# Patient Record
Sex: FEMALE | Race: WHITE | NOT HISPANIC OR LATINO | Employment: OTHER | URBAN - METROPOLITAN AREA
[De-identification: names, ages, dates, MRNs, and addresses within clinical notes are randomized per-mention and may not be internally consistent; named-entity substitution may affect disease eponyms.]

---

## 2017-01-02 DIAGNOSIS — I63.9 CEREBROVASCULAR ACCIDENT (CVA), UNSPECIFIED MECHANISM (H): Primary | ICD-10-CM

## 2017-01-02 RX ORDER — BACLOFEN 10 MG/1
10 TABLET ORAL 3 TIMES DAILY
Qty: 90 TABLET | Refills: 0 | Status: CANCELLED | OUTPATIENT
Start: 2017-01-02

## 2017-01-02 RX ORDER — BACLOFEN 10 MG/1
10 TABLET ORAL 3 TIMES DAILY
Qty: 90 TABLET | Refills: 0 | Status: SHIPPED | OUTPATIENT
Start: 2017-01-02 | End: 2017-01-05

## 2017-01-02 NOTE — TELEPHONE ENCOUNTER
Pt calling for refill of baclofen,  Pharmacy is [facility] on  [address] in [place].  Please call pt when called in or if you have any questions.  OK to leave message on voicemail.

## 2017-01-02 NOTE — TELEPHONE ENCOUNTER
Requesting 90 day supply  baclofen      Last Written Prescription Date:  1/2/17  Last Fill Quantity: 90,   # refills: 0  Last Office Visit with [facility], [facility] or [facility] prescribing provider: 5/2/16  Future Office visit:    Next 5 appointments (look out 90 days)     Jan 06, 2017  8:00 AM   PHYSICAL with Joel Daniel Irwin Wegener, MD   [facility]    [address] [place] [id number] [phone]                   Routing refill request to provider for review/approval because:  Drug not on the [facility], [facility] or [facility] refill protocol or controlled substance

## 2017-01-02 NOTE — TELEPHONE ENCOUNTER
Routing refill request to provider for review/approval because:  Drug not on the [facility] refill protocol     Routing to PCP.    Dr. Wegener-Please sign if agree.    Thank you!  CARYN WatsonN, RN    baclofen (LIORESAL) 10 MG tablet      Last Written Prescription Date: 11/1/16  Last Fill Quantity: 90,  # refills: 0   Last Office Visit with [facility], [facility] or [facility] prescribing provider: 5/2/16 with Dr. Wegener                                         Next 5 appointments (look out 90 days)     Jan 06, 2017  8:00 AM   PHYSICAL with Joel Daniel Irwin Wegener, MD   [facility]    [address] [place] [id number] [phone]

## 2017-01-03 ENCOUNTER — TELEPHONE (OUTPATIENT)
Dept: FAMILY MEDICINE | Facility: CLINIC | Age: 67
End: 2017-01-03

## 2017-01-03 DIAGNOSIS — I63.9 CEREBROVASCULAR ACCIDENT (CVA), UNSPECIFIED MECHANISM (H): Primary | ICD-10-CM

## 2017-01-03 NOTE — TELEPHONE ENCOUNTER
Pharmacy is requesting a 90 day supply on Baclofen 10 mg or a dispense qty of #270.  Prescription was written for a 30 day supply or #90 with directions of Take 1 tablet by mouth 3 times daily  Please advise.  Can patient get a 90 day supply?  Medication not on RN protocol.  Ora Rios, RN

## 2017-01-03 NOTE — TELEPHONE ENCOUNTER
This is a duplicate request.  See other 1/2/17 refill encounter.    Closing this encounter.  CARYN WatsonN, RN

## 2017-01-05 RX ORDER — BACLOFEN 10 MG/1
10 TABLET ORAL 3 TIMES DAILY
Qty: 270 TABLET | Refills: 3 | Status: SHIPPED | OUTPATIENT
Start: 2017-01-05 | End: 2017-12-04

## 2017-01-06 ENCOUNTER — OFFICE VISIT (OUTPATIENT)
Dept: FAMILY MEDICINE | Facility: CLINIC | Age: 67
End: 2017-01-06
Payer: MEDICARE

## 2017-01-06 VITALS
HEIGHT: 66 IN | OXYGEN SATURATION: 96 % | SYSTOLIC BLOOD PRESSURE: 124 MMHG | DIASTOLIC BLOOD PRESSURE: 66 MMHG | TEMPERATURE: 97.7 F | BODY MASS INDEX: 24.91 KG/M2 | RESPIRATION RATE: 12 BRPM | HEART RATE: 60 BPM | WEIGHT: 155 LBS

## 2017-01-06 DIAGNOSIS — F33.0 MAJOR DEPRESSIVE DISORDER, RECURRENT EPISODE, MILD (H): ICD-10-CM

## 2017-01-06 DIAGNOSIS — Z12.4 CERVICAL CANCER SCREENING: ICD-10-CM

## 2017-01-06 DIAGNOSIS — Z13.820 SCREENING FOR OSTEOPOROSIS: ICD-10-CM

## 2017-01-06 DIAGNOSIS — Z00.01 ENCOUNTER FOR GENERAL ADULT MEDICAL EXAMINATION WITH ABNORMAL FINDINGS: Primary | ICD-10-CM

## 2017-01-06 DIAGNOSIS — N18.30 TYPE 2 DIABETES MELLITUS WITH STAGE 3 CHRONIC KIDNEY DISEASE, WITH LONG-TERM CURRENT USE OF INSULIN (H): ICD-10-CM

## 2017-01-06 DIAGNOSIS — M10.9 GOUT, UNSPECIFIED CAUSE, UNSPECIFIED CHRONICITY, UNSPECIFIED SITE: ICD-10-CM

## 2017-01-06 DIAGNOSIS — I10 HYPERTENSION GOAL BP (BLOOD PRESSURE) < 140/90: ICD-10-CM

## 2017-01-06 DIAGNOSIS — Z23 NEED FOR VACCINATION: ICD-10-CM

## 2017-01-06 DIAGNOSIS — E78.5 HYPERLIPIDEMIA LDL GOAL <70: ICD-10-CM

## 2017-01-06 DIAGNOSIS — I10 HYPERTENSION, UNCONTROLLED: ICD-10-CM

## 2017-01-06 DIAGNOSIS — Z79.4 TYPE 2 DIABETES MELLITUS WITH STAGE 3 CHRONIC KIDNEY DISEASE, WITH LONG-TERM CURRENT USE OF INSULIN (H): ICD-10-CM

## 2017-01-06 DIAGNOSIS — J30.2 SEASONAL ALLERGIC RHINITIS, UNSPECIFIED ALLERGIC RHINITIS TRIGGER: ICD-10-CM

## 2017-01-06 DIAGNOSIS — Z78.0 POSTMENOPAUSAL STATUS: ICD-10-CM

## 2017-01-06 DIAGNOSIS — Z11.51 SCREENING FOR HUMAN PAPILLOMAVIRUS: ICD-10-CM

## 2017-01-06 DIAGNOSIS — E11.22 TYPE 2 DIABETES MELLITUS WITH STAGE 3 CHRONIC KIDNEY DISEASE, WITHOUT LONG-TERM CURRENT USE OF INSULIN (H): ICD-10-CM

## 2017-01-06 DIAGNOSIS — D51.9 ANEMIA DUE TO VITAMIN B12 DEFICIENCY, UNSPECIFIED B12 DEFICIENCY TYPE: ICD-10-CM

## 2017-01-06 DIAGNOSIS — E11.22 TYPE 2 DIABETES MELLITUS WITH STAGE 3 CHRONIC KIDNEY DISEASE, WITH LONG-TERM CURRENT USE OF INSULIN (H): ICD-10-CM

## 2017-01-06 DIAGNOSIS — N18.30 TYPE 2 DIABETES MELLITUS WITH STAGE 3 CHRONIC KIDNEY DISEASE, WITHOUT LONG-TERM CURRENT USE OF INSULIN (H): ICD-10-CM

## 2017-01-06 DIAGNOSIS — Z23 NEED FOR PROPHYLACTIC VACCINATION AND INOCULATION AGAINST INFLUENZA: ICD-10-CM

## 2017-01-06 DIAGNOSIS — Z12.4 SCREENING FOR CERVICAL CANCER: ICD-10-CM

## 2017-01-06 DIAGNOSIS — Z11.59 NEED FOR HEPATITIS C SCREENING TEST: ICD-10-CM

## 2017-01-06 LAB
ALT SERPL W P-5'-P-CCNC: 21 U/L (ref 0–50)
ANION GAP SERPL CALCULATED.3IONS-SCNC: 9 MMOL/L (ref 3–14)
AST SERPL W P-5'-P-CCNC: 12 U/L (ref 0–45)
BASOPHILS # BLD AUTO: 0 10E9/L (ref 0–0.2)
BASOPHILS NFR BLD AUTO: 0.4 %
BUN SERPL-MCNC: 28 MG/DL (ref 7–30)
CALCIUM SERPL-MCNC: 9.5 MG/DL (ref 8.5–10.1)
CHLORIDE SERPL-SCNC: 109 MMOL/L (ref 94–109)
CHOLEST SERPL-MCNC: 148 MG/DL
CO2 SERPL-SCNC: 24 MMOL/L (ref 20–32)
CREAT SERPL-MCNC: 0.93 MG/DL (ref 0.52–1.04)
DIFFERENTIAL METHOD BLD: NORMAL
EOSINOPHIL # BLD AUTO: 0.4 10E9/L (ref 0–0.7)
EOSINOPHIL NFR BLD AUTO: 5.2 %
ERYTHROCYTE [DISTWIDTH] IN BLOOD BY AUTOMATED COUNT: 12.6 % (ref 10–15)
GFR SERPL CREATININE-BSD FRML MDRD: 61 ML/MIN/1.7M2
GLUCOSE SERPL-MCNC: 139 MG/DL (ref 70–99)
HBA1C MFR BLD: 6.9 % (ref 4.3–6)
HCT VFR BLD AUTO: 40.4 % (ref 35–47)
HCV AB SERPL QL IA: NORMAL
HDLC SERPL-MCNC: 63 MG/DL
HGB BLD-MCNC: 12.8 G/DL (ref 11.7–15.7)
LDLC SERPL CALC-MCNC: 63 MG/DL
LYMPHOCYTES # BLD AUTO: 1.9 10E9/L (ref 0.8–5.3)
LYMPHOCYTES NFR BLD AUTO: 23.5 %
MCH RBC QN AUTO: 30.9 PG (ref 26.5–33)
MCHC RBC AUTO-ENTMCNC: 31.7 G/DL (ref 31.5–36.5)
MCV RBC AUTO: 98 FL (ref 78–100)
MONOCYTES # BLD AUTO: 0.4 10E9/L (ref 0–1.3)
MONOCYTES NFR BLD AUTO: 5.3 %
NEUTROPHILS # BLD AUTO: 5.3 10E9/L (ref 1.6–8.3)
NEUTROPHILS NFR BLD AUTO: 65.6 %
NONHDLC SERPL-MCNC: 85 MG/DL
PLATELET # BLD AUTO: 275 10E9/L (ref 150–450)
POTASSIUM SERPL-SCNC: 4.7 MMOL/L (ref 3.4–5.3)
RBC # BLD AUTO: 4.14 10E12/L (ref 3.8–5.2)
SODIUM SERPL-SCNC: 142 MMOL/L (ref 133–144)
TRIGL SERPL-MCNC: 109 MG/DL
TSH SERPL DL<=0.005 MIU/L-ACNC: 1.73 MU/L (ref 0.4–4)
URATE SERPL-MCNC: 4.5 MG/DL (ref 2.6–6)
VIT B12 SERPL-MCNC: >6000 PG/ML (ref 193–986)
WBC # BLD AUTO: 8 10E9/L (ref 4–11)

## 2017-01-06 PROCEDURE — 80048 BASIC METABOLIC PNL TOTAL CA: CPT | Performed by: FAMILY MEDICINE

## 2017-01-06 PROCEDURE — 36415 COLL VENOUS BLD VENIPUNCTURE: CPT | Performed by: FAMILY MEDICINE

## 2017-01-06 PROCEDURE — G0145 SCR C/V CYTO,THINLAYER,RESCR: HCPCS | Performed by: FAMILY MEDICINE

## 2017-01-06 PROCEDURE — 83036 HEMOGLOBIN GLYCOSYLATED A1C: CPT | Performed by: FAMILY MEDICINE

## 2017-01-06 PROCEDURE — 86803 HEPATITIS C AB TEST: CPT | Performed by: FAMILY MEDICINE

## 2017-01-06 PROCEDURE — 82607 VITAMIN B-12: CPT | Performed by: FAMILY MEDICINE

## 2017-01-06 PROCEDURE — 80061 LIPID PANEL: CPT | Performed by: FAMILY MEDICINE

## 2017-01-06 PROCEDURE — G0009 ADMIN PNEUMOCOCCAL VACCINE: HCPCS | Performed by: FAMILY MEDICINE

## 2017-01-06 PROCEDURE — 85025 COMPLETE CBC W/AUTO DIFF WBC: CPT | Performed by: FAMILY MEDICINE

## 2017-01-06 PROCEDURE — 84443 ASSAY THYROID STIM HORMONE: CPT | Performed by: FAMILY MEDICINE

## 2017-01-06 PROCEDURE — 90732 PPSV23 VACC 2 YRS+ SUBQ/IM: CPT | Performed by: FAMILY MEDICINE

## 2017-01-06 PROCEDURE — G0438 PPPS, INITIAL VISIT: HCPCS | Performed by: FAMILY MEDICINE

## 2017-01-06 PROCEDURE — 99000 SPECIMEN HANDLING OFFICE-LAB: CPT | Performed by: FAMILY MEDICINE

## 2017-01-06 PROCEDURE — 84450 TRANSFERASE (AST) (SGOT): CPT | Performed by: FAMILY MEDICINE

## 2017-01-06 PROCEDURE — 84460 ALANINE AMINO (ALT) (SGPT): CPT | Performed by: FAMILY MEDICINE

## 2017-01-06 PROCEDURE — 84550 ASSAY OF BLOOD/URIC ACID: CPT | Performed by: FAMILY MEDICINE

## 2017-01-06 RX ORDER — LISINOPRIL 5 MG/1
5 TABLET ORAL DAILY
Qty: 90 TABLET | Refills: 3 | Status: SHIPPED | OUTPATIENT
Start: 2017-01-06 | End: 2017-03-17 | Stop reason: SINTOL

## 2017-01-06 RX ORDER — MONTELUKAST SODIUM 10 MG/1
10 TABLET ORAL DAILY
Qty: 90 TABLET | Refills: 3 | Status: SHIPPED | OUTPATIENT
Start: 2017-01-06 | End: 2017-12-04

## 2017-01-06 RX ORDER — CITALOPRAM HYDROBROMIDE 20 MG/1
20 TABLET ORAL DAILY
Qty: 90 TABLET | Refills: 3 | Status: SHIPPED | OUTPATIENT
Start: 2017-01-06 | End: 2017-03-17

## 2017-01-06 RX ORDER — METOPROLOL SUCCINATE 50 MG/1
50 TABLET, EXTENDED RELEASE ORAL DAILY
Qty: 90 TABLET | Refills: 3 | Status: SHIPPED | OUTPATIENT
Start: 2017-01-06 | End: 2017-12-04

## 2017-01-06 RX ORDER — ATORVASTATIN CALCIUM 40 MG/1
40 TABLET, FILM COATED ORAL DAILY
Qty: 90 TABLET | Refills: 3 | Status: SHIPPED | OUTPATIENT
Start: 2017-01-06 | End: 2017-03-17

## 2017-01-06 NOTE — NURSING NOTE
The following medication was given:     MEDICATION: Vitamin B12  1000mcg  ROUTE: IM  SITE: Forearm - Left  DOSE: 1mL  LOT #: [id number]  :  [employer] [place]  EXPIRATION DATE:  5/1/2018  NDC#: 1722-1217-21  Sun Luong CMA

## 2017-01-06 NOTE — NURSING NOTE
"Chief Complaint   Patient presents with     Physical       Initial /66 mmHg  Pulse 60  Temp(Src) 97.7  F (36.5  C) (Tympanic)  Resp 12  Ht 5' 6\" (1.676 m)  Wt 155 lb (70.308 kg)  BMI 25.03 kg/m2  SpO2 96% Estimated body mass index is 25.03 kg/(m^2) as calculated from the following:    Height as of this encounter: 5' 6\" (1.676 m).    Weight as of this encounter: 155 lb (70.308 kg).  BP completed using cuff size: albertina Luong CMA      "

## 2017-01-06 NOTE — MR AVS SNAPSHOT
After Visit Summary   1/6/2017    Addis Peña    MRN: [id number]           Patient Information     Date Of Birth          1950        Visit Information        Provider Department      1/6/2017 8:00 AM Wegener, Joel Daniel Irwin, MD [facility]        Today's Diagnoses     Encounter for general adult medical examination with abnormal findings    -  1     Hypertension goal BP (blood pressure) < 140/90         Type 2 diabetes mellitus with stage 3 chronic kidney disease, without long-term current use of insulin (H)         Gout, unspecified cause, unspecified chronicity, unspecified site         Hyperlipidemia LDL goal <70         Anemia due to vitamin B12 deficiency, unspecified B12 deficiency type         Major depressive disorder, recurrent episode, mild (H)         Type 2 diabetes mellitus with stage 3 chronic kidney disease, with long-term current use of insulin (H)         Seasonal allergic rhinitis, unspecified allergic rhinitis trigger         Hypertension, uncontrolled         Need for hepatitis C screening test         Screening for osteoporosis         Screening for cervical cancer         Postmenopausal status            Follow-ups after your visit        Your next 10 appointments already scheduled     Feb 03, 2017 10:00 AM   Nurse Only with [facility]   [facility]    [address] [place] [id number] [phone]              Future tests that were ordered for you today     Open Future Orders        Priority Expected Expires Ordered    DX Hip/Pelvis/Spine Routine  1/6/2018 1/6/2017            Who to contact     If you have questions or need follow up information about today's clinic visit or your schedule please contact [facility] directly at [phone].  Normal or non-critical lab and imaging results will be communicated to you by MyChart, letter or phone within 4 business days  "after the clinic has received the results. If you do not hear from us within 7 days, please contact the clinic through [employer] or phone. If you have a critical or abnormal lab result, we will notify you by phone as soon as possible.  Submit refill requests through [employer] or call your pharmacy and they will forward the refill request to us. Please allow 3 business days for your refill to be completed.          Additional Information About Your Visit        [employer] Information     [employer] lets you send messages to your doctor, view your test results, renew your prescriptions, schedule appointments and more. To sign up, go to [email or web address] . Click on \"Log in\" on the left side of the screen, which will take you to the Welcome page. Then click on \"Sign up Now\" on the right side of the page.     You will be asked to enter the access code listed below, as well as some personal information. Please follow the directions to create your username and password.     Your access code is: [email or web address]  Expires: 2017  5:30 AM     Your access code will  in 90 days. If you need help or a new code, please call your [place] clinic or [phone].        Care EveryWhere ID     This is your Care EveryWhere ID. This could be used by other organizations to access your [place] medical records  [id number]        Your Vitals Were     Pulse Temperature Respirations Height BMI (Body Mass Index) Pulse Oximetry    60 97.7  F (36.5  C) (Tympanic) 12 5' 6\" (1.676 m) 25.03 kg/m2 96%       Blood Pressure from Last 3 Encounters:   17 124/66   16 130/68   16 142/68    Weight from Last 3 Encounters:   17 155 lb (70.308 kg)   16 154 lb (69.854 kg)   16 154 lb 1.6 oz (69.9 kg)              We Performed the Following     ALT     AST     Basic metabolic panel     CBC with platelets differential     Hemoglobin A1c     Hepatitis C Screen Reflex to HCV RNA Quant and Genotype     Lipid panel reflex " to direct LDL     Pap imaged thin layer screen with HPV - recommended age 30 - 65 years (select HPV order below)     TSH with free T4 reflex     Uric acid     Vitamin B12          Where to get your medicines      These medications were sent to [id number] [place] - [address] AT [facility] [address]  [address], [place] [address]     Phone:  [phone]    - atorvastatin 40 MG tablet  - citalopram 20 MG tablet  - lisinopril 5 MG tablet  - metFORMIN 1000 MG tablet  - metoprolol 50 MG 24 hr tablet  - montelukast 10 MG tablet       Primary Care Provider Office Phone # Fax #    Joel Daniel Irwin Wegener, [phone]       [facility] [address]  [facility] [address]        Thank you!     Thank you for choosing [facility]  for your care. Our goal is always to provide you with excellent care. Hearing back from our patients is one way we can continue to improve our services. Please take a few minutes to complete the written survey that you may receive in the mail after your visit with us. Thank you!             Your Updated Medication List - Protect others around you: Learn how to safely use, store and throw away your medicines at www.disposemymeds.org.          This list is accurate as of: 1/6/17  8:52 AM.  Always use your most recent med list.                   Brand Name Dispense Instructions for use    amLODIPine 10 MG tablet    NORVASC    90 tablet    Take 1 tablet (10 mg) by mouth daily       aspirin 81 MG tablet      Take 1 tablet by mouth daily.       atorvastatin 40 MG tablet    LIPITOR    90 tablet    Take 1 tablet (40 mg) by mouth daily       baclofen 10 MG tablet    LIORESAL    270 tablet    Take 1 tablet (10 mg) by mouth 3 times daily       blood glucose monitoring test strip    IRENE CONTOUR    200 each    Use to test blood sugars two times daily or as directed.       cholecalciferol 1000 UNIT tablet    vitamin D     Take 1,000 Units by  mouth daily       citalopram 20 MG tablet    celeXA    90 tablet    Take 1 tablet (20 mg) by mouth daily       cyanocobalamin 1000 MCG/ML injection    VITAMIN B12    1 mL    Inject 1 mL (1,000 mcg) into the muscle every 30 days Order for clinic injection,  No need to fill.       dextromethorphan 15 MG/5ML syrup      Take 10 mLs by mouth 2 times daily as needed       insulin pen needle 31G X 5 MM     30 each    See Admin Instructions Use once time(s) a day for use with lantus solostar pen.       Lactase 250 MG Caps      Take 1 chew tab by mouth as needed (with dairy)       lisinopril 5 MG tablet    PRINIVIL/ZESTRIL    90 tablet    Take 1 tablet (5 mg) by mouth daily       metFORMIN 1000 MG tablet    GLUCOPHAGE    180 tablet    Take 1 tablet (1,000 mg) by mouth 2 times daily (with meals)       metoprolol 50 MG 24 hr tablet    TOPROL-XL    90 tablet    Take 1 tablet (50 mg) by mouth daily       montelukast 10 MG tablet    SINGULAIR    90 tablet    Take 1 tablet (10 mg) by mouth daily       senna-docusate 8.6-50 MG per tablet    SENOKOT-S;PERICOLACE    60 tablet    Take 1 tablet by mouth 2 times daily To be taken with opioid (narcotic) pain medication to prevent constipation.       TYLENOL 500 MG tablet   Generic drug:  acetaminophen      Take 500 mg by mouth 2 times daily

## 2017-01-06 NOTE — Clinical Note
[place] [address] [place] [id number] [phone]      January 12, 2017    Addis Peña  [address]    [place] [address]    Dear Addis,    I am happy to inform you that your cervical cancer screening test (PAP smear) was normal and the test for HPV (Human Papilloma Virus) test was also negative or normal.    Per guidelines, you no longer need to have pap smears completed. You will still need to return to the clinic every year for an annual exam and other preventive tests.    Please return on or after 01/06/18 for your next physical exam.    Please contact my office if you have further questions.        Sincerely,    Joel Daniel Wegener, MD./  Cate Gilmore  Pap Tracking RN

## 2017-01-06 NOTE — PROGRESS NOTES
SUBJECTIVE:                                                            Addis Peña is a 66 year old female who presents for Preventive Visit.      Are you in the first 12 months of your Medicare coverage?  No    Physical  Annual:     Getting at least 3 servings of Calcium per day::  Yes    Bi-annual eye exam::  Yes    Dental care twice a year::  Yes    Sleep apnea or symptoms of sleep apnea::  None    Diet::  Diabetic    Frequency of exercise::  6-7 days/week    Duration of exercise::  30-45 minutes    Taking medications regularly::  Yes    Medication side effects::  None    Additional concerns today::  YES      COGNITIVE SCREEN  1) Repeat 3 items (Banana, Sunrise, Chair)    2) Clock draw: NORMAL  3) 3 item recall: Recalls 3 objects  Results: 3 items recalled: COGNITIVE IMPAIRMENT LESS LIKELY    Mini-CogTM Copyright S Jennifer. Licensed by the author for use in [place] [employer]; reprinted with permission (carina@[facility]). All rights reserved.        All Histories reviewed and updated in EPIC as appropriate.  Social History   Substance Use Topics     Smoking status: Never Smoker      Smokeless tobacco: Never Used     Alcohol Use: No       The patient does not drink >3 drinks per day nor >7 drinks per week.        -------------------------------------    Today's PHQ-2 Score:   PHQ-2 ( 1999 Pfizer) 1/6/2017   Q1: Little interest or pleasure in doing things -   Q2: Feeling down, depressed or hopeless -   PHQ-2 Score -   Little interest or pleasure in doing things Not at all   Feeling down, depressed or hopeless Not at all   PHQ-2 Score 0       Do you feel safe in your environment - Yes    Do you have a Health Care Directive?: Yes: Advance Directive has been received and scanned.    Current providers sharing in care for this patient include:   Patient Care Team:  Wegener, Joel Daniel Irwin, MD as PCP - General (Family Practice)  Madelaine Roland MD as MD (Urology)  Wegener, Joel Daniel Irwin, MD as  "Referring Physician (Family Practice)  Annette Perez, RN as Registered Nurse (Urology)      Hearing impairment: No    Ability to successfully perform activities of daily living: No, needs assistance with: transportation, shopping, preparing meals, housework, bathing and laundry     Fall risk:       Home safety:  none identified      The following health maintenance items are reviewed in Epic and correct as of today:  Health Maintenance   Topic Date Due     HEPATITIS C SCREENING  10/22/1969     FOOT EXAM Q1 YEAR( NO INBASKET)  07/31/2016     PHQ-9 Q6 MONTHS (NO INBASKET)  08/10/2016     INFLUENZA VACCINE (SYSTEM ASSIGNED)  09/01/2016     EYE EXAM Q1 YEAR( NO INBASKET)  09/10/2016     FALL RISK ASSESSMENT  10/22/2016     DEXA SCAN SCREENING (SYSTEM ASSIGNED)  10/22/2016     ADVANCE DIRECTIVE PLANNING Q5 YRS (NO INBASKET)  12/09/2016     PNEUMOCOCCAL (2 of 2 - PPSV23) 01/15/2017     DEPRESSION ACTION PLAN Q1 YR (NO INBASKET)  01/15/2017     LIPID MONITORING Q1 YEAR( NO INBASKET)  02/05/2017     MICROALBUMIN Q1 YEAR( NO INBASKET)  02/05/2017     RICCO QUESTIONNAIRE 1 YEAR  02/10/2017     BMP Q6 MOS (NO INBASKET)  04/11/2017     A1C Q6 MO( NO INBASKET)  04/11/2017     MAMMO Q1 YR NO INBASKET MESSAGE  11/11/2017     PAP Q3 YR  12/19/2017     TSH W/ FREE T4 REFLEX Q2 YEAR (NO INBASKET)  12/23/2017     COLONOSCOPY Q3 YR INBASKET MESSAGE  12/04/2018     TETANUS IMMUNIZATION (SYSTEM ASSIGNED)  02/01/2020           ROS:  Constitutional, HEENT, cardiovascular, pulmonary, GI, , musculoskeletal, neuro, skin, endocrine and psych systems are negative, except as otherwise noted.    Problem list, Medication list, Allergies, and Medical/Social/Surgical histories reviewed in [facility] and updated as appropriate.  OBJECTIVE:                                                            /66 mmHg  Pulse 60  Temp(Src) 97.7  F (36.5  C) (Tympanic)  Resp 12  Ht 5' 6\" (1.676 m)  Wt 155 lb (70.308 kg)  BMI 25.03 kg/m2  SpO2 96% " "Estimated body mass index is 25.03 kg/(m^2) as calculated from the following:    Height as of this encounter: 5' 6\" (1.676 m).    Weight as of this encounter: 155 lb (70.308 kg).  EXAM:   GENERAL: healthy, alert and no distress  EYES: Eyes grossly normal to inspection, PERRL and conjunctivae and sclerae normal  RESP: lungs clear to auscultation - no rales, rhonchi or wheezes  BREAST: normal without masses, tenderness or nipple discharge and no palpable axillary masses or adenopathy  CV: regular rate and rhythm, normal S1 S2, no S3 or S4, no murmur, click or rub, no peripheral edema and peripheral pulses strong  ABDOMEN: soft, nontender, no hepatosplenomegaly, no masses and bowel sounds normal  SKIN: no suspicious lesions or rashes  PSYCH: mentation appears normal, affect normal/bright    ASSESSMENT / PLAN:                                                            ASSESSMENT AND PLAN  1. Encounter for general adult medical examination with abnormal findings  pcv 23 repeat today since age 66.  Had flu shot already.  Pap smear today since only two on file at age 66, due for mammogram next year and up to date on colon cancer screening.     dexa scan ordered.     In context of h/o stroke, diabetes, problems below with right sided weakness.  Uses wheelchair, does not walk, has orthotic supports for transfers.  Here with spouse today who helps to care for her.     2. Hypertension goal BP (blood pressure) < 140/90  At goal, labs, refills.   - Basic metabolic panel  - metoprolol (TOPROL-XL) 50 MG 24 hr tablet; Take 1 tablet (50 mg) by mouth daily  Dispense: 90 tablet; Refill: 3    3. Type 2 diabetes mellitus with stage 3 chronic kidney disease, without long-term current use of insulin (H)  Last a1c at goal.   Sugars today fasting /140-160s after meals.   On appropriate ace inhibitor, statin metformin.   - Hemoglobin A1c  - TSH with free T4 reflex    4. Gout, unspecified cause, unspecified chronicity, unspecified " site  Labs today.   - Uric acid    5. Hyperlipidemia LDL goal <70  Labs todayl  - ALT  - AST  - Lipid panel reflex to direct LDL  - atorvastatin (LIPITOR) 40 MG tablet; Take 1 tablet (40 mg) by mouth daily  Dispense: 90 tablet; Refill: 3    6. Anemia due to vitamin B12 deficiency, unspecified B12 deficiency type  Labs today.   - CBC with platelets differential  - Vitamin B12    7. Major depressive disorder, recurrent episode, mild (H)  Currently stating mood is good.   - citalopram (CELEXA) 20 MG tablet; Take 1 tablet (20 mg) by mouth daily  Dispense: 90 tablet; Refill: 3    8. Type 2 diabetes mellitus with stage 3 chronic kidney disease, with long-term current use of insulin (H)    - metFORMIN (GLUCOPHAGE) 1000 MG tablet; Take 1 tablet (1,000 mg) by mouth 2 times daily (with meals)  Dispense: 180 tablet; Refill: 3  - lisinopril (PRINIVIL/ZESTRIL) 5 MG tablet; Take 1 tablet (5 mg) by mouth daily  Dispense: 90 tablet; Refill: 3    9. Seasonal allergic rhinitis, unspecified allergic rhinitis trigger    - montelukast (SINGULAIR) 10 MG tablet; Take 1 tablet (10 mg) by mouth daily  Dispense: 90 tablet; Refill: 3    10. Hypertension, uncontrolled    - metoprolol (TOPROL-XL) 50 MG 24 hr tablet; Take 1 tablet (50 mg) by mouth daily  Dispense: 90 tablet; Refill: 3    11. Need for hepatitis C screening test    - Hepatitis C Screen Reflex to HCV RNA Quant and Genotype    12. Screening for osteoporosis    - DX Hip/Pelvis/Spine; Future    13. Screening for cervical cancer    - Pap imaged thin layer screen with HPV - recommended age 30 - 65 years (select HPV order below)    14. Postmenopausal status    - DX Hip/Pelvis/Spine; Future    15. Need for prophylactic vaccination and inoculation against influenza    - Vaccine Administration, Initial [26816]        ANCELMOHART SIGNUP FOR E-VISITS AND EASIER COMMUNICATION:  [email or web address]     Zipnosis:  [employer] Complete.  Sign up for e-visits for common illnesses!  "    RADIOLOGY:   [facility]:  [phone] to schedule any radiology tests at [facility]: [phone]    Mammograms/colonoscopies:  [phone]    CONSUMER PRICE LINE for estimates of test costs:  [phone]           End of Life Planning:  Patient currently has an advanced directive: on file per problem list.     COUNSELING:  Reviewed preventive health counseling, as reflected in patient instructions       Dental care       Colon cancer screening        Estimated body mass index is 25.03 kg/(m^2) as calculated from the following:    Height as of this encounter: 5' 6\" (1.676 m).    Weight as of this encounter: 155 lb (70.308 kg).     reports that she has never smoked. She has never used smokeless tobacco.      Appropriate preventive services were discussed with this patient, including applicable screening as appropriate for cardiovascular disease, diabetes, osteopenia/osteoporosis, and glaucoma.  As appropriate for age/gender, discussed screening for colorectal cancer, prostate cancer, breast cancer, and cervical cancer. Checklist reviewing preventive services available has been given to the patient.    Reviewed patients plan of care and provided an AVS. The Intermediate Care Plan ( asthma action plan, low back pain action plan, and migraine action plan) for Addis meets the Care Plan requirement. This Care Plan has been established and reviewed with the Patient and spouse.    Counseling Resources:  ATP IV Guidelines  Pooled Cohorts Equation Calculator  Breast Cancer Risk Calculator  FRAX Risk Assessment  ICSI Preventive Guidelines  Dietary Guidelines for Americans, 2010  USDA's MyPlate  ASA Prophylaxis  Lung CA Screening    Joel Daniel Wegener, MD  [facility]  Answers for HPI/ROS submitted by the patient on 1/6/2017   PHQ-2 Depressed: Not at all, Not at all  PHQ-2 Score: 0      Injectable Influenza Immunization Documentation    1.  Is the person to be vaccinated sick " today?  No    2. Does the person to be vaccinated have an allergy to eggs or to a component of the vaccine?  No    3. Has the person to be vaccinated today ever had a serious reaction to influenza vaccine in the past?  No    4. Has the person to be vaccinated ever had Guillain-Manor syndrome?  No     Form completed by Sun Luong CMA

## 2017-01-07 ASSESSMENT — PATIENT HEALTH QUESTIONNAIRE - PHQ9: SUM OF ALL RESPONSES TO PHQ QUESTIONS 1-9: 0

## 2017-01-10 LAB
COPATH REPORT: NORMAL
PAP: NORMAL

## 2017-01-10 PROCEDURE — 87624 HPV HI-RISK TYP POOLED RSLT: CPT | Performed by: FAMILY MEDICINE

## 2017-01-11 LAB
FINAL DIAGNOSIS: NORMAL
HPV HR 12 DNA CVX QL NAA+PROBE: NEGATIVE
HPV16 DNA SPEC QL NAA+PROBE: NEGATIVE
HPV18 DNA SPEC QL NAA+PROBE: NEGATIVE
SPECIMEN DESCRIPTION: NORMAL

## 2017-01-12 PROBLEM — Z12.4 CERVICAL CANCER SCREENING: Status: ACTIVE | Noted: 2017-01-12

## 2017-01-17 ENCOUNTER — TELEPHONE (OUTPATIENT)
Dept: FAMILY MEDICINE | Facility: CLINIC | Age: 67
End: 2017-01-17

## 2017-01-17 DIAGNOSIS — D51.9 ANEMIA DUE TO VITAMIN B12 DEFICIENCY, UNSPECIFIED B12 DEFICIENCY TYPE: Primary | ICD-10-CM

## 2017-01-17 NOTE — TELEPHONE ENCOUNTER
Reason for Call:  Other call back    Detailed comments: Pt received her lab results today 01/17/16. She states that her B12 was high. She did have a B12 shot right before her lab work on 01/06/17. She thinks that is the reason why her B12 is very high. If there are any questions, please contact pt. Thanks!    Phone Number Patient can be reached at: Home number on file [phone] (home)    Best Time: Anytime    Can we leave a detailed message on this number? YES    Call taken on 1/17/2017 at 3:17 PM by Doreen Matos

## 2017-01-19 ENCOUNTER — RADIANT APPOINTMENT (OUTPATIENT)
Dept: BONE DENSITY | Facility: CLINIC | Age: 67
End: 2017-01-19
Attending: FAMILY MEDICINE
Payer: MEDICARE

## 2017-01-19 DIAGNOSIS — Z78.0 POSTMENOPAUSAL STATUS: ICD-10-CM

## 2017-01-19 DIAGNOSIS — Z13.820 SCREENING FOR OSTEOPOROSIS: ICD-10-CM

## 2017-01-19 PROCEDURE — 77080 DXA BONE DENSITY AXIAL: CPT | Performed by: INTERNAL MEDICINE

## 2017-01-20 NOTE — TELEPHONE ENCOUNTER
I agree that could be.  I recommend she re-check her level on the day of her next injection before she takes the injection. Joel Wegener,MD

## 2017-01-23 NOTE — TELEPHONE ENCOUNTER
Called and relayed message below from Dr. Wegener. Pt agrees she will schedule a lab visit prior to her next injection.    Teresa Couch CMA

## 2017-01-25 ENCOUNTER — TELEPHONE (OUTPATIENT)
Dept: FAMILY MEDICINE | Facility: CLINIC | Age: 67
End: 2017-01-25

## 2017-01-25 DIAGNOSIS — E55.9 VITAMIN D DEFICIENCY: Primary | ICD-10-CM

## 2017-01-25 NOTE — TELEPHONE ENCOUNTER
Pt called about Dexa result letter.  Pt was instructed to f/u with pcp.    She is wondering if you will rx something or if she should be on vit D and calcium.  She already takes 1000 units of Vit D.    Ok to leave a detailed message.  TYLER Lozada

## 2017-01-26 NOTE — TELEPHONE ENCOUNTER
Dr. Wegener - Paulette has a lab appointment on the 3rd of February, I pended a Vitamin D order and marked it future, if you approve please sign and she will get her vitamin d checked the same time.   Thank you    Patel Watson CMA

## 2017-01-26 NOTE — TELEPHONE ENCOUNTER
Per Dr. Wegener,  Call patient and let her know that we will check the vitamin level at next visit.   Patel Watson, CMA

## 2017-02-03 ENCOUNTER — ALLIED HEALTH/NURSE VISIT (OUTPATIENT)
Dept: NURSING | Facility: CLINIC | Age: 67
End: 2017-02-03
Payer: MEDICARE

## 2017-02-03 DIAGNOSIS — E55.9 VITAMIN D DEFICIENCY: ICD-10-CM

## 2017-02-03 DIAGNOSIS — D51.9 ANEMIA DUE TO VITAMIN B12 DEFICIENCY, UNSPECIFIED B12 DEFICIENCY TYPE: ICD-10-CM

## 2017-02-03 DIAGNOSIS — D51.9 ANEMIA DUE TO VITAMIN B12 DEFICIENCY, UNSPECIFIED B12 DEFICIENCY TYPE: Primary | ICD-10-CM

## 2017-02-03 LAB
DEPRECATED CALCIDIOL+CALCIFEROL SERPL-MC: 40 UG/L (ref 20–75)
VIT B12 SERPL-MCNC: 517 PG/ML (ref 193–986)

## 2017-02-03 PROCEDURE — 82607 VITAMIN B-12: CPT | Performed by: FAMILY MEDICINE

## 2017-02-03 PROCEDURE — 96372 THER/PROPH/DIAG INJ SC/IM: CPT

## 2017-02-03 PROCEDURE — 99207 ZZC NO CHARGE NURSE ONLY: CPT

## 2017-02-03 PROCEDURE — 36415 COLL VENOUS BLD VENIPUNCTURE: CPT | Performed by: FAMILY MEDICINE

## 2017-02-03 PROCEDURE — 82306 VITAMIN D 25 HYDROXY: CPT | Performed by: FAMILY MEDICINE

## 2017-02-03 NOTE — NURSING NOTE
The following medication was given:     MEDICATION: Vitamin B12  1000 mcg  ROUTE: IM  SITE: Deltoid - Left  DOSE: 1000 mcg/ml  LOT #: [id number]  :  [employer] [place]  EXPIRATION DATE:  7/2018  NDC#: 7978-0417-47   Verified by YESENIA Neal MA

## 2017-02-20 ENCOUNTER — TELEPHONE (OUTPATIENT)
Dept: FAMILY MEDICINE | Facility: CLINIC | Age: 67
End: 2017-02-20

## 2017-02-20 NOTE — TELEPHONE ENCOUNTER
Routing to PCP.    Dr. Wegener-Please review and advise.  Do you recommend patient continue with B12 injections and taking Vitamin D 1000 IU daily?    Thank you!  CARYN WatsonN, RN

## 2017-02-20 NOTE — TELEPHONE ENCOUNTER
Reason for Call:  Other Letter, B12 shots, Vit. D for bones    Detailed comments: Pt had a vitamin D test done on 02/03/17. She called to see what her results were since she has not received any. Her results were normal, but would like someone to contact here via phone or mail with future results. Pt would also like to know if she should still be receiving her B12 shot and taking her vitamin D meds.  Please contact pt regarding this. THanks!    Phone Number Patient can be reached at: Home number on file [phone] (home)    Best Time: Anytime    Can we leave a detailed message on this number? YES    Call taken on 2/20/2017 at 9:06 AM by Doreen Matos

## 2017-02-20 NOTE — TELEPHONE ENCOUNTER
Called PT, she did not answer, left a message stating to continue taking her B12 and Vitamin D. Told her to call back if she has any questions.     Octavia Fang, SMA

## 2017-03-03 ENCOUNTER — ALLIED HEALTH/NURSE VISIT (OUTPATIENT)
Dept: NURSING | Facility: CLINIC | Age: 67
End: 2017-03-03
Payer: MEDICARE

## 2017-03-03 DIAGNOSIS — D51.9 ANEMIA DUE TO VITAMIN B12 DEFICIENCY, UNSPECIFIED B12 DEFICIENCY TYPE: Primary | ICD-10-CM

## 2017-03-03 PROCEDURE — 96372 THER/PROPH/DIAG INJ SC/IM: CPT

## 2017-03-03 PROCEDURE — 99207 ZZC NO CHARGE NURSE ONLY: CPT

## 2017-03-03 NOTE — MR AVS SNAPSHOT
After Visit Summary   3/3/2017    Addis Peña    MRN: [id number]           Patient Information     Date Of Birth          1950        Visit Information        Provider Department      3/3/2017 10:00 AM HP MEDICAL ASSISTANT [facility]        Today's Diagnoses     Anemia due to vitamin B12 deficiency, unspecified B12 deficiency type    -  1       Follow-ups after your visit        Your next 10 appointments already scheduled     Mar 24, 2017  2:20 PM CDT   Office Visit with Joel Daniel Irwin Wegener, MD   Formerly named [facility] (Formerly named [facility])    [address] [place] [address] [phone]           Bring a current list of meds and any records pertaining to this visit.  For Physicals, please bring immunization records and any forms needing to be filled out.  Please arrive 10 minutes early to complete paperwork.            Apr 07, 2017 10:00 AM CDT   Nurse Only with HP MEDICAL ASSISTANT   [facility] ([facility])    [address] [place] [address] [phone]              Who to contact     If you have questions or need follow up information about today's clinic visit or your schedule please contact [facility] directly at [phone].  Normal or non-critical lab and imaging results will be communicated to you by sougouhart, letter or phone within 4 business days after the clinic has received the results. If you do not hear from us within 7 days, please contact the clinic through sougouhart or phone. If you have a critical or abnormal lab result, we will notify you by phone as soon as possible.  Submit refill requests through Lighting by LED or call your pharmacy and they will forward the refill request to us. Please allow 3 business days for your refill to be completed.          Additional Information About Your Visit        Lighting by LED Information     Lighting by LED lets you send messages to  "your doctor, view your test results, renew your prescriptions, schedule appointments and more. To sign up, go to [email or web address] . Click on \"Log in\" on the left side of the screen, which will take you to the Welcome page. Then click on \"Sign up Now\" on the right side of the page.     You will be asked to enter the access code listed below, as well as some personal information. Please follow the directions to create your username and password.     Your access code is: [email or web address]  Expires: 2017 10:04 AM     Your access code will  in 90 days. If you need help or a new code, please call your [place] [facility] clinic or [phone].        Care EveryWhere ID     This is your Care EveryWhere ID. This could be used by other organizations to access your [facility] medical records  [id number]         Blood Pressure from Last 3 Encounters:   17 124/66   16 130/68   16 142/68    Weight from Last 3 Encounters:   17 155 lb (70.3 kg)   16 154 lb (69.9 kg)   16 154 lb 1.6 oz (69.9 kg)              We Performed the Following     INJECTION INTRAMUSCULAR OR SUB-Q     VITAMIN B12 INJ /1000MCG        Primary Care Provider Office Phone # Fax #    Joel Daniel Irwin Wegener, [phone]       [address] [address]        Thank you!     Thank you for choosing [facility]  for your care. Our goal is always to provide you with excellent care. Hearing back from our patients is one way we can continue to improve our services. Please take a few minutes to complete the written survey that you may receive in the mail after your visit with us. Thank you!             Your Updated Medication List - Protect others around you: Learn how to safely use, store and throw away your medicines at www.disposemymeds.org.          This list is accurate as of: 3/3/17 10:04 AM.  Always use your most recent med list.                   Brand Name " Dispense Instructions for use    amLODIPine 10 MG tablet    NORVASC    90 tablet    Take 1 tablet (10 mg) by mouth daily       aspirin 81 MG tablet      Take 1 tablet by mouth daily.       atorvastatin 40 MG tablet    LIPITOR    90 tablet    Take 1 tablet (40 mg) by mouth daily       baclofen 10 MG tablet    LIORESAL    270 tablet    Take 1 tablet (10 mg) by mouth 3 times daily       blood glucose monitoring test strip    IRENE CONTOUR    200 each    Use to test blood sugars two times daily or as directed.       cholecalciferol 1000 UNIT tablet    vitamin D     Take 1,000 Units by mouth daily       citalopram 20 MG tablet    celeXA    90 tablet    Take 1 tablet (20 mg) by mouth daily       cyanocobalamin 1000 MCG/ML injection    VITAMIN B12    1 mL    Inject 1 mL (1,000 mcg) into the muscle every 30 days Order for clinic injection,  No need to fill.       dextromethorphan 15 MG/5ML syrup      Take 10 mLs by mouth 2 times daily as needed       insulin pen needle 31G X 5 MM     30 each    See Admin Instructions Use once time(s) a day for use with lantus solostar pen.       Lactase 250 MG Caps      Take 1 chew tab by mouth as needed (with dairy)       lisinopril 5 MG tablet    PRINIVIL/ZESTRIL    90 tablet    Take 1 tablet (5 mg) by mouth daily       metFORMIN 1000 MG tablet    GLUCOPHAGE    180 tablet    Take 1 tablet (1,000 mg) by mouth 2 times daily (with meals)       metoprolol 50 MG 24 hr tablet    TOPROL-XL    90 tablet    Take 1 tablet (50 mg) by mouth daily       montelukast 10 MG tablet    SINGULAIR    90 tablet    Take 1 tablet (10 mg) by mouth daily       senna-docusate 8.6-50 MG per tablet    SENOKOT-S;PERICOLACE    60 tablet    Take 1 tablet by mouth 2 times daily To be taken with opioid (narcotic) pain medication to prevent constipation.       TYLENOL 500 MG tablet   Generic drug:  acetaminophen      Take 500 mg by mouth 2 times daily

## 2017-03-03 NOTE — PROGRESS NOTES
The following medication was given:     MEDICATION: Vitamin B12  1000mcg  ROUTE: IM  SITE: Deltoid - Right  DOSE: 1 mL  LOT #:[id number]  :  [employer]  EXPIRATION DATE:  7/2018  NDC: 5041-1714-80  Verified by Yonas Fagan CMA

## 2017-03-14 ENCOUNTER — TELEPHONE (OUTPATIENT)
Dept: FAMILY MEDICINE | Facility: CLINIC | Age: 67
End: 2017-03-14

## 2017-03-14 NOTE — TELEPHONE ENCOUNTER
Pt has had a cold for one month.  Cough is getting worse.  No fever.  Nasal congestion.  Sometimes a productive cough.  Pt wanted to see pcp on Friday afternoon.  Used BAKARI Lozada RN

## 2017-03-17 ENCOUNTER — OFFICE VISIT (OUTPATIENT)
Dept: FAMILY MEDICINE | Facility: CLINIC | Age: 67
End: 2017-03-17
Payer: MEDICARE

## 2017-03-17 VITALS — HEART RATE: 73 BPM | TEMPERATURE: 99.2 F | SYSTOLIC BLOOD PRESSURE: 138 MMHG | DIASTOLIC BLOOD PRESSURE: 80 MMHG

## 2017-03-17 DIAGNOSIS — R05.8 ACE-INHIBITOR COUGH: ICD-10-CM

## 2017-03-17 DIAGNOSIS — F33.0 MAJOR DEPRESSIVE DISORDER, RECURRENT EPISODE, MILD (H): ICD-10-CM

## 2017-03-17 DIAGNOSIS — T46.4X5A ACE-INHIBITOR COUGH: ICD-10-CM

## 2017-03-17 DIAGNOSIS — R05.9 COUGH: Primary | ICD-10-CM

## 2017-03-17 DIAGNOSIS — I10 HYPERTENSION GOAL BP (BLOOD PRESSURE) < 140/90: ICD-10-CM

## 2017-03-17 DIAGNOSIS — E78.5 HYPERLIPIDEMIA LDL GOAL <70: ICD-10-CM

## 2017-03-17 PROCEDURE — 99213 OFFICE O/P EST LOW 20 MIN: CPT | Performed by: FAMILY MEDICINE

## 2017-03-17 RX ORDER — LOSARTAN POTASSIUM 25 MG/1
25 TABLET ORAL DAILY
Qty: 90 TABLET | Refills: 3 | Status: SHIPPED | OUTPATIENT
Start: 2017-03-17 | End: 2017-12-04

## 2017-03-17 RX ORDER — AMLODIPINE BESYLATE 10 MG/1
10 TABLET ORAL DAILY
Qty: 90 TABLET | Refills: 1 | Status: SHIPPED | OUTPATIENT
Start: 2017-03-17 | End: 2017-09-14

## 2017-03-17 RX ORDER — CITALOPRAM HYDROBROMIDE 20 MG/1
20 TABLET ORAL DAILY
Qty: 90 TABLET | Refills: 3 | Status: SHIPPED | OUTPATIENT
Start: 2017-03-17 | End: 2017-12-04

## 2017-03-17 RX ORDER — ATORVASTATIN CALCIUM 40 MG/1
40 TABLET, FILM COATED ORAL DAILY
Qty: 90 TABLET | Refills: 3 | Status: SHIPPED | OUTPATIENT
Start: 2017-03-17 | End: 2017-12-04

## 2017-03-17 NOTE — PATIENT INSTRUCTIONS
ASSESSMENT AND PLAN  1. Cough  Chronic cough since fall mostly fits with starting lisinopril and tickle in throat/throat clearing.      More recent cough likely mild cold.  If significantly worsening/not better let me know.  Expect two weeks for lisinopril cough to wear off.       2. Hyperlipidemia LDL goal <70  Refill needed.   - atorvastatin (LIPITOR) 40 MG tablet; Take 1 tablet (40 mg) by mouth daily  Dispense: 90 tablet; Refill: 3    3. Major depressive disorder, recurrent episode, mild (H)  Refill needed.   - citalopram (CELEXA) 20 MG tablet; Take 1 tablet (20 mg) by mouth daily  Dispense: 90 tablet; Refill: 3    4. Hypertension goal BP (blood pressure) < 140/90  Change to losartan 25 mg daily.  Stop lisinopril 5 mg daily.     - losartan (COZAAR) 25 MG tablet; Take 1 tablet (25 mg) by mouth daily  Dispense: 90 tablet; Refill: 3    5. ACE-inhibitor cough  Noted.     Follow up with me as planned to review dexa scan.           [facility] SIGNUP FOR E-VISITS AND EASIER COMMUNICATION:  [email or web address]     Zipnosis:  [email or web address].  Sign up for e-visits for common illnesses!     RADIOLOGY:   [facility]:  [phone] to schedule any radiology tests at [facility]: [phone]    Mammograms/colonoscopies:  [phone]    CONSUMER PRICE LINE for estimates of test costs:  [phone]

## 2017-03-17 NOTE — NURSING NOTE
"Chief Complaint   Patient presents with     URI     coughing       Initial /80 (BP Location: Right arm, Patient Position: Chair, Cuff Size: Adult Regular)  Pulse 73  Temp 99.2  F (37.3  C) (Oral) Estimated body mass index is 25.02 kg/(m^2) as calculated from the following:    Height as of 1/6/17: 5' 6\" (1.676 m).    Weight as of 1/6/17: 155 lb (70.3 kg).  Medication Reconciliation: complete Rupal Duque MA      "

## 2017-03-17 NOTE — PROGRESS NOTES
SUBJECTIVE:                                                    Addis Peña is a 66 year old female who presents to clinic today for the following health issues:      RESPIRATORY SYMPTOMS      Duration: November 2016    Description  nasal congestion, rhinorrhea, sore throat, cough and headache    Severity: moderate 8/10    Accompanying signs and symptoms: None    History (predisposing factors):  none    Precipitating or alleviating factors: laying down makes it worst    Therapies tried and outcome:  Cough drop     Additional history/life update:       Cough: with tickle in throat, throat clearing.  In retrospect started soon after starting lisinopril.  Per  seems to recall was on losartan in past and may have had this problem before with lisinopril.   Hyperlipidemia LDL goal <70  Major depressive disorder, recurrent episode, mild (H)  Hypertension goal BP (blood pressure) < 140/90  ACE-inhibitor cough :    Medical, social, surgical, and family histories reviewed.      REVIEW OF SYSTEMS FOR GENERAL, RESPIRATORY, CV, GI AND PSYCHIATRIC NEGATIVE EXCEPT AS NOTED IN HPI.         OBJECTIVE:  /80 (BP Location: Right arm, Patient Position: Chair, Cuff Size: Adult Regular)  Pulse 73  Temp 99.2  F (37.3  C) (Oral)    EXAM:  GENERAL APPEARANCE: healthy, alert and no distress  RESP: lungs clear to auscultation - no rales, rhonchi or wheezes  CV: regular rates and rhythm, normal S1 S2, no S3 or S4 and no murmur, click or rub -        ASSESSMENT AND PLAN  Patient Instructions   ASSESSMENT AND PLAN  1. Cough  Chronic cough since fall mostly fits with starting lisinopril and tickle in throat/throat clearing.      More recent cough likely mild cold.  If significantly worsening/not better let me know.  Expect two weeks for lisinopril cough to wear off.       2. Hyperlipidemia LDL goal <70  Refill needed.   - atorvastatin (LIPITOR) 40 MG tablet; Take 1 tablet (40 mg) by mouth daily  Dispense: 90 tablet; Refill:  3    3. Major depressive disorder, recurrent episode, mild (H)  Refill needed.   - citalopram (CELEXA) 20 MG tablet; Take 1 tablet (20 mg) by mouth daily  Dispense: 90 tablet; Refill: 3    4. Hypertension goal BP (blood pressure) < 140/90  Change to losartan 25 mg daily.  Stop lisinopril 5 mg daily.     - losartan (COZAAR) 25 MG tablet; Take 1 tablet (25 mg) by mouth daily  Dispense: 90 tablet; Refill: 3    5. ACE-inhibitor cough  Noted.     Follow up with me as planned to review dexa scan.           [facility] SIGNUP FOR E-VISITS AND EASIER COMMUNICATION:  [email or web address]     Zipnosis:  [place].  Sign up for e-visits for common illnesses!     RADIOLOGY:   [facility]:  [phone] to schedule any radiology tests at [facility]: [phone]    Mammograms/colonoscopies:  [phone]    CONSUMER PRICE LINE for estimates of test costs:  [phone]               Joel Wegener,MD

## 2017-03-17 NOTE — MR AVS SNAPSHOT
After Visit Summary   3/17/2017    Addis Peña    MRN: [id number]           Patient Information     Date Of Birth          1950        Visit Information        Provider Department      3/17/2017 3:40 PM Wegener, Joel Daniel Irwin, MD [facility]        Today's Diagnoses     Cough    -  1    Hyperlipidemia LDL goal <70        Major depressive disorder, recurrent episode, mild (H)        Hypertension goal BP (blood pressure) < 140/90        ACE-inhibitor cough          Care Instructions    ASSESSMENT AND PLAN  1. Cough  Chronic cough since fall mostly fits with starting lisinopril and tickle in throat/throat clearing.      More recent cough likely mild cold.  If significantly worsening/not better let me know.  Expect two weeks for lisinopril cough to wear off.       2. Hyperlipidemia LDL goal <70  Refill needed.   - atorvastatin (LIPITOR) 40 MG tablet; Take 1 tablet (40 mg) by mouth daily  Dispense: 90 tablet; Refill: 3    3. Major depressive disorder, recurrent episode, mild (H)  Refill needed.   - citalopram (CELEXA) 20 MG tablet; Take 1 tablet (20 mg) by mouth daily  Dispense: 90 tablet; Refill: 3    4. Hypertension goal BP (blood pressure) < 140/90  Change to losartan 25 mg daily.  Stop lisinopril 5 mg daily.     - losartan (COZAAR) 25 MG tablet; Take 1 tablet (25 mg) by mouth daily  Dispense: 90 tablet; Refill: 3    5. ACE-inhibitor cough  Noted.     Follow up with me as planned to review dexa scan.           [facility] SIG[facility]P FOR E-VISITS AND EASIER COMMUNICATION:  [email or web address]     Zipnosis:  [email or web address].  Sign up for e-visits for common illnesses!     RADIOLOGY:   [facility]:  [phone] to schedule any radiology tests at [facility]: [phone]    Mammograms/colonoscopies:  [phone]    CONSUMER PRICE LINE for estimates of test costs:  [phone]             Follow-ups after your visit        Your next 10  "appointments already scheduled     Mar 24, 2017  2:20 PM CDT   Office Visit with Joel Daniel Irwin Wegener, MD   [facility] ([facility])    [address] [place] [address] [phone]           Bring a current list of meds and any records pertaining to this visit.  For Physicals, please bring immunization records and any forms needing to be filled out.  Please arrive 10 minutes early to complete paperwork.            Apr 07, 2017 10:00 AM CDT   Nurse Only with HP MEDICAL ASSISTANT   [facility]    [address] [facility] [address] [phone]              Who to contact     If you have questions or need follow up information about today's clinic visit or your schedule please contact [facility] directly at [phone].  Normal or non-critical lab and imaging results will be communicated to you by e27hart, letter or phone within 4 business days after the clinic has received the results. If you do not hear from us within 7 days, please contact the clinic through e27hart or phone. If you have a critical or abnormal lab result, we will notify you by phone as soon as possible.  Submit refill requests through ClipCard or call your pharmacy and they will forward the refill request to us. Please allow 3 business days for your refill to be completed.          Additional Information About Your Visit        ClipCard Information     ClipCard lets you send messages to your doctor, view your test results, renew your prescriptions, schedule appointments and more. To sign up, go to [email or web address] . Click on \"Log in\" on the left side of the screen, which will take you to the Welcome page. Then click on \"Sign up Now\" on the right side of the page.     You will be asked to enter the access code listed below, as well as some personal information. Please follow the directions to create your username and " password.     Your access code is: [email or web address]  Expires: 2017 11:04 AM     Your access code will  in 90 days. If you need help or a new code, please call your [facility] or [phone].        Care EveryWhere ID     This is your Care EveryWhere ID. This could be used by other organizations to access your [facility] medical records  [id number]        Your Vitals Were     Pulse Temperature                73 99.2  F (37.3  C) (Oral)           Blood Pressure from Last 3 Encounters:   17 138/80   17 124/66   16 130/68    Weight from Last 3 Encounters:   17 155 lb (70.3 kg)   16 154 lb (69.9 kg)   16 154 lb 1.6 oz (69.9 kg)              Today, you had the following     No orders found for display         Today's Medication Changes          These changes are accurate as of: 3/17/17  4:32 PM.  If you have any questions, ask your nurse or doctor.               Start taking these medicines.        Dose/Directions    losartan 25 MG tablet   Commonly known as:  COZAAR   Used for:  Hypertension goal BP (blood pressure) < 140/90   Started by:  Wegener, Joel Daniel Irwin, MD        Dose:  25 mg   Take 1 tablet (25 mg) by mouth daily   Quantity:  90 tablet   Refills:  3         Stop taking these medicines if you haven't already. Please contact your care team if you have questions.     lisinopril 5 MG tablet   Commonly known as:  PRINIVIL/ZESTRIL   Stopped by:  Wegener, Joel Daniel Irwin, MD                Where to get your medicines      These medications were sent to [id number] [place] [address] AT [facility] [address]   [place], [place] [address]     Phone:  [phone]     amLODIPine 10 MG tablet    atorvastatin 40 MG tablet    citalopram 20 MG tablet    losartan 25 MG tablet                Primary Care Provider Office Phone # Fax #    Joel Daniel Irwin Wegener, [phone]       [facility] [address]  [facility] [address]        Thank you!     Thank you for choosing [facility]  for your care. Our goal is always to provide you with excellent care. Hearing back from our patients is one way we can continue to improve our services. Please take a few minutes to complete the written survey that you may receive in the mail after your visit with us. Thank you!             Your Updated Medication List - Protect others around you: Learn how to safely use, store and throw away your medicines at www.disposemymeds.org.          This list is accurate as of: 3/17/17  4:32 PM.  Always use your most recent med list.                   Brand Name Dispense Instructions for use    amLODIPine 10 MG tablet    NORVASC    90 tablet    Take 1 tablet (10 mg) by mouth daily       aspirin 81 MG tablet      Take 1 tablet by mouth daily.       atorvastatin 40 MG tablet    LIPITOR    90 tablet    Take 1 tablet (40 mg) by mouth daily       baclofen 10 MG tablet    LIORESAL    270 tablet    Take 1 tablet (10 mg) by mouth 3 times daily       blood glucose monitoring test strip    IRENE CONTOUR    200 each    Use to test blood sugars two times daily or as directed.       cholecalciferol 1000 UNIT tablet    vitamin D     Take 1,000 Units by mouth daily       citalopram 20 MG tablet    celeXA    90 tablet    Take 1 tablet (20 mg) by mouth daily       cyanocobalamin 1000 MCG/ML injection    VITAMIN B12    1 mL    Inject 1 mL (1,000 mcg) into the muscle every 30 days Order for clinic injection,  No need to fill.       dextromethorphan 15 MG/5ML syrup      Take 10 mLs by mouth 2 times daily as needed       insulin pen needle 31G X 5 MM     30 each    See Admin Instructions Use once time(s) a day for use with lantus solostar pen.       Lactase 250 MG Caps      Take 1 chew tab by mouth as needed (with dairy)       losartan 25 MG tablet    COZAAR    90 tablet    Take 1 tablet (25 mg) by mouth daily       metFORMIN 1000 MG tablet     GLUCOPHAGE    180 tablet    Take 1 tablet (1,000 mg) by mouth 2 times daily (with meals)       metoprolol 50 MG 24 hr tablet    TOPROL-XL    90 tablet    Take 1 tablet (50 mg) by mouth daily       montelukast 10 MG tablet    SINGULAIR    90 tablet    Take 1 tablet (10 mg) by mouth daily       senna-docusate 8.6-50 MG per tablet    SENOKOT-S;PERICOLACE    60 tablet    Take 1 tablet by mouth 2 times daily To be taken with opioid (narcotic) pain medication to prevent constipation.       TYLENOL 500 MG tablet   Generic drug:  acetaminophen      Take 500 mg by mouth 2 times daily

## 2017-03-17 NOTE — TELEPHONE ENCOUNTER
Medication Detail      Disp Refills Start End MARIZA   amLODIPine (NORVASC) 10 MG tablet 90 tablet 3 1/7/2016  --   Sig: Take 1 tablet (10 mg) by mouth daily   Class: E-Prescribe   Route: Oral       Last Office Visit with [facility], P or [facility] prescribing provider:  1/6/2017   Future Office Visit:    Next 5 appointments (look out 90 days)     Mar 17, 2017  3:40 PM CDT   SHORT with Joel Daniel Irwin Wegener, MD   [facility] ([facility])    [address] [place] [address] [phone]            Mar 24, 2017  2:20 PM CDT   Office Visit with Joel Daniel Irwin Wegener, MD   [facility] ([facility])    [address] [place] [id number] [phone]            Apr 07, 2017 10:00 AM CDT   Nurse Only with HP MEDICAL ASSISTANT   [facility]    [address] [place] [id number] [phone]                    BP Readings from Last 3 Encounters:   01/06/17 124/66   05/02/16 130/68   03/28/16 142/68

## 2017-03-24 ENCOUNTER — OFFICE VISIT (OUTPATIENT)
Dept: FAMILY MEDICINE | Facility: CLINIC | Age: 67
End: 2017-03-24
Payer: MEDICARE

## 2017-03-24 VITALS
HEART RATE: 69 BPM | TEMPERATURE: 98.9 F | OXYGEN SATURATION: 92 % | SYSTOLIC BLOOD PRESSURE: 112 MMHG | DIASTOLIC BLOOD PRESSURE: 78 MMHG

## 2017-03-24 DIAGNOSIS — M81.0 AGE-RELATED OSTEOPOROSIS WITHOUT CURRENT PATHOLOGICAL FRACTURE: Primary | ICD-10-CM

## 2017-03-24 PROCEDURE — 99214 OFFICE O/P EST MOD 30 MIN: CPT | Performed by: FAMILY MEDICINE

## 2017-03-24 NOTE — PROGRESS NOTES
SUBJECTIVE:                                                    Addis Peña is a 66 year old female who presents to clinic today for the following health issues:    1. Pt is here to follow up with bone density results and discuss osteoporosis medications.      Additional history/life update:    Age-related osteoporosis without current pathological fracture : has not had fractures.  No fhx fractures/hip fracture.  No corticosteroid use, smoking or heavy alcohol.  No falls.      Medical, social, surgical, and family histories reviewed.      REVIEW OF SYSTEMS FOR GENERAL, RESPIRATORY, CV, GI AND PSYCHIATRIC NEGATIVE EXCEPT AS NOTED IN HPI.         OBJECTIVE:  /78 (BP Location: Right arm, Patient Position: Chair, Cuff Size: Adult Regular)  Pulse 69  Temp 98.9  F (37.2  C) (Oral)  SpO2 92%    EXAM:  GENERAL APPEARANCE: healthy, alert and no distress        ASSESSMENT AND PLAN  Patient Instructions   ASSESSMENT AND PLAN  1. Age-related osteoporosis without current pathological fracture  Reviewed FRAX score and no need for treatment. Is below treatment threshold for both.  10 year risk major fracture 13.5 % and hip 3.5%  .  This is on higher end of non-treatment zone but still in established no treatment zone by frax criteria/who.  Has high calcium diet, vitamin D normal and on vitamin D supplementation and so will continue this.     Follow up at 12:20 on June 27th for diabetes and kidney follow up.      Ace Cough better with med changes!!    More than 1/2 of 25 minutes spent counseling/coordination of are.               Joel Wegener,MD

## 2017-03-24 NOTE — MR AVS SNAPSHOT
After Visit Summary   3/24/2017    Addis Peña    MRN: [id number]           Patient Information     Date Of Birth          1950        Visit Information        Provider Department      3/24/2017 2:20 PM Wegener, Joel Daniel Irwin, MD [facility]        Today's Diagnoses     Age-related osteoporosis without current pathological fracture    -  1      Care Instructions    ASSESSMENT AND PLAN  1. Age-related osteoporosis without current pathological fracture  Reviewed FRAX score and no need for treatment.  Has high calcium diet, vitamin D normal and on vitamin D supplementation.     Follow up at 12:20 on June 27th for diabetes and kidney follow up.      Cough better with med changes!!          MYCHART SIGNUP FOR E-VISITS AND EASIER COMMUNICATION:  [email or web address]     Zipnosis:  [employer].  Sign up for e-visits for common illnesses!     RADIOLOGY:   [facility]:  [phone] to schedule any radiology tests at [facility]: [phone]    Mammograms/colonoscopies:  [phone]    CONSUMER PRICE LINE for estimates of test costs:  [phone]             Follow-ups after your visit        Your next 10 appointments already scheduled     Apr 07, 2017 10:00 AM CDT   Nurse Only with HP MEDICAL ASSISTANT   [facility]    [address] [place] [id number] [phone]            Jun 27, 2017 12:20 PM CDT   SHORT with Joel Daniel Irwin Wegener, MD   [facility] ([facility])    [address] [place] [id number] [phone]              Who to contact     If you have questions or need follow up information about today's clinic visit or your schedule please contact [facility] directly at [phone].  Normal or non-critical lab and imaging results will be communicated to you by MyChart, letter or phone within 4 business  "days after the clinic has received the results. If you do not hear from us within 7 days, please contact the clinic through [employer] or phone. If you have a critical or abnormal lab result, we will notify you by phone as soon as possible.  Submit refill requests through [employer] or call your pharmacy and they will forward the refill request to us. Please allow 3 business days for your refill to be completed.          Additional Information About Your Visit        [employer] Information     [employer] lets you send messages to your doctor, view your test results, renew your prescriptions, schedule appointments and more. To sign up, go to [email or web address] . Click on \"Log in\" on the left side of the screen, which will take you to the Welcome page. Then click on \"Sign up Now\" on the right side of the page.     You will be asked to enter the access code listed below, as well as some personal information. Please follow the directions to create your username and password.     Your access code is: [email or web address]  Expires: 2017 11:04 AM     Your access code will  in 90 days. If you need help or a new code, please call your [place] clinic or [phone].        Care EveryWhere ID     This is your Care EveryWhere ID. This could be used by other organizations to access your [place] medical records  [id number]        Your Vitals Were     Pulse Temperature Pulse Oximetry             69 98.9  F (37.2  C) (Oral) 92%          Blood Pressure from Last 3 Encounters:   17 112/78   17 138/80   17 124/66    Weight from Last 3 Encounters:   17 155 lb (70.3 kg)   16 154 lb (69.9 kg)   16 154 lb 1.6 oz (69.9 kg)              Today, you had the following     No orders found for display       Primary Care Provider Office Phone # Fax #    Joel Daniel Irwin Wegener, [phone]       [facility] [address] [facility] [address]        Thank you!     Thank you for " choosing [facility]  for your care. Our goal is always to provide you with excellent care. Hearing back from our patients is one way we can continue to improve our services. Please take a few minutes to complete the written survey that you may receive in the mail after your visit with us. Thank you!             Your Updated Medication List - Protect others around you: Learn how to safely use, store and throw away your medicines at www.disposemymeds.org.          This list is accurate as of: 3/24/17  3:09 PM.  Always use your most recent med list.                   Brand Name Dispense Instructions for use    amLODIPine 10 MG tablet    NORVASC    90 tablet    Take 1 tablet (10 mg) by mouth daily       aspirin 81 MG tablet      Take 1 tablet by mouth daily.       atorvastatin 40 MG tablet    LIPITOR    90 tablet    Take 1 tablet (40 mg) by mouth daily       baclofen 10 MG tablet    LIORESAL    270 tablet    Take 1 tablet (10 mg) by mouth 3 times daily       blood glucose monitoring test strip    IRENE CONTOUR    200 each    Use to test blood sugars two times daily or as directed.       cholecalciferol 1000 UNIT tablet    vitamin D     Take 1,000 Units by mouth daily       citalopram 20 MG tablet    celeXA    90 tablet    Take 1 tablet (20 mg) by mouth daily       cyanocobalamin 1000 MCG/ML injection    VITAMIN B12    1 mL    Inject 1 mL (1,000 mcg) into the muscle every 30 days Order for clinic injection,  No need to fill.       dextromethorphan 15 MG/5ML syrup      Take 10 mLs by mouth 2 times daily as needed       insulin pen needle 31G X 5 MM     30 each    See Admin Instructions Use once time(s) a day for use with lantus solostar pen.       Lactase 250 MG Caps      Take 1 chew tab by mouth as needed (with dairy)       losartan 25 MG tablet    COZAAR    90 tablet    Take 1 tablet (25 mg) by mouth daily       metFORMIN 1000 MG tablet    GLUCOPHAGE    180 tablet    Take 1 tablet (1,000 mg) by mouth 2  times daily (with meals)       metoprolol 50 MG 24 hr tablet    TOPROL-XL    90 tablet    Take 1 tablet (50 mg) by mouth daily       montelukast 10 MG tablet    SINGULAIR    90 tablet    Take 1 tablet (10 mg) by mouth daily       senna-docusate 8.6-50 MG per tablet    SENOKOT-S;PERICOLACE    60 tablet    Take 1 tablet by mouth 2 times daily To be taken with opioid (narcotic) pain medication to prevent constipation.       TYLENOL 500 MG tablet   Generic drug:  acetaminophen      Take 500 mg by mouth 2 times daily

## 2017-03-24 NOTE — PATIENT INSTRUCTIONS
ASSESSMENT AND PLAN  1. Age-related osteoporosis without current pathological fracture  Reviewed FRAX score and no need for treatment.  Has high calcium diet, vitamin D normal and on vitamin D supplementation.     Follow up at 12:20 on June 27th for diabetes and kidney follow up.      Cough better with med changes!!          MYCHART SIGNUP FOR E-VISITS AND EASIER COMMUNICATION:  [email or web address]     Zipnosis:  [employer].  Sign up for e-visits for common illnesses!     RADIOLOGY:   [facility]:  [phone] to schedule any radiology tests at [facility]: [phone]    Mammograms/colonoscopies:  [phone]    CONSUMER PRICE LINE for estimates of test costs:  [phone]

## 2017-04-07 ENCOUNTER — ALLIED HEALTH/NURSE VISIT (OUTPATIENT)
Dept: NURSING | Facility: CLINIC | Age: 67
End: 2017-04-07
Payer: MEDICARE

## 2017-04-07 DIAGNOSIS — D51.9 ANEMIA DUE TO VITAMIN B12 DEFICIENCY, UNSPECIFIED B12 DEFICIENCY TYPE: Primary | ICD-10-CM

## 2017-04-07 PROCEDURE — 96372 THER/PROPH/DIAG INJ SC/IM: CPT

## 2017-04-07 PROCEDURE — 99207 ZZC NO CHARGE NURSE ONLY: CPT

## 2017-04-07 NOTE — NURSING NOTE
>> DESHAUN CHAN   Fri Apr 7, 2017 10:01 AM  The following medication was given:     MEDICATION: Vitamin B12  1000mcg  ROUTE: IM  SITE: Deltoid - Right  DOSE: 1 mL  LOT #: [id number]  :  [employer]  EXPIRATION DATE:  7/30/2018  NDC: 7608-7287-93  Verified by Yonas Chan CMA

## 2017-04-07 NOTE — MR AVS SNAPSHOT
"              After Visit Summary   4/7/2017    Addis Peña    MRN: [id number]           Patient Information     Date Of Birth          1950        Visit Information        Provider Department      4/7/2017 10:00 AM HP MEDICAL ASSISTANT [facility]        Today's Diagnoses     Anemia due to vitamin B12 deficiency, unspecified B12 deficiency type    -  1       Follow-ups after your visit        Your next 10 appointments already scheduled     May 05, 2017 10:00 AM CDT   Nurse Only with HP MEDICAL ASSISTANT   [facility] ([facility]    [address] [place] [phone]            Jun 23, 2017  3:20 PM CDT   SHORT with Joel Daniel Irwin Wegener, MD   [facility] ([facility])    [address] [place] [id number] [phone]              Who to contact     If you have questions or need follow up information about today's clinic visit or your schedule please contact [facility] directly at [phone].  Normal or non-critical lab and imaging results will be communicated to you by [employer], letter or phone within 4 business days after the clinic has received the results. If you do not hear from us within 7 days, please contact the clinic through [employer] or phone. If you have a critical or abnormal lab result, we will notify you by phone as soon as possible.  Submit refill requests through [employer] or call your pharmacy and they will forward the refill request to us. Please allow 3 business days for your refill to be completed.          Additional Information About Your Visit        MyChart Information     [employer] lets you send messages to your doctor, view your test results, renew your prescriptions, schedule appointments and more. To sign up, go to [email or web address] . Click on \"Log in\" on the left side of the screen, which will take you to the Welcome page. " "Then click on \"Sign up Now\" on the right side of the page.     You will be asked to enter the access code listed below, as well as some personal information. Please follow the directions to create your username and password.     Your access code is: [email or web address]  Expires: 2017 11:04 AM     Your access code will  in 90 days. If you need help or a new code, please call your [place] clinic or [phone].        Care EveryWhere ID     This is your Care EveryWhere ID. This could be used by other organizations to access your [place] medical records  [id number]         Blood Pressure from Last 3 Encounters:   17 112/78   17 138/80   17 124/66    Weight from Last 3 Encounters:   17 155 lb (70.3 kg)   16 154 lb (69.9 kg)   16 154 lb 1.6 oz (69.9 kg)              We Performed the Following     INJECTION INTRAMUSCULAR OR SUB-Q     VITAMIN B12 INJ /1000MCG        Primary Care Provider Office Phone # Fax #    Joel Daniel Irwin Wegener, [phone]       [address] [address]        Thank you!     Thank you for choosing [facility]  for your care. Our goal is always to provide you with excellent care. Hearing back from our patients is one way we can continue to improve our services. Please take a few minutes to complete the written survey that you may receive in the mail after your visit with us. Thank you!             Your Updated Medication List - Protect others around you: Learn how to safely use, store and throw away your medicines at www.disposemymeds.org.          This list is accurate as of: 17 12:04 PM.  Always use your most recent med list.                   Brand Name Dispense Instructions for use    amLODIPine 10 MG tablet    NORVASC    90 tablet    Take 1 tablet (10 mg) by mouth daily       aspirin 81 MG tablet      Take 1 tablet by mouth daily.       atorvastatin 40 MG tablet    LIPITOR    90 " tablet    Take 1 tablet (40 mg) by mouth daily       baclofen 10 MG tablet    LIORESAL    270 tablet    Take 1 tablet (10 mg) by mouth 3 times daily       blood glucose monitoring test strip    IRENE CONTOUR    200 each    Use to test blood sugars two times daily or as directed.       cholecalciferol 1000 UNIT tablet    vitamin D     Take 1,000 Units by mouth daily       citalopram 20 MG tablet    celeXA    90 tablet    Take 1 tablet (20 mg) by mouth daily       cyanocobalamin 1000 MCG/ML injection    VITAMIN B12    1 mL    Inject 1 mL (1,000 mcg) into the muscle every 30 days Order for clinic injection,  No need to fill.       dextromethorphan 15 MG/5ML syrup      Take 10 mLs by mouth 2 times daily as needed       insulin pen needle 31G X 5 MM     30 each    See Admin Instructions Use once time(s) a day for use with lantus solostar pen.       Lactase 250 MG Caps      Take 1 chew tab by mouth as needed (with dairy)       losartan 25 MG tablet    COZAAR    90 tablet    Take 1 tablet (25 mg) by mouth daily       metFORMIN 1000 MG tablet    GLUCOPHAGE    180 tablet    Take 1 tablet (1,000 mg) by mouth 2 times daily (with meals)       metoprolol 50 MG 24 hr tablet    TOPROL-XL    90 tablet    Take 1 tablet (50 mg) by mouth daily       montelukast 10 MG tablet    SINGULAIR    90 tablet    Take 1 tablet (10 mg) by mouth daily       senna-docusate 8.6-50 MG per tablet    SENOKOT-S;PERICOLACE    60 tablet    Take 1 tablet by mouth 2 times daily To be taken with opioid (narcotic) pain medication to prevent constipation.       TYLENOL 500 MG tablet   Generic drug:  acetaminophen      Take 500 mg by mouth 2 times daily

## 2017-05-01 DIAGNOSIS — E11.9 TYPE 2 DIABETES, HBA1C GOAL < 8% (H): Primary | ICD-10-CM

## 2017-05-01 NOTE — TELEPHONE ENCOUNTER
Medication Detail      Disp Refills Start End MARIZA   blood glucose monitoring (IRENE CONTOUR) test strip 200 each 3 3/31/2016  No   Sig: Use to test blood sugars two times daily or as directed.       Last Office Visit with [facility], [facility] or [facility] prescribing provider: 3/24/17                                         Next 5 appointments (look out 90 days)     May 05, 2017 10:00 AM CDT   Nurse Only with HP MEDICAL ASSISTANT   [facility] ([facility])    [address] [place] [phone]            Jun 23, 2017  3:20 PM CDT   SHORT with Joel Daniel Irwin Wegener, MD   [facility] ([facility])    [address] [place] [address] [phone]

## 2017-05-01 NOTE — TELEPHONE ENCOUNTER
Prescription approved per [facility] Refill Protocol.    Signed Prescriptions:                        Disp   Refills    blood glucose monitoring (IRENE CONTOUR) t*200 ea*1        Sig: Use to test blood sugars two times daily or as           directed.  Authorizing Provider: WEGENER, JOEL DANIEL IRWIN  Ordering User: USAMA GALVIN      Closing encounter - no further actions needed at this time    Usama Galvin RN

## 2017-05-05 ENCOUNTER — ALLIED HEALTH/NURSE VISIT (OUTPATIENT)
Dept: NURSING | Facility: CLINIC | Age: 67
End: 2017-05-05
Payer: MEDICARE

## 2017-05-05 DIAGNOSIS — D51.9 B12 DEFICIENCY ANEMIA: Primary | ICD-10-CM

## 2017-05-05 PROCEDURE — 99207 ZZC NO CHARGE NURSE ONLY: CPT

## 2017-05-05 PROCEDURE — 96372 THER/PROPH/DIAG INJ SC/IM: CPT

## 2017-05-05 NOTE — MR AVS SNAPSHOT
"              After Visit Summary   5/5/2017    Addis Peña    MRN: [id number]           Patient Information     Date Of Birth          1950        Visit Information        Provider Department      5/5/2017 10:00 AM HP MEDICAL ASSISTANT [facility]        Today's Diagnoses     B12 deficiency anemia    -  1       Follow-ups after your visit        Your next 10 appointments already scheduled     Jun 02, 2017 10:00 AM CDT   Nurse Only with HP MEDICAL ASSISTANT   [facility] ([facility])    [address] [place] [address] [phone]            Jun 23, 2017  3:20 PM CDT   SHORT with Joel Daniel Irwin Wegener, MD   [facility] ([facility])    [address] [place] [address] [phone]              Who to contact     If you have questions or need follow up information about today's clinic visit or your schedule please contact [facility] directly at [phone].  Normal or non-critical lab and imaging results will be communicated to you by MyChart, letter or phone within 4 business days after the clinic has received the results. If you do not hear from us within 7 days, please contact the clinic through [employer] or phone. If you have a critical or abnormal lab result, we will notify you by phone as soon as possible.  Submit refill requests through [employer] or call your pharmacy and they will forward the refill request to us. Please allow 3 business days for your refill to be completed.          Additional Information About Your Visit        MyChart Information     [employer] lets you send messages to your doctor, view your test results, renew your prescriptions, schedule appointments and more. To sign up, go to [email or web address] . Click on \"Log in\" on the left side of the screen, which will take you to the Welcome page. Then click on \"Sign up Now\" on the right side of " the page.     You will be asked to enter the access code listed below, as well as some personal information. Please follow the directions to create your username and password.     Your access code is: [email or web address]  Expires: 2017 11:04 AM     Your access code will  in 90 days. If you need help or a new code, please call your [place] clinic or [phone].        Care EveryWhere ID     This is your Care EveryWhere ID. This could be used by other organizations to access your [place] medical records  [id number]         Blood Pressure from Last 3 Encounters:   17 112/78   17 138/80   17 124/66    Weight from Last 3 Encounters:   17 155 lb (70.3 kg)   16 154 lb (69.9 kg)   16 154 lb 1.6 oz (69.9 kg)              We Performed the Following     INJECTION INTRAMUSCULAR OR SUB-Q     VITAMIN B12 INJ /1000MCG        Primary Care Provider Office Phone # Fax #    Joel Daniel Irwin Wegener, [phone]       [address] [address]        Thank you!     Thank you for choosing [facility]  for your care. Our goal is always to provide you with excellent care. Hearing back from our patients is one way we can continue to improve our services. Please take a few minutes to complete the written survey that you may receive in the mail after your visit with us. Thank you!             Your Updated Medication List - Protect others around you: Learn how to safely use, store and throw away your medicines at www.disposemymeds.org.          This list is accurate as of: 17 10:22 AM.  Always use your most recent med list.                   Brand Name Dispense Instructions for use    amLODIPine 10 MG tablet    NORVASC    90 tablet    Take 1 tablet (10 mg) by mouth daily       aspirin 81 MG tablet      Take 1 tablet by mouth daily.       atorvastatin 40 MG tablet    LIPITOR    90 tablet    Take 1 tablet (40 mg) by mouth daily        baclofen 10 MG tablet    LIORESAL    270 tablet    Take 1 tablet (10 mg) by mouth 3 times daily       blood glucose monitoring test strip    IRENE CONTOUR    200 each    Use to test blood sugars two times daily or as directed.       cholecalciferol 1000 UNIT tablet    vitamin D     Take 1,000 Units by mouth daily       citalopram 20 MG tablet    celeXA    90 tablet    Take 1 tablet (20 mg) by mouth daily       cyanocobalamin 1000 MCG/ML injection    VITAMIN B12    1 mL    Inject 1 mL (1,000 mcg) into the muscle every 30 days Order for clinic injection,  No need to fill.       dextromethorphan 15 MG/5ML syrup      Take 10 mLs by mouth 2 times daily as needed       insulin pen needle 31G X 5 MM     30 each    See Admin Instructions Use once time(s) a day for use with lantus solostar pen.       Lactase 250 MG Caps      Take 1 chew tab by mouth as needed (with dairy)       losartan 25 MG tablet    COZAAR    90 tablet    Take 1 tablet (25 mg) by mouth daily       metFORMIN 1000 MG tablet    GLUCOPHAGE    180 tablet    Take 1 tablet (1,000 mg) by mouth 2 times daily (with meals)       metoprolol 50 MG 24 hr tablet    TOPROL-XL    90 tablet    Take 1 tablet (50 mg) by mouth daily       montelukast 10 MG tablet    SINGULAIR    90 tablet    Take 1 tablet (10 mg) by mouth daily       senna-docusate 8.6-50 MG per tablet    SENOKOT-S;PERICOLACE    60 tablet    Take 1 tablet by mouth 2 times daily To be taken with opioid (narcotic) pain medication to prevent constipation.       TYLENOL 500 MG tablet   Generic drug:  acetaminophen      Take 500 mg by mouth 2 times daily

## 2017-06-02 ENCOUNTER — ALLIED HEALTH/NURSE VISIT (OUTPATIENT)
Dept: NURSING | Facility: CLINIC | Age: 67
End: 2017-06-02
Payer: MEDICARE

## 2017-06-02 DIAGNOSIS — D51.9 B12 DEFICIENCY ANEMIA: Primary | ICD-10-CM

## 2017-06-02 PROCEDURE — 96372 THER/PROPH/DIAG INJ SC/IM: CPT

## 2017-06-02 PROCEDURE — 99207 ZZC NO CHARGE NURSE ONLY: CPT

## 2017-06-02 NOTE — NURSING NOTE
>> YONAS MIRANDA   Fri May 5, 2017 10:20 AM  The following medication was given:     MEDICATION: Vitamin B12  1000mcg  ROUTE: IM  SITE: Deltoid - Right  DOSE: 1ml  LOT #: [id number]  :  [employer]  EXPIRATION DATE:  07/2018  NDC#: 9820-7133-58      Verbal ok given by dr porter to give in day 28.       Yonas Miranda MA

## 2017-06-02 NOTE — MR AVS SNAPSHOT
"              After Visit Summary   6/2/2017    Addis Peña    MRN: [id number]           Patient Information     Date Of Birth          1950        Visit Information        Provider Department      6/2/2017 10:00 AM HP MEDICAL ASSISTANT [facility]        Today's Diagnoses     B12 deficiency anemia    -  1       Follow-ups after your visit        Your next 10 appointments already scheduled     Jun 23, 2017  3:20 PM CDT   SHORT with Joel Daniel Irwin Wegener, MD   [facility] ([facility])    [address] [place] [id number] [phone]            Jul 07, 2017 10:00 AM CDT   Nurse Only with HP MEDICAL ASSISTANT   [facility]    [address] [facility] [address] [phone]              Who to contact     If you have questions or need follow up information about today's clinic visit or your schedule please contact [facility] directly at [phone].  Normal or non-critical lab and imaging results will be communicated to you by MyChart, letter or phone within 4 business days after the clinic has received the results. If you do not hear from us within 7 days, please contact the clinic through [employer] or phone. If you have a critical or abnormal lab result, we will notify you by phone as soon as possible.  Submit refill requests through [employer] or call your pharmacy and they will forward the refill request to us. Please allow 3 business days for your refill to be completed.          Additional Information About Your Visit        MyChart Information     [employer] lets you send messages to your doctor, view your test results, renew your prescriptions, schedule appointments and more. To sign up, go to [email or web address] . Click on \"Log in\" on the left side of the screen, which will take you to the Welcome page. Then click on \"Sign up Now\" on the right side of " the page.     You will be asked to enter the access code listed below, as well as some personal information. Please follow the directions to create your username and password.     Your access code is: [email or web address]  Expires: 2017 10:13 AM     Your access code will  in 90 days. If you need help or a new code, please call your [facility] clinic or [phone].        Care EveryWhere ID     This is your Care EveryWhere ID. This could be used by other organizations to access your [facility] medical records  [id number]         Blood Pressure from Last 3 Encounters:   17 112/78   17 138/80   17 124/66    Weight from Last 3 Encounters:   17 155 lb (70.3 kg)   16 154 lb (69.9 kg)   16 154 lb 1.6 oz (69.9 kg)              We Performed the Following     INJECTION INTRAMUSCULAR OR SUB-Q     VITAMIN B12 INJ /1000MCG        Primary Care Provider Office Phone # Fax #    Joel Daniel Irwin Wegener, [phone]       [address] [address]        Thank you!     Thank you for choosing [facility]  for your care. Our goal is always to provide you with excellent care. Hearing back from our patients is one way we can continue to improve our services. Please take a few minutes to complete the written survey that you may receive in the mail after your visit with us. Thank you!             Your Updated Medication List - Protect others around you: Learn how to safely use, store and throw away your medicines at www.disposemymeds.org.          This list is accurate as of: 17 10:13 AM.  Always use your most recent med list.                   Brand Name Dispense Instructions for use    amLODIPine 10 MG tablet    NORVASC    90 tablet    Take 1 tablet (10 mg) by mouth daily       aspirin 81 MG tablet      Take 1 tablet by mouth daily.       atorvastatin 40 MG tablet    LIPITOR    90 tablet    Take 1 tablet (40 mg) by mouth daily        baclofen 10 MG tablet    LIORESAL    270 tablet    Take 1 tablet (10 mg) by mouth 3 times daily       blood glucose monitoring test strip    IRENE CONTOUR    200 each    Use to test blood sugars two times daily or as directed.       cholecalciferol 1000 UNIT tablet    vitamin D     Take 1,000 Units by mouth daily       citalopram 20 MG tablet    celeXA    90 tablet    Take 1 tablet (20 mg) by mouth daily       cyanocobalamin 1000 MCG/ML injection    VITAMIN B12    1 mL    Inject 1 mL (1,000 mcg) into the muscle every 30 days Order for clinic injection,  No need to fill.       dextromethorphan 15 MG/5ML syrup      Take 10 mLs by mouth 2 times daily as needed       insulin pen needle 31G X 5 MM     30 each    See Admin Instructions Use once time(s) a day for use with lantus solostar pen.       Lactase 250 MG Caps      Take 1 chew tab by mouth as needed (with dairy)       losartan 25 MG tablet    COZAAR    90 tablet    Take 1 tablet (25 mg) by mouth daily       metFORMIN 1000 MG tablet    GLUCOPHAGE    180 tablet    Take 1 tablet (1,000 mg) by mouth 2 times daily (with meals)       metoprolol 50 MG 24 hr tablet    TOPROL-XL    90 tablet    Take 1 tablet (50 mg) by mouth daily       montelukast 10 MG tablet    SINGULAIR    90 tablet    Take 1 tablet (10 mg) by mouth daily       senna-docusate 8.6-50 MG per tablet    SENOKOT-S;PERICOLACE    60 tablet    Take 1 tablet by mouth 2 times daily To be taken with opioid (narcotic) pain medication to prevent constipation.       TYLENOL 500 MG tablet   Generic drug:  acetaminophen      Take 500 mg by mouth 2 times daily

## 2017-06-20 ENCOUNTER — TELEPHONE (OUTPATIENT)
Dept: FAMILY MEDICINE | Facility: CLINIC | Age: 67
End: 2017-06-20

## 2017-06-20 DIAGNOSIS — I69.30 H/O: STROKE WITH RESIDUAL EFFECTS: Primary | ICD-10-CM

## 2017-06-20 DIAGNOSIS — I63.9 FLACCID HEMIPLEGIA OF RIGHT DOMINANT SIDE DUE TO INFARCTION OF BRAIN (H): ICD-10-CM

## 2017-06-20 DIAGNOSIS — G81.01 FLACCID HEMIPLEGIA OF RIGHT DOMINANT SIDE DUE TO INFARCTION OF BRAIN (H): ICD-10-CM

## 2017-06-20 NOTE — LETTER
[place] [id number] [place] [address] [phone]      June 26, 2017      Addis Peña  [address]    [place] [address]            To whom it may concern,    Please allow the above named patient to participate in water therapy during July and August. She has arm and leg weakness from previous stroke and would benefit from strengthening.     Diagnoses:      ICD-10-CM    1. H/O: stroke with residual effects I69.30    2. 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

## 2017-06-20 NOTE — LETTER
[facility]   [address] [place]   [address]  [phone]    June 20, 2017      RE: Addis Peña  [address]    [place] [address]              To whom it may concern,    Please allow the above named patient to participate in water therapy during July and August. She has arm and leg weakness from previous stroke and would benefit from strengthening.             Sincerely,          Joel Wegener, MD/tb

## 2017-06-23 ENCOUNTER — OFFICE VISIT (OUTPATIENT)
Dept: FAMILY MEDICINE | Facility: CLINIC | Age: 67
End: 2017-06-23
Payer: MEDICARE

## 2017-06-23 VITALS
TEMPERATURE: 97.5 F | SYSTOLIC BLOOD PRESSURE: 132 MMHG | HEART RATE: 70 BPM | DIASTOLIC BLOOD PRESSURE: 78 MMHG | OXYGEN SATURATION: 96 %

## 2017-06-23 DIAGNOSIS — G81.91 HEMIPLEGIA AFFECTING RIGHT DOMINANT SIDE (H): ICD-10-CM

## 2017-06-23 DIAGNOSIS — E11.22 TYPE 2 DIABETES MELLITUS WITH STAGE 3 CHRONIC KIDNEY DISEASE, WITHOUT LONG-TERM CURRENT USE OF INSULIN (H): Primary | ICD-10-CM

## 2017-06-23 DIAGNOSIS — N18.30 TYPE 2 DIABETES MELLITUS WITH STAGE 3 CHRONIC KIDNEY DISEASE, WITHOUT LONG-TERM CURRENT USE OF INSULIN (H): Primary | ICD-10-CM

## 2017-06-23 DIAGNOSIS — Z86.73 HISTORY OF STROKE: ICD-10-CM

## 2017-06-23 DIAGNOSIS — D51.9 ANEMIA DUE TO VITAMIN B12 DEFICIENCY, UNSPECIFIED B12 DEFICIENCY TYPE: ICD-10-CM

## 2017-06-23 LAB
ERYTHROCYTE [DISTWIDTH] IN BLOOD BY AUTOMATED COUNT: 13 % (ref 10–15)
HBA1C MFR BLD: 7 % (ref 4.3–6)
HCT VFR BLD AUTO: 37.9 % (ref 35–47)
HGB BLD-MCNC: 12 G/DL (ref 11.7–15.7)
MCH RBC QN AUTO: 30.6 PG (ref 26.5–33)
MCHC RBC AUTO-ENTMCNC: 31.7 G/DL (ref 31.5–36.5)
MCV RBC AUTO: 97 FL (ref 78–100)
PLATELET # BLD AUTO: 250 10E9/L (ref 150–450)
RBC # BLD AUTO: 3.92 10E12/L (ref 3.8–5.2)
VIT B12 SERPL-MCNC: 487 PG/ML (ref 193–986)
WBC # BLD AUTO: 8.3 10E9/L (ref 4–11)

## 2017-06-23 PROCEDURE — 85027 COMPLETE CBC AUTOMATED: CPT | Performed by: FAMILY MEDICINE

## 2017-06-23 PROCEDURE — 36415 COLL VENOUS BLD VENIPUNCTURE: CPT | Performed by: FAMILY MEDICINE

## 2017-06-23 PROCEDURE — 99213 OFFICE O/P EST LOW 20 MIN: CPT | Performed by: FAMILY MEDICINE

## 2017-06-23 PROCEDURE — 80048 BASIC METABOLIC PNL TOTAL CA: CPT | Performed by: FAMILY MEDICINE

## 2017-06-23 PROCEDURE — 82607 VITAMIN B-12: CPT | Performed by: FAMILY MEDICINE

## 2017-06-23 PROCEDURE — 83036 HEMOGLOBIN GLYCOSYLATED A1C: CPT | Performed by: FAMILY MEDICINE

## 2017-06-23 ASSESSMENT — ANXIETY QUESTIONNAIRES
GAD7 TOTAL SCORE: 0
7. FEELING AFRAID AS IF SOMETHING AWFUL MIGHT HAPPEN: NOT AT ALL
5. BEING SO RESTLESS THAT IT IS HARD TO SIT STILL: NOT AT ALL
3. WORRYING TOO MUCH ABOUT DIFFERENT THINGS: NOT AT ALL
2. NOT BEING ABLE TO STOP OR CONTROL WORRYING: NOT AT ALL
1. FEELING NERVOUS, ANXIOUS, OR ON EDGE: NOT AT ALL
6. BECOMING EASILY ANNOYED OR IRRITABLE: NOT AT ALL
IF YOU CHECKED OFF ANY PROBLEMS ON THIS QUESTIONNAIRE, HOW DIFFICULT HAVE THESE PROBLEMS MADE IT FOR YOU TO DO YOUR WORK, TAKE CARE OF THINGS AT HOME, OR GET ALONG WITH OTHER PEOPLE: NOT DIFFICULT AT ALL

## 2017-06-23 ASSESSMENT — PATIENT HEALTH QUESTIONNAIRE - PHQ9: 5. POOR APPETITE OR OVEREATING: NOT AT ALL

## 2017-06-23 NOTE — NURSING NOTE
"Chief Complaint   Patient presents with     Diabetes     Kidney Problem       Initial /78 (BP Location: Right arm, Patient Position: Chair, Cuff Size: Adult Regular)  Pulse 70  Temp 97.5  F (36.4  C) (Tympanic)  SpO2 96% Estimated body mass index is 25.02 kg/(m^2) as calculated from the following:    Height as of 1/6/17: 5' 6\" (1.676 m).    Weight as of 1/6/17: 155 lb (70.3 kg).  Medication Reconciliation: complete Rupal Duque MA      "

## 2017-06-23 NOTE — MR AVS SNAPSHOT
After Visit Summary   6/23/2017    Addis Peña    MRN: [id number]           Patient Information     Date Of Birth          1950        Visit Information        Provider Department      6/23/2017 3:20 PM Wegener, Joel Daniel Irwin, MD [facility]        Today's Diagnoses     Type 2 diabetes mellitus with stage 3 chronic kidney disease, without long-term current use of insulin (H)    -  1    Anemia due to vitamin B12 deficiency, unspecified B12 deficiency type          Care Instructions    Lab Results   Component Value Date    A1C 7.0 06/23/2017    A1C 6.9 01/06/2017    A1C 6.8 10/11/2016    A1C 7.9 05/02/2016    A1C 8.6 02/05/2016     Controlled.      No acute issues.  No med changes.      please put in addis dhaliwal for 8:00 dec 4th for diabetes fu visit and preventive physical (annual wellness)            Follow-ups after your visit        Your next 10 appointments already scheduled     Jul 07, 2017 10:00 AM CDT   Nurse Only with HP MEDICAL ASSISTANT   [facility] ([facility])    [address] [place] [address] [phone]              Who to contact     If you have questions or need follow up information about today's clinic visit or your schedule please contact [facility] directly at [phone].  Normal or non-critical lab and imaging results will be communicated to you by MyChart, letter or phone within 4 business days after the clinic has received the results. If you do not hear from us within 7 days, please contact the clinic through MyChart or phone. If you have a critical or abnormal lab result, we will notify you by phone as soon as possible.  Submit refill requests through [employer] or call your pharmacy and they will forward the refill request to us. Please allow 3 business days for your refill to be completed.          Additional Information About Your Visit        MyChart Information     [employer] lets  "you send messages to your doctor, view your test results, renew your prescriptions, schedule appointments and more. To sign up, go to [email or web address] . Click on \"Log in\" on the left side of the screen, which will take you to the Welcome page. Then click on \"Sign up Now\" on the right side of the page.     You will be asked to enter the access code listed below, as well as some personal information. Please follow the directions to create your username and password.     Your access code is: [email or web address]  Expires: 2017 10:13 AM     Your access code will  in 90 days. If you need help or a new code, please call your [facility] clinic or [phone].        Care EveryWhere ID     This is your Care EveryWhere ID. This could be used by other organizations to access your [facility] medical records  [id number]        Your Vitals Were     Pulse Temperature Pulse Oximetry             70 97.5  F (36.4  C) (Tympanic) 96%          Blood Pressure from Last 3 Encounters:   17 132/78   17 112/78   17 138/80    Weight from Last 3 Encounters:   17 155 lb (70.3 kg)   16 154 lb (69.9 kg)   16 154 lb 1.6 oz (69.9 kg)              We Performed the Following     Basic metabolic panel     CBC with platelets     Hemoglobin A1c     Vitamin B12        Primary Care Provider Office Phone # Fax #    Joel Daniel Irwin Wegener, [phone]       [facility] [address] [place] [address]        Equal Access to Services     WOJCIECH PAPPAS : Hadii naz Mccarthy, waaxda luqadaha, qaybta kaalerica haas. So [facility] [phone].    ATENCIÓN: Si habla español, tiene a parr disposición servicios gratuitos de asistencia lingüística. Javy al [phone].    We comply with applicable federal civil rights laws and [place] laws. We do not discriminate on the basis of race, color, national origin, age, disability sex, sexual " orientation or gender identity.            Thank you!     Thank you for choosing [facility]  for your care. Our goal is always to provide you with excellent care. Hearing back from our patients is one way we can continue to improve our services. Please take a few minutes to complete the written survey that you may receive in the mail after your visit with us. Thank you!             Your Updated Medication List - Protect others around you: Learn how to safely use, store and throw away your medicines at www.disposemymeds.org.          This list is accurate as of: 6/23/17  4:17 PM.  Always use your most recent med list.                   Brand Name Dispense Instructions for use Diagnosis    amLODIPine 10 MG tablet    NORVASC    90 tablet    Take 1 tablet (10 mg) by mouth daily    Hypertension goal BP (blood pressure) < 140/90       aspirin 81 MG tablet      Take 1 tablet by mouth daily.        atorvastatin 40 MG tablet    LIPITOR    90 tablet    Take 1 tablet (40 mg) by mouth daily    Hyperlipidemia LDL goal <70       baclofen 10 MG tablet    LIORESAL    270 tablet    Take 1 tablet (10 mg) by mouth 3 times daily    Cerebrovascular accident (CVA), unspecified mechanism (H)       blood glucose monitoring test strip    IRENE CONTOUR    200 each    Use to test blood sugars two times daily or as directed.    Type 2 diabetes, HbA1C goal < 8% (H)       cholecalciferol 1000 UNIT tablet    vitamin D     Take 1,000 Units by mouth daily        citalopram 20 MG tablet    celeXA    90 tablet    Take 1 tablet (20 mg) by mouth daily    Major depressive disorder, recurrent episode, mild (H)       cyanocobalamin 1000 MCG/ML injection    VITAMIN B12    1 mL    Inject 1 mL (1,000 mcg) into the muscle every 30 days Order for clinic injection,  No need to fill.    Anemia due to vitamin B12 deficiency, unspecified B12 deficiency type       dextromethorphan 15 MG/5ML syrup      Take 10 mLs by mouth 2 times daily as needed         Lactase 250 MG Caps      Take 1 chew tab by mouth as needed (with dairy)        losartan 25 MG tablet    COZAAR    90 tablet    Take 1 tablet (25 mg) by mouth daily    Hypertension goal BP (blood pressure) < 140/90       metFORMIN 1000 MG tablet    GLUCOPHAGE    180 tablet    Take 1 tablet (1,000 mg) by mouth 2 times daily (with meals)    Type 2 diabetes mellitus with stage 3 chronic kidney disease, with long-term current use of insulin (H)       metoprolol 50 MG 24 hr tablet    TOPROL-XL    90 tablet    Take 1 tablet (50 mg) by mouth daily    Hypertension, uncontrolled, Hypertension goal BP (blood pressure) < 140/90       montelukast 10 MG tablet    SINGULAIR    90 tablet    Take 1 tablet (10 mg) by mouth daily    Seasonal allergic rhinitis, unspecified allergic rhinitis trigger       senna-docusate 8.6-50 MG per tablet    SENOKOT-S;PERICOLACE    60 tablet    Take 1 tablet by mouth 2 times daily To be taken with opioid (narcotic) pain medication to prevent constipation.    MARI (acute kidney injury) (H)       TYLENOL 500 MG tablet   Generic drug:  acetaminophen      Take 500 mg by mouth 2 times daily

## 2017-06-23 NOTE — LETTER
[facility]   [address] [place]   [address]  [phone]    June 26, 2017      Addis Peña  [address]    [place] [address]              Dear Ms. Peña,    The b12 was good so let's not change your dose.  The other labs were good     Results for orders placed or performed in visit on 06/23/17   Hemoglobin A1c   Result Value Ref Range    Hemoglobin A1C 7.0 (H) 4.3 - 6.0 %   Basic metabolic panel   Result Value Ref Range    Sodium 143 133 - 144 mmol/L    Potassium 4.2 3.4 - 5.3 mmol/L    Chloride 110 (H) 94 - 109 mmol/L    Carbon Dioxide 23 20 - 32 mmol/L    Anion Gap 10 3 - 14 mmol/L    Glucose 107 (H) 70 - 99 mg/dL    Urea Nitrogen 30 7 - 30 mg/dL    Creatinine 0.89 0.52 - 1.04 mg/dL    GFR Estimate 64 >60 mL/min/1.7m2    GFR Estimate If Black 77 >60 mL/min/1.7m2    Calcium 9.3 8.5 - 10.1 mg/dL   Vitamin B12   Result Value Ref Range    Vitamin B12 487 193 - 986 pg/mL   CBC with platelets   Result Value Ref Range    WBC 8.3 4.0 - 11.0 10e9/L    RBC Count 3.92 3.8 - 5.2 10e12/L    Hemoglobin 12.0 11.7 - 15.7 g/dL    Hematocrit 37.9 35.0 - 47.0 %    MCV 97 78 - 100 fl    MCH 30.6 26.5 - 33.0 pg    MCHC 31.7 31.5 - 36.5 g/dL    RDW 13.0 10.0 - 15.0 %    Platelet Count 250 150 - 450 10e9/L           Sincerely,    Joel Wegener, MD/nr

## 2017-06-23 NOTE — PATIENT INSTRUCTIONS
Lab Results   Component Value Date    A1C 7.0 06/23/2017    A1C 6.9 01/06/2017    A1C 6.8 10/11/2016    A1C 7.9 05/02/2016    A1C 8.6 02/05/2016     Controlled.      No acute issues.  No med changes.      please put in parveen dhaliwal for 8:00 dec 4th for diabetes fu visit and preventive physical (annual wellness)

## 2017-06-23 NOTE — PROGRESS NOTES
SUBJECTIVE:                                                    Addis Peña is a 66 year old female who presents to clinic today for the following health issues:      Diabetes Follow-up    Patient is checking blood sugars: twice daily.    Blood sugar testing frequency justification: Patient modifying lifestyle changes (diet, exercise) with blood sugars  Results are as follows:         am - 140 pm-117    Diabetic concerns: None     Symptoms of hypoglycemia (low blood sugar): none     Paresthesias (numbness or burning in feet) or sores: No     Date of last diabetic eye exam: OCT 2016      Amount of exercise or physical activity: 6-7 days/week for an average of 30-45 minutes    Problems taking medications regularly: No    Medication side effects: none    Diet: low salt and NO CARBS/ SWEETS      PROBLEMS TO ADD ON...KIDNEY F/U pt state that everything is going good.     Additional history/life update:       Type 2 diabetes mellitus with stage 3 chronic kidney disease, without long-term current use of insulin (H)  Anemia due to vitamin B12 deficiency, unspecified B12 deficiency type  History of stroke  Hemiplegia affecting right dominant side (H) :needing pool therapy orders, does yerly.     Medical, social, surgical, and family histories reviewed.      REVIEW OF SYSTEMS FOR GENERAL, RESPIRATORY, CV, GI AND PSYCHIATRIC NEGATIVE EXCEPT AS NOTED IN HPI.         OBJECTIVE:  /78 (BP Location: Right arm, Patient Position: Chair, Cuff Size: Adult Regular)  Pulse 70  Temp 97.5  F (36.4  C) (Tympanic)  SpO2 96%    EXAM:  GENERAL APPEARANCE: healthy, alert and no distress    In wheelchair.  Cannot move right leg and limited movement right arm.   No m/g/r  lctab        ASSESSMENT AND PLAN  1. Type 2 diabetes mellitus with stage 3 chronic kidney disease, without long-term current use of insulin (H)      Lab Results   Component Value Date    A1C 7.0 06/23/2017    A1C 6.9 01/06/2017    A1C 6.8 10/11/2016    A1C 7.9  05/02/2016    A1C 8.6 02/05/2016     Controlled.      No acute issues.  No med changes.      please put in parveen dhaliwal for 8:00 dec 4th for diabetes fu visit and preventive physical (annual wellness)    - Hemoglobin A1c  - Basic metabolic panel    2. Anemia due to vitamin B12 deficiency, unspecified B12 deficiency type  Check labs today.  Last injection about three weeks ago.   - Vitamin B12  - CBC with platelets    3. History of stroke  I will send in orders for pool therapy.     4. Hemiplegia affecting right dominant side (H)  Same.         MYCHART SIGNUP FOR E-VISITS AND EASIER COMMUNICATION:  [email or web address]     Zipnosis:  [email or web address].  Sign up for e-visits for common illnesses!     RADIOLOGY:   [facility]:  [phone] to schedule any radiology tests at [facility]: [phone]    Mammograms/colonoscopies:  [phone]    CONSUMER PRICE LINE for estimates of test costs:  [phone]       Joel Wegener,MD

## 2017-06-24 LAB
ANION GAP SERPL CALCULATED.3IONS-SCNC: 10 MMOL/L (ref 3–14)
BUN SERPL-MCNC: 30 MG/DL (ref 7–30)
CALCIUM SERPL-MCNC: 9.3 MG/DL (ref 8.5–10.1)
CHLORIDE SERPL-SCNC: 110 MMOL/L (ref 94–109)
CO2 SERPL-SCNC: 23 MMOL/L (ref 20–32)
CREAT SERPL-MCNC: 0.89 MG/DL (ref 0.52–1.04)
GFR SERPL CREATININE-BSD FRML MDRD: 64 ML/MIN/1.7M2
GLUCOSE SERPL-MCNC: 107 MG/DL (ref 70–99)
POTASSIUM SERPL-SCNC: 4.2 MMOL/L (ref 3.4–5.3)
SODIUM SERPL-SCNC: 143 MMOL/L (ref 133–144)

## 2017-06-24 ASSESSMENT — PATIENT HEALTH QUESTIONNAIRE - PHQ9: SUM OF ALL RESPONSES TO PHQ QUESTIONS 1-9: 0

## 2017-06-24 ASSESSMENT — ANXIETY QUESTIONNAIRES: GAD7 TOTAL SCORE: 0

## 2017-06-26 PROBLEM — G81.01: Status: ACTIVE | Noted: 2017-06-26

## 2017-06-26 PROBLEM — I63.9: Status: ACTIVE | Noted: 2017-06-26

## 2017-07-07 ENCOUNTER — TELEPHONE (OUTPATIENT)
Dept: FAMILY MEDICINE | Facility: CLINIC | Age: 67
End: 2017-07-07

## 2017-07-07 ENCOUNTER — ALLIED HEALTH/NURSE VISIT (OUTPATIENT)
Dept: NURSING | Facility: CLINIC | Age: 67
End: 2017-07-07
Payer: MEDICARE

## 2017-07-07 DIAGNOSIS — I63.9 FLACCID HEMIPLEGIA OF RIGHT DOMINANT SIDE DUE TO INFARCTION OF BRAIN (H): Primary | ICD-10-CM

## 2017-07-07 DIAGNOSIS — G81.01 FLACCID HEMIPLEGIA OF RIGHT DOMINANT SIDE DUE TO INFARCTION OF BRAIN (H): Primary | ICD-10-CM

## 2017-07-07 DIAGNOSIS — D51.9 ANEMIA DUE TO VITAMIN B12 DEFICIENCY, UNSPECIFIED B12 DEFICIENCY TYPE: Primary | ICD-10-CM

## 2017-07-07 PROCEDURE — 99207 ZZC NO CHARGE NURSE ONLY: CPT

## 2017-07-07 PROCEDURE — 96372 THER/PROPH/DIAG INJ SC/IM: CPT

## 2017-07-07 NOTE — TELEPHONE ENCOUNTER
Reason for Call:  Other-Referral    Detailed comments: Pt needs a letter stating that she was referral to see an acupuncture. [facility] is requesting the pt of this for her spend down. Please contact pt regarding this. Thanks!    Phone Number Patient can be reached at: Home number on file [phone] (home)    Best Time: Anytime    Can we leave a detailed message on this number? YES    Call taken on 7/7/2017 at 2:38 PM by Doreen Matos

## 2017-07-07 NOTE — MR AVS SNAPSHOT
"              After Visit Summary   7/7/2017    Addis Peña    MRN: [id number]           Patient Information     Date Of Birth          1950        Visit Information        Provider Department      7/7/2017 10:00 AM HP MEDICAL ASSISTANT [facility]        Today's Diagnoses     Anemia due to vitamin B12 deficiency, unspecified B12 deficiency type    -  1       Follow-ups after your visit        Your next 10 appointments already scheduled     Aug 04, 2017 10:00 AM CDT   Nurse Only with HP MEDICAL ASSISTANT   [facility] ([facility]    [address] [place] [id number] [phone]            Dec 04, 2017  8:00 AM CST   PHYSICAL with Joel Daniel Irwin Wegener, MD   [facility] ([facility])    [address] [place] [address] [phone]              Who to contact     If you have questions or need follow up information about today's clinic visit or your schedule please contact [facility] directly at [phone].  Normal or non-critical lab and imaging results will be communicated to you by BackerKithart, letter or phone within 4 business days after the clinic has received the results. If you do not hear from us within 7 days, please contact the clinic through BackerKithart or phone. If you have a critical or abnormal lab result, we will notify you by phone as soon as possible.  Submit refill requests through CBIT A/S or call your pharmacy and they will forward the refill request to us. Please allow 3 business days for your refill to be completed.          Additional Information About Your Visit        MyChart Information     CBIT A/S lets you send messages to your doctor, view your test results, renew your prescriptions, schedule appointments and more. To sign up, go to [email or web address] . Click on \"Log in\" on the left side of the screen, which will take you to the Welcome page. " "Then click on \"Sign up Now\" on the right side of the page.     You will be asked to enter the access code listed below, as well as some personal information. Please follow the directions to create your username and password.     Your access code is: [email or web address]  Expires: 2017 10:13 AM     Your access code will  in 90 days. If you need help or a new code, please call your [place] clinic or [phone].        Care EveryWhere ID     This is your Care EveryWhere ID. This could be used by other organizations to access your [place] medical records  [id number]         Blood Pressure from Last 3 Encounters:   17 132/78   17 112/78   17 138/80    Weight from Last 3 Encounters:   17 155 lb (70.3 kg)   16 154 lb (69.9 kg)   16 154 lb 1.6 oz (69.9 kg)              Today, you had the following     No orders found for display       Primary Care Provider Office Phone # Fax #    Joel Daniel Irwin Wegener, [phone]       [place] [id number] [place] [address]        Equal Access to Services     SUYAPA PAPPAS : Hadii aad ku hadasho Soomaali, waaxda luqadaha, qaybta kaalmada adeegyada, waxay idiin hayaan miriam olvera lafernando . So [facility] [phone].    ATENCIÓN: Si habla español, tiene a parr disposición servicios gratuitos de asistencia lingüística. Llame al [phone].    We comply with applicable federal civil rights laws and [place] laws. We do not discriminate on the basis of race, color, national origin, age, disability sex, sexual orientation or gender identity.            Thank you!     Thank you for choosing [facility]  for your care. Our goal is always to provide you with excellent care. Hearing back from our patients is one way we can continue to improve our services. Please take a few minutes to complete the written survey that you may receive in the mail after your visit with us. Thank you!             Your " Updated Medication List - Protect others around you: Learn how to safely use, store and throw away your medicines at www.disposemymeds.org.          This list is accurate as of: 7/7/17 12:32 PM.  Always use your most recent med list.                   Brand Name Dispense Instructions for use Diagnosis    amLODIPine 10 MG tablet    NORVASC    90 tablet    Take 1 tablet (10 mg) by mouth daily    Hypertension goal BP (blood pressure) < 140/90       aspirin 81 MG tablet      Take 1 tablet by mouth daily.        atorvastatin 40 MG tablet    LIPITOR    90 tablet    Take 1 tablet (40 mg) by mouth daily    Hyperlipidemia LDL goal <70       baclofen 10 MG tablet    LIORESAL    270 tablet    Take 1 tablet (10 mg) by mouth 3 times daily    Cerebrovascular accident (CVA), unspecified mechanism (H)       blood glucose monitoring test strip    IRENE CONTOUR    200 each    Use to test blood sugars two times daily or as directed.    Type 2 diabetes, HbA1C goal < 8% (H)       cholecalciferol 1000 UNIT tablet    vitamin D     Take 1,000 Units by mouth daily        citalopram 20 MG tablet    celeXA    90 tablet    Take 1 tablet (20 mg) by mouth daily    Major depressive disorder, recurrent episode, mild (H)       cyanocobalamin 1000 MCG/ML injection    VITAMIN B12    1 mL    Inject 1 mL (1,000 mcg) into the muscle every 30 days Order for clinic injection,  No need to fill.    Anemia due to vitamin B12 deficiency, unspecified B12 deficiency type       dextromethorphan 15 MG/5ML syrup      Take 10 mLs by mouth 2 times daily as needed        Lactase 250 MG Caps      Take 1 chew tab by mouth as needed (with dairy)        losartan 25 MG tablet    COZAAR    90 tablet    Take 1 tablet (25 mg) by mouth daily    Hypertension goal BP (blood pressure) < 140/90       metFORMIN 1000 MG tablet    GLUCOPHAGE    180 tablet    Take 1 tablet (1,000 mg) by mouth 2 times daily (with meals)    Type 2 diabetes mellitus with stage 3 chronic kidney disease,  with long-term current use of insulin (H)       metoprolol 50 MG 24 hr tablet    TOPROL-XL    90 tablet    Take 1 tablet (50 mg) by mouth daily    Hypertension, uncontrolled, Hypertension goal BP (blood pressure) < 140/90       montelukast 10 MG tablet    SINGULAIR    90 tablet    Take 1 tablet (10 mg) by mouth daily    Seasonal allergic rhinitis, unspecified allergic rhinitis trigger       senna-docusate 8.6-50 MG per tablet    SENOKOT-S;PERICOLACE    60 tablet    Take 1 tablet by mouth 2 times daily To be taken with opioid (narcotic) pain medication to prevent constipation.    MARI (acute kidney injury) (H)       TYLENOL 500 MG tablet   Generic drug:  acetaminophen      Take 500 mg by mouth 2 times daily

## 2017-07-07 NOTE — TELEPHONE ENCOUNTER
Writer does not find an acupuncture referral in patient's chart.    Writer called patient who stated:  1. Needs Acupuncture referral from PCP so that her acupuncture visits count towards her spend down    Patient aware Dr. Wegener back in clinic on 7/11/17 and will wait until he returns.    Referral pended.    Dr. Wegener-Please sign if agree.    Thank you!  CARYN WatsonN, RN

## 2017-07-07 NOTE — NURSING NOTE
The following medication was given:     MEDICATION: Vitamin B12  1000mcg  ROUTE: IM  SITE: Deltoid - Left  DOSE: 1mL  LOT #: [id number]  :  [employer]  EXPIRATION DATE:  7/1/2018  NDC#: 6305-4898-58  Patel Watson CMA

## 2017-07-10 ENCOUNTER — TELEPHONE (OUTPATIENT)
Dept: FAMILY MEDICINE | Facility: CLINIC | Age: 67
End: 2017-07-10

## 2017-07-10 DIAGNOSIS — B35.1 ONYCHOMYCOSIS: Primary | ICD-10-CM

## 2017-07-10 NOTE — TELEPHONE ENCOUNTER
Reason for call:  Other   Patient called regarding (reason for call): referral  Additional comments: for twinkle toes for her finger nails    Phone number to reach patient:  Home number on file [phone] (home)    Best Time:  anytime    Can we leave a detailed message on this number?  YES

## 2017-07-10 NOTE — TELEPHONE ENCOUNTER
"Spoke with pt and stated medical insurances do not pay for trimming of finger or toe nails. Pt is responsible for payment. Pt is requesting a \"Letter of Medical Necessity\" so she can show the county she is unable to trim nails herself and the county would apply payment towards her spend down account. Please mail the letter to pt.  "

## 2017-07-10 NOTE — TELEPHONE ENCOUNTER
Team coordinators-please contact patient and ask if she would like referral mailed or left at  for ?     Thank you!  CARYN WatsonN, RN

## 2017-07-10 NOTE — TELEPHONE ENCOUNTER
Called patient to ask about mailing or if she wanted to  she asked for it to be mailed to that is what I will do

## 2017-07-10 NOTE — TELEPHONE ENCOUNTER
Routing to referral specialist.    Luna Joseph-Please review.  Can patient be given referral to [facility]?    Thank you!  CARYN WatsonN, RN

## 2017-07-18 NOTE — TELEPHONE ENCOUNTER
Patient states she is still waiting on the letter requested below for [facility]. Patient thinks her request may have been misunderstood. Please see message from 7/10/2017 and follow up. Thanks!

## 2017-07-18 NOTE — TELEPHONE ENCOUNTER
Called pt that letter is ready for  , pt state SULY ULRICH will  the letter on her behalf.     Rupal Duque MA

## 2017-07-28 ENCOUNTER — TRANSFERRED RECORDS (OUTPATIENT)
Dept: HEALTH INFORMATION MANAGEMENT | Facility: CLINIC | Age: 67
End: 2017-07-28

## 2017-08-04 ENCOUNTER — ALLIED HEALTH/NURSE VISIT (OUTPATIENT)
Dept: NURSING | Facility: CLINIC | Age: 67
End: 2017-08-04
Payer: MEDICARE

## 2017-08-04 DIAGNOSIS — D51.9 ANEMIA DUE TO VITAMIN B12 DEFICIENCY, UNSPECIFIED B12 DEFICIENCY TYPE: Primary | ICD-10-CM

## 2017-08-04 PROCEDURE — 96372 THER/PROPH/DIAG INJ SC/IM: CPT

## 2017-08-04 NOTE — MR AVS SNAPSHOT
"              After Visit Summary   8/4/2017    Addis Peña    MRN: [id number]           Patient Information     Date Of Birth          1950        Visit Information        Provider Department      8/4/2017 10:00 AM HP MEDICAL ASSISTANT [facility]        Today's Diagnoses     Anemia due to vitamin B12 deficiency, unspecified B12 deficiency type    -  1       Follow-ups after your visit        Your next 10 appointments already scheduled     Sep 01, 2017 10:00 AM CDT   Nurse Only with HP MEDICAL ASSISTANT   [facility] ([facility]    [address] [place] [id number] [phone]            Dec 04, 2017  8:00 AM CST   PHYSICAL with Joel Daniel Irwin Wegener, MD   [facility] ([facility])    [address] [place] [address] [phone]              Who to contact     If you have questions or need follow up information about today's clinic visit or your schedule please contact [facility] directly at [phone].  Normal or non-critical lab and imaging results will be communicated to you by Vyoptahart, letter or phone within 4 business days after the clinic has received the results. If you do not hear from us within 7 days, please contact the clinic through Vyoptahart or phone. If you have a critical or abnormal lab result, we will notify you by phone as soon as possible.  Submit refill requests through Swogo or call your pharmacy and they will forward the refill request to us. Please allow 3 business days for your refill to be completed.          Additional Information About Your Visit        MyChart Information     Swogo lets you send messages to your doctor, view your test results, renew your prescriptions, schedule appointments and more. To sign up, go to [email or web address] . Click on \"Log in\" on the left side of the screen, which will take you to the Welcome page. " "Then click on \"Sign up Now\" on the right side of the page.     You will be asked to enter the access code listed below, as well as some personal information. Please follow the directions to create your username and password.     Your access code is: [email or web address]  Expires: 2017 10:13 AM     Your access code will  in 90 days. If you need help or a new code, please call your [place] clinic or [phone].        Care EveryWhere ID     This is your Care EveryWhere ID. This could be used by other organizations to access your [facility] medical records  [id number]         Blood Pressure from Last 3 Encounters:   17 132/78   17 112/78   17 138/80    Weight from Last 3 Encounters:   17 155 lb (70.3 kg)   16 154 lb (69.9 kg)   16 154 lb 1.6 oz (69.9 kg)              We Performed the Following     INJECTION INTRAMUSCULAR OR SUB-Q     VITAMIN B12 INJ /1000MCG        Primary Care Provider Office Phone # Fax #    Joel Daniel Irwin Wegener, [phone]       [place] [address]        Equal Access to Services     SUYAPA PAPPAS AH: Hadii naz ku hadasho Soomaali, waaxda luqadaha, qaybta kaalmada adeegyada, erica clark hayradhames kelly. So [facility] [phone].    ATENCIÓN: Si habla español, tiene a parr disposición servicios gratuitos de asistencia lingüística. Llame al [phone].    We comply with applicable federal civil rights laws and [place] laws. We do not discriminate on the basis of race, color, national origin, age, disability sex, sexual orientation or gender identity.            Thank you!     Thank you for choosing [facility]  for your care. Our goal is always to provide you with excellent care. Hearing back from our patients is one way we can continue to improve our services. Please take a few minutes to complete the written survey that you may receive in the mail after your visit with us. " Thank you!             Your Updated Medication List - Protect others around you: Learn how to safely use, store and throw away your medicines at www.disposemymeds.org.          This list is accurate as of: 8/4/17 11:02 AM.  Always use your most recent med list.                   Brand Name Dispense Instructions for use Diagnosis    amLODIPine 10 MG tablet    NORVASC    90 tablet    Take 1 tablet (10 mg) by mouth daily    Hypertension goal BP (blood pressure) < 140/90       aspirin 81 MG tablet      Take 1 tablet by mouth daily.        atorvastatin 40 MG tablet    LIPITOR    90 tablet    Take 1 tablet (40 mg) by mouth daily    Hyperlipidemia LDL goal <70       baclofen 10 MG tablet    LIORESAL    270 tablet    Take 1 tablet (10 mg) by mouth 3 times daily    Cerebrovascular accident (CVA), unspecified mechanism (H)       blood glucose monitoring test strip    IRENE CONTOUR    200 each    Use to test blood sugars two times daily or as directed.    Type 2 diabetes, HbA1C goal < 8% (H)       cholecalciferol 1000 UNIT tablet    vitamin D     Take 1,000 Units by mouth daily        citalopram 20 MG tablet    celeXA    90 tablet    Take 1 tablet (20 mg) by mouth daily    Major depressive disorder, recurrent episode, mild (H)       cyanocobalamin 1000 MCG/ML injection    VITAMIN B12    1 mL    Inject 1 mL (1,000 mcg) into the muscle every 30 days Order for clinic injection,  No need to fill.    Anemia due to vitamin B12 deficiency, unspecified B12 deficiency type       dextromethorphan 15 MG/5ML syrup      Take 10 mLs by mouth 2 times daily as needed        Lactase 250 MG Caps      Take 1 chew tab by mouth as needed (with dairy)        losartan 25 MG tablet    COZAAR    90 tablet    Take 1 tablet (25 mg) by mouth daily    Hypertension goal BP (blood pressure) < 140/90       metFORMIN 1000 MG tablet    GLUCOPHAGE    180 tablet    Take 1 tablet (1,000 mg) by mouth 2 times daily (with meals)    Type 2 diabetes mellitus with  stage 3 chronic kidney disease, with long-term current use of insulin (H)       metoprolol 50 MG 24 hr tablet    TOPROL-XL    90 tablet    Take 1 tablet (50 mg) by mouth daily    Hypertension, uncontrolled, Hypertension goal BP (blood pressure) < 140/90       montelukast 10 MG tablet    SINGULAIR    90 tablet    Take 1 tablet (10 mg) by mouth daily    Seasonal allergic rhinitis, unspecified allergic rhinitis trigger       senna-docusate 8.6-50 MG per tablet    SENOKOT-S;PERICOLACE    60 tablet    Take 1 tablet by mouth 2 times daily To be taken with opioid (narcotic) pain medication to prevent constipation.    MARI (acute kidney injury) (H)       TYLENOL 500 MG tablet   Generic drug:  acetaminophen      Take 500 mg by mouth 2 times daily

## 2017-08-04 NOTE — NURSING NOTE
The following medication was given:     MEDICATION: Vitamin B12  1000mcg  ROUTE: IM  SITE: Deltoid - Left  DOSE: 1ml  LOT #: [id number]  :  [employer]  EXPIRATION DATE:  09/01/18  NDC: 4898-4544-77  Bertha Duque MA

## 2017-08-13 ENCOUNTER — OFFICE VISIT (OUTPATIENT)
Dept: URGENT CARE | Facility: URGENT CARE | Age: 67
End: 2017-08-13
Payer: MEDICARE

## 2017-08-13 VITALS
RESPIRATION RATE: 16 BRPM | SYSTOLIC BLOOD PRESSURE: 130 MMHG | HEIGHT: 64 IN | TEMPERATURE: 98.7 F | HEART RATE: 84 BPM | WEIGHT: 164 LBS | BODY MASS INDEX: 28 KG/M2 | DIASTOLIC BLOOD PRESSURE: 76 MMHG | OXYGEN SATURATION: 96 %

## 2017-08-13 DIAGNOSIS — R31.0 GROSS HEMATURIA: Primary | ICD-10-CM

## 2017-08-13 LAB
ALBUMIN SERPL-MCNC: 3.8 G/DL (ref 3.4–5)
ALBUMIN UR-MCNC: >=300 MG/DL
ALP SERPL-CCNC: 94 U/L (ref 40–150)
ALT SERPL W P-5'-P-CCNC: 22 U/L (ref 0–50)
ANION GAP SERPL CALCULATED.3IONS-SCNC: 7 MMOL/L (ref 3–14)
APPEARANCE UR: ABNORMAL
AST SERPL W P-5'-P-CCNC: 17 U/L (ref 0–45)
BACTERIA #/AREA URNS HPF: ABNORMAL /HPF
BILIRUB SERPL-MCNC: 0.2 MG/DL (ref 0.2–1.3)
BILIRUB UR QL STRIP: NEGATIVE
BUN SERPL-MCNC: 28 MG/DL (ref 7–30)
CALCIUM SERPL-MCNC: 9.4 MG/DL (ref 8.5–10.1)
CHLORIDE SERPL-SCNC: 109 MMOL/L (ref 94–109)
CO2 SERPL-SCNC: 26 MMOL/L (ref 20–32)
COLOR UR AUTO: ABNORMAL
CREAT SERPL-MCNC: 0.92 MG/DL (ref 0.52–1.04)
GFR SERPL CREATININE-BSD FRML MDRD: 61 ML/MIN/1.7M2
GLUCOSE SERPL-MCNC: 122 MG/DL (ref 70–99)
GLUCOSE UR STRIP-MCNC: NEGATIVE MG/DL
HGB UR QL STRIP: ABNORMAL
KETONES UR STRIP-MCNC: NEGATIVE MG/DL
LEUKOCYTE ESTERASE UR QL STRIP: NEGATIVE
NITRATE UR QL: NEGATIVE
PH UR STRIP: 5.5 PH (ref 5–7)
POTASSIUM SERPL-SCNC: 4.8 MMOL/L (ref 3.4–5.3)
PROT SERPL-MCNC: 7.2 G/DL (ref 6.8–8.8)
RBC #/AREA URNS AUTO: >100 /HPF (ref 0–2)
SODIUM SERPL-SCNC: 142 MMOL/L (ref 133–144)
SP GR UR STRIP: 1.02 (ref 1–1.03)
URN SPEC COLLECT METH UR: ABNORMAL
URNS CMNT MICRO: ABNORMAL
UROBILINOGEN UR STRIP-ACNC: 0.2 EU/DL (ref 0.2–1)
WBC #/AREA URNS AUTO: ABNORMAL /HPF (ref 0–2)

## 2017-08-13 PROCEDURE — 80053 COMPREHEN METABOLIC PANEL: CPT | Performed by: PHYSICIAN ASSISTANT

## 2017-08-13 PROCEDURE — 87086 URINE CULTURE/COLONY COUNT: CPT | Performed by: PHYSICIAN ASSISTANT

## 2017-08-13 PROCEDURE — 81001 URINALYSIS AUTO W/SCOPE: CPT | Performed by: PHYSICIAN ASSISTANT

## 2017-08-13 PROCEDURE — 36415 COLL VENOUS BLD VENIPUNCTURE: CPT | Performed by: PHYSICIAN ASSISTANT

## 2017-08-13 PROCEDURE — 99213 OFFICE O/P EST LOW 20 MIN: CPT | Performed by: PHYSICIAN ASSISTANT

## 2017-08-13 NOTE — PROGRESS NOTES
SUBJECTIVE:   Addis Peña is a 66 year old female who  presents today for a possible UTI. Symptoms of hematuria have been going on since this AM. Patient denies having symptoms of dysuria, frequency or urgency.  sudden onsetand mild.  There is no history of fever, chills, nausea or vomiting.  No history of vaginal or penile discharge. This patient does have a history of urinary tract infections. Patient denies long duration, rigors, flank pain, temperature > 101 degrees F., Vomiting, significant nausea or diarrhea and taking Coumadin or vaginal discharge, vaginal odor and vaginal itching. She denies having abdominal pain.     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     Nephrolithiasis      Stroke (H) 1/2010    due to uncontrolled hypertension     Type 2 diabetes, HbA1C goal < 8% (H)      Current Outpatient Prescriptions   Medication Sig Dispense Refill     amLODIPine (NORVASC) 10 MG tablet Take 1 tablet (10 mg) by mouth daily 90 tablet 1     atorvastatin (LIPITOR) 40 MG tablet Take 1 tablet (40 mg) by mouth daily 90 tablet 3     citalopram (CELEXA) 20 MG tablet Take 1 tablet (20 mg) by mouth daily 90 tablet 3     losartan (COZAAR) 25 MG tablet Take 1 tablet (25 mg) by mouth daily 90 tablet 3     metFORMIN (GLUCOPHAGE) 1000 MG tablet Take 1 tablet (1,000 mg) by mouth 2 times daily (with meals) 180 tablet 3     montelukast (SINGULAIR) 10 MG tablet Take 1 tablet (10 mg) by mouth daily 90 tablet 3     metoprolol (TOPROL-XL) 50 MG 24 hr tablet Take 1 tablet (50 mg) by mouth daily 90 tablet 3     baclofen (LIORESAL) 10 MG tablet Take 1 tablet (10 mg) by mouth 3 times daily 270 tablet 3     cyanocobalamin (VITAMIN  "B12) 1000 MCG/ML injection Inject 1 mL (1,000 mcg) into the muscle every 30 days Order for clinic injection,  No need to fill. 1 mL 11     senna-docusate (SENOKOT-S;PERICOLACE) 8.6-50 MG per tablet Take 1 tablet by mouth 2 times daily To be taken with opioid (narcotic) pain medication to prevent constipation. 60 tablet 1     dextromethorphan 15 MG/5ML syrup Take 10 mLs by mouth 2 times daily as needed        Cholecalciferol (VITAMIN D3) 1000 UNIT TABS Take 1,000 Units by mouth daily        aspirin 81 MG tablet Take 1 tablet by mouth daily.       acetaminophen (TYLENOL) 500 MG tablet Take 500 mg by mouth 2 times daily        blood glucose monitoring (atVenu CONTOUR) test strip Use to test blood sugars two times daily or as directed. 200 each 1     Lactase 250 MG CAPS Take 1 chew tab by mouth as needed (with dairy)        Social History   Substance Use Topics     Smoking status: Never Smoker     Smokeless tobacco: Never Used     Alcohol use No       ROS:   Review of systems negative except as stated above.    OBJECTIVE:  /76  Pulse 84  Temp 98.7  F (37.1  C) (Oral)  Resp 16  Ht 5' 4\" (1.626 m)  Wt 164 lb (74.4 kg)  SpO2 96%  Breastfeeding? No  BMI 28.15 kg/m2  GENERAL APPEARANCE: healthy, alert and no distress  RESP: lungs clear to auscultation - no rales, rhonchi or wheezes  CV: regular rates and rhythm, normal S1 S2, no murmur noted  ABDOMEN:  soft, nontender, no HSM or masses and bowel sounds normal  BACK: No CVA tenderness  SKIN: no suspicious lesions or rashes    ASSESSMENT/PLAN:  1. Gross hematuria  Follow up with PCP tomorrow or Tuesday in regards to hematuria. Went over RED FLAG symptoms, severe abdominal pain, fever or unable to hold down fluids, you should be seen in the ED.   - *UA reflex to Microscopic and Culture ([place] and [facility] (except [place] and [place])  - Urine Microscopic  - Comprehensive metabolic panel (BMP + Alb, Alk Phos, ALT, AST, Total. Bili, TP)  - Urine Culture " Aerobic Bacterial    I have discussed the patient's diagnosis and my plan of treatment with the patient. We went over any labs or imaging. Patient is aware to come back in with worsening symptoms or if no relief despite treatment plan.  Patient verbalizes understanding. All questions were addressed and answered.     Chelly Ball PA-C

## 2017-08-13 NOTE — MR AVS SNAPSHOT
"              After Visit Summary   8/13/2017    Addis Peña    MRN: [id number]           Patient Information     Date Of Birth          1950        Visit Information        Provider Department      8/13/2017 1:35 PM Chelly Ball PA-C [facility]        Today's Diagnoses     Gross hematuria    -  1       Follow-ups after your visit        Your next 10 appointments already scheduled     Sep 01, 2017 10:00 AM CDT   Nurse Only with HP MEDICAL ASSISTANT   [facility] ([facility])    [address] [place] [id number] [phone]            Dec 04, 2017  8:00 AM CST   PHYSICAL with Joel Daniel Irwin Wegener, MD   [facility] ([facility]    [address] [place] [id number] [phone]              Who to contact     If you have questions or need follow up information about today's clinic visit or your schedule please contact [facility] directly at [phone].  Normal or non-critical lab and imaging results will be communicated to you by scoo mobilityhart, letter or phone within 4 business days after the clinic has received the results. If you do not hear from us within 7 days, please contact the clinic through scoo mobilityhart or phone. If you have a critical or abnormal lab result, we will notify you by phone as soon as possible.  Submit refill requests through [employer] or call your pharmacy and they will forward the refill request to us. Please allow 3 business days for your refill to be completed.          Additional Information About Your Visit        scoo mobilityhart Information     [employer] lets you send messages to your doctor, view your test results, renew your prescriptions, schedule appointments and more. To sign up, go to [email or web address] . Click on \"Log in\" on the left side of the screen, which will take you to the Welcome page. Then click on \"Sign up Now\" on the " "right side of the page.     You will be asked to enter the access code listed below, as well as some personal information. Please follow the directions to create your username and password.     Your access code is: [email or web address]  Expires: 2017 10:13 AM     Your access code will  in 90 days. If you need help or a new code, please call your [facility] clinic or [phone].        Care EveryWhere ID     This is your Care EveryWhere ID. This could be used by other organizations to access your [facility] medical records  [id number]        Your Vitals Were     Pulse Temperature Respirations Height Pulse Oximetry Breastfeeding?    84 98.7  F (37.1  C) (Oral) 16 5' 4\" (1.626 m) 96% No    BMI (Body Mass Index)                   28.15 kg/m2            Blood Pressure from Last 3 Encounters:   17 130/76   17 132/78   17 112/78    Weight from Last 3 Encounters:   17 164 lb (74.4 kg)   17 155 lb (70.3 kg)   16 154 lb (69.9 kg)              We Performed the Following     *UA reflex to Microscopic and Culture ([place] and [facility] (except [place] and [place])     Comprehensive metabolic panel (BMP + Alb, Alk Phos, ALT, AST, Total. Bili, TP)     Urine Culture Aerobic Bacterial     Urine Microscopic        Primary Care Provider Office Phone # Fax #    Jesus Daniel Irwin Wegener, [phone]       [facility] [address] [place], [place] [id number]        Equal Access to Services     [facility] Hospital[facility]AN : Hadii aad ku hadasho Soomaali, waaxda luqadaha, qaybta kaalmada juan j, waxdimitri eduardo gibson . So [facility] [phone].    ATENCIÓN: Si habla español, tiene a parr disposición servicios gratuitos de asistencia lingüística. Llame al [phone].    We comply with applicable federal civil rights laws and [place] laws. We do not discriminate on the basis of race, color, national origin, age, disability sex, sexual orientation or gender identity.            Thank you!  "    Thank you for choosing [facility]  for your care. Our goal is always to provide you with excellent care. Hearing back from our patients is one way we can continue to improve our services. Please take a few minutes to complete the written survey that you may receive in the mail after your visit with us. Thank you!             Your Updated Medication List - Protect others around you: Learn how to safely use, store and throw away your medicines at www.disposemymeds.org.          This list is accurate as of: 8/13/17  2:57 PM.  Always use your most recent med list.                   Brand Name Dispense Instructions for use Diagnosis    amLODIPine 10 MG tablet    NORVASC    90 tablet    Take 1 tablet (10 mg) by mouth daily    Hypertension goal BP (blood pressure) < 140/90       aspirin 81 MG tablet      Take 1 tablet by mouth daily.        atorvastatin 40 MG tablet    LIPITOR    90 tablet    Take 1 tablet (40 mg) by mouth daily    Hyperlipidemia LDL goal <70       baclofen 10 MG tablet    LIORESAL    270 tablet    Take 1 tablet (10 mg) by mouth 3 times daily    Cerebrovascular accident (CVA), unspecified mechanism (H)       blood glucose monitoring test strip    IRENE CONTOUR    200 each    Use to test blood sugars two times daily or as directed.    Type 2 diabetes, HbA1C goal < 8% (H)       cholecalciferol 1000 UNIT tablet    vitamin D     Take 1,000 Units by mouth daily        citalopram 20 MG tablet    celeXA    90 tablet    Take 1 tablet (20 mg) by mouth daily    Major depressive disorder, recurrent episode, mild (H)       cyanocobalamin 1000 MCG/ML injection    VITAMIN B12    1 mL    Inject 1 mL (1,000 mcg) into the muscle every 30 days Order for clinic injection,  No need to fill.    Anemia due to vitamin B12 deficiency, unspecified B12 deficiency type       dextromethorphan 15 MG/5ML syrup      Take 10 mLs by mouth 2 times daily as needed        Lactase 250 MG Caps      Take 1 chew tab by  mouth as needed (with dairy)        losartan 25 MG tablet    COZAAR    90 tablet    Take 1 tablet (25 mg) by mouth daily    Hypertension goal BP (blood pressure) < 140/90       metFORMIN 1000 MG tablet    GLUCOPHAGE    180 tablet    Take 1 tablet (1,000 mg) by mouth 2 times daily (with meals)    Type 2 diabetes mellitus with stage 3 chronic kidney disease, with long-term current use of insulin (H)       metoprolol 50 MG 24 hr tablet    TOPROL-XL    90 tablet    Take 1 tablet (50 mg) by mouth daily    Hypertension, uncontrolled, Hypertension goal BP (blood pressure) < 140/90       montelukast 10 MG tablet    SINGULAIR    90 tablet    Take 1 tablet (10 mg) by mouth daily    Seasonal allergic rhinitis, unspecified allergic rhinitis trigger       senna-docusate 8.6-50 MG per tablet    SENOKOT-S;PERICOLACE    60 tablet    Take 1 tablet by mouth 2 times daily To be taken with opioid (narcotic) pain medication to prevent constipation.    MARI (acute kidney injury) (H)       TYLENOL 500 MG tablet   Generic drug:  acetaminophen      Take 500 mg by mouth 2 times daily

## 2017-08-13 NOTE — NURSING NOTE
"Chief Complaint   Patient presents with     Urgent Care     UTI     started today about 11am. blood in urine. some discomfort, uncomfortable to sit.        Initial /76  Pulse 84  Temp 98.7  F (37.1  C) (Oral)  Resp 16  Ht 5' 4\" (1.626 m)  Wt 164 lb (74.4 kg)  SpO2 96%  Breastfeeding? No  BMI 28.15 kg/m2 Estimated body mass index is 28.15 kg/(m^2) as calculated from the following:    Height as of this encounter: 5' 4\" (1.626 m).    Weight as of this encounter: 164 lb (74.4 kg).  Medication Reconciliation: complete  "

## 2017-08-14 ENCOUNTER — TELEPHONE (OUTPATIENT)
Dept: FAMILY MEDICINE | Facility: CLINIC | Age: 67
End: 2017-08-14

## 2017-08-14 LAB
BACTERIA SPEC CULT: NORMAL
MICRO REPORT STATUS: NORMAL
SPECIMEN SOURCE: NORMAL

## 2017-08-14 NOTE — TELEPHONE ENCOUNTER
PT left  for clinic at 0913.    Pt was seen in UC yest.  Had blood in urine.  She needed f/u appt made with pcp.    Called pt and made appt.  TYLER Lozada

## 2017-08-15 ENCOUNTER — OFFICE VISIT (OUTPATIENT)
Dept: FAMILY MEDICINE | Facility: CLINIC | Age: 67
End: 2017-08-15
Payer: MEDICARE

## 2017-08-15 VITALS
TEMPERATURE: 98 F | SYSTOLIC BLOOD PRESSURE: 144 MMHG | DIASTOLIC BLOOD PRESSURE: 72 MMHG | OXYGEN SATURATION: 95 % | HEART RATE: 72 BPM

## 2017-08-15 DIAGNOSIS — I10 HYPERTENSION GOAL BP (BLOOD PRESSURE) < 140/90: ICD-10-CM

## 2017-08-15 DIAGNOSIS — R31.9 HEMATURIA: Primary | ICD-10-CM

## 2017-08-15 PROCEDURE — 99213 OFFICE O/P EST LOW 20 MIN: CPT | Performed by: FAMILY MEDICINE

## 2017-08-15 NOTE — MR AVS SNAPSHOT
After Visit Summary   8/15/2017    Addis Peña    MRN: [id number]           Patient Information     Date Of Birth          1950        Visit Information        Provider Department      8/15/2017 3:40 PM Wegener, Joel Daniel Irwin, MD [facility]        Today's Diagnoses     Hematuria    -  1    Hypertension goal BP (blood pressure) < 140/90          Care Instructions    ASSESSMENT AND PLAN  1. Hematuria  Resolved now.  No infection on culture.     Renal blood test came back stable/good.      No further evaluation needed unless this is recurrent.         2. Hypertension goal BP (blood pressure) < 140/90  Slightly high today, similar re-check but has been controlled recently.     Follow up one month for nurse blood pressue check, if high we will increase losartan.     Follow up December for diabetes visit as planned.           Reset TherapeuticsHART SIGNUP FOR E-VISITS AND EASIER COMMUNICATION:  [email or web address]     Zipnosis:  [email or web address].  Sign up for e-visits for common illnesses!     RADIOLOGY:   [facility]:  [phone] [facility] [phone]    Mammograms/colonoscopies:  [phone]    CONSUMER PRICE LINE for estimates of test costs:  [phone]               Follow-ups after your visit        Your next 10 appointments already scheduled     Sep 01, 2017 10:00 AM CDT   Nurse Only with HP MEDICAL ASSISTANT   [facility] ([facility])    [address] [place] [id number] [phone]            Dec 04, 2017  8:00 AM CST   PHYSICAL with Joel Daniel Irwin Wegener, MD   [facility] ([facility])    [address] [place] [id number] [phone]              Who to contact     If you have questions or need follow up information about today's clinic visit or your schedule please contact [facility]  "directly at [phone].  Normal or non-critical lab and imaging results will be communicated to you by ACTV8mehart, letter or phone within 4 business days after the clinic has received the results. If you do not hear from us within 7 days, please contact the clinic through ACTV8mehart or phone. If you have a critical or abnormal lab result, we will notify you by phone as soon as possible.  Submit refill requests through [employer] or call your pharmacy and they will forward the refill request to us. Please allow 3 business days for your refill to be completed.          Additional Information About Your Visit        ACTV8meharSnackr Information     [employer] lets you send messages to your doctor, view your test results, renew your prescriptions, schedule appointments and more. To sign up, go to [email or web address] . Click on \"Log in\" on the left side of the screen, which will take you to the Welcome page. Then click on \"Sign up Now\" on the right side of the page.     You will be asked to enter the access code listed below, as well as some personal information. Please follow the directions to create your username and password.     Your access code is: [email or web address]  Expires: 2017 10:13 AM     Your access code will  in 90 days. If you need help or a new code, please call your [facility] clinic or [phone].        Care EveryWhere ID     This is your Care EveryWhere ID. This could be used by other organizations to access your [facility] medical records  [id number]        Your Vitals Were     Pulse Temperature Pulse Oximetry             72 98  F (36.7  C) (Tympanic) 95%          Blood Pressure from Last 3 Encounters:   08/15/17 144/72   17 130/76   17 132/78    Weight from Last 3 Encounters:   17 164 lb (74.4 kg)   17 155 lb (70.3 kg)   16 154 lb (69.9 kg)              We Performed the Following     *UA reflex to Microscopic     Urine Culture Aerobic Bacterial        Primary Care Provider Office " Phone # Fax #    Jesus Daniel Irwin Wegener, [phone] [address]  [facility] [address]        Equal Access to Services     DAVIDSONWOJCIECH ELYSE : Mariely naz garcía dionio Sokhadar, waaxda luqadaha, qaybta kaalmada karenda, erica jerniganradhames kelly. So [facility] [phone].    ATENCIÓN: Si habla español, tiene a parr disposición servicios gratuitos de asistencia lingüística. Llame al [phone].    We comply with applicable federal civil rights laws and [place] laws. We do not discriminate on the basis of race, color, national origin, age, disability sex, sexual orientation or gender identity.            Thank you!     Thank you for choosing [facility]  for your care. Our goal is always to provide you with excellent care. Hearing back from our patients is one way we can continue to improve our services. Please take a few minutes to complete the written survey that you may receive in the mail after your visit with us. Thank you!             Your Updated Medication List - Protect others around you: Learn how to safely use, store and throw away your medicines at www.disposemymeds.org.          This list is accurate as of: 8/15/17  4:31 PM.  Always use your most recent med list.                   Brand Name Dispense Instructions for use Diagnosis    amLODIPine 10 MG tablet    NORVASC    90 tablet    Take 1 tablet (10 mg) by mouth daily    Hypertension goal BP (blood pressure) < 140/90       aspirin 81 MG tablet      Take 1 tablet by mouth daily.        atorvastatin 40 MG tablet    LIPITOR    90 tablet    Take 1 tablet (40 mg) by mouth daily    Hyperlipidemia LDL goal <70       baclofen 10 MG tablet    LIORESAL    270 tablet    Take 1 tablet (10 mg) by mouth 3 times daily    Cerebrovascular accident (CVA), unspecified mechanism (H)       blood glucose monitoring test strip    IRENE CONTOUR    200 each    Use to test blood sugars two times daily or as directed.    Type 2  diabetes, HbA1C goal < 8% (H)       cholecalciferol 1000 UNIT tablet    vitamin D     Take 1,000 Units by mouth daily        citalopram 20 MG tablet    celeXA    90 tablet    Take 1 tablet (20 mg) by mouth daily    Major depressive disorder, recurrent episode, mild (H)       cyanocobalamin 1000 MCG/ML injection    VITAMIN B12    1 mL    Inject 1 mL (1,000 mcg) into the muscle every 30 days Order for clinic injection,  No need to fill.    Anemia due to vitamin B12 deficiency, unspecified B12 deficiency type       dextromethorphan 15 MG/5ML syrup      Take 10 mLs by mouth 2 times daily as needed        Lactase 250 MG Caps      Take 1 chew tab by mouth as needed (with dairy)        losartan 25 MG tablet    COZAAR    90 tablet    Take 1 tablet (25 mg) by mouth daily    Hypertension goal BP (blood pressure) < 140/90       metFORMIN 1000 MG tablet    GLUCOPHAGE    180 tablet    Take 1 tablet (1,000 mg) by mouth 2 times daily (with meals)    Type 2 diabetes mellitus with stage 3 chronic kidney disease, with long-term current use of insulin (H)       metoprolol 50 MG 24 hr tablet    TOPROL-XL    90 tablet    Take 1 tablet (50 mg) by mouth daily    Hypertension, uncontrolled, Hypertension goal BP (blood pressure) < 140/90       montelukast 10 MG tablet    SINGULAIR    90 tablet    Take 1 tablet (10 mg) by mouth daily    Seasonal allergic rhinitis, unspecified allergic rhinitis trigger       senna-docusate 8.6-50 MG per tablet    SENOKOT-S;PERICOLACE    60 tablet    Take 1 tablet by mouth 2 times daily To be taken with opioid (narcotic) pain medication to prevent constipation.    MARI (acute kidney injury) (H)       TYLENOL 500 MG tablet   Generic drug:  acetaminophen      Take 500 mg by mouth 2 times daily

## 2017-08-15 NOTE — NURSING NOTE
"Chief Complaint   Patient presents with     UTI       Initial /72 (BP Location: Right arm, Patient Position: Chair, Cuff Size: Adult Regular)  Pulse 72  Temp 98  F (36.7  C) (Tympanic)  SpO2 95% Estimated body mass index is 28.15 kg/(m^2) as calculated from the following:    Height as of 8/13/17: 5' 4\" (1.626 m).    Weight as of 8/13/17: 164 lb (74.4 kg).  Medication Reconciliation: complete   Rupal Duque MA      "

## 2017-08-15 NOTE — PATIENT INSTRUCTIONS
ASSESSMENT AND PLAN  1. Hematuria  Resolved now.  No infection on culture.     Renal blood test came back stable/good.      No further evaluation needed unless this is recurrent.         2. Hypertension goal BP (blood pressure) < 140/90  Slightly high today, similar re-check but has been controlled recently.     Follow up one month for nurse blood pressue check, if high we will increase losartan.     Follow up December for diabetes visit as planned.           [employer] USA[employer]ART SIGNUP FOR E-VISITS AND EASIER COMMUNICATION:  [email or web address]     Zipnosis:  "[email or web address].  Sign up for e-visits for common illnesses!     RADIOLOGY:   [facility]:  [phone] to schedule any radiology tests at [facility]: [phone]    Mammograms/colonoscopies:  [phone]    CONSUMER PRICE LINE for estimates of test costs:  [phone]

## 2017-08-15 NOTE — PROGRESS NOTES
SUBJECTIVE:                                                    Addis Peña is a 66 year old female who presents to clinic today for the following health issues:      URINARY TRACT SYMPTOMS      Duration: 3 day    Description  frequency and hematuria    Intensity:  No pain    Accompanying signs and symptoms:  Fever/chills: 7/13/2017   Flank pain no   Nausea and vomiting: no   Vaginal symptoms: none  Abdominal/Pelvic Pain: no     History  History of frequent UTI's: YES  History of kidney stones: YES  Sexually Active: no   Possibility of pregnancy: No    Precipitating or alleviating factors: None    Therapies tried and outcome: none   Outcome: none          Additional history/life update:       Hematuria: no day, no pain, fevers, dysuria.  Resolved.  None for several days.    Hypertension goal BP (blood pressure) < 140/90 :    Medical, social, surgical, and family histories reviewed.      ROS:  Constitutional, HEENT, cardiovascular, pulmonary, GI, , musculoskeletal, neuro, skin, endocrine and psych systems are negative, except as otherwise noted.        OBJECTIVE:  /72 (BP Location: Right arm, Patient Position: Chair, Cuff Size: Adult Regular)  Pulse 72  Temp 98  F (36.7  C) (Tympanic)  SpO2 95%    EXAM:  GENERAL APPEARANCE: healthy, alert and no distress      ASSESSMENT AND PLAN  Patient Instructions   ASSESSMENT AND PLAN  1. Hematuria  Resolved now.  No infection on culture.     Renal blood test came back stable/good.      No further evaluation needed unless this is recurrent.         2. Hypertension goal BP (blood pressure) < 140/90  Slightly high today, similar re-check but has been controlled recently.     Follow up one month for nurse blood pressue check, if high we will increase losartan.     Follow up December for diabetes visit as planned.           SANJEEV SIGNUP FOR E-VISITS AND EASIER COMMUNICATION:  [email or web address]     Zipnosis:  [email or web address].  Sign up for e-visits  for common illnesses!     RADIOLOGY:   [facility]:  [phone] to schedule any radiology tests at [facility]: [phone]    Mammograms/colonoscopies:  [phone]    CONSUMER PRICE LINE for estimates of test costs:  [phone]           I spent greater than 1/2 of 15 minutes counseling regarding the rational for the assessment and plan noted above.           Joel Wegener,MD

## 2017-09-01 ENCOUNTER — ALLIED HEALTH/NURSE VISIT (OUTPATIENT)
Dept: NURSING | Facility: CLINIC | Age: 67
End: 2017-09-01
Payer: MEDICARE

## 2017-09-01 VITALS — HEART RATE: 67 BPM | SYSTOLIC BLOOD PRESSURE: 125 MMHG | DIASTOLIC BLOOD PRESSURE: 68 MMHG | OXYGEN SATURATION: 96 %

## 2017-09-01 DIAGNOSIS — Z01.30 BP CHECK: Primary | ICD-10-CM

## 2017-09-01 DIAGNOSIS — E53.8 VITAMIN B 12 DEFICIENCY: ICD-10-CM

## 2017-09-01 PROCEDURE — 99207 ZZC NO CHARGE NURSE ONLY: CPT

## 2017-09-01 NOTE — MR AVS SNAPSHOT
"              After Visit Summary   9/1/2017    Addis Peña    MRN: [id number]           Patient Information     Date Of Birth          1950        Visit Information        Provider Department      9/1/2017 10:00 AM HP MEDICAL ASSISTANT [facility]        Today's Diagnoses     BP check    -  1    Vitamin B 12 deficiency           Follow-ups after your visit        Your next 10 appointments already scheduled     Oct 06, 2017 10:00 AM CDT   Nurse Only with HP MEDICAL ASSISTANT   [facility] ([facility])    [address] [place] [address] [phone]            Dec 04, 2017  8:00 AM CST   PHYSICAL with Joel Daniel Irwin Wegener, MD   [facility] ([facility])    [address] [place] [address] [phone]              Who to contact     If you have questions or need follow up information about today's clinic visit or your schedule please contact [facility] directly at [phone].  Normal or non-critical lab and imaging results will be communicated to you by [employer], letter or phone within 4 business days after the clinic has received the results. If you do not hear from us within 7 days, please contact the clinic through [employer] or phone. If you have a critical or abnormal lab result, we will notify you by phone as soon as possible.  Submit refill requests through [employer] or call your pharmacy and they will forward the refill request to us. Please allow 3 business days for your refill to be completed.          Additional Information About Your Visit        Metabacushart Information     [employer] lets you send messages to your doctor, view your test results, renew your prescriptions, schedule appointments and more. To sign up, go to [email or web address] . Click on \"Log in\" on the left side of the screen, which will take you to the Welcome page. Then click on \"Sign up Now\" " on the right side of the page.     You will be asked to enter the access code listed below, as well as some personal information. Please follow the directions to create your username and password.     Your access code is: [email or web address]  Expires: 2017 10:53 AM     Your access code will  in 90 days. If you need help or a new code, please call your [facility] or [phone].        Care EveryWhere ID     This is your Care EveryWhere ID. This could be used by other organizations to access your [place] medical records  [id number]        Your Vitals Were     Pulse Pulse Oximetry                67 96%           Blood Pressure from Last 3 Encounters:   17 125/68   08/15/17 144/72   17 130/76    Weight from Last 3 Encounters:   17 164 lb (74.4 kg)   17 155 lb (70.3 kg)   16 154 lb (69.9 kg)              Today, you had the following     No orders found for display       Primary Care Provider Office Phone # Fax #    Joel Daniel Irwin Wegener, [phone] [address] [place] [address]        Equal Access to Services     WOJCIECH PAPPAS AH: Hadii naz garcía hadasho Soomaali, waaxda luqadaha, qaybta kaalmada adeegyada, erica kelly. So [facility] [phone].    ATENCIÓN: Si habla español, tiene a parr disposición servicios gratuitos de asistencia lingüística. Llame al [phone].    We comply with applicable federal civil rights laws and [place] laws. We do not discriminate on the basis of race, color, national origin, age, disability sex, sexual orientation or gender identity.            Thank you!     Thank you for choosing [facility]  for your care. Our goal is always to provide you with excellent care. Hearing back from our patients is one way we can continue to improve our services. Please take a few minutes to complete the written survey that you may receive in the mail after your visit with us. Thank you!              Your Updated Medication List - Protect others around you: Learn how to safely use, store and throw away your medicines at www.disposemymeds.org.          This list is accurate as of: 9/1/17 10:53 AM.  Always use your most recent med list.                   Brand Name Dispense Instructions for use Diagnosis    amLODIPine 10 MG tablet    NORVASC    90 tablet    Take 1 tablet (10 mg) by mouth daily    Hypertension goal BP (blood pressure) < 140/90       aspirin 81 MG tablet      Take 1 tablet by mouth daily.        atorvastatin 40 MG tablet    LIPITOR    90 tablet    Take 1 tablet (40 mg) by mouth daily    Hyperlipidemia LDL goal <70       baclofen 10 MG tablet    LIORESAL    270 tablet    Take 1 tablet (10 mg) by mouth 3 times daily    Cerebrovascular accident (CVA), unspecified mechanism (H)       blood glucose monitoring test strip    IRENE CONTOUR    200 each    Use to test blood sugars two times daily or as directed.    Type 2 diabetes, HbA1C goal < 8% (H)       cholecalciferol 1000 UNIT tablet    vitamin D     Take 1,000 Units by mouth daily        citalopram 20 MG tablet    celeXA    90 tablet    Take 1 tablet (20 mg) by mouth daily    Major depressive disorder, recurrent episode, mild (H)       cyanocobalamin 1000 MCG/ML injection    VITAMIN B12    1 mL    Inject 1 mL (1,000 mcg) into the muscle every 30 days Order for clinic injection,  No need to fill.    Anemia due to vitamin B12 deficiency, unspecified B12 deficiency type       dextromethorphan 15 MG/5ML syrup      Take 10 mLs by mouth 2 times daily as needed        Lactase 250 MG Caps      Take 1 chew tab by mouth as needed (with dairy)        losartan 25 MG tablet    COZAAR    90 tablet    Take 1 tablet (25 mg) by mouth daily    Hypertension goal BP (blood pressure) < 140/90       metFORMIN 1000 MG tablet    GLUCOPHAGE    180 tablet    Take 1 tablet (1,000 mg) by mouth 2 times daily (with meals)    Type 2 diabetes mellitus with stage 3 chronic  kidney disease, with long-term current use of insulin (H)       metoprolol 50 MG 24 hr tablet    TOPROL-XL    90 tablet    Take 1 tablet (50 mg) by mouth daily    Hypertension, uncontrolled, Hypertension goal BP (blood pressure) < 140/90       montelukast 10 MG tablet    SINGULAIR    90 tablet    Take 1 tablet (10 mg) by mouth daily    Seasonal allergic rhinitis, unspecified allergic rhinitis trigger       senna-docusate 8.6-50 MG per tablet    SENOKOT-S;PERICOLACE    60 tablet    Take 1 tablet by mouth 2 times daily To be taken with opioid (narcotic) pain medication to prevent constipation.    MARI (acute kidney injury) (H)       TYLENOL 500 MG tablet   Generic drug:  acetaminophen      Take 500 mg by mouth 2 times daily

## 2017-09-14 ENCOUNTER — TELEPHONE (OUTPATIENT)
Dept: FAMILY MEDICINE | Facility: CLINIC | Age: 67
End: 2017-09-14

## 2017-09-14 DIAGNOSIS — I10 HYPERTENSION GOAL BP (BLOOD PRESSURE) < 140/90: ICD-10-CM

## 2017-09-14 RX ORDER — AMLODIPINE BESYLATE 10 MG/1
10 TABLET ORAL DAILY
Qty: 90 TABLET | Refills: 1 | Status: SHIPPED | OUTPATIENT
Start: 2017-09-14 | End: 2017-12-04

## 2017-09-14 NOTE — TELEPHONE ENCOUNTER
Next 5 appointments (look out 90 days)     Oct 06, 2017 10:00 AM CDT   Nurse Only with HP MEDICAL ASSISTANT   [facility] ([facility])    [address] [place] [address] [phone]            Dec 04, 2017  8:00 AM CST   PHYSICAL with Joel Daniel Irwin Wegener, MD   [facility] ([facility])    [address] [place] [address] [phone]                   Potassium   Date Value Ref Range Status   08/13/2017 4.8 3.4 - 5.3 mmol/L Final     Creatinine   Date Value Ref Range Status   08/13/2017 0.92 0.52 - 1.04 mg/dL Final     BP Readings from Last 3 Encounters:   09/01/17 125/68   08/15/17 144/72   08/13/17 130/76     Patient informed refill sent.  Ora Rios RN

## 2017-09-14 NOTE — TELEPHONE ENCOUNTER
Reason for Call:  Medication or medication refill:    Do you use a [facility]?  Name of the pharmacy and phone number for the current request:  [facility] [place] [facility]    Name of the medication requested: Amlodipine    Other request: Pt called in to request a refill. Pt states pharmacy has been contacting us to get this approved but have heard nothing. Please assist asap!    Can we leave a detailed message on this number? YES    Phone number patient can be reached at: Home number on file [phone] (home)    Best Time: anytime    Call taken on 9/14/2017 at 10:59 AM by Luna Hernández

## 2017-10-06 ENCOUNTER — ALLIED HEALTH/NURSE VISIT (OUTPATIENT)
Dept: NURSING | Facility: CLINIC | Age: 67
End: 2017-10-06
Payer: MEDICARE

## 2017-10-06 DIAGNOSIS — E53.8 VITAMIN B 12 DEFICIENCY: Primary | ICD-10-CM

## 2017-10-06 PROCEDURE — 96372 THER/PROPH/DIAG INJ SC/IM: CPT

## 2017-10-06 PROCEDURE — 99207 ZZC NO CHARGE NURSE ONLY: CPT

## 2017-10-06 NOTE — MR AVS SNAPSHOT
"              After Visit Summary   10/6/2017    Addis Peña    MRN: [id number]           Patient Information     Date Of Birth          1950        Visit Information        Provider Department      10/6/2017 10:00 AM HP MEDICAL ASSISTANT [facility]        Today's Diagnoses     Vitamin B 12 deficiency    -  1       Follow-ups after your visit        Your next 10 appointments already scheduled     Nov 03, 2017 10:00 AM CDT   Nurse Only with HP MEDICAL ASSISTANT   [facility] ([facility])    [address] [place] [address] [phone]            Dec 04, 2017  8:00 AM CST   PHYSICAL with Joel Daniel Irwin Wegener, MD   [facility]    [address] [place] [address] [phone]              Who to contact     If you have questions or need follow up information about today's clinic visit or your schedule please contact [facility] directly at [phone].  Normal or non-critical lab and imaging results will be communicated to you by MyChart, letter or phone within 4 business days after the clinic has received the results. If you do not hear from us within 7 days, please contact the clinic through [employer] or phone. If you have a critical or abnormal lab result, we will notify you by phone as soon as possible.  Submit refill requests through [employer] or call your pharmacy and they will forward the refill request to us. Please allow 3 business days for your refill to be completed.          Additional Information About Your Visit        MyChart Information     [employer] lets you send messages to your doctor, view your test results, renew your prescriptions, schedule appointments and more. To sign up, go to [email or web address] . Click on \"Log in\" on the left side of the screen, which will take you to the Welcome page. Then click on \"Sign up Now\" on the right " side of the page.     You will be asked to enter the access code listed below, as well as some personal information. Please follow the directions to create your username and password.     Your access code is: [email or web address]  Expires: 2017 10:53 AM     Your access code will  in 90 days. If you need help or a new code, please call your [place] clinic or [phone].        Care EveryWhere ID     This is your Care EveryWhere ID. This could be used by other organizations to access your [place] medical records  [id number]         Blood Pressure from Last 3 Encounters:   17 125/68   08/15/17 144/72   17 130/76    Weight from Last 3 Encounters:   17 164 lb (74.4 kg)   17 155 lb (70.3 kg)   16 154 lb (69.9 kg)              Today, you had the following     No orders found for display       Primary Care Provider Office Phone # Fax #    Joel Daniel Irwin Wegener, [phone]       [facility] [address] [place] [address]        Equal Access to Services     [facility]: Hadii aad ku hadasho Soomaali, waaxda luqadaha, qaybta kaalmada adeegyada, erica clark haymirzan adehyun gibson . So [facility] [phone].    ATENCIÓN: Si habla español, tiene a parr disposición servicios gratuitos de asistencia lingüística. Llame al [phone].    We comply with applicable federal civil rights laws and [place] laws. We do not discriminate on the basis of race, color, national origin, age, disability, sex, sexual orientation, or gender identity.            Thank you!     Thank you for choosing [facility]  for your care. Our goal is always to provide you with excellent care. Hearing back from our patients is one way we can continue to improve our services. Please take a few minutes to complete the written survey that you may receive in the mail after your visit with us. Thank you!             Your Updated Medication List - Protect others around you: Learn how to  safely use, store and throw away your medicines at www.disposemymeds.org.          This list is accurate as of: 10/6/17 10:42 AM.  Always use your most recent med list.                   Brand Name Dispense Instructions for use Diagnosis    amLODIPine 10 MG tablet    NORVASC    90 tablet    Take 1 tablet (10 mg) by mouth daily    Hypertension goal BP (blood pressure) < 140/90       aspirin 81 MG tablet      Take 1 tablet by mouth daily.        atorvastatin 40 MG tablet    LIPITOR    90 tablet    Take 1 tablet (40 mg) by mouth daily    Hyperlipidemia LDL goal <70       baclofen 10 MG tablet    LIORESAL    270 tablet    Take 1 tablet (10 mg) by mouth 3 times daily    Cerebrovascular accident (CVA), unspecified mechanism (H)       blood glucose monitoring test strip    IRENE CONTOUR    200 each    Use to test blood sugars two times daily or as directed.    Type 2 diabetes, HbA1C goal < 8% (H)       cholecalciferol 1000 UNIT tablet    vitamin D     Take 1,000 Units by mouth daily        citalopram 20 MG tablet    celeXA    90 tablet    Take 1 tablet (20 mg) by mouth daily    Major depressive disorder, recurrent episode, mild (H)       cyanocobalamin 1000 MCG/ML injection    VITAMIN B12    1 mL    Inject 1 mL (1,000 mcg) into the muscle every 30 days Order for clinic injection,  No need to fill.    Anemia due to vitamin B12 deficiency, unspecified B12 deficiency type       dextromethorphan 15 MG/5ML syrup      Take 10 mLs by mouth 2 times daily as needed        Lactase 250 MG Caps      Take 1 chew tab by mouth as needed (with dairy)        losartan 25 MG tablet    COZAAR    90 tablet    Take 1 tablet (25 mg) by mouth daily    Hypertension goal BP (blood pressure) < 140/90       metFORMIN 1000 MG tablet    GLUCOPHAGE    180 tablet    Take 1 tablet (1,000 mg) by mouth 2 times daily (with meals)    Type 2 diabetes mellitus with stage 3 chronic kidney disease, with long-term current use of insulin (H)       metoprolol 50 MG  24 hr tablet    TOPROL-XL    90 tablet    Take 1 tablet (50 mg) by mouth daily    Hypertension, uncontrolled, Hypertension goal BP (blood pressure) < 140/90       montelukast 10 MG tablet    SINGULAIR    90 tablet    Take 1 tablet (10 mg) by mouth daily    Seasonal allergic rhinitis, unspecified allergic rhinitis trigger       senna-docusate 8.6-50 MG per tablet    SENOKOT-S;PERICOLACE    60 tablet    Take 1 tablet by mouth 2 times daily To be taken with opioid (narcotic) pain medication to prevent constipation.    MARI (acute kidney injury) (H)       TYLENOL 500 MG tablet   Generic drug:  acetaminophen      Take 500 mg by mouth 2 times daily

## 2017-10-06 NOTE — NURSING NOTE
The following medication was given:     MEDICATION: Vitamin B12  1000mcg  ROUTE: IM  SITE: Arm - Right  DOSE: 1000 mcg  LOT #: [id number]  :  Spare Backup  EXPIRATION DATE:  9-18  NDC#: 7760-4297-42    Aneta Dueñas MA

## 2017-11-03 ENCOUNTER — ALLIED HEALTH/NURSE VISIT (OUTPATIENT)
Dept: NURSING | Facility: CLINIC | Age: 67
End: 2017-11-03
Payer: MEDICARE

## 2017-11-03 DIAGNOSIS — D51.9 VITAMIN B12 DEFICIENCY ANEMIA: Primary | ICD-10-CM

## 2017-11-03 PROCEDURE — 96372 THER/PROPH/DIAG INJ SC/IM: CPT

## 2017-11-03 PROCEDURE — 99207 ZZC NO CHARGE NURSE ONLY: CPT

## 2017-11-03 NOTE — NURSING NOTE
The following medication was given:     MEDICATION: Vitamin B12  1000mcg  ROUTE: IM  SITE: Deltoid - Left  DOSE: 1,000 mcg/mL  LOT #: [id number]  :  FlexEl  EXPIRATION DATE:  09/2018  Verified by: Bertha Duque MA

## 2017-11-03 NOTE — MR AVS SNAPSHOT
"              After Visit Summary   11/3/2017    Addis Peña    MRN: [id number]           Patient Information     Date Of Birth          1950        Visit Information        Provider Department      11/3/2017 10:00 AM HP MEDICAL ASSISTANT [facility]        Today's Diagnoses     Vitamin B12 deficiency anemia    -  1       Follow-ups after your visit        Your next 10 appointments already scheduled     Nov 17, 2017  2:15 PM CST   (Arrive by 2:00 PM)   MA SCREENING DIGITAL BILATERAL with [id number]   [facility] ([facility])    [address]  2[address] Floor  [facility] [address] [phone]           Do not use any powder, lotion or deodorant under your arms or on your breast. If you do, we will ask you to remove it before your exam.  Wear comfortable, two-piece clothing.  If you have any allergies, tell your care team.  Bring any previous mammograms from other facilities or have them mailed to the breast center. Three-dimensional (3D) mammograms are available at [place] locations in formerly [facility], [facility], [facility], and [place]. [facility] locations include [place] and [facility] in [place]. Benefits of 3D mammograms include: - Improved rate of cancer detection - Decreases your chance of having to go back for more tests, which means fewer: - \"False-positive\" results (This means that there is an abnormal area but it isn't cancer.) - Invasive testing procedures, such as a biopsy or surgery - Can provide clearer images of the breast if you have dense breast tissue. 3D mammography is an optional exam that anyone can have with a 2D mammogram. It doesn't replace or take the place of a 2D mammogram. 2D mammograms remain an effective screening test for all women.  Not all insurance companies cover the cost of a 3D mammogram. Check with your insurance.            Dec 04, 2017  8:00 AM " "CST   PHYSICAL with Joel Daniel Irwin Wegener, MD   [facility] ([facility])    [address] [place] [address] [phone]            Dec 08, 2017 10:00 AM CST   Nurse Only with HP MEDICAL ASSISTANT   [facility] ([facility])    [address] [address] [phone]              Who to contact     If you have questions or need follow up information about today's clinic visit or your schedule please contact [facility] directly at [phone].  Normal or non-critical lab and imaging results will be communicated to you by MyChart, letter or phone within 4 business days after the clinic has received the results. If you do not hear from us within 7 days, please contact the clinic through MyChart or phone. If you have a critical or abnormal lab result, we will notify you by phone as soon as possible.  Submit refill requests through [employer] or call your pharmacy and they will forward the refill request to us. Please allow 3 business days for your refill to be completed.          Additional Information About Your Visit        MyChart Information     [employer] lets you send messages to your doctor, view your test results, renew your prescriptions, schedule appointments and more. To sign up, go to [email or web address] . Click on \"Log in\" on the left side of the screen, which will take you to the Welcome page. Then click on \"Sign up Now\" on the right side of the page.     You will be asked to enter the access code listed below, as well as some personal information. Please follow the directions to create your username and password.     Your access code is: [email or web address]  Expires: 2017 10:53 AM     Your access code will  in 90 days. If you need help or a new code, please call your [place] clinic or [phone].        Care EveryWhere ID     This is your Care EveryWhere ID. This could be " used by other organizations to access your [place] medical records  [id number]         Blood Pressure from Last 3 Encounters:   09/01/17 125/68   08/15/17 144/72   08/13/17 130/76    Weight from Last 3 Encounters:   08/13/17 164 lb (74.4 kg)   01/06/17 155 lb (70.3 kg)   03/11/16 154 lb (69.9 kg)              We Performed the Following     INJECTION INTRAMUSCULAR OR SUB-Q     VITAMIN B12 INJ /1000MCG        Primary Care Provider Office Phone # Fax #    Jesus Ashok Irwin Wegener, [phone]       [address]  [facility] [address]        Equal Access to Services     SUYAPA PAPPAS : Hadii aad ku hadasho Sokhadar, waaxda luqadaha, qaybta kaalmada adeegyada, erica gibson . So [facility] [phone].    ATENCIÓN: Si habla español, tiene a parr disposición servicios gratuitos de asistencia lingüística. Llame al [phone].    We comply with applicable federal civil rights laws and [place] laws. We do not discriminate on the basis of race, color, national origin, age, disability, sex, sexual orientation, or gender identity.            Thank you!     Thank you for choosing [facility]  for your care. Our goal is always to provide you with excellent care. Hearing back from our patients is one way we can continue to improve our services. Please take a few minutes to complete the written survey that you may receive in the mail after your visit with us. Thank you!             Your Updated Medication List - Protect others around you: Learn how to safely use, store and throw away your medicines at www.disposemymeds.org.          This list is accurate as of: 11/3/17 10:28 AM.  Always use your most recent med list.                   Brand Name Dispense Instructions for use Diagnosis    amLODIPine 10 MG tablet    NORVASC    90 tablet    Take 1 tablet (10 mg) by mouth daily    Hypertension goal BP (blood pressure) < 140/90       aspirin 81 MG tablet      Take 1 tablet  by mouth daily.        atorvastatin 40 MG tablet    LIPITOR    90 tablet    Take 1 tablet (40 mg) by mouth daily    Hyperlipidemia LDL goal <70       baclofen 10 MG tablet    LIORESAL    270 tablet    Take 1 tablet (10 mg) by mouth 3 times daily    Cerebrovascular accident (CVA), unspecified mechanism (H)       blood glucose monitoring test strip    IRENE CONTOUR    200 each    Use to test blood sugars two times daily or as directed.    Type 2 diabetes, HbA1C goal < 8% (H)       cholecalciferol 1000 UNIT tablet    vitamin D3     Take 1,000 Units by mouth daily        citalopram 20 MG tablet    celeXA    90 tablet    Take 1 tablet (20 mg) by mouth daily    Major depressive disorder, recurrent episode, mild (H)       cyanocobalamin 1000 MCG/ML injection    VITAMIN B12    1 mL    Inject 1 mL (1,000 mcg) into the muscle every 30 days Order for clinic injection,  No need to fill.    Anemia due to vitamin B12 deficiency, unspecified B12 deficiency type       dextromethorphan 15 MG/5ML syrup      Take 10 mLs by mouth 2 times daily as needed        Lactase 250 MG Caps      Take 1 chew tab by mouth as needed (with dairy)        losartan 25 MG tablet    COZAAR    90 tablet    Take 1 tablet (25 mg) by mouth daily    Hypertension goal BP (blood pressure) < 140/90       metFORMIN 1000 MG tablet    GLUCOPHAGE    180 tablet    Take 1 tablet (1,000 mg) by mouth 2 times daily (with meals)    Type 2 diabetes mellitus with stage 3 chronic kidney disease, with long-term current use of insulin (H)       metoprolol 50 MG 24 hr tablet    TOPROL-XL    90 tablet    Take 1 tablet (50 mg) by mouth daily    Hypertension, uncontrolled, Hypertension goal BP (blood pressure) < 140/90       montelukast 10 MG tablet    SINGULAIR    90 tablet    Take 1 tablet (10 mg) by mouth daily    Seasonal allergic rhinitis, unspecified allergic rhinitis trigger       senna-docusate 8.6-50 MG per tablet    SENOKOT-S;PERICOLACE    60 tablet    Take 1 tablet by  mouth 2 times daily To be taken with opioid (narcotic) pain medication to prevent constipation.    MARI (acute kidney injury) (H)       TYLENOL 500 MG tablet   Generic drug:  acetaminophen      Take 500 mg by mouth 2 times daily

## 2017-11-17 ENCOUNTER — RADIANT APPOINTMENT (OUTPATIENT)
Dept: MAMMOGRAPHY | Facility: CLINIC | Age: 67
End: 2017-11-17
Attending: FAMILY MEDICINE

## 2017-11-17 DIAGNOSIS — Z12.31 VISIT FOR SCREENING MAMMOGRAM: ICD-10-CM

## 2017-11-28 DIAGNOSIS — E11.9 TYPE 2 DIABETES, HBA1C GOAL < 8% (H): Primary | ICD-10-CM

## 2017-11-28 NOTE — TELEPHONE ENCOUNTER
LOV 8/15/17    Medication Detail      Disp Refills Start End MARIZA   blood glucose monitoring (IRENE CONTOUR) test strip 200 each 1 5/1/2017  No   Sig: Use to test blood sugars two times daily or as directed.

## 2017-11-30 NOTE — TELEPHONE ENCOUNTER
Prescription approved per [facility] Refill Protocol.  GLADYS Bravo, BSN, RN

## 2017-12-04 ENCOUNTER — OFFICE VISIT (OUTPATIENT)
Dept: FAMILY MEDICINE | Facility: CLINIC | Age: 67
End: 2017-12-04
Payer: MEDICARE

## 2017-12-04 VITALS
DIASTOLIC BLOOD PRESSURE: 68 MMHG | BODY MASS INDEX: 28.32 KG/M2 | HEART RATE: 65 BPM | SYSTOLIC BLOOD PRESSURE: 128 MMHG | WEIGHT: 165 LBS | OXYGEN SATURATION: 93 % | TEMPERATURE: 97.5 F

## 2017-12-04 DIAGNOSIS — N18.30 TYPE 2 DIABETES MELLITUS WITH STAGE 3 CHRONIC KIDNEY DISEASE, WITH LONG-TERM CURRENT USE OF INSULIN (H): ICD-10-CM

## 2017-12-04 DIAGNOSIS — E11.22 TYPE 2 DIABETES MELLITUS WITH STAGE 3 CHRONIC KIDNEY DISEASE, WITH LONG-TERM CURRENT USE OF INSULIN (H): ICD-10-CM

## 2017-12-04 DIAGNOSIS — F33.0 MAJOR DEPRESSIVE DISORDER, RECURRENT EPISODE, MILD (H): ICD-10-CM

## 2017-12-04 DIAGNOSIS — I10 HYPERTENSION GOAL BP (BLOOD PRESSURE) < 140/90: ICD-10-CM

## 2017-12-04 DIAGNOSIS — E78.5 HYPERLIPIDEMIA LDL GOAL <70: ICD-10-CM

## 2017-12-04 DIAGNOSIS — J30.2 SEASONAL ALLERGIC RHINITIS, UNSPECIFIED CHRONICITY, UNSPECIFIED TRIGGER: ICD-10-CM

## 2017-12-04 DIAGNOSIS — N18.30 TYPE 2 DIABETES MELLITUS WITH STAGE 3 CHRONIC KIDNEY DISEASE, WITHOUT LONG-TERM CURRENT USE OF INSULIN (H): ICD-10-CM

## 2017-12-04 DIAGNOSIS — R13.12 OROPHARYNGEAL DYSPHAGIA: ICD-10-CM

## 2017-12-04 DIAGNOSIS — I63.9 CEREBROVASCULAR ACCIDENT (CVA), UNSPECIFIED MECHANISM (H): ICD-10-CM

## 2017-12-04 DIAGNOSIS — D51.9 ANEMIA DUE TO VITAMIN B12 DEFICIENCY, UNSPECIFIED B12 DEFICIENCY TYPE: ICD-10-CM

## 2017-12-04 DIAGNOSIS — I10 HYPERTENSION, UNCONTROLLED: ICD-10-CM

## 2017-12-04 DIAGNOSIS — Z13.89 SCREENING FOR DIABETIC PERIPHERAL NEUROPATHY: ICD-10-CM

## 2017-12-04 DIAGNOSIS — E11.22 TYPE 2 DIABETES MELLITUS WITH STAGE 3 CHRONIC KIDNEY DISEASE, WITHOUT LONG-TERM CURRENT USE OF INSULIN (H): ICD-10-CM

## 2017-12-04 DIAGNOSIS — Z00.00 MEDICARE ANNUAL WELLNESS VISIT, SUBSEQUENT: Primary | ICD-10-CM

## 2017-12-04 DIAGNOSIS — Z79.4 TYPE 2 DIABETES MELLITUS WITH STAGE 3 CHRONIC KIDNEY DISEASE, WITH LONG-TERM CURRENT USE OF INSULIN (H): ICD-10-CM

## 2017-12-04 DIAGNOSIS — Z11.59 ENCOUNTER FOR HCV SCREENING TEST FOR LOW RISK PATIENT: ICD-10-CM

## 2017-12-04 LAB
ALBUMIN SERPL-MCNC: 3.6 G/DL (ref 3.4–5)
ALP SERPL-CCNC: 94 U/L (ref 40–150)
ALT SERPL W P-5'-P-CCNC: 17 U/L (ref 0–50)
ANION GAP SERPL CALCULATED.3IONS-SCNC: 13 MMOL/L (ref 3–14)
AST SERPL W P-5'-P-CCNC: 9 U/L (ref 0–45)
BILIRUB SERPL-MCNC: 0.3 MG/DL (ref 0.2–1.3)
BUN SERPL-MCNC: 24 MG/DL (ref 7–30)
CALCIUM SERPL-MCNC: 9.3 MG/DL (ref 8.5–10.1)
CHLORIDE SERPL-SCNC: 109 MMOL/L (ref 94–109)
CHOLEST SERPL-MCNC: 135 MG/DL
CO2 SERPL-SCNC: 20 MMOL/L (ref 20–32)
CREAT SERPL-MCNC: 0.9 MG/DL (ref 0.52–1.04)
CREAT UR-MCNC: 108 MG/DL
ERYTHROCYTE [DISTWIDTH] IN BLOOD BY AUTOMATED COUNT: 13.6 % (ref 10–15)
GFR SERPL CREATININE-BSD FRML MDRD: 63 ML/MIN/1.7M2
GLUCOSE SERPL-MCNC: 134 MG/DL (ref 70–99)
HBA1C MFR BLD: 7.2 % (ref 4.3–6)
HCT VFR BLD AUTO: 37.6 % (ref 35–47)
HCV AB SERPL QL IA: NONREACTIVE
HDLC SERPL-MCNC: 56 MG/DL
HGB BLD-MCNC: 11.9 G/DL (ref 11.7–15.7)
LDLC SERPL CALC-MCNC: 51 MG/DL
MCH RBC QN AUTO: 29.9 PG (ref 26.5–33)
MCHC RBC AUTO-ENTMCNC: 31.6 G/DL (ref 31.5–36.5)
MCV RBC AUTO: 95 FL (ref 78–100)
MICROALBUMIN UR-MCNC: 191 MG/L
MICROALBUMIN/CREAT UR: 176.85 MG/G CR (ref 0–25)
NONHDLC SERPL-MCNC: 79 MG/DL
PLATELET # BLD AUTO: 271 10E9/L (ref 150–450)
POTASSIUM SERPL-SCNC: 4.5 MMOL/L (ref 3.4–5.3)
PROT SERPL-MCNC: 7 G/DL (ref 6.8–8.8)
RBC # BLD AUTO: 3.98 10E12/L (ref 3.8–5.2)
SODIUM SERPL-SCNC: 142 MMOL/L (ref 133–144)
TRIGL SERPL-MCNC: 139 MG/DL
VIT B12 SERPL-MCNC: 467 PG/ML (ref 193–986)
WBC # BLD AUTO: 6.5 10E9/L (ref 4–11)

## 2017-12-04 PROCEDURE — G0472 HEP C SCREEN HIGH RISK/OTHER: HCPCS | Performed by: FAMILY MEDICINE

## 2017-12-04 PROCEDURE — 82043 UR ALBUMIN QUANTITATIVE: CPT | Performed by: FAMILY MEDICINE

## 2017-12-04 PROCEDURE — 80061 LIPID PANEL: CPT | Performed by: FAMILY MEDICINE

## 2017-12-04 PROCEDURE — 99213 OFFICE O/P EST LOW 20 MIN: CPT | Mod: 25 | Performed by: FAMILY MEDICINE

## 2017-12-04 PROCEDURE — G0439 PPPS, SUBSEQ VISIT: HCPCS | Performed by: FAMILY MEDICINE

## 2017-12-04 PROCEDURE — 80053 COMPREHEN METABOLIC PANEL: CPT | Performed by: FAMILY MEDICINE

## 2017-12-04 PROCEDURE — 36415 COLL VENOUS BLD VENIPUNCTURE: CPT | Performed by: FAMILY MEDICINE

## 2017-12-04 PROCEDURE — 82607 VITAMIN B-12: CPT | Performed by: FAMILY MEDICINE

## 2017-12-04 PROCEDURE — 99207 C FOOT EXAM  NO CHARGE: CPT | Performed by: FAMILY MEDICINE

## 2017-12-04 PROCEDURE — 85027 COMPLETE CBC AUTOMATED: CPT | Performed by: FAMILY MEDICINE

## 2017-12-04 PROCEDURE — 96372 THER/PROPH/DIAG INJ SC/IM: CPT | Performed by: FAMILY MEDICINE

## 2017-12-04 PROCEDURE — 83036 HEMOGLOBIN GLYCOSYLATED A1C: CPT | Performed by: FAMILY MEDICINE

## 2017-12-04 RX ORDER — CYANOCOBALAMIN 1000 UG/ML
1 INJECTION, SOLUTION INTRAMUSCULAR; SUBCUTANEOUS
Qty: 1 ML | Refills: 11 | Status: SHIPPED | OUTPATIENT
Start: 2017-12-04 | End: 2018-12-20

## 2017-12-04 RX ORDER — CITALOPRAM HYDROBROMIDE 20 MG/1
20 TABLET ORAL DAILY
Qty: 90 TABLET | Refills: 3 | Status: SHIPPED | OUTPATIENT
Start: 2017-12-04 | End: 2018-12-08

## 2017-12-04 RX ORDER — METOPROLOL SUCCINATE 50 MG/1
50 TABLET, EXTENDED RELEASE ORAL DAILY
Qty: 90 TABLET | Refills: 3 | Status: SHIPPED | OUTPATIENT
Start: 2017-12-04 | End: 2019-02-15

## 2017-12-04 RX ORDER — LOSARTAN POTASSIUM 25 MG/1
25 TABLET ORAL DAILY
Qty: 90 TABLET | Refills: 3 | Status: SHIPPED | OUTPATIENT
Start: 2017-12-04 | End: 2019-02-15

## 2017-12-04 RX ORDER — MONTELUKAST SODIUM 10 MG/1
10 TABLET ORAL DAILY
Qty: 90 TABLET | Refills: 3 | Status: SHIPPED | OUTPATIENT
Start: 2017-12-04 | End: 2019-01-06

## 2017-12-04 RX ORDER — BACLOFEN 10 MG/1
10 TABLET ORAL 3 TIMES DAILY
Qty: 270 TABLET | Refills: 3 | Status: SHIPPED | OUTPATIENT
Start: 2017-12-04 | End: 2018-12-07

## 2017-12-04 RX ORDER — AMLODIPINE BESYLATE 10 MG/1
10 TABLET ORAL DAILY
Qty: 90 TABLET | Refills: 3 | Status: SHIPPED | OUTPATIENT
Start: 2017-12-04 | End: 2018-12-07

## 2017-12-04 RX ORDER — ATORVASTATIN CALCIUM 40 MG/1
40 TABLET, FILM COATED ORAL DAILY
Qty: 90 TABLET | Refills: 3 | Status: SHIPPED | OUTPATIENT
Start: 2017-12-04 | End: 2019-02-22

## 2017-12-04 ASSESSMENT — PATIENT HEALTH QUESTIONNAIRE - PHQ9: SUM OF ALL RESPONSES TO PHQ QUESTIONS 1-9: 0

## 2017-12-04 NOTE — NURSING NOTE
The following medication was given:     MEDICATION: Vitamin B12  1000mcg  ROUTE: IM  SITE: Deltoid - Right  DOSE: 1 ml  LOT #: [id number]  :  CAPPTURE  EXPIRATION DATE:  12/01/18  NDC: 47827443-26    Bertha Duque MA

## 2017-12-04 NOTE — PROGRESS NOTES
SUBJECTIVE:   Addis Peña is a 67 year old female who presents for Preventive Visit.      Are you in the first 12 months of your Medicare Part B coverage?  No    Healthy Habits:    Do you get at least three servings of calcium containing foods daily (dairy, green leafy vegetables, etc.)? yes    Amount of exercise or daily activities, outside of work: 6 day(s) per week    Problems taking medications regularly No    Medication side effects: No    Have you had an eye exam in the past two years? yes    Do you see a dentist twice per year? yes    Do you have sleep apnea, excessive snoring or daytime drowsiness?no    COGNITIVE SCREEN  1) Repeat 3 items (Banana, Sunrise, Chair)    2) Clock draw: NORMAL  3) 3 item recall: Recalls 3 objects  Results: 3 items recalled: COGNITIVE IMPAIRMENT LESS LIKELY    Mini-CogTM Copyright S Jennifer. Licensed by the author for use in [facility]; reprinted with permission (carina@[email or web address]). All rights reserved.                -------------------------------------    Reviewed and updated as needed this visit by clinical staffAllergies  Meds         Reviewed and updated as needed this visit by Provider  Meds        Social History   Substance Use Topics     Smoking status: Never Smoker     Smokeless tobacco: Never Used     Alcohol use No       The patient does not drink >3 drinks per day nor >7 drinks per week.     Today's PHQ-2 Score:   PHQ-2 ( 1999 Pfizer) 12/4/2017 8/15/2017   Q1: Little interest or pleasure in doing things 0 0   Q2: Feeling down, depressed or hopeless 0 0   PHQ-2 Score 0 0   Q1: Little interest or pleasure in doing things - -   Q2: Feeling down, depressed or hopeless - -   PHQ-2 Score - -         Do you feel safe in your environment - yes    Do you have a Health Care Directive?: No: Advance care planning reviewed with patient; information given to patient to review.      Current providers sharing in care for this patient include: Patient Care  "Team:  Wegener, Joel Daniel Irwin, MD as PCP - General (Family Practice)  Madelaine Roland MD as MD (Urology)  Wegener, Joel Daniel Irwin, MD as Referring Physician (Family Practice)  Annette Perez, RN as Registered Nurse (Urology)      Hearing impairment: No    Ability to successfully perform activities of daily living: No, needs assistance with: transportation, shopping, preparing meals, housework, bathing and laundry     Fall risk:  Fallen 2 or more times in the past year?: No  Any fall with injury in the past year?: No      Home safety:  none identified      The following health maintenance items are reviewed in Epic and correct as of today:  Health Maintenance   Topic Date Due     FOOT EXAM Q1 YEAR  07/31/2016     EYE EXAM Q1 YEAR  09/10/2016     ADVANCE DIRECTIVE PLANNING Q5 YRS  12/09/2016     DEPRESSION ACTION PLAN Q1 YR  01/15/2017     MICROALBUMIN Q1 YEAR  02/05/2017     A1C Q6 MO  12/23/2017     PHQ-9 Q6 MONTHS  12/23/2017     MEDICARE ANNUAL WELLNESS VISIT  01/06/2018     LIPID MONITORING Q1 YEAR  01/06/2018     BMP Q6 MOS  02/13/2018     RICCO QUESTIONNAIRE 1 YEAR  06/23/2018     FALL RISK ASSESSMENT  08/15/2018     COLONOSCOPY Q3 YR  12/04/2018     TSH W/ FREE T4 REFLEX Q2 YEAR  01/06/2019     MAMMO SCREEN Q2 YR (SYSTEM ASSIGNED)  11/17/2019     TETANUS IMMUNIZATION (SYSTEM ASSIGNED)  02/01/2020     INFLUENZA VACCINE (SYSTEM ASSIGNED)  Completed     DEXA SCAN SCREENING (SYSTEM ASSIGNED)  Completed     PNEUMOCOCCAL  Completed     HEPATITIS C SCREENING  Completed     Labs reviewed in EPIC        ROS:  Constitutional, HEENT, cardiovascular, pulmonary, GI, , musculoskeletal, neuro, skin, endocrine and psych systems are negative, except as otherwise noted.      OBJECTIVE:   /68  Pulse 65  Temp 97.5  F (36.4  C)  Wt 165 lb (74.8 kg)  SpO2 93%  BMI 28.32 kg/m2 Estimated body mass index is 28.32 kg/(m^2) as calculated from the following:    Height as of 8/13/17: 5' 4\" (1.626 m).    Weight " as of this encounter: 165 lb (74.8 kg).  EXAM:   GENERAL: healthy, alert and no distress  EYES: Eyes grossly normal to inspection, PERRL and conjunctivae and sclerae normal  HENT: ear canals and TM's normal, nose and mouth without ulcers or lesions  NECK: no adenopathy, no asymmetry, masses, or scars and thyroid normal to palpation  RESP: lungs clear to auscultation - no rales, rhonchi or wheezes  CV: regular rate and rhythm, normal S1 S2, no S3 or S4, no murmur, click or rub, no peripheral edema and peripheral pulses strong  ABDOMEN: soft, nontender, no hepatosplenomegaly, no masses and bowel sounds normal  MS: no ankle edema.  In wheelchair.  Left lower leg brace fitting well.   SKIN: no suspicious lesions or rashes - has pca check bottom weekly - no sores.   NEURO: Normal strength and tone, mentation intact and speech normal  PSYCH: mentation appears normal, affect normal/bright  Bilateral diabetic monofilament foot exam normal     ASSESSMENT / PLAN:   ASSESSMENT AND PLAN  1. Medicare annual wellness visit, subsequent  Up to date on preventive care screening.  Well cared for by /pca with h/o stroke in wheelchair.  Has had all immunizations including flu shot this year.     Up to date on colon and mammogram screening.     Reviewed chin tuck for mild dysphagia (since stroke, stable about every 1-2 weeks, worse with thin liquids.)  If not working well will let me know and can have her work with speech pathology.     2. Type 2 diabetes mellitus with stage 3 chronic kidney disease, without long-term current use of insulin (H)  Labs to eval control.  On appropriate metformin, statin, ace, daily aspirin.   - Hemoglobin A1c; Future  - blood glucose monitoring (IRENE CONTOUR) test strip; Use to test blood sugars two times daily or as directed.  Dispense: 200 each; Refill: 11  - Albumin Random Urine Quantitative with Creat Ratio  - Hemoglobin A1c  - Comprehensive metabolic panel    3. Hypertension goal BP (blood  pressure) < 140/90  Controlled.   - amLODIPine (NORVASC) 10 MG tablet; Take 1 tablet (10 mg) by mouth daily  Dispense: 90 tablet; Refill: 3  - metoprolol (TOPROL-XL) 50 MG 24 hr tablet; Take 1 tablet (50 mg) by mouth daily  Dispense: 90 tablet; Refill: 3  - losartan (COZAAR) 25 MG tablet; Take 1 tablet (25 mg) by mouth daily  Dispense: 90 tablet; Refill: 3    4. Hyperlipidemia LDL goal <70    - atorvastatin (LIPITOR) 40 MG tablet; Take 1 tablet (40 mg) by mouth daily  Dispense: 90 tablet; Refill: 3  - Lipid panel reflex to direct LDL Fasting    5. Cerebrovascular accident (CVA), unspecified mechanism (H)  With h/o spasm.   - baclofen (LIORESAL) 10 MG tablet; Take 1 tablet (10 mg) by mouth 3 times daily  Dispense: 270 tablet; Refill: 3    6. Major depressive disorder, recurrent episode, mild (H)  Continue.   - citalopram (CELEXA) 20 MG tablet; Take 1 tablet (20 mg) by mouth daily  Dispense: 90 tablet; Refill: 3    7. Anemia due to vitamin B12 deficiency, unspecified B12 deficiency type  Labs and b12 shot today.   - cyanocobalamin (VITAMIN B12) 1000 MCG/ML injection; Inject 1 mL (1,000 mcg) into the muscle every 30 days Order for clinic injection,  No need to fill.  Dispense: 1 mL; Refill: 11  - CBC with platelets  - Vitamin B12    8. Type 2 diabetes mellitus with stage 3 chronic kidney disease, with long-term current use of insulin (H)    - metFORMIN (GLUCOPHAGE) 1000 MG tablet; Take 1 tablet (1,000 mg) by mouth 2 times daily (with meals)  Dispense: 180 tablet; Refill: 3    9. Hypertension, uncontrolled    - metoprolol (TOPROL-XL) 50 MG 24 hr tablet; Take 1 tablet (50 mg) by mouth daily  Dispense: 90 tablet; Refill: 3    10. Screening for diabetic peripheral neuropathy    - FOOT EXAM  NO CHARGE [58377.114]    11. Seasonal allergic rhinitis, unspecified chronicity, unspecified trigger    - montelukast (SINGULAIR) 10 MG tablet; Take 1 tablet (10 mg) by mouth daily  Dispense: 90 tablet; Refill: 3    12. Encounter for  "HCV screening test for low risk patient    - Hepatitis C Screen Reflex to HCV RNA Quant and Genotype    13. Oropharyngeal dysphagia  As above.         MYCHART SIGNUP FOR E-VISITS AND EASIER COMMUNICATION:  [email or web address]     Zipnosis:  [email or web address].  Sign up for e-visits for common illnesses!     RADIOLOGY:   [facility]:  [phone] to schedule any radiology tests at [facility]: [phone]    Mammograms/colonoscopies:  [phone]    CONSUMER PRICE LINE for estimates of test costs:  [phone]       End of Life Planning:  Patient currently has an advanced directive: Yes.  Practitioner is supportive of decision.    COUNSELING:  Reviewed preventive health counseling, as reflected in patient instructions       Regular exercise       Healthy diet/nutrition       Vision screening       Hearing screening       Dental care        Estimated body mass index is 28.32 kg/(m^2) as calculated from the following:    Height as of 8/13/17: 5' 4\" (1.626 m).    Weight as of this encounter: 165 lb (74.8 kg).     reports that she has never smoked. She has never used smokeless tobacco.        Appropriate preventive services were discussed with this patient, including applicable screening as appropriate for cardiovascular disease, diabetes, osteopenia/osteoporosis, and glaucoma.  As appropriate for age/gender, discussed screening for colorectal cancer, prostate cancer, breast cancer, and cervical cancer. Checklist reviewing preventive services available has been given to the patient.    Reviewed patients plan of care and provided an AVS. The Basic Care Plan (routine screening as documented in Health Maintenance) for Addis meets the Care Plan requirement. This Care Plan has been established and reviewed with the Patient.    Counseling Resources:  ATP IV Guidelines  Pooled Cohorts Equation Calculator  Breast Cancer Risk Calculator  FRAX Risk Assessment  ICSI Preventive " Guidelines  Dietary Guidelines for Americans, 2010  USDA's MyPlate  ASA Prophylaxis  Lung CA Screening    Joel Daniel Wegener, MD  [facility]

## 2017-12-04 NOTE — MR AVS SNAPSHOT
After Visit Summary   12/4/2017    Addis Peña    MRN: [id number]           Patient Information     Date Of Birth          1950        Visit Information        Provider Department      12/4/2017 8:00 AM Wegener, Joel Daniel Irwin, MD [facility]        Today's Diagnoses     Medicare annual wellness visit, subsequent    -  1    Type 2 diabetes mellitus with stage 3 chronic kidney disease, without long-term current use of insulin (H)        Hypertension goal BP (blood pressure) < 140/90        Hyperlipidemia LDL goal <70        Cerebrovascular accident (CVA), unspecified mechanism (H)        Major depressive disorder, recurrent episode, mild (H)        Anemia due to vitamin B12 deficiency, unspecified B12 deficiency type        Type 2 diabetes mellitus with stage 3 chronic kidney disease, with long-term current use of insulin (H)        Hypertension, uncontrolled        Screening for diabetic peripheral neuropathy        Seasonal allergic rhinitis, unspecified chronicity, unspecified trigger        Encounter for HCV screening test for low risk patient        Oropharyngeal dysphagia          Care Instructions      Preventive Health Recommendations    Female Ages 65 +    Yearly exam:     See your health care provider every year in order to  o Review health changes.   o Discuss preventive care.    o Review your medicines if your doctor has prescribed any.      You no longer need a yearly Pap test unless you've had an abnormal Pap test in the past 10 years. If you have vaginal symptoms, such as bleeding or discharge, be sure to talk with your provider about a Pap test.      Every 1 to 2 years, have a mammogram.  If you are over 69, talk with your health care provider about whether or not you want to continue having screening mammograms.      Every 10 years, have a colonoscopy. Or, have a yearly FIT test (stool test). These exams will check for colon cancer.       Have a cholesterol  test every 5 years, or more often if your doctor advises it.       Have a diabetes test (fasting glucose) every three years. If you are at risk for diabetes, you should have this test more often.       At age 65, have a bone density scan (DEXA) to check for osteoporosis (brittle bone disease).    Shots:    Get a flu shot each year.    Get a tetanus shot every 10 years.    Talk to your doctor about your pneumonia vaccines. There are now two you should receive - Pneumovax (PPSV 23) and Prevnar (PCV 13).    Talk to your doctor about the shingles vaccine.    Talk to your doctor about the hepatitis B vaccine.    Nutrition:     Eat at least 5 servings of fruits and vegetables each day.      Eat whole-grain bread, whole-wheat pasta and brown rice instead of white grains and rice.      Talk to your provider about Calcium and Vitamin D.     Lifestyle    Exercise at least 150 minutes a week (30 minutes a day, 5 days a week). This will help you control your weight and prevent disease.      Limit alcohol to one drink per day.      No smoking.       Wear sunscreen to prevent skin cancer.       See your dentist twice a year for an exam and cleaning.      See your eye doctor every 1 to 2 years to screen for conditions such as glaucoma, macular degeneration and cataracts.          Follow-ups after your visit        Who to contact     If you have questions or need follow up information about today's clinic visit or your schedule please contact [facility] directly at [phone].  Normal or non-critical lab and imaging results will be communicated to you by MyChart, letter or phone within 4 business days after the clinic has received the results. If you do not hear from us within 7 days, please contact the clinic through MyChart or phone. If you have a critical or abnormal lab result, we will notify you by phone as soon as possible.  Submit refill requests through [employer] or call your pharmacy and they will forward  "the refill request to us. Please allow 3 business days for your refill to be completed.          Additional Information About Your Visit        [facility] Information     [employer] lets you send messages to your doctor, view your test results, renew your prescriptions, schedule appointments and more. To sign up, go to [email or web address] . Click on \"Log in\" on the left side of the screen, which will take you to the Welcome page. Then click on \"Sign up Now\" on the right side of the page.     You will be asked to enter the access code listed below, as well as some personal information. Please follow the directions to create your username and password.     Your access code is: [email or web address]  Expires: 3/4/2018  8:41 AM     Your access code will  in 90 days. If you need help or a new code, please call your [place] clinic or [phone].        Care EveryWhere ID     This is your Care EveryWhere ID. This could be used by other organizations to access your [place] medical records  [id number]        Your Vitals Were     Pulse Temperature Pulse Oximetry BMI (Body Mass Index)          65 97.5  F (36.4  C) 93% 28.32 kg/m2         Blood Pressure from Last 3 Encounters:   17 128/68   17 125/68   08/15/17 144/72    Weight from Last 3 Encounters:   17 165 lb (74.8 kg)   17 164 lb (74.4 kg)   17 155 lb (70.3 kg)              We Performed the Following     Albumin Random Urine Quantitative with Creat Ratio     CBC with platelets     Comprehensive metabolic panel     FOOT EXAM  NO CHARGE [15083.114]     Hemoglobin A1c     Hepatitis C Screen Reflex to HCV RNA Quant and Genotype     Lipid panel reflex to direct LDL Fasting     Vitamin B12          Where to get your medicines      These medications were sent to [facility] [id number] AT [facility] [address]   [address] [address]     Phone:  [phone]     amLODIPine 10 MG tablet    atorvastatin 40 " MG tablet    baclofen 10 MG tablet    blood glucose monitoring test strip    citalopram 20 MG tablet    cyanocobalamin 1000 MCG/ML injection    losartan 25 MG tablet    metFORMIN 1000 MG tablet    metoprolol 50 MG 24 hr tablet    montelukast 10 MG tablet          Primary Care Provider Office Phone # Fax #    Jesus Ashok Irwin Wegener, [phone]       [address]  [facility] [address]        Equal Access to Services     SUYAPA PAPPAS : Hadii aad ku hadasho Soomaali, waaxda luqadaha, qaybta kaalmada adeegyada, waxay idiin hayaan adeeg kharash la'aan . So [facility] [phone].    ATENCIÓN: Si habla español, tiene a parr disposición servicios gratuitos de asistencia lingüística. Javy al [phone].    We comply with applicable federal civil rights laws and [place] laws. We do not discriminate on the basis of race, color, national origin, age, disability, sex, sexual orientation, or gender identity.            Thank you!     Thank you for choosing [facility]  for your care. Our goal is always to provide you with excellent care. Hearing back from our patients is one way we can continue to improve our services. Please take a few minutes to complete the written survey that you may receive in the mail after your visit with us. Thank you!             Your Updated Medication List - Protect others around you: Learn how to safely use, store and throw away your medicines at www.disposemymeds.org.          This list is accurate as of: 12/4/17  8:41 AM.  Always use your most recent med list.                   Brand Name Dispense Instructions for use Diagnosis    amLODIPine 10 MG tablet    NORVASC    90 tablet    Take 1 tablet (10 mg) by mouth daily    Hypertension goal BP (blood pressure) < 140/90       aspirin 81 MG tablet      Take 1 tablet by mouth daily.        atorvastatin 40 MG tablet    LIPITOR    90 tablet    Take 1 tablet (40 mg) by mouth daily    Hyperlipidemia LDL goal <70        baclofen 10 MG tablet    LIORESAL    270 tablet    Take 1 tablet (10 mg) by mouth 3 times daily    Cerebrovascular accident (CVA), unspecified mechanism (H)       blood glucose monitoring test strip    IRENE CONTOUR    200 each    Use to test blood sugars two times daily or as directed.    Type 2 diabetes mellitus with stage 3 chronic kidney disease, without long-term current use of insulin (H)       cholecalciferol 1000 UNIT tablet    vitamin D3     Take 1,000 Units by mouth daily        citalopram 20 MG tablet    celeXA    90 tablet    Take 1 tablet (20 mg) by mouth daily    Major depressive disorder, recurrent episode, mild (H)       cyanocobalamin 1000 MCG/ML injection    VITAMIN B12    1 mL    Inject 1 mL (1,000 mcg) into the muscle every 30 days Order for clinic injection,  No need to fill.    Anemia due to vitamin B12 deficiency, unspecified B12 deficiency type       dextromethorphan 15 MG/5ML syrup      Take 10 mLs by mouth 2 times daily as needed        Lactase 250 MG Caps      Take 1 chew tab by mouth as needed (with dairy)        losartan 25 MG tablet    COZAAR    90 tablet    Take 1 tablet (25 mg) by mouth daily    Hypertension goal BP (blood pressure) < 140/90       metFORMIN 1000 MG tablet    GLUCOPHAGE    180 tablet    Take 1 tablet (1,000 mg) by mouth 2 times daily (with meals)    Type 2 diabetes mellitus with stage 3 chronic kidney disease, with long-term current use of insulin (H)       metoprolol 50 MG 24 hr tablet    TOPROL-XL    90 tablet    Take 1 tablet (50 mg) by mouth daily    Hypertension, uncontrolled, Hypertension goal BP (blood pressure) < 140/90       montelukast 10 MG tablet    SINGULAIR    90 tablet    Take 1 tablet (10 mg) by mouth daily    Seasonal allergic rhinitis, unspecified chronicity, unspecified trigger       senna-docusate 8.6-50 MG per tablet    SENOKOT-S;PERICOLACE    60 tablet    Take 1 tablet by mouth 2 times daily To be taken with opioid (narcotic) pain medication to  prevent constipation.    MARI (acute kidney injury) (H)       TYLENOL 500 MG tablet   Generic drug:  acetaminophen      Take 500 mg by mouth 2 times daily

## 2017-12-04 NOTE — LETTER
My Depression Action Plan  Name: Addis Peña   Date of Birth 1950  Date: 12/4/2017    My doctor: Wegener, Joel Daniel Irwin   My clinic: [address] [address] [phone]          GREEN    ZONE   Good Control    What it looks like:     Things are going generally well. You have normal up s and down s. You may even feel depressed from time to time, but bad moods usually last less than a day.   What you need to do:  1. Continue to care for yourself (see self care plan)  2. Check your depression survival kit and update it as needed  3. Follow your physician s recommendations including any medication.  4. Do not stop taking medication unless you consult with your physician first.           YELLOW         ZONE Getting Worse    What it looks like:     Depression is starting to interfere with your life.     It may be hard to get out of bed; you may be starting to isolate yourself from others.    Symptoms of depression are starting to last most all day and this has happened for several days.     You may have suicidal thoughts but they are not constant.   What you need to do:     1. Call your care team, your response to treatment will improve if you keep your care team informed of your progress. Yellow periods are signs an adjustment may need to be made.     2. Continue your self-care, even if you have to fake it!    3. Talk to someone in your support network    4. Open up your depression survival kit           RED    ZONE Medical Alert - Get Help    What it looks like:     Depression is seriously interfering with your life.     You may experience these or other symptoms: You can t get out of bed most days, can t work or engage in other necessary activities, you have trouble taking care of basic hygiene, or basic responsibilities, thoughts of suicide or death that will not go away, self-injurious behavior.     What you need to do:  1. Call your  care team and request a same-day appointment. If they are not available (weekends or after hours) call your local crisis line, emergency room or 911.      Electronically signed by: Bertha Duque, December 4, 2017    Depression Self Care Plan / Survival Kit    Self-Care for Depression  Here s the deal. Your body and mind are really not as separate as most people think.  What you do and think affects how you feel and how you feel influences what you do and think. This means if you do things that people who feel good do, it will help you feel better.  Sometimes this is all it takes.  There is also a place for medication and therapy depending on how severe your depression is, so be sure to consult with your medical provider and/ or Behavioral Health Consultant if your symptoms are worsening or not improving.     In order to better manage my stress, I will:    Exercise  Get some form of exercise, every day. This will help reduce pain and release endorphins, the  feel good  chemicals in your brain. This is almost as good as taking antidepressants!  This is not the same as joining a gym and then never going! (they count on that by the way ) It can be as simple as just going for a walk or doing some gardening, anything that will get you moving.      Hygiene   Maintain good hygiene (Get out of bed in the morning, Make your bed, Brush your teeth, Take a shower, and Get dressed like you were going to work, even if you are unemployed).  If your clothes don't fit try to get ones that do.    Diet  I will strive to eat foods that are good for me, drink plenty of water, and avoid excessive sugar, caffeine, alcohol, and other mood-altering substances.  Some foods that are helpful in depression are: complex carbohydrates, B vitamins, flaxseed, fish or fish oil, fresh fruits and vegetables.    Psychotherapy  I agree to participate in Individual Therapy (if recommended).    Medication  If prescribed medications, I agree to take them.   Missing doses can result in serious side effects.  I understand that drinking alcohol, or other illicit drug use, may cause potential side effects.  I will not stop my medication abruptly without first discussing it with my provider.    Staying Connected With Others  I will stay in touch with my friends, family members, and my primary care provider/team.    Use your imagination  Be creative.  We all have a creative side; it doesn t matter if it s oil painting, sand castles, or mud pies! This will also kick up the endorphins.    Witness Beauty  (AKA stop and smell the roses) Take a look outside, even in mid-winter. Notice colors, textures. Watch the squirrels and birds.     Service to others  Be of service to others.  There is always someone else in need.  By helping others we can  get out of ourselves  and remember the really important things.  This also provides opportunities for practicing all the other parts of the program.    Humor  Laugh and be silly!  Adjust your TV habits for less news and crime-drama and more comedy.    Control your stress  Try breathing deep, massage therapy, biofeedback, and meditation. Find time to relax each day.     My support system    Clinic Contact:  Phone number:    Contact 1:  Phone number:    Contact 2:  Phone number:    [employer]/:  Phone number:    Therapist:  Phone number:    Local crisis center:    Phone number:    Other community support:  Phone number:

## 2017-12-04 NOTE — NURSING NOTE
"Chief Complaint   Patient presents with     Physical       Initial There were no vitals taken for this visit. Estimated body mass index is 28.15 kg/(m^2) as calculated from the following:    Height as of 8/13/17: 5' 4\" (1.626 m).    Weight as of 8/13/17: 164 lb (74.4 kg).  Medication Reconciliation: complete     .Bertha Duque MA      "

## 2017-12-04 NOTE — LETTER
December 27, 2017      Addis Peña  [address]    [place] [address]        Dear ,    We are writing to inform you of your test results.    All labs stable/reasuring.  No med changes needed based on this.    Resulted Orders   CBC with platelets   Result Value Ref Range    WBC 6.5 4.0 - 11.0 10e9/L    RBC Count 3.98 3.8 - 5.2 10e12/L    Hemoglobin 11.9 11.7 - 15.7 g/dL    Hematocrit 37.6 35.0 - 47.0 %    MCV 95 78 - 100 fl    MCH 29.9 26.5 - 33.0 pg    MCHC 31.6 31.5 - 36.5 g/dL    RDW 13.6 10.0 - 15.0 %    Platelet Count 271 150 - 450 10e9/L   Vitamin B12   Result Value Ref Range    Vitamin B12 467 193 - 986 pg/mL   Albumin Random Urine Quantitative with Creat Ratio   Result Value Ref Range    Creatinine Urine 108 mg/dL    Albumin Urine mg/L 191 mg/L    Albumin Urine mg/g Cr 176.85 (H) 0 - 25 mg/g Cr   Hemoglobin A1c   Result Value Ref Range    Hemoglobin A1C 7.2 (H) 4.3 - 6.0 %   Lipid panel reflex to direct LDL Fasting   Result Value Ref Range    Cholesterol 135 <200 mg/dL    Triglycerides 139 <150 mg/dL      Comment:      Non Fasting    HDL Cholesterol 56 >49 mg/dL    LDL Cholesterol Calculated 51 <100 mg/dL      Comment:      Desirable:       <100 mg/dl    Non HDL Cholesterol 79 <130 mg/dL   Comprehensive metabolic panel   Result Value Ref Range    Sodium 142 133 - 144 mmol/L    Potassium 4.5 3.4 - 5.3 mmol/L    Chloride 109 94 - 109 mmol/L    Carbon Dioxide 20 20 - 32 mmol/L    Anion Gap 13 3 - 14 mmol/L    Glucose 134 (H) 70 - 99 mg/dL      Comment:      Non Fasting    Urea Nitrogen 24 7 - 30 mg/dL    Creatinine 0.90 0.52 - 1.04 mg/dL    GFR Estimate 63 >60 mL/min/1.7m2      Comment:      Non  GFR Calc    GFR Estimate If Black 76 >60 mL/min/1.7m2      Comment:       GFR Calc    Calcium 9.3 8.5 - 10.1 mg/dL    Bilirubin Total 0.3 0.2 - 1.3 mg/dL    Albumin 3.6 3.4 - 5.0 g/dL    Protein Total 7.0 6.8 - 8.8 g/dL    Alkaline Phosphatase 94 40 - 150  U/L    ALT 17 0 - 50 U/L    AST 9 0 - 45 U/L   Hepatitis C Screen Reflex to HCV RNA Quant and Genotype   Result Value Ref Range    Hepatitis C Antibody Nonreactive NR^Nonreactive      Comment:      Assay performance characteristics have not been established for newborns,   infants, and children         If you have any questions or concerns, please call the clinic at the number listed above.       Sincerely,      Joel Daniel Wegener, MD/nr

## 2018-01-05 ENCOUNTER — ALLIED HEALTH/NURSE VISIT (OUTPATIENT)
Dept: NURSING | Facility: CLINIC | Age: 68
End: 2018-01-05
Payer: MEDICARE

## 2018-01-05 DIAGNOSIS — D51.9 ANEMIA DUE TO VITAMIN B12 DEFICIENCY: Primary | ICD-10-CM

## 2018-01-05 PROCEDURE — 99207 ZZC NO CHARGE NURSE ONLY: CPT

## 2018-01-05 PROCEDURE — 96372 THER/PROPH/DIAG INJ SC/IM: CPT

## 2018-01-05 NOTE — MR AVS SNAPSHOT
After Visit Summary   1/5/2018    Addis Peña    MRN: [id number]           Patient Information     Date Of Birth          1950        Visit Information        Provider Department      1/5/2018 10:00 AM HP MEDICAL ASSISTANT [facility]        Today's Diagnoses     Anemia due to vitamin B12 deficiency    -  1       Follow-ups after your visit        Your next 10 appointments already scheduled     Feb 02, 2018 10:00 AM CST   Nurse Only with HP MEDICAL ASSISTANT   [facility] ([facility])    [address] [place] [address] [phone]            May 04, 2018  8:00 AM CDT   Office Visit with Joel Daniel Irwin Wegener, MD   [facility] ([facility])    [address] [place] [address] [phone]           Bring a current list of meds and any records pertaining to this visit. For Physicals, please bring immunization records and any forms needing to be filled out. Please arrive 10 minutes early to complete paperwork.              Who to contact     If you have questions or need follow up information about today's clinic visit or your schedule please contact [facility] directly at [phone].  Normal or non-critical lab and imaging results will be communicated to you by MyVRhart, letter or phone within 4 business days after the clinic has received the results. If you do not hear from us within 7 days, please contact the clinic through MyVRhart or phone. If you have a critical or abnormal lab result, we will notify you by phone as soon as possible.  Submit refill requests through Groove or call your pharmacy and they will forward the refill request to us. Please allow 3 business days for your refill to be completed.          Additional Information About Your Visit        MyVRharAzonia Information     Groove lets you send messages to your doctor, view your test  "results, renew your prescriptions, schedule appointments and more. To sign up, go to [email or web address] . Click on \"Log in\" on the left side of the screen, which will take you to the Welcome page. Then click on \"Sign up Now\" on the right side of the page.     You will be asked to enter the access code listed below, as well as some personal information. Please follow the directions to create your username and password.     Your access code is: [email or web address]  Expires: 3/4/2018  8:41 AM     Your access code will  in 90 days. If you need help or a new code, please call your [place] clinic or [phone].        Care EveryWhere ID     This is your Care EveryWhere ID. This could be used by other organizations to access your [place] medical records  [id number]         Blood Pressure from Last 3 Encounters:   17 128/68   17 125/68   08/15/17 144/72    Weight from Last 3 Encounters:   17 165 lb (74.8 kg)   17 164 lb (74.4 kg)   17 155 lb (70.3 kg)              We Performed the Following     INJECTION INTRAMUSCULAR OR SUB-Q     VITAMIN B12 INJ /1000MCG        Primary Care Provider Office Phone # Fax #    Joel Daniel Irwin Wegener, [phone] [address] [facility] [address]        Equal Access to Services     [facility] : Hadii naz ku dionio Sokhadar, waaxda luqadaha, qaybta kaalmada miriamyada, erica kelly. So [facility] [phone].    ATENCIÓN: Si habla español, tiene a parr disposición servicios gratuitos de asistencia lingüística. Llame al [phone].    We comply with applicable federal civil rights laws and [place] laws. We do not discriminate on the basis of race, color, national origin, age, disability, sex, sexual orientation, or gender identity.            Thank you!     Thank you for choosing [facility]  for your care. Our goal is always to provide you with excellent care. Hearing back from our " patients is one way we can continue to improve our services. Please take a few minutes to complete the written survey that you may receive in the mail after your visit with us. Thank you!             Your Updated Medication List - Protect others around you: Learn how to safely use, store and throw away your medicines at www.disposemymeds.org.          This list is accurate as of: 1/5/18 10:41 AM.  Always use your most recent med list.                   Brand Name Dispense Instructions for use Diagnosis    amLODIPine 10 MG tablet    NORVASC    90 tablet    Take 1 tablet (10 mg) by mouth daily    Hypertension goal BP (blood pressure) < 140/90       aspirin 81 MG tablet      Take 1 tablet by mouth daily.        atorvastatin 40 MG tablet    LIPITOR    90 tablet    Take 1 tablet (40 mg) by mouth daily    Hyperlipidemia LDL goal <70       baclofen 10 MG tablet    LIORESAL    270 tablet    Take 1 tablet (10 mg) by mouth 3 times daily    Cerebrovascular accident (CVA), unspecified mechanism (H)       blood glucose monitoring test strip    IRENE CONTOUR    200 each    Use to test blood sugars two times daily or as directed.    Type 2 diabetes mellitus with stage 3 chronic kidney disease, without long-term current use of insulin (H)       cholecalciferol 1000 UNIT tablet    vitamin D3     Take 1,000 Units by mouth daily        citalopram 20 MG tablet    celeXA    90 tablet    Take 1 tablet (20 mg) by mouth daily    Major depressive disorder, recurrent episode, mild (H)       cyanocobalamin 1000 MCG/ML injection    VITAMIN B12    1 mL    Inject 1 mL (1,000 mcg) into the muscle every 30 days Order for clinic injection,  No need to fill.    Anemia due to vitamin B12 deficiency, unspecified B12 deficiency type       dextromethorphan 15 MG/5ML syrup      Take 10 mLs by mouth 2 times daily as needed        Lactase 250 MG Caps      Take 1 chew tab by mouth as needed (with dairy)        losartan 25 MG tablet    COZAAR    90 tablet     Take 1 tablet (25 mg) by mouth daily    Hypertension goal BP (blood pressure) < 140/90       metFORMIN 1000 MG tablet    GLUCOPHAGE    180 tablet    Take 1 tablet (1,000 mg) by mouth 2 times daily (with meals)    Type 2 diabetes mellitus with stage 3 chronic kidney disease, with long-term current use of insulin (H)       metoprolol 50 MG 24 hr tablet    TOPROL-XL    90 tablet    Take 1 tablet (50 mg) by mouth daily    Hypertension, uncontrolled, Hypertension goal BP (blood pressure) < 140/90       montelukast 10 MG tablet    SINGULAIR    90 tablet    Take 1 tablet (10 mg) by mouth daily    Seasonal allergic rhinitis, unspecified chronicity, unspecified trigger       senna-docusate 8.6-50 MG per tablet    SENOKOT-S;PERICOLACE    60 tablet    Take 1 tablet by mouth 2 times daily To be taken with opioid (narcotic) pain medication to prevent constipation.    MARI (acute kidney injury) (H)       TYLENOL 500 MG tablet   Generic drug:  acetaminophen      Take 500 mg by mouth 2 times daily

## 2018-01-05 NOTE — NURSING NOTE
The following medication was given:     MEDICATION: Vitamin B12  1000 mcg  ROUTE: IM  SITE: Deltoid - Right  DOSE: 1000 mcg/mL  LOT #: [id number]  :  Virtual DBS  EXPIRATION DATE:  02/2019  NDC: 6032-8743-05  Verified by: PARAS Wheeler MA

## 2018-02-02 ENCOUNTER — ALLIED HEALTH/NURSE VISIT (OUTPATIENT)
Dept: NURSING | Facility: CLINIC | Age: 68
End: 2018-02-02
Payer: MEDICARE

## 2018-02-02 DIAGNOSIS — D51.9 ANEMIA DUE TO VITAMIN B12 DEFICIENCY, UNSPECIFIED B12 DEFICIENCY TYPE: Primary | ICD-10-CM

## 2018-02-02 PROCEDURE — 99207 ZZC NO CHARGE NURSE ONLY: CPT

## 2018-02-02 PROCEDURE — 96372 THER/PROPH/DIAG INJ SC/IM: CPT

## 2018-02-02 NOTE — MR AVS SNAPSHOT
After Visit Summary   2/2/2018    Addis Peña    MRN: [id number]           Patient Information     Date Of Birth          1950        Visit Information        Provider Department      2/2/2018 10:00 AM HP MEDICAL ASSISTANT [facility]        Today's Diagnoses     Anemia due to vitamin B12 deficiency, unspecified B12 deficiency type    -  1       Follow-ups after your visit        Your next 10 appointments already scheduled     Mar 02, 2018 10:00 AM CST   Nurse Only with HP MEDICAL ASSISTANT   [facility] ([facility])    [address] [place] [address] [phone]            May 04, 2018  8:00 AM CDT   Office Visit with Joel Daniel Irwin Wegener, MD   [facility] ([facility])    [address] [place] [address] [phone]           Bring a current list of meds and any records pertaining to this visit. For Physicals, please bring immunization records and any forms needing to be filled out. Please arrive 10 minutes early to complete paperwork.              Who to contact     If you have questions or need follow up information about today's clinic visit or your schedule please contact [facility] directly at [phone].  Normal or non-critical lab and imaging results will be communicated to you by Bobex.comhart, letter or phone within 4 business days after the clinic has received the results. If you do not hear from us within 7 days, please contact the clinic through Bobex.comhart or phone. If you have a critical or abnormal lab result, we will notify you by phone as soon as possible.  Submit refill requests through Prosensa or call your pharmacy and they will forward the refill request to us. Please allow 3 business days for your refill to be completed.          Additional Information About Your Visit        Prosensa Information     Prosensa lets you send messages to  "your doctor, view your test results, renew your prescriptions, schedule appointments and more. To sign up, go to [email or web address] . Click on \"Log in\" on the left side of the screen, which will take you to the Welcome page. Then click on \"Sign up Now\" on the right side of the page.     You will be asked to enter the access code listed below, as well as some personal information. Please follow the directions to create your username and password.     Your access code is: [email or web address]  Expires: 3/4/2018  8:41 AM     Your access code will  in 90 days. If you need help or a new code, please call your [place] clinic or [phone].        Care EveryWhere ID     This is your Care EveryWhere ID. This could be used by other organizations to access your [place] medical records  [id number]         Blood Pressure from Last 3 Encounters:   17 128/68   17 125/68   08/15/17 144/72    Weight from Last 3 Encounters:   17 165 lb (74.8 kg)   17 164 lb (74.4 kg)   17 155 lb (70.3 kg)              We Performed the Following     INJECTION INTRAMUSCULAR OR SUB-Q     VITAMIN B12 INJ /1000MCG        Primary Care Provider Office Phone # Fax #    Joel Daniel Irwin Wegener, [phone]       [address] [facility] [address]        Equal Access to Services     [facility] : Hadii aad ku hadasho Soomaali, waaxda luqadaha, qaybta kaalmada adeegyada, erica gibson . So [facility] [phone].    ATENCIÓN: Si vivianela sage, tiene a parr disposición servicios gratuitos de asistencia lingüística. Javy al [phone].    We comply with applicable federal civil rights laws and [place] laws. We do not discriminate on the basis of race, color, national origin, age, disability, sex, sexual orientation, or gender identity.            Thank you!     Thank you for choosing [facility]  for your care. Our goal is always to provide you with excellent " care. Hearing back from our patients is one way we can continue to improve our services. Please take a few minutes to complete the written survey that you may receive in the mail after your visit with us. Thank you!             Your Updated Medication List - Protect others around you: Learn how to safely use, store and throw away your medicines at www.disposemymeds.org.          This list is accurate as of 2/2/18 10:34 AM.  Always use your most recent med list.                   Brand Name Dispense Instructions for use Diagnosis    amLODIPine 10 MG tablet    NORVASC    90 tablet    Take 1 tablet (10 mg) by mouth daily    Hypertension goal BP (blood pressure) < 140/90       aspirin 81 MG tablet      Take 1 tablet by mouth daily.        atorvastatin 40 MG tablet    LIPITOR    90 tablet    Take 1 tablet (40 mg) by mouth daily    Hyperlipidemia LDL goal <70       baclofen 10 MG tablet    LIORESAL    270 tablet    Take 1 tablet (10 mg) by mouth 3 times daily    Cerebrovascular accident (CVA), unspecified mechanism (H)       blood glucose monitoring test strip    IRENE CONTOUR    200 each    Use to test blood sugars two times daily or as directed.    Type 2 diabetes mellitus with stage 3 chronic kidney disease, without long-term current use of insulin (H)       cholecalciferol 1000 UNIT tablet    vitamin D3     Take 1,000 Units by mouth daily        citalopram 20 MG tablet    celeXA    90 tablet    Take 1 tablet (20 mg) by mouth daily    Major depressive disorder, recurrent episode, mild (H)       cyanocobalamin 1000 MCG/ML injection    VITAMIN B12    1 mL    Inject 1 mL (1,000 mcg) into the muscle every 30 days Order for clinic injection,  No need to fill.    Anemia due to vitamin B12 deficiency, unspecified B12 deficiency type       dextromethorphan 15 MG/5ML syrup      Take 10 mLs by mouth 2 times daily as needed        Lactase 250 MG Caps      Take 1 chew tab by mouth as needed (with dairy)        losartan 25 MG  tablet    COZAAR    90 tablet    Take 1 tablet (25 mg) by mouth daily    Hypertension goal BP (blood pressure) < 140/90       metFORMIN 1000 MG tablet    GLUCOPHAGE    180 tablet    Take 1 tablet (1,000 mg) by mouth 2 times daily (with meals)    Type 2 diabetes mellitus with stage 3 chronic kidney disease, with long-term current use of insulin (H)       metoprolol succinate 50 MG 24 hr tablet    TOPROL-XL    90 tablet    Take 1 tablet (50 mg) by mouth daily    Hypertension, uncontrolled, Hypertension goal BP (blood pressure) < 140/90       montelukast 10 MG tablet    SINGULAIR    90 tablet    Take 1 tablet (10 mg) by mouth daily    Seasonal allergic rhinitis, unspecified chronicity, unspecified trigger       senna-docusate 8.6-50 MG per tablet    SENOKOT-S;PERICOLACE    60 tablet    Take 1 tablet by mouth 2 times daily To be taken with opioid (narcotic) pain medication to prevent constipation.    MARI (acute kidney injury) (H)       TYLENOL 500 MG tablet   Generic drug:  acetaminophen      Take 500 mg by mouth 2 times daily

## 2018-02-02 NOTE — NURSING NOTE
The following medication was given:     MEDICATION: Vitamin B12  1,000mcg  ROUTE: IM  SITE: Deltoid - Right  DOSE: 1ml  LOT #: [id number]  :  BrightRoll  EXPIRATION DATE:  02/01/19  NDC: 4849-3172-04    Bertha Duque MA

## 2018-02-04 NOTE — TELEPHONE ENCOUNTER
Addis Peña 's letter  have been faxed to [facility] @ [phone].     letter are in Dr. Wegener's basket.     Call and notify pt letter was sent out.    Rupal Duque MA     
Barbara, from [facility], calling about letter for pt to receive water therapy. They need diagnosis codes for this request.  Thanks,  Luna Joseph, DUARTE Referral Rep  
In nurse basket. Joel Wegener,MD   
Letter with diagnosis code for water therapy  fax to Barbara at [facility] at [phone].      Bertha Duque MA    
Reason for Call:  Other call back/letter    Detailed comments: Pt called in and requested a letter/referral to be faxed to [facility] for water therapy. Pt stated it would be in July and August for 8 weeks. Pt did not specify a date she needs this by, just at your convenience. Fax number she provided is [phone]. Pt stated if there are any questions, she is home all day and can be reached at the home phone listed.    Phone Number Patient can be reached at: Home number on file [phone] (home)    Best Time: anytime    Can we leave a detailed message on this number? YES    Call taken on 6/20/2017 at 1:48 PM by Luna Hernández      
Updated letter In nurse basket. Joel Wegener,MD   
172.72

## 2018-02-16 DIAGNOSIS — N18.30 TYPE 2 DIABETES MELLITUS WITH STAGE 3 CHRONIC KIDNEY DISEASE, WITH LONG-TERM CURRENT USE OF INSULIN (H): ICD-10-CM

## 2018-02-16 DIAGNOSIS — E11.22 TYPE 2 DIABETES MELLITUS WITH STAGE 3 CHRONIC KIDNEY DISEASE, WITH LONG-TERM CURRENT USE OF INSULIN (H): ICD-10-CM

## 2018-02-16 DIAGNOSIS — E11.9 TYPE 2 DIABETES, HBA1C GOAL < 8% (H): ICD-10-CM

## 2018-02-16 DIAGNOSIS — Z79.4 TYPE 2 DIABETES MELLITUS WITH STAGE 3 CHRONIC KIDNEY DISEASE, WITH LONG-TERM CURRENT USE OF INSULIN (H): ICD-10-CM

## 2018-02-19 NOTE — TELEPHONE ENCOUNTER
Requested Prescriptions   Pending Prescriptions Disp Refills     metFORMIN (GLUCOPHAGE) 1000 MG tablet [Pharmacy Med Name: METFORMIN 1000MG TABLETS]  Last Written Prescription Date:  12/4/2017  Last Fill Quantity: 180 tablet,  # refills: 3   Last Office Visit: 12/4/2017   Future Office Visit:    Next 5 appointments (look out 90 days)     Mar 02, 2018 10:00 AM CST   Nurse Only with HP MEDICAL ASSISTANT   [facility] ([facility]    [address] [place] [id number]   [phone]            May 04, 2018  8:00 AM CDT   Office Visit with Joel Daniel Irwin Wegener, MD   [facility] ([facility])    [address] [place] [id number] [phone]                180 tablet 0     Sig: TAKE 1 TABLET(1000 MG) BY MOUTH TWICE DAILY WITH MEALS    Biguanide Agents Passed    2/16/2018  5:15 PM       Passed - Blood pressure less than 140/90 in past 6 months    BP Readings from Last 3 Encounters:   12/04/17 128/68   09/01/17 125/68   08/15/17 144/72          Passed - Patient has documented LDL within the past 12 mos.    Recent Labs   Lab Test  12/04/17   0831   LDL  51          Passed - Patient has had a Microalbumin in the past 12 mos.    Recent Labs   Lab Test  12/04/17   0831   MICROL  191   UMALCR  176.85*          Passed - Patient is age 10 or older       Passed - Patient has documented A1c within the specified period of time.    Recent Labs   Lab Test  12/04/17   0831   A1C  7.2*          Passed - Patient's CR is NOT>1.4 OR Patient's EGFR is NOT<45 within past 12 mos.    Recent Labs   Lab Test  12/04/17   0831   GFRESTIMATED  63   GFRESTBLACK  76     Recent Labs   Lab Test  12/04/17   0831   CR  0.90          Passed - Patient does NOT have a diagnosis of CHF.       Passed - Patient is not pregnant       Passed - Patient has not had a positive pregnancy test within the past 12 mos.        Passed - Recent (6 mos) or future visit with  "authorizing provider's specialty    Patient had office visit in the last 6 months or has a visit in the next 30 days with authorizing provider.  See \"Patient Info\" tab in inbasket, or \"Choose Columns\" in Meds & Orders section of the refill encounter.         ________________________________________________________________________________       IRENE CONTOUR test strip [Pharmacy Med Name: CONTOUR TEST STRIPS 100'S]  Last Written Prescription Date:  12/4/2017  Last Fill Quantity: 200,  # refills: 11   Last Office Visit: 12/4/2017   Future Office Visit:    Next 5 appointments (look out 90 days)     Mar 02, 2018 10:00 AM CST   Nurse Only with HP MEDICAL ASSISTANT   [facility] ([facility])    [address] [place] [id number] [phone]            May 04, 2018  8:00 AM CDT   Office Visit with Joel Daniel Irwin Wegener, MD   [facility] ([facility])    [address] [place] [address] [phone]                200 strip 0     Sig: USE TO TEST BLOOD GLUCOSE TWICE DAILY OR AS DIRECTED    Diabetic Supplies Protocol Passed    2/16/2018  5:15 PM       Passed - Patient is 18 years of age or older       Passed - Patient has had appt within past 6 mos    Patient had office visit in the last 6 months or has a visit in the next 30 days with authorizing provider.  See \"Patient Info\" tab in inbasket, or \"Choose Columns\" in Meds & Orders section of the refill encounter.              "

## 2018-03-02 ENCOUNTER — ALLIED HEALTH/NURSE VISIT (OUTPATIENT)
Dept: NURSING | Facility: CLINIC | Age: 68
End: 2018-03-02
Payer: MEDICARE

## 2018-03-02 DIAGNOSIS — D51.9 ANEMIA DUE TO VITAMIN B12 DEFICIENCY: Primary | ICD-10-CM

## 2018-03-02 PROCEDURE — 99207 ZZC NO CHARGE NURSE ONLY: CPT

## 2018-03-02 PROCEDURE — 96372 THER/PROPH/DIAG INJ SC/IM: CPT

## 2018-03-02 NOTE — MR AVS SNAPSHOT
After Visit Summary   3/2/2018    Addis Peña    MRN: [id number]           Patient Information     Date Of Birth          1950        Visit Information        Provider Department      3/2/2018 10:00 AM HP MEDICAL ASSISTANT [facility]        Today's Diagnoses     Anemia due to vitamin B12 deficiency    -  1       Follow-ups after your visit        Your next 10 appointments already scheduled     Apr 06, 2018 10:00 AM CDT   Nurse Only with HP MEDICAL ASSISTANT   [facility] ([facility]    [address] [place] [address] [phone]            May 04, 2018  8:00 AM CDT   Office Visit with Joel Daniel Irwin Wegener, MD   [facility] ([facility])    [address] [place] [address] [phone]           Bring a current list of meds and any records pertaining to this visit. For Physicals, please bring immunization records and any forms needing to be filled out. Please arrive 10 minutes early to complete paperwork.              Who to contact     If you have questions or need follow up information about today's clinic visit or your schedule please contact [facility] directly at [phone].  Normal or non-critical lab and imaging results will be communicated to you by [employer], letter or phone within 4 business days after the clinic has received the results. If you do not hear from us within 7 days, please contact the clinic through [employer] or phone. If you have a critical or abnormal lab result, we will notify you by phone as soon as possible.  Submit refill requests through [employer] or call your pharmacy and they will forward the refill request to us. Please allow 3 business days for your refill to be completed.          Additional Information About Your Visit        [employer] Information     [employer] lets you send messages to your doctor, view your test  "results, renew your prescriptions, schedule appointments and more. To sign up, go to [email or web address] . Click on \"Log in\" on the left side of the screen, which will take you to the Welcome page. Then click on \"Sign up Now\" on the right side of the page.     You will be asked to enter the access code listed below, as well as some personal information. Please follow the directions to create your username and password.     Your access code is: [email or web address]  Expires: 3/4/2018  8:41 AM     Your access code will  in 90 days. If you need help or a new code, please call your [place] clinic or [phone].        Care EveryWhere ID     This is your Care EveryWhere ID. This could be used by other organizations to access your [place] medical records  [id number]         Blood Pressure from Last 3 Encounters:   17 128/68   17 125/68   08/15/17 144/72    Weight from Last 3 Encounters:   17 165 lb (74.8 kg)   17 164 lb (74.4 kg)   17 155 lb (70.3 kg)              We Performed the Following     INJECTION INTRAMUSCULAR OR SUB-Q     VITAMIN B12 INJ /1000MCG        Primary Care Provider Office Phone # Fax #    Joel Daniel Irwin Wegener, [phone] [address] [facility] [address]        Equal Access to Services     [facility] : Hadii naz ku dionio Sokhadar, waaxda luqadaha, qaybta kaalmada miriamyada, erica kelly. So [facility] [phone].    ATENCIÓN: Si habla español, tiene a parr disposición servicios gratuitos de asistencia lingüística. Llame al [phone].    We comply with applicable federal civil rights laws and [place] laws. We do not discriminate on the basis of race, color, national origin, age, disability, sex, sexual orientation, or gender identity.            Thank you!     Thank you for choosing [facility]  for your care. Our goal is always to provide you with excellent care. Hearing back from our " patients is one way we can continue to improve our services. Please take a few minutes to complete the written survey that you may receive in the mail after your visit with us. Thank you!             Your Updated Medication List - Protect others around you: Learn how to safely use, store and throw away your medicines at www.disposemymeds.org.          This list is accurate as of 3/2/18 10:16 AM.  Always use your most recent med list.                   Brand Name Dispense Instructions for use Diagnosis    amLODIPine 10 MG tablet    NORVASC    90 tablet    Take 1 tablet (10 mg) by mouth daily    Hypertension goal BP (blood pressure) < 140/90       aspirin 81 MG tablet      Take 1 tablet by mouth daily.        atorvastatin 40 MG tablet    LIPITOR    90 tablet    Take 1 tablet (40 mg) by mouth daily    Hyperlipidemia LDL goal <70       baclofen 10 MG tablet    LIORESAL    270 tablet    Take 1 tablet (10 mg) by mouth 3 times daily    Cerebrovascular accident (CVA), unspecified mechanism (H)       blood glucose monitoring test strip    IRENE CONTOUR    200 each    Use to test blood sugars two times daily or as directed.    Type 2 diabetes mellitus with stage 3 chronic kidney disease, without long-term current use of insulin (H)       cholecalciferol 1000 UNIT tablet    vitamin D3     Take 1,000 Units by mouth daily        citalopram 20 MG tablet    celeXA    90 tablet    Take 1 tablet (20 mg) by mouth daily    Major depressive disorder, recurrent episode, mild (H)       cyanocobalamin 1000 MCG/ML injection    VITAMIN B12    1 mL    Inject 1 mL (1,000 mcg) into the muscle every 30 days Order for clinic injection,  No need to fill.    Anemia due to vitamin B12 deficiency, unspecified B12 deficiency type       dextromethorphan 15 MG/5ML syrup      Take 10 mLs by mouth 2 times daily as needed        Lactase 250 MG Caps      Take 1 chew tab by mouth as needed (with dairy)        losartan 25 MG tablet    COZAAR    90 tablet     Take 1 tablet (25 mg) by mouth daily    Hypertension goal BP (blood pressure) < 140/90       metFORMIN 1000 MG tablet    GLUCOPHAGE    180 tablet    Take 1 tablet (1,000 mg) by mouth 2 times daily (with meals)    Type 2 diabetes mellitus with stage 3 chronic kidney disease, with long-term current use of insulin (H)       metoprolol succinate 50 MG 24 hr tablet    TOPROL-XL    90 tablet    Take 1 tablet (50 mg) by mouth daily    Hypertension, uncontrolled, Hypertension goal BP (blood pressure) < 140/90       montelukast 10 MG tablet    SINGULAIR    90 tablet    Take 1 tablet (10 mg) by mouth daily    Seasonal allergic rhinitis, unspecified chronicity, unspecified trigger       senna-docusate 8.6-50 MG per tablet    SENOKOT-S;PERICOLACE    60 tablet    Take 1 tablet by mouth 2 times daily To be taken with opioid (narcotic) pain medication to prevent constipation.    MARI (acute kidney injury) (H)       TYLENOL 500 MG tablet   Generic drug:  acetaminophen      Take 500 mg by mouth 2 times daily

## 2018-03-02 NOTE — NURSING NOTE
The following medication was given:     MEDICATION: Vitamin B12  1,000 mcg  ROUTE: IM  SITE: Deltoid - Right  DOSE: 1,000 mcg/mL  LOT #: [id number]  :  [employer]  EXPIRATION DATE:  05/2019  NDC: 8886-4395-71    Agus Duque MA

## 2018-04-06 ENCOUNTER — ALLIED HEALTH/NURSE VISIT (OUTPATIENT)
Dept: NURSING | Facility: CLINIC | Age: 68
End: 2018-04-06
Payer: MEDICARE

## 2018-04-06 DIAGNOSIS — D51.9 ANEMIA DUE TO VITAMIN B12 DEFICIENCY, UNSPECIFIED B12 DEFICIENCY TYPE: Primary | ICD-10-CM

## 2018-04-06 PROCEDURE — 96372 THER/PROPH/DIAG INJ SC/IM: CPT

## 2018-04-06 PROCEDURE — 99207 ZZC NO CHARGE NURSE ONLY: CPT

## 2018-04-06 NOTE — NURSING NOTE
The following medication was given:     MEDICATION: Vitamin B12  1000mcg  ROUTE: IM  SITE: Deltoid - Left  DOSE: 100 mcg/mL  LOT #: [id number]  :  [employer]  EXPIRATION DATE:  05/2019  NDC: 9935-2685-64  Verified by: Bertha Duque MA

## 2018-04-06 NOTE — MR AVS SNAPSHOT
"              After Visit Summary   4/6/2018    Addis Peña    MRN: [id number]           Patient Information     Date Of Birth          1950        Visit Information        Provider Department      4/6/2018 10:00 AM HP MEDICAL ASSISTANT [facility]        Today's Diagnoses     Anemia due to vitamin B12 deficiency, unspecified B12 deficiency type    -  1       Follow-ups after your visit        Your next 10 appointments already scheduled     May 04, 2018  8:00 AM CDT   Office Visit with Joel Daniel Irwin [facility]gener[facility]    [address] [place] [address] [phone]           Bring a current list of meds and any records pertaining to this visit. For Physicals, please bring immunization records and any forms needing to be filled out. Please arrive 10 minutes early to complete paperwork.              Who to contact     If you have questions or need follow up information about today's clinic visit or your schedule please contact [facility] directly at [phone].  Normal or non-critical lab and imaging results will be communicated to you by MyChart, letter or phone within 4 business days after the clinic has received the results. If you do not hear from us within 7 days, please contact the clinic through [employer] or phone. If you have a critical or abnormal lab result, we will notify you by phone as soon as possible.  Submit refill requests through [employer] or call your pharmacy and they will forward the refill request to us. Please allow 3 business days for your refill to be completed.          Additional Information About Your Visit        MyChart Information     [employer] lets you send messages to your doctor, view your test results, renew your prescriptions, schedule appointments and more. To sign up, go to [email or web address] . Click on \"Log in\" on the left side of the screen, which will " "take you to the Welcome page. Then click on \"Sign up Now\" on the right side of the page.     You will be asked to enter the access code listed below, as well as some personal information. Please follow the directions to create your username and password.     Your access code is: [email or web address]  Expires: 2018 10:44 AM     Your access code will  in 90 days. If you need help or a new code, please call your [place] clinic or [phone].        Care EveryWhere ID     This is your Care EveryWhere ID. This could be used by other organizations to access your [place] medical records  [id number]         Blood Pressure from Last 3 Encounters:   17 128/68   17 125/68   08/15/17 144/72    Weight from Last 3 Encounters:   17 165 lb (74.8 kg)   17 164 lb (74.4 kg)   17 155 lb (70.3 kg)              We Performed the Following     INJECTION INTRAMUSCULAR OR SUB-Q     VITAMIN B12 INJ /1000MCG        Primary Care Provider Office Phone # Fax #    Joel Daniel Irwin Wegener, [phone] [address] [place] [address]        Equal Access to Services     SUYAPA PAPPAS : Hadii naz ku hadasho Soomaali, waaxda luqadaha, qaybta kaalmada adeegyada, waxay idiin haymirzan miriam gibson . So [facility] [phone].    ATENCIÓN: Si habla español, tiene a parr disposición servicios gratuitos de asistencia lingüística. Llame al [phone].    We comply with applicable federal civil rights laws and [place] laws. We do not discriminate on the basis of race, color, national origin, age, disability, sex, sexual orientation, or gender identity.            Thank you!     Thank you for choosing [facility]  for your care. Our goal is always to provide you with excellent care. Hearing back from our patients is one way we can continue to improve our services. Please take a few minutes to complete the written survey that you may receive in the mail after your visit with " us. Thank you!             Your Updated Medication List - Protect others around you: Learn how to safely use, store and throw away your medicines at www.disposemymeds.org.          This list is accurate as of 4/6/18 10:44 AM.  Always use your most recent med list.                   Brand Name Dispense Instructions for use Diagnosis    amLODIPine 10 MG tablet    NORVASC    90 tablet    Take 1 tablet (10 mg) by mouth daily    Hypertension goal BP (blood pressure) < 140/90       aspirin 81 MG tablet      Take 1 tablet by mouth daily.        atorvastatin 40 MG tablet    LIPITOR    90 tablet    Take 1 tablet (40 mg) by mouth daily    Hyperlipidemia LDL goal <70       baclofen 10 MG tablet    LIORESAL    270 tablet    Take 1 tablet (10 mg) by mouth 3 times daily    Cerebrovascular accident (CVA), unspecified mechanism (H)       blood glucose monitoring test strip    IRENE CONTOUR    200 each    Use to test blood sugars two times daily or as directed.    Type 2 diabetes mellitus with stage 3 chronic kidney disease, without long-term current use of insulin (H)       cholecalciferol 1000 UNIT tablet    vitamin D3     Take 1,000 Units by mouth daily        citalopram 20 MG tablet    celeXA    90 tablet    Take 1 tablet (20 mg) by mouth daily    Major depressive disorder, recurrent episode, mild (H)       cyanocobalamin 1000 MCG/ML injection    VITAMIN B12    1 mL    Inject 1 mL (1,000 mcg) into the muscle every 30 days Order for clinic injection,  No need to fill.    Anemia due to vitamin B12 deficiency, unspecified B12 deficiency type       dextromethorphan 15 MG/5ML syrup      Take 10 mLs by mouth 2 times daily as needed        Lactase 250 MG Caps      Take 1 chew tab by mouth as needed (with dairy)        losartan 25 MG tablet    COZAAR    90 tablet    Take 1 tablet (25 mg) by mouth daily    Hypertension goal BP (blood pressure) < 140/90       metFORMIN 1000 MG tablet    GLUCOPHAGE    180 tablet    Take 1 tablet (1,000 mg)  by mouth 2 times daily (with meals)    Type 2 diabetes mellitus with stage 3 chronic kidney disease, with long-term current use of insulin (H)       metoprolol succinate 50 MG 24 hr tablet    TOPROL-XL    90 tablet    Take 1 tablet (50 mg) by mouth daily    Hypertension, uncontrolled, Hypertension goal BP (blood pressure) < 140/90       montelukast 10 MG tablet    SINGULAIR    90 tablet    Take 1 tablet (10 mg) by mouth daily    Seasonal allergic rhinitis, unspecified chronicity, unspecified trigger       senna-docusate 8.6-50 MG per tablet    SENOKOT-S;PERICOLACE    60 tablet    Take 1 tablet by mouth 2 times daily To be taken with opioid (narcotic) pain medication to prevent constipation.    MARI (acute kidney injury) (H)       TYLENOL 500 MG tablet   Generic drug:  acetaminophen      Take 500 mg by mouth 2 times daily

## 2018-05-04 ENCOUNTER — OFFICE VISIT (OUTPATIENT)
Dept: FAMILY MEDICINE | Facility: CLINIC | Age: 68
End: 2018-05-04
Payer: MEDICARE

## 2018-05-04 VITALS
SYSTOLIC BLOOD PRESSURE: 128 MMHG | TEMPERATURE: 99 F | HEIGHT: 64 IN | DIASTOLIC BLOOD PRESSURE: 80 MMHG | OXYGEN SATURATION: 95 % | HEART RATE: 73 BPM

## 2018-05-04 DIAGNOSIS — D51.9 ANEMIA DUE TO VITAMIN B12 DEFICIENCY, UNSPECIFIED B12 DEFICIENCY TYPE: ICD-10-CM

## 2018-05-04 DIAGNOSIS — Z71.89 ADVANCED DIRECTIVES, COUNSELING/DISCUSSION: ICD-10-CM

## 2018-05-04 DIAGNOSIS — N18.30 TYPE 2 DIABETES MELLITUS WITH STAGE 3 CHRONIC KIDNEY DISEASE, WITHOUT LONG-TERM CURRENT USE OF INSULIN (H): Primary | ICD-10-CM

## 2018-05-04 DIAGNOSIS — E11.22 TYPE 2 DIABETES MELLITUS WITH STAGE 3 CHRONIC KIDNEY DISEASE, WITHOUT LONG-TERM CURRENT USE OF INSULIN (H): Primary | ICD-10-CM

## 2018-05-04 LAB
ERYTHROCYTE [DISTWIDTH] IN BLOOD BY AUTOMATED COUNT: 14.4 % (ref 10–15)
HBA1C MFR BLD: 7.7 % (ref 0–5.6)
HCT VFR BLD AUTO: 39.7 % (ref 35–47)
HGB BLD-MCNC: 12.3 G/DL (ref 11.7–15.7)
MCH RBC QN AUTO: 29.4 PG (ref 26.5–33)
MCHC RBC AUTO-ENTMCNC: 31 G/DL (ref 31.5–36.5)
MCV RBC AUTO: 95 FL (ref 78–100)
PLATELET # BLD AUTO: 265 10E9/L (ref 150–450)
RBC # BLD AUTO: 4.19 10E12/L (ref 3.8–5.2)
VIT B12 SERPL-MCNC: 416 PG/ML (ref 193–986)
WBC # BLD AUTO: 6.9 10E9/L (ref 4–11)

## 2018-05-04 PROCEDURE — 99213 OFFICE O/P EST LOW 20 MIN: CPT | Performed by: FAMILY MEDICINE

## 2018-05-04 PROCEDURE — 83036 HEMOGLOBIN GLYCOSYLATED A1C: CPT | Performed by: FAMILY MEDICINE

## 2018-05-04 PROCEDURE — 85027 COMPLETE CBC AUTOMATED: CPT | Performed by: FAMILY MEDICINE

## 2018-05-04 PROCEDURE — 36415 COLL VENOUS BLD VENIPUNCTURE: CPT | Performed by: FAMILY MEDICINE

## 2018-05-04 PROCEDURE — 82607 VITAMIN B-12: CPT | Performed by: FAMILY MEDICINE

## 2018-05-04 NOTE — MR AVS SNAPSHOT
After Visit Summary   5/4/2018    Addis Peña    MRN: [id number]           Patient Information     Date Of Birth          1950        Visit Information        Provider Department      5/4/2018 8:00 AM Wegener, Joel Daniel Irwin, MD [facility]        Today's Diagnoses     Type 2 diabetes mellitus with stage 3 chronic kidney disease, without long-term current use of insulin (H)    -  1    Anemia due to vitamin B12 deficiency, unspecified B12 deficiency type        Advanced directives, counseling/discussion           Follow-ups after your visit        Your next 10 appointments already scheduled     Jun 01, 2018 10:00 AM CDT   Nurse Only with HP MEDICAL ASSISTANT   [facility] ([facility])    [address] [place] [address] [phone]            Oct 12, 2018  7:40 AM CDT   Office Visit with Joel Daniel Irwin Wegener, MD   [facility] ([facility])    [address] [place] [address] [phone]           Bring a current list of meds and any records pertaining to this visit. For Physicals, please bring immunization records and any forms needing to be filled out. Please arrive 10 minutes early to complete paperwork.              Who to contact     If you have questions or need follow up information about today's clinic visit or your schedule please contact [facility] directly at [phone].  Normal or non-critical lab and imaging results will be communicated to you by MyChart, letter or phone within 4 business days after the clinic has received the results. If you do not hear from us within 7 days, please contact the clinic through MyChart or phone. If you have a critical or abnormal lab result, we will notify you by phone as soon as possible.  Submit refill requests through LOOKCAST or call your pharmacy and they will forward the refill request to us. Please allow 3  "business days for your refill to be completed.          Additional Information About Your Visit        MyChart Information     Sellaround lets you send messages to your doctor, view your test results, renew your prescriptions, schedule appointments and more. To sign up, go to [email or web address] . Click on \"Log in\" on the left side of the screen, which will take you to the Welcome page. Then click on \"Sign up Now\" on the right side of the page.     You will be asked to enter the access code listed below, as well as some personal information. Please follow the directions to create your username and password.     Your access code is: [email or web address]  Expires: 2018 10:44 AM     Your access code will  in 90 days. If you need help or a new code, please call your [facility] clinic or [phone].        Care EveryWhere ID     This is your Care EveryWhere ID. This could be used by other organizations to access your [facility] medical records  [id number]        Your Vitals Were     Pulse Temperature Height Pulse Oximetry          73 99  F (37.2  C) (Oral) 5' 4\" (1.626 m) 95%         Blood Pressure from Last 3 Encounters:   18 128/80   17 128/68   17 125/68    Weight from Last 3 Encounters:   17 165 lb (74.8 kg)   17 164 lb (74.4 kg)   17 155 lb (70.3 kg)              We Performed the Following     CBC with platelets     DNR/DNI     Hemoglobin A1c     Vitamin B12        Primary Care Provider Office Phone # Fax #    Joel Daniel Irwin Wegener, [phone] [address]  [place] [address]        Equal Access to Services     WOJCIECH PAPPAS : Hadii naz Mccarthy, supriya skaggs, nadira arita, erica kelly. [facility] [phone].    ATENCIÓN: Si habla español, tiene a parr disposición servicios gratuitos de asistencia lingüística. Llame al [phone].    We comply with applicable federal civil rights laws and " [place] laws. We do not discriminate on the basis of race, color, national origin, age, disability, sex, sexual orientation, or gender identity.            Thank you!     Thank you for choosing [facility]  for your care. Our goal is always to provide you with excellent care. Hearing back from our patients is one way we can continue to improve our services. Please take a few minutes to complete the written survey that you may receive in the mail after your visit with us. Thank you!             Your Updated Medication List - Protect others around you: Learn how to safely use, store and throw away your medicines at www.disposemymeds.org.          This list is accurate as of 5/4/18  9:00 AM.  Always use your most recent med list.                   Brand Name Dispense Instructions for use Diagnosis    amLODIPine 10 MG tablet    NORVASC    90 tablet    Take 1 tablet (10 mg) by mouth daily    Hypertension goal BP (blood pressure) < 140/90       aspirin 81 MG tablet      Take 1 tablet by mouth daily.        atorvastatin 40 MG tablet    LIPITOR    90 tablet    Take 1 tablet (40 mg) by mouth daily    Hyperlipidemia LDL goal <70       baclofen 10 MG tablet    LIORESAL    270 tablet    Take 1 tablet (10 mg) by mouth 3 times daily    Cerebrovascular accident (CVA), unspecified mechanism (H)       blood glucose monitoring test strip    IRENE CONTOUR    200 each    Use to test blood sugars two times daily or as directed.    Type 2 diabetes mellitus with stage 3 chronic kidney disease, without long-term current use of insulin (H)       cholecalciferol 1000 UNIT tablet    vitamin D3     Take 1,000 Units by mouth daily        citalopram 20 MG tablet    celeXA    90 tablet    Take 1 tablet (20 mg) by mouth daily    Major depressive disorder, recurrent episode, mild (H)       cyanocobalamin 1000 MCG/ML injection    VITAMIN B12    1 mL    Inject 1 mL (1,000 mcg) into the muscle every 30 days Order for clinic injection,   No need to fill.    Anemia due to vitamin B12 deficiency, unspecified B12 deficiency type       dextromethorphan 15 MG/5ML syrup      Take 10 mLs by mouth 2 times daily as needed        Lactase 250 MG Caps      Take 1 chew tab by mouth as needed (with dairy)        losartan 25 MG tablet    COZAAR    90 tablet    Take 1 tablet (25 mg) by mouth daily    Hypertension goal BP (blood pressure) < 140/90       metFORMIN 1000 MG tablet    GLUCOPHAGE    180 tablet    Take 1 tablet (1,000 mg) by mouth 2 times daily (with meals)    Type 2 diabetes mellitus with stage 3 chronic kidney disease, with long-term current use of insulin (H)       metoprolol succinate 50 MG 24 hr tablet    TOPROL-XL    90 tablet    Take 1 tablet (50 mg) by mouth daily    Hypertension, uncontrolled, Hypertension goal BP (blood pressure) < 140/90       montelukast 10 MG tablet    SINGULAIR    90 tablet    Take 1 tablet (10 mg) by mouth daily    Seasonal allergic rhinitis, unspecified chronicity, unspecified trigger       senna-docusate 8.6-50 MG per tablet    SENOKOT-S;PERICOLACE    60 tablet    Take 1 tablet by mouth 2 times daily To be taken with opioid (narcotic) pain medication to prevent constipation.    MARI (acute kidney injury) (H)       TYLENOL 500 MG tablet   Generic drug:  acetaminophen      Take 500 mg by mouth 2 times daily

## 2018-05-04 NOTE — LETTER
May 11, 2018      Addis Peña  [address]    [place] [address]        Dear ,    We are writing to inform you of your test results.    The b12 and cbc were normal.    Resulted Orders   Hemoglobin A1c   Result Value Ref Range    Hemoglobin A1C 7.7 (H) 0 - 5.6 %      Comment:      Normal <5.7% Prediabetes 5.7-6.4%  Diabetes 6.5% or higher - adopted from ADA   consensus guidelines.     CBC with platelets   Result Value Ref Range    WBC 6.9 4.0 - 11.0 10e9/L    RBC Count 4.19 3.8 - 5.2 10e12/L    Hemoglobin 12.3 11.7 - 15.7 g/dL    Hematocrit 39.7 35.0 - 47.0 %    MCV 95 78 - 100 fl    MCH 29.4 26.5 - 33.0 pg    MCHC 31.0 (L) 31.5 - 36.5 g/dL    RDW 14.4 10.0 - 15.0 %    Platelet Count 265 150 - 450 10e9/L   Vitamin B12   Result Value Ref Range    Vitamin B12 416 193 - 986 pg/mL       If you have any questions or concerns, please call the clinic at the number listed above.     Sincerely,  Joel Daniel Wegener, MD/nr

## 2018-05-04 NOTE — PROGRESS NOTES
SUBJECTIVE:   Addis Peña is a 67 year old female who presents to clinic today for the following health issues:      Diabetes Follow-up    Patient is checking blood sugars: twice daily.    Blood sugar testing frequency justification: none  Results are as follows:          am - mornings and afternoon     Diabetic concerns: None     Symptoms of hypoglycemia (low blood sugar): none     Paresthesias (numbness or burning in feet) or sores: No     Date of last diabetic eye exam: 09/2017    BP Readings from Last 2 Encounters:   05/04/18 128/80   12/04/17 128/68     Hemoglobin A1C (%)   Date Value   05/04/2018 7.7 (H)   12/04/2017 7.2 (H)     LDL Cholesterol Calculated (mg/dL)   Date Value   12/04/2017 51   01/06/2017 63       Amount of exercise or physical activity: None    Problems taking medications regularly: No    Medication side effects: none    Diet: diabetic             Type 2 diabetes mellitus with stage 3 chronic kidney disease, without long-term current use of insulin (H): no changes from her perspective.   Anemia due to vitamin B12 deficiency, unspecified B12 deficiency type: due today.   Advanced directives, counseling/discussion :brings in POLST and advanced directive with her given by home care, would like to update.          Problem list, Medication list, Allergies, and Medical/Social/Surgical histories [facility] and updated as appropriate.  Labs reviewed in EP[facility]  BP Readings from Last 3 Encounters:   05/04/18 128/80   12/04/17 128/68   09/01/17 125/68    Wt Readings from Last 3 Encounters:   12/04/17 165 lb (74.8 kg)   08/13/17 164 lb (74.4 kg)   01/06/17 155 lb (70.3 kg)                  Patient Active Problem List   Diagnosis     Hypertension goal BP (blood pressure) < 140/90     Type 2 diabetes, HbA1C goal < 8% (H)     Rosacea     Cerebral artery occlusion with cerebral infarction (H)     Mild major depression (H)     GERD (gastroesophageal reflux disease)     Seasonal allergic rhinitis      Advanced directives, counseling/discussion     History of colonic polyps     Major depression in complete remission (H)     B12 deficiency anemia     Health Care Home     Hyperlipidemia LDL goal <70     Gout     Type 2 diabetes with stage 3 chronic kidney disease GFR 30-59 (H)     Altered mental status     MARI (acute kidney injury) (H)     Type 2 diabetes mellitus with stage 3 chronic kidney disease, without long-term current use of insulin (H)     Cervical cancer screening     ACE-inhibitor cough     History of stroke     Hemiplegia affecting right dominant side (H)     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 Father      DIABETES Sister      Hypertension Brother      CANCER Sister          Current Outpatient Prescriptions   Medication Sig Dispense Refill     acetaminophen (TYLENOL) 500 MG tablet Take 500 mg by mouth 2 times daily        amLODIPine (NORVASC) 10 MG tablet Take 1 tablet (10 mg) by mouth daily 90 tablet 3     aspirin 81 MG tablet Take 1 tablet by mouth daily.       atorvastatin (LIPITOR) 40 MG tablet Take 1 tablet (40 mg) by mouth daily 90 tablet 3     baclofen (LIORESAL) 10 MG tablet Take 1 tablet (10 mg) by mouth 3 times daily 270 tablet 3     blood glucose monitoring (IRENE CONTOUR) test strip Use to test blood sugars two times daily or as directed. 200 each 11     Cholecalciferol (VITAMIN D3) 1000 UNIT TABS Take 1,000 Units by mouth daily        citalopram  (CELEXA) 20 MG tablet Take 1 tablet (20 mg) by mouth daily 90 tablet 3     cyanocobalamin (VITAMIN B12) 1000 MCG/ML injection Inject 1 mL (1,000 mcg) into the muscle every 30 days Order for clinic injection,  No need to fill. 1 mL 11     dextromethorphan 15 MG/5ML syrup Take 10 mLs by mouth 2 times daily as needed        Lactase 250 MG CAPS Take 1 chew tab by mouth as needed (with dairy)        losartan (COZAAR) 25 MG tablet Take 1 tablet (25 mg) by mouth daily 90 tablet 3     metFORMIN (GLUCOPHAGE) 1000 MG tablet Take 1 tablet (1,000 mg) by mouth 2 times daily (with meals) 180 tablet 3     metoprolol (TOPROL-XL) 50 MG 24 hr tablet Take 1 tablet (50 mg) by mouth daily 90 tablet 3     montelukast (SINGULAIR) 10 MG tablet Take 1 tablet (10 mg) by mouth daily 90 tablet 3     senna-docusate (SENOKOT-S;PERICOLACE) 8.6-50 MG per tablet Take 1 tablet by mouth 2 times daily To be taken with opioid (narcotic) pain medication to prevent constipation. 60 tablet 1     Allergies   Allergen Reactions     Lisinopril Cough     Milk Products GI Disturbance     Latex Rash     Recent Labs   Lab Test  05/04/18   0751  12/04/17   0831  08/13/17   1433  06/23/17   1542  01/06/17   0848   02/05/16   0902   12/23/15   0939   A1C  7.7*  7.2*   --   7.0*  6.9*   < >  8.6*   < >   --    LDL   --   51   --    --   63   --   41   < >   --    HDL   --   56   --    --   63   --   55   < >   --    TRIG   --   139   --    --   109   --   186*   < >   --    ALT   --   17  22   --   21   --    --    --    --    CR   --   0.90  0.92  0.89  0.93   < >   --    < >  2.62*   GFRESTIMATED   --   63  61  64  61   < >   --    < >  18*   GFRESTBLACK   --   76  74  77  73   < >   --    < >  22*   POTASSIUM   --   4.5  4.8  4.2  4.7   < >   --    < >  3.6   TSH   --    --    --    --   1.73   --    --    --   1.24    < > = values in this interval not displayed.        ROS:  Constitutional, HEENT, cardiovascular, pulmonary, GI, , musculoskeletal, neuro, skin,  "endocrine and psych systems are negative, except as otherwise noted.        OBJECTIVE:  /80  Pulse 73  Temp 99  F (37.2  C) (Oral)  Ht 5' 4\" (1.626 m)  SpO2 95%    EXAM:  GENERAL APPEARANCE: healthy, alert and no distress  In wheelchair today    ASSESSMENT AND PLAN  1. Type 2 diabetes mellitus with stage 3 chronic kidney disease, without long-term current use of insulin (H) -   Reasonably controlled still with a1c below 8, medications reviewed as above no changes.  On statin and arb.   - Hemoglobin A1c; Future  - Hemoglobin A1c    2. Anemia due to vitamin B12 deficiency, unspecified B12 deficiency type  Shot today and lab to evaluate.   - CBC with platelets; Future  - Vitamin B12; Future  - CBC with platelets  - Vitamin B12    3. Advanced directives, counseling/discussion  Spent more than 1/2 of 25 minute visit discussing advanced directive wishes/plans.  Desires to be DNR/DNI and also do not intubate in hospital, would not want artificial nutrition.  Would want hospitalization for IV fluids, antibiotics, etc which I feel is very reasonable.  She will fill out more extensive advanced directive form and bring back (here with  today).  We did do the POLST form and gave her a copy for herself as well.       Joel Wegener,MD        "

## 2018-05-25 DIAGNOSIS — E78.5 HYPERLIPIDEMIA LDL GOAL <70: ICD-10-CM

## 2018-05-25 NOTE — TELEPHONE ENCOUNTER
"Requested Prescriptions   Pending Prescriptions Disp Refills     atorvastatin (LIPITOR) 40 MG tablet [Pharmacy Med Name: ATORVASTATIN 40MG TABLETS]  Last Written Prescription Date:  12/4/2017  Last Fill Quantity: 90 tabs,  # refills: 3   Last office visit: 5/4/2018 with prescribing provider:  Wegener   Future Office Visit:   Next 5 appointments (look out 90 days)     Jun 01, 2018 10:00 AM CDT   Nurse Only with HP MEDICAL ASSISTANT   [facility] ([facility])    [address] [address]   [phone]                  90 tablet 0     Sig: TAKE 1 TABLET(40 MG) BY MOUTH DAILY    Statins Protocol Passed    5/25/2018  2:25 PM       Passed - LDL on file in past 12 months    Recent Labs   Lab Test  12/04/17   0831   LDL  51            Passed - No abnormal creatine kinase in past 12 months    No lab results found.            Passed - Recent (12 mo) or future (30 days) visit within the authorizing provider's specialty    Patient had office visit in the last 12 months or has a visit in the next 30 days with authorizing provider or within the authorizing provider's specialty.  See \"Patient Info\" tab in inbasket, or \"Choose Columns\" in Meds & Orders section of the refill encounter.           Passed - Patient is age 18 or older       Passed - No active pregnancy on record       Passed - No positive pregnancy test in past 12 months          "

## 2018-05-30 RX ORDER — ATORVASTATIN CALCIUM 40 MG/1
TABLET, FILM COATED ORAL
Qty: 0.1 TABLET | Refills: 0 | OUTPATIENT
Start: 2018-05-30

## 2018-06-01 ENCOUNTER — ALLIED HEALTH/NURSE VISIT (OUTPATIENT)
Dept: NURSING | Facility: CLINIC | Age: 68
End: 2018-06-01
Payer: MEDICARE

## 2018-06-01 DIAGNOSIS — D51.9 ANEMIA DUE TO VITAMIN B12 DEFICIENCY, UNSPECIFIED B12 DEFICIENCY TYPE: Primary | ICD-10-CM

## 2018-06-01 PROCEDURE — 96372 THER/PROPH/DIAG INJ SC/IM: CPT

## 2018-06-01 NOTE — MR AVS SNAPSHOT
After Visit Summary   6/1/2018    Addis Peña    MRN: [id number]           Patient Information     Date Of Birth          1950        Visit Information        Provider Department      6/1/2018 10:00 AM HP MEDICAL ASSISTANT [facility]        Today's Diagnoses     Anemia due to vitamin B12 deficiency, unspecified B12 deficiency type    -  1       Follow-ups after your visit        Your next 10 appointments already scheduled     Jun 29, 2018 10:00 AM CDT   Nurse Only with HP MEDICAL ASSISTANT   [facility] ([facility])    [address] [place] [id number] [phone]            Oct 12, 2018  7:40 AM CDT   Office Visit with Joel Daniel Irwin Wegener, MD   [facility] ([facility]    [address] [place] [address] [phone]           Bring a current list of meds and any records pertaining to this visit. For Physicals, please bring immunization records and any forms needing to be filled out. Please arrive 10 minutes early to complete paperwork.              Who to contact     If you have questions or need follow up information about today's clinic visit or your schedule please contact [facility] directly at [phone].  Normal or non-critical lab and imaging results will be communicated to you by [employer], letter or phone within 4 business days after the clinic has received the results. If you do not hear from us within 7 days, please contact the clinic through Jootat or phone. If you have a critical or abnormal lab result, we will notify you by phone as soon as possible.  Submit refill requests through [email or web address] or call your pharmacy and they will forward the refill request to us. Please allow 3 business days for your refill to be completed.          Additional Information About Your Visit        [email or web address] Information     [email or web address] lets you send messages to  "your doctor, view your test results, renew your prescriptions, schedule appointments and more. To sign up, go to [email or web address] . Click on \"Log in\" on the left side of the screen, which will take you to the Welcome page. Then click on \"Sign up Now\" on the right side of the page.     You will be asked to enter the access code listed below, as well as some personal information. Please follow the directions to create your username and password.     Your access code is: [email or web address]  Expires: 2018 10:44 AM     Your access code will  in 90 days. If you need help or a new code, please call your [place] clinic or [phone].        Care EveryWhere ID     This is your Care EveryWhere ID. This could be used by other organizations to access your [place] medical records  [id number]         Blood Pressure from Last 3 Encounters:   18 128/80   17 128/68   17 125/68    Weight from Last 3 Encounters:   17 165 lb (74.8 kg)   17 164 lb (74.4 kg)   17 155 lb (70.3 kg)              We Performed the Following     INJECTION INTRAMUSCULAR OR SUB-Q     VITAMIN B12 INJ /1000MCG        Primary Care Provider Office Phone # Fax #    Jesus Daniel Irwin Wegener, [phone]       [address] [address]        Equal Access to Services     [facility] : Hadii aad ku hadasho Soomaali, waaxda luqadaha, qaybta kaalmada miriamyaitzel, erica gibson . So [facility] [phone].    ATENCIÓN: Si habla torinañol, tiene a parr disposición servicios gratuitos de asistencia lingüística. Llame al [phone].    We comply with applicable federal civil rights laws and [place] laws. We do not discriminate on the basis of race, color, national origin, age, disability, sex, sexual orientation, or gender identity.            Thank you!     Thank you for choosing [facility]  for your care. Our goal is always to provide you with excellent " care. Hearing back from our patients is one way we can continue to improve our services. Please take a few minutes to complete the written survey that you may receive in the mail after your visit with us. Thank you!             Your Updated Medication List - Protect others around you: Learn how to safely use, store and throw away your medicines at www.disposemymeds.org.          This list is accurate as of 6/1/18  1:04 PM.  Always use your most recent med list.                   Brand Name Dispense Instructions for use Diagnosis    amLODIPine 10 MG tablet    NORVASC    90 tablet    Take 1 tablet (10 mg) by mouth daily    Hypertension goal BP (blood pressure) < 140/90       aspirin 81 MG tablet      Take 1 tablet by mouth daily.        atorvastatin 40 MG tablet    LIPITOR    90 tablet    Take 1 tablet (40 mg) by mouth daily    Hyperlipidemia LDL goal <70       baclofen 10 MG tablet    LIORESAL    270 tablet    Take 1 tablet (10 mg) by mouth 3 times daily    Cerebrovascular accident (CVA), unspecified mechanism (H)       blood glucose monitoring test strip    IRENE CONTOUR    200 each    Use to test blood sugars two times daily or as directed.    Type 2 diabetes mellitus with stage 3 chronic kidney disease, without long-term current use of insulin (H)       cholecalciferol 1000 UNIT tablet    vitamin D3     Take 1,000 Units by mouth daily        citalopram 20 MG tablet    celeXA    90 tablet    Take 1 tablet (20 mg) by mouth daily    Major depressive disorder, recurrent episode, mild (H)       cyanocobalamin 1000 MCG/ML injection    VITAMIN B12    1 mL    Inject 1 mL (1,000 mcg) into the muscle every 30 days Order for clinic injection,  No need to fill.    Anemia due to vitamin B12 deficiency, unspecified B12 deficiency type       dextromethorphan 15 MG/5ML syrup      Take 10 mLs by mouth 2 times daily as needed        Lactase 250 MG Caps      Take 1 chew tab by mouth as needed (with dairy)        losartan 25 MG  tablet    COZAAR    90 tablet    Take 1 tablet (25 mg) by mouth daily    Hypertension goal BP (blood pressure) < 140/90       metFORMIN 1000 MG tablet    GLUCOPHAGE    180 tablet    Take 1 tablet (1,000 mg) by mouth 2 times daily (with meals)    Type 2 diabetes mellitus with stage 3 chronic kidney disease, with long-term current use of insulin (H)       metoprolol succinate 50 MG 24 hr tablet    TOPROL-XL    90 tablet    Take 1 tablet (50 mg) by mouth daily    Hypertension, uncontrolled, Hypertension goal BP (blood pressure) < 140/90       montelukast 10 MG tablet    SINGULAIR    90 tablet    Take 1 tablet (10 mg) by mouth daily    Seasonal allergic rhinitis, unspecified chronicity, unspecified trigger       senna-docusate 8.6-50 MG per tablet    SENOKOT-S;PERICOLACE    60 tablet    Take 1 tablet by mouth 2 times daily To be taken with opioid (narcotic) pain medication to prevent constipation.    MARI (acute kidney injury) (H)       TYLENOL 500 MG tablet   Generic drug:  acetaminophen      Take 500 mg by mouth 2 times daily

## 2018-06-01 NOTE — NURSING NOTE
The following medication was given:     MEDICATION: Vitamin B12  1,000 mcg  ROUTE: IM  SITE: Deltoid - Left  DOSE: 1 ml  LOT #: [id number]  :  [employer]  EXPIRATION DATE:  10/2019  NDC: 7764-2073-51  Bertha Duque MA

## 2018-06-29 ENCOUNTER — ALLIED HEALTH/NURSE VISIT (OUTPATIENT)
Dept: NURSING | Facility: CLINIC | Age: 68
End: 2018-06-29
Payer: MEDICARE

## 2018-06-29 DIAGNOSIS — E53.8 VITAMIN B12 DEFICIENCY (NON ANEMIC): Primary | ICD-10-CM

## 2018-06-29 PROCEDURE — 96372 THER/PROPH/DIAG INJ SC/IM: CPT

## 2018-06-29 NOTE — MR AVS SNAPSHOT
After Visit Summary   6/29/2018    Addis Peña    MRN: [id number]           Patient Information     Date Of Birth          1950        Visit Information        Provider Department      6/29/2018 10:00 AM [facility] MEDICAL ASSISTANT [facility]        Today's Diagnoses     Vitamin B12 deficiency (non anemic)    -  1       Follow-ups after your visit        Your next 10 appointments already scheduled     Aug 03, 2018 10:00 AM CDT   Nurse Only with [facility] MEDICAL ASSISTANT   [facility] ([facility])    [address] [place] [id number] [phone]            Oct 12, 2018  7:40 AM CDT   Office Visit with Joel Daniel Irwin Wegener, MD   [facility] ([facility])    [address] [place] [address] [phone]           Bring a current list of meds and any records pertaining to this visit. For Physicals, please bring immunization records and any forms needing to be filled out. Please arrive 10 minutes early to complete paperwork.              Who to contact     If you have questions or need follow up information about today's clinic visit or your schedule please contact [facility] directly at [phone].  Normal or non-critical lab and imaging results will be communicated to you by [employer], letter or phone within 4 business days after the clinic has received the results. If you do not hear from us within 7 days, please contact the clinic through [employer] or phone. If you have a critical or abnormal lab result, we will notify you by phone as soon as possible.  Submit refill requests through [employer] or call your pharmacy and they will forward the refill request to us. Please allow 3 business days for your refill to be completed.          Additional Information About Your Visit        Vokle Information     [employer]le lets you send messages to your doctor, view your test  "results, renew your prescriptions, schedule appointments and more. To sign up, go to [email or web address] . Click on \"Log in\" on the left side of the screen, which will take you to the Welcome page. Then click on \"Sign up Now\" on the right side of the page.     You will be asked to enter the access code listed below, as well as some personal information. Please follow the directions to create your username and password.     Your access code is: [email or web address]  Expires: 2018 10:44 AM     Your access code will  in 90 days. If you need help or a new code, please call your [place] clinic or [phone].        Care EveryWhere ID     This is your Care EveryWhere ID. This could be used by other organizations to access your [place] medical records  [id number]         Blood Pressure from Last 3 Encounters:   18 128/80   17 128/68   17 125/68    Weight from Last 3 Encounters:   17 165 lb (74.8 kg)   17 164 lb (74.4 kg)   17 155 lb (70.3 kg)              Today, you had the following     No orders found for display       Primary Care Provider Office Phone # Fax #    Joel Daniel Irwin Wegener, [phone] [address]  [facility] [address]        Equal Access to Services     [facility]: Hadii aad ku hadasho Soomaali, waaxda luqadaha, qaybta kaalmada adeegyada, waxay eduardo hayradhames gibson ah. So [facility] [phone].    ATENCIÓN: Si habla español, tiene a parr disposición servicios gratuitos de asistencia lingüística. Llame al [phone].    We comply with applicable federal civil rights laws and [place] laws. We do not discriminate on the basis of race, color, national origin, age, disability, sex, sexual orientation, or gender identity.            Thank you!     Thank you for choosing [facility]  for your care. Our goal is always to provide you with excellent care. Hearing back from our patients is one way we can " continue to improve our services. Please take a few minutes to complete the written survey that you may receive in the mail after your visit with us. Thank you!             Your Updated Medication List - Protect others around you: Learn how to safely use, store and throw away your medicines at www.disposemymeds.org.          This list is accurate as of 6/29/18 10:08 AM.  Always use your most recent med list.                   Brand Name Dispense Instructions for use Diagnosis    amLODIPine 10 MG tablet    NORVASC    90 tablet    Take 1 tablet (10 mg) by mouth daily    Hypertension goal BP (blood pressure) < 140/90       aspirin 81 MG tablet      Take 1 tablet by mouth daily.        atorvastatin 40 MG tablet    LIPITOR    90 tablet    Take 1 tablet (40 mg) by mouth daily    Hyperlipidemia LDL goal <70       baclofen 10 MG tablet    LIORESAL    270 tablet    Take 1 tablet (10 mg) by mouth 3 times daily    Cerebrovascular accident (CVA), unspecified mechanism (H)       blood glucose monitoring test strip    IRENE CONTOUR    200 each    Use to test blood sugars two times daily or as directed.    Type 2 diabetes mellitus with stage 3 chronic kidney disease, without long-term current use of insulin (H)       cholecalciferol 1000 UNIT tablet    vitamin D3     Take 1,000 Units by mouth daily        citalopram 20 MG tablet    celeXA    90 tablet    Take 1 tablet (20 mg) by mouth daily    Major depressive disorder, recurrent episode, mild (H)       cyanocobalamin 1000 MCG/ML injection    VITAMIN B12    1 mL    Inject 1 mL (1,000 mcg) into the muscle every 30 days Order for clinic injection,  No need to fill.    Anemia due to vitamin B12 deficiency, unspecified B12 deficiency type       dextromethorphan 15 MG/5ML syrup      Take 10 mLs by mouth 2 times daily as needed        Lactase 250 MG Caps      Take 1 chew tab by mouth as needed (with dairy)        losartan 25 MG tablet    COZAAR    90 tablet    Take 1 tablet (25 mg) by  mouth daily    Hypertension goal BP (blood pressure) < 140/90       metFORMIN 1000 MG tablet    GLUCOPHAGE    180 tablet    Take 1 tablet (1,000 mg) by mouth 2 times daily (with meals)    Type 2 diabetes mellitus with stage 3 chronic kidney disease, with long-term current use of insulin (H)       metoprolol succinate 50 MG 24 hr tablet    TOPROL-XL    90 tablet    Take 1 tablet (50 mg) by mouth daily    Hypertension, uncontrolled, Hypertension goal BP (blood pressure) < 140/90       montelukast 10 MG tablet    SINGULAIR    90 tablet    Take 1 tablet (10 mg) by mouth daily    Seasonal allergic rhinitis, unspecified chronicity, unspecified trigger       senna-docusate 8.6-50 MG per tablet    SENOKOT-S;PERICOLACE    60 tablet    Take 1 tablet by mouth 2 times daily To be taken with opioid (narcotic) pain medication to prevent constipation.    MARI (acute kidney injury) (H)       TYLENOL 500 MG tablet   Generic drug:  acetaminophen      Take 500 mg by mouth 2 times daily

## 2018-06-29 NOTE — NURSING NOTE
The following medication was given:     MEDICATION: Vitamin B12  1000mcg  ROUTE: IM  SITE: Deltoid - Right  DOSE: 1,000mcg  LOT #: [id number]  :  [employer]  EXPIRATION DATE:  10-1-19  NDC#:   1168-0474-24  Aneta Dueñas MA

## 2018-08-02 ENCOUNTER — TELEPHONE (OUTPATIENT)
Dept: FAMILY MEDICINE | Facility: CLINIC | Age: 68
End: 2018-08-02

## 2018-08-02 NOTE — TELEPHONE ENCOUNTER
The patient reports that she has gout in her left big toe and is wondering what foods to avoid as well as any home remedies to help with gout.

## 2018-08-02 NOTE — TELEPHONE ENCOUNTER
Per pt ed:    Gout is an inflammation of a joint due to a build-up of gout crystals in the joint fluid. This occurs when there is an excess of uric acid (a normal waste product) in the body. Uric acid builds up in the body when the kidneys are unable to filter enough of it from the blood. This may occur with age. It is also associated with kidney disease. Gout occurs more often in people with obesity, diabetes, high blood pressure, or high levels of fats in the blood. It may run in families. Gout tends to come and go. A flare up of gout is called an attack. Drinking alcohol or eating certain foods (such as shellfish or foods with additives such as high-fructose corn syrup) may increase uric acid levels in the blood and cause a gout attack.  During a gout attack, the affected joint may become a hot, red, swollen and painful. If you have had one attack of gout, you are likely to have another. An attack of gout can be treated with medicine. If these attacks become frequent, a daily medicine may be prescribed to help the kidneys remove uric acid from the body.  Home care  During a gout attack:    Rest painful joints. If gout affects the joints of your foot or leg, you may want to use crutches for the first few days to keep from bearing weight on the affected joint.    When sitting or lying down, raise the painful joint to a level higher than your heart.    Apply an ice pack (ice cubes in a plastic bag wrapped in a thin towel) over the injured area for 20 minutes every 1 to 2 hours the first day for pain relief. Continue this 3 to 4 times a day for swelling and pain.    Avoid alcohol and foods listed below (see Preventing attacks) during a gout attack. Drink extra fluid to help flush the uric acid through your kidneys.    If you were prescribed a medicine to treat gout, take it as your healthcare provider has instructed. Don't skip doses.    Take anti-inflammatory medicine as directed.     If pain medicines have been  prescribed, take them exactly as directed.    Preventing attacks    Minimize or avoid alcohol use. Excess alcohol intake can cause a gout attack.    Limit these foods and beverages:    Organ meats, such as kidneys and liver    Certain seafoods (anchovies, sardines, shrimp, scallops, herring, mackerel)    Wild game, meat extracts and meat gravies    Foods and beverages sweetened with high-fructose corn syrup, such as sodas    Eat a healthy diet including low-fat and nonfat dairy, whole grains, and vegetables.    If you are overweight, talk to your healthcare provider about a weight reduction plan. Avoid fasting or extreme low calorie diets (less than 900 calories per day). This will increase uric acid levels in the body.    If you have diabetes or high blood pressure, work with your doctor to manage these conditions.    Protect the joint from injury. Trauma can trigger a gout attack.  Follow-up care  Follow up with your healthcare provider, or as advised.   When to seek medical advice  Call your healthcare provider if you have any of the following:    Fever over 100.4 F (38. C) with worsening joint pain    Increasing redness around the joint    Pain developing in another joint    Repeated vomiting, abdominal pain, or blood in the vomit or stool (black or red color)  Date Last Reviewed: 3/1/2017     Pt has had sx for one week, no redness, pain goes up food and up leg. Not limping. Pt is in a limping, does hurt when she walks.     She wants to  this information above when she goes to  tomorrow for her B12 shot.    Pt was offered an appt but she declined    Carol Fisher, RN, BSN

## 2018-08-02 NOTE — TELEPHONE ENCOUNTER
HP RN,    Can you please print out this info below that is found in references as pt wants to pick it up at  of [facility] clinic tomorrow    Thanks,    Carol Fisher, RN, BSN

## 2018-08-03 ENCOUNTER — ALLIED HEALTH/NURSE VISIT (OUTPATIENT)
Dept: NURSING | Facility: CLINIC | Age: 68
End: 2018-08-03
Payer: MEDICARE

## 2018-08-03 DIAGNOSIS — D51.9 VITAMIN B12 DEFICIENCY ANEMIA: Primary | ICD-10-CM

## 2018-08-03 PROCEDURE — 96372 THER/PROPH/DIAG INJ SC/IM: CPT

## 2018-08-03 PROCEDURE — 99207 ZZC NO CHARGE NURSE ONLY: CPT

## 2018-08-03 NOTE — NURSING NOTE
The following medication was given:     MEDICATION: Vitamin B12  1000 mcg  ROUTE: IM  SITE: Deltoid - Left  DOSE: 1000 mcg/mL  LOT #: [id number]  :  [employer]  EXPIRATION DATE:  10/2019  NDC: 2600-8975-89  Verified by: PARAS Verduzco MA

## 2018-08-03 NOTE — MR AVS SNAPSHOT
After Visit Summary   8/3/2018    Addis Peña    MRN: [id number]           Patient Information     Date Of Birth          1950        Visit Information        Provider Department      8/3/2018 10:00 AM [facility] MEDICAL ASSISTANT [facility]        Today's Diagnoses     Vitamin B12 deficiency anemia    -  1       Follow-ups after your visit        Your next 10 appointments already scheduled     Aug 31, 2018 10:00 AM CDT   Nurse Only with HP MEDICAL ASSISTANT   [facility] ([facility])    [address] [place] [address] [phone]            Oct 12, 2018  7:40 AM CDT   Office Visit with Joel Daniel Irwin Wegener, MD   [facility] ([facility])    [address] [place] [address] [phone]           Bring a current list of meds and any records pertaining to this visit. For Physicals, please bring immunization records and any forms needing to be filled out. Please arrive 10 minutes early to complete paperwork.              Who to contact     If you have questions or need follow up information about today's clinic visit or your schedule please contact [facility] directly at [phone].  Normal or non-critical lab and imaging results will be communicated to you by MyChart, letter or phone within 4 business days after the clinic has received the results. If you do not hear from us within 7 days, please contact the clinic through MyChart or phone. If you have a critical or abnormal lab result, we will notify you by phone as soon as possible.  Submit refill requests through [employer] or call your pharmacy and they will forward the refill request to us. Please allow 3 business days for your refill to be completed.          Additional Information About Your Visit        Care EveryWhere ID     This is your Care EveryWhere ID. This could be used by other organizations to  access your [place] medical records  [id number]         Blood Pressure from Last 3 Encounters:   05/04/18 128/80   12/04/17 128/68   09/01/17 125/68    Weight from Last 3 Encounters:   12/04/17 165 lb (74.8 kg)   08/13/17 164 lb (74.4 kg)   01/06/17 155 lb (70.3 kg)              We Performed the Following     INJECTION INTRAMUSCULAR OR SUB-Q     VITAMIN B12 INJ /1000MCG        Primary Care Provider Office Phone # Fax #    Jesus Daniel Irwin Wegener, [phone] [address]  [facility] [address]        Equal Access to Services     SUYAPA PAPPAS : Hadii naz garcía hadstaro Sokhadar, waaxda luqadaha, qaybta kaalmada adehyunyada, erica gibson . So [facility] [phone].    ATENCIÓN: Si habla español, tiene a parr disposición servicios gratuitos de asistencia lingüística. Llame al [phone].    We comply with applicable federal civil rights laws and [place] laws. We do not discriminate on the basis of race, color, national origin, age, disability, sex, sexual orientation, or gender identity.            Thank you!     Thank you for choosing [facility]  for your care. Our goal is always to provide you with excellent care. Hearing back from our patients is one way we can continue to improve our services. Please take a few minutes to complete the written survey that you may receive in the mail after your visit with us. Thank you!             Your Updated Medication List - Protect others around you: Learn how to safely use, store and throw away your medicines at www.disposemymeds.org.          This list is accurate as of 8/3/18 10:32 AM.  Always use your most recent med list.                   Brand Name Dispense Instructions for use Diagnosis    amLODIPine 10 MG tablet    NORVASC    90 tablet    Take 1 tablet (10 mg) by mouth daily    Hypertension goal BP (blood pressure) < 140/90       aspirin 81 MG tablet      Take 1 tablet by mouth daily.         atorvastatin 40 MG tablet    LIPITOR    90 tablet    Take 1 tablet (40 mg) by mouth daily    Hyperlipidemia LDL goal <70       baclofen 10 MG tablet    LIORESAL    270 tablet    Take 1 tablet (10 mg) by mouth 3 times daily    Cerebrovascular accident (CVA), unspecified mechanism (H)       blood glucose monitoring test strip    IRENE CONTOUR    200 each    Use to test blood sugars two times daily or as directed.    Type 2 diabetes mellitus with stage 3 chronic kidney disease, without long-term current use of insulin (H)       cholecalciferol 1000 UNIT tablet    vitamin D3     Take 1,000 Units by mouth daily        citalopram 20 MG tablet    celeXA    90 tablet    Take 1 tablet (20 mg) by mouth daily    Major depressive disorder, recurrent episode, mild (H)       cyanocobalamin 1000 MCG/ML injection    VITAMIN B12    1 mL    Inject 1 mL (1,000 mcg) into the muscle every 30 days Order for clinic injection,  No need to fill.    Anemia due to vitamin B12 deficiency, unspecified B12 deficiency type       dextromethorphan 15 MG/5ML syrup      Take 10 mLs by mouth 2 times daily as needed        Lactase 250 MG Caps      Take 1 chew tab by mouth as needed (with dairy)        losartan 25 MG tablet    COZAAR    90 tablet    Take 1 tablet (25 mg) by mouth daily    Hypertension goal BP (blood pressure) < 140/90       metFORMIN 1000 MG tablet    GLUCOPHAGE    180 tablet    Take 1 tablet (1,000 mg) by mouth 2 times daily (with meals)    Type 2 diabetes mellitus with stage 3 chronic kidney disease, with long-term current use of insulin (H)       metoprolol succinate 50 MG 24 hr tablet    TOPROL-XL    90 tablet    Take 1 tablet (50 mg) by mouth daily    Hypertension, uncontrolled, Hypertension goal BP (blood pressure) < 140/90       montelukast 10 MG tablet    SINGULAIR    90 tablet    Take 1 tablet (10 mg) by mouth daily    Seasonal allergic rhinitis, unspecified chronicity, unspecified trigger       senna-docusate 8.6-50 MG per  tablet    SENOKOT-S;PERICOLACE    60 tablet    Take 1 tablet by mouth 2 times daily To be taken with opioid (narcotic) pain medication to prevent constipation.    MARI (acute kidney injury) (H)       TYLENOL 500 MG tablet   Generic drug:  acetaminophen      Take 500 mg by mouth 2 times daily

## 2018-08-26 ENCOUNTER — NURSE TRIAGE (OUTPATIENT)
Dept: NURSING | Facility: CLINIC | Age: 68
End: 2018-08-26

## 2018-08-26 ENCOUNTER — OFFICE VISIT (OUTPATIENT)
Dept: URGENT CARE | Facility: URGENT CARE | Age: 68
End: 2018-08-26
Payer: MEDICARE

## 2018-08-26 VITALS
WEIGHT: 168 LBS | TEMPERATURE: 98 F | DIASTOLIC BLOOD PRESSURE: 68 MMHG | OXYGEN SATURATION: 96 % | HEART RATE: 78 BPM | SYSTOLIC BLOOD PRESSURE: 135 MMHG | BODY MASS INDEX: 28.84 KG/M2

## 2018-08-26 DIAGNOSIS — R30.0 DYSURIA: Primary | ICD-10-CM

## 2018-08-26 PROCEDURE — 99214 OFFICE O/P EST MOD 30 MIN: CPT | Performed by: PHYSICIAN ASSISTANT

## 2018-08-26 RX ORDER — PHENAZOPYRIDINE HYDROCHLORIDE 200 MG/1
200 TABLET, FILM COATED ORAL 3 TIMES DAILY PRN
Qty: 6 TABLET | Refills: 0 | Status: SHIPPED | OUTPATIENT
Start: 2018-08-26 | End: 2018-08-28

## 2018-08-26 RX ORDER — CIPROFLOXACIN 250 MG/1
250 TABLET, FILM COATED ORAL 2 TIMES DAILY
Qty: 10 TABLET | Refills: 0 | Status: SHIPPED | OUTPATIENT
Start: 2018-08-26 | End: 2018-08-31

## 2018-08-26 NOTE — TELEPHONE ENCOUNTER
Buck is not with Addis so I called Addis to do a triage of symptoms then called Buck back to let him.know that she does need to be seen within the next 4 hours. They have an urgent care near them that he will bring her to for evaluation. I advised him to let them know she did take some Tylenol for her back pain, which would bring a fever down. She didn't have a thermometer to get her temperature.  Drea Dubon RN-[facility]      Reason for Disposition    Side (flank) or lower back pain present    Additional Information    Negative: Shock suspected (e.g., cold/pale/clammy skin, too weak to stand, low BP, rapid pulse)    Negative: Sounds like a life-threatening emergency to the triager    Negative: Followed a genital area injury    Negative: Taking antibiotic for urinary tract infection (UTI)    Negative: Pregnant    Negative: Postpartum < 1 month    Negative: [1] Unable to urinate (or only a few drops) > 4 hours AND     [2] bladder feels very full (e.g., palpable bladder or strong urge to urinate)    Negative: Patient sounds very sick or weak to the triager    Negative: [1] SEVERE pain with urination  (e.g., excruciating) AND [2] not improved after 2 hours of pain medicine and Sitz bath     Took some Tylenol for pain, 7 in back.    Negative: Fever > 100.5 F (38.1 C)    Protocols used: URINATION PAIN - FEMALE-ADULT-

## 2018-08-26 NOTE — MR AVS SNAPSHOT
After Visit Summary   8/26/2018    Addis Peña    MRN: [id number]           Patient Information     Date Of Birth          1950        Visit Information        Provider Department      8/26/2018 10:05 AM Syd Gutierrez PA-C [facility]        Today's Diagnoses     Dysuria    -  1       Follow-ups after your visit        Your next 10 appointments already scheduled     Aug 31, 2018 10:00 AM CDT   Nurse Only with HP MEDICAL ASSISTANT   [facility]    [address] [place] [address] [phone]            Oct 12, 2018  7:40 AM CDT   Office Visit with Joel Daniel Irwin Wegener, MD   [facility] ([facility])    [address] [place] [address] [phone]           Bring a current list of meds and any records pertaining to this visit. For Physicals, please bring immunization records and any forms needing to be filled out. Please arrive 10 minutes early to complete paperwork.              Who to contact     If you have questions or need follow up information about today's clinic visit or your schedule please contact [facility] directly at [phone].  Normal or non-critical lab and imaging results will be communicated to you by MyChart, letter or phone within 4 business days after the clinic has received the results. If you do not hear from us within 7 days, please contact the clinic through MyChart or phone. If you have a critical or abnormal lab result, we will notify you by phone as soon as possible.  Submit refill requests through statusboom or call your pharmacy and they will forward the refill request to us. Please allow 3 business days for your refill to be completed.          Additional Information About Your Visit        Care EveryWhere ID     This is your Care EveryWhere ID. This could be used by other organizations to access  your [facility] medical records  [id number]        Your Vitals Were     Pulse Temperature Pulse Oximetry BMI (Body Mass Index)          78 98  F (36.7  C) (Tympanic) 96% 28.84 kg/m2         Blood Pressure from Last 3 Encounters:   08/26/18 135/68   05/04/18 128/80   12/04/17 128/68    Weight from Last 3 Encounters:   08/26/18 168 lb (76.2 kg)   12/04/17 165 lb (74.8 kg)   08/13/17 164 lb (74.4 kg)              We Performed the Following     *UA reflex to Microscopic and Culture ([facility] (except [place] and [place])          Today's Medication Changes          These changes are accurate as of 8/26/18 10:45 AM.  If you have any questions, ask your nurse or doctor.               Start taking these medicines.        Dose/Directions    ciprofloxacin 250 MG tablet   Commonly known as:  CIPRO   Used for:  Dysuria   Started by:  Syd Gutierrez PA-C        Dose:  250 mg   Take 1 tablet (250 mg) by mouth 2 times daily for 5 days   Quantity:  10 tablet   Refills:  0       phenazopyridine 200 MG tablet   Commonly known as:  PYRIDIUM   Used for:  Dysuria   Started by:  Syd Gutierrez PA-C        Dose:  200 mg   Take 1 tablet (200 mg) by mouth 3 times daily as needed for irritation   Quantity:  6 tablet   Refills:  0            Where to get your medicines      These medications were sent to [facility] [id number]  [place] - [address] AT [facility]  [address] [address]     Phone:  [phone]     ciprofloxacin 250 MG tablet    phenazopyridine 200 MG tablet                Primary Care Provider Office Phone # Fax #    Joel Daniel Irwin Wegener, [phone] [address] [place] [address]        Equal Access to Services     SUYAPA PAPPAS AH: Mariely Mccarthy, waalanada lurovertoadaha, qaybta kaalmada juan j, erica kelly. [facility] [phone].    ATENCIÓN: Si habla español, tiene a parr disposición servicios  ronny de asistencia lingüística. Javy hayden [phone].    We comply with applicable federal civil rights laws and [place] laws. We do not discriminate on the basis of race, color, national origin, age, disability, sex, sexual orientation, or gender identity.            Thank you!     Thank you for choosing [facility]  for your care. Our goal is always to provide you with excellent care. Hearing back from our patients is one way we can continue to improve our services. Please take a few minutes to complete the written survey that you may receive in the mail after your visit with us. Thank you!             Your Updated Medication List - Protect others around you: Learn how to safely use, store and throw away your medicines at www.disposemymeds.org.          This list is accurate as of 8/26/18 10:45 AM.  Always use your most recent med list.                   Brand Name Dispense Instructions for use Diagnosis    amLODIPine 10 MG tablet    NORVASC    90 tablet    Take 1 tablet (10 mg) by mouth daily    Hypertension goal BP (blood pressure) < 140/90       aspirin 81 MG tablet      Take 1 tablet by mouth daily.        atorvastatin 40 MG tablet    LIPITOR    90 tablet    Take 1 tablet (40 mg) by mouth daily    Hyperlipidemia LDL goal <70       baclofen 10 MG tablet    LIORESAL    270 tablet    Take 1 tablet (10 mg) by mouth 3 times daily    Cerebrovascular accident (CVA), unspecified mechanism (H)       blood glucose monitoring test strip    IRENE CONTOUR    200 each    Use to test blood sugars two times daily or as directed.    Type 2 diabetes mellitus with stage 3 chronic kidney disease, without long-term current use of insulin (H)       cholecalciferol 1000 UNIT tablet    vitamin D3     Take 1,000 Units by mouth daily        ciprofloxacin 250 MG tablet    CIPRO    10 tablet    Take 1 tablet (250 mg) by mouth 2 times daily for 5 days    Dysuria       citalopram 20 MG tablet    celeXA    90  tablet    Take 1 tablet (20 mg) by mouth daily    Major depressive disorder, recurrent episode, mild (H)       cyanocobalamin 1000 MCG/ML injection    VITAMIN B12    1 mL    Inject 1 mL (1,000 mcg) into the muscle every 30 days Order for clinic injection,  No need to fill.    Anemia due to vitamin B12 deficiency, unspecified B12 deficiency type       dextromethorphan 15 MG/5ML syrup      Take 10 mLs by mouth 2 times daily as needed        Lactase 250 MG Caps      Take 1 chew tab by mouth as needed (with dairy)        losartan 25 MG tablet    COZAAR    90 tablet    Take 1 tablet (25 mg) by mouth daily    Hypertension goal BP (blood pressure) < 140/90       metFORMIN 1000 MG tablet    GLUCOPHAGE    180 tablet    Take 1 tablet (1,000 mg) by mouth 2 times daily (with meals)    Type 2 diabetes mellitus with stage 3 chronic kidney disease, with long-term current use of insulin (H)       metoprolol succinate 50 MG 24 hr tablet    TOPROL-XL    90 tablet    Take 1 tablet (50 mg) by mouth daily    Hypertension, uncontrolled, Hypertension goal BP (blood pressure) < 140/90       montelukast 10 MG tablet    SINGULAIR    90 tablet    Take 1 tablet (10 mg) by mouth daily    Seasonal allergic rhinitis, unspecified chronicity, unspecified trigger       phenazopyridine 200 MG tablet    PYRIDIUM    6 tablet    Take 1 tablet (200 mg) by mouth 3 times daily as needed for irritation    Dysuria       senna-docusate 8.6-50 MG per tablet    SENOKOT-S;PERICOLACE    60 tablet    Take 1 tablet by mouth 2 times daily To be taken with opioid (narcotic) pain medication to prevent constipation.    MARI (acute kidney injury) (H)       TYLENOL 500 MG tablet   Generic drug:  acetaminophen      Take 500 mg by mouth 2 times daily

## 2018-08-26 NOTE — PROGRESS NOTES
SUBJECTIVE:   Addis Peña is a 67 year old female who  presents today for a possible UTI. Symptoms of dysuria, urgency, frequency, suprapubic pain and pressure and back pain have been going on for 9hour(s).  Hematuria no.  sudden onsetand severe.  There is no history of fever, chills, nausea or vomiting.  No history of vaginal  discharge. This patient does  have a history of urinary tract infections. Patient denies long duration, rigors, flank pain and temperature > 101 degrees F. 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       atorvastatin (LIPITOR) 40 MG tablet Take 1 tablet (40 mg) by mouth daily 90 tablet 3     baclofen (LIORESAL) 10 MG tablet Take 1 tablet (10 mg) by mouth 3 times daily 270 tablet 3     blood glucose monitoring (IRENE CONTOUR) test strip Use to test blood sugars two times daily or as directed. 200 each 11     Cholecalciferol (VITAMIN D3) 1000 UNIT TABS Take 1,000 Units by mouth daily        citalopram (CELEXA) 20 MG tablet Take 1 tablet (20 mg) by mouth daily 90 tablet 3     cyanocobalamin (VITAMIN B12) 1000 MCG/ML injection Inject 1 mL (1,000 mcg) into the muscle every 30 days Order for clinic injection,  No need to fill. 1 mL  11     dextromethorphan 15 MG/5ML syrup Take 10 mLs by mouth 2 times daily as needed        Lactase 250 MG CAPS Take 1 chew tab by mouth as needed (with dairy)        losartan (COZAAR) 25 MG tablet Take 1 tablet (25 mg) by mouth daily 90 tablet 3     metFORMIN (GLUCOPHAGE) 1000 MG tablet Take 1 tablet (1,000 mg) by mouth 2 times daily (with meals) 180 tablet 3     metoprolol (TOPROL-XL) 50 MG 24 hr tablet Take 1 tablet (50 mg) by mouth daily 90 tablet 3     montelukast (SINGULAIR) 10 MG tablet Take 1 tablet (10 mg) by mouth daily 90 tablet 3     senna-docusate (SENOKOT-S;PERICOLACE) 8.6-50 MG per tablet Take 1 tablet by mouth 2 times daily To be taken with opioid (narcotic) pain medication to prevent constipation. 60 tablet 1     Social History   Substance Use Topics     Smoking status: Never Smoker     Smokeless tobacco: Never Used     Alcohol use No       ROS:   CONSTITUTIONAL:NEGATIVE for fever, chills, change in weight  : dysuria and frequency     OBJECTIVE:  /68 (BP Location: Right arm)  Pulse 78  Temp 98  F (36.7  C) (Tympanic)  Wt 168 lb (76.2 kg)  SpO2 96%  BMI 28.84 kg/m2  GENERAL APPEARANCE: healthy, alert and no distress  ABDOMEN:  soft, nontender, no HSM or masses and bowel sounds normal  BACK: No CVA tenderness  SKIN: no suspicious lesions or rashes    ASSESSMENT:     1. Dysuria    - ciprofloxacin (CIPRO) 250 MG tablet; Take 1 tablet (250 mg) by mouth 2 times daily for 5 days  Dispense: 10 tablet; Refill: 0  - phenazopyridine (PYRIDIUM) 200 MG tablet; Take 1 tablet (200 mg) by mouth 3 times daily as needed for irritation  Dispense: 6 tablet; Refill: 0    PLAN:  As per ordered above  Drink plenty of fluids.  Prevention and treatment of UTI's discussed.Signs and symptoms of pyelonephritis mentioned.  Follow up with primary care physician if not improving over the next 3 days.

## 2018-08-31 ENCOUNTER — ALLIED HEALTH/NURSE VISIT (OUTPATIENT)
Dept: NURSING | Facility: CLINIC | Age: 68
End: 2018-08-31
Payer: MEDICARE

## 2018-08-31 DIAGNOSIS — E53.8 VITAMIN B12 DEFICIENCY (NON ANEMIC): Primary | ICD-10-CM

## 2018-08-31 PROCEDURE — 96372 THER/PROPH/DIAG INJ SC/IM: CPT

## 2018-08-31 PROCEDURE — 99207 ZZC NO CHARGE NURSE ONLY: CPT

## 2018-08-31 NOTE — NURSING NOTE
>> MASHA KAUFFMAN   Fri Aug 31, 2018 10:28 AM  The following medication was given:     MEDICATION: Vitamin B12  1000 mcg  ROUTE: IM  SITE: Deltoid - Left  DOSE: 1000 mcg/ml  LOT #: [id number]  :  [employer]  EXPIRATION DATE:  10/2019  NDC#: 3802-1194-53      Pt was 2 days early, ok to give per Dr. Wegener

## 2018-08-31 NOTE — MR AVS SNAPSHOT
After Visit Summary   8/31/2018    Addis Peña    MRN: [id number]           Patient Information     Date Of Birth          1950        Visit Information        Provider Department      8/31/2018 10:00 AM HP MEDICAL ASSISTANT [facility]        Today's Diagnoses     Vitamin B12 deficiency (non anemic)    -  1       Follow-ups after your visit        Your next 10 appointments already scheduled     Sep 28, 2018 10:00 AM CDT   Nurse Only with HP MEDICAL ASSISTANT   [facility]    [address] [place] [address] [phone]            Oct 12, 2018  7:40 AM CDT   Office Visit with Joel Daniel Irwin Wegener, MD   [facility] ([facility])    [address] [place] [address] [phone]           Bring a current list of meds and any records pertaining to this visit. For Physicals, please bring immunization records and any forms needing to be filled out. Please arrive 10 minutes early to complete paperwork.              Who to contact     If you have questions or need follow up information about today's clinic visit or your schedule please contact [facility] directly at [phone].  Normal or non-critical lab and imaging results will be communicated to you by MyChart, letter or phone within 4 business days after the clinic has received the results. If you do not hear from us within 7 days, please contact the clinic through MyChart or phone. If you have a critical or abnormal lab result, we will notify you by phone as soon as possible.  Submit refill requests through Fuze or call your pharmacy and they will forward the refill request to us. Please allow 3 business days for your refill to be completed.          Additional Information About Your Visit        Care EveryWhere ID     This is your Care EveryWhere ID. This could be used by other  organizations to access your [facility] medical records  [id number]         Blood Pressure from Last 3 Encounters:   08/26/18 135/68   05/04/18 128/80   12/04/17 128/68    Weight from Last 3 Encounters:   08/26/18 168 lb (76.2 kg)   12/04/17 165 lb (74.8 kg)   08/13/17 164 lb (74.4 kg)              We Performed the Following     INJECTION INTRAMUSCULAR OR SUB-Q     VITAMIN B12 INJ /1000MCG        Primary Care Provider Office Phone # Fax #    Jesus Daniel Irwin Wegener, [phone] [address]  [facility] [address]        Equal Access to Services     SUYAPA PAPPAS : Hadii naz wheatleyo Sokhadar, waaxda luqadaha, qaybta kaalmada adehyunyada, erica kelly. So [facility] [phone].    ATENCIÓN: Si habla español, tiene a parr disposición servicios gratuitos de asistencia lingüística. Llame al [phone].    We comply with applicable federal civil rights laws and [place] laws. We do not discriminate on the basis of race, color, national origin, age, disability, sex, sexual orientation, or gender identity.            Thank you!     Thank you for choosing [facility]  for your care. Our goal is always to provide you with excellent care. Hearing back from our patients is one way we can continue to improve our services. Please take a few minutes to complete the written survey that you may receive in the mail after your visit with us. Thank you!             Your Updated Medication List - Protect others around you: Learn how to safely use, store and throw away your medicines at www.disposemymeds.org.          This list is accurate as of 8/31/18 10:31 AM.  Always use your most recent med list.                   Brand Name Dispense Instructions for use Diagnosis    amLODIPine 10 MG tablet    NORVASC    90 tablet    Take 1 tablet (10 mg) by mouth daily    Hypertension goal BP (blood pressure) < 140/90       aspirin 81 MG tablet      Take 1 tablet by mouth daily.         atorvastatin 40 MG tablet    LIPITOR    90 tablet    Take 1 tablet (40 mg) by mouth daily    Hyperlipidemia LDL goal <70       baclofen 10 MG tablet    LIORESAL    270 tablet    Take 1 tablet (10 mg) by mouth 3 times daily    Cerebrovascular accident (CVA), unspecified mechanism (H)       blood glucose monitoring test strip    IRENE CONTOUR    200 each    Use to test blood sugars two times daily or as directed.    Type 2 diabetes mellitus with stage 3 chronic kidney disease, without long-term current use of insulin (H)       cholecalciferol 1000 UNIT tablet    vitamin D3     Take 1,000 Units by mouth daily        ciprofloxacin 250 MG tablet    CIPRO    10 tablet    Take 1 tablet (250 mg) by mouth 2 times daily for 5 days    Dysuria       citalopram 20 MG tablet    celeXA    90 tablet    Take 1 tablet (20 mg) by mouth daily    Major depressive disorder, recurrent episode, mild (H)       cyanocobalamin 1000 MCG/ML injection    VITAMIN B12    1 mL    Inject 1 mL (1,000 mcg) into the muscle every 30 days Order for clinic injection,  No need to fill.    Anemia due to vitamin B12 deficiency, unspecified B12 deficiency type       dextromethorphan 15 MG/5ML syrup      Take 10 mLs by mouth 2 times daily as needed        Lactase 250 MG Caps      Take 1 chew tab by mouth as needed (with dairy)        losartan 25 MG tablet    COZAAR    90 tablet    Take 1 tablet (25 mg) by mouth daily    Hypertension goal BP (blood pressure) < 140/90       metFORMIN 1000 MG tablet    GLUCOPHAGE    180 tablet    Take 1 tablet (1,000 mg) by mouth 2 times daily (with meals)    Type 2 diabetes mellitus with stage 3 chronic kidney disease, with long-term current use of insulin (H)       metoprolol succinate 50 MG 24 hr tablet    TOPROL-XL    90 tablet    Take 1 tablet (50 mg) by mouth daily    Hypertension, uncontrolled, Hypertension goal BP (blood pressure) < 140/90       montelukast 10 MG tablet    SINGULAIR    90 tablet    Take 1 tablet  (10 mg) by mouth daily    Seasonal allergic rhinitis, unspecified chronicity, unspecified trigger       senna-docusate 8.6-50 MG per tablet    SENOKOT-S;PERICOLACE    60 tablet    Take 1 tablet by mouth 2 times daily To be taken with opioid (narcotic) pain medication to prevent constipation.    MARI (acute kidney injury) (H)       TYLENOL 500 MG tablet   Generic drug:  acetaminophen      Take 500 mg by mouth 2 times daily

## 2018-09-28 ENCOUNTER — ALLIED HEALTH/NURSE VISIT (OUTPATIENT)
Dept: NURSING | Facility: CLINIC | Age: 68
End: 2018-09-28
Payer: MEDICARE

## 2018-09-28 DIAGNOSIS — D51.9 ANEMIA DUE TO VITAMIN B12 DEFICIENCY, UNSPECIFIED B12 DEFICIENCY TYPE: Primary | ICD-10-CM

## 2018-09-28 PROCEDURE — 96372 THER/PROPH/DIAG INJ SC/IM: CPT

## 2018-09-28 PROCEDURE — 99207 ZZC NO CHARGE NURSE ONLY: CPT

## 2018-09-28 NOTE — MR AVS SNAPSHOT
After Visit Summary   9/28/2018    Addis Peña    MRN: [id number]           Patient Information     Date Of Birth          1950        Visit Information        Provider Department      9/28/2018 10:00 AM HP MEDICAL ASSISTANT [facility]        Today's Diagnoses     Anemia due to vitamin B12 deficiency, unspecified B12 deficiency type    -  1       Follow-ups after your visit        Your next 10 appointments already scheduled     Oct 12, 2018  7:40 AM CDT   [facility] Visit with Joel Daniel Irwin Wegener, [facility]   [facility]    [address] [place] [address] [phone]           Bring a current list of meds and any records pertaining to this visit. For Physicals, please bring immunization records and any forms needing to be filled out. Please arrive 10 minutes early to complete paperwork.            Oct 26, 2018 10:00 AM CDT   Nurse Only with HP MEDICAL ASSISTANT   [facility] ([facility])    [address] [place] [address] [phone]              Who to contact     If you have questions or need follow up information about today's clinic visit or your schedule please contact [facility] directly at [phone].  Normal or non-critical lab and imaging results will be communicated to you by MyChart, letter or phone within 4 business days after the clinic has received the results. If you do not hear from us within 7 days, please contact the clinic through MyChart or phone. If you have a critical or abnormal lab result, we will notify you by phone as soon as possible.  Submit refill requests through [employer] or call your pharmacy and they will forward the refill request to us. Please allow 3 business days for your refill to be completed.          Additional Information About Your Visit        Care EveryWhere ID     This is your Care EveryWhere ID.  This could be used by other organizations to access your [place] medical records  [id number]         Blood Pressure from Last 3 Encounters:   08/26/18 135/68   05/04/18 128/80   12/04/17 128/68    Weight from Last 3 Encounters:   08/26/18 168 lb (76.2 kg)   12/04/17 165 lb (74.8 kg)   08/13/17 164 lb (74.4 kg)              We Performed the Following     INJECTION INTRAMUSCULAR OR SUB-Q     VITAMIN B12 INJ /1000MCG        Primary Care Provider Office Phone # Fax #    Jesus Daniel Irwin Wegener, [phone]  [place] [address]        Equal Access to Services     SUYAPA PAPPAS : Hadii naz wheatleyo Sokhadar, waaxda luqadaha, qaybta kaalmada adeegyada, erica gibson . So [facility] [phone].    ATENCIÓN: Si habla español, tiene a parr disposición servicios gratuitos de asistencia lingüística. Llame al [phone].    We comply with applicable federal civil rights laws and [place] laws. We do not discriminate on the basis of race, color, national origin, age, disability, sex, sexual orientation, or gender identity.            Thank you!     Thank you for choosing [facility]  for your care. Our goal is always to provide you with excellent care. Hearing back from our patients is one way we can continue to improve our services. Please take a few minutes to complete the written survey that you may receive in the mail after your visit with us. Thank you!             Your Updated Medication List - Protect others around you: Learn how to safely use, store and throw away your medicines at www.disposemymeds.org.          This list is accurate as of 9/28/18 10:10 AM.  Always use your most recent med list.                   Brand Name Dispense Instructions for use Diagnosis    amLODIPine 10 MG tablet    NORVASC    90 tablet    Take 1 tablet (10 mg) by mouth daily    Hypertension goal BP (blood pressure) < 140/90       aspirin 81 MG tablet       Take 1 tablet by mouth daily.        atorvastatin 40 MG tablet    LIPITOR    90 tablet    Take 1 tablet (40 mg) by mouth daily    Hyperlipidemia LDL goal <70       baclofen 10 MG tablet    LIORESAL    270 tablet    Take 1 tablet (10 mg) by mouth 3 times daily    Cerebrovascular accident (CVA), unspecified mechanism (H)       blood glucose monitoring test strip    IRENE CONTOUR    200 each    Use to test blood sugars two times daily or as directed.    Type 2 diabetes mellitus with stage 3 chronic kidney disease, without long-term current use of insulin (H)       cholecalciferol 1000 UNIT tablet    vitamin D3     Take 1,000 Units by mouth daily        citalopram 20 MG tablet    celeXA    90 tablet    Take 1 tablet (20 mg) by mouth daily    Major depressive disorder, recurrent episode, mild (H)       cyanocobalamin 1000 MCG/ML injection    VITAMIN B12    1 mL    Inject 1 mL (1,000 mcg) into the muscle every 30 days Order for clinic injection,  No need to fill.    Anemia due to vitamin B12 deficiency, unspecified B12 deficiency type       dextromethorphan 15 MG/5ML syrup      Take 10 mLs by mouth 2 times daily as needed        Lactase 250 MG Caps      Take 1 chew tab by mouth as needed (with dairy)        losartan 25 MG tablet    COZAAR    90 tablet    Take 1 tablet (25 mg) by mouth daily    Hypertension goal BP (blood pressure) < 140/90       metFORMIN 1000 MG tablet    GLUCOPHAGE    180 tablet    Take 1 tablet (1,000 mg) by mouth 2 times daily (with meals)    Type 2 diabetes mellitus with stage 3 chronic kidney disease, with long-term current use of insulin (H)       metoprolol succinate 50 MG 24 hr tablet    TOPROL-XL    90 tablet    Take 1 tablet (50 mg) by mouth daily    Hypertension, uncontrolled, Hypertension goal BP (blood pressure) < 140/90       montelukast 10 MG tablet    SINGULAIR    90 tablet    Take 1 tablet (10 mg) by mouth daily    Seasonal allergic rhinitis, unspecified chronicity, unspecified trigger        senna-docusate 8.6-50 MG per tablet    SENOKOT-S;PERICOLACE    60 tablet    Take 1 tablet by mouth 2 times daily To be taken with opioid (narcotic) pain medication to prevent constipation.    MARI (acute kidney injury) (H)       TYLENOL 500 MG tablet   Generic drug:  acetaminophen      Take 500 mg by mouth 2 times daily

## 2018-09-28 NOTE — NURSING NOTE
The following medication was given:     MEDICATION: Vitamin B12  1000mcg  ROUTE: IM  SITE: Deltoid - Left  DOSE: 1000mcg  LOT #: [id number]  :  [employer]  EXPIRATION DATE:  6-1-20  NDC#: 77160-153-18  Aneta Dueñas MA

## 2018-10-12 ENCOUNTER — OFFICE VISIT (OUTPATIENT)
Dept: FAMILY MEDICINE | Facility: CLINIC | Age: 68
End: 2018-10-12
Payer: MEDICARE

## 2018-10-12 VITALS
DIASTOLIC BLOOD PRESSURE: 78 MMHG | RESPIRATION RATE: 18 BRPM | BODY MASS INDEX: 28.84 KG/M2 | HEIGHT: 64 IN | SYSTOLIC BLOOD PRESSURE: 132 MMHG | OXYGEN SATURATION: 98 % | TEMPERATURE: 99.3 F | HEART RATE: 62 BPM

## 2018-10-12 DIAGNOSIS — M19.072 OSTEOARTHRITIS OF JOINTS OF TOES OF FEET, BILATERAL: ICD-10-CM

## 2018-10-12 DIAGNOSIS — M19.071 OSTEOARTHRITIS OF JOINTS OF TOES OF FEET, BILATERAL: ICD-10-CM

## 2018-10-12 DIAGNOSIS — E11.22 TYPE 2 DIABETES MELLITUS WITH STAGE 3 CHRONIC KIDNEY DISEASE, WITHOUT LONG-TERM CURRENT USE OF INSULIN (H): Primary | ICD-10-CM

## 2018-10-12 DIAGNOSIS — N18.30 TYPE 2 DIABETES MELLITUS WITH STAGE 3 CHRONIC KIDNEY DISEASE, WITHOUT LONG-TERM CURRENT USE OF INSULIN (H): Primary | ICD-10-CM

## 2018-10-12 LAB
ANION GAP SERPL CALCULATED.3IONS-SCNC: 9 MMOL/L (ref 3–14)
BUN SERPL-MCNC: 20 MG/DL (ref 7–30)
CALCIUM SERPL-MCNC: 9.4 MG/DL (ref 8.5–10.1)
CHLORIDE SERPL-SCNC: 108 MMOL/L (ref 94–109)
CO2 SERPL-SCNC: 23 MMOL/L (ref 20–32)
CREAT SERPL-MCNC: 0.81 MG/DL (ref 0.52–1.04)
GFR SERPL CREATININE-BSD FRML MDRD: 71 ML/MIN/1.7M2
GLUCOSE SERPL-MCNC: 159 MG/DL (ref 70–99)
HBA1C MFR BLD: 7.8 % (ref 0–5.6)
POTASSIUM SERPL-SCNC: 4 MMOL/L (ref 3.4–5.3)
SODIUM SERPL-SCNC: 140 MMOL/L (ref 133–144)

## 2018-10-12 PROCEDURE — 80048 BASIC METABOLIC PNL TOTAL CA: CPT | Performed by: FAMILY MEDICINE

## 2018-10-12 PROCEDURE — 36415 COLL VENOUS BLD VENIPUNCTURE: CPT | Performed by: FAMILY MEDICINE

## 2018-10-12 PROCEDURE — 99213 OFFICE O/P EST LOW 20 MIN: CPT | Performed by: FAMILY MEDICINE

## 2018-10-12 PROCEDURE — 83036 HEMOGLOBIN GLYCOSYLATED A1C: CPT | Performed by: FAMILY MEDICINE

## 2018-10-12 NOTE — PROGRESS NOTES
SUBJECTIVE:   Addis Peña is a 67 year old female who presents to clinic today for the following health issues:      Diabetes Follow-up    Patient is checking blood sugars: twice daily.    Blood sugar testing frequency justification: On insulin, frequency appropriate   Results are as follows:         am - pm    Diabetic concerns: None     Symptoms of hypoglycemia (low blood sugar): none     Paresthesias (numbness or burning in feet) or sores: No     Date of last diabetic eye exam: 09/2017    BP Readings from Last 2 Encounters:   08/26/18 135/68   05/04/18 128/80     Hemoglobin A1C (%)   Date Value   05/04/2018 7.7 (H)   12/04/2017 7.2 (H)     LDL Cholesterol Calculated (mg/dL)   Date Value   12/04/2017 51   01/06/2017 63       Diabetes Management Resources    Amount of exercise or physical activity: None    Problems taking medications regularly: No    Medication side effects: none    Diet: diabetic           Type 2 diabetes mellitus with stage 3 chronic kidney disease, without long-term current use of insulin (H)  Osteoarthritis of joints of toes of feet, bilateral :no changes with diabetes that she can tell.  Concerned about gout in toes.  Several toes, painful under toe pads.  They do not get warm or red.  Just ache sometimes.  Does not interfere with anything.  No sores.         Problem list, Medication list, Allergies, and Medical/Social/Surgical histories reviewed in [facility] and updated as appropriate.  Labs reviewed in [facility]  BP Readings from Last 3 Encounters:   10/12/18 132/78   08/26/18 135/68   05/04/18 128/80    Wt Readings from Last 3 Encounters:   08/26/18 168 lb (76.2 kg)   12/04/17 165 lb (74.8 kg)   08/13/17 164 lb (74.4 kg)                  Patient Active Problem List   Diagnosis     Hypertension goal BP (blood pressure) < 140/90     Type 2 diabetes, HbA1C goal < 8% (H)     Rosacea     Cerebral artery occlusion with cerebral infarction (H)     Mild major depression (H)     GERD (gastroesophageal  reflux disease)     Seasonal allergic rhinitis     Advanced directives, counseling/discussion     History of colonic polyps     Major depression in complete remission (H)     B12 deficiency anemia     Health Care Home     Hyperlipidemia LDL goal <70     Gout     Type 2 diabetes with stage 3 chronic kidney disease GFR 30-59 (H)     Altered mental status     MARI (acute kidney injury) (H)     Type 2 diabetes mellitus with stage 3 chronic kidney disease, without long-term current use of insulin (H)     Cervical cancer screening     ACE-inhibitor cough     History of stroke     Hemiplegia affecting right dominant side (H)     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 Father      Diabetes Sister      Hypertension Brother      Cancer Sister          Current Outpatient Prescriptions   Medication Sig Dispense Refill     acetaminophen (TYLENOL) 500 MG tablet Take 500 mg by mouth 2 times daily        amLODIPine (NORVASC) 10 MG tablet Take 1 tablet (10 mg) by mouth daily 90 tablet 3     aspirin 81 MG tablet Take 1 tablet by mouth daily.       atorvastatin (LIPITOR) 40 MG tablet Take 1 tablet (40 mg) by mouth daily 90 tablet 3     baclofen (LIORESAL) 10 MG tablet Take 1 tablet (10 mg) by mouth 3 times daily 270 tablet 3     blood glucose monitoring (IRENE CONTOUR) test strip Use to test blood sugars two times daily or as directed. 200 each 11     Cholecalciferol (VITAMIN D3) 1000 UNIT TABS  Take 1,000 Units by mouth daily        citalopram (CELEXA) 20 MG tablet Take 1 tablet (20 mg) by mouth daily 90 tablet 3     cyanocobalamin (VITAMIN B12) 1000 MCG/ML injection Inject 1 mL (1,000 mcg) into the muscle every 30 days Order for clinic injection,  No need to fill. 1 mL 11     dextromethorphan 15 MG/5ML syrup Take 10 mLs by mouth 2 times daily as needed        diclofenac (VOLTAREN) 1 % GEL topical gel Apply 2 grams to toes four times daily as needed using enclosed dosing card. 100 g 1     Lactase 250 MG CAPS Take 1 chew tab by mouth as needed (with dairy)        losartan (COZAAR) 25 MG tablet Take 1 tablet (25 mg) by mouth daily 90 tablet 3     metFORMIN (GLUCOPHAGE) 1000 MG tablet Take 1 tablet (1,000 mg) by mouth 2 times daily (with meals) 180 tablet 3     metoprolol (TOPROL-XL) 50 MG 24 hr tablet Take 1 tablet (50 mg) by mouth daily 90 tablet 3     montelukast (SINGULAIR) 10 MG tablet Take 1 tablet (10 mg) by mouth daily 90 tablet 3     senna-docusate (SENOKOT-S;PERICOLACE) 8.6-50 MG per tablet Take 1 tablet by mouth 2 times daily To be taken with opioid (narcotic) pain medication to prevent constipation. 60 tablet 1     Allergies   Allergen Reactions     Lisinopril Cough     Milk Products GI Disturbance     Latex Rash     Recent Labs   Lab Test  10/12/18   0747  05/04/18   0751  12/04/17   0831  08/13/17   1433   01/06/17   0848   02/05/16   0902   12/23/15   0939   A1C  7.8*  7.7*  7.2*   --    < >  6.9*   < >  8.6*   < >   --    LDL   --    --   51   --    --   63   --   41   < >   --    HDL   --    --   56   --    --   63   --   55   < >   --    TRIG   --    --   139   --    --   109   --   186*   < >   --    ALT   --    --   17  22   --   21   --    --    --    --    CR  0.81   --   0.90  0.92   < >  0.93   < >   --    < >  2.62*   GFRESTIMATED  71   --   63  61   < >  61   < >   --    < >  18*   GFRESTBLACK  86   --   76  74   < >  73   < >   --    < >  22*   POTASSIUM  4.0   --   4.5  4.8   < >   "4.7   < >   --    < >  3.6   TSH   --    --    --    --    --   1.73   --    --    --   1.24    < > = values in this interval not displayed.        ROS:  Constitutional, HEENT, cardiovascular, pulmonary, GI, , musculoskeletal, neuro, skin, endocrine and psych systems are negative, except as otherwise noted.        OBJECTIVE:  /78  Pulse 62  Temp 99.3  F (37.4  C) (Oral)  Resp 18  Ht 5' 4\" (1.626 m)  SpO2 98%  BMI 28.84 kg/m2    EXAM:  GENERAL APPEARANCE: healthy, alert and no distress  Bilateral diabetic monofilament foot exam normal some callous lateral great toes.  No redness/swelling.     ASSESSMENT AND PLAN  Patient Instructions   ASSESSMENT AND PLAN  1. Type 2 diabetes mellitus with stage 3 chronic kidney disease, without long-term current use of insulin (H)  [facility] flu shot [facility] scheduled.     a1c still at goal but slowly rising.     Follow up early spring (about 5-6 months).       - Hemoglobin A1c  - Basic metabolic panel    2. Osteoarthritis of joints of toes of feet, bilateral  Does not sound like gout.  Can use diclofenac gel prior to activity if aching, up to four times daily.       - diclofenac (VOLTAREN) 1 % GEL topical gel; Apply 2 grams to toes four times daily as needed using enclosed dosing card.  Dispense: 100 g; Refill: 1        MYCHART FOR ON-LINE CARE(VISITS), LABS, REFILLS, MESSAGING, ETC [email or web address] , [phone]    E-VISIT: click \"on-line care, then request e-visit\".  E-visits work well for following up on issues we have discussed in clinic previously which may need new prescriptions, new prescriptions or substantial discussion. These are always done by me (Dr. Wegener).     ONCARE VISIT:  [email or web address]  - we treat nearly 50 common conditions through on-care.  These are done in an hour by on-call staff.     RADIOLOGY:  [facility]:  [phone]   [facility]: [phone]    Mammogram and Colonoscopy Scheduling:  " [phone]    Smoking Cessation: www.[email or web address], 4-998-463-PLAN (9275)      CONSUMER PRICE LINE for estimates of test costs:  [phone]           Joel Wegener,MD

## 2018-10-12 NOTE — MR AVS SNAPSHOT
"              After Visit Summary   10/12/2018    Addis Peña    MRN: [id number]           Patient Information     Date Of Birth          1950        Visit Information        Provider Department      10/12/2018 7:40 AM Wegener, Joel Daniel Irwin, MD [facility]        Today's Diagnoses     Type 2 diabetes mellitus with stage 3 chronic kidney disease, without long-term current use of insulin (H)    -  1    Osteoarthritis of joints of toes of feet, bilateral          Care Instructions    ASSESSMENT AND PLAN  1. Type 2 diabetes mellitus with stage 3 chronic kidney disease, without long-term current use of insulin (H)  Having flu shot at [facility] scheduled.     a1c still at goal but slowly rising.     Follow up early spring (about 5-6 months).       - Hemoglobin A1c  - Basic metabolic panel    2. Osteoarthritis of joints of toes of feet, bilateral  Does not sound like gout.  Can use diclofenac gel prior to activity if aching, up to four times daily.       - diclofenac (VOLTAREN) 1 % GEL topical gel; Apply 2 grams to toes four times daily as needed using enclosed dosing card.  Dispense: 100 g; Refill: 1        MYCHART FOR ON-LINE CARE(VISITS), LABS, REFILLS, MESSAGING, ETC [email or web address] , [phone]    E-VISIT: click \"on-line care, then request e-visit\".  E-visits work well for following up on issues we have discussed in clinic previously which may need new prescriptions, new prescriptions or substantial discussion. These are always done by me (Dr. Wegener).     ONCARE VISIT:  [email or web address]  - we treat nearly 50 common conditions through on-care.  These are done in an hour by on-call staff.     RADIOLOGY:  [facility]:  [phone]   [facility]: [phone]    Mammogram and Colonoscopy [phone]    Smoking Cessation: www.quitplan.org, 4-129-252-PLAN ([phone])      CONSUMER PRICE LINE for estimates of test costs:  [phone]               " "Follow-ups after your visit        Your next 10 appointments already scheduled     Oct 26, 2018 10:00 AM CDT   Nurse Only with HP MEDICAL ASSISTANT   [facility]    [address] [address] [phone]              Who to contact     If you have questions or need follow up information about today's clinic visit or your schedule please contact [facility] directly at [phone].  Normal or non-critical lab and imaging results will be communicated to you by MyChart, letter or phone within 4 business days after the clinic has received the results. If you do not hear from us within 7 days, please contact the clinic through MyChart or phone. If you have a critical or abnormal lab result, we will notify you by phone as soon as possible.  Submit refill requests through [employer]) or call your pharmacy and they will forward the refill request to us. Please allow 3 business days for your refill to be completed.          Additional Information About Your Visit        Care EveryWhere ID     This is your Care EveryWhere ID. This could be used by other organizations to access your [place] medical records  [id number]        Your Vitals Were     Pulse Temperature Respirations Height Pulse Oximetry BMI (Body Mass Index)    62 99.3  F (37.4  C) (Oral) 18 5' 4\" (1.626 m) 98% 28.84 kg/m2       Blood Pressure from Last 3 Encounters:   10/12/18 142/68   08/26/18 135/68   05/04/18 128/80    Weight from Last 3 Encounters:   08/26/18 168 lb (76.2 kg)   12/04/17 165 lb (74.8 kg)   08/13/17 164 lb (74.4 kg)              We Performed the Following     Basic metabolic panel     Hemoglobin A1c          Today's Medication Changes          These changes are accurate as of 10/12/18  8:14 AM.  If you have any questions, ask your nurse or doctor.               Start taking these medicines.        Dose/Directions    diclofenac 1 % Gel topical gel   Commonly known " as:  VOLTAREN   Used for:  Osteoarthritis of joints of toes of feet, bilateral   Started by:  Wegener, Joel Daniel Irwin, MD        Apply 2 grams to toes four times daily as needed using enclosed dosing card.   Quantity:  100 g   Refills:  1            Where to get your medicines      These medications were sent to [facility] [id number]  [place], [place] - [address] AT [facility] [address]  [address], [place] [address]     Phone:  [phone]     diclofenac 1 % Gel topical gel                Primary Care Provider Office Phone # Fax #    Joel Daniel Irwin Wegener, [phone]  [facility] [address]        Equal Access to Services     SUYAPA PAPPAS AH: Hadii naz ku hadasho Soomaali, waaxda luqadaha, qaybta kaalmada adeegyada, waxay idiin haymirzan miriam kelly. So [facility] [phone].    ATENCIÓN: Si habla español, tiene a parr disposición servicios gratuitos de asistencia lingüística. [facility] [phone].    We comply with applicable federal civil rights laws and [place] laws. We do not discriminate on the basis of race, color, national origin, age, disability, sex, sexual orientation, or gender identity.            Thank you!     Thank you for choosing [facility]  for your care. Our goal is always to provide you with excellent care. Hearing back from our patients is one way we can continue to improve our services. Please take a few minutes to complete the written survey that you may receive in the mail after your visit with us. Thank you!             Your Updated Medication List - Protect others around you: Learn how to safely use, store and throw away your medicines at www.disposemymeds.org.          This list is accurate as of 10/12/18  8:14 AM.  Always use your most recent med list.                   Brand Name Dispense Instructions for use Diagnosis    amLODIPine 10 MG tablet    NORVASC    90 tablet    Take 1 tablet (10 mg) by mouth daily     Hypertension goal BP (blood pressure) < 140/90       aspirin 81 MG tablet      Take 1 tablet by mouth daily.        atorvastatin 40 MG tablet    LIPITOR    90 tablet    Take 1 tablet (40 mg) by mouth daily    Hyperlipidemia LDL goal <70       baclofen 10 MG tablet    LIORESAL    270 tablet    Take 1 tablet (10 mg) by mouth 3 times daily    Cerebrovascular accident (CVA), unspecified mechanism (H)       blood glucose monitoring test strip    IRENE CONTOUR    200 each    Use to test blood sugars two times daily or as directed.    Type 2 diabetes mellitus with stage 3 chronic kidney disease, without long-term current use of insulin (H)       cholecalciferol 1000 UNIT tablet    vitamin D3     Take 1,000 Units by mouth daily        citalopram 20 MG tablet    celeXA    90 tablet    Take 1 tablet (20 mg) by mouth daily    Major depressive disorder, recurrent episode, mild (H)       cyanocobalamin 1000 MCG/ML injection    VITAMIN B12    1 mL    Inject 1 mL (1,000 mcg) into the muscle every 30 days Order for clinic injection,  No need to fill.    Anemia due to vitamin B12 deficiency, unspecified B12 deficiency type       dextromethorphan 15 MG/5ML syrup      Take 10 mLs by mouth 2 times daily as needed        diclofenac 1 % Gel topical gel    VOLTAREN    100 g    Apply 2 grams to toes four times daily as needed using enclosed dosing card.    Osteoarthritis of joints of toes of feet, bilateral       Lactase 250 MG Caps      Take 1 chew tab by mouth as needed (with dairy)        losartan 25 MG tablet    COZAAR    90 tablet    Take 1 tablet (25 mg) by mouth daily    Hypertension goal BP (blood pressure) < 140/90       metFORMIN 1000 MG tablet    GLUCOPHAGE    180 tablet    Take 1 tablet (1,000 mg) by mouth 2 times daily (with meals)    Type 2 diabetes mellitus with stage 3 chronic kidney disease, with long-term current use of insulin (H)       metoprolol succinate 50 MG 24 hr tablet    TOPROL-XL    90 tablet    Take 1 tablet  (50 mg) by mouth daily    Hypertension, uncontrolled, Hypertension goal BP (blood pressure) < 140/90       montelukast 10 MG tablet    SINGULAIR    90 tablet    Take 1 tablet (10 mg) by mouth daily    Seasonal allergic rhinitis, unspecified chronicity, unspecified trigger       senna-docusate 8.6-50 MG per tablet    SENOKOT-S;PERICOLACE    60 tablet    Take 1 tablet by mouth 2 times daily To be taken with opioid (narcotic) pain medication to prevent constipation.    MARI (acute kidney injury) (H)       TYLENOL 500 MG tablet   Generic drug:  acetaminophen      Take 500 mg by mouth 2 times daily

## 2018-10-12 NOTE — LETTER
October 22, 2018    Addis Peña  [address]    [place] [address]      Dear ,    We are writing to inform you of your test results.    Your kidney function was good and the a1c in a decent range.    Resulted Orders   Hemoglobin A1c   Result Value Ref Range    Hemoglobin A1C 7.8 (H) 0 - 5.6 %      Comment:      Normal <5.7% Prediabetes 5.7-6.4%  Diabetes 6.5% or higher - adopted from ADA   consensus guidelines.     Basic metabolic panel   Result Value Ref Range    Sodium 140 133 - 144 mmol/L    Potassium 4.0 3.4 - 5.3 mmol/L    Chloride 108 94 - 109 mmol/L    Carbon Dioxide 23 20 - 32 mmol/L    Anion Gap 9 3 - 14 mmol/L    Glucose 159 (H) 70 - 99 mg/dL      Comment:      Fasting specimen    Urea Nitrogen 20 7 - 30 mg/dL    Creatinine 0.81 0.52 - 1.04 mg/dL    GFR Estimate 71 >60 mL/min/1.7m2      Comment:      Non  GFR Calc    GFR Estimate If Black 86 >60 mL/min/1.7m2      Comment:       GFR Calc    Calcium 9.4 8.5 - 10.1 mg/dL     If you have any questions or concerns, please call the clinic at the number listed above.   Sincerely,   Joel Daniel Wegener, MD/nr

## 2018-10-12 NOTE — PATIENT INSTRUCTIONS
"ASSESSMENT AND PLAN  1. Type 2 diabetes mellitus with stage 3 chronic kidney disease, without long-term current use of insulin (H)  Having flu shot at [facility] scheduled.     a1c still at goal but slowly rising.     Follow up early spring (about 5-6 months).       - Hemoglobin A1c  - Basic metabolic panel    2. Osteoarthritis of joints of toes of feet, bilateral  Does not sound like gout.  Can use diclofenac gel prior to activity if aching, up to four times daily.       - diclofenac (VOLTAREN) 1 % GEL topical gel; Apply 2 grams to toes four times daily as needed using enclosed dosing card.  Dispense: 100 g; Refill: 1        MYCHART FOR ON-LINE CARE(VISITS), LABS, REFILLS, MESSAGING, ETC [email or web address] , [phone]    E-VISIT: click \"on-line care, then request e-visit\".  E-visits work well for following up on issues we have discussed in clinic previously which may need new prescriptions, new prescriptions or substantial discussion. These are always done by me (Dr. Wegener).     ONCARE VISIT:  [email or web address]  - we treat nearly 50 common conditions through on-care.  These are done in an hour by on-call staff.     RADIOLOGY:  [facility]:  [phone]   [facility]: [phone]    Mammogram and Colonoscopy [phone]    Smoking Cessation: [email or web address], [phone] ([phone])      CONSUMER PRICE LINE for estimates of test costs:  [phone]       "

## 2018-10-26 ENCOUNTER — ALLIED HEALTH/NURSE VISIT (OUTPATIENT)
Dept: NURSING | Facility: CLINIC | Age: 68
End: 2018-10-26
Payer: MEDICARE

## 2018-10-26 DIAGNOSIS — D51.9 ANEMIA DUE TO VITAMIN B12 DEFICIENCY, UNSPECIFIED B12 DEFICIENCY TYPE: Primary | ICD-10-CM

## 2018-10-26 PROCEDURE — 96372 THER/PROPH/DIAG INJ SC/IM: CPT

## 2018-10-26 PROCEDURE — 99207 ZZC NO CHARGE NURSE ONLY: CPT

## 2018-10-26 NOTE — NURSING NOTE
The following medication was given:     MEDICATION: Vitamin B12  1000 mcg  ROUTE: IM  SITE: Deltoid - Right  DOSE: 1000 mcg/mL  LOT #: [id number]  :  [employer]  EXPIRATION DATE:  06/2020  NDC: 664847-186-01  Verified by: PARAS Verduzco MA

## 2018-10-26 NOTE — MR AVS SNAPSHOT
After Visit Summary   10/26/2018    Addis Peña    MRN: [id number]           Patient Information     Date Of Birth          1950        Visit Information        Provider Department      10/26/2018 10:00 AM HP MEDICAL ASSISTANT [facility]        Today's Diagnoses     Anemia due to vitamin B12 deficiency, unspecified B12 deficiency type    -  1       Follow-ups after your visit        Your next 10 appointments already scheduled     Nov 23, 2018 10:00 AM CST   Nurse Only with HP MEDICAL ASSISTANT   [facility] ([facility])    [address] [place] [address] [phone]            Mar 29, 2019  7:40 AM CDT   PHYSICAL with Joel Daniel Irwin Wegener, MD   [facility] ([facility])    [address] [place] [address] [phone]              Who to contact     If you have questions or need follow up information about today's clinic visit or your schedule please contact [facility] directly at [phone].  Normal or non-critical lab and imaging results will be communicated to you by MyChart, letter or phone within 4 business days after the clinic has received the results. If you do not hear from us within 7 days, please contact the clinic through MyChart or phone. If you have a critical or abnormal lab result, we will notify you by phone as soon as possible.  Submit refill requests through JuicyCanvas or call your pharmacy and they will forward the refill request to us. Please allow 3 business days for your refill to be completed.          Additional Information About Your Visit        Care EveryWhere ID     This is your Care EveryWhere ID. This could be used by other organizations to access your [facility] medical records  [id number]         Blood Pressure from Last 3 Encounters:   10/12/18 132/78   08/26/18 135/68   05/04/18 128/80    Weight from Last 3  Encounters:   08/26/18 168 lb (76.2 kg)   12/04/17 165 lb (74.8 kg)   08/13/17 164 lb (74.4 kg)              We Performed the Following     INJECTION INTRAMUSCULAR OR SUB-Q     VITAMIN B12 INJ /1000MCG        Primary Care Provider Office Phone # Fax #    Jesus Ashok Irwin Wegener, [phone]       [address]  [facility] [address]        Equal Access to Services     SUYAPA PAPPAS : Hadii aad ku hadasho Soomaali, waaxda luqadaha, qaybta kaalmada adeegyada, waxay idiin hayaan adeeg kharash la'aan ah. So [facility] [phone].    ATENCIÓN: Si habla español, tiene a parr disposición servicios gratuitos de asistencia lingüística. [facility] [phone].    We comply with applicable federal civil rights laws and [place] laws. We do not discriminate on the basis of race, color, national origin, age, disability, sex, sexual orientation, or gender identity.            Thank you!     Thank you for choosing [facility]  for your care. Our goal is always to provide you with excellent care. Hearing back from our patients is one way we can continue to improve our services. Please take a few minutes to complete the written survey that you may receive in the mail after your visit with us. Thank you!             Your Updated Medication List - Protect others around you: Learn how to safely use, store and throw away your medicines at www.disposemymeds.org.          This list is accurate as of 10/26/18 10:26 AM.  Always use your most recent med list.                   Brand Name Dispense Instructions for use Diagnosis    amLODIPine 10 MG tablet    NORVASC    90 tablet    Take 1 tablet (10 mg) by mouth daily    Hypertension goal BP (blood pressure) < 140/90       aspirin 81 MG tablet      Take 1 tablet by mouth daily.        atorvastatin 40 MG tablet    LIPITOR    90 tablet    Take 1 tablet (40 mg) by mouth daily    Hyperlipidemia LDL goal <70       baclofen 10 MG tablet    LIORESAL    270 tablet    Take  1 tablet (10 mg) by mouth 3 times daily    Cerebrovascular accident (CVA), unspecified mechanism (H)       blood glucose monitoring test strip    IRENE CONTOUR    200 each    Use to test blood sugars two times daily or as directed.    Type 2 diabetes mellitus with stage 3 chronic kidney disease, without long-term current use of insulin (H)       cholecalciferol 1000 UNIT tablet    vitamin D3     Take 1,000 Units by mouth daily        citalopram 20 MG tablet    celeXA    90 tablet    Take 1 tablet (20 mg) by mouth daily    Major depressive disorder, recurrent episode, mild (H)       cyanocobalamin 1000 MCG/ML injection    VITAMIN B12    1 mL    Inject 1 mL (1,000 mcg) into the muscle every 30 days Order for clinic injection,  No need to fill.    Anemia due to vitamin B12 deficiency, unspecified B12 deficiency type       dextromethorphan 15 MG/5ML syrup      Take 10 mLs by mouth 2 times daily as needed        diclofenac 1 % Gel topical gel    VOLTAREN    100 g    Apply 2 grams to toes four times daily as needed using enclosed dosing card.    Osteoarthritis of joints of toes of feet, bilateral       Lactase 250 MG Caps      Take 1 chew tab by mouth as needed (with dairy)        losartan 25 MG tablet    COZAAR    90 tablet    Take 1 tablet (25 mg) by mouth daily    Hypertension goal BP (blood pressure) < 140/90       metFORMIN 1000 MG tablet    GLUCOPHAGE    180 tablet    Take 1 tablet (1,000 mg) by mouth 2 times daily (with meals)    Type 2 diabetes mellitus with stage 3 chronic kidney disease, with long-term current use of insulin (H)       metoprolol succinate 50 MG 24 hr tablet    TOPROL-XL    90 tablet    Take 1 tablet (50 mg) by mouth daily    Hypertension, uncontrolled, Hypertension goal BP (blood pressure) < 140/90       montelukast 10 MG tablet    SINGULAIR    90 tablet    Take 1 tablet (10 mg) by mouth daily    Seasonal allergic rhinitis, unspecified chronicity, unspecified trigger       senna-docusate 8.6-50  MG per tablet    SENOKOT-S;PERICOLACE    60 tablet    Take 1 tablet by mouth 2 times daily To be taken with opioid (narcotic) pain medication to prevent constipation.    MARI (acute kidney injury) (H)       TYLENOL 500 MG tablet   Generic drug:  acetaminophen      Take 500 mg by mouth 2 times daily

## 2018-10-31 ENCOUNTER — TRANSFERRED RECORDS (OUTPATIENT)
Dept: HEALTH INFORMATION MANAGEMENT | Facility: CLINIC | Age: 68
End: 2018-10-31

## 2018-10-31 LAB — RETINOPATHY: NEGATIVE

## 2018-11-23 ENCOUNTER — ALLIED HEALTH/NURSE VISIT (OUTPATIENT)
Dept: NURSING | Facility: CLINIC | Age: 68
End: 2018-11-23
Payer: MEDICARE

## 2018-11-23 DIAGNOSIS — D51.9 B12 DEFICIENCY ANEMIA: Primary | ICD-10-CM

## 2018-11-23 PROCEDURE — 96372 THER/PROPH/DIAG INJ SC/IM: CPT

## 2018-11-23 NOTE — NURSING NOTE
Prior to injection verified patient identity using patient's name and date of birth.  Due to injection administration, patient instructed to remain in clinic for 15 minutes  afterwards, and to report any adverse reaction to me immediately.    The following medication was given:     MEDICATION:  Vitamin B12  1000mcg/mL  ROUTE: IM  SITE: Deltoid - Right  DOSE: 1mL  LOT #: [id number]  : [employer]  EXPIRATION DATE: 06/2020  NDC#: 32390-699-96   Was there drug waste? No  Multi-dose vial: Janice Mohamud MA on 11/23/2018 at 10:11 AM

## 2018-11-23 NOTE — MR AVS SNAPSHOT
After Visit Summary   11/23/2018    Addis Peña    MRN: [id number]           Patient Information     Date Of Birth          1950        Visit Information        Provider Department      11/23/2018 10:00 AM HP MEDICAL ASSISTANT [facility]        Today's Diagnoses     B12 deficiency anemia    -  1       Follow-ups after your visit        Your next 10 appointments already scheduled     Nov 26, 2018  2:40 PM CST   SHORT with Emeka Monzon MD   [facility] ([facility])    [address] [place] [address] [phone]            Nov 30, 2018  1:00 PM CST   MA SCREENING DIGITAL BILATERAL with [id number]   [facility] ([facility])    [address] [facility] [facility] [address] [phone]           How do I prepare for my exam? (Food and drink instructions) No Food and Drink Restrictions.  How do I prepare for my exam? (Other instructions) Do not use any powder, lotion or deodorant under your arms or on your breast. If you do, we will ask you to remove it before your exam.  What should I wear: Wear comfortable, two-piece clothing.  How long does the exam take: Most scans will take 15 minutes.  What should I bring: Bring any previous mammograms from other facilities or have them mailed to the breast center.  Do I need a :  No  is needed.  What do I need to tell my doctor: If you have any allergies, tell your care team.  What should I do after the exam: No restrictions, You may resume normal activities.  What is this test: This test is an x-ray of the breast to look for breast disease. The breast is pressed between two plates to flatten and spread the tissue. An X-ray is taken of the breast from different angles.  Who should I call with questions: If you have any questions, please call the Imaging Department where you will have your exam. Directions, parking  "instructions, and other information is available on our website, [email or web address].  Other information about my exam Three-dimensional (3D) mammograms are available at [facility] locations in [facility], [facility], [place], [facility] and [place].  Health locations include [place] and [facility] in [place].  Benefits of 3D mammograms include * Improved rate of cancer detection * Decreases your chance of having to go back for more tests, which means fewer: * \"False-positive\" results (This means that there is an abnormal area but it isn't cancer.) * Invasive testing procedures, such as a biopsy or surgery * Can provide clearer images of the breast if you have dense breast tissue.  *3D mammography is an optional exam that anyone can have with a 2D mammogram. It doesn't replace or take the place of a 2D mammogram. 2D mammograms remain an effective screening test for all women.  Not all insurance companies cover the cost of a 3D mammogram. Check with your insurance. Three-dimensional (3D) mammograms are available at [facility] locations in [facility], [facility], [facility], and [place]. Health locations include [place] and [facility] in [place]. Benefits of 3D mammograms include: - Improved rate of cancer detection - Decreases your chance of having to go back for more tests, which means fewer: - \"False-positive\" results (This means that there is an abnormal area but it isn't cancer.) - Invasive testing procedures, such as a biopsy or surgery - Can provide clearer images of the breast if you have dense breast tissue. 3D mammography is an optional exam that anyone can have with a 2D mammogram. It doesn't replace or take the place of a 2D mammogram. 2D mammograms remain an effective screening test for all women.  Not all insurance companies cover the cost of a 3D mammogram. Check with your " "insurance.            Dec 21, 2018 10:00 AM CST   Nurse Only with HP MEDICAL ASSISTANT   [facility]    [address] [place] [address] [phone]            Mar 29, 2019  7:40 AM CDT   PHYSICAL with Joel Daniel Irwin Wegener, MD   [facility] ([facility])    [address] [place] [address] [phone]              Who to contact     If you have questions or need follow up information about today's clinic visit or your schedule please contact [facility] directly at [phone].  Normal or non-critical lab and imaging results will be communicated to you by Taamkruhart, letter or phone within 4 business days after the clinic has received the results. If you do not hear from us within 7 days, please contact the clinic through Taamkruhart or phone. If you have a critical or abnormal lab result, we will notify you by phone as soon as possible.  Submit refill requests through BloomThat or call your pharmacy and they will forward the refill request to us. Please allow 3 business days for your refill to be completed.          Additional Information About Your Visit        TaamkruharGood Deal Information     BloomThat lets you send messages to your doctor, view your test results, renew your prescriptions, schedule appointments and more. To sign up, go to [email or web address] . Click on \"Log in\" on the left side of the screen, which will take you to the Welcome page. Then click on \"Sign up Now\" on the right side of the page.     You will be asked to enter the access code listed below, as well as some personal information. Please follow the directions to create your username and password.     Your access code is: [email or web address]  Expires: 2019  6:30 AM     Your access code will  in 90 days. If you need help or a new code, please call your [facility] or [phone].        Care EveryWhere ID     This " is your Care EveryWhere ID. This could be used by other organizations to access your [place] medical records  [id number]         Blood Pressure from Last 3 Encounters:   10/12/18 132/78   08/26/18 135/68   05/04/18 128/80    Weight from Last 3 Encounters:   08/26/18 168 lb (76.2 kg)   12/04/17 165 lb (74.8 kg)   08/13/17 164 lb (74.4 kg)              We Performed the Following     INJECTION INTRAMUSCULAR OR SUB-Q     VITAMIN B12 INJ /1000MCG        Primary Care Provider Office Phone # Fax #    Jesus Ashok Irwin Wegener, [phone] [address]  [facility] [address]        Equal Access to Services     SUYAPA PAPPAS : Hadii aad ku hadasho Soomaali, waaxda luqadaha, qaybta kaalmada adeegyada, waxay idiin hayradhames gibson . So [facility] [phone].    ATENCIÓN: Si habla español, tiene a parr disposición servicios gratuitos de asistencia lingüística. Javy al [phone].    We comply with applicable federal civil rights laws and [place] laws. We do not discriminate on the basis of race, color, national origin, age, disability, sex, sexual orientation, or gender identity.            Thank you!     Thank you for choosing [facility]  for your care. Our goal is always to provide you with excellent care. Hearing back from our patients is one way we can continue to improve our services. Please take a few minutes to complete the written survey that you may receive in the mail after your visit with us. Thank you!             Your Updated Medication List - Protect others around you: Learn how to safely use, store and throw away your medicines at www.disposemymeds.org.          This list is accurate as of 11/23/18 10:16 AM.  Always use your most recent med list.                   Brand Name Dispense Instructions for use Diagnosis    amLODIPine 10 MG tablet    NORVASC    90 tablet    Take 1 tablet (10 mg) by mouth daily    Hypertension goal BP (blood pressure) < 140/90        aspirin 81 MG tablet    ASA     Take 1 tablet by mouth daily.        atorvastatin 40 MG tablet    LIPITOR    90 tablet    Take 1 tablet (40 mg) by mouth daily    Hyperlipidemia LDL goal <70       baclofen 10 MG tablet    LIORESAL    270 tablet    Take 1 tablet (10 mg) by mouth 3 times daily    Cerebrovascular accident (CVA), unspecified mechanism (H)       blood glucose monitoring test strip    IRENE CONTOUR    200 each    Use to test blood sugars two times daily or as directed.    Type 2 diabetes mellitus with stage 3 chronic kidney disease, without long-term current use of insulin (H)       cholecalciferol 1000 UNIT tablet    vitamin D3     Take 1,000 Units by mouth daily        citalopram 20 MG tablet    celeXA    90 tablet    Take 1 tablet (20 mg) by mouth daily    Major depressive disorder, recurrent episode, mild (H)       cyanocobalamin 1000 MCG/ML injection    VITAMIN B12    1 mL    Inject 1 mL (1,000 mcg) into the muscle every 30 days Order for clinic injection,  No need to fill.    Anemia due to vitamin B12 deficiency, unspecified B12 deficiency type       dextromethorphan 15 MG/5ML syrup      Take 10 mLs by mouth 2 times daily as needed        diclofenac 1 % topical gel    VOLTAREN    100 g    Apply 2 grams to toes four times daily as needed using enclosed dosing card.    Osteoarthritis of joints of toes of feet, bilateral       Lactase 250 MG Caps      Take 1 chew tab by mouth as needed (with dairy)        losartan 25 MG tablet    COZAAR    90 tablet    Take 1 tablet (25 mg) by mouth daily    Hypertension goal BP (blood pressure) < 140/90       metFORMIN 1000 MG tablet    GLUCOPHAGE    180 tablet    Take 1 tablet (1,000 mg) by mouth 2 times daily (with meals)    Type 2 diabetes mellitus with stage 3 chronic kidney disease, with long-term current use of insulin (H)       metoprolol succinate 50 MG 24 hr tablet    TOPROL-XL    90 tablet    Take 1 tablet (50 mg) by mouth daily    Hypertension, uncontrolled,  Hypertension goal BP (blood pressure) < 140/90       montelukast 10 MG tablet    SINGULAIR    90 tablet    Take 1 tablet (10 mg) by mouth daily    Seasonal allergic rhinitis, unspecified chronicity, unspecified trigger       senna-docusate 8.6-50 MG per tablet    SENOKOT-S;PERICOLACE    60 tablet    Take 1 tablet by mouth 2 times daily To be taken with opioid (narcotic) pain medication to prevent constipation.    MARI (acute kidney injury) (H)       TYLENOL 500 MG tablet   Generic drug:  acetaminophen      Take 500 mg by mouth 2 times daily

## 2018-11-26 ENCOUNTER — OFFICE VISIT (OUTPATIENT)
Dept: FAMILY MEDICINE | Facility: CLINIC | Age: 68
End: 2018-11-26
Payer: MEDICARE

## 2018-11-26 VITALS
DIASTOLIC BLOOD PRESSURE: 80 MMHG | OXYGEN SATURATION: 92 % | TEMPERATURE: 99.3 F | SYSTOLIC BLOOD PRESSURE: 142 MMHG | HEART RATE: 78 BPM

## 2018-11-26 DIAGNOSIS — L03.032 PARONYCHIA OF TOE, LEFT: ICD-10-CM

## 2018-11-26 DIAGNOSIS — I10 HYPERTENSION GOAL BP (BLOOD PRESSURE) < 140/90: ICD-10-CM

## 2018-11-26 DIAGNOSIS — N18.30 TYPE 2 DIABETES MELLITUS WITH STAGE 3 CHRONIC KIDNEY DISEASE, WITHOUT LONG-TERM CURRENT USE OF INSULIN (H): ICD-10-CM

## 2018-11-26 DIAGNOSIS — E11.22 TYPE 2 DIABETES MELLITUS WITH STAGE 3 CHRONIC KIDNEY DISEASE, WITHOUT LONG-TERM CURRENT USE OF INSULIN (H): ICD-10-CM

## 2018-11-26 DIAGNOSIS — L60.0 INGROWING TOENAIL OF LEFT FOOT: Primary | ICD-10-CM

## 2018-11-26 PROCEDURE — 99214 OFFICE O/P EST MOD 30 MIN: CPT | Performed by: FAMILY MEDICINE

## 2018-11-26 RX ORDER — CEPHALEXIN 500 MG/1
500 CAPSULE ORAL 2 TIMES DAILY
Qty: 14 CAPSULE | Refills: 0 | Status: SHIPPED | OUTPATIENT
Start: 2018-11-26 | End: 2019-03-29

## 2018-11-26 NOTE — PATIENT INSTRUCTIONS
Keflex 500 mg twice daily for 7 days  Soak feet in warm water two to three times/week. Then lift the inner side of the nail bed with dental floss.  If symptoms are not improving then you would need to follow up with a podiatrist.

## 2018-11-26 NOTE — PROGRESS NOTES
SUBJECTIVE:   Addis Peña is a 68 year old female who presents to clinic today for the following health issues:      Left big toenail problem   - For a few weeks she has swelling, redness, pain and bloody discharge. She he has been soaking foot with borox, neosporin at night and aloe during the day. Her fasting BG - have been kind of high due to the holidays. BG ~ 200.     Problem list and histories reviewed & adjusted, as indicated.  Additional history: as documented    Labs reviewed in EPIC    Reviewed and updated as needed this visit by clinical staff  Tobacco  Allergies  Med Hx  Surg Hx  Fam Hx  Soc Hx      Reviewed and updated as needed this visit by Provider         ROS:  Constitutional, HEENT, cardiovascular, pulmonary, gi and gu systems are negative, except as otherwise noted.    OBJECTIVE:     /71  Pulse 78  Temp 99.3  F (37.4  C) (Oral)  SpO2 92%  There is no height or weight on file to calculate BMI.   /80  Pulse 78  Temp 99.3  F (37.4  C) (Oral)  SpO2 92%  GENERAL: healthy, alert and no distress  EYES: Eyes grossly normal to inspection  HENT:  nose and mouth without ulcers or lesions  MS/SKIN: left great toe (inner side) with mild erythema, edema and dried blood     Diagnostic Test Results:  none     ASSESSMENT/PLAN:       ## Ingrowing toenail of left foot: a/s with paronchia   - H/o diabetes mellitus II with stage 3 CKD - well controlled. Recent fasting BG elevated due to holidays.   - Advised to soak foot and use dental floss to lift nail bed.   - cephALEXin (KEFLEX) 500 MG capsule; Take 1 capsule (500 mg) by mouth 2 times daily for 7 days  Dispense: 14 capsule; Refill: 0  - PODIATRY/FOOT & ANKLE SURGERY REFERRAL - if symptoms are not improving     ## HTN  - Nurse only B/P check - Dec 21, 2018       Emeka Monzon MD  [facility]

## 2018-11-26 NOTE — MR AVS SNAPSHOT
After Visit Summary   11/26/2018    Addis Peña    MRN: [id number]           Patient Information     Date Of Birth          1950        Visit Information        Provider Department      11/26/2018 2:40 PM Emeka Monzon MD [facility]        Today's Diagnoses     Ingrowing toenail of left foot    -  1    Paronychia of toe, left          Care Instructions    Keflex 500 mg twice daily for 7 days  Soak feet in warm water two to three times/week. Then lift the inner side of the nail bed with dental floss.  If symptoms are not improving then you would need to follow up with a podiatrist.           Follow-ups after your visit        Additional Services     PODIATRY/FOOT & ANKLE SURGERY REFERRAL       Your provider has referred you to: FMG: [facility] [phone]   [email or web address]/  FMG: [facility] [phone]   [email or web address]/    Please be aware that coverage of these services is subject to the terms and limitations of your health insurance plan.  Call member services at your health plan with any benefit or coverage questions.      Please bring the following to your appointment:  >>   Any x-rays, CTs or MRIs which have been performed.  Contact the facility where they were done to arrange for  prior to your scheduled appointment.    >>   List of current medications   >>   This referral request   >>   Any documents/labs given to you for this referral                  Your next 10 appointments already scheduled     Nov 30, 2018  1:00 PM CST   MA SCREENING DIGITAL BILATERAL with [id number]   [facility] ([facility])    [address]  [facility] [address] [phone]           How do I prepare for my exam? (Food and drink instructions) No Food and Drink Restrictions.  How do I prepare for my  "exam? (Other instructions) Do not use any powder, lotion or deodorant under your arms or on your breast. If you do, we will ask you to remove it before your exam.  What should I wear: Wear comfortable, two-piece clothing.  How long does the exam take: Most scans will take 15 minutes.  What should I bring: Bring any previous mammograms from other facilities or have them mailed to the breast center.  Do I need a :  No  is needed.  What do I need to tell my doctor: If you have any allergies, tell your care team.  What should I do after the exam: No restrictions, You may resume normal activities.  What is this test: This test is an x-ray of the breast to look for breast disease. The breast is pressed between two plates to flatten and spread the tissue. An X-ray is taken of the breast from different angles.  Who should I call with questions: If you have any questions, please call the Imaging Department where you will have your exam. Directions, parking instructions, and other information is available on our website, [email or web address].  Other information about my exam Three-dimensional (3D) mammograms are available at [place] locations in [facility], [place], [place], [facility], [place], [facility] and [place]. [facility] locations include [place] and the [facility] in [place].  Benefits of 3D mammograms include * Improved rate of cancer detection * Decreases your chance of having to go back for more tests, which means fewer: * \"False-positive\" results (This means that there is an abnormal area but it isn't cancer.) * Invasive testing procedures, such as a biopsy or surgery * Can provide clearer images of the breast if you have dense breast tissue.  *3D mammography is an optional exam that anyone can have with a 2D mammogram. It doesn't replace or take the place of a 2D mammogram. 2D mammograms remain an effective screening test for all women.  Not all insurance " "companies cover the cost of a 3D mammogram. Check with your insurance. Three-dimensional (3D) mammograms are available at [place] locations in [facility], [place], [place], [facility], [place], [place], and [place]. [facility] locations include [place] and [facility] in [place]. Benefits of 3D mammograms include: - Improved rate of cancer detection - Decreases your chance of having to go back for more tests, which means fewer: - \"False-positive\" results (This means that there is an abnormal area but it isn't cancer.) - Invasive testing procedures, such as a biopsy or surgery - Can provide clearer images of the breast if you have dense breast tissue. 3D mammography is an optional exam that anyone can have with a 2D mammogram. It doesn't replace or take the place of a 2D mammogram. 2D mammograms remain an effective screening test for all women.  Not all insurance companies cover the cost of a 3D mammogram. Check with your insurance.            Dec 21, 2018 10:00 AM CST   Nurse Only with [facility] MEDICAL ASSISTANT   [facility]    [address] [place] [id number] [phone]            Mar 29, 2019  7:40 AM CDT   PHYSICAL with Joel Daniel Irwin Wegener, MD   [facility]    [address] [place] [address] [phone]              Who to contact     If you have questions or need follow up information about today's clinic visit or your schedule please contact [facility] directly at [phone].  Normal or non-critical lab and imaging results will be communicated to you by MyChart, letter or phone within 4 business days after the clinic has received the results. If you do not hear from us within 7 days, please contact the clinic through MyChart or phone. If you have a critical or abnormal lab result, we will notify you by phone as soon as " "possible.  Submit refill requests through [employer] or call your pharmacy and they will forward the refill request to us. Please allow 3 business days for your refill to be completed.          Additional Information About Your Visit        [employer] Information     [employer] lets you send messages to your doctor, view your test results, renew your prescriptions, schedule appointments and more. To sign up, go to [email or web address] . Click on \"Log in\" on the left side of the screen, which will take you to the Welcome page. Then click on \"Sign up Now\" on the right side of the page.     You will be asked to enter the access code listed below, as well as some personal information. Please follow the directions to create your username and password.     Your access code is: [email or web address]  Expires: 2019  6:30 AM     Your access code will  in 90 days. If you need help or a new code, please call your [place] clinic or [phone].        Care EveryWhere ID     This is your Care EveryWhere ID. This could be used by other organizations to access your [place] medical records  [id number]        Your Vitals Were     Pulse Temperature Pulse Oximetry             78 99.3  F (37.4  C) (Oral) 92%          Blood Pressure from Last 3 Encounters:   18 140/71   10/12/18 132/78   18 135/68    Weight from Last 3 Encounters:   18 168 lb (76.2 kg)   17 165 lb (74.8 kg)   17 164 lb (74.4 kg)              We Performed the Following     PODIATRY/FOOT & ANKLE SURGERY REFERRAL          Today's Medication Changes          These changes are accurate as of 18  3:22 PM.  If you have any questions, ask your nurse or doctor.               Start taking these medicines.        Dose/Directions    cephALEXin 500 MG capsule   Commonly known as:  KEFLEX   Used for:  Ingrowing toenail of left foot   Started by:  Emeka Monzon MD        Dose:  500 mg   Take 1 capsule (500 mg) by mouth 2 times daily for 7 " days   Quantity:  14 capsule   Refills:  0            Where to get your medicines      These medications were sent to [facility] - [place], [place] - [address] AT [facility] [address]  [address] [address]     Phone:  [phone]     cephALEXin 500 MG capsule                Primary Care Provider Office Phone # Fax #    Jesus Ashok Irwin Wegener, [phone] [address]  [facility] [address]        Equal Access to Services     SUYAP[facility] : Hadii aad ku hadasho Soomaali, waaxda luqadaha, qaybta kaalmada adeegyada, waxay idiin hayaan adeeg kharash lafernando . So [facility] [phone].    ATENCIÓN: Si habla español, tiene a parr disposición servicios gratuitos de asistencia lingüística. Ll[facility] [phone].    We comply with applicable federal civil rights laws and [place] laws. We do not discriminate on the basis of race, color, national origin, age, disability, sex, sexual orientation, or gender identity.            Thank you!     Thank you for choosing [facility] [place]  for your care. Our goal is always to provide you with excellent care. Hearing back from our patients is one way we can continue to improve our services. Please take a few minutes to complete the written survey that you may receive in the mail after your visit with us. Thank you!             Your Updated Medication List - Protect others around you: Learn how to safely use, store and throw away your medicines at www.disposemymeds.org.          This list is accurate as of 11/26/18  3:22 PM.  Always use your most recent med list.                   Brand Name Dispense Instructions for use Diagnosis    amLODIPine 10 MG tablet    NORVASC    90 tablet    Take 1 tablet (10 mg) by mouth daily    Hypertension goal BP (blood pressure) < 140/90       aspirin 81 MG tablet    ASA     Take 1 tablet by mouth daily.        atorvastatin 40 MG tablet    LIPITOR    90 tablet    Take 1 tablet (40 mg) by mouth  daily    Hyperlipidemia LDL goal <70       baclofen 10 MG tablet    LIORESAL    270 tablet    Take 1 tablet (10 mg) by mouth 3 times daily    Cerebrovascular accident (CVA), unspecified mechanism (H)       blood glucose monitoring test strip    IRENE CONTOUR    200 each    Use to test blood sugars two times daily or as directed.    Type 2 diabetes mellitus with stage 3 chronic kidney disease, without long-term current use of insulin (H)       cephALEXin 500 MG capsule    KEFLEX    14 capsule    Take 1 capsule (500 mg) by mouth 2 times daily for 7 days    Ingrowing toenail of left foot       citalopram 20 MG tablet    celeXA    90 tablet    Take 1 tablet (20 mg) by mouth daily    Major depressive disorder, recurrent episode, mild (H)       cyanocobalamin 1000 MCG/ML injection    VITAMIN B12    1 mL    Inject 1 mL (1,000 mcg) into the muscle every 30 days Order for clinic injection,  No need to fill.    Anemia due to vitamin B12 deficiency, unspecified B12 deficiency type       dextromethorphan 15 MG/5ML syrup      Take 10 mLs by mouth 2 times daily as needed        diclofenac 1 % topical gel    VOLTAREN    100 g    Apply 2 grams to toes four times daily as needed using enclosed dosing card.    Osteoarthritis of joints of toes of feet, bilateral       Lactase 250 MG Caps      Take 1 chew tab by mouth as needed (with dairy)        losartan 25 MG tablet    COZAAR    90 tablet    Take 1 tablet (25 mg) by mouth daily    Hypertension goal BP (blood pressure) < 140/90       metFORMIN 1000 MG tablet    GLUCOPHAGE    180 tablet    Take 1 tablet (1,000 mg) by mouth 2 times daily (with meals)    Type 2 diabetes mellitus with stage 3 chronic kidney disease, with long-term current use of insulin (H)       metoprolol succinate 50 MG 24 hr tablet    TOPROL-XL    90 tablet    Take 1 tablet (50 mg) by mouth daily    Hypertension, uncontrolled, Hypertension goal BP (blood pressure) < 140/90       montelukast 10 MG tablet    SINGULAIR     90 tablet    Take 1 tablet (10 mg) by mouth daily    Seasonal allergic rhinitis, unspecified chronicity, unspecified trigger       senna-docusate 8.6-50 MG per tablet    SENOKOT-S;PERICOLACE    60 tablet    Take 1 tablet by mouth 2 times daily To be taken with opioid (narcotic) pain medication to prevent constipation.    MARI (acute kidney injury) (H)       TYLENOL 500 MG tablet   Generic drug:  acetaminophen      Take 500 mg by mouth 2 times daily        vitamin D3 1000 UNIT tablet    CHOLECALCIFEROL     Take 1,000 Units by mouth daily

## 2018-11-30 ENCOUNTER — RADIANT APPOINTMENT (OUTPATIENT)
Dept: MAMMOGRAPHY | Facility: CLINIC | Age: 68
End: 2018-11-30
Attending: FAMILY MEDICINE
Payer: MEDICARE

## 2018-11-30 DIAGNOSIS — Z12.39 ENCOUNTER FOR SCREENING FOR MALIGNANT NEOPLASM OF BREAST: ICD-10-CM

## 2018-12-07 DIAGNOSIS — I63.9 CEREBROVASCULAR ACCIDENT (CVA), UNSPECIFIED MECHANISM (H): ICD-10-CM

## 2018-12-07 DIAGNOSIS — I10 HYPERTENSION GOAL BP (BLOOD PRESSURE) < 140/90: ICD-10-CM

## 2018-12-07 NOTE — TELEPHONE ENCOUNTER
"Medication Detail      Disp Refills Start End MARIZA   baclofen (LIORESAL) 10 MG tablet 270 tablet 3 12/4/2017  No   Sig - Route: Take 1 tablet (10 mg) by mouth 3 times daily - Oral   Class: E-Prescribe   Order: [id number]   E-Prescribing Status: Receipt confirmed by pharmacy (12/4/2017  8:19 AM CST)       Last Office Visit: 11/26/2018  Future Office visit:    Next 5 appointments (look out 90 days)     Dec 21, 2018 10:00 AM CST   Nurse Only with HP MEDICAL ASSISTANT   [address] [address]   [phone]                   Routing refill request to provider for review/approval because:  Drug not on the [facility], [facility] or [facility] refill protocol or controlled substance    Requested Prescriptions   Pending Prescriptions Disp Refills     amLODIPine (NORVASC) 10 MG tablet [Pharmacy Med Name: AMLODIPINE BESYLATE 10MG TABLETS]  Last Written Prescription Date:  12/4/2017  Last Fill Quantity: 90 tabs,  # refills: 3   Last office visit: 11/26/2018 with prescribing provider:  Na   Future Office Visit:   Next 5 appointments (look out 90 days)     Dec 21, 2018 10:00 AM CST   Nurse Only with  MEDICAL ASSISTANT   [address] [address]   [phone]                  90 tablet 0     Sig: TAKE 1 TABLET(10 MG) BY MOUTH DAILY    Calcium Channel Blockers Protocol  Failed    12/7/2018 12:03 PM       Failed - Blood pressure under 140/90 in past 12 months    BP Readings from Last 3 Encounters:   11/26/18 142/80   10/12/18 132/78   08/26/18 135/68                Passed - Recent (12 mo) or future (30 days) visit within the authorizing provider's specialty    Patient had office visit in the last 12 months or has a visit in the next 30 days with authorizing provider or within the authorizing provider's specialty.  See \"Patient Info\" tab in inbasket, or \"Choose Columns\" in " Meds & Orders section of the refill encounter.             Passed - Patient is age 18 or older       Passed - No active pregnancy on record       Passed - Normal serum creatinine on file in past 12 months    Recent Labs   Lab Test  10/12/18   0747   CR  0.81            Passed - No positive pregnancy test in past 12 months

## 2018-12-08 DIAGNOSIS — F33.0 MAJOR DEPRESSIVE DISORDER, RECURRENT EPISODE, MILD (H): ICD-10-CM

## 2018-12-10 NOTE — TELEPHONE ENCOUNTER
"Requested Prescriptions   Pending Prescriptions Disp Refills     citalopram (CELEXA) 20 MG tablet [Pharmacy Med Name: CITALOPRAM 20MG TABLETS]  Last Written Prescription Date:  12/4/17  Last Fill Quantity: 90 TABLET,  # refills: 3   Last office visit: 11/26/2018 with prescribing provider:  GRETTA   Future Office Visit:   Next 5 appointments (look out 90 days)    Dec 21, 2018 10:00 AM CST  Nurse Only with HP MEDICAL ASSISTANT  [facility] [address] [facility] [id number] [phone]          90 tablet 0     Sig: TAKE 1 TABLET(20 MG) BY MOUTH DAILY    SSRIs Protocol Failed - 12/8/2018  3:11 PM       Failed - PHQ-9 score less than 5 in past 6 months    Please review last PHQ-9 score.   PHQ-9 SCORE 1/6/2017 6/23/2017 12/4/2017   PHQ-9 Total Score - - -   PHQ-9 Total Score 0 0 0     RICCO-7 SCORE 2/5/2016 2/10/2016 6/23/2017   Total Score - - -   Total Score 0 0 0                Passed - Patient is age 18 or older       Passed - No active pregnancy on record       Passed - No positive pregnancy test in last 12 months       Passed - Recent (6 mo) or future (30 days) visit within the authorizing provider's specialty    Patient had office visit in the last 6 months or has a visit in the next 30 days with authorizing provider or within the authorizing provider's specialty.  See \"Patient Info\" tab in inbasket, or \"Choose Columns\" in Meds & Orders section of the refill encounter.              "

## 2018-12-12 RX ORDER — AMLODIPINE BESYLATE 10 MG/1
TABLET ORAL
Qty: 90 TABLET | Refills: 0 | Status: SHIPPED | OUTPATIENT
Start: 2018-12-12 | End: 2019-03-09

## 2018-12-12 RX ORDER — BACLOFEN 10 MG/1
TABLET ORAL
Qty: 270 TABLET | Refills: 0 | Status: SHIPPED | OUTPATIENT
Start: 2018-12-12 | End: 2019-03-09

## 2018-12-13 ENCOUNTER — OFFICE VISIT (OUTPATIENT)
Dept: PODIATRY | Facility: CLINIC | Age: 68
End: 2018-12-13
Payer: MEDICARE

## 2018-12-13 ENCOUNTER — ANCILLARY PROCEDURE (OUTPATIENT)
Dept: GENERAL RADIOLOGY | Facility: CLINIC | Age: 68
End: 2018-12-13
Attending: PODIATRIST
Payer: MEDICARE

## 2018-12-13 VITALS
SYSTOLIC BLOOD PRESSURE: 134 MMHG | DIASTOLIC BLOOD PRESSURE: 72 MMHG | HEIGHT: 64 IN | BODY MASS INDEX: 28.68 KG/M2 | WEIGHT: 168 LBS

## 2018-12-13 DIAGNOSIS — L60.0 INGROWING NAIL: ICD-10-CM

## 2018-12-13 DIAGNOSIS — D51.9 B12 DEFICIENCY ANEMIA: ICD-10-CM

## 2018-12-13 DIAGNOSIS — M25.579 ANKLE PAIN: ICD-10-CM

## 2018-12-13 DIAGNOSIS — S99.912A ANKLE INJURY, LEFT, INITIAL ENCOUNTER: ICD-10-CM

## 2018-12-13 DIAGNOSIS — M79.675 TOE PAIN, LEFT: Primary | ICD-10-CM

## 2018-12-13 PROCEDURE — 99203 OFFICE O/P NEW LOW 30 MIN: CPT | Mod: 25 | Performed by: PODIATRIST

## 2018-12-13 PROCEDURE — 11730 AVULSION NAIL PLATE SIMPLE 1: CPT | Performed by: PODIATRIST

## 2018-12-13 PROCEDURE — 73610 X-RAY EXAM OF ANKLE: CPT | Mod: LT

## 2018-12-13 ASSESSMENT — MIFFLIN-ST. JEOR: SCORE: 1282.04

## 2018-12-13 NOTE — Clinical Note
Dr. Madera:  The order for B12 has .  Pt is coming in TOMORROW, Friday.  Do you want him to continue?  Order is pended.  Sign please.  Thanks!  Deyanira

## 2018-12-13 NOTE — LETTER
"    12/13/2018         RE: Addis Peña  [address]  [address]  [place] [address]        Dear Colleague,    Thank you for referring your patient, Addis Peña, to the [facility]. Please see a copy of my visit note below.      ASSESSMENT/PLAN:    Encounter Diagnoses   Name Primary?     Ankle pain Yes     Toe pain, left      Ingrowing nail      Most of the visit was focused on the ingrown toenail.  I suggested partial nail avulsion, as the problem persists despite soaking and an oral antibiotic.  Procedure outlined below.     Ankle pain likely a sprain. No fractures seen on XR and ankle mortise is congruent.  Pain is lessening.  No additional recommendations at this time.     Nail Avulsion Procedure  (non permanent removal)    The procedure was discussed with the patient, including risk of infection, abnormal nail regrowth, and possible need for a future nail procedure.  Post procedure home cares were explained. These cares are important for preventing infection and aiding in timely healing.   Verbal and written consent was obtained.   The site was marked and the \"Time Out\" called.     The base of the left hallux was injected with 2 cc of  2% Lidocaine plain.  The toe was then prepped with betadine solution.  A tourniquet was applied around the base of the toe for hemostasis.   Next the toe was checked for adequate anesthesia.  With the Pt comfortable, the medial nail was freed from the nail bed and marginal soft tissue attachments with a blunt instrument.  ( A nail splitter was then used to make a longitudinal cut 2mm from the medial nail fold.  It was completed, atraumatically, under the eponychium with a [place] blade. ) Next, it was firmly grasped with a hemostat and removed in total.      Silvadene ointment was applied to the nail bed, followed by a compressive dressing.  The tourniquet was removed.  The distal toe turned immediately pink.  The foot was kept elevated for several " minutes.  The patient tolerated the procedure well.      Patient is instructed to watch for, and call if,  increasing redness, drainage, and pain after 2-3 days.  Post procedure instructions provided - handout given.      Body mass index is 28.84 kg/m .    Weight management plan: Patient was referred to their PCP to discuss a diet and exercise plan.      Syd Matthew DPM, FACFAS, MS    [facility] of [facility]/Foot & Ankle Surgery      ____________________________________________________________________    HPI:       I was asked by Dr. Monzon to evaluate this patient, in consultation, regarding an ingrown toenail, left great toe.     Chief Complaint: toe pain; ingrown toenail, left great toe (medial edge)  Onset of problem: 1 month  Painful  Ratin/10   Frequency:  daily    The pain is made worse with walking, pressure  Previous treatment: soaking, neosporin cephalexin;  Problem persists    Secondary concerns:  Left ankle pain secondary to a fall. This has improved.  History of strike affecting her left lower extremity.     *  Patient Active Problem List   Diagnosis     Hypertension goal BP (blood pressure) < 140/90     Type 2 diabetes, HbA1C goal < 8% (H)     Rosacea     Cerebral artery occlusion with cerebral infarction (H)     Mild major depression (H)     GERD (gastroesophageal reflux disease)     Seasonal allergic rhinitis     Advanced directives, counseling/discussion     History of colonic polyps     Major depression in complete remission (H)     B12 deficiency anemia     Health Care Home     Hyperlipidemia LDL goal <70     Gout     Type 2 diabetes with stage 3 chronic kidney disease GFR 30-59 (H)     Altered mental status     MARI (acute kidney injury) (H)     Type 2 diabetes mellitus with stage 3 chronic kidney disease, without long-term current use of insulin (H)     Cervical cancer screening     ACE-inhibitor cough     History of stroke     Hemiplegia affecting right dominant side (H)      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       atorvastatin (LIPITOR) 40 MG tablet Take 1 tablet (40 mg) by mouth daily 90 tablet 3     baclofen (LIORESAL) 10 MG tablet TAKE 1 TABLET(10 MG) BY MOUTH THREE TIMES DAILY 270 tablet 0     blood glucose monitoring (IRENE CONTOUR) test strip Use to test blood sugars two times daily or as directed. 200 each 11     Cholecalciferol (VITAMIN D3) 1000 UNIT TABS Take 1,000 Units by mouth daily        citalopram (CELEXA) 20 MG tablet TAKE 1 TABLET(20 MG) BY MOUTH DAILY 90 tablet 3     cyanocobalamin (VITAMIN B12) 1000 MCG/ML injection Inject 1 mL (1,000 mcg) into the muscle every 30 days Order for clinic injection,  No need to fill. 1 mL 11     dextromethorphan 15 MG/5ML syrup Take 10 mLs by mouth 2 times daily as needed        diclofenac (VOLTAREN) 1 % GEL topical gel Apply 2 grams to toes four times daily as needed using enclosed dosing card. 100 g 1     Lactase 250 MG CAPS Take 1 chew tab by mouth as needed (with dairy)        losartan (COZAAR) 25 MG tablet Take 1 tablet (25 mg) by mouth daily 90 tablet 3     metFORMIN (GLUCOPHAGE) 1000 MG tablet Take 1 tablet (1,000 mg) by mouth 2 times daily (with meals) 180 tablet 3     metoprolol (TOPROL-XL) 50 MG 24 hr tablet Take 1 tablet (50 mg) by mouth daily 90 tablet 3     montelukast (SINGULAIR) 10 MG tablet Take 1 tablet (10 mg) by mouth  "daily 90 tablet 3     senna-docusate (SENOKOT-S;PERICOLACE) 8.6-50 MG per tablet Take 1 tablet by mouth 2 times daily To be taken with opioid (narcotic) pain medication to prevent constipation. 60 tablet 1       ROS:     A 10-point review of systems was performed and is positive for that noted above in the HPI and as seen below.  All other areas are negative.     Numbness in feet?  no   Calf pain with walking? no  Recent foot/ankle injury? History of ankle fracture in 2010 secondary to a fall  Weight change over past 12 months? no  Self perception as overweight? no  Recent flu-like symptoms? no  Joint pain other than feet ? no    Social History: Employment:  no;  Exercise/Physical activity:  Physical therapy;  Tobacco use:  no  Social History     Socioeconomic History     Marital status:      Spouse name: Not on file     Number of children: Not on file     Years of education: Not on file     Highest education level: Not on file   Social Needs     Financial resource strain: Not on file     Food insecurity - worry: Not on file     Food insecurity - inability: Not on file     Transportation needs - medical: Not on file     Transportation needs - non-medical: Not on file   Occupational History     Not on file   Tobacco Use     Smoking status: Never Smoker     Smokeless tobacco: Never Used   Substance and Sexual Activity     Alcohol use: No     Drug use: No     Sexual activity: Not Currently   Other Topics Concern     Parent/sibling w/ CABG, MI or angioplasty before 65F 55M? No   Social History Narrative     Not on file       Family history:  Family History   Problem Relation Age of Onset     Hypertension Mother      Heart Disease Mother      C.A.D. Father      Diabetes Sister      Hypertension Brother      Cancer Sister        Rheumatoid arthritis:  no  Foot Problems: no  Diabetes: yes      EXAM:    Vitals: /72   Ht 1.626 m (5' 4\")   Wt 76.2 kg (168 lb)   BMI 28.84 kg/m     BMI: Body mass index is 28.84 " "kg/m .  Height: 5' 4\"    Constitutional/ general:  Pt is in no apparent distress, appears well-nourished.  Cooperative with history and physical exam.     Psych:  The patient answered questions appropriately.  Normal affect.  Seems to have reasonable expectations, in terms of treatment.     Vascular:  Pedal pulses are faintly palpable on the left for both the DP and PT arteries.  CFT < 3 sec.  No edema.      Neuro:  Alert and oriented x 3. Coordinated gait.  Light touch sensation is intact to the L4, L5, S1 distributions. No obvious deficits.  Weakness of left lower extremity with some contracture at the knee that can be reduced.     Derm: localized edema, erythema, pain, crust, hyperkeratotic tissue along the medial nail unit, left hallux.     Musculoskeletal:    Lower extremity muscle strength is diminished on the left.  Reducible knee contracture.    No gross deformities.  Pain on palpation: lateral left ankle.      Radiographic Exam:  X-Ray Findings:  I personally reviewed the left ankle films.  No fracture. Osteopenia.     LEFT ANKLE THREE VIEWS  12/13/2018 3:02 PM      HISTORY: Ankle pain.     COMPARISON: None.     FINDINGS: Mild degenerative change. The ankle mortise appears intact.  There is no acute fracture or dislocation. There are no worrisome bony  lesions.                                                                      IMPRESSION: No acute osseous abnormality demonstrated.     MD Syd HUGHES DPM, MS [facility] of Podiatry/Foot & Ankle Surgery                Again, thank you for allowing me to participate in the care of your patient.        Sincerely,        Syd Matthew DPM    "

## 2018-12-13 NOTE — PATIENT INSTRUCTIONS
Thank you for choosing [facility]!    DR. ORTIZ'S CLINIC LOCATIONS     MONDAY - [place] WEDNESDAY - [place]   [address] [address] [place] [address]   [phone] / [phone] / [phone]       THURSDAY - [place] SCHEDULE SURGERY: [phone]   [address] APPOINTMENTS: [phone]   [place] [address] BILLING QUESTIONS: [phone] / [phone]       [facility]  1. Keep bandage on until that evening or the day after your procedure. If the bandage falls off, start the soaking process.    2. Some bleeding is normal. If bleeding seems excessive to you, place ice on top of your foot for 15-20 minutes and elevate your foot above heart level.    3. Over the counter pain medication, elevating your foot and ice application is all you will need for pain control.    4. If the bandage feels too tight and your toe is throbbing it is ok to remove the bandage and start soaking.     5. For two weeks, soak your foot twice a day in mild skin friendly soap (dish or hand soap) and warm water for 15 minutes. It is ok to soak your foot for a few minutes to loosen the dressing applied in the clinic. After soaking, blot dry and apply a regular band aid.    6. It is normal to experience some discomfort and redness around the nail for several days following the procedure. Drainage will likely appear a red- yellow. This is normal. If your toe is still draining a red-yellow fluid after 2 weeks continue to soak foot.    7. Initial discomfort might last for 2-3 days. You may resume with regular activity as soon as you are comfortable, as long as you keep the wound clean and dry and follow the soaking instruction. It is recommended that you do not enter public swimming pools/hot tubs while your toe is draining.    8. If you are experiencing worsening pain and redness or notice pus after 2-3  days please contact the clinic. If it is after hours call the clinic number and follow the voice prompter. This will direct you to an on call doctor.      Body Mass Index (BMI)  Many things can cause foot and ankle problems. Foot structure, activity level, foot mechanics and injuries are common causes of pain.  One very important issue that often goes unmentioned, is body weight.  Extra weight can cause increased stress on muscles, ligaments, bones and tendons.  Sometimes just a few extra pounds is all it takes to put one over her/his threshold. Without reducing that stress, it can be difficult to alleviate pain. Some people are uncomfortable addressing this issue, but we feel it is important for you to think about it. As Foot &  Ankle specialists, our job is addressing the lower extremity problem and possible causes. Regarding extra body weight, we encourage patients to discuss diet and weight management plans with their primary care doctors. It is this team approach that gives you the best opportunity for pain relief and getting you back on your feet.

## 2018-12-14 DIAGNOSIS — N18.30 TYPE 2 DIABETES MELLITUS WITH STAGE 3 CHRONIC KIDNEY DISEASE, WITHOUT LONG-TERM CURRENT USE OF INSULIN (H): ICD-10-CM

## 2018-12-14 DIAGNOSIS — E11.22 TYPE 2 DIABETES MELLITUS WITH STAGE 3 CHRONIC KIDNEY DISEASE, WITHOUT LONG-TERM CURRENT USE OF INSULIN (H): ICD-10-CM

## 2018-12-14 RX ORDER — CITALOPRAM HYDROBROMIDE 20 MG/1
TABLET ORAL
Qty: 90 TABLET | Refills: 3 | Status: SHIPPED | OUTPATIENT
Start: 2018-12-14 | End: 2019-03-29

## 2018-12-14 ASSESSMENT — ANXIETY QUESTIONNAIRES
3. WORRYING TOO MUCH ABOUT DIFFERENT THINGS: NOT AT ALL
1. FEELING NERVOUS, ANXIOUS, OR ON EDGE: NOT AT ALL
7. FEELING AFRAID AS IF SOMETHING AWFUL MIGHT HAPPEN: NOT AT ALL
2. NOT BEING ABLE TO STOP OR CONTROL WORRYING: NOT AT ALL
6. BECOMING EASILY ANNOYED OR IRRITABLE: NOT AT ALL
5. BEING SO RESTLESS THAT IT IS HARD TO SIT STILL: NOT AT ALL
IF YOU CHECKED OFF ANY PROBLEMS ON THIS QUESTIONNAIRE, HOW DIFFICULT HAVE THESE PROBLEMS MADE IT FOR YOU TO DO YOUR WORK, TAKE CARE OF THINGS AT HOME, OR GET ALONG WITH OTHER PEOPLE: NOT DIFFICULT AT ALL
GAD7 TOTAL SCORE: 0

## 2018-12-14 ASSESSMENT — PATIENT HEALTH QUESTIONNAIRE - PHQ9
SUM OF ALL RESPONSES TO PHQ QUESTIONS 1-9: 0
5. POOR APPETITE OR OVEREATING: NOT AT ALL

## 2018-12-14 NOTE — TELEPHONE ENCOUNTER
Routing refill request to provider for review/approval because:  Drug interaction warning Citalopram and Diclofenac  Ora Rios RN

## 2018-12-14 NOTE — TELEPHONE ENCOUNTER
Patient called to inquire about the status of this request received on 12/8/2018. Writer was able to connect patient with MA to complete PHQ-9. Please address RX request. Thanks!

## 2018-12-14 NOTE — TELEPHONE ENCOUNTER
"Requested Prescriptions   Pending Prescriptions Disp Refills     blood glucose monitoring (IRENE CONTOUR) test strip  Last Written Prescription Date:  12/4/2017  Last Fill Quantity: 200 each,  # refills: 11   Last office visit: 11/26/2018 with prescribing provider:  Bella   Future Office Visit:   Next 5 appointments (look out 90 days)    Dec 21, 2018 10:00 AM CST  Nurse Only with [facility]  [facility] ([address] [address]  [phone]   Jan 03, 2019  2:15 PM CST  Return Visit with Syd Matthew DPM  [facility] [address] [place] [id number] [phone]          200 each 11     Sig: Use to test blood sugars two times daily or as directed.    Diabetic Supplies Protocol Passed - 12/14/2018  1:27 PM       Passed - Patient is 18 years of age or older       Passed - Recent (6 mo) or future (30 days) visit within the authorizing provider's specialty    Patient had office visit in the last 6 months or has a visit in the next 30 days with authorizing provider.  See \"Patient Info\" tab in inbasket, or \"Choose Columns\" in Meds & Orders section of the refill encounter.              "

## 2018-12-14 NOTE — TELEPHONE ENCOUNTER
Low risk interaction with diclofenac since this being used in gel form. Refilled.     Joel Wegener,MD

## 2018-12-14 NOTE — TELEPHONE ENCOUNTER
Did phq-9 with pt over phone.     PHQ-2 Score:     PHQ-2 ( 1999 [employer]) 12/14/2018 12/4/2017   Q1: Little interest or pleasure in doing things 0 0   Q2: Feeling down, depressed or hopeless 0 0   PHQ-2 Score 0 0   Q1: Little interest or pleasure in doing things - -   Q2: Feeling down, depressed or hopeless - -   PHQ-2 Score - -     Kristin Saul, [facility]

## 2018-12-15 ASSESSMENT — ANXIETY QUESTIONNAIRES: GAD7 TOTAL SCORE: 0

## 2018-12-15 NOTE — PROGRESS NOTES
"  ASSESSMENT/PLAN:    Encounter Diagnoses   Name Primary?     Ankle pain Yes     Toe pain, left      Ingrowing nail      Most of the visit was focused on the ingrown toenail.  I suggested partial nail avulsion, as the problem persists despite soaking and an oral antibiotic.  Procedure outlined below.     Ankle pain likely a sprain. No fractures seen on XR and ankle mortise is congruent.  Pain is lessening.  No additional recommendations at this time.     Nail Avulsion Procedure  (non permanent removal)    The procedure was discussed with the patient, including risk of infection, abnormal nail regrowth, and possible need for a future nail procedure.  Post procedure home cares were explained. These cares are important for preventing infection and aiding in timely healing.   Verbal and written consent was obtained.   The site was marked and the \"Time Out\" called.     The base of the left hallux was injected with 2 cc of  2% Lidocaine plain.  The toe was then prepped with betadine solution.  A tourniquet was applied around the base of the toe for hemostasis.   Next the toe was checked for adequate anesthesia.  With the Pt comfortable, the medial nail was freed from the nail bed and marginal soft tissue attachments with a blunt instrument.  ( A nail splitter was then used to make a longitudinal cut 2mm from the medial nail fold.  It was completed, atraumatically, under the eponychium with a [place] blade. ) Next, it was firmly grasped with a hemostat and removed in total.      Silvadene ointment was applied to the nail bed, followed by a compressive dressing.  The tourniquet was removed.  The distal toe turned immediately pink.  The foot was kept elevated for several minutes.  The patient tolerated the procedure well.      Patient is instructed to watch for, and call if,  increasing redness, drainage, and pain after 2-3 days.  Post procedure instructions provided - handout given.      Body mass index is 28.84 " kg/m .    Weight management plan: Patient was referred to their PCP to discuss a diet and exercise plan.      Syd Matthew DPM, FACFAS, MS    [facility] of Podiatry/Foot & Ankle Surgery      ____________________________________________________________________    HPI:       I was asked by Dr. Monzon to evaluate this patient, in consultation, regarding an ingrown toenail, left great toe.     Chief Complaint: toe pain; ingrown toenail, left great toe (medial edge)  Onset of problem: 1 month  Painful  Ratin/10   Frequency:  daily    The pain is made worse with walking, pressure  Previous treatment: soaking, neosporin cephalexin;  Problem persists    Secondary concerns:  Left ankle pain secondary to a fall. This has improved.  History of strike affecting her left lower extremity.     *  Patient Active Problem List   Diagnosis     Hypertension goal BP (blood pressure) < 140/90     Type 2 diabetes, HbA1C goal < 8% (H)     Rosacea     Cerebral artery occlusion with cerebral infarction (H)     Mild major depression (H)     GERD (gastroesophageal reflux disease)     Seasonal allergic rhinitis     Advanced directives, counseling/discussion     History of colonic polyps     Major depression in complete remission (H)     B12 deficiency anemia     Health Care Home     Hyperlipidemia LDL goal <70     Gout     Type 2 diabetes with stage 3 chronic kidney disease GFR 30-59 (H)     Altered mental status     MARI (acute kidney injury) (H)     Type 2 diabetes mellitus with stage 3 chronic kidney disease, without long-term current use of insulin (H)     Cervical cancer screening     ACE-inhibitor cough     History of stroke     Hemiplegia affecting right dominant side (H)     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       atorvastatin (LIPITOR) 40 MG tablet Take 1 tablet (40 mg) by mouth daily 90 tablet 3     baclofen (LIORESAL) 10 MG tablet TAKE 1 TABLET(10 MG) BY MOUTH THREE TIMES DAILY 270 tablet 0     blood glucose monitoring ([place] CONTOUR) test strip Use to test blood sugars two times daily or as directed. 200 each 11     Cholecalciferol (VITAMIN D3) 1000 UNIT TABS Take 1,000 Units by mouth daily        citalopram (CELEXA) 20 MG tablet TAKE 1 TABLET(20 MG) BY MOUTH DAILY 90 tablet 3     cyanocobalamin (VITAMIN B12) 1000 MCG/ML injection Inject 1 mL (1,000 mcg) into the muscle every 30 days Order for clinic injection,  No need to fill. 1 mL 11     dextromethorphan 15 MG/5ML syrup Take 10 mLs by mouth 2 times daily as needed        diclofenac (VOLTAREN) 1 % GEL topical gel Apply 2 grams to toes four times daily as needed using enclosed dosing card. 100 g 1     Lactase 250 MG CAPS Take 1 chew tab by mouth as needed (with dairy)        losartan (COZAAR) 25 MG tablet Take 1 tablet (25 mg) by mouth daily 90 tablet 3     metFORMIN (GLUCOPHAGE) 1000 MG tablet Take 1 tablet (1,000 mg) by mouth 2 times daily (with meals) 180 tablet 3     metoprolol (TOPROL-XL) 50 MG 24 hr tablet Take 1 tablet (50 mg) by mouth daily 90 tablet 3     montelukast (SINGULAIR) 10 MG tablet Take 1 tablet (10 mg) by mouth daily 90 tablet 3     senna-docusate (SENOKOT-S;PERICOLACE) 8.6-50 MG per tablet Take 1 tablet by mouth 2 times daily To be taken with opioid (narcotic) pain medication to prevent constipation. 60 tablet 1       ROS:     A 10-point review of systems  "was performed and is positive for that noted above in the HPI and as seen below.  All other areas are negative.     Numbness in feet?  no   Calf pain with walking? no  Recent foot/ankle injury? History of ankle fracture in 2010 secondary to a fall  Weight change over past 12 months? no  Self perception as overweight? no  Recent flu-like symptoms? no  Joint pain other than feet ? no    Social History: Employment:  no;  Exercise/Physical activity:  Physical therapy;  Tobacco use:  no  Social History     Socioeconomic History     Marital status:      Spouse name: Not on file     Number of children: Not on file     Years of education: Not on file     Highest education level: Not on file   Social Needs     Financial resource strain: Not on file     Food insecurity - worry: Not on file     Food insecurity - inability: Not on file     Transportation needs - medical: Not on file     Transportation needs - non-medical: Not on file   Occupational History     Not on file   Tobacco Use     Smoking status: Never Smoker     Smokeless tobacco: Never Used   Substance and Sexual Activity     Alcohol use: No     Drug use: No     Sexual activity: Not Currently   Other Topics Concern     Parent/sibling w/ CABG, MI or angioplasty before 65F 55M? No   Social History Narrative     Not on file       Family history:  Family History   Problem Relation Age of Onset     Hypertension Mother      Heart Disease Mother      C.A.D. Father      Diabetes Sister      Hypertension Brother      Cancer Sister        Rheumatoid arthritis:  no  Foot Problems: no  Diabetes: yes      EXAM:    Vitals: /72   Ht 1.626 m (5' 4\")   Wt 76.2 kg (168 lb)   BMI 28.84 kg/m    BMI: Body mass index is 28.84 kg/m .  Height: 5' 4\"    Constitutional/ general:  Pt is in no apparent distress, appears well-nourished.  Cooperative with history and physical exam.     Psych:  The patient answered questions appropriately.  Normal affect.  Seems to have reasonable " expectations, in terms of treatment.     Vascular:  Pedal pulses are faintly palpable on the left for both the DP and PT arteries.  CFT < 3 sec.  No edema.      Neuro:  Alert and oriented x 3. Coordinated gait.  Light touch sensation is intact to the L4, L5, S1 distributions. No obvious deficits.  Weakness of left lower extremity with some contracture at the knee that can be reduced.     Derm: localized edema, erythema, pain, crust, hyperkeratotic tissue along the medial nail unit, left hallux.     Musculoskeletal:    Lower extremity muscle strength is diminished on the left.  Reducible knee contracture.    No gross deformities.  Pain on palpation: lateral left ankle.      Radiographic Exam:  X-Ray Findings:  I personally reviewed the left ankle films.  No fracture. Osteopenia.     LEFT ANKLE THREE VIEWS  12/13/2018 3:02 PM      HISTORY: Ankle pain.     COMPARISON: None.     FINDINGS: Mild degenerative change. The ankle mortise appears intact.  There is no acute fracture or dislocation. There are no worrisome bony  lesions.                                                                      IMPRESSION: No acute osseous abnormality demonstrated.     MD Syd HUGHES DPM, FACFAS, MS    [facility] of [facility]iatry/Foot & Ankle Surgery

## 2018-12-16 NOTE — TELEPHONE ENCOUNTER
Prescription approved per [facility] [place].  GLADYS Bravo, BSN, RN

## 2018-12-20 RX ORDER — CYANOCOBALAMIN 1000 UG/ML
1000 INJECTION, SOLUTION INTRAMUSCULAR; SUBCUTANEOUS
Status: CANCELLED | OUTPATIENT
Start: 2018-12-20 | End: 2019-12-15

## 2018-12-21 ENCOUNTER — ALLIED HEALTH/NURSE VISIT (OUTPATIENT)
Dept: NURSING | Facility: CLINIC | Age: 68
End: 2018-12-21
Payer: MEDICARE

## 2018-12-21 VITALS — SYSTOLIC BLOOD PRESSURE: 138 MMHG | DIASTOLIC BLOOD PRESSURE: 74 MMHG

## 2018-12-21 DIAGNOSIS — D51.9 ANEMIA DUE TO VITAMIN B12 DEFICIENCY, UNSPECIFIED B12 DEFICIENCY TYPE: Primary | ICD-10-CM

## 2018-12-21 DIAGNOSIS — D51.9 VITAMIN B12 DEFICIENCY ANEMIA: Primary | ICD-10-CM

## 2018-12-21 PROCEDURE — 99207 ZZC NO CHARGE NURSE ONLY: CPT

## 2018-12-21 RX ORDER — CYANOCOBALAMIN 1000 UG/ML
1000 INJECTION, SOLUTION INTRAMUSCULAR; SUBCUTANEOUS
Status: CANCELLED | OUTPATIENT
Start: 2018-12-21

## 2018-12-21 RX ORDER — CYANOCOBALAMIN 1000 UG/ML
1 INJECTION, SOLUTION INTRAMUSCULAR; SUBCUTANEOUS
Qty: 3 ML | Refills: 3 | OUTPATIENT
Start: 2018-12-21 | End: 2018-12-27

## 2018-12-21 NOTE — Clinical Note
Sir,Could you sign this order for B12. One time only. Dr. Wegener is not in office. Patient advised to contact PCP for future refilld and administration.Rikki Mohamud MA on 12/21/2018 at 10:51 AM

## 2018-12-21 NOTE — NURSING NOTE
The following medication was given:     MEDICATION: Vitamin B12  1000 mcg  ROUTE: IM  SITE: Deltoid - Right  DOSE: 1000 mcg  LOT #: [id number]  :  [employer]  EXPIRATION DATE:  07/2020  NDC: 9933-7641-54    Agus Duque MA

## 2018-12-27 RX ORDER — MEDROXYPROGESTERONE ACETATE 150 MG/ML
150 INJECTION, SUSPENSION INTRAMUSCULAR
Status: DISCONTINUED | OUTPATIENT
Start: 2019-01-25 | End: 2019-01-07 | Stop reason: CLARIF

## 2019-01-03 ENCOUNTER — OFFICE VISIT (OUTPATIENT)
Dept: PODIATRY | Facility: CLINIC | Age: 69
End: 2019-01-03
Payer: MEDICARE

## 2019-01-03 VITALS
HEIGHT: 64 IN | BODY MASS INDEX: 28.68 KG/M2 | DIASTOLIC BLOOD PRESSURE: 74 MMHG | SYSTOLIC BLOOD PRESSURE: 132 MMHG | WEIGHT: 168 LBS

## 2019-01-03 DIAGNOSIS — Z09 SURGERY FOLLOW-UP EXAMINATION: Primary | ICD-10-CM

## 2019-01-03 PROCEDURE — 99212 OFFICE O/P EST SF 10 MIN: CPT | Performed by: PODIATRIST

## 2019-01-03 ASSESSMENT — MIFFLIN-ST. JEOR: SCORE: 1282.04

## 2019-01-03 NOTE — LETTER
1/3/2019         RE: Addis Peña  [address]  [address]  [place] [address]        Dear Colleague,    Thank you for referring your patient, Addis Peña, to [facility]. Please see a copy of my visit note below.    Subjective:   Addis Peña is a 68 year old female who presents today in follow up after a partial nail avulsion, medial edge, left hallux. This was for treatment of an ingrown nail.  She denies any problems. She said that she soaked as instructed and applied Aloe Vera cream.    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  No history of GITA 2 or greater found in epic.   No further cervical cancer screening recommended.   Hm updated.              Type 2 diabetes mellitus with stage 3 chronic kidney disease, without long-term current use of insulin (H) 01/06/2017     Priority: Medium     Altered mental status 12/23/2015     Priority: Medium     MARI (acute kidney injury) (H) 12/23/2015     Priority: Medium     Type 2 diabetes with stage 3 chronic kidney disease GFR 30-59 (H) 10/23/2015     Priority: Medium     Gout 01/28/2014     Priority: Medium     Problem list name updated by automated process. Provider to review       Hyperlipidemia LDL goal <70 01/20/2014     Priority: Medium     Health Care Home 11/25/2013     Priority: Medium     State Tier Level:    Status:  Closed  Care Coordinator: Not active in care coordination at this time     See Letters for [facility] Care Plan         B12 deficiency anemia 06/25/2013     Priority: Medium      Major depression in complete remission (H) 07/06/2012     Priority: Medium     History of colonic polyps 04/13/2012     Priority: Medium     Problem list name updated by automated process. Provider to review       Advanced directives, counseling/discussion 12/09/2011     Priority: Medium     Advance Directive Problem List Overview:   Name Relationship Phone    Primary Health Care Agent            Alternative Health Care Agent          Patient states has Advance Directive and will bring in a copy to clinic. 12/9/2011          Mild major depression (H) 10/07/2011     Priority: Medium     GERD (gastroesophageal reflux disease) 10/07/2011     Priority: Medium     Seasonal allergic rhinitis 10/07/2011     Priority: Medium     Cerebral artery occlusion with cerebral infarction (H) 09/13/2011     Priority: Medium     Problem list name updated by automated process. Provider to review       Rosacea 02/09/2011     Priority: Medium     Hypertension goal BP (blood pressure) < 140/90      Priority: Medium     Type 2 diabetes, HbA1C goal < 8% (H)      Priority: Medium         Objective:    B/P: 132/74, T: Data Unavailable, P: Data Unavailable, R: Data Unavailable  Body mass index is 28.84 kg/m .    Medial edge of the left hallux nail unit appears dry. There is no wound or edema. No erythema. No pain on palpation.    Assessment/Plan:     Encounter Diagnosis   Name Primary?     Surgery follow-up examination Yes     Normal healing status post partial nail avulsion, medial edge, left hallux. No clinical signs of infection.     No additional follow up needed.    Body mass index is 28.84 kg/m .    Weight management plan: Patient was referred to their PCP to discuss a diet and exercise plan.      Syd Matthew DPM            Again, thank you for allowing me to participate in the care of your patient.        Sincerely,        Syd Matthew DPM

## 2019-01-03 NOTE — PROGRESS NOTES
Subjective:   Addis Peña is a 68 year old female who presents today in follow up after a partial nail avulsion, medial edge, left hallux. This was for treatment of an ingrown nail.  She denies any problems. She said that she soaked as instructed and applied Aloe Vera cream.    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  No history of GITA 2 or greater found in epic.   No further cervical cancer screening recommended.   Hm updated.              Type 2 diabetes mellitus with stage 3 chronic kidney disease, without long-term current use of insulin (H) 01/06/2017     Priority: Medium     Altered mental status 12/23/2015     Priority: Medium     MARI (acute kidney injury) (H) 12/23/2015     Priority: Medium     Type 2 diabetes with stage 3 chronic kidney disease GFR 30-59 (H) 10/23/2015     Priority: Medium     Gout 01/28/2014     Priority: Medium     Problem list name updated by automated process. Provider to review       Hyperlipidemia LDL goal <70 01/20/2014     Priority: Medium     Health Care Home 11/25/2013     Priority: Medium     State Tier Level:    Status:  Closed  Care Coordinator: Not active in care coordination at this time     See Letters for [facility] Plan         B12 deficiency anemia 06/25/2013     Priority: Medium     Major depression in complete remission (H) 07/06/2012     Priority: Medium     History of colonic polyps 04/13/2012     Priority: Medium     Problem list name updated by automated process. Provider to review       Advanced directives,  counseling/discussion 12/09/2011     Priority: Medium     Advance Directive Problem List Overview:   Name Relationship Phone    Primary Health Care Agent            Alternative Health Care Agent          Patient states has Advance Directive and will bring in a copy to clinic. 12/9/2011          Mild major depression (H) 10/07/2011     Priority: Medium     GERD (gastroesophageal reflux disease) 10/07/2011     Priority: Medium     Seasonal allergic rhinitis 10/07/2011     Priority: Medium     Cerebral artery occlusion with cerebral infarction (H) 09/13/2011     Priority: Medium     Problem list name updated by automated process. Provider to review       Rosacea 02/09/2011     Priority: Medium     Hypertension goal BP (blood pressure) < 140/90      Priority: Medium     Type 2 diabetes, HbA1C goal < 8% (H)      Priority: Medium         Objective:    B/P: 132/74, T: Data Unavailable, P: Data Unavailable, R: Data Unavailable  Body mass index is 28.84 kg/m .    Medial edge of the left hallux nail unit appears dry. There is no wound or edema. No erythema. No pain on palpation.    Assessment/Plan:     Encounter Diagnosis   Name Primary?     Surgery follow-up examination Yes     Normal healing status post partial nail avulsion, medial edge, left hallux. No clinical signs of infection.     No additional follow up needed.    Body mass index is 28.84 kg/m .    Weight management plan: Patient was referred to their PCP to discuss a diet and exercise plan.      Syd Matthew DPM

## 2019-01-06 DIAGNOSIS — J30.2 SEASONAL ALLERGIC RHINITIS: ICD-10-CM

## 2019-01-07 DIAGNOSIS — D51.9 B12 DEFICIENCY ANEMIA: Primary | ICD-10-CM

## 2019-01-07 RX ORDER — CYANOCOBALAMIN 1000 UG/ML
1000 INJECTION, SOLUTION INTRAMUSCULAR; SUBCUTANEOUS
Status: CANCELLED | OUTPATIENT
Start: 2019-01-08 | End: 2020-01-02

## 2019-01-07 RX ORDER — MONTELUKAST SODIUM 10 MG/1
TABLET ORAL
Qty: 90 TABLET | Refills: 2 | Status: SHIPPED | OUTPATIENT
Start: 2019-01-07 | End: 2019-03-29

## 2019-01-07 RX ORDER — CYANOCOBALAMIN 1000 UG/ML
1000 INJECTION, SOLUTION INTRAMUSCULAR; SUBCUTANEOUS
Status: CANCELLED | OUTPATIENT
Start: 2019-01-07 | End: 2020-01-02

## 2019-01-07 NOTE — PROGRESS NOTES
Patient has MA only on Jan 18th.  Has no active order for Vit B12.  Order pended.  Sign or advise please.  Thanks!      Deyanira Paiz, MSN, RN  Patient Care Supervisor  [facility]

## 2019-01-07 NOTE — TELEPHONE ENCOUNTER
"Requested Prescriptions   Pending Prescriptions Disp Refills     montelukast (SINGULAIR) 10 MG tablet [Pharmacy Med Name: MONTELUKAST 10MG TABLETS]  Last Written Prescription Date:  12/4/2017  Last Fill Quantity: 90 tablet,  # refills: 3   Last Office Visit: 11/26/2018   Future Office Visit:    Next 5 appointments (look out 90 days)    Jan 18, 2019 10:00 AM CST  Nurse Only with HP MEDICAL ASSISTANT  [facility] ([facility]) [address] [place] [phone]   Mar 29, 2019  7:40 AM CDT  PHYSICAL with Joel Daniel Irwin Wegener, MD  [facility] ([facility]) [address] [place] [phone] [phone]        90 tablet 0     Sig: TAKE 1 TABLET(10 MG) BY MOUTH DAILY    Leukotriene Inhibitors Protocol Failed - 1/6/2019  6:28 PM       Failed - Asthma control assessment score within normal limits in last 6 months    Please review ACT score.   No flowsheet data found.         Passed - Patient is age 12 or older    If patient is under 16, ok to refill using age based dosing.        Passed - Medication is active on med list       Passed - Recent (6 mo) or future (30 days) visit within the authorizing provider's specialty    Patient had office visit in the last 6 months or has a visit in the next 30 days with authorizing provider or within the authorizing provider's specialty.  See \"Patient Info\" tab in inbasket, or \"Choose Columns\" in Meds & Orders section of the refill encounter.              "

## 2019-01-07 NOTE — TELEPHONE ENCOUNTER
I cannot find where my note went.  Asthma not on problem list.    Prescription approved per [facility] Refill Protocol.  Nicole Rosa RN

## 2019-01-17 RX ORDER — CYANOCOBALAMIN 1000 UG/ML
1000 INJECTION, SOLUTION INTRAMUSCULAR; SUBCUTANEOUS
Status: CANCELLED | OUTPATIENT
Start: 2019-01-17

## 2019-01-18 ENCOUNTER — ALLIED HEALTH/NURSE VISIT (OUTPATIENT)
Dept: NURSING | Facility: CLINIC | Age: 69
End: 2019-01-18
Payer: MEDICARE

## 2019-01-18 DIAGNOSIS — E53.8 VITAMIN B12 DEFICIENCY (NON ANEMIC): Primary | ICD-10-CM

## 2019-01-18 PROCEDURE — 96372 THER/PROPH/DIAG INJ SC/IM: CPT

## 2019-01-18 RX ORDER — CYANOCOBALAMIN 1000 UG/ML
1000 INJECTION, SOLUTION INTRAMUSCULAR; SUBCUTANEOUS ONCE
Status: COMPLETED | OUTPATIENT
Start: 2019-01-18 | End: 2019-01-18

## 2019-01-18 RX ADMIN — CYANOCOBALAMIN 1000 MCG: 1000 INJECTION, SOLUTION INTRAMUSCULAR; SUBCUTANEOUS at 14:07

## 2019-01-18 NOTE — PROGRESS NOTES
Prior to injection, verified patient identity using patient's name and date of birth.  Due to injection administration, patient instructed to remain in clinic for 15 minutes  afterwards, and to report any adverse reaction to me immediately.    B12 injection    Drug Amount Wasted:  None.  Vial/Syringe: Single dose vial  Expiration Date:  07/2020

## 2019-02-15 ENCOUNTER — ALLIED HEALTH/NURSE VISIT (OUTPATIENT)
Dept: NURSING | Facility: CLINIC | Age: 69
End: 2019-02-15
Payer: MEDICARE

## 2019-02-15 DIAGNOSIS — E11.22 TYPE 2 DIABETES MELLITUS WITH STAGE 3 CHRONIC KIDNEY DISEASE, WITH LONG-TERM CURRENT USE OF INSULIN (H): ICD-10-CM

## 2019-02-15 DIAGNOSIS — I10 HYPERTENSION, UNCONTROLLED: ICD-10-CM

## 2019-02-15 DIAGNOSIS — Z79.4 TYPE 2 DIABETES MELLITUS WITH STAGE 3 CHRONIC KIDNEY DISEASE, WITH LONG-TERM CURRENT USE OF INSULIN (H): ICD-10-CM

## 2019-02-15 DIAGNOSIS — D51.9 ANEMIA DUE TO VITAMIN B12 DEFICIENCY, UNSPECIFIED B12 DEFICIENCY TYPE: Primary | ICD-10-CM

## 2019-02-15 DIAGNOSIS — I10 HYPERTENSION GOAL BP (BLOOD PRESSURE) < 140/90: ICD-10-CM

## 2019-02-15 DIAGNOSIS — N18.30 TYPE 2 DIABETES MELLITUS WITH STAGE 3 CHRONIC KIDNEY DISEASE, WITH LONG-TERM CURRENT USE OF INSULIN (H): ICD-10-CM

## 2019-02-15 PROCEDURE — 96372 THER/PROPH/DIAG INJ SC/IM: CPT

## 2019-02-15 PROCEDURE — 99207 ZZC NO CHARGE NURSE ONLY: CPT

## 2019-02-15 RX ORDER — CYANOCOBALAMIN 1000 UG/ML
1 INJECTION, SOLUTION INTRAMUSCULAR; SUBCUTANEOUS
Qty: 3 ML | Refills: 3 | OUTPATIENT
Start: 2019-02-15 | End: 2020-02-15

## 2019-02-15 RX ORDER — CYANOCOBALAMIN 1000 UG/ML
1000 INJECTION, SOLUTION INTRAMUSCULAR; SUBCUTANEOUS
Status: CANCELLED | OUTPATIENT
Start: 2019-02-15

## 2019-02-15 RX ORDER — CYANOCOBALAMIN 1000 UG/ML
1000 INJECTION, SOLUTION INTRAMUSCULAR; SUBCUTANEOUS
Status: DISCONTINUED | OUTPATIENT
Start: 2019-02-15 | End: 2022-02-11

## 2019-02-15 RX ADMIN — CYANOCOBALAMIN 1000 MCG: 1000 INJECTION, SOLUTION INTRAMUSCULAR; SUBCUTANEOUS at 10:31

## 2019-02-15 NOTE — TELEPHONE ENCOUNTER
"Requested Prescriptions   Pending Prescriptions Disp Refills     losartan (COZAAR) 25 MG tablet [Pharmacy Med Name: LOSARTAN 25MG TABLETS]  Last Written Prescription Date:  12/4/17  Last Fill Quantity: 90 TABLET,  # refills: 3   Last office visit: 11/26/2018 with prescribing provider:  GRETTA   Future Office Visit:   Next 5 appointments (look out 90 days)    Mar 22, 2019 10:00 AM CDT  Nurse Only with HP MEDICAL ASSISTANT  [facility] ([facility]) [address] [place] [phone]   Mar 29, 2019  7:40 AM CDT  PHYSICAL with Joel Daniel Irwin Wegener, MD  [facility] ([facility]) [address] [place] [phone] [phone]          90 tablet 0     Sig: TAKE 1 TABLET(25 MG) BY MOUTH DAILY    Angiotensin-II Receptors Passed - 2/15/2019  3:37 PM       Passed - Blood pressure under 140/90 in past 12 months    BP Readings from Last 3 Encounters:   01/03/19 132/74   12/21/18 138/74   12/13/18 134/72                Passed - Recent (12 mo) or future (30 days) visit within the authorizing provider's specialty    Patient had office visit in the last 12 months or has a visit in the next 30 days with authorizing provider or within the authorizing provider's specialty.  See \"Patient Info\" tab in inbasket, or \"Choose Columns\" in Meds & Orders section of the refill encounter.             Passed - Medication is active on med list       Passed - Patient is age 18 or older       Passed - No active pregnancy on record       Passed - Normal serum creatinine on file in past 12 months    Recent Labs   Lab Test 10/12/18  0747   CR 0.81            Passed - Normal serum potassium on file in past 12 months    Recent Labs   Lab Test 10/12/18  0747   POTASSIUM 4.0                   Passed - No positive pregnancy test in past 12 months        metFORMIN (GLUCOPHAGE) 1000 MG tablet [Pharmacy Med Name: METFORMIN 1000MG TABLETS]  Last Written " Prescription Date:  12/4/17  Last Fill Quantity: 180 TABLET,  # refills: 3   Last office visit: 11/26/2018 with prescribing provider:  GRETTA   Future Office Visit:   Next 5 appointments (look out 90 days)    Mar 22, 2019 10:00 AM CDT  Nurse Only with HP MEDICAL ASSISTANT  [facility] ([facility]) [address] [facility] [address]  [phone]   Mar 29, 2019  7:40 AM CDT  PHYSICAL with Joel Daniel Irwin Wegener, MD  [facility] ([facility]) [address] [place] [phone]          180 tablet 0     Sig: TAKE 1 TABLET(1000 MG) BY MOUTH TWICE DAILY WITH MEALS    Biguanide Agents Failed - 2/15/2019  3:37 PM       Failed - Patient has documented LDL within the past 12 mos.    Recent Labs   Lab Test 12/04/17  0831   LDL 51            Passed - Blood pressure less than 140/90 in past 6 months    BP Readings from Last 3 Encounters:   01/03/19 132/74   12/21/18 138/74   12/13/18 134/72                Passed - Patient has had a Microalbumin in the past 15 mos.    Recent Labs   Lab Test 12/04/17  0831   MICROL 191   UMALCR 176.85*            Passed - Patient is age 10 or older       Passed - Patient has documented A1c within the specified period of time.    If HgbA1C is 8 or greater, it needs to be on file within the past 3 months.  If less than 8, must be on file within the past 6 months.     Recent Labs   Lab Test 10/12/18  0747   A1C 7.8*            Passed - Patient's CR is NOT>1.4 OR Patient's EGFR is NOT<45 within past 12 mos.    Recent Labs   Lab Test 10/12/18  0747   GFRESTIMATED 71   GFRESTBLACK 86       Recent Labs   Lab Test 10/12/18  0747   CR 0.81            Passed - Patient does NOT have a diagnosis of CHF.       Passed - Medication is active on med list       Passed - Patient is not pregnant       Passed - Patient has not had a positive pregnancy test within the past 12 mos.        Passed - Recent (6 mo)  "or future (30 days) visit within the authorizing provider's specialty    Patient had office visit in the last 6 months or has a visit in the next 30 days with authorizing provider or within the authorizing provider's specialty.  See \"Patient Info\" tab in inbasket, or \"Choose Columns\" in Meds & Orders section of the refill encounter.            metoprolol succinate ER (TOPROL-XL) 50 MG 24 hr tablet [Pharmacy Med Name: METOPROLOL ER SUCCINATE 50MG TABS]  Last Written Prescription Date:  12/4/17  Last Fill Quantity: 90 TABLET,  # refills: 3   Last office visit: 11/26/2018 with prescribing provider:  GRETTA   Future Office Visit:   Next 5 appointments (look out 90 days)    Mar 22, 2019 10:00 AM CDT  Nurse Only with HP MEDICAL ASSISTANT  [facility] ([facility]) [address] [place] [phone]  [phone]   Mar 29, 2019  7:40 AM CDT  PHYSICAL with Joel Daniel Irwin Wegener, MD  [facility] ([facility]) [address] [place] [phone]  [phone]          90 tablet 0     Sig: TAKE 1 TABLET(50 MG) BY MOUTH DAILY    Beta-Blockers Protocol Passed - 2/15/2019  3:37 PM       Passed - Blood pressure under 140/90 in past 12 months    BP Readings from Last 3 Encounters:   01/03/19 132/74   12/21/18 138/74   12/13/18 134/72                Passed - Patient is age 6 or older       Passed - Recent (12 mo) or future (30 days) visit within the authorizing provider's specialty    Patient had office visit in the last 12 months or has a visit in the next 30 days with authorizing provider or within the authorizing provider's specialty.  See \"Patient Info\" tab in inbasket, or \"Choose Columns\" in Meds & Orders section of the refill encounter.             Passed - Medication is active on med list          "

## 2019-02-19 RX ORDER — METOPROLOL SUCCINATE 50 MG/1
TABLET, EXTENDED RELEASE ORAL
Qty: 90 TABLET | Refills: 0 | Status: SHIPPED | OUTPATIENT
Start: 2019-02-19 | End: 2019-03-29

## 2019-02-19 RX ORDER — LOSARTAN POTASSIUM 25 MG/1
TABLET ORAL
Qty: 90 TABLET | Refills: 0 | Status: SHIPPED | OUTPATIENT
Start: 2019-02-19 | End: 2019-03-29

## 2019-02-22 DIAGNOSIS — E78.5 HYPERLIPIDEMIA LDL GOAL <70: Primary | ICD-10-CM

## 2019-02-22 NOTE — TELEPHONE ENCOUNTER
"Requested Prescriptions   Pending Prescriptions Disp Refills     atorvastatin (LIPITOR) 40 MG tablet [Pharmacy Med Name: ATORVASTATIN 40MG TABLETS]  Last Written Prescription Date:  12/4/2017  Last Fill Quantity: 90 tabs,  # refills: 3   Last office visit: 11/26/2018 with prescribing provider:  Na   Future Office Visit:   Next 5 appointments (look out 90 days)    Mar 22, 2019 10:00 AM CDT  Nurse Only with HP MEDICAL ASSISTANT  [facility] ([facility]) [address] [place] [phone]   Mar 29, 2019  7:40 AM CDT  PHYSICAL with Joel Daniel Irwin Wegener, MD  [facility] ([facility]) [address] [place] [phone]          90 tablet 0     Sig: TAKE 1 TABLET(40 MG) BY MOUTH DAILY    Statins Protocol Failed - 2/22/2019  3:04 PM       Failed - LDL on file in past 12 months    Recent Labs   Lab Test 12/04/17  0831   LDL 51            Passed - No abnormal creatine kinase in past 12 months    No lab results found.            Passed - Recent (12 mo) or future (30 days) visit within the authorizing provider's specialty    Patient had office visit in the last 12 months or has a visit in the next 30 days with authorizing provider or within the authorizing provider's specialty.  See \"Patient Info\" tab in inbasket, or \"Choose Columns\" in Meds & Orders section of the refill encounter.             Passed - Medication is active on med list       Passed - Patient is age 18 or older       Passed - No active pregnancy on record       Passed - No positive pregnancy test in past 12 months          "

## 2019-02-25 DIAGNOSIS — E78.5 HYPERLIPIDEMIA LDL GOAL <70: ICD-10-CM

## 2019-02-25 RX ORDER — ATORVASTATIN CALCIUM 40 MG/1
TABLET, FILM COATED ORAL
Qty: 30 TABLET | Refills: 0 | Status: SHIPPED | OUTPATIENT
Start: 2019-02-25 | End: 2019-02-25

## 2019-02-25 NOTE — TELEPHONE ENCOUNTER
"Requested Prescriptions   Pending Prescriptions Disp Refills     atorvastatin (LIPITOR) 40 MG tablet [Pharmacy Med Name: ATORVASTATIN 40MG TABLETS]  Last Written Prescription Date:  2/25/2019  Last Fill Quantity: 30 tablet,  # refills: 0   Last Office Visit: 11/26/2018   Future Office Visit:    Next 5 appointments (look out 90 days)    Mar 22, 2019 10:00 AM CDT  Nurse Only with HP MEDICAL ASSISTANT  [facility] ([facility]) [address] [place] [phone]   Mar 29, 2019  7:40 AM CDT  PHYSICAL with Joel Daniel Irwin Wegener, MD  [facility] ([facility]) [address] [place] [address] [phone]        90 tablet 0     Sig: TAKE 1 TABLET BY MOUTH EVERY DAY    Statins Protocol Failed - 2/25/2019 11:11 AM       Failed - LDL on file in past 12 months    Recent Labs   Lab Test 12/04/17  0831   LDL 51          Passed - No abnormal creatine kinase in past 12 months    No lab results found.          Passed - Recent (12 mo) or future (30 days) visit within the authorizing provider's specialty    Patient had office visit in the last 12 months or has a visit in the next 30 days with authorizing provider or within the authorizing provider's specialty.  See \"Patient Info\" tab in inbasket, or \"Choose Columns\" in Meds & Orders section of the refill encounter.           Passed - Medication is active on med list       Passed - Patient is age 18 or older       Passed - No active pregnancy on record       Passed - No positive pregnancy test in past 12 months          "

## 2019-02-25 NOTE — TELEPHONE ENCOUNTER
,  --Please contact patient  and ask her to schedule a lab-only for annual cholesterol check..    -- A refill order for below medication  was sent to the pharmacy.         Thank you,  Nicole Rosa RN

## 2019-02-26 RX ORDER — ATORVASTATIN CALCIUM 40 MG/1
TABLET, FILM COATED ORAL
Qty: 30 TABLET | Refills: 0 | Status: SHIPPED | OUTPATIENT
Start: 2019-02-26 | End: 2019-03-29

## 2019-02-27 ENCOUNTER — TELEPHONE (OUTPATIENT)
Dept: FAMILY MEDICINE | Facility: CLINIC | Age: 69
End: 2019-02-27

## 2019-02-27 NOTE — TELEPHONE ENCOUNTER
Reason for Call:  Other call back    Detailed comments: pt states she got a call from [employer] and said they that her medications were delayed until they get authorization from the provider but she did not know what medications they were. Please advise.   Phone Number Patient can be reached at: Home number on file [phone] (home)    Best Time: asap    Can we leave a detailed message on this number? YES    Call taken on 2/27/2019 at 10:11 AM by Janene Hartley

## 2019-02-27 NOTE — TELEPHONE ENCOUNTER
Team coordinators-Please inform patient:    1. Atorvastatin refilled on 2/26/19.  2. Metoprolol, Atorvastatin, Losartan and Metformin refilled on 2/19/19  3. Patient to please follow up with her pharmacy for these refills    Thank you!  GLADYS Bravo, BSN, RN

## 2019-03-09 DIAGNOSIS — I63.9 CEREBROVASCULAR ACCIDENT (CVA), UNSPECIFIED MECHANISM (H): ICD-10-CM

## 2019-03-09 DIAGNOSIS — I10 HYPERTENSION GOAL BP (BLOOD PRESSURE) < 140/90: ICD-10-CM

## 2019-03-11 NOTE — TELEPHONE ENCOUNTER
"Requested Prescriptions   Pending Prescriptions Disp Refills     amLODIPine (NORVASC) 10 MG tablet [Pharmacy Med Name: AMLODIPINE BESYLATE 10MG TABLETS]  Last Written Prescription Date:  12/12/2018  Last Fill Quantity: 90 tablet,  # refills: 0   Last Office Visit: 11/26/2018   Future Office Visit:    Next 5 appointments (look out 90 days)    Mar 22, 2019 10:00 AM CDT  Nurse Only with HP MEDICAL ASSISTANT  [facility] ([facility]) [address] [place] [phone]   Mar 29, 2019  7:40 AM CDT  PHYSICAL with Joel Daniel Irwin Wegener, MD  [facility] ([facility]) [address] [place] [phone]        90 tablet 0     Sig: TAKE 1 TABLET(10 MG) BY MOUTH DAILY    Calcium Channel Blockers Protocol  Passed - 3/9/2019 12:28 PM       Passed - Blood pressure under 140/90 in past 12 months    BP Readings from Last 3 Encounters:   01/03/19 132/74   12/21/18 138/74   12/13/18 134/72          Passed - Recent (12 mo) or future (30 days) visit within the authorizing provider's specialty    Patient had office visit in the last 12 months or has a visit in the next 30 days with authorizing provider or within the authorizing provider's specialty.  See \"Patient Info\" tab in inbasket, or \"Choose Columns\" in Meds & Orders section of the refill encounter.           Passed - Medication is active on med list       Passed - Patient is age 18 or older       Passed - No active pregnancy on record       Passed - Normal serum creatinine on file in past 12 months    Recent Labs   Lab Test 10/12/18  0747   CR 0.81          Passed - No positive pregnancy test in past 12 months     _________________________________________________________________________________________________________________________       BACLOFEN 10MG TABLETS      Last Written Prescription Date:  12/12/2018  Last Fill Quantity: 270 tablet,   # refills: 0  Last " Office Visit: 11/26/2018  Future Office visit:    Next 5 appointments (look out 90 days)    Mar 22, 2019 10:00 AM CDT  Nurse Only with HP MEDICAL ASSISTANT  [facility] ([facility]) [address] [place] [phone]  [phone]   Mar 29, 2019  7:40 AM CDT  PHYSICAL with Joel Daniel Irwin Wegener, MD  [facility] ([facility]) [address] [place] [phone]         Routing refill request to provider for review/approval because:  Drug not on the [facility], [facility] or  Health refill protocol or controlled substance

## 2019-03-12 RX ORDER — AMLODIPINE BESYLATE 10 MG/1
TABLET ORAL
Qty: 90 TABLET | Refills: 2 | Status: SHIPPED | OUTPATIENT
Start: 2019-03-12 | End: 2019-03-29

## 2019-03-13 RX ORDER — BACLOFEN 10 MG/1
TABLET ORAL
Qty: 270 TABLET | Refills: 3 | Status: SHIPPED | OUTPATIENT
Start: 2019-03-13 | End: 2019-03-29

## 2019-03-22 ENCOUNTER — ALLIED HEALTH/NURSE VISIT (OUTPATIENT)
Dept: NURSING | Facility: CLINIC | Age: 69
End: 2019-03-22
Payer: MEDICARE

## 2019-03-22 DIAGNOSIS — D51.9 B12 DEFICIENCY ANEMIA: Primary | ICD-10-CM

## 2019-03-22 PROCEDURE — 99207 ZZC NO CHARGE NURSE ONLY: CPT

## 2019-03-22 PROCEDURE — 96372 THER/PROPH/DIAG INJ SC/IM: CPT

## 2019-03-22 RX ADMIN — CYANOCOBALAMIN 1000 MCG: 1000 INJECTION, SOLUTION INTRAMUSCULAR; SUBCUTANEOUS at 10:14

## 2019-03-22 NOTE — PROGRESS NOTES
Prior to injection, verified patient identity using patient's name and date of birth.  Due to injection administration, patient instructed to remain in clinic for 15 minutes  afterwards, and to report any adverse reaction to me immediately.    Cyanocobalamin    Drug Amount Wasted:  None.  Vial/Syringe: Single dose vial  Expiration Date:  07/2020

## 2019-03-29 ENCOUNTER — OFFICE VISIT (OUTPATIENT)
Dept: FAMILY MEDICINE | Facility: CLINIC | Age: 69
End: 2019-03-29
Payer: MEDICARE

## 2019-03-29 VITALS
WEIGHT: 166 LBS | SYSTOLIC BLOOD PRESSURE: 128 MMHG | BODY MASS INDEX: 28.34 KG/M2 | RESPIRATION RATE: 18 BRPM | HEART RATE: 67 BPM | TEMPERATURE: 97.9 F | HEIGHT: 64 IN | OXYGEN SATURATION: 95 % | DIASTOLIC BLOOD PRESSURE: 62 MMHG

## 2019-03-29 DIAGNOSIS — N18.30 TYPE 2 DIABETES MELLITUS WITH STAGE 3 CHRONIC KIDNEY DISEASE, WITHOUT LONG-TERM CURRENT USE OF INSULIN (H): ICD-10-CM

## 2019-03-29 DIAGNOSIS — Z12.11 SPECIAL SCREENING FOR MALIGNANT NEOPLASMS, COLON: ICD-10-CM

## 2019-03-29 DIAGNOSIS — Z86.0101 H/O ADENOMATOUS POLYP OF COLON: ICD-10-CM

## 2019-03-29 DIAGNOSIS — N18.30 TYPE 2 DIABETES MELLITUS WITH STAGE 3 CHRONIC KIDNEY DISEASE, WITH LONG-TERM CURRENT USE OF INSULIN (H): ICD-10-CM

## 2019-03-29 DIAGNOSIS — E78.5 HYPERLIPIDEMIA LDL GOAL <70: ICD-10-CM

## 2019-03-29 DIAGNOSIS — E11.22 TYPE 2 DIABETES MELLITUS WITH STAGE 3 CHRONIC KIDNEY DISEASE, WITHOUT LONG-TERM CURRENT USE OF INSULIN (H): ICD-10-CM

## 2019-03-29 DIAGNOSIS — I63.9 CEREBROVASCULAR ACCIDENT (CVA), UNSPECIFIED MECHANISM (H): ICD-10-CM

## 2019-03-29 DIAGNOSIS — Z00.00 WELL WOMAN EXAM WITHOUT GYNECOLOGICAL EXAM: Primary | ICD-10-CM

## 2019-03-29 DIAGNOSIS — D51.9 ANEMIA DUE TO VITAMIN B12 DEFICIENCY, UNSPECIFIED B12 DEFICIENCY TYPE: ICD-10-CM

## 2019-03-29 DIAGNOSIS — Z79.4 TYPE 2 DIABETES MELLITUS WITH STAGE 3 CHRONIC KIDNEY DISEASE, WITH LONG-TERM CURRENT USE OF INSULIN (H): ICD-10-CM

## 2019-03-29 DIAGNOSIS — I10 HYPERTENSION, UNCONTROLLED: ICD-10-CM

## 2019-03-29 DIAGNOSIS — I69.351 HEMIPLEGIA OF RIGHT DOMINANT SIDE AS LATE EFFECT OF CEREBRAL INFARCTION, UNSPECIFIED HEMIPLEGIA TYPE (H): ICD-10-CM

## 2019-03-29 DIAGNOSIS — E11.22 TYPE 2 DIABETES MELLITUS WITH STAGE 3 CHRONIC KIDNEY DISEASE, WITH LONG-TERM CURRENT USE OF INSULIN (H): ICD-10-CM

## 2019-03-29 DIAGNOSIS — F33.0 MAJOR DEPRESSIVE DISORDER, RECURRENT EPISODE, MILD (H): ICD-10-CM

## 2019-03-29 DIAGNOSIS — J30.2 SEASONAL ALLERGIC RHINITIS, UNSPECIFIED TRIGGER: ICD-10-CM

## 2019-03-29 DIAGNOSIS — I10 HYPERTENSION GOAL BP (BLOOD PRESSURE) < 140/90: ICD-10-CM

## 2019-03-29 LAB
ALBUMIN SERPL-MCNC: 4 G/DL (ref 3.4–5)
ALP SERPL-CCNC: 96 U/L (ref 40–150)
ALT SERPL W P-5'-P-CCNC: 15 U/L (ref 0–50)
ANION GAP SERPL CALCULATED.3IONS-SCNC: 9 MMOL/L (ref 3–14)
AST SERPL W P-5'-P-CCNC: 15 U/L (ref 0–45)
BILIRUB SERPL-MCNC: 0.3 MG/DL (ref 0.2–1.3)
BUN SERPL-MCNC: 25 MG/DL (ref 7–30)
CALCIUM SERPL-MCNC: 9.5 MG/DL (ref 8.5–10.1)
CHLORIDE SERPL-SCNC: 107 MMOL/L (ref 94–109)
CHOLEST SERPL-MCNC: 142 MG/DL
CO2 SERPL-SCNC: 26 MMOL/L (ref 20–32)
CREAT SERPL-MCNC: 0.8 MG/DL (ref 0.52–1.04)
CREAT UR-MCNC: 92 MG/DL
ERYTHROCYTE [DISTWIDTH] IN BLOOD BY AUTOMATED COUNT: 14.4 % (ref 10–15)
FOLATE SERPL-MCNC: 10.5 NG/ML
GFR SERPL CREATININE-BSD FRML MDRD: 77 ML/MIN/{1.73_M2}
GLUCOSE SERPL-MCNC: 183 MG/DL (ref 70–99)
HBA1C MFR BLD: 7.6 % (ref 0–5.6)
HCT VFR BLD AUTO: 39 % (ref 35–47)
HDLC SERPL-MCNC: 54 MG/DL
HGB BLD-MCNC: 12.1 G/DL (ref 11.7–15.7)
LDLC SERPL CALC-MCNC: 66 MG/DL
MCH RBC QN AUTO: 28.9 PG (ref 26.5–33)
MCHC RBC AUTO-ENTMCNC: 31 G/DL (ref 31.5–36.5)
MCV RBC AUTO: 93 FL (ref 78–100)
MICROALBUMIN UR-MCNC: 719 MG/L
MICROALBUMIN/CREAT UR: 785.79 MG/G CR (ref 0–25)
NONHDLC SERPL-MCNC: 88 MG/DL
PLATELET # BLD AUTO: 270 10E9/L (ref 150–450)
POTASSIUM SERPL-SCNC: 4.9 MMOL/L (ref 3.4–5.3)
PROT SERPL-MCNC: 7.2 G/DL (ref 6.8–8.8)
RBC # BLD AUTO: 4.19 10E12/L (ref 3.8–5.2)
SODIUM SERPL-SCNC: 142 MMOL/L (ref 133–144)
TRIGL SERPL-MCNC: 110 MG/DL
TSH SERPL DL<=0.005 MIU/L-ACNC: 1.08 MU/L (ref 0.4–4)
VIT B12 SERPL-MCNC: 560 PG/ML (ref 193–986)
WBC # BLD AUTO: 7.6 10E9/L (ref 4–11)

## 2019-03-29 PROCEDURE — 85027 COMPLETE CBC AUTOMATED: CPT | Performed by: FAMILY MEDICINE

## 2019-03-29 PROCEDURE — 83036 HEMOGLOBIN GLYCOSYLATED A1C: CPT | Performed by: FAMILY MEDICINE

## 2019-03-29 PROCEDURE — 80061 LIPID PANEL: CPT | Performed by: FAMILY MEDICINE

## 2019-03-29 PROCEDURE — 82607 VITAMIN B-12: CPT | Performed by: FAMILY MEDICINE

## 2019-03-29 PROCEDURE — 84443 ASSAY THYROID STIM HORMONE: CPT | Performed by: FAMILY MEDICINE

## 2019-03-29 PROCEDURE — 80053 COMPREHEN METABOLIC PANEL: CPT | Performed by: FAMILY MEDICINE

## 2019-03-29 PROCEDURE — 82746 ASSAY OF FOLIC ACID SERUM: CPT | Performed by: FAMILY MEDICINE

## 2019-03-29 PROCEDURE — G0439 PPPS, SUBSEQ VISIT: HCPCS | Performed by: FAMILY MEDICINE

## 2019-03-29 PROCEDURE — 36415 COLL VENOUS BLD VENIPUNCTURE: CPT | Performed by: FAMILY MEDICINE

## 2019-03-29 PROCEDURE — 82043 UR ALBUMIN QUANTITATIVE: CPT | Performed by: FAMILY MEDICINE

## 2019-03-29 RX ORDER — BACLOFEN 10 MG/1
10 TABLET ORAL 3 TIMES DAILY PRN
Qty: 270 TABLET | Refills: 3 | Status: SHIPPED | OUTPATIENT
Start: 2019-03-29 | End: 2020-10-20

## 2019-03-29 RX ORDER — AMLODIPINE BESYLATE 10 MG/1
10 TABLET ORAL DAILY
Qty: 90 TABLET | Refills: 3 | Status: SHIPPED | OUTPATIENT
Start: 2019-03-29 | End: 2020-10-20

## 2019-03-29 RX ORDER — ATORVASTATIN CALCIUM 40 MG/1
40 TABLET, FILM COATED ORAL DAILY
Qty: 90 TABLET | Refills: 3 | Status: SHIPPED | OUTPATIENT
Start: 2019-03-29 | End: 2020-10-20

## 2019-03-29 RX ORDER — LOSARTAN POTASSIUM 25 MG/1
25 TABLET ORAL DAILY
Qty: 90 TABLET | Refills: 3 | Status: SHIPPED | OUTPATIENT
Start: 2019-03-29 | End: 2020-10-20

## 2019-03-29 RX ORDER — MONTELUKAST SODIUM 10 MG/1
10 TABLET ORAL DAILY
Qty: 90 TABLET | Refills: 3 | Status: SHIPPED | OUTPATIENT
Start: 2019-03-29 | End: 2020-10-20

## 2019-03-29 RX ORDER — CITALOPRAM HYDROBROMIDE 20 MG/1
20 TABLET ORAL DAILY
Qty: 90 TABLET | Refills: 3 | Status: SHIPPED | OUTPATIENT
Start: 2019-03-29 | End: 2019-05-09

## 2019-03-29 RX ORDER — METOPROLOL SUCCINATE 50 MG/1
50 TABLET, EXTENDED RELEASE ORAL DAILY
Qty: 90 TABLET | Refills: 3 | Status: SHIPPED | OUTPATIENT
Start: 2019-03-29 | End: 2020-10-20

## 2019-03-29 ASSESSMENT — ENCOUNTER SYMPTOMS
NERVOUS/ANXIOUS: 0
ABDOMINAL PAIN: 0
CHILLS: 0
PALPITATIONS: 0
ARTHRALGIAS: 0
HEMATURIA: 0
PARESTHESIAS: 0
MYALGIAS: 0
HEADACHES: 0
HEMATOCHEZIA: 0
SHORTNESS OF BREATH: 0
COUGH: 0
DIARRHEA: 0
SORE THROAT: 0
NAUSEA: 0
CONSTIPATION: 0
WEAKNESS: 0
DYSURIA: 0
JOINT SWELLING: 0
FREQUENCY: 0
EYE PAIN: 0
FEVER: 0
DIZZINESS: 0

## 2019-03-29 ASSESSMENT — ACTIVITIES OF DAILY LIVING (ADL)
CURRENT_FUNCTION: PREPARING MEALS REQUIRES ASSISTANCE
CURRENT_FUNCTION: LAUNDRY REQUIRES ASSISTANCE
CURRENT_FUNCTION: TRANSPORTATION REQUIRES ASSISTANCE
CURRENT_FUNCTION: BATHING REQUIRES ASSISTANCE

## 2019-03-29 ASSESSMENT — MIFFLIN-ST. JEOR: SCORE: 1272.97

## 2019-03-29 NOTE — PROGRESS NOTES
"SUBJECTIVE:   Addis Peña is a 68 year old female who presents for Preventive Visit.  Are you in the first 12 months of your Medicare coverage?  No    Annual Wellness Visit     In general, how would you rate your overall health?  Good    Frequency of exercise:  6-7 days/week    Do you usually eat at least 4 servings of fruit and vegetables a day, include whole grains    & fiber and avoid regularly eating high fat or \"junk\" foods?  No    Taking medications regularly:  Yes    Medication side effects:  Not applicable    Ability to successfully perform activities of daily living:  Transportation requires assistance, preparing meals requires assistance, bathing requires assistance and laundry requires assistance    Home Safety:  No safety concerns identified    Hearing Impairment:  No hearing concerns    In the past 6 months, have you been bothered by leaking of urine?  No    In general, how would you rate your overall mental or emotional health?  Good    PHQ-2 Total Score: 0    Additional concerns today:  No    Do you feel safe in your environment? Yes    Do you have a Health Care Directive? Yes: Advance Directive has been received and scanned.      Fall risk  Fallen 2 or more times in the past year?: No  Any fall with injury in the past year?: No  click delete button to remove this line now  Cognitive Screening   1) Repeat 3 items (Leader, Season, Table)   - 2  2) Clock draw:  - unable, stroke  3) 3 item recall: 2  Results: decreased    Mini-CogTM Copyright TOBY Rodriguez. Licensed by the author for use in [facility]; reprinted with permission (carina@.[facility]). All rights reserved.      Do you have sleep apnea, excessive snoring or daytime drowsiness?: no    Reviewed and updated as needed this visit by clinical staff  Tobacco  Allergies  Meds  Med Hx  Surg Hx  Fam Hx  Soc Hx        Reviewed and updated as needed this visit by Provider        Social History     Tobacco Use     Smoking status: Never " Smoker     Smokeless tobacco: Never Used   Substance Use Topics     Alcohol use: No       Alcohol Use 3/29/2019   If you drink alcohol do you typically have greater than 3 drinks per day OR greater than 7 drinks per week? Not Applicable           PROBLEMS TO ADD ON...  -------------------------------------  Doing quite well.  Cared for well by     Current providers sharing in care for this patient include:   Patient Care Team:  Wegener, Joel Daniel Irwin, MD as PCP - General (Family Practice)  Wegener, Joel Daniel Irwin, MD as Assigned PCP  Madelaine Roland MD as MD (Urology)  Wegener, Joel Daniel Irwin, MD as Referring Physician (Family Practice)  Annette Perez, TYLER as Registered Nurse (Urology)    The following health maintenance items are reviewed in Epic and correct as of today:  Health Maintenance   Topic Date Due     ZOSTER IMMUNIZATION (2 of 3) 08/31/2012     FOOT EXAM Q1 YEAR  12/04/2018     MEDICARE ANNUAL WELLNESS VISIT  12/04/2018     LIPID MONITORING Q1 YEAR  12/04/2018     MICROALBUMIN Q1 YEAR  12/04/2018     COLONOSCOPY Q3 YR  12/04/2018     TSH W/ FREE T4 REFLEX Q2 YEAR  01/06/2019     BMP Q6 MOS  04/12/2019     A1C Q6 MO  04/12/2019     CBC Q1 YR  05/04/2019     PHQ-9 Q6 MONTHS  06/08/2019     EYE EXAM Q1 YEAR  10/31/2019     RICCO QUESTIONNAIRE 1 YEAR  12/08/2019     DTAP/TDAP/TD IMMUNIZATION (2 - Td) 02/01/2020     FALL RISK ASSESSMENT  03/29/2020     MAMMO SCREEN Q2 YR (SYSTEM ASSIGNED)  11/30/2020     ADVANCE DIRECTIVE PLANNING Q5 YRS  05/04/2023     DEXA SCAN SCREENING (SYSTEM ASSIGNED)  Completed     DEPRESSION ACTION PLAN  Completed     INFLUENZA VACCINE  Completed     PNEUMOCOCCAL IMMUNIZATION 65+ LOW/MEDIUM RISK  Completed     HEPATITIS C SCREENING  Completed     IPV IMMUNIZATION  Aged Out     MENINGITIS IMMUNIZATION  Aged Out     Labs reviewed in EPIC      Review of Systems   Constitutional: Negative for chills and fever.   HENT: Negative for congestion, ear pain, hearing  "loss and sore throat.    Eyes: Negative for pain.   Respiratory: Negative for cough and shortness of breath.    Cardiovascular: Negative for chest pain, palpitations and peripheral edema.   Gastrointestinal: Negative for abdominal pain, constipation, diarrhea, hematochezia and nausea.   Breasts:  Negative for tenderness and discharge.   Genitourinary: Negative for dysuria, frequency, genital sores, hematuria, urgency and vaginal bleeding.   Musculoskeletal: Negative for arthralgias, joint swelling and myalgias.   Neurological: Negative for dizziness, weakness, headaches and paresthesias.   Psychiatric/Behavioral: Negative for mood changes. The patient is not nervous/anxious.          OBJECTIVE:   /62   Pulse 67   Temp 97.9  F (36.6  C) (Oral)   Resp 18   Ht 1.626 m (5' 4\")   Wt 75.3 kg (166 lb)   SpO2 95%   BMI 28.49 kg/m   Estimated body mass index is 28.49 kg/m  as calculated from the following:    Height as of this encounter: 1.626 m (5' 4\").    Weight as of this encounter: 75.3 kg (166 lb).  Physical Exam  GENERAL: healthy, alert and no distress  EYES: Eyes grossly normal to inspection, PERRL and conjunctivae and sclerae normal  HENT: ear canals and TM's normal, nose and mouth without ulcers or lesions  NECK: no adenopathy, no asymmetry, masses, or scars and thyroid normal to palpation  RESP: lungs clear to auscultation - no rales, rhonchi or wheezes  BREAST: normal without masses, tenderness or nipple discharge and no palpable axillary masses or adenopathy  CV: regular rate and rhythm, normal S1 S2, no S3 or S4, no murmur, click or rub, no peripheral edema and peripheral pulses strong  ABDOMEN: soft, nontender, no hepatosplenomegaly, no masses and bowel sounds normal  MS: no gross musculoskeletal defects noted, no edema, does not move left arm or leg.  Left lower leg in rigid brace.     In wheelchair.   SKIN: no suspicious lesions or rashes  NEURO: Normal strength and tone, mentation intact and " speech normal  PSYCH: mentation appears normal, affect normal/bright        ASSESSMENT / PLAN:   ASSESSMENT AND PLAN  1. Well woman exam without gynecological exam  Overall well.     Due for mammogram next fall.     Advanced directive on file.     Due for colonoscopy now.     Call insurance to see if shingrix covered (new shingles vaccine).     I will refill all medications for one year,     Follow up 5-6 months with me for diabetes check.     2. Type 2 diabetes mellitus with stage 3 chronic kidney disease, with long-term current use of insulin (H)  Lab Results   Component Value Date    A1C 7.6 03/29/2019    A1C 7.8 10/12/2018    A1C 7.7 05/04/2018    A1C 7.2 12/04/2017    A1C 7.0 06/23/2017     At goal.  On aspirin, metformin, statin appropriately.       - Comprehensive metabolic panel; Future  - Albumin Random Urine Quantitative with Creat Ratio; Future  - Hemoglobin A1c; Future  - TSH with free T4 reflex; Future  - TSH with free T4 reflex  - Hemoglobin A1c  - Albumin Random Urine Quantitative with Creat Ratio  - Comprehensive metabolic panel    3. Hypertension goal BP (blood pressure) < 140/90    - Comprehensive metabolic panel; Future  - Albumin Random Urine Quantitative with Creat Ratio; Future  - Albumin Random Urine Quantitative with Creat Ratio  - Comprehensive metabolic panel    4. Hyperlipidemia LDL goal <70    - Comprehensive metabolic panel; Future  - Lipid panel reflex to direct LDL Fasting; Future  - Lipid panel reflex to direct LDL Fasting  - Comprehensive metabolic panel    5. Anemia due to vitamin B12 deficiency, unspecified B12 deficiency type    - CBC with platelets; Future  - Vitamin B12; Future  - Folate; Future  - Folate  - Vitamin B12  - CBC with platelets    6. Major depressive disorder, recurrent episode, mild (H)      7. Hemiplegia of right dominant side as late effect of cerebral infarction, unspecified hemiplegia type (H)      End of Life Planning:  Patient currently has an advanced  "directive: Yes.  Practitioner is supportive of decision.    COUNSELING:  Reviewed preventive health counseling, as reflected in patient instructions       Colon cancer screening    BP Readings from Last 1 Encounters:   03/29/19 128/62     Estimated body mass index is 28.49 kg/m  as calculated from the following:    Height as of this encounter: 1.626 m (5' 4\").    Weight as of this encounter: 75.3 kg (166 lb).           reports that  has never smoked. she has never used smokeless tobacco.      Appropriate preventive services were discussed with this patient, including applicable screening as appropriate for cardiovascular disease, diabetes, osteopenia/osteoporosis, and glaucoma.  As appropriate for age/gender, discussed screening for colorectal cancer, prostate cancer, breast cancer, and cervical cancer. Checklist reviewing preventive services available has been given to the patient.    Reviewed patients plan of care and provided an AVS. The Basic Care Plan (routine screening as documented in Health Maintenance) for Addis meets the Care Plan requirement. This Care Plan has been established and reviewed with the Patient.    Counseling Resources:  ATP IV Guidelines  Pooled Cohorts Equation Calculator  Breast Cancer Risk Calculator  FRAX Risk Assessment  ICSI Preventive Guidelines  Dietary Guidelines for Americans, 2010  [employer] MyPlate  ASA Prophylaxis  Lung CA Screening    Joel Daniel Wegener, MD  [facility]  "

## 2019-03-29 NOTE — PATIENT INSTRUCTIONS
"ASSESSMENT AND PLAN  1. Well woman exam without gynecological exam  Overall well.     Due for mammogram next fall.     Due for colonoscopy now.     Call insurance to see if shingrix covered (new shingles vaccine).     I will refill all medications for one year,     Follow up 5-6 months with me for diabetes check.     2. Type 2 diabetes mellitus with stage 3 chronic kidney disease, with long-term current use of insulin (H)    - Comprehensive metabolic panel; Future  - Albumin Random Urine Quantitative with Creat Ratio; Future  - Hemoglobin A1c; Future  - TSH with free T4 reflex; Future  - TSH with free T4 reflex  - Hemoglobin A1c  - Albumin Random Urine Quantitative with Creat Ratio  - Comprehensive metabolic panel    3. Hypertension goal BP (blood pressure) < 140/90    - Comprehensive metabolic panel; Future  - Albumin Random Urine Quantitative with Creat Ratio; Future  - Albumin Random Urine Quantitative with Creat Ratio  - Comprehensive metabolic panel    4. Hyperlipidemia LDL goal <70    - Comprehensive metabolic panel; Future  - Lipid panel reflex to direct LDL Fasting; Future  - Lipid panel reflex to direct LDL Fasting  - Comprehensive metabolic panel    5. Anemia due to vitamin B12 deficiency, unspecified B12 deficiency type    - CBC with platelets; Future  - Vitamin B12; Future  - Folate; Future  - Folate  - Vitamin B12  - CBC with platelets    6. Major depressive disorder, recurrent episode, mild (H)      7. Hemiplegia of right dominant side as late effect of cerebral infarction, unspecified hemiplegia type (H)        MYCHART FOR ON-LINE CARE(VISITS), LABS, REFILLS, MESSAGING, ETC [email or web address] , [phone]    E-VISIT: click \"on-line care, then request e-visit\".  E-visits work well for following up on issues we have discussed in clinic previously which may need new prescriptions, new prescriptions or substantial discussion. These are always done by me (Dr. Wegener).     ONCARE VISIT:  " [email or web address]  - we treat nearly 50 common conditions through on-care.  These are done in an hour by on-call staff.     RADIOLOGY:  [facility]:  [phone]   [facility]: [phone]    Mammogram and Colonoscopy [phone]    Smoking Cessation: www.quitplan.org, 5-879-087-PLAN ([phone])      CONSUMER PRICE LINE for estimates of test costs:  [phone]       Preventive Health Recommendations    See your health care provider every year to    Review health changes.     Discuss preventive care.      Review your medicines if your doctor has prescribed any.      You no longer need a yearly Pap test unless you've had an abnormal Pap test in the past 10 years. If you have vaginal symptoms, such as bleeding or discharge, be sure to talk with your provider about a Pap test.      Every 1 to 2 years, have a mammogram.  If you are over 69, talk with your health care provider about whether or not you want to continue having screening mammograms.      Every 10 years, have a colonoscopy. Or, have a yearly FIT test (stool test). These exams will check for colon cancer.       Have a cholesterol test every 5 years, or more often if your doctor advises it.       Have a diabetes test (fasting glucose) every three years. If you are at risk for diabetes, you should have this test more often.       At age 65, have a bone density scan (DEXA) to check for osteoporosis (brittle bone disease).    Shots:    Get a flu shot each year.    Get a tetanus shot every 10 years.    Talk to your doctor about your pneumonia vaccines. There are now two you should receive - Pneumovax (PPSV 23) and Prevnar (PCV 13).    Talk to your pharmacist about the shingles vaccine.    Talk to your doctor about the hepatitis B vaccine.    Nutrition:     Eat at least 5 servings of fruits and vegetables each day.      Eat whole-grain bread, whole-wheat pasta and brown rice instead of white grains and rice.      Get adequate Calcium and  Vitamin D.     Lifestyle    Exercise at least 150 minutes a week (30 minutes a day, 5 days a week). This will help you control your weight and prevent disease.      Limit alcohol to one drink per day.      No smoking.       Wear sunscreen to prevent skin cancer.       See your dentist twice a year for an exam and cleaning.      See your eye doctor every 1 to 2 years to screen for conditions such as glaucoma, macular degeneration and cataracts.    Personalized Prevention Plan  You are due for the preventive services outlined below.  Your care team is available to assist you in scheduling these services.  If you have already completed any of these items, please share that information with your care team to update in your medical record.  Health Maintenance Due   Topic Date Due     Zoster (Shingles) Vaccine (2 of 3) 08/31/2012     Diabetic Foot Exam - yearly  12/04/2018     Annual Wellness Visit  12/04/2018     FALL RISK ASSESSMENT  12/04/2018     Cholesterol Lab - yearly  12/04/2018     Microalbumin Lab - yearly  12/04/2018     Colonoscopy - every 3 years  12/04/2018     Thyroid Function Lab (TSH) - every 2 years  01/06/2019     Basic Metabolic Lab - every 6 months  04/12/2019     A1C (Diabetes) Lab - every 6 months  04/12/2019

## 2019-04-19 ENCOUNTER — ALLIED HEALTH/NURSE VISIT (OUTPATIENT)
Dept: NURSING | Facility: CLINIC | Age: 69
End: 2019-04-19
Payer: MEDICARE

## 2019-04-19 DIAGNOSIS — E53.8 VITAMIN B 12 DEFICIENCY: Primary | ICD-10-CM

## 2019-04-19 PROCEDURE — 99207 ZZC NO CHARGE NURSE ONLY: CPT

## 2019-04-19 PROCEDURE — 96372 THER/PROPH/DIAG INJ SC/IM: CPT

## 2019-04-19 RX ADMIN — CYANOCOBALAMIN 1000 MCG: 1000 INJECTION, SOLUTION INTRAMUSCULAR; SUBCUTANEOUS at 09:54

## 2019-05-03 ENCOUNTER — TELEPHONE (OUTPATIENT)
Dept: GASTROENTEROLOGY | Facility: CLINIC | Age: 69
End: 2019-05-03

## 2019-05-03 ENCOUNTER — TELEPHONE (OUTPATIENT)
Dept: FAMILY MEDICINE | Facility: CLINIC | Age: 69
End: 2019-05-03

## 2019-05-03 DIAGNOSIS — Z86.0101 H/O ADENOMATOUS POLYP OF COLON: Primary | ICD-10-CM

## 2019-05-03 RX ORDER — BISACODYL 5 MG/1
10 TABLET, DELAYED RELEASE ORAL DAILY PRN
Qty: 4 TABLET | Refills: 0 | Status: SHIPPED | OUTPATIENT
Start: 2019-05-03 | End: 2020-10-20

## 2019-05-03 NOTE — TELEPHONE ENCOUNTER
Skip all medications on the morning of the procedure except the metoprolol.      Otherwise take medications as prescribed.     Joel Wegener,MD

## 2019-05-03 NOTE — TELEPHONE ENCOUNTER
Called and discussed with patient. All questions answered.    Angle Charles RN, BSN   [facility]

## 2019-05-03 NOTE — TELEPHONE ENCOUNTER
: [x] N/A   [] Yes:  Language /  ID:       Patient scheduled for:  [] EGD  [x] Colonoscopy  [] EUS  [] Flex Sig   [] Other:     Indication for procedure. [] Screening   [x] H/O adenomatous polyp of colon    Sedation Type: [x] Conscious Sedation   [] MAC    Procedure Provider:  Fazal      Referring Provider. Wegener    Arrival time verified: Thurs; 5/9/19; 0715    [facility]   [facility] [address], 1st Floor, [address]  [x][address], 5th floor       Prep Type:    [x]Golytely eRx: [facility] [address] [facility], [place] - [address] AT [facility] ;  [] MoviPrep: , [] MiraLax: , [] Other:   []NPO /p [place], No solid food /p 2200 the night before    Anticoagulants or blood thinners: []None [x] ASA 81mg  - may continue [] Warfarin  [] Warfarin + Lovenox bridge [] Plavix [] Effient [] Eliquis [] Xarelto  [] Brilinta [] NSAIDS  [] Other    LAST anticoagulant dose: Date/Time:   INR:     Electronic implanted devices: [x] No  [] IPG  []  ICD  []  LVAD  []     H&P / Pre op physical completed: [x] N/A, [] Complete, Date , [] Scheduled, Date , [] No,     Additional Information: Pt uses wheelchair - she is able to stand and pivot transfer    _______________________________________________      Instructions given: [x] Rec d & Read   [] Reviewed         [] Resent via imaginet     [] Resent via eMail (see below)     Pre procedure teaching completed: [x] Yes - Reviewed, [] No,     [x] No questions regarding Sedation as ordered, []     Transportation from procedure & responsible adult to be with patient following procedure for a minimum of 6 hrs (Conscious Sedation) 24 hrs (MAC): [x] Yes Friend - confirmed will have post-procedure companionship as required, [] Pending , [] No     Lou Eric RN  [facility]

## 2019-05-03 NOTE — TELEPHONE ENCOUNTER
Reason for Call:  Other call back    Detailed comments: Patient called and stated that she is having a colonoscopy on 5/16 and was told that she needed to contact pcp about what to do with her current medications.      Phone Number Patient can be reached at: Home number on file [phone] (home)    Best Time: Any     Can we leave a detailed message on this number? YES    Call taken on 5/3/2019 at 3:07 PM by Angel Campoverde

## 2019-05-03 NOTE — TELEPHONE ENCOUNTER
Dr. Wegener -- please review patient medications and advise if any she should hold on day of procedure or during prep. Colonoscopy scheduled for 5/16.    Thank you  Angle Charles, RN, BSN   [facility]

## 2019-05-09 ENCOUNTER — HOSPITAL ENCOUNTER (OUTPATIENT)
Facility: AMBULATORY SURGERY CENTER | Age: 69
End: 2019-05-09
Attending: INTERNAL MEDICINE
Payer: MEDICARE

## 2019-05-09 VITALS
DIASTOLIC BLOOD PRESSURE: 76 MMHG | RESPIRATION RATE: 15 BRPM | HEIGHT: 64 IN | OXYGEN SATURATION: 95 % | SYSTOLIC BLOOD PRESSURE: 126 MMHG | TEMPERATURE: 98.6 F | HEART RATE: 68 BPM | BODY MASS INDEX: 28.85 KG/M2 | WEIGHT: 169 LBS

## 2019-05-09 LAB
COLONOSCOPY: NORMAL
GLUCOSE BLDC GLUCOMTR-MCNC: 167 MG/DL (ref 70–99)

## 2019-05-09 PROCEDURE — 88305 TISSUE EXAM BY PATHOLOGIST: CPT | Performed by: INTERNAL MEDICINE

## 2019-05-09 RX ORDER — ONDANSETRON 2 MG/ML
4 INJECTION INTRAMUSCULAR; INTRAVENOUS
Status: DISCONTINUED | OUTPATIENT
Start: 2019-05-09 | End: 2019-05-10 | Stop reason: HOSPADM

## 2019-05-09 RX ORDER — FENTANYL CITRATE 50 UG/ML
INJECTION, SOLUTION INTRAMUSCULAR; INTRAVENOUS PRN
Status: DISCONTINUED | OUTPATIENT
Start: 2019-05-09 | End: 2019-05-09 | Stop reason: HOSPADM

## 2019-05-09 RX ORDER — SIMETHICONE
LIQUID (ML) MISCELLANEOUS PRN
Status: DISCONTINUED | OUTPATIENT
Start: 2019-05-09 | End: 2019-05-09 | Stop reason: HOSPADM

## 2019-05-09 RX ORDER — LIDOCAINE 40 MG/G
CREAM TOPICAL
Status: DISCONTINUED | OUTPATIENT
Start: 2019-05-09 | End: 2019-05-10 | Stop reason: HOSPADM

## 2019-05-09 ASSESSMENT — MIFFLIN-ST. JEOR: SCORE: 1286.58

## 2019-05-09 NOTE — LETTER
May 14, 2019       TO: dAdis Stevenson  [address]  [address]  [place] [address]       Dear Ms. Stevenson,    We are writing to inform you of your test results.  The polyp tissue removed from your colon showed no evidence of cancer, but was identified as adenomatous.  These types of polyps can progress to cancer if left in the colon.     Although your polyp tissue was completely removed, we know that you may develop more polyps in the future.  It also showed melanosis coli. The results and recommendations regarding a follow-up colonoscopy were sent to your primary physician.  He or she will review our recommendation in the context of your other medical problems.  Please remember that our recommendation is only for individuals who have no symptoms.  If you experience a persistent change in bowel habits, or develop new abdominal pain or bleeding in your stools, please consult your primary care physician.     If you have questions, please feel free to make an appointment with your primary care physician or gastroenterology clinic.     Resulted Orders   Surgical pathology exam   Result Value Ref Range    Copath Report       Patient Name: ADDIS STEVENSON  MR#: [id number]  Specimen #: [id number]  Collected: 5/9/2019  Received: 5/9/2019  Reported: 5/13/2019 09:28  Ordering Phy(s): GRETTA RÍOS    For improved result formatting, select 'View Enhanced Report Format' under   Linked Documents section.    SPECIMEN(S):  A: Colon biopsy, ascending  B: Colon polyp, descending    FINAL DIAGNOSIS:  A. Colon biopsy, ascending:  - Colonic mucosa with melanosis coli    B. Colon polyp, descending:  - Tubular adenoma  - Granulation tissue    I have personally reviewed all specimens and/or slides, including the   listed special stains, and used them  with my medical judgement to determine or confirm the final diagnosis.    Electronically signed out by:    Rosa Sabillon M.D., [facility]    CLINICAL  "HISTORY:  History of adenomatous polyps.    GROSS:  A:  The specimen is received in formalin with proper patient   identification, labeled \"ascending colon biopsy\".   The specimen consists of a 0.6 cm in greate st dimension, irregular tan   soft tissue which is entirely  submitted in cassette A1.    B:  The specimen is received in formalin with proper patient   identification, labeled \"descending polyp\".  The  specimen consists of a 0.4 cm in greatest dimension, polypoid tan soft   tissue which is entirely submitted in  cassette B1. (Dictated by: Arabella Purvis 5/9/2019 10:52 AM)    MICROSCOPIC:  Microscopic examination was performed.    The technical component of this testing was completed at the [facility], with the professional component performed   at the [facility], [address] [place] [address],   [place] [address] ([phone])    CPT Codes:  A: 96844-IW9  B: 42604-FY8    COLLECTION SITE:  Client: [facility]  Location: [facility] (B)               It was a pleasure to see you at your recent visit. Please let me know if you have any questions or concerns.     Clinic Staff - [phone] option 3     Sincerely,     Na Diehl MD  [address], [address]  [place]  [address].        "

## 2019-05-09 NOTE — LETTER
"    May 13, 2019           {[facility] [[email or web address]] results letter list:[id number]}    Resulted Orders   Surgical pathology exam   Result Value Ref Range    Copath Report       Patient Name: ELAINE STEVENSON  MR#: [id number]  Specimen #: [id number]  Collected: 5/9/2019  Received: 5/9/2019  Reported: 5/13/2019 09:28  Ordering Phy(s): GRETTA RÍOS    For improved result formatting, select 'View Enhanced Report Format' under   Linked Documents section.    SPECIMEN(S):  A: Colon biopsy, ascending  B: Colon polyp, descending    FINAL DIAGNOSIS:  A. Colon biopsy, ascending:  - Colonic mucosa with melanosis coli    B. Colon polyp, descending:  - Tubular adenoma  - Granulation tissue    I have personally reviewed all specimens and/or slides, including the   listed special stains, and used them  with my medical judgement to determine or confirm the final diagnosis.    Electronically signed out by:    Rosa Sabillon M.D., [facility]    CLINICAL HISTORY:  History of adenomatous polyps.    GROSS:  A:  The specimen is received in formalin with proper patient   identification, labeled \"ascending colon biopsy\".   The specimen consists of a 0.6 cm in greate st dimension, irregular tan   soft tissue which is entirely  submitted in cassette A1.    B:  The specimen is received in formalin with proper patient   identification, labeled \"descending polyp\".  The  specimen consists of a 0.4 cm in greatest dimension, polypoid tan soft   tissue which is entirely submitted in  cassette B1. (Dictated by: Arabella Purvis 5/9/2019 10:52 AM)    MICROSCOPIC:  Microscopic examination was performed.    The technical component of this testing was completed at the [facility], with the professional component performed   at the [facility], [address] [place] [phone] ([phone])    CPT Codes:  A: 72593-ON0  B: " 64810-IT1    COLLECTION SITE:  Client: [facility]  Location: [facility] (B)           ***

## 2019-05-09 NOTE — DISCHARGE INSTRUCTIONS
Discharge Instructions after Colonoscopy  or Sigmoidoscopy    Today you had a ____ Colonoscopy ____ Sigmoidoscopy    Activity and Diet  You were given medicine for pain. You may be dizzy or sleepy.  For 24 hours:    Do not drive or use heavy equipment.    Do not make important decisions.    Do not drink any alcohol.  You may return to your normal diet and medicines.    Discomfort    Air was placed in your colon during the exam in order to see it. Walking helps to pass the air.    You may take Tylenol (acetaminophen) for pain unless your doctor has told you not to.  Do not take aspirin or ibuprofen (Advil, Motrin, or other anti-inflammatory  drugs) for _____ days.    Follow-up  ____ We took small tissue samples or polyps to study. Your doctor will call you with the results  within two weeks.    When to call:    Call right away if you have:    Unusual pain in belly or chest pain not relieved with passing air.    More than 1 to 2 Tablespoons of bleeding from your rectum.    Fever above 100.6  F (37.5  C).    If you have severe pain, bleeding, or shortness of breath, go to an emergency room.    If you have questions, call:  Monday to Friday, 7 a.m. to 4:30 p.m.  Endoscopy: [phone] (We may have to call you back)    After hours  Hospital: [phone] (Ask for the GI fellow on call)

## 2019-05-13 LAB — COPATH REPORT: NORMAL

## 2019-05-20 ENCOUNTER — TELEPHONE (OUTPATIENT)
Dept: FAMILY MEDICINE | Facility: CLINIC | Age: 69
End: 2019-05-20

## 2019-05-20 NOTE — TELEPHONE ENCOUNTER
Dr. Wegener-Please review and advise.  Patient had two biopsies during colonoscopy and pathology report is back.  Any further follow up recommended?    Thank you!  CARYN LopezN, RN

## 2019-05-20 NOTE — TELEPHONE ENCOUNTER
Reason for Call:  Other call back    Detailed comments: pt states she had a colonoscopy last week and it said to follow up by calling her provider for further instruction. Please advise.    Phone Number Patient can be reached at: Home number on file [phone] (home)    Best Time: asap    Can we leave a detailed message on this number? YES    Call taken on 5/20/2019 at 10:16 AM by Janene Hartley

## 2019-05-20 NOTE — TELEPHONE ENCOUNTER
Call pt.  One polyp found, not cancerous (pre-cancerous) and it was removed.     Next colonoscopy recommended in 5 years, we can decide at that time.     Joel Wegener,MD

## 2019-05-24 ENCOUNTER — ALLIED HEALTH/NURSE VISIT (OUTPATIENT)
Dept: NURSING | Facility: CLINIC | Age: 69
End: 2019-05-24
Payer: MEDICARE

## 2019-05-24 DIAGNOSIS — E53.8 VITAMIN B 12 DEFICIENCY: Primary | ICD-10-CM

## 2019-05-24 PROCEDURE — 96372 THER/PROPH/DIAG INJ SC/IM: CPT

## 2019-05-24 RX ADMIN — CYANOCOBALAMIN 1000 MCG: 1000 INJECTION, SOLUTION INTRAMUSCULAR; SUBCUTANEOUS at 10:01

## 2019-05-24 NOTE — PROGRESS NOTES
Prior to injection, verified patient identity using patient's name and date of birth.  Due to injection administration, patient instructed to remain in clinic for 15 minutes  afterwards, and to report any adverse reaction to me immediately.    B12    Drug Amount Wasted:  None.  Vial/Syringe: Single dose vial  Expiration Date:  10/20    Fide Veras MA on 5/24/2019 at 10:04 AM    .

## 2019-06-11 DIAGNOSIS — E11.22 TYPE 2 DIABETES MELLITUS WITH STAGE 3 CHRONIC KIDNEY DISEASE, WITHOUT LONG-TERM CURRENT USE OF INSULIN (H): ICD-10-CM

## 2019-06-11 DIAGNOSIS — N18.30 TYPE 2 DIABETES MELLITUS WITH STAGE 3 CHRONIC KIDNEY DISEASE, WITHOUT LONG-TERM CURRENT USE OF INSULIN (H): ICD-10-CM

## 2019-06-12 NOTE — TELEPHONE ENCOUNTER
"Requested Prescriptions   Pending Prescriptions Disp Refills     blood glucose (IRENE CONTOUR) test strip 200 each 3     Sig: Use to test blood sugars two times daily or as directed.  strip  Last Written Prescription Date:  03/29/2019  Last Fill Quantity: 200 each,  # refills: 3   Last Office Visit: 3/29/2019 Wegener  Future Office Visit:    Next 5 appointments (look out 90 days)    Jun 21, 2019 10:00 AM CDT  Nurse Only with [facility]  [facility] ([address] [address]  [phone]                  Diabetic Supplies Protocol Passed - 6/11/2019  8:25 AM        Passed - Medication is active on med list        Passed - Patient is 18 years of age or older        Passed - Recent (6 mo) or future (30 days) visit within the authorizing provider's specialty     Patient had office visit in the last 6 months or has a visit in the next 30 days with authorizing provider.  See \"Patient Info\" tab in inbasket, or \"Choose Columns\" in Meds & Orders section of the refill encounter.            "

## 2019-06-21 ENCOUNTER — ALLIED HEALTH/NURSE VISIT (OUTPATIENT)
Dept: NURSING | Facility: CLINIC | Age: 69
End: 2019-06-21
Payer: MEDICARE

## 2019-06-21 DIAGNOSIS — E53.8 VITAMIN B 12 DEFICIENCY: Primary | ICD-10-CM

## 2019-06-21 PROCEDURE — 96372 THER/PROPH/DIAG INJ SC/IM: CPT

## 2019-06-21 PROCEDURE — 99207 ZZC NO CHARGE NURSE ONLY: CPT

## 2019-06-21 RX ADMIN — CYANOCOBALAMIN 1000 MCG: 1000 INJECTION, SOLUTION INTRAMUSCULAR; SUBCUTANEOUS at 10:09

## 2019-06-21 NOTE — NURSING NOTE
Prior to injection, verified patient identity using patient's name and date of birth.  Due to injection administration, patient instructed to remain in clinic for 15 minutes  afterwards, and to report any adverse reaction to me immediately.    Cyanocobalamin 1,000    Drug Amount Wasted:  None.  Vial/Syringe: Single dose vial  Expiration Date: 10/2020    Bertha Duque MA

## 2019-07-02 ENCOUNTER — TELEPHONE (OUTPATIENT)
Dept: FAMILY MEDICINE | Facility: CLINIC | Age: 69
End: 2019-07-02

## 2019-07-02 NOTE — TELEPHONE ENCOUNTER
2nd request Faxed diabetic detailed written order to   Fax # [phone]  Also faxed 3rd request  Fax # [phone]

## 2019-07-19 ENCOUNTER — ALLIED HEALTH/NURSE VISIT (OUTPATIENT)
Dept: NURSING | Facility: CLINIC | Age: 69
End: 2019-07-19
Payer: MEDICARE

## 2019-07-19 DIAGNOSIS — E53.8 VITAMIN B12 DEFICIENCY (NON ANEMIC): Primary | ICD-10-CM

## 2019-07-19 PROCEDURE — 99207 ZZC NO CHARGE NURSE ONLY: CPT

## 2019-07-19 PROCEDURE — 96372 THER/PROPH/DIAG INJ SC/IM: CPT

## 2019-07-19 RX ADMIN — CYANOCOBALAMIN 1000 MCG: 1000 INJECTION, SOLUTION INTRAMUSCULAR; SUBCUTANEOUS at 10:04

## 2019-07-19 NOTE — NURSING NOTE
The following medication was given:     MEDICATION: Vitamin B12  1000 mcg  ROUTE: IM  SITE: Deltoid - Right  DOSE: 1000 mcg/ml  LOT #: [id number]  :  [employer]  EXPIRATION DATE:  11/2020  NDC#: 3304-0625-42

## 2019-08-16 ENCOUNTER — ALLIED HEALTH/NURSE VISIT (OUTPATIENT)
Dept: NURSING | Facility: CLINIC | Age: 69
End: 2019-08-16
Payer: MEDICARE

## 2019-08-16 DIAGNOSIS — E53.8 VITAMIN B 12 DEFICIENCY: Primary | ICD-10-CM

## 2019-08-16 PROCEDURE — 96372 THER/PROPH/DIAG INJ SC/IM: CPT

## 2019-08-16 RX ADMIN — CYANOCOBALAMIN 1000 MCG: 1000 INJECTION, SOLUTION INTRAMUSCULAR; SUBCUTANEOUS at 10:16

## 2019-08-16 NOTE — NURSING NOTE
Clinic Administered Medication Documentation      Injectable Medication Documentation    Patient was given Cyanocobalamin (B-12). Prior to medication administration, verified patients identity using patient s name and date of birth. Please see MAR and medication order for additional information. Patient instructed to remain in clinic for 15 minutes and report any adverse reaction to staff immediately .      Was entire vial of medication used? Yes  Vial/Syringe: Single dose vial  Expiration Date:  09/2020  Was this medication supplied by the patient? No     Agus Duque MA

## 2019-09-13 ENCOUNTER — ALLIED HEALTH/NURSE VISIT (OUTPATIENT)
Dept: NURSING | Facility: CLINIC | Age: 69
End: 2019-09-13
Payer: MEDICARE

## 2019-09-13 DIAGNOSIS — Z29.9 PREVENTIVE MEDICATION THERAPY NEEDED: Primary | ICD-10-CM

## 2019-09-13 PROCEDURE — 96372 THER/PROPH/DIAG INJ SC/IM: CPT

## 2019-09-13 RX ADMIN — CYANOCOBALAMIN 1000 MCG: 1000 INJECTION, SOLUTION INTRAMUSCULAR; SUBCUTANEOUS at 10:03

## 2019-09-13 NOTE — NURSING NOTE
Clinic Administered Medication Documentation    MEDICATION LIST:   Injectable Medication Documentation    Patient was given Cyanocobalamin (B-12). Prior to medication administration, verified patients identity using patient s name and date of birth. Please see MAR and medication order for additional information. Patient instructed to remain in clinic for 15 minutes.      Was entire vial of medication used? Yes  Vial/Syringe: Single dose vial  Expiration Date:  01/2021  Was this medication supplied by the patient? No     Rene Matt MA on 9/13/2019 at 10:06 AM

## 2019-10-04 DIAGNOSIS — E11.22 TYPE 2 DIABETES MELLITUS WITH STAGE 3 CHRONIC KIDNEY DISEASE, WITHOUT LONG-TERM CURRENT USE OF INSULIN (H): Primary | ICD-10-CM

## 2019-10-04 DIAGNOSIS — N18.30 TYPE 2 DIABETES MELLITUS WITH STAGE 3 CHRONIC KIDNEY DISEASE, WITHOUT LONG-TERM CURRENT USE OF INSULIN (H): Primary | ICD-10-CM

## 2019-10-04 LAB
ANION GAP SERPL CALCULATED.3IONS-SCNC: 10 MMOL/L (ref 3–14)
BUN SERPL-MCNC: 23 MG/DL (ref 7–30)
CALCIUM SERPL-MCNC: 8.9 MG/DL (ref 8.5–10.1)
CHLORIDE SERPL-SCNC: 106 MMOL/L (ref 94–109)
CO2 SERPL-SCNC: 23 MMOL/L (ref 20–32)
CREAT SERPL-MCNC: 0.74 MG/DL (ref 0.52–1.04)
GFR SERPL CREATININE-BSD FRML MDRD: 84 ML/MIN/{1.73_M2}
GLUCOSE SERPL-MCNC: 173 MG/DL (ref 70–99)
HBA1C MFR BLD: 7.6 % (ref 0–5.6)
POTASSIUM SERPL-SCNC: 4 MMOL/L (ref 3.4–5.3)
SODIUM SERPL-SCNC: 139 MMOL/L (ref 133–144)

## 2019-10-04 PROCEDURE — 80048 BASIC METABOLIC PNL TOTAL CA: CPT | Performed by: FAMILY MEDICINE

## 2019-10-04 PROCEDURE — 83036 HEMOGLOBIN GLYCOSYLATED A1C: CPT | Performed by: FAMILY MEDICINE

## 2019-10-04 PROCEDURE — 36415 COLL VENOUS BLD VENIPUNCTURE: CPT | Performed by: FAMILY MEDICINE

## 2019-10-11 ENCOUNTER — ALLIED HEALTH/NURSE VISIT (OUTPATIENT)
Dept: NURSING | Facility: CLINIC | Age: 69
End: 2019-10-11
Payer: MEDICARE

## 2019-10-11 DIAGNOSIS — E53.8 VITAMIN B 12 DEFICIENCY: Primary | ICD-10-CM

## 2019-10-11 PROCEDURE — 96372 THER/PROPH/DIAG INJ SC/IM: CPT

## 2019-10-11 RX ADMIN — CYANOCOBALAMIN 1000 MCG: 1000 INJECTION, SOLUTION INTRAMUSCULAR; SUBCUTANEOUS at 10:14

## 2019-10-11 NOTE — NURSING NOTE
Clinic Administered Medication Documentation    MEDICATION LIST:   Injectable Medication Documentation    Patient was given Cyanocobalamin (B-12). Prior to medication administration, verified patients identity using patient s name and date of birth. Please see MAR and medication order for additional information. Patient instructed to remain in clinic for 15 minutes and report any adverse reaction to staff immediately .      Was entire vial of medication used? Yes  Vial/Syringe: Single dose vial  Expiration Date:  11/2020  Was this medication supplied by the patient? No         Agus Duque MA

## 2019-11-08 ENCOUNTER — ALLIED HEALTH/NURSE VISIT (OUTPATIENT)
Dept: NURSING | Facility: CLINIC | Age: 69
End: 2019-11-08
Payer: MEDICARE

## 2019-11-08 DIAGNOSIS — E53.8 VITAMIN B 12 DEFICIENCY: Primary | ICD-10-CM

## 2019-11-08 PROCEDURE — 99207 ZZC NO CHARGE NURSE ONLY: CPT

## 2019-11-08 PROCEDURE — 96372 THER/PROPH/DIAG INJ SC/IM: CPT

## 2019-11-08 RX ADMIN — CYANOCOBALAMIN 1000 MCG: 1000 INJECTION, SOLUTION INTRAMUSCULAR; SUBCUTANEOUS at 10:21

## 2019-11-12 ENCOUNTER — TRANSFERRED RECORDS (OUTPATIENT)
Dept: HEALTH INFORMATION MANAGEMENT | Facility: CLINIC | Age: 69
End: 2019-11-12

## 2019-11-12 LAB — RETINOPATHY: NEGATIVE

## 2019-12-05 ENCOUNTER — ANCILLARY PROCEDURE (OUTPATIENT)
Dept: MAMMOGRAPHY | Facility: CLINIC | Age: 69
End: 2019-12-05
Attending: FAMILY MEDICINE
Payer: MEDICARE

## 2019-12-05 DIAGNOSIS — Z12.31 VISIT FOR SCREENING MAMMOGRAM: ICD-10-CM

## 2019-12-13 ENCOUNTER — ALLIED HEALTH/NURSE VISIT (OUTPATIENT)
Dept: NURSING | Facility: CLINIC | Age: 69
End: 2019-12-13
Payer: MEDICARE

## 2019-12-13 DIAGNOSIS — E53.8 VITAMIN B 12 DEFICIENCY: Primary | ICD-10-CM

## 2019-12-13 PROCEDURE — 96372 THER/PROPH/DIAG INJ SC/IM: CPT

## 2019-12-13 RX ADMIN — CYANOCOBALAMIN 1000 MCG: 1000 INJECTION, SOLUTION INTRAMUSCULAR; SUBCUTANEOUS at 10:32

## 2019-12-13 ASSESSMENT — ANXIETY QUESTIONNAIRES
7. FEELING AFRAID AS IF SOMETHING AWFUL MIGHT HAPPEN: NOT AT ALL
GAD7 TOTAL SCORE: 1
3. WORRYING TOO MUCH ABOUT DIFFERENT THINGS: NOT AT ALL
6. BECOMING EASILY ANNOYED OR IRRITABLE: NOT AT ALL
1. FEELING NERVOUS, ANXIOUS, OR ON EDGE: NOT AT ALL
2. NOT BEING ABLE TO STOP OR CONTROL WORRYING: NOT AT ALL
5. BEING SO RESTLESS THAT IT IS HARD TO SIT STILL: NOT AT ALL

## 2019-12-13 ASSESSMENT — PATIENT HEALTH QUESTIONNAIRE - PHQ9
SUM OF ALL RESPONSES TO PHQ QUESTIONS 1-9: 0
5. POOR APPETITE OR OVEREATING: SEVERAL DAYS

## 2019-12-13 NOTE — NURSING NOTE
Clinic Administered Medication Documentation    MEDICATION LIST:   Injectable Medication Documentation    Patient was given Cyanocobalamin (B-12). Prior to medication administration, verified patients identity using patient s name and date of birth. Please see MAR and medication order for additional information. Patient instructed to remain in clinic for 15 minutes and report any adverse reaction to staff immediately .      Was entire vial of medication used? Yes  Vial/Syringe: Single dose vial  Expiration Date:  01/2021  Was this medication supplied by the patient? No         Agus Duque MA

## 2019-12-14 ASSESSMENT — ANXIETY QUESTIONNAIRES: GAD7 TOTAL SCORE: 1

## 2019-12-16 ENCOUNTER — TELEPHONE (OUTPATIENT)
Dept: FAMILY MEDICINE | Facility: CLINIC | Age: 69
End: 2019-12-16

## 2020-01-07 DIAGNOSIS — N18.30 TYPE 2 DIABETES MELLITUS WITH STAGE 3 CHRONIC KIDNEY DISEASE, WITHOUT LONG-TERM CURRENT USE OF INSULIN (H): ICD-10-CM

## 2020-01-07 DIAGNOSIS — E11.22 TYPE 2 DIABETES MELLITUS WITH STAGE 3 CHRONIC KIDNEY DISEASE, WITHOUT LONG-TERM CURRENT USE OF INSULIN (H): ICD-10-CM

## 2020-01-07 NOTE — TELEPHONE ENCOUNTER
"Requested Prescriptions   Pending Prescriptions Disp Refills     blood glucose (IRENE CONTOUR) test strip 200 each 2     Sig: Use to test blood sugars two times daily or as directed.  Last Written Prescription Date:  6/13/2019  Last Fill Quantity: 200,  # refills: 2   Last Office Visit: 3/29/2019   Future Office Visit:    Next 5 appointments (look out 90 days)    Kyle 10, 2020 10:00 AM CST  Nurse Only with [facility]  [facility] ([facility]) [address] [place] [address]  [phone]              Diabetic Supplies Protocol Passed - 1/7/2020 11:40 AM        Passed - Medication is active on med list        Passed - Patient is 18 years of age or older        Passed - Recent (6 mo) or future (30 days) visit within the authorizing provider's specialty     Patient had office visit in the last 6 months or has a visit in the next 30 days with authorizing provider.  See \"Patient Info\" tab in inbasket, or \"Choose Columns\" in Meds & Orders section of the refill encounter.              "

## 2020-01-08 NOTE — TELEPHONE ENCOUNTER
HW Reception Medication is being filled for 1 time refill only due to:  Patient needs to be seen because due for diabetes f/u office visit please.     Signed Prescriptions:                        Disp   Refills    blood glucose (IRENE CONTOUR) test strip   70 each0        Sig: Use to test blood sugars two times daily or as           directed.  Authorizing Provider: WEGENER, JOEL DANIEL IRWIN  Ordering User: USAMA GALVIN

## 2020-01-09 NOTE — TELEPHONE ENCOUNTER
Spoke with patient regarding making her diabetes visit for further refills of her test strips but she has her physical appt schedule for 4- which she saids you made he change it. But any how she will not have enough test strip to get there through to her appointment. Tried to get her to schedule a diabetes visit and she insisted that you do all this at her physical

## 2020-01-10 ENCOUNTER — ALLIED HEALTH/NURSE VISIT (OUTPATIENT)
Dept: NURSING | Facility: CLINIC | Age: 70
End: 2020-01-10
Payer: MEDICARE

## 2020-01-10 DIAGNOSIS — E53.8 VITAMIN B 12 DEFICIENCY: Primary | ICD-10-CM

## 2020-01-10 PROCEDURE — 96372 THER/PROPH/DIAG INJ SC/IM: CPT

## 2020-01-10 RX ADMIN — CYANOCOBALAMIN 1000 MCG: 1000 INJECTION, SOLUTION INTRAMUSCULAR; SUBCUTANEOUS at 10:10

## 2020-01-10 NOTE — TELEPHONE ENCOUNTER
ERA informing pt two medications were sent to pharmacy.     Mary LINDO     [facility]

## 2020-01-25 ENCOUNTER — OFFICE VISIT (OUTPATIENT)
Dept: URGENT CARE | Facility: URGENT CARE | Age: 70
End: 2020-01-25
Payer: MEDICARE

## 2020-01-25 VITALS
OXYGEN SATURATION: 95 % | HEART RATE: 96 BPM | BODY MASS INDEX: 25.58 KG/M2 | SYSTOLIC BLOOD PRESSURE: 148 MMHG | WEIGHT: 163 LBS | HEIGHT: 67 IN | TEMPERATURE: 99 F | DIASTOLIC BLOOD PRESSURE: 84 MMHG

## 2020-01-25 DIAGNOSIS — M25.562 ACUTE PAIN OF LEFT KNEE: Primary | ICD-10-CM

## 2020-01-25 PROCEDURE — 99213 OFFICE O/P EST LOW 20 MIN: CPT | Performed by: FAMILY MEDICINE

## 2020-01-25 RX ORDER — IBUPROFEN 400 MG/1
400 TABLET, FILM COATED ORAL EVERY 6 HOURS PRN
COMMUNITY
End: 2021-11-16

## 2020-01-25 ASSESSMENT — MIFFLIN-ST. JEOR: SCORE: 1294.05

## 2020-01-26 NOTE — PROGRESS NOTES
SUBJECTIVE:  Chief Complaint   Patient presents with     Urgent Care     Knee Pain     12:30PM today while standing doing dishes suddenly felt herself falling backward and twisted while sitting back down in her wheelchair; thinks she twisted left knee while falling.  Denies hitting head but says may have strained neck when she fell.  Notes left leg is weaker and left knee doesn't fully straighten since stroke in 2010.     Addis Peña is a 69 year old female presents with a chief complaint of left knee pain and decreased range of motion.  The injury occurred 7 hour(s) ago.   The injury happened while at home. How: was standing up doing dishes, started to feel weak in left leg and was trying to sit down, grabbed counter and ended up twisting her left knee before she was able to sit down.  Unable to fully move knee since injury.  has had left sided weakness since CVA, has a brace to wear for ankle but does not use this when at home.  The patient complained of mild and moderate pain  and has had decreased ROM, feels like thigh hurts more than knee.  Applied biofreeze to area and this did seem to help. This is the first time this type of injury has occurred to this patient.     Patient states that when she fell backwards that may have strained her left sided neck, endorsed discomfort left side of neck but no LOC.  Hurts to move but not as bad.    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       atorvastatin (LIPITOR) 40 MG tablet Take 1 tablet (40 mg) by mouth daily 90 tablet 3     baclofen (LIORESAL) 10 MG tablet Take 1 tablet (10 mg) by mouth 3 times daily as needed for muscle spasms 270 tablet 3     bisacodyl (DULCOLAX) 5 MG EC tablet Take 2 tablets (10 mg) by mouth daily as needed for constipation 4 tablet 0     blood glucose (IRENE CONTOUR) test strip Use to test blood sugars two times daily or as directed. 100 each 11     Cholecalciferol (VITAMIN D3) 1000 UNIT TABS Take 1,000 Units by mouth daily        ibuprofen (ADVIL/MOTRIN) 400 MG tablet Take 400 mg by mouth every 6 hours as needed for moderate pain       Lactase 250 MG CAPS Take 1 chew tab by mouth as needed (with dairy)        losartan (COZAAR) 25 MG tablet Take 1 tablet (25 mg) by mouth daily 90 tablet 3     metFORMIN (GLUCOPHAGE) 1000 MG tablet Take 1 tablet (1,000 mg) by mouth 2 times daily (with meals) 180 tablet 3     metoprolol succinate ER (TOPROL-XL) 50 MG 24 hr tablet Take 1 tablet (50 mg) by mouth daily 90 tablet 3     vitamin B-12 (CYANOCOBALAMIN) 1000 MCG/ML injection Inject 1 mL (1,000 mcg) into the muscle every 30 days 3 mL 3     montelukast (SINGULAIR) 10 MG tablet Take 1 tablet (10 mg) by mouth daily (Patient not taking: Reported on 1/25/2020) 90 tablet 3     senna-docusate (SENOKOT-S;PERICOLACE) 8.6-50 MG per tablet Take 1 tablet by mouth 2 times daily To be taken with opioid (narcotic) pain medication to prevent constipation. (Patient not taking: Reported on 1/25/2020) 60 tablet 1     Social History     Tobacco Use     Smoking status: Never Smoker     Smokeless tobacco: Never Used   Substance Use Topics     Alcohol use: No       ROS:  Review of systems negative except as stated above.    EXAM:   BP (!) 148/84 (BP Location: Left arm, Patient  "Position: Sitting, Cuff Size: Adult Small)   Pulse 96   Temp 99  F (37.2  C) (Oral)   Ht 1.689 m (5' 6.5\")   Wt 73.9 kg (163 lb)   SpO2 95%   BMI 25.91 kg/m    Gen: healthy,alert,no distress, sitting in wheelchair  Extremity: left knee has decrease ROM, non-tender to palpation on lateral and medial liganment, mild swelling..   There is not compromise to the distal circulation.  Pulses are +2 and CRT is brisk  EXTREMITIES: peripheral pulses normal  PSYCH: alert, affect flat    X-RAY was done - left knee - unable to obtain due to patient's mobility    ASSESSMENT/PLAN:   (M25.562) Acute pain of left knee  (primary encounter diagnosis)  Comment: twisting injury  Plan: XR Knee Left 3 Views, [facility]          Referral            Patient with left sided weakness due to CVA, sustained a left knee twisting injury today, here due to decrease ROM.  Unable to obtain XRAY due to patient's mobility and unable to lay down.  Ace wrap for comfort and support, recommend rest, ice, elevation, tylenol.  Refer to [facility] for further evaluation.    Follow up with [facility] within 2-5 days    Greogry Mercedes MD, MD  January 25, 2020 6:50 PM              "

## 2020-01-26 NOTE — PATIENT INSTRUCTIONS
Ace wrap for comfort and support  Ice, elevation, tylenol for discomfort  Follow up with [facility]    Patient Education     Knee Sprain    A sprain is an injury to the ligaments or capsule that holds a joint together. There are no broken bones. Most sprains take 3 to 6 weeks to heal. If it a severe sprain where the ligament is completely torn, it can take months to recover.  Most knee sprains are treated with a splint, knee immobilizer brace, or elastic wrap for support. Severe sprains may rarely require surgery.  Home care    Stay off the injured leg as much as possible until you can walk on it without pain. If you have a lot of pain with walking, crutches or a walker may be prescribed. (These can be rented or purchased at many pharmacies and surgical or orthopedic supply stores). Follow your healthcare provider's advice about when to begin putting weight on that leg.    Keep your leg elevated to reduce pain and swelling. When sleeping, place a pillow under the injured leg. When sitting, support the injured leg so it is above heart level. This is very important during the first 48 hours.    Apply an ice pack over the injured area for 15 to 20 minutes every 3 to 6 hours. You should do this for the first 24 to 48 hours. You can make an ice pack by filling a plastic bag that seals at the top with ice cubes and then wrapping it with a thin towel. Continue to use ice packs for relief of pain and swelling as needed. As the ice melts, be careful to avoid getting your wrap, splint, or cast wet. After 48 hours, apply heat (warm shower or warm bath) for 15 to 20 minutes several times a day, or alternate ice and heat. You can place the ice pack directly over the splint. If you have to wear a hook-and-loop knee brace, you can open it to apply the ice pack, or heat, directly to the knee. Never put ice directly on the skin. Always wrap the ice in a towel or other type of cloth.    You may use over-the-counter  pain medicine to control pain, unless another pain medicine was prescribed. If you have chronic liver or kidney disease or ever had a stomach ulcer or gastrointestinal bleeding, talk with your healthcare provider before using these medicines.    If you were given a splint, keep it completely dry at all times. Bathe with your splint out of the water, protected with 2 large plastic bags, sealed with rubber bands or tape at the top end. If a fiberglass splint gets wet, you can dry it with a hair dryer set to cool. If you have a hook-and-loop knee brace, you can remove this to bathe, unless told otherwise.  Follow-up care  Follow up with your doctor as advised. Any X-rays you had today don t show any broken bones, breaks, or fractures. Sometimes fractures don t show up on the first X-ray. Bruises and sprains can sometimes hurt as much as a fracture. These injuries can take time to heal completely. If your symptoms don t improve or they get worse, talk with your doctor. You may need a repeat X-ray. If X-rays were taken, you will be told of any new findings that may affect your care.  Call 911  Call 911 if you have:     Shortness of breath     Chest pain  When to seek medical advice  Call your healthcare provider right away if any of these occur:    The splint or knee immobilizer brace becomes wet or soft    The fiberglass cast or splint remains wet for more than 24 hours    Pain or swelling increases    The injured leg or toes become cold, blue, numb, or tingly  Date Last Reviewed: 5/1/2018 2000-2019 [employer]. [address] [place] [address], [place] [address]. All rights reserved. This information is not intended as a substitute for professional medical care. Always follow your healthcare professional's instructions.

## 2020-01-27 ENCOUNTER — OFFICE VISIT (OUTPATIENT)
Dept: ORTHOPEDICS | Facility: CLINIC | Age: 70
End: 2020-01-27
Payer: MEDICARE

## 2020-01-27 ENCOUNTER — ANCILLARY PROCEDURE (OUTPATIENT)
Dept: GENERAL RADIOLOGY | Facility: CLINIC | Age: 70
End: 2020-01-27
Attending: FAMILY MEDICINE
Payer: MEDICARE

## 2020-01-27 VITALS — HEIGHT: 67 IN | BODY MASS INDEX: 25.58 KG/M2 | WEIGHT: 163 LBS

## 2020-01-27 DIAGNOSIS — Z86.73 H/O: STROKE: ICD-10-CM

## 2020-01-27 DIAGNOSIS — M25.562 LEFT KNEE PAIN, UNSPECIFIED CHRONICITY: Primary | ICD-10-CM

## 2020-01-27 DIAGNOSIS — M79.672 LEFT FOOT PAIN: ICD-10-CM

## 2020-01-27 DIAGNOSIS — M25.552 LEFT HIP PAIN: ICD-10-CM

## 2020-01-27 DIAGNOSIS — M81.0 OSTEOPOROSIS, UNSPECIFIED OSTEOPOROSIS TYPE, UNSPECIFIED PATHOLOGICAL FRACTURE PRESENCE: Primary | ICD-10-CM

## 2020-01-27 DIAGNOSIS — S32.402A CLOSED NONDISPLACED FRACTURE OF LEFT ACETABULUM, UNSPECIFIED PORTION OF ACETABULUM, INITIAL ENCOUNTER (H): ICD-10-CM

## 2020-01-27 DIAGNOSIS — S92.902A CLOSED FRACTURE OF LEFT FOOT, INITIAL ENCOUNTER: ICD-10-CM

## 2020-01-27 ASSESSMENT — PAIN SCALES - GENERAL: PAINLEVEL: MODERATE PAIN (5)

## 2020-01-27 ASSESSMENT — MIFFLIN-ST. JEOR: SCORE: 1294.05

## 2020-01-27 NOTE — LETTER
"  1/27/2020      RE: Addis Peña  [address]  [address]  [place] [address]       Sports Medicine Clinic Visit    PCP: Wegener, Joel Daniel Irwin    Addis Peña is a 69 year old female who is seen  as self referral presenting with left thigh pain a left great toe pain    Injury: 1/25/2020- standing up to do dishes without her AFO on, and she twisted her left leg as she fell back seated into her chair     Location of Pain: left anterior thigh  Duration of Pain: 2 day(s)  Rating of Pain: 5/10  Pain is better with: Ice and ibuprofen  Pain is worse with: WB  Additional Features: Pain radiates into thigh and lower leg,  Ecchymosis in left great toe  Treatment so far consists of: Ice and Ibuprofen  Prior History of related problems: None    Ht 1.689 m (5' 6.5\")   Wt 73.9 kg (163 lb)   BMI 25.91 kg/m            PMH:  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     Nephrolithiasis      Stroke (H) 1/2010    due to uncontrolled hypertension     Type 2 diabetes, HbA1C goal < 8% (H)        Active problem list:  Patient Active Problem List   Diagnosis     Hypertension goal BP (blood pressure) < 140/90     Type 2 diabetes, HbA1C goal < 8% (H)     Rosacea     Cerebral artery occlusion with cerebral infarction (H)     Mild major depression (H)     GERD (gastroesophageal reflux disease)     Seasonal allergic rhinitis     Advanced directives, counseling/discussion     History of colonic polyps     Major depression in complete remission (H)     B12 deficiency anemia     Health Care Home     Hyperlipidemia LDL goal <70     Gout     Type 2 diabetes with stage 3 chronic kidney disease GFR 30-59 (H)     Altered mental " status     MARI (acute kidney injury) (H)     Type 2 diabetes mellitus with stage 3 chronic kidney disease, without long-term current use of insulin (H)     Cervical cancer screening     ACE-inhibitor cough     History of stroke     Hemiplegia affecting right dominant side (H)     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 Father      Diabetes Sister      Hypertension Brother      Cancer Sister        SH:  Social History     Socioeconomic History     Marital status:      Spouse name: Not on file     Number of children: Not on file     Years of education: Not on file     Highest education level: Not on file   Occupational History     Not on file   Social Needs     Financial resource strain: Not on file     Food insecurity:     Worry: Not on file     Inability: Not on file     Transportation needs:     Medical: Not on file     Non-medical: Not on file   Tobacco Use     Smoking status: Never Smoker     Smokeless tobacco: Never Used   Substance and Sexual Activity     Alcohol use: No     Drug use: No     Sexual activity: Not Currently   Lifestyle     Physical activity:     Days per week: Not on file     Minutes per session: Not on file     Stress: Not on file   Relationships     Social connections:     Talks on phone: Not on file     Gets together: Not on file     Attends Yarsani service: Not on file     Active member of club or organization: Not on file     Attends meetings of clubs or organizations: Not on file     Relationship status: Not on file     Intimate partner violence:     Fear of current or ex partner: Not on file     Emotionally abused: Not on file     Physically abused: Not on file     Forced sexual activity: Not on file   Other Topics Concern     Parent/sibling w/ CABG, MI or angioplasty before 65F 55M? No   Social History Narrative     Not on file        MEDS:  See EMR, reviewed  ALL:  See EMR, reviewed    REVIEW OF SYSTEMS:  CONSTITUTIONAL:NEGATIVE for fever, chills, change in weight  INTEGUMENTARY/SKIN: NEGATIVE for worrisome rashes, moles or lesions  EYES: NEGATIVE for vision changes or irritation  ENT/MOUTH: NEGATIVE for ear, mouth and throat problems  RESP:NEGATIVE for significant cough or SOB  BREAST: NEGATIVE for masses, tenderness or discharge  CV: NEGATIVE for chest pain, palpitations or peripheral edema  GI: NEGATIVE for nausea, abdominal pain, heartburn, or change in bowel habits  :NEGATIVE for frequency, dysuria, or hematuria  :NEGATIVE for frequency, dysuria, or hematuria  NEURO: NEGATIVE for weakness, dizziness or paresthesias  ENDOCRINE: NEGATIVE for temperature intolerance, skin/hair changes  HEME/ALLERGY/IMMUNE: NEGATIVE for bleeding problems  PSYCHIATRIC: NEGATIVE for changes in mood or affect      Subjective: This 69-year-old female presents with acute left-sided hip and thigh discomfort and acute left-sided great toe discomfort after an injury 5 days ago.  At her baseline she has decreased use of the left lower extremity chronically because of a previous stroke.  She has a aide that assists her 3 hours each morning and another assistant that comes in in the evening.  However she will usually do transfers herself to the commode and will usually walk with assistive devices.  She has a history of osteoporosis.  She is to see physical therapy and physical medicine for spasm involving the left lower extremity and responded to baclofen.  She uses an AFO device for the left lower extremity to assist with ambulation.  5 days ago she did not have the AFO device on and she fell backwards into her wheelchair twisting her left lower extremity as she did so.  Since then she has had discomfort with weightbearing.  She has managed transfers by transferring with the use of the right lower extremity and avoiding weight on the left.    Objective she has  some bruising that is isolated to the great toe on the left.  I do not see other bruising involving the thigh, knee, calf or foot.  There is no significant swelling noted.  Internal and external rotation of the left hip is tolerated poorly.  Rocking motions of the pelvis are poorly tolerated with left-sided discomfort.  I can flex and extend the left knee fully.  In the seated position in the chair she will extend her left knee fully and she is able to do so without an extension lag.  But it causes discomfort in the thigh.  There is no effusion at the left knee.  Anterior posterior drawer is negative.  She is nontender over the medial lateral joint line.  Skin is intact.  At her baseline she has limited strength involving this extremity.  She will allow knee extension with 5- out of 5 strength.  She will allow some flexion and extension of the lower extremity at about 4- out of 5.  She has some mild tenderness in the area of the great toe distally.  She is nontender in the area of Lisfranc at the left midfoot.  She is nontender about the medial or lateral malleolus of the left ankle.    An x-ray of the left hemipelvis suggests a left-sided acetabular fracture.  An x-ray of the femur shows no fracture.  An x-ray of the knee shows no fracture.  An x-ray of the foot shows a small avulsion fracture at the corner of the proximal phalanx of the great toe.  This was not present on an x-ray in 2013.  She has what appears to be subacute fractures of the metatarsal heads of the second through fourth digits that were not present on an x-ray in 2013.  She seems nontender in those areas on exam today.      Left-sided acetabular fracture, acute x5 days.  Left-sided great toe fracture, nondisplaced, acute.  Left-sided metatarsal head fractures of the left foot, suspect subacute.  History of left-sided stroke affecting left lower extremity.  AFO and wheelchair use.    Plan: Patient denies pain as a concern.  She has an aide that  comes helps her with transfers in the morning and again in the evening.  She does not feel that she needs more support at home.  She has been able to tolerate transfers to the commode by using her wheelchair, her grab bars, and putting weight on the right extremity.  She knows that she will likely need to be nonweightbearing over the next 4 weeks.  I went over her x-rays with Dr. Campbell in consultation.  CT scan of her left hip and acetabulum is pending and he will follow-up with her to go over the results and options.  She did not declines the need for pain medicines.  She has an AFO device with an extension that goes under the foot.  This should be adequate enough to support her foot and her nonweightbearing status.  She will need follow-up x-rays of the foot within the next 2 weeks.        Steve Foster MD

## 2020-01-27 NOTE — TELEPHONE ENCOUNTER
RECORDS RECEIVED FROM: Acetabulum fracture, left. Ok per Amalia Foster referring   DATE RECEIVED: Feb 3, 2020   NOTES STATUS DETAILS   OFFICE NOTE from referring provider Internal  Steve Foster MD       OFFICE NOTE from other specialist N/A    DISCHARGE SUMMARY from hospital N/A    DISCHARGE REPORT from the ER N/A    OPERATIVE REPORT Internal    MEDICATION LIST Internal    IMPLANT RECORD/STICKER N/A    LABS     CBC/DIFF N/A    CULTURES N/A    INJECTIONS DONE IN RADIOLOGY N/A    MRI N/A    CT SCAN Internal    XRAYS (IMAGES & REPORTS) Internal    TUMOR     PATHOLOGY  Slides & report N/A      01/27/20   4:30 PM   Pre-visit complete  Deborah Glamm, CMA

## 2020-01-27 NOTE — PROGRESS NOTES
"Sports Medicine Clinic Visit    PCP: Wegener, Joel Daniel Irwin    Addis Peña is a 69 year old female who is seen  as self referral presenting with left thigh pain a left great toe pain    Injury: 1/25/2020- standing up to do dishes without her AFO on, and she twisted her left leg as she fell back seated into her chair     Location of Pain: left anterior thigh  Duration of Pain: 2 day(s)  Rating of Pain: 5/10  Pain is better with: Ice and ibuprofen  Pain is worse with: WB  Additional Features: Pain radiates into thigh and lower leg,  Ecchymosis in left great toe  Treatment so far consists of: Ice and Ibuprofen  Prior History of related problems: None    Ht 1.689 m (5' 6.5\")   Wt 73.9 kg (163 lb)   BMI 25.91 kg/m           PMH:  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     Nephrolithiasis      Stroke (H) 1/2010    due to uncontrolled hypertension     Type 2 diabetes, HbA1C goal < 8% (H)        Active problem list:  Patient Active Problem List   Diagnosis     Hypertension goal BP (blood pressure) < 140/90     Type 2 diabetes, HbA1C goal < 8% (H)     Rosacea     Cerebral artery occlusion with cerebral infarction (H)     Mild major depression (H)     GERD (gastroesophageal reflux disease)     Seasonal allergic rhinitis     Advanced directives, counseling/discussion     History of colonic polyps     Major depression in complete remission (H)     B12 deficiency anemia     Health Care Home     Hyperlipidemia LDL goal <70     Gout     Type 2 diabetes with stage 3 chronic kidney disease GFR 30-59 (H)     Altered mental status     MARI (acute kidney injury) (H)     Type 2 diabetes mellitus with stage 3 chronic kidney " disease, without long-term current use of insulin (H)     Cervical cancer screening     ACE-inhibitor cough     History of stroke     Hemiplegia affecting right dominant side (H)     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 Father      Diabetes Sister      Hypertension Brother      Cancer Sister        SH:  Social History     Socioeconomic History     Marital status:      Spouse name: Not on file     Number of children: Not on file     Years of education: Not on file     Highest education level: Not on file   Occupational History     Not on file   Social Needs     Financial resource strain: Not on file     Food insecurity:     Worry: Not on file     Inability: Not on file     Transportation needs:     Medical: Not on file     Non-medical: Not on file   Tobacco Use     Smoking status: Never Smoker     Smokeless tobacco: Never Used   Substance and Sexual Activity     Alcohol use: No     Drug use: No     Sexual activity: Not Currently   Lifestyle     Physical activity:     Days per week: Not on file     Minutes per session: Not on file     Stress: Not on file   Relationships     Social connections:     Talks on phone: Not on file     Gets together: Not on file     Attends [employer]ism service: Not on file     Active member of club or organization: Not on file     Attends meetings of clubs or organizations: Not on file     Relationship status: Not on file     Intimate partner violence:     Fear of current or ex partner: Not on file     Emotionally abused: Not on file     Physically abused: Not on file     Forced sexual activity: Not on file   Other Topics Concern     Parent/sibling w/ CABG, MI or angioplasty before 65F 55M? No   Social History Narrative     Not on file       MEDS:  See EMR, reviewed  ALL:  See EMR, reviewed    REVIEW OF  SYSTEMS:  CONSTITUTIONAL:NEGATIVE for fever, chills, change in weight  INTEGUMENTARY/SKIN: NEGATIVE for worrisome rashes, moles or lesions  EYES: NEGATIVE for vision changes or irritation  ENT/MOUTH: NEGATIVE for ear, mouth and throat problems  RESP:NEGATIVE for significant cough or SOB  BREAST: NEGATIVE for masses, tenderness or discharge  CV: NEGATIVE for chest pain, palpitations or peripheral edema  GI: NEGATIVE for nausea, abdominal pain, heartburn, or change in bowel habits  :NEGATIVE for frequency, dysuria, or hematuria  :NEGATIVE for frequency, dysuria, or hematuria  NEURO: NEGATIVE for weakness, dizziness or paresthesias  ENDOCRINE: NEGATIVE for temperature intolerance, skin/hair changes  HEME/ALLERGY/IMMUNE: NEGATIVE for bleeding problems  PSYCHIATRIC: NEGATIVE for changes in mood or affect      Subjective: This 69-year-old female presents with acute left-sided hip and thigh discomfort and acute left-sided great toe discomfort after an injury 5 days ago.  At her baseline she has decreased use of the left lower extremity chronically because of a previous stroke.  She has a aide that assists her 3 hours each morning and another assistant that comes in in the evening.  However she will usually do transfers herself to the commode and will usually walk with assistive devices.  She has a history of osteoporosis.  She is to see physical therapy and physical medicine for spasm involving the left lower extremity and responded to baclofen.  She uses an AFO device for the left lower extremity to assist with ambulation.  5 days ago she did not have the AFO device on and she fell backwards into her wheelchair twisting her left lower extremity as she did so.  Since then she has had discomfort with weightbearing.  She has managed transfers by transferring with the use of the right lower extremity and avoiding weight on the left.    Objective she has some bruising that is isolated to the great toe on the left.  I do  not see other bruising involving the thigh, knee, calf or foot.  There is no significant swelling noted.  Internal and external rotation of the left hip is tolerated poorly.  Rocking motions of the pelvis are poorly tolerated with left-sided discomfort.  I can flex and extend the left knee fully.  In the seated position in the chair she will extend her left knee fully and she is able to do so without an extension lag.  But it causes discomfort in the thigh.  There is no effusion at the left knee.  Anterior posterior drawer is negative.  She is nontender over the medial lateral joint line.  Skin is intact.  At her baseline she has limited strength involving this extremity.  She will allow knee extension with 5- out of 5 strength.  She will allow some flexion and extension of the lower extremity at about 4- out of 5.  She has some mild tenderness in the area of the great toe distally.  She is nontender in the area of Lisfranc at the left midfoot.  She is nontender about the medial or lateral malleolus of the left ankle.    An x-ray of the left hemipelvis suggests a left-sided acetabular fracture.  An x-ray of the femur shows no fracture.  An x-ray of the knee shows no fracture.  An x-ray of the foot shows a small avulsion fracture at the corner of the proximal phalanx of the great toe.  This was not present on an x-ray in 2013.  She has what appears to be subacute fractures of the metatarsal heads of the second through fourth digits that were not present on an x-ray in 2013.  She seems nontender in those areas on exam today.      Left-sided acetabular fracture, acute x5 days.  Left-sided great toe fracture, nondisplaced, acute.  Left-sided metatarsal head fractures of the left foot, suspect subacute.  History of left-sided stroke affecting left lower extremity.  AFO and wheelchair use.    Plan: Patient denies pain as a concern.  She has an aide that comes helps her with transfers in the morning and again in the  evening.  She does not feel that she needs more support at home.  She has been able to tolerate transfers to the commode by using her wheelchair, her grab bars, and putting weight on the right extremity.  She knows that she will likely need to be nonweightbearing over the next 4 weeks.  I went over her x-rays with Dr. Campbell in consultation.  CT scan of her left hip and acetabulum is pending and he will follow-up with her to go over the results and options.  She did not declines the need for pain medicines.  She has an AFO device with an extension that goes under the foot.  This should be adequate enough to support her foot and her nonweightbearing status.  She will need follow-up x-rays of the foot within the next 2 weeks.

## 2020-01-28 ENCOUNTER — ANCILLARY PROCEDURE (OUTPATIENT)
Dept: CT IMAGING | Facility: CLINIC | Age: 70
End: 2020-01-28
Attending: FAMILY MEDICINE
Payer: MEDICARE

## 2020-01-28 DIAGNOSIS — S32.402A CLOSED NONDISPLACED FRACTURE OF LEFT ACETABULUM, UNSPECIFIED PORTION OF ACETABULUM, INITIAL ENCOUNTER (H): ICD-10-CM

## 2020-01-29 ENCOUNTER — TELEPHONE (OUTPATIENT)
Dept: FAMILY MEDICINE | Facility: CLINIC | Age: 70
End: 2020-01-29

## 2020-01-29 DIAGNOSIS — N18.30 TYPE 2 DIABETES MELLITUS WITH STAGE 3 CHRONIC KIDNEY DISEASE, WITHOUT LONG-TERM CURRENT USE OF INSULIN (H): Primary | ICD-10-CM

## 2020-01-29 DIAGNOSIS — E11.22 TYPE 2 DIABETES MELLITUS WITH STAGE 3 CHRONIC KIDNEY DISEASE, WITHOUT LONG-TERM CURRENT USE OF INSULIN (H): Primary | ICD-10-CM

## 2020-01-29 DIAGNOSIS — R32 URINARY INCONTINENCE, UNSPECIFIED TYPE: ICD-10-CM

## 2020-01-29 NOTE — TELEPHONE ENCOUNTER
Reason for Call:  Other call back    Detailed comments: Patient is requesting a call back RE her blood sugar. States her hip is fractured so it has been in the 200's. Please return call to advise. Thanks!    Phone Number Patient can be reached at: Home number on file [phone] (home)    Best Time: Any    Can we leave a detailed message on this number? YES    Call taken on 1/29/2020 at 8:41 AM by Yue Griffin

## 2020-01-29 NOTE — TELEPHONE ENCOUNTER
Writer called patient twice and phone line busy.    Recall at another time.    Fasting glucose from 3/29/19 was 183.    Hemoglobin A1C   Date Value Ref Range Status   10/04/2019 7.6 (H) 0 - 5.6 % Final     Comment:     Normal <5.7% Prediabetes 5.7-6.4%  Diabetes 6.5% or higher - adopted from ADA   consensus guidelines.     03/29/2019 7.6 (H) 0 - 5.6 % Final     Comment:     Normal <5.7% Prediabetes 5.7-6.4%  Diabetes 6.5% or higher - adopted from ADA   consensus guidelines.     10/12/2018 7.8 (H) 0 - 5.6 % Final     Comment:     Normal <5.7% Prediabetes 5.7-6.4%  Diabetes 6.5% or higher - adopted from ADA   consensus guidelines.       GLADYS Bravo, BSN, RN

## 2020-01-29 NOTE — TELEPHONE ENCOUNTER
Dr. Wegener-Please review and advise.  Patient is concerned about elevated blood sugar readings. Any need to adjust Metformin dose?    Patient called back and spoke with writer.    Patient stated:  1.  Had accident on 1/25/20 and fractured hip  2. Blood sugars have been consistently elevated in 200's since 1/25/20  3. Not prescribed/given steroids or started on any new medications  4. Taking Metformin 1000 mg BID    Writer discussed with patient blood sugars can be elevated following a trauma.    Patient would like provider input.    Thank you!  GLADYS Bravo, CARYNN, RN

## 2020-01-30 ENCOUNTER — TELEPHONE (OUTPATIENT)
Dept: FAMILY MEDICINE | Facility: CLINIC | Age: 70
End: 2020-01-30

## 2020-01-30 NOTE — TELEPHONE ENCOUNTER
"Medication Problem (insulin glargine (LANTUS SOLOSTAR) 100 UNIT/ML pen plan does not cover medication. Alternative: Arash MCMULLEN provided a note in regards to the above.     Writer called pharmacy to relay Dr. Wegener's message in regards to substitution. Pharmacist stated \"ok I see it.\" Pharmacist will substitute. No further action at this time.    Thanks! Octavia Estrada RN            "

## 2020-01-30 NOTE — TELEPHONE ENCOUNTER
"Dr. Wegener-Please review.    Update received via [employer] message from person who identified as patient's caregiver:      \"To:  TRIAGE BRANDY ESCAMILLA      From: Buck Petty      Created: 1/30/2020 6:53 AM          THIS IS ABOUT ELAINE STEVENSON I AM HER CARE GIVER.  SORRY BUT I AM WORRIED.  HER BLOOD SUGAR HAS BEEN IN THE PLUS TWO HUNDREDS SINCE LAST SUNDAY.  SHE HAS BEEN IN CONTACT WITH THE NURSE. NURSE SHE WOULD SPEAK TO DOCTOR WEGENER, THAT WAS YESTERDAY.  AFTER HER FALL RESULTED IN FRACTURED HIP AND A BROKEN BIG TOE ALL ON LEFT SIDE.  HER STROKE SIDE. WELL SHE HAS STARTED TO BECOME INCONTINENT THIS MORNING. SHE SAID SHE DOES NOT WANT TO GO ON! SHE CAN'THANDLE THIS ONLY BEING ABLE TO STAND USING ONE LEG AND REALLY ONE ARM, AS HER OTHER HAS LIMITED USE, PLUS THE INCONTINENCE.  I HAVE NEVER HEARD HER SAY I JUST WANT TO GIVE UP.  HER PHONE [phone].  SHE DOES NOT HAVE MY CHART CANNOT KEYBOARD THAT WELL.  ANY HELP APPRECIATED THANK YOU\"          Writer called patient who stated:  1. Unable to get to bathroom in time due to decreased mobility   A. Wearing briefs   B. Denied signs/symptoms of UTI   C. Incontinence of urine only-dealt with this after stroke but it resolved   D. Able to clean self after episode of incontinence   E. Does not use a commode   F. Showered this AM  2. Has ortho follow up on 2/3/20 with Dr. Beard  3. Denied feeling depressed, but is overwhelmed with hip fracture diagnosis and decreased mobility  4. Declined office visit with PCP clinic provider today  5. Has adequate help at home      Writer recommended:  1. Continue working on good skin hygiene.  Discussed maintaining skin integrity of perineal region  2. Go to bathroom every hour, if possible, to empty bladder and help reduce episodes of incontinence    Writer discussed with patient to please call clinic with any questions/concerns and as soon as response received from Dr. Wegener, a triage RN will be calling patient with his " response.    Patient verbalized understanding and in agreement with plan.    Thank you!  CARYN LopezN, RN

## 2020-01-30 NOTE — TELEPHONE ENCOUNTER
I called and spoke with Addis  Feeling fairly impproved emotionally with increased ssupport.      Has used insulin before.  I recommend: For blood sugars I recommend starting basal insuline (glargine ) at 10 units daily.  This is actually somewhat below a standard starting dose so should be very safe and and not cause hypoglycemia.     I sent prescription for lantus and needles to [facility] in [place].     She has ortho follow up next Monday she  States.     Urine incontinence  Started fairly suddenly.  No burning or fevers.     If infection could be related to hyperglycemia.     I recommend urine culture.      can  container , she can do at home and he can bring back to any [place] lab.     I placed future order for this.     I will call back around dinner again to discuss plan with her  (she  Thought  This  Was a good  Idea).     Joel Wegener,MD Joel Wegener,MD

## 2020-01-31 DIAGNOSIS — R32 URINARY INCONTINENCE, UNSPECIFIED TYPE: ICD-10-CM

## 2020-01-31 LAB
ALBUMIN UR-MCNC: 100 MG/DL
APPEARANCE UR: ABNORMAL
BACTERIA #/AREA URNS HPF: ABNORMAL /HPF
BILIRUB UR QL STRIP: NEGATIVE
CASTS #/AREA URNS LPF: ABNORMAL /LPF (ref 0–2)
COLOR UR AUTO: YELLOW
GLUCOSE UR STRIP-MCNC: 100 MG/DL
HGB UR QL STRIP: ABNORMAL
KETONES UR STRIP-MCNC: NEGATIVE MG/DL
LEUKOCYTE ESTERASE UR QL STRIP: ABNORMAL
NITRATE UR QL: NEGATIVE
NON-SQ EPI CELLS #/AREA URNS LPF: ABNORMAL /LPF
PH UR STRIP: 5.5 PH (ref 5–7)
RBC #/AREA URNS AUTO: ABNORMAL /HPF
SOURCE: ABNORMAL
SP GR UR STRIP: 1.01 (ref 1–1.03)
UROBILINOGEN UR STRIP-ACNC: 0.2 EU/DL (ref 0.2–1)
WBC #/AREA URNS AUTO: ABNORMAL /HPF

## 2020-01-31 PROCEDURE — 81001 URINALYSIS AUTO W/SCOPE: CPT | Performed by: FAMILY MEDICINE

## 2020-01-31 PROCEDURE — 87086 URINE CULTURE/COLONY COUNT: CPT | Performed by: FAMILY MEDICINE

## 2020-02-01 LAB
BACTERIA SPEC CULT: NORMAL
SPECIMEN SOURCE: NORMAL

## 2020-02-02 ENCOUNTER — APPOINTMENT (OUTPATIENT)
Dept: GENERAL RADIOLOGY | Facility: CLINIC | Age: 70
End: 2020-02-02
Payer: MEDICARE

## 2020-02-02 ENCOUNTER — HOSPITAL ENCOUNTER (OUTPATIENT)
Facility: CLINIC | Age: 70
Setting detail: OBSERVATION
Discharge: OTHER INSTITUTION | End: 2020-02-02
Attending: EMERGENCY MEDICINE | Admitting: SURGERY
Payer: MEDICARE

## 2020-02-02 ENCOUNTER — APPOINTMENT (OUTPATIENT)
Dept: GENERAL RADIOLOGY | Facility: CLINIC | Age: 70
End: 2020-02-02
Attending: EMERGENCY MEDICINE
Payer: MEDICARE

## 2020-02-02 ENCOUNTER — APPOINTMENT (OUTPATIENT)
Dept: CT IMAGING | Facility: CLINIC | Age: 70
End: 2020-02-02
Attending: EMERGENCY MEDICINE
Payer: MEDICARE

## 2020-02-02 VITALS
TEMPERATURE: 98.5 F | OXYGEN SATURATION: 97 % | RESPIRATION RATE: 16 BRPM | SYSTOLIC BLOOD PRESSURE: 108 MMHG | HEART RATE: 76 BPM | DIASTOLIC BLOOD PRESSURE: 45 MMHG

## 2020-02-02 DIAGNOSIS — Z79.4 ENCOUNTER FOR LONG-TERM (CURRENT) USE OF INSULIN (H): ICD-10-CM

## 2020-02-02 DIAGNOSIS — S32.402A CLOSED DISPLACED FRACTURE OF LEFT ACETABULUM, UNSPECIFIED PORTION OF ACETABULUM, INITIAL ENCOUNTER (H): ICD-10-CM
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
## 2020-02-02 DIAGNOSIS — E11.9 TYPE 2 DIABETES MELLITUS WITHOUT COMPLICATION, UNSPECIFIED WHETHER LONG TERM INSULIN USE (H): ICD-10-CM

## 2020-02-02 PROBLEM — S72.002A CLOSED LEFT HIP FRACTURE (H): Status: ACTIVE | Noted: 2020-02-02

## 2020-02-02 LAB
ALBUMIN SERPL-MCNC: 2.8 G/DL (ref 3.4–5)
ALBUMIN UR-MCNC: 100 MG/DL
ALP SERPL-CCNC: 105 U/L (ref 40–150)
ALT SERPL W P-5'-P-CCNC: 38 U/L (ref 0–50)
ANION GAP SERPL CALCULATED.3IONS-SCNC: 7 MMOL/L (ref 3–14)
APPEARANCE UR: ABNORMAL
AST SERPL W P-5'-P-CCNC: 56 U/L (ref 0–45)
BASOPHILS # BLD AUTO: 0 10E9/L (ref 0–0.2)
BASOPHILS NFR BLD AUTO: 0.3 %
BILIRUB SERPL-MCNC: 0.4 MG/DL (ref 0.2–1.3)
BILIRUB UR QL STRIP: NEGATIVE
BUN SERPL-MCNC: 45 MG/DL (ref 7–30)
CALCIUM SERPL-MCNC: 9.6 MG/DL (ref 8.5–10.1)
CHLORIDE SERPL-SCNC: 112 MMOL/L (ref 94–109)
CO2 SERPL-SCNC: 22 MMOL/L (ref 20–32)
COLOR UR AUTO: YELLOW
CREAT SERPL-MCNC: 1.06 MG/DL (ref 0.52–1.04)
DIFFERENTIAL METHOD BLD: ABNORMAL
EOSINOPHIL # BLD AUTO: 0.1 10E9/L (ref 0–0.7)
EOSINOPHIL NFR BLD AUTO: 0.7 %
ERYTHROCYTE [DISTWIDTH] IN BLOOD BY AUTOMATED COUNT: 14 % (ref 10–15)
GFR SERPL CREATININE-BSD FRML MDRD: 54 ML/MIN/{1.73_M2}
GLUCOSE BLDC GLUCOMTR-MCNC: 158 MG/DL (ref 70–99)
GLUCOSE BLDC GLUCOMTR-MCNC: 222 MG/DL (ref 70–99)
GLUCOSE SERPL-MCNC: 246 MG/DL (ref 70–99)
GLUCOSE UR STRIP-MCNC: 30 MG/DL
HBA1C MFR BLD: 8.4 % (ref 0–5.6)
HCT VFR BLD AUTO: 31.9 % (ref 35–47)
HGB BLD-MCNC: 9.9 G/DL (ref 11.7–15.7)
HGB UR QL STRIP: ABNORMAL
IMM GRANULOCYTES # BLD: 0.1 10E9/L (ref 0–0.4)
IMM GRANULOCYTES NFR BLD: 0.9 %
KETONES UR STRIP-MCNC: NEGATIVE MG/DL
LEUKOCYTE ESTERASE UR QL STRIP: ABNORMAL
LYMPHOCYTES # BLD AUTO: 1.3 10E9/L (ref 0.8–5.3)
LYMPHOCYTES NFR BLD AUTO: 13.8 %
MCH RBC QN AUTO: 29.7 PG (ref 26.5–33)
MCHC RBC AUTO-ENTMCNC: 31 G/DL (ref 31.5–36.5)
MCV RBC AUTO: 96 FL (ref 78–100)
MONOCYTES # BLD AUTO: 0.8 10E9/L (ref 0–1.3)
MONOCYTES NFR BLD AUTO: 8.5 %
MUCOUS THREADS #/AREA URNS LPF: PRESENT /LPF
NEUTROPHILS # BLD AUTO: 7.2 10E9/L (ref 1.6–8.3)
NEUTROPHILS NFR BLD AUTO: 75.8 %
NITRATE UR QL: NEGATIVE
NRBC # BLD AUTO: 0 10*3/UL
NRBC BLD AUTO-RTO: 0 /100
PH UR STRIP: 5.5 PH (ref 5–7)
PLATELET # BLD AUTO: 347 10E9/L (ref 150–450)
POTASSIUM SERPL-SCNC: 3.4 MMOL/L (ref 3.4–5.3)
PROT SERPL-MCNC: 6.8 G/DL (ref 6.8–8.8)
RBC # BLD AUTO: 3.33 10E12/L (ref 3.8–5.2)
RBC #/AREA URNS AUTO: 15 /HPF (ref 0–2)
SODIUM SERPL-SCNC: 142 MMOL/L (ref 133–144)
SOURCE: ABNORMAL
SP GR UR STRIP: 1.02 (ref 1–1.03)
SQUAMOUS #/AREA URNS AUTO: 20 /HPF (ref 0–1)
UROBILINOGEN UR STRIP-MCNC: NORMAL MG/DL (ref 0–2)
WBC # BLD AUTO: 9.5 10E9/L (ref 4–11)
WBC #/AREA URNS AUTO: >182 /HPF (ref 0–5)
WBC CLUMPS #/AREA URNS HPF: PRESENT /HPF

## 2020-02-02 PROCEDURE — 71045 X-RAY EXAM CHEST 1 VIEW: CPT

## 2020-02-02 PROCEDURE — 99285 EMERGENCY DEPT VISIT HI MDM: CPT | Mod: 25 | Performed by: EMERGENCY MEDICINE

## 2020-02-02 PROCEDURE — 72192 CT PELVIS W/O DYE: CPT

## 2020-02-02 PROCEDURE — 25000132 ZZH RX MED GY IP 250 OP 250 PS 637: Mod: GY | Performed by: NURSE PRACTITIONER

## 2020-02-02 PROCEDURE — 00000146 ZZHCL STATISTIC GLUCOSE BY METER IP

## 2020-02-02 PROCEDURE — 99285 EMERGENCY DEPT VISIT HI MDM: CPT | Mod: Z6 | Performed by: EMERGENCY MEDICINE

## 2020-02-02 PROCEDURE — 73700 CT LOWER EXTREMITY W/O DYE: CPT | Mod: LT

## 2020-02-02 PROCEDURE — 96376 TX/PRO/DX INJ SAME DRUG ADON: CPT | Performed by: EMERGENCY MEDICINE

## 2020-02-02 PROCEDURE — 83036 HEMOGLOBIN GLYCOSYLATED A1C: CPT | Performed by: EMERGENCY MEDICINE

## 2020-02-02 PROCEDURE — 99232 SBSQ HOSP IP/OBS MODERATE 35: CPT | Performed by: NURSE PRACTITIONER

## 2020-02-02 PROCEDURE — G0378 HOSPITAL OBSERVATION PER HR: HCPCS

## 2020-02-02 PROCEDURE — 73502 X-RAY EXAM HIP UNI 2-3 VIEWS: CPT

## 2020-02-02 PROCEDURE — 72170 X-RAY EXAM OF PELVIS: CPT

## 2020-02-02 PROCEDURE — 80053 COMPREHEN METABOLIC PANEL: CPT | Performed by: EMERGENCY MEDICINE

## 2020-02-02 PROCEDURE — 25000128 H RX IP 250 OP 636: Performed by: NURSE PRACTITIONER

## 2020-02-02 PROCEDURE — 96375 TX/PRO/DX INJ NEW DRUG ADDON: CPT | Performed by: EMERGENCY MEDICINE

## 2020-02-02 PROCEDURE — 85025 COMPLETE CBC W/AUTO DIFF WBC: CPT | Performed by: EMERGENCY MEDICINE

## 2020-02-02 PROCEDURE — 25000131 ZZH RX MED GY IP 250 OP 636 PS 637: Mod: GY | Performed by: NURSE PRACTITIONER

## 2020-02-02 PROCEDURE — 25000132 ZZH RX MED GY IP 250 OP 250 PS 637: Mod: GY | Performed by: SURGERY

## 2020-02-02 PROCEDURE — 96365 THER/PROPH/DIAG IV INF INIT: CPT | Performed by: EMERGENCY MEDICINE

## 2020-02-02 PROCEDURE — 96372 THER/PROPH/DIAG INJ SC/IM: CPT | Performed by: EMERGENCY MEDICINE

## 2020-02-02 PROCEDURE — 81001 URINALYSIS AUTO W/SCOPE: CPT | Performed by: EMERGENCY MEDICINE

## 2020-02-02 RX ORDER — BACLOFEN 10 MG/1
10 TABLET ORAL 3 TIMES DAILY PRN
Status: DISCONTINUED | OUTPATIENT
Start: 2020-02-02 | End: 2020-02-02 | Stop reason: HOSPADM

## 2020-02-02 RX ORDER — METHOCARBAMOL 750 MG/1
750 TABLET, FILM COATED ORAL EVERY 6 HOURS PRN
Status: DISCONTINUED | OUTPATIENT
Start: 2020-02-02 | End: 2020-02-02 | Stop reason: HOSPADM

## 2020-02-02 RX ORDER — SENNOSIDES 8.6 MG
8.6 TABLET ORAL 2 TIMES DAILY PRN
Status: DISCONTINUED | OUTPATIENT
Start: 2020-02-02 | End: 2020-02-02 | Stop reason: HOSPADM

## 2020-02-02 RX ORDER — DEXTROSE MONOHYDRATE 25 G/50ML
25-50 INJECTION, SOLUTION INTRAVENOUS
Status: DISCONTINUED | OUTPATIENT
Start: 2020-02-02 | End: 2020-02-02 | Stop reason: HOSPADM

## 2020-02-02 RX ORDER — ATORVASTATIN CALCIUM 40 MG/1
40 TABLET, FILM COATED ORAL DAILY
Status: CANCELLED | OUTPATIENT
Start: 2020-02-02

## 2020-02-02 RX ORDER — VITAMIN B COMPLEX
1000 TABLET ORAL DAILY
Status: DISCONTINUED | OUTPATIENT
Start: 2020-02-02 | End: 2020-02-02 | Stop reason: HOSPADM

## 2020-02-02 RX ORDER — ACETAMINOPHEN 650 MG/1
650 SUPPOSITORY RECTAL EVERY 4 HOURS PRN
Status: DISCONTINUED | OUTPATIENT
Start: 2020-02-02 | End: 2020-02-02 | Stop reason: HOSPADM

## 2020-02-02 RX ORDER — LIDOCAINE 4 G/G
2 PATCH TOPICAL
Status: DISCONTINUED | OUTPATIENT
Start: 2020-02-02 | End: 2020-02-02 | Stop reason: HOSPADM

## 2020-02-02 RX ORDER — HEPARIN SODIUM 5000 [USP'U]/.5ML
5000 INJECTION, SOLUTION INTRAVENOUS; SUBCUTANEOUS EVERY 12 HOURS
Status: DISCONTINUED | OUTPATIENT
Start: 2020-02-02 | End: 2020-02-02 | Stop reason: HOSPADM

## 2020-02-02 RX ORDER — ONDANSETRON 2 MG/ML
4 INJECTION INTRAMUSCULAR; INTRAVENOUS EVERY 6 HOURS PRN
Status: DISCONTINUED | OUTPATIENT
Start: 2020-02-02 | End: 2020-02-02 | Stop reason: HOSPADM

## 2020-02-02 RX ORDER — AMLODIPINE BESYLATE 5 MG/1
10 TABLET ORAL DAILY
Status: DISCONTINUED | OUTPATIENT
Start: 2020-02-02 | End: 2020-02-02 | Stop reason: HOSPADM

## 2020-02-02 RX ORDER — NICOTINE POLACRILEX 4 MG
15-30 LOZENGE BUCCAL
Status: DISCONTINUED | OUTPATIENT
Start: 2020-02-02 | End: 2020-02-02 | Stop reason: HOSPADM

## 2020-02-02 RX ORDER — ATORVASTATIN CALCIUM 40 MG/1
40 TABLET, FILM COATED ORAL DAILY
Status: DISCONTINUED | OUTPATIENT
Start: 2020-02-02 | End: 2020-02-02 | Stop reason: HOSPADM

## 2020-02-02 RX ORDER — IBUPROFEN 600 MG/1
600 TABLET, FILM COATED ORAL EVERY 6 HOURS PRN
Status: DISCONTINUED | OUTPATIENT
Start: 2020-02-02 | End: 2020-02-02 | Stop reason: HOSPADM

## 2020-02-02 RX ORDER — HYDROMORPHONE HCL/0.9% NACL/PF 0.2MG/0.2
0.2 SYRINGE (ML) INTRAVENOUS
Status: DISCONTINUED | OUTPATIENT
Start: 2020-02-02 | End: 2020-02-02 | Stop reason: HOSPADM

## 2020-02-02 RX ORDER — MONTELUKAST SODIUM 10 MG/1
10 TABLET ORAL AT BEDTIME
Status: DISCONTINUED | OUTPATIENT
Start: 2020-02-02 | End: 2020-02-02 | Stop reason: HOSPADM

## 2020-02-02 RX ORDER — INSULIN GLARGINE 100 [IU]/ML
10 INJECTION, SOLUTION SUBCUTANEOUS EVERY MORNING
Status: DISCONTINUED | OUTPATIENT
Start: 2020-02-02 | End: 2020-02-02 | Stop reason: HOSPADM

## 2020-02-02 RX ORDER — ACETAMINOPHEN 500 MG
500 TABLET ORAL ONCE
Status: COMPLETED | OUTPATIENT
Start: 2020-02-02 | End: 2020-02-02

## 2020-02-02 RX ORDER — ONDANSETRON 4 MG/1
4 TABLET, ORALLY DISINTEGRATING ORAL EVERY 6 HOURS PRN
Status: DISCONTINUED | OUTPATIENT
Start: 2020-02-02 | End: 2020-02-02 | Stop reason: HOSPADM

## 2020-02-02 RX ORDER — LIDOCAINE 40 MG/G
CREAM TOPICAL
Status: DISCONTINUED | OUTPATIENT
Start: 2020-02-02 | End: 2020-02-02 | Stop reason: HOSPADM

## 2020-02-02 RX ORDER — BISACODYL 5 MG
5 TABLET, DELAYED RELEASE (ENTERIC COATED) ORAL DAILY PRN
Status: DISCONTINUED | OUTPATIENT
Start: 2020-02-02 | End: 2020-02-02 | Stop reason: HOSPADM

## 2020-02-02 RX ORDER — NALOXONE HYDROCHLORIDE 0.4 MG/ML
.1-.4 INJECTION, SOLUTION INTRAMUSCULAR; INTRAVENOUS; SUBCUTANEOUS
Status: DISCONTINUED | OUTPATIENT
Start: 2020-02-02 | End: 2020-02-02 | Stop reason: HOSPADM

## 2020-02-02 RX ORDER — OXYCODONE HYDROCHLORIDE 5 MG/1
5 TABLET ORAL ONCE
Status: DISCONTINUED | OUTPATIENT
Start: 2020-02-02 | End: 2020-02-02 | Stop reason: HOSPADM

## 2020-02-02 RX ORDER — METOPROLOL TARTRATE 25 MG/1
25 TABLET, FILM COATED ORAL 2 TIMES DAILY
Status: DISCONTINUED | OUTPATIENT
Start: 2020-02-02 | End: 2020-02-02 | Stop reason: HOSPADM

## 2020-02-02 RX ORDER — METOPROLOL TARTRATE 50 MG
50 TABLET ORAL DAILY
Status: DISCONTINUED | OUTPATIENT
Start: 2020-02-02 | End: 2020-02-02

## 2020-02-02 RX ORDER — ACETAMINOPHEN 325 MG/1
650 TABLET ORAL EVERY 4 HOURS PRN
Status: DISCONTINUED | OUTPATIENT
Start: 2020-02-02 | End: 2020-02-02 | Stop reason: HOSPADM

## 2020-02-02 RX ORDER — CEFTRIAXONE 1 G/1
1 INJECTION, POWDER, FOR SOLUTION INTRAMUSCULAR; INTRAVENOUS EVERY 24 HOURS
Status: DISCONTINUED | OUTPATIENT
Start: 2020-02-02 | End: 2020-02-02 | Stop reason: HOSPADM

## 2020-02-02 RX ADMIN — ACETAMINOPHEN 650 MG: 325 TABLET, FILM COATED ORAL at 05:12

## 2020-02-02 RX ADMIN — MELATONIN 1000 UNITS: at 12:47

## 2020-02-02 RX ADMIN — IBUPROFEN 600 MG: 600 TABLET ORAL at 05:12

## 2020-02-02 RX ADMIN — INSULIN ASPART 1 UNITS: 100 INJECTION, SOLUTION INTRAVENOUS; SUBCUTANEOUS at 13:57

## 2020-02-02 RX ADMIN — HEPARIN SODIUM 5000 UNITS: 5000 INJECTION, SOLUTION INTRAVENOUS; SUBCUTANEOUS at 12:49

## 2020-02-02 RX ADMIN — LIDOCAINE 2 PATCH: 560 PATCH PERCUTANEOUS; TOPICAL; TRANSDERMAL at 08:19

## 2020-02-02 RX ADMIN — BACLOFEN 10 MG: 10 TABLET ORAL at 12:48

## 2020-02-02 RX ADMIN — INSULIN ASPART 2 UNITS: 100 INJECTION, SOLUTION INTRAVENOUS; SUBCUTANEOUS at 10:34

## 2020-02-02 RX ADMIN — Medication 0.2 MG: at 11:07

## 2020-02-02 RX ADMIN — ACETAMINOPHEN 500 MG: 500 TABLET ORAL at 08:19

## 2020-02-02 RX ADMIN — ATORVASTATIN CALCIUM 40 MG: 40 TABLET, FILM COATED ORAL at 12:47

## 2020-02-02 RX ADMIN — CEFTRIAXONE 1 G: 1 INJECTION, POWDER, FOR SOLUTION INTRAMUSCULAR; INTRAVENOUS at 10:34

## 2020-02-02 RX ADMIN — AMLODIPINE BESYLATE 5 MG: 5 TABLET ORAL at 12:48

## 2020-02-02 ASSESSMENT — ENCOUNTER SYMPTOMS
ABDOMINAL PAIN: 0
NECK STIFFNESS: 0
COLOR CHANGE: 0
ARTHRALGIAS: 0
DIFFICULTY URINATING: 0
EYE REDNESS: 0
CONFUSION: 0
SHORTNESS OF BREATH: 0
HEADACHES: 0
FEVER: 0

## 2020-02-02 NOTE — PROGRESS NOTES
Ortho Brief Note    Patient with a know left anterior column yesenia transverse acetabular fracture.  Had a new fall.  Will re image for any additional injuries and plan for admission for pain control and placement.  Scheduled appointment with  tomorrow.  Will evaluate in the AM after completion of all necessary imaging    Russell Crowell MD  Orthopedic Surgery, PGY-4  Pager: [phone]  2:12 AM  February 2, 2020

## 2020-02-02 NOTE — ED NOTES
[facility], [place]   ED Nurse to Floor Handoff     Addis Peña is a 69 year old female who speaks English and lives with friend,  in a home  They arrived in the ED by car from home    ED Chief Complaint: Hip Pain    ED Dx;   Final diagnoses:   None         Needed?: No    Allergies:   Allergies   Allergen Reactions     Lisinopril Cough     Milk Products GI Disturbance     Latex Rash   .  Past Medical Hx:   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     Nephrolithiasis      Stroke (H) 1/2010    due to uncontrolled hypertension     Type 2 diabetes, HbA1C goal < 8% (H)       Baseline Mental status: WDL  Current Mental Status changes: at basesline    Infection present or suspected this encounter: no  Sepsis suspected: No  Isolation type: No active isolations     Activity level - Baseline/Home:  Stand with assist x2 and Wheelchair  Activity Level - Current:   Stand with assist x2 and Wheelchair.  Left hemiparesis.    Bariatric equipment needed?: No    In the ED these meds were given:   Medications   oxyCODONE (ROXICODONE) tablet 5 mg (5 mg Oral Not Given 2/2/20 2036)   lidocaine 1 % 0.1-1 mL (has no administration in time range)   lidocaine (LMX4) cream (has no administration in time range)   sodium chloride (PF) 0.9% PF flush 3 mL (has no administration in time range)   sodium chloride (PF) 0.9% PF flush 3 mL (has no administration in time range)   ondansetron (ZOFRAN-ODT) ODT tab 4 mg (has no administration in time range)     Or   ondansetron (ZOFRAN) injection 4 mg (has no administration in time range)   acetaminophen (TYLENOL) tablet 650 mg (650 mg Oral Given 2/2/20 0312)    acetaminophen (TYLENOL) Suppository 650 mg (has no administration in time range)   naloxone (NARCAN) injection 0.1-0.4 mg (has no administration in time range)   ibuprofen (ADVIL/MOTRIN) tablet 600 mg (600 mg Oral Given 2/2/20 0512)       Drips running?  No    Home pump  No    Current LDAs  Peripheral IV 05/09/19 Right Hand (Active)   Number of days: 269       Peripheral IV 02/02/20 Right Hand (Active)   Number of days: 0       Labs results:   Labs Ordered and Resulted from Time of ED Arrival Up to the Time of Departure from the ED   CBC WITH PLATELETS DIFFERENTIAL - Abnormal; Notable for the following components:       Result Value    RBC Count 3.33 (*)     Hemoglobin 9.9 (*)     Hematocrit 31.9 (*)     MCHC 31.0 (*)     All other components within normal limits   COMPREHENSIVE METABOLIC PANEL - Abnormal; Notable for the following components:    Chloride 112 (*)     All other components within normal limits   ROUTINE UA WITH MICROSCOPIC - Abnormal; Notable for the following components:    Glucose Urine 30 (*)     Blood Urine Small (*)     Protein Albumin Urine 100 (*)     Leukocyte Esterase Urine Large (*)     WBC Urine >182 (*)     RBC Urine 15 (*)     WBC Clumps Present (*)     Squamous Epithelial /HPF Urine 20 (*)     Mucous Urine Present (*)     All other components within normal limits   IP ASSIGN PROVIDER TEAM TO TREATMENT TEAM   MEASURE HEIGHT AND WEIGHT   VITAL SIGNS   ASSESS   NOTIFY PHYSICIAN   PULSE OXIMETRY NURSING   NOTIFY   NOTIFY   PERIPHERAL IV CATHETER   OBSERVATION GOALS   DAILY WEIGHTS   INTAKE AND OUTPUT   ACTIVITY       Imaging Studies:   Recent Results (from the past 24 hour(s))   CT Pelvis Bone wo Contrast    Narrative    EXAM: CT HIP LEFT WITHOUT CONTRAST, CT PELVIS BONE WITHOUT CONTRAST  LOCATION: [facility]  DATE/TIME: 02/02/2020, 2:31 AM    INDICATION: Known left acetabular fracture. Secondary fall. Worsening pain.  COMPARISON: 01/28/2020.  TECHNIQUE: Noncontrast. Axial,  sagittal and coronal thin-section reconstruction. Dose reduction techniques were used.   CONTRAST: None.    FINDINGS:   There is again a comminuted fracture of the left acetabulum. There is increased superior displacement of the acetabular roof with protrusion of the femoral head superiorly. No new fracture. There is increasing hematoma medial to the ilium and acetabulum.   Degenerative disease in the lower lumbar spine.    Colonic diverticula without diverticulitis. Nabothian cysts in the cervix. Atherosclerotic calcification of the aorta and its branches.      Impression    IMPRESSION:  1.  There is again a comminuted left acetabular fracture with increased superior displacement of the acetabular roof and superior displacement of the femoral head within the acetabular fossa. No dislocation.       CT Hip Left w/o Contrast    Narrative    EXAM: CT HIP LEFT WITHOUT CONTRAST, CT PELVIS BONE WITHOUT CONTRAST  LOCATION: [facility]  DATE/TIME: 02/02/2020, 2:31 AM    INDICATION: Known left acetabular fracture. Secondary fall. Worsening pain.  COMPARISON: 01/28/2020.  TECHNIQUE: Noncontrast. Axial, sagittal and coronal thin-section reconstruction. Dose reduction techniques were used.   CONTRAST: None.    FINDINGS:   There is again a comminuted fracture of the left acetabulum. There is increased superior displacement of the acetabular roof with protrusion of the femoral head superiorly. No new fracture. There is increasing hematoma medial to the ilium and acetabulum.   Degenerative disease in the lower lumbar spine.    Colonic diverticula without diverticulitis. Nabothian cysts in the cervix. Atherosclerotic calcification of the aorta and its branches.      Impression    IMPRESSION:  1.  There is again a comminuted left acetabular fracture with increased superior displacement of the acetabular roof and superior displacement of the femoral head within the acetabular fossa. No dislocation.           Recent vital  signs:   BP (!) 142/67   Pulse 92   Temp 98.4  F (36.9  C) (Oral)   Resp 14   SpO2 98%     [place] Coma Scale Score: 15 (02/02/20 0044)       Cardiac Rhythm: Other  Pt needs tele? No  Skin/wound Issues: Left hip ecchymosis.    Code Status: Full Code    Pain control: good    Nausea control: pt had none    Abnormal labs/tests/findings requiring intervention: none    Family present during ED course? Yes   Family Comments/Social Situation comments: n/a    Tasks needing completion: None    Niurka Rossi RN  [facility] -- [phone] [place] [facility]  [phone] [facility] ED

## 2020-02-02 NOTE — H&P
[facility], [place]    History and Physical / Consult note: Trauma Service      Date of Admission:  2/2/2020    Time of Admission/Consult Request (page/call): 2/2/2020    Time of my evaluation: 2/2/2020    Consulting services:  Orthopedics - Emergent consult (within 30 mins): Called by ED    Assessment & Plan   Trauma mechanism: Fall    Time/date of injury: 2/1/2020 around 10:00 PM and previously on 01/25/2020    Known Injuries:  1. Left acetabular fracture  2. Left toe and metatarsal fractures    Other diagnoses:   1. Left sided hemiparesis as sequela of stroke since 2010  2. Type 2 diabetes mellitus    Plan:  1. Orthopedic Surgery Consult  2. CBC, CMP, UA/UC, chest x-ray   3. New CT left hip/pelvis as patient fell again   4. Admission to Trauma Service   5. Pain control    Plan discussed with Michael Torres MD Trauma Attending    Saray Marie MD  Trauma Moonlighter  [phone]    Code status: Full code  General Cares:  GI Prophylaxis: n/a  DVT Prophylaxis: SCDs    ETOH: This patient was asked if in the last 3-6 months there has been a time when she had 4 or more drinks in a single day/outing.. Patient answer to the screening question was in the negative. No intervention needed.  Primary Care Physician   Joel Daniel Wegener    Chief Complaint   Left hip pain    History is obtained from the patient, electronic health record, emergency department physician and patient's family    History of Present Illness   Addis Peña is a 69 year old female who presented to the ER brought by her caregiver due to increased left hip pain.   Patient had a fall at home on 01/25/2020 while doing the dishes without her AFO on. She twisted her left leg as she fell back seated into her chair. She went to urgent care and initially a left knee x-ray was requested but it was not done due to her difficult mobility. She returned due to pain and a CT left hip was obtained on 01/28/2020. She was diagnosed with a  left acetabular fracture and left metatarsal and toe fractures. She chose to go back home and continue management as outpatient. She has been managing her pain with ibuprofen. She had a scheduled consultation with Orthopedic Surgery on 2/3/2020. She had been trying to stay non-weight bearing on her left side while at home but it had been difficult. She lives with a friend who is her caregiver.   She refers she fell again last night around 10:00 PM while trying to transfer. Her pain increased to 9/10. She came to the ER this time.  Addis denies any other acute symptoms at this time but pain in her left hip. She denies loss of consciousness, any chest pain, shortness of breath, fever, dysuria, etc.    Past Medical History    I have reviewed this patient's medical history and updated it with pertinent information if needed.   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  Past Surgical History:   Procedure Laterality Date     COLONOSCOPY       COLONOSCOPY N/A 5/9/2019    Procedure: COLONOSCOPY, WITH POLYPECTOMY AND BIOPSY;  Surgeon: Na Diehl MD;  Location: [facility] OR     colonscopy  3/2012    repeat 1 to 3 y     CYSTOSCOPY, URETEROSCOPY, COMBINED Right 2/22/2016    Procedure: COMBINED CYSTOSCOPY, URETEROSCOPY;  Surgeon: Madelaine Roland MD;  Location: [facility] OR     Prior to Admission Medications   Prior to Admission Medications   Prescriptions Last Dose  Informant Patient Reported? Taking?   Cholecalciferol (VITAMIN D3) 1000 UNIT TABS  Care Giver Yes No   Sig: Take 1,000 Units by mouth daily    Lactase 250 MG CAPS  Care Giver Yes No   Sig: Take 1 chew tab by mouth as needed (with dairy)    acetaminophen (TYLENOL) 500 MG tablet  Care Giver Yes No   Sig: Take 500 mg by mouth 2 times daily    amLODIPine (NORVASC) 10 MG tablet   No No   Sig: Take 1 tablet (10 mg) by mouth daily   aspirin 81 MG tablet  Care Giver Yes No   Sig: Take 1 tablet by mouth daily.   atorvastatin (LIPITOR) 40 MG tablet   No No   Sig: Take 1 tablet (40 mg) by mouth daily   baclofen (LIORESAL) 10 MG tablet   No No   Sig: Take 1 tablet (10 mg) by mouth 3 times daily as needed for muscle spasms   bisacodyl (DULCOLAX) 5 MG EC tablet   No No   Sig: Take 2 tablets (10 mg) by mouth daily as needed for constipation   blood glucose (IRENE CONTOUR) test strip   No No   Sig: Use to test blood sugars two times daily or as directed.   ibuprofen (ADVIL/MOTRIN) 400 MG tablet   Yes No   Sig: Take 400 mg by mouth every 6 hours as needed for moderate pain   insulin glargine (LANTUS SOLOSTAR) 100 UNIT/ML pen   No No   Sig: Inject 10 Units Subcutaneous every morning   insulin pen needle (ULTICARE MINI) 31G X 6 MM miscellaneous   No No   Sig: Use one pen needles daily or as directed.   losartan (COZAAR) 25 MG tablet   No No   Sig: Take 1 tablet (25 mg) by mouth daily   metFORMIN (GLUCOPHAGE) 1000 MG tablet   No No   Sig: Take 1 tablet (1,000 mg) by mouth 2 times daily (with meals)   metoprolol succinate ER (TOPROL-XL) 50 MG 24 hr tablet   No No   Sig: Take 1 tablet (50 mg) by mouth daily   montelukast (SINGULAIR) 10 MG tablet   No No   Sig: Take 1 tablet (10 mg) by mouth daily   Patient not taking: Reported on 1/25/2020   senna-docusate (SENOKOT-S;PERICOLACE) 8.6-50 MG per tablet   No No   Sig: Take 1 tablet by mouth 2 times daily To be taken with opioid (narcotic) pain medication to prevent constipation.   Patient  not taking: Reported on 1/25/2020   vitamin B-12 (CYANOCOBALAMIN) 1000 MCG/ML injection   No No   Sig: Inject 1 mL (1,000 mcg) into the muscle every 30 days      Facility-Administered Medications Last Administration Doses Remaining   vitamin B-12 (CYANOCOBALAMIN) injection 1,000 mcg 1/10/2020 10:10 AM         Allergies   Allergies   Allergen Reactions     Lisinopril Cough     Milk Products GI Disturbance     Latex Rash       Social History   Social History     Socioeconomic History     Marital status:      Spouse name: Not on file     Number of children: Not on file     Years of education: Not on file     Highest education level: Not on file   Occupational History     Not on file   Social Needs     Financial resource strain: Not on file     Food insecurity:     Worry: Not on file     Inability: Not on file     Transportation needs:     Medical: Not on file     Non-medical: Not on file   Tobacco Use     Smoking status: Never Smoker     Smokeless tobacco: Never Used   Substance and Sexual Activity     Alcohol use: No     Drug use: No     Sexual activity: Not Currently   Lifestyle     Physical activity:     Days per week: Not on file     Minutes per session: Not on file     Stress: Not on file   Relationships     Social connections:     Talks on phone: Not on file     Gets together: Not on file     Attends Denominational service: Not on file     Active member of club or organization: Not on file     Attends meetings of clubs or organizations: Not on file     Relationship status: Not on file     Intimate partner violence:     Fear of current or ex partner: Not on file     Emotionally abused: Not on file     Physically abused: Not on file     Forced sexual activity: Not on file   Other Topics Concern     Parent/sibling w/ CABG, MI or angioplasty before 65F 55M? No   Social History Narrative     Not on file       Family History   I have reviewed this patient's family history and updated it with pertinent information if  needed.   Family History   Problem Relation Age of Onset     Hypertension Mother      Heart Disease Mother      C.A.D. Father      Diabetes Sister      Hypertension Brother      Cancer Sister        Review of Systems   CONSTITUTIONAL: No fever, chills, sweats, fatigue   EYES: no visual blurring, no double vision or visual loss  ENT: no decrease in hearing, no tinnitus, no vertigo, no hoarseness  RESPIRATORY: no shortness of breath, no cough, no sputum   CARDIOVASCULAR: no palpitations, no chest  pain, no exertional chest pain or pressure  GASTROINTESTINAL: no nausea or vomiting, or abd pain  GENITOURINARY: no dysuria, no frequency or hesitancy, no hematuria  MUSCULOSKELETAL: left internal rotation of left lower extremity otherwise no weakness, no redness, no swelling, no joint pain in the other joints  SKIN: left hip ecchymosis otherwise no rashes, ecchymoses, abrasions or lacerations  NEUROLOGIC: no numbness or tingling of hands, no numbness or tingling  of feet, no syncope, no tremors or weakness  PSYCHIATRIC: no sleep disturbances, no anxiety or depression    Physical Exam   Temp: 98.4  F (36.9  C) Temp src: Oral BP: 137/42 Pulse: 76   Resp: 16 SpO2: 96 % O2 Device: None (Room air)    Vital Signs with Ranges  Temp:  [98.4  F (36.9  C)] 98.4  F (36.9  C)  Pulse:  [76] 76  Resp:  [16] 16  BP: (137)/(42) 137/42  SpO2:  [96 %] 96 % 0 lbs 0 oz    Primary Survey:  Airway: patient talking  Breathing: symmetric respiratory effort bilaterally  Circulation: central pulses present and peripheral pulses present  Disability: Pupils - left 3 mm and brisk, right 3 mm and brisk     [place] Coma Scale - Total 15/15  Eye Response (E): 15  4= spontaneous,  3= to verbal/voice, 2=  to pain, 1= No response   Verbal Response (V): 5   5= Orientated, converses,  4= Confused, converses, 3= Inappropriate words,  2= Incomprehensible sounds,  1=No response   Motor Response (M): 6   6= Obeys commands, 5= Localizes to pain, 4= Withdrawal to  pain, 3=Fexion to pain, 2= Extension to pain, 1= No response    Secondary Survey:  General: alert, oriented to person, place, time  Neuro: PERRLA. EOMI. CN II-XII grossly intact. No focal deficits. Strength 5/5 x right sided extremities. Sensation 3/5 left sided extremities (stroke sequela). Sensation intact.  Head: atraumatic, normocephalic, trachea midline  Eyes:  Pupils 3 mm, EOMI, corneas and conjunctivae clear  Ears: pearly grey bilateral TMs and non-inflamed external ear canals  Nose: nares patent, no drainage, nasal septum non-tender  Mouth/Throat: no exudates or erythema,  no dental tenderness or malocclusions, no tongue lacerations  Neck:  No cervical collar present. No midline posterior tenderness, full AROM without pain.   Chest/Pulmonary: normal respiratory rate and rhythm,  bilateral clear breath sounds, no wheezes, rales or rhonchi, no chest wall tenderness or deformities,   Cardiovascular: S1, S2,  normal and regular rate and rhythm, no murmurs  Abdomen: soft, non-tender, no guarding, no rebound tenderness and no tenderness to palpation  : normal external genitalia, pelvis stable to lateral compression,  no hartley, no urine assess/urine yellow and clear  Musculoskel/Extremities: left internal rotation of left lower extremity otherwise normal extremities, full AROM of major joints without tenderness, edema, erythema, ecchymosis, or abrasions. PP. No edema.   Back/Spine: no deformity, no midline tenderness, no sacral tenderness, no step-offs and no abrasions or contusions  Hands: no gross deformities of hands or fingers. Full AROM of hand and fingers in flexion and extension.  strength equal and symmetric.   Psychiatric: affect/mood normal, cooperative, normal judgement/insight and memory intact  Skin: left hip ecchymosis otherwise, no rashes, laceration, ecchymosis, skin warm and dry.     Results for orders placed or performed during the hospital encounter of 02/02/20 (from the past 24 hour(s))    CT Pelvis Bone wo Contrast    Narrative    EXAM: CT HIP LEFT WITHOUT CONTRAST, CT PELVIS BONE WITHOUT CONTRAST  LOCATION: [facility]  DATE/TIME: 02/02/2020, 2:31 AM    INDICATION: Known left acetabular fracture. Secondary fall. Worsening pain.  COMPARISON: 01/28/2020.  TECHNIQUE: Noncontrast. Axial, sagittal and coronal thin-section reconstruction. Dose reduction techniques were used.   CONTRAST: None.    FINDINGS:   There is again a comminuted fracture of the left acetabulum. There is increased superior displacement of the acetabular roof with protrusion of the femoral head superiorly. No new fracture. There is increasing hematoma medial to the ilium and acetabulum.   Degenerative disease in the lower lumbar spine.    Colonic diverticula without diverticulitis. Nabothian cysts in the cervix. Atherosclerotic calcification of the aorta and its branches.      Impression    IMPRESSION:  1.  There is again a comminuted left acetabular fracture with increased superior displacement of the acetabular roof and superior displacement of the femoral head within the acetabular fossa. No dislocation.       CT Hip Left w/o Contrast    Narrative    EXAM: CT HIP LEFT WITHOUT CONTRAST, CT PELVIS BONE WITHOUT CONTRAST  LOCATION: [facility]  DATE/TIME: 02/02/2020, 2:31 AM    INDICATION: Known left acetabular fracture. Secondary fall. Worsening pain.  COMPARISON: 01/28/2020.  TECHNIQUE: Noncontrast. Axial, sagittal and coronal thin-section reconstruction. Dose reduction techniques were used.   CONTRAST: None.    FINDINGS:   There is again a comminuted fracture of the left acetabulum. There is increased superior displacement of the acetabular roof with protrusion of the femoral head superiorly. No new fracture. There is increasing hematoma medial to the ilium and acetabulum.   Degenerative disease in the lower lumbar spine.    Colonic diverticula without diverticulitis. Nabothian cysts in the cervix.  Atherosclerotic calcification of the aorta and its branches.      Impression    IMPRESSION:  1.  There is again a comminuted left acetabular fracture with increased superior displacement of the acetabular roof and superior displacement of the femoral head within the acetabular fossa. No dislocation.       CBC with platelets differential   Result Value Ref Range    WBC 9.5 4.0 - 11.0 10e9/L    RBC Count 3.33 (L) 3.8 - 5.2 10e12/L    Hemoglobin 9.9 (L) 11.7 - 15.7 g/dL    Hematocrit 31.9 (L) 35.0 - 47.0 %    MCV 96 78 - 100 fl    MCH 29.7 26.5 - 33.0 pg    MCHC 31.0 (L) 31.5 - 36.5 g/dL    RDW 14.0 10.0 - 15.0 %    Platelet Count 347 150 - 450 10e9/L    Diff Method Automated Method     % Neutrophils 75.8 %    % Lymphocytes 13.8 %    % Monocytes 8.5 %    % Eosinophils 0.7 %    % Basophils 0.3 %    % Immature Granulocytes 0.9 %    Nucleated RBCs 0 0 /100    Absolute Neutrophil 7.2 1.6 - 8.3 10e9/L    Absolute Lymphocytes 1.3 0.8 - 5.3 10e9/L    Absolute Monocytes 0.8 0.0 - 1.3 10e9/L    Absolute Eosinophils 0.1 0.0 - 0.7 10e9/L    Absolute Basophils 0.0 0.0 - 0.2 10e9/L    Abs Immature Granulocytes 0.1 0 - 0.4 10e9/L    Absolute Nucleated RBC 0.0    Comprehensive metabolic panel   Result Value Ref Range    Sodium 142 133 - 144 mmol/L    Potassium 3.4 3.4 - 5.3 mmol/L    Chloride 112 (H) 94 - 109 mmol/L    Carbon Dioxide 22 20 - 32 mmol/L    Anion Gap 7 3 - 14 mmol/L    Glucose 246 (H) 70 - 99 mg/dL    Urea Nitrogen 45 (H) 7 - 30 mg/dL    Creatinine 1.06 (H) 0.52 - 1.04 mg/dL    GFR Estimate 54 (L) >60 mL/min/[1.73_m2]    GFR Estimate If Black 62 >60 mL/min/[1.73_m2]    Calcium 9.6 8.5 - 10.1 mg/dL    Bilirubin Total 0.4 0.2 - 1.3 mg/dL    Albumin 2.8 (L) 3.4 - 5.0 g/dL    Protein Total 6.8 6.8 - 8.8 g/dL    Alkaline Phosphatase 105 40 - 150 U/L    ALT 38 0 - 50 U/L    AST 56 (H) 0 - 45 U/L   UA with Microscopic   Result Value Ref Range    Color Urine Yellow     Appearance Urine Cloudy     Glucose Urine 30 (A)  NEG^Negative mg/dL    Bilirubin Urine Negative NEG^Negative    Ketones Urine Negative NEG^Negative mg/dL    Specific Gravity Urine 1.016 1.003 - 1.035    Blood Urine Small (A) NEG^Negative    pH Urine 5.5 5.0 - 7.0 pH    Protein Albumin Urine 100 (A) NEG^Negative mg/dL    Urobilinogen mg/dL Normal 0.0 - 2.0 mg/dL    Nitrite Urine Negative NEG^Negative    Leukocyte Esterase Urine Large (A) NEG^Negative    Source Midstream Urine     WBC Urine >182 (H) 0 - 5 /HPF    RBC Urine 15 (H) 0 - 2 /HPF    WBC Clumps Present (A) NEG^Negative /HPF    Squamous Epithelial /HPF Urine 20 (H) 0 - 1 /HPF    Mucous Urine Present (A) NEG^Negative /LPF       Studies:  CT Hip Left w/o Contrast    (Results Pending)   CT Pelvis Bone wo Contrast    (Results Pending)   XR Pelvis w Hip Left G/E 2 Views    (Results Pending)   XR Pelvis G/E 3 Views    (Results Pending)       Saray Marie\

## 2020-02-02 NOTE — ED TRIAGE NOTES
Pt arrives via  from triage w/ complaints of left hip pain. Pt fell while reaching for a cabinet and fell back into side of chair and sustained a hip fracture confirmed at [place] 8 days ago. Also has big toe fracture on left side. Hip pain 9/10 while laying, and 7/10 while sitting. Pt uses  at home to transfer, independently transfers to and from .     Hx stroke 10yrs ago w/ left side residual weakness.

## 2020-02-02 NOTE — ED PROVIDER NOTES
[place] [facility] EMERGENCY DEPARTMENT ([facility])  February 2, 2020    History     Chief Complaint   Patient presents with     Hip Pain     HPI  Addis Peña is a 69 year old female who presents to the ED for evaluation of left hip pain. Patient reports she fell while reaching for a cabinet and fell back into the side of a chair. She states she had X-rays at the [facility] clinic on 1/27/20, which showed a left-sided acetabular fracture, a small avulsion fracture at the corner of the proximal phalanx of the left great toe, and left-sided metatarsal head fractures of the left foot.  Patient was the option for admission or discharge home with nonweightbearing.  Patient opted to be discharged home.  She has been nonweightbearing since that time.  She does have an appointment with orthopedic surgery on Monday.  She states that today that she slid out of bed and has been having worsening left hip pain.  She otherwise denies numbness and tingling in her left lower extremity.  No other injuries from this event.  No other symptoms noted.        PAST MEDICAL HISTORY  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     Nephrolithiasis      Stroke (H) 1/2010    due to uncontrolled hypertension     Type 2 diabetes, HbA1C goal < 8% (H)      PAST SURGICAL HISTORY  Past Surgical History:   Procedure Laterality Date     COLONOSCOPY       COLONOSCOPY N/A 5/9/2019    Procedure: COLONOSCOPY, WITH POLYPECTOMY AND BIOPSY;  Surgeon: Na Diehl MD;  Location: [facility] OR     colonscopy  3/2012    repeat 1 to 3 y     CYSTOSCOPY, URETEROSCOPY, COMBINED Right 2/22/2016    Procedure: COMBINED CYSTOSCOPY,  URETEROSCOPY;  Surgeon: Madelaine Roland MD;  Location:  OR     FAMILY HISTORY  Family History   Problem Relation Age of Onset     Hypertension Mother      Heart Disease Mother      C.A.D. Father      Diabetes Sister      Hypertension Brother      Cancer Sister      SOCIAL HISTORY  Social History     Tobacco Use     Smoking status: Never Smoker     Smokeless tobacco: Never Used   Substance Use Topics     Alcohol use: No     MEDICATIONS  Current Facility-Administered Medications   Medication     vitamin B-12 (CYANOCOBALAMIN) injection 1,000 mcg     Current Outpatient Medications   Medication     acetaminophen (TYLENOL) 500 MG tablet     amLODIPine (NORVASC) 10 MG tablet     aspirin 81 MG tablet     atorvastatin (LIPITOR) 40 MG tablet     baclofen (LIORESAL) 10 MG tablet     bisacodyl (DULCOLAX) 5 MG EC tablet     blood glucose (IRENE CONTOUR) test strip     Cholecalciferol (VITAMIN D3) 1000 UNIT TABS     ibuprofen (ADVIL/MOTRIN) 400 MG tablet     insulin glargine (LANTUS SOLOSTAR) 100 UNIT/ML pen     insulin pen needle (ULTICARE MINI) 31G X 6 MM miscellaneous     Lactase 250 MG CAPS     losartan (COZAAR) 25 MG tablet     metFORMIN (GLUCOPHAGE) 1000 MG tablet     metoprolol succinate ER (TOPROL-XL) 50 MG 24 hr tablet     montelukast (SINGULAIR) 10 MG tablet     senna-docusate (SENOKOT-S;PERICOLACE) 8.6-50 MG per tablet     vitamin B-12 (CYANOCOBALAMIN) 1000 MCG/ML injection     ALLERGIES  Allergies   Allergen Reactions     Lisinopril Cough     Milk Products GI Disturbance     Latex Rash       I have reviewed the Medications, Allergies, Past Medical and Surgical History, and Social History in the Epic system.    Review of Systems   Constitutional: Negative for fever.   HENT: Negative for congestion.    Eyes: Negative for redness.   Respiratory: Negative for shortness of breath.    Cardiovascular: Negative for chest pain.   Gastrointestinal: Negative for abdominal pain.   Genitourinary: Negative for  difficulty urinating.   Musculoskeletal: Negative for arthralgias and neck stiffness.   Skin: Negative for color change.   Neurological: Negative for headaches.   Psychiatric/Behavioral: Negative for confusion.       Physical Exam   BP: 137/42  Pulse: 76  Temp: 98.4  F (36.9  C)  Resp: 16  SpO2: 96 %      Physical Exam  Constitutional:       General: She is not in acute distress.     Appearance: She is well-developed. She is not diaphoretic.   HENT:      Head: Normocephalic and atraumatic.      Mouth/Throat:      Pharynx: No oropharyngeal exudate.   Eyes:      General: No scleral icterus.        Right eye: No discharge.         Left eye: No discharge.      Pupils: Pupils are equal, round, and reactive to light.   Neck:      Musculoskeletal: Normal range of motion and neck supple.   Cardiovascular:      Rate and Rhythm: Normal rate and regular rhythm.      Heart sounds: Normal heart sounds. No murmur. No friction rub. No gallop.    Pulmonary:      Effort: Pulmonary effort is normal. No respiratory distress.      Breath sounds: Normal breath sounds. No wheezing.   Chest:      Chest wall: No tenderness.   Abdominal:      General: Bowel sounds are normal. There is no distension.      Palpations: Abdomen is soft.      Tenderness: There is no abdominal tenderness.   Musculoskeletal: Normal range of motion.         General: Tenderness (L hip) present. No deformity.      Comments: Sensation and pulse intact in left lower extremity.  Left-sided weakness at baseline.   Skin:     General: Skin is warm and dry.      Coloration: Skin is not pale.      Findings: No erythema or rash.   Neurological:      Mental Status: She is alert and oriented to person, place, and time. Mental status is at baseline.      Cranial Nerves: No cranial nerve deficit.      Comments: Left-sided weakness from previous CVA, at baseline.          Physical Exam   Constitutional: oriented to person, place, and time. appears well-developed and  well-nourished.   HENT:   Head: Normocephalic and atraumatic.   Neck: Normal range of motion.   Pulmonary/Chest: Effort normal. No respiratory distress.   Cardiac: No murmurs, rubs, gallops. RRR.  Abdominal: Abdomen soft, nontender, nondistended. No rebound tenderness.  MSK: Long bones without deformity or evidence of trauma  Neurological: alert and oriented to person, place, and time.   Skin: Skin is warm and dry.   Psychiatric:  normal mood and affect.  behavior is normal. Thought content normal.       ED Course        Procedures             Critical Care time:  none             Labs Ordered and Resulted from Time of ED Arrival Up to the Time of Departure from the ED - No data to display         Assessments & Plan (with Medical Decision Making)   This 69-year-old female with a recent diagnosis of an acetabular fracture after a fall who presents with worsening left hip pain.  Patient has been at home and not weightbearing.  She did have a minor fall where she slid out of bed earlier today and has been having worsening pain since that time.  Her pain is not currently controlled at home.  On exam she is neurovascularly intact in her left lower extremity.  She does have left-sided weakness from previous CVA.  CT scan redemonstrates this is acetabular fracture.  I discussed case with orthopedic surgery and Trauma.  Patient was seen by Trauma who will admit to their service.    I have reviewed the nursing notes.    I have reviewed the findings, diagnosis, plan and need for follow up with the patient.    New Prescriptions    No medications on file       Final diagnoses:   None       2/2/2020   [facility], [place], EMERGENCY DEPARTMENT     Rd Graff DO  02/02/20 1943

## 2020-02-02 NOTE — PROGRESS NOTES
[facility]   Tertiary Survey Progress Note     Date of Service (when I saw the patient): 02/02/2020     Assessment & Plan    Ms. Peña is a 69 year old female patient with a history significant for left sided partial hemiparesis and DM2 who presented to the ED with a subacute left acetabular fracture.     Trauma Mechanism:fall from standing into chair  Known Injuries:  1. Left acetabular fracture  2. Left toe and metatarsal fractures                   Other diagnoses:  1. Left sided hemiparesis from CVA in 2010  2. Diabetes Mellitus type 2  3. HTN  4. Rosacea  5. CVA  6. GERD  7. Seasonal allergies  8. B12 deficiency anemia  9. Hemiplegia    Procedure(s): none currently    Plan:  Trauma:  #Left acetabular fracture  #left toe and metatarsal fractures  -anticipating transfer to outside hospital for surgical intervention    Neuro/Pain:  #Acute pain 2/2 fracture  #hx of CVA with hemiplegia, CVA 2010  - Monitor neurological status. Delirium prevention and precautions.   -pain control via acetaminophen, lidocaine patches, oxycodone    Pulmonary:  #no current acute issues  -supplemental oxygen to keep sat >92%    Cardiovascular:    #HTN  #HLD  -home beta blocker and statin restarted.     GI/Nutrition:    #No current acute issue  -Ok to have diet prior to intervention: CHO consistent  No indication for parenteral nutrition.    Fluids/Electrolytes:  #no acute issues    Renal:   #no current acute issues  -baseline Cr 1.06      Endocrine  # Diabetes Mellitus type 2  -home metformin held  -home glargine started  -moderate sliding scale insulin    Infectious disease:   - Antibiotics:  -No indications for antibiotics. Anticipate perioperative antibiotics      Hematology:    #B12 deficiency anemia  -Hgb 9.9, hematocrit 31.9  -MCHC 31.0  -heparin subcutaneous bid, hold prior to surgical interventions per orthopedic team         Musculoskeletal:  #left sided hemiplegia 2/2  CVA  -Bedrest for now per ortho  - Physical and occupational therapy consults for strengthening, ROM, gait training, ADLs    Skin:  #potential for skinbreakdown  -bedrest, orthopedic issue, limited mobility  - dilgent cares to prevent skin breakdown and wound formation.      Lines/ tubes/ drains:  -PIV      General Cares:    PPI/H2 blocker:  Not indicated   DVT prophylaxis: heparin started   Bowel Regimen/Date of last stool: 2/1/2020   Pulmonary toilet: IS   ETOH screen completed: yes       Discharge goals:     Adequate pain management: yes    VSS x24 hours: ongoing    Hemoglobin stable x 48 hours: ongoing    Ambulating safely and/or therapy evals complete: ongoing    Drains/lines removed or plan in place to manage: ongoing    Teaching done: ongoing    Other:  Expected D/C date: await orthopedics treatement recommendations    Interval History    Addis Peña is a 69 year old female who presented to the ER brought by her caregiver due to increased left hip pain.   Patient had a fall at home on 01/25/2020 while doing the dishes without her AFO on. She twisted her left leg as she fell back seated into her chair. She went to urgent care and initially a left knee x-ray was requested but it was not done due to her difficult mobility. She returned due to pain and a CT left hip was obtained on 01/28/2020. She was diagnosed with a left acetabular fracture and left metatarsal and toe fractures. She chose to go back home and continue management as outpatient. She has been managing her pain with ibuprofen. She had a scheduled consultation with Orthopedic Surgery on 2/3/2020. She had been trying to stay non-weight bearing on her left side while at home but it had been difficult. She lives with a friend who is her caregiver.   She states she fell again 2/1/2020 around 10:00 PM while trying to transfer. Her pain increased to 9/10. She came to the ER this time.  Patient states she has had an uneventful time in the ED. She  states pain is well managed and she does not have concerns, just would like to know if she is having surgery. Denies nausea and vomiting, changes to skin, changes to vision or hearing.       Review Of Systems x8 negative except as noted on interval history       [place] Coma Scale - Total 15/15      Frailty Questionnaire: To be done for all patients age 60+  F (Fatigue): Is the patient easily fatigued? NO = 0  Does the patient have difficulty performing housework such as washing windows or scrubbing floors? AND Activity in a typical 24-hour day- No moderate or vigorous activity  R (Resistance): Is the patient unable to walk one flight of stairs? YES = 1  A (Ambulation): Is the patient unable to walk one block? YES = 1  I  (Illness): Does the patient have more than five illnesses? YES = 1  L (Loss of weight): Has the patient lost more than 5% of weight in the past 6 months. NO = 0  Lost five pounds or more in the last 3 months without trying? AND/OR Unintended weight loss?    Score: 3    Score:3-5: PLAN: Palliative Care Consult, PT/OT Consult, consider PM&R, Delirium Prevention Strategies                           Physical Exam  Vitals signs and nursing note reviewed.   Constitutional:       General: She is not in acute distress.     Appearance: Normal appearance. She is not ill-appearing, toxic-appearing or diaphoretic.   HENT:      Head: Normocephalic and atraumatic.      Right Ear: External ear normal.      Left Ear: External ear normal.      Nose: Nose normal.      Mouth/Throat:      Mouth: Mucous membranes are moist.      Pharynx: No oropharyngeal exudate or posterior oropharyngeal erythema.   Eyes:      General: No scleral icterus.        Right eye: No discharge.         Left eye: No discharge.      Extraocular Movements: Extraocular movements intact.      Pupils: Pupils are equal, round, and reactive to light.   Neck:      Musculoskeletal: Normal range of motion and neck supple. No neck rigidity or muscular  tenderness.   Cardiovascular:      Rate and Rhythm: Normal rate and regular rhythm.      Pulses: Normal pulses.      Heart sounds: Normal heart sounds. No murmur. No friction rub. No gallop.    Pulmonary:      Effort: Pulmonary effort is normal. No respiratory distress.      Breath sounds: Normal breath sounds. No stridor. No wheezing, rhonchi or rales.   Chest:      Chest wall: No tenderness.   Abdominal:      General: Bowel sounds are normal. There is no distension.      Palpations: Abdomen is soft. There is no mass.      Tenderness: There is no abdominal tenderness. There is no guarding.   Musculoskeletal: Normal range of motion.         General: No swelling, tenderness, deformity or signs of injury.      Right lower leg: No edema.      Left lower leg: No edema.   Skin:     General: Skin is warm and dry.      Capillary Refill: Capillary refill takes less than 2 seconds.      Coloration: Skin is not jaundiced.      Findings: Bruising present. No erythema.      Comments: Bruising present to left posterior hip   Neurological:      General: No focal deficit present.      Mental Status: She is alert and oriented to person, place, and time. Mental status is at baseline.   Psychiatric:         Mood and Affect: Mood normal.         Behavior: Behavior normal.         Thought Content: Thought content normal.         Judgment: Judgment normal.         Temp: 98.4  F (36.9  C) Temp src: Oral BP: 124/57 Pulse: 74   Resp: 14 SpO2: 95 % O2 Device: None (Room air)    There were no vitals filed for this visit.  Vital Signs with Ranges  Temp:  [98.4  F (36.9  C)] 98.4  F (36.9  C)  Pulse:  [74-92] 74  Resp:  [14-16] 14  BP: (124-142)/(42-67) 124/57  SpO2:  [95 %-98 %] 95 %  No intake/output data recorded.      Jaycob Pena NP  To contact the trauma service use job code pager [phone],   Numeric texts or alpha text through [facility]

## 2020-02-02 NOTE — CONSULTS
Orthopaedic Surgery Consultation    Addis Peña MRN# [id number]   Age: 69 year old YOB: 1950   Date of Admission:  2/2/2020    Reason for consult: Left acetabular fracture   Requesting physician: Michael Torres*            Impression and Recommendation (Resident / Clinician):   Impression:  Addis Peña is a 69 year old female with a significant PMH of left sided partial yesenia-paresis and DM II who presents with a subacute left acetabular fracture.     Recomendations:  Operative Plan: Pending, possibly transfer to [facility] for appropriate management      Activity: NWB LLE  Wound Cares/Dressing/Splint: None  Diet: Regular  DVT  PPx: DVT ppx per primary team  Pain: PO and IV pain control  Labs:   Recommend osteoporosis labs to be done during this hospitalization including Ca, Phos, PTH, Vitamin D.  Imaging: No further imaging needed at this time  PT/OT: Work on ROM, strengthening, gait training, mobility, and ADLs  Consults: Trauma    Dispo: Per Primary team  Follow Up: TBTHAI Crowell MD  Orthopaedic Surgery, PGY-4  Pager: [phone]      Please page me directly with any questions/concerns during regular weekday hours before 5pm. If there is no response, if it is a weekend, or if it is during evening hours then please page the orthopaedic surgery resident on call.    Attestation:  This patient was discussed with Dr Baerd who agrees with the above.         Chief Complaint:   Left acetabular fracture          History of Present Illness (Resident / Clinician):   Addis Peña is a 69 year old female with a significant PMH of left sided partial yesenia-paresis and DM II who presents with a subacute left acetabular fracture.  She had a ground level fall at her home 1 week ago.  Was seen at urgent care and subsequently sport medicine and diagnosed with a left acetabular fracture.  She has been at home managing in a wheelchair and has been nonweightbearing.  She  reports that her pain was very well controlled on ibuprofen and Tylenol.  Last night she had a fall out of bed, in fact she simply slid off the edge and was unable to get up.  She presented to the emergency department for worsening pain.  She denies any other new symptoms, no tingling or numbness.  Has been on Tylenol and ibuprofen with good pain control in the emergency department.  Pain is presently in the left hip and in the groin.  Worse with some movement.    At baseline the patient lives in a senior assisted living.  She has had aides help in the morning getting dressed and transferring to her wheelchair.  She has a roommate which helps her in the evening.  She typically ambulates with a cane.  She denies antecedent hip pain.  She has baseline weakness in the left leg and left arm/hand secondary to a stroke in 2010.      History obtained from patient interview and chart review.        Past Medical History:     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     Nephrolithiasis      Stroke (H) 1/2010    due to uncontrolled hypertension     Type 2 diabetes, HbA1C goal < 8% (H)              Past Surgical History:     Past Surgical History:   Procedure Laterality Date     COLONOSCOPY       COLONOSCOPY N/A 5/9/2019    Procedure: COLONOSCOPY, WITH POLYPECTOMY AND BIOPSY;  Surgeon: Na Diehl MD;  Location: [facility] OR     colonscopy  3/2012    repeat 1 to 3 y     CYSTOSCOPY, URETEROSCOPY, COMBINED Right 2/22/2016    Procedure: COMBINED CYSTOSCOPY, URETEROSCOPY;  Surgeon: Madelaine Roland MD;  [facility] OR      Reviewed with patient       Social History:   Tobacco use: Denies  Alcohol use:  Denies  Elicit drug use: Patient denies  Ambulation: Cane at baseline, has been wheelchair-bound since her fall  Living situation: Senior living facility          Family History:   No family history of anesthesia, bleeding or clotting complications.           Allergies:     Allergies   Allergen Reactions     Lisinopril Cough     Milk Products GI Disturbance     Latex Rash             Medications:   Medication reviewed with patient and in chart.  Anticoagulation: None  Antibiotics: None          Review of Systems:   A 12 point ROS was conducted and was otherwise negative except for HPI above.          Physical Exam:   /57   Pulse 74   Temp 98.4  F (36.9  C) (Oral)   Resp 14   SpO2 95%   General: Awake, alert, cooperative, no apparent distress, appears stated age  HEENT: Normocephalic, atraumatic, EOMI, no scleral icterus  Respiratory: Breathing non-labored, no wheezing  Cardiovascular: Nontachycardic  Skin: No rashes or lesions  Neurological: A&Ox3, CN II-XII grossly intact    Musculoskeletal:  LLE: No gross deformity. Skin intact, extensive ecchymosis to the lateral hip.  Patient has generalized atrophy of the left lower extremity from the knee distal.  She tolerates hip range of motion with only mild increase in pain.  She is able to do a partial independent straight leg raise.  She has no knee or ankle pain.  She has 0 out of 5 strength in her TA/EHL, 1 out of 5 in her Gastroc, 0 out of 5 in her peroneals, 3 out of 5 in knee extension, 4 out of 5 in hip flexion, 5 out of 5 in knee flexion.  SILT in femoral, sural, saphenous, deep peroneal, superficial peroneal, and tibial nerve distributions. Dorsalis pedis and posterior tibial arteries 2+ and foot wwp with BCR.          Imaging:   Review of left hip and pelvis CT from 2/2/2020 demonstrate: Redemonstration of a left acetabular fracture.  Complex pattern most consistent with anterior column posterior hemitransverse.  Slightly increased protrusio from  prior CT on 1/28/2019.  No femoral neck or intertrochanteric hip fracture noted.  No new fractures noted.         Laboratory date:   CBC:  Lab Results   Component Value Date    WBC 9.5 02/02/2020    HGB 9.9 (L) 02/02/2020     02/02/2020       BMP:  Lab Results   Component Value Date     02/02/2020    POTASSIUM 3.4 02/02/2020    CHLORIDE 112 (H) 02/02/2020    CO2 22 02/02/2020    BUN 45 (H) 02/02/2020    CR 1.06 (H) 02/02/2020    ANIONGAP 7 02/02/2020    ALBINO 9.6 02/02/2020     (H) 02/02/2020       Inflammatory Markers:  Lab Results   Component Value Date    WBC 9.5 02/02/2020    WBC 7.6 03/29/2019    WBC 6.9 05/04/2018    SED 15 02/02/2010    SED 12 02/02/2010    SED 8 01/26/2010

## 2020-02-02 NOTE — DISCHARGE SUMMARY
[facility], [place]    Discharge Summary  Trauma Surgery Service    Date of Admission:  2/2/2020  Date of Discharge/ transfer:  2/2/2020  Attending Physician: Dr. Melissa Rodriguez  Discharging Provider: Janelle Sandra CNP  Date of Service (when I saw the patient): 02/02/20    Primary Provider: Wegener, Joel Daniel Irwin  Primary Care clinic: [address] [place] [id number]  Phone: [phone]  Fax number: [phone]     Discharge Diagnoses   Falls- seem to be mechanical in nature  Left acetabular fracture  Acute vs subacute 2nd to 4th metatarsal head fractures  Diabetes Mellitus  Hypertension  Hx CVA with residual left sided hemiparesis    Hospital Course     The patient sustained the above injury as a result of multiple falls over the course of a few days. Date of initial fall per patient report 01/25/2020, second fall 02/02/2020. Per chart review and per the patient report, Addis Peña is a pleasant 69 year old female who presented to the ER brought by her caregiver due to increased left hip pain.     She had a fall at home on 01/25/2020 while doing the dishes without her AFO. She twisted her left leg as she fell back seated into her chair. She went to urgent care and initially a left knee x-ray was requested but it was not done due to her difficult mobility and pain. She subsequently returned  to urgent care due to pain and a CT left hip was obtained on 01/28/2020. She was diagnosed with a left acetabular fracture and left metatarsal and toe fractures. She chose to go back home and continue management as outpatient. She has been managing her pain with ibuprofen. She had a scheduled consultation with Orthopedic Surgery on 2/3/2020. She had been trying to stay non-weight bearing on her left side while at home but it had been difficult. She lives with a friend who is her caregiver.   Unfortunately she fell again 02/01/2020 about 10pm while trying to transfer. Her pain became  intense which prompted her to come to the ED.    In the ED she complains mainly of pain to her left hip. She denies loss of consciousness, or head strike with the fall. She denies chest pain, shortness of breath, fever or dysuria.    Repeat CT imaging was obtained of her pelvis and left hip with a left acetabular fracture with increased superior displacement and hematoma. She was seen by the Orthopedic surgery team and the decision was made to transfer her to another facility where there will be more equipped to manage her pelvic fractures. Management here included pain control, activity restrictions, and hemodynamic monitoring.      Acute pain  Pain was controlled with a multi-modality approach.  The current regimen for Addis Peña includes the following, scheduled acetaminophen, topical analgesic and PRN short acting opioids.      Urinary Tract Infection  Addis Peña was found to have UTI on routine urine analysis during this admission. She was started on Ceftriaxone. Her regimen can be tailored pending culture results.    Diabetes Mellitus  Hyperglycemia  Her home regimen includes Metformin 1000 mg twice daily and Glargine 10 units daily. Her blood glucose levels were elevated int he 200's . Her home oral regimen was held. She was placed on a Novolog medium correction sliding scale regimen and Glargin.     Hx Hypertension  Hyperlipidemia  Her blood pressure and heart rate were stable. Continue home betablocker, statin regimen    Code Status   Full Code    SUBJECTIVE: Transfer plan discussed with patient. No acute complains prior to transfer.    Discharge Disposition   Transferred to [facility] Orthopedic surgery team  Condition at discharge: Stable  Discharge VS: Blood pressure 124/57, pulse 74, temperature 98.4  F (36.9  C), temperature source Oral, resp. rate 14, SpO2 95 %, not currently breastfeeding.    Consultations This Hospital Stay   ORTHOPAEDIC SURGERY ADULT/PEDS IP CONSULT  SOCIAL WORK  "IP CONSULT  CARE COORDINATOR IP CONSULT  PHYSICAL THERAPY ADULT IP CONSULT  OCCUPATIONAL THERAPY ADULT IP CONSULT  NUTRITION SERVICES ADULT IP CONSULT    Discharge Orders   No discharge procedures on file.  Discharge Medications   Current Discharge Medication List      CONTINUE these medications which have NOT CHANGED    Details   acetaminophen (TYLENOL) 500 MG tablet Take 500 mg by mouth 2 times daily       amLODIPine (NORVASC) 10 MG tablet Take 1 tablet (10 mg) by mouth daily  Qty: 90 tablet, Refills: 3    Comments: Profile Rx: patient will contact pharmacy when needed  Associated Diagnoses: Hypertension goal BP (blood pressure) < 140/90      aspirin 81 MG tablet Take 1 tablet by mouth daily.      atorvastatin (LIPITOR) 40 MG tablet Take 1 tablet (40 mg) by mouth daily  Qty: 90 tablet, Refills: 3    Comments: Profile Rx: patient will contact pharmacy when needed  Associated Diagnoses: Hyperlipidemia LDL goal <70      baclofen (LIORESAL) 10 MG tablet Take 1 tablet (10 mg) by mouth 3 times daily as needed for muscle spasms  Qty: 270 tablet, Refills: 3    Comments: Profile Rx: patient will contact pharmacy when needed  Associated Diagnoses: Cerebrovascular accident (CVA), unspecified mechanism (H)      bisacodyl (DULCOLAX) 5 MG EC tablet Take 2 tablets (10 mg) by mouth daily as needed for constipation  Qty: 4 tablet, Refills: 0    Comments: Follow split-dose [facility] Colonoscopy Prep Instructions, Purchase Dulcolax tablets over-the-counter - finish all prep - even if you experience \"clear\" stools at any time.  Associated Diagnoses: H/O adenomatous polyp of colon      blood glucose (IRENE CONTOUR) test strip Use to test blood sugars two times daily or as directed.  Qty: 100 each, Refills: 11    Associated Diagnoses: Type 2 diabetes mellitus with stage 3 chronic kidney disease, without long-term current use of insulin (H)      Cholecalciferol (VITAMIN D3) 1000 UNIT TABS Take 1,000 Units by mouth daily       ibuprofen " (ADVIL/MOTRIN) 400 MG tablet Take 400 mg by mouth every 6 hours as needed for moderate pain      insulin glargine (LANTUS SOLOSTAR) 100 UNIT/ML pen Inject 10 Units Subcutaneous every morning  Qty: 9 mL, Refills: 3    Comments: If Lantus is not covered by insurance, may substitute Basaglar at same dose and frequency.    Associated Diagnoses: Type 2 diabetes mellitus with stage 3 chronic kidney disease, without long-term current use of insulin (H)      insulin pen needle (ULTICARE MINI) 31G X 6 MM miscellaneous Use one pen needles daily or as directed.  Qty: 100 each, Refills: 3    Associated Diagnoses: Type 2 diabetes mellitus with stage 3 chronic kidney disease, without long-term current use of insulin (H)      Lactase 250 MG CAPS Take 1 chew tab by mouth as needed (with dairy)       losartan (COZAAR) 25 MG tablet Take 1 tablet (25 mg) by mouth daily  Qty: 90 tablet, Refills: 3    Comments: Profile Rx: patient will contact pharmacy when needed  Associated Diagnoses: Hypertension goal BP (blood pressure) < 140/90      metFORMIN (GLUCOPHAGE) 1000 MG tablet Take 1 tablet (1,000 mg) by mouth 2 times daily (with meals)  Qty: 180 tablet, Refills: 3    Comments: Profile Rx: patient will contact pharmacy when needed  Associated Diagnoses: Type 2 diabetes mellitus with stage 3 chronic kidney disease, with long-term current use of insulin (H)      metoprolol succinate ER (TOPROL-XL) 50 MG 24 hr tablet Take 1 tablet (50 mg) by mouth daily  Qty: 90 tablet, Refills: 3    Comments: Profile Rx: patient will contact pharmacy when needed  Associated Diagnoses: Hypertension, uncontrolled; Hypertension goal BP (blood pressure) < 140/90      montelukast (SINGULAIR) 10 MG tablet Take 1 tablet (10 mg) by mouth daily  Qty: 90 tablet, Refills: 3    Comments: Profile Rx: patient will contact pharmacy when needed  Associated Diagnoses: Seasonal allergic rhinitis, unspecified trigger      senna-docusate (SENOKOT-S;PERICOLACE) 8.6-50 MG per  tablet Take 1 tablet by mouth 2 times daily To be taken with opioid (narcotic) pain medication to prevent constipation.  Qty: 60 tablet, Refills: 1    Comments: To be taken with opioid (narcotic) pain medication to prevent constipation.  Associated Diagnoses: MARI (acute kidney injury) (H)      vitamin B-12 (CYANOCOBALAMIN) 1000 MCG/ML injection Inject 1 mL (1,000 mcg) into the muscle every 30 days  Qty: 3 mL, Refills: 3    Associated Diagnoses: Anemia due to vitamin B12 deficiency, unspecified B12 deficiency type           Allergies   Allergies   Allergen Reactions     Lisinopril Cough     Milk Products GI Disturbance     Latex Rash     Data   Most Recent 3 CBC's:  Recent Labs   Lab Test 02/02/20  0419 03/29/19  0746 05/04/18  0751   WBC 9.5 7.6 6.9   HGB 9.9* 12.1 12.3   MCV 96 93 95    270 265      Most Recent 3 BMP's:  Recent Labs   Lab Test 02/02/20  0419 10/04/19  0727 03/29/19  0746    139 142   POTASSIUM 3.4 4.0 4.9   CHLORIDE 112* 106 107   CO2 22 23 26   BUN 45* 23 25   CR 1.06* 0.74 0.80   ANIONGAP 7 10 9   ALBINO 9.6 8.9 9.5   * 173* 183*     Most Recent 2 LFT's:  Recent Labs   Lab Test 02/02/20 0419 03/29/19  0746   AST 56* 15   ALT 38 15   ALKPHOS 105 96   BILITOTAL 0.4 0.3     Most Recent INR's and Anticoagulation Dosing History:  Anticoagulation Dose History     Recent Dosing and Labs Latest Ref Rng & Units 1/25/2010 1/26/2010 2/2/2010 12/23/2015    INR 0.86 - 1.14 0.94 0.99 1.01 1.02        Most Recent 3 Troponin's:  Recent Labs   Lab Test 12/23/15  0939   TROPI <0.015  The 99th percentile for upper reference range is 0.045 ug/L.  Troponin values in   the range of 0.045 - 0.120 ug/L may be associated with risks of adverse   clinical events.       Most Recent 6 Bacteria Isolates From Any Culture (See EPIC Reports for Culture Details):  Recent Labs   Lab Test 01/31/20  1405 08/13/17  1403 02/05/16  0909 12/24/15  2245 12/24/15  2243 12/28/12  1410   CULT 10,000 to 50,000  colonies/mL  mixed urogenital jewel  Susceptibility testing not routinely done   10,000 to 50,000 colonies/mL mixed urogenital jewel Susceptibility testing not   routinely done   50,000 to 100,000 colonies/mL mixed urogenital jewel No growth No growth Heavy growth Escherichia coli Heavy growth Strain 2 Escherichia coli Moderate growth 2 kinds Corynebacterium species Susceptibility testing not  routinely done     Most Recent TSH, T4 and A1c Labs:  Recent Labs   Lab Test 02/02/20  0419  03/29/19  0746   TSH  --   --  1.08   A1C 8.4*   < > 7.6*    < > = values in this interval not displayed.     Results for orders placed or performed during the hospital encounter of 02/02/20   CT Hip Left w/o Contrast    Narrative    EXAM: CT HIP LEFT WITHOUT CONTRAST, CT PELVIS BONE WITHOUT CONTRAST  LOCATION: [facility]  DATE/TIME: 02/02/2020, 2:31 AM    INDICATION: Known left acetabular fracture. Secondary fall. Worsening pain.  COMPARISON: 01/28/2020.  TECHNIQUE: Noncontrast. Axial, sagittal and coronal thin-section reconstruction. Dose reduction techniques were used.   CONTRAST: None.    FINDINGS:   There is again a comminuted fracture of the left acetabulum. There is increased superior displacement of the acetabular roof with protrusion of the femoral head superiorly. No new fracture. There is increasing hematoma medial to the ilium and acetabulum.   Degenerative disease in the lower lumbar spine.    Colonic diverticula without diverticulitis. Nabothian cysts in the cervix. Atherosclerotic calcification of the aorta and its branches.      Impression    IMPRESSION:  1.  There is again a comminuted left acetabular fracture with increased superior displacement of the acetabular roof and superior displacement of the femoral head within the acetabular fossa. No dislocation.       CT Pelvis Bone wo Contrast    Narrative    EXAM: CT HIP LEFT WITHOUT CONTRAST, CT PELVIS BONE WITHOUT CONTRAST  LOCATION: [facility]  SERVICES  DATE/TIME: 02/02/2020, 2:31 AM    INDICATION: Known left acetabular fracture. Secondary fall. Worsening pain.  COMPARISON: 01/28/2020.  TECHNIQUE: Noncontrast. Axial, sagittal and coronal thin-section reconstruction. Dose reduction techniques were used.   CONTRAST: None.    FINDINGS:   There is again a comminuted fracture of the left acetabulum. There is increased superior displacement of the acetabular roof with protrusion of the femoral head superiorly. No new fracture. There is increasing hematoma medial to the ilium and acetabulum.   Degenerative disease in the lower lumbar spine.    Colonic diverticula without diverticulitis. Nabothian cysts in the cervix. Atherosclerotic calcification of the aorta and its branches.      Impression    IMPRESSION:  1.  There is again a comminuted left acetabular fracture with increased superior displacement of the acetabular roof and superior displacement of the femoral head within the acetabular fossa. No dislocation.       XR Pelvis w Hip Left G/E 2 Views    Narrative    3 views left hip radiographs 2/2/2020 11:51 AM    History: Judet vies     Comparison: CT left hip 2/2/2020    Findings:    AP, crosstable lateral, and judet views of the left hip were obtained.      Mildly displaced, comminuted left acetabular fracture, which is better  characterized on pelvic CT same day. Mild protrusion of the left  femoral head compared to the right. Moderate degenerative changes of  the right hip and lumbar spine. Vascular calcifications.      Impression    Impression:  1. Redemonstration of mildly displaced, comminuted left acetabular  fracture, better characterized on CT left hip same day.  2. Degenerative changes of the lumbar spine and right hip.    XR Chest Port 1 View    Narrative    EXAM: XR CHEST PORT 1 VW  2/2/2020 11:33 AM      HISTORY: Recent falls. Possible pre-op.    COMPARISON: 12/23/2015    FINDINGS: Single AP chest radiograph. Trachea is midline, heart size  is  normal. No focal pulmonary opacity, pneumothorax, or pleural  effusion. No acute osseous or abdominal abnormality.      Impression    IMPRESSION: No acute cardiopulmonary disease.         I have personally reviewed the examination and initial interpretation  and I agree with the findings.    NOE GOLDSMITH MD   XR Pelvis 1/2 Views    Narrative    3 views left hip radiographs 2/2/2020 11:51 AM    History: Judet vies     Comparison: CT left hip 2/2/2020    Findings:    AP, crosstable lateral, and judet views of the left hip were obtained.      Mildly displaced, comminuted left acetabular fracture, which is better  characterized on pelvic CT same day. Mild protrusion of the left  femoral head compared to the right. Moderate degenerative changes of  the right hip and lumbar spine. Vascular calcifications.      Impression    Impression:  1. Redemonstration of mildly displaced, comminuted left acetabular  fracture, better characterized on CT left hip same day.  2. Degenerative changes of the lumbar spine and right hip.        Time Spent on this Encounter   I, KEILA Zaragoza CNP, personally saw the patient today and spent less than or equal to 30 minutes discharging this patient.    We appreciate the opportunity to care for your patient while in the hospital.  Should you have any questions about their injuries or this discharge summary our contact information is below.    Trauma Services  [facility]   Department of Critical Care and Acute Care Surgery  [address] [address]  Office: [phone]

## 2020-02-03 ENCOUNTER — PRE VISIT (OUTPATIENT)
Dept: ORTHOPEDICS | Facility: CLINIC | Age: 70
End: 2020-02-03

## 2020-02-07 ENCOUNTER — AMBULATORY - HEALTHEAST (OUTPATIENT)
Dept: GERIATRICS | Facility: CLINIC | Age: 70
End: 2020-02-07

## 2020-02-10 ENCOUNTER — AMBULATORY - HEALTHEAST (OUTPATIENT)
Dept: ADMINISTRATIVE | Facility: CLINIC | Age: 70
End: 2020-02-10

## 2020-02-10 ENCOUNTER — OFFICE VISIT - HEALTHEAST (OUTPATIENT)
Dept: GERIATRICS | Facility: CLINIC | Age: 70
End: 2020-02-10

## 2020-02-10 DIAGNOSIS — G89.18 POSTOPERATIVE PAIN: ICD-10-CM

## 2020-02-10 DIAGNOSIS — R53.81 PHYSICAL DECONDITIONING: ICD-10-CM

## 2020-02-10 DIAGNOSIS — Z98.890 S/P ORIF (OPEN REDUCTION INTERNAL FIXATION) FRACTURE: ICD-10-CM

## 2020-02-10 DIAGNOSIS — M25.552 LEFT HIP PAIN: ICD-10-CM

## 2020-02-10 DIAGNOSIS — Z87.81 S/P ORIF (OPEN REDUCTION INTERNAL FIXATION) FRACTURE: ICD-10-CM

## 2020-02-10 DIAGNOSIS — S32.432D CLOSED DISPLACED FRACTURE OF ANTERIOR COLUMN OF LEFT ACETABULUM WITH ROUTINE HEALING, SUBSEQUENT ENCOUNTER: ICD-10-CM

## 2020-02-11 ENCOUNTER — RECORDS - HEALTHEAST (OUTPATIENT)
Dept: LAB | Facility: CLINIC | Age: 70
End: 2020-02-11

## 2020-02-11 ENCOUNTER — COMMUNICATION - HEALTHEAST (OUTPATIENT)
Dept: GERIATRICS | Facility: CLINIC | Age: 70
End: 2020-02-11

## 2020-02-11 DIAGNOSIS — M25.552 LEFT HIP PAIN: ICD-10-CM

## 2020-02-11 DIAGNOSIS — G89.18 POSTOPERATIVE PAIN: ICD-10-CM

## 2020-02-11 LAB
ANION GAP SERPL CALCULATED.3IONS-SCNC: 10 MMOL/L (ref 5–18)
BUN SERPL-MCNC: 33 MG/DL (ref 8–22)
CALCIUM SERPL-MCNC: 9.1 MG/DL (ref 8.5–10.5)
CHLORIDE BLD-SCNC: 109 MMOL/L (ref 98–107)
CO2 SERPL-SCNC: 23 MMOL/L (ref 22–31)
CREAT SERPL-MCNC: 0.94 MG/DL (ref 0.6–1.1)
CREAT SERPL-MCNC: 0.94 MG/DL (ref 0.6–1.1)
ERYTHROCYTE [DISTWIDTH] IN BLOOD BY AUTOMATED COUNT: 14.6 % (ref 11–14.5)
GFR SERPL CREATININE-BSD FRML MDRD: 59 ML/MIN/1.73M2
GFR SERPL CREATININE-BSD FRML MDRD: 59 ML/MIN/1.73M2
GLUCOSE BLD-MCNC: 90 MG/DL (ref 70–125)
GLUCOSE SERPL-MCNC: 90 MG/DL (ref 70–125)
HCT VFR BLD AUTO: 36.7 % (ref 35–47)
HGB BLD-MCNC: 10.6 G/DL (ref 12–16)
MAGNESIUM SERPL-MCNC: 1.9 MG/DL (ref 1.8–2.6)
MCH RBC QN AUTO: 30.3 PG (ref 27–34)
MCHC RBC AUTO-ENTMCNC: 28.9 G/DL (ref 32–36)
MCV RBC AUTO: 105 FL (ref 80–100)
PLATELET # BLD AUTO: 377 THOU/UL (ref 140–440)
PMV BLD AUTO: 10.4 FL (ref 8.5–12.5)
POTASSIUM BLD-SCNC: 5.3 MMOL/L (ref 3.5–5)
POTASSIUM SERPL-SCNC: 5.3 MMOL/L (ref 3.5–5)
RBC # BLD AUTO: 3.5 MILL/UL (ref 3.8–5.4)
SODIUM SERPL-SCNC: 142 MMOL/L (ref 136–145)
WBC: 8.3 THOU/UL (ref 4–11)

## 2020-02-13 ENCOUNTER — OFFICE VISIT - HEALTHEAST (OUTPATIENT)
Dept: GERIATRICS | Facility: CLINIC | Age: 70
End: 2020-02-13
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
## 2020-02-13 DIAGNOSIS — Z87.81 S/P ORIF (OPEN REDUCTION INTERNAL FIXATION) FRACTURE: ICD-10-CM

## 2020-02-13 DIAGNOSIS — Z98.890 S/P ORIF (OPEN REDUCTION INTERNAL FIXATION) FRACTURE: ICD-10-CM

## 2020-02-14 ENCOUNTER — TRANSFERRED RECORDS (OUTPATIENT)
Dept: HEALTH INFORMATION MANAGEMENT | Facility: CLINIC | Age: 70
End: 2020-02-14

## 2020-02-14 ENCOUNTER — RECORDS - HEALTHEAST (OUTPATIENT)
Dept: LAB | Facility: CLINIC | Age: 70
End: 2020-02-14

## 2020-02-14 ENCOUNTER — OFFICE VISIT - HEALTHEAST (OUTPATIENT)
Dept: GERIATRICS | Facility: CLINIC | Age: 70
End: 2020-02-14

## 2020-02-14 DIAGNOSIS — Z98.890 S/P ORIF (OPEN REDUCTION INTERNAL FIXATION) FRACTURE: ICD-10-CM

## 2020-02-14 DIAGNOSIS — R53.81 PHYSICAL DECONDITIONING: ICD-10-CM

## 2020-02-14 DIAGNOSIS — S32.432D CLOSED DISPLACED FRACTURE OF ANTERIOR COLUMN OF LEFT ACETABULUM WITH ROUTINE HEALING, SUBSEQUENT ENCOUNTER: ICD-10-CM

## 2020-02-14 DIAGNOSIS — Z87.81 S/P ORIF (OPEN REDUCTION INTERNAL FIXATION) FRACTURE: ICD-10-CM

## 2020-02-14 LAB
CREAT SERPL-MCNC: 0.76 MG/DL (ref 0.6–1.1)
GFR SERPL CREATININE-BSD FRML MDRD: >60 ML/MIN/1.73M2
POTASSIUM BLD-SCNC: 4.6 MMOL/L (ref 3.5–5)
SODIUM SERPL-SCNC: 144 MMOL/L (ref 136–145)

## 2020-02-18 ENCOUNTER — OFFICE VISIT - HEALTHEAST (OUTPATIENT)
Dept: GERIATRICS | Facility: CLINIC | Age: 70
End: 2020-02-18

## 2020-02-18 DIAGNOSIS — M25.552 LEFT HIP PAIN: ICD-10-CM

## 2020-02-18 DIAGNOSIS — Z79.4 TYPE 2 DIABETES MELLITUS WITH OTHER NEUROLOGIC COMPLICATION, WITH LONG-TERM CURRENT USE OF INSULIN (H): ICD-10-CM

## 2020-02-18 DIAGNOSIS — Z87.81 S/P ORIF (OPEN REDUCTION INTERNAL FIXATION) FRACTURE: ICD-10-CM

## 2020-02-18 DIAGNOSIS — R53.81 PHYSICAL DECONDITIONING: ICD-10-CM

## 2020-02-18 DIAGNOSIS — I10 ESSENTIAL HYPERTENSION: ICD-10-CM

## 2020-02-18 DIAGNOSIS — S32.432D CLOSED DISPLACED FRACTURE OF ANTERIOR COLUMN OF LEFT ACETABULUM WITH ROUTINE HEALING, SUBSEQUENT ENCOUNTER: ICD-10-CM

## 2020-02-18 DIAGNOSIS — Z98.890 S/P ORIF (OPEN REDUCTION INTERNAL FIXATION) FRACTURE: ICD-10-CM

## 2020-02-18 DIAGNOSIS — E11.49 TYPE 2 DIABETES MELLITUS WITH OTHER NEUROLOGIC COMPLICATION, WITH LONG-TERM CURRENT USE OF INSULIN (H): ICD-10-CM

## 2020-02-18 DIAGNOSIS — G89.18 POSTOPERATIVE PAIN: ICD-10-CM

## 2020-02-20 ENCOUNTER — OFFICE VISIT - HEALTHEAST (OUTPATIENT)
Dept: GERIATRICS | Facility: CLINIC | Age: 70
End: 2020-02-20

## 2020-02-20 ENCOUNTER — TRANSFERRED RECORDS (OUTPATIENT)
Dept: HEALTH INFORMATION MANAGEMENT | Facility: CLINIC | Age: 70
End: 2020-02-20

## 2020-02-20 DIAGNOSIS — I10 ESSENTIAL HYPERTENSION: ICD-10-CM

## 2020-02-20 DIAGNOSIS — E11.49 TYPE 2 DIABETES MELLITUS WITH OTHER NEUROLOGIC COMPLICATION, WITH LONG-TERM CURRENT USE OF INSULIN (H): ICD-10-CM

## 2020-02-20 DIAGNOSIS — Z79.4 TYPE 2 DIABETES MELLITUS WITH OTHER NEUROLOGIC COMPLICATION, WITH LONG-TERM CURRENT USE OF INSULIN (H): ICD-10-CM

## 2020-02-20 DIAGNOSIS — R53.81 PHYSICAL DECONDITIONING: ICD-10-CM

## 2020-02-25 ENCOUNTER — TRANSFERRED RECORDS (OUTPATIENT)
Dept: HEALTH INFORMATION MANAGEMENT | Facility: CLINIC | Age: 70
End: 2020-02-25

## 2020-02-25 ENCOUNTER — OFFICE VISIT - HEALTHEAST (OUTPATIENT)
Dept: GERIATRICS | Facility: CLINIC | Age: 70
End: 2020-02-25

## 2020-02-25 DIAGNOSIS — M25.552 LEFT HIP PAIN: ICD-10-CM

## 2020-02-25 DIAGNOSIS — Z87.81 S/P ORIF (OPEN REDUCTION INTERNAL FIXATION) FRACTURE: ICD-10-CM

## 2020-02-25 DIAGNOSIS — E11.49 TYPE 2 DIABETES MELLITUS WITH OTHER NEUROLOGIC COMPLICATION, WITH LONG-TERM CURRENT USE OF INSULIN (H): ICD-10-CM

## 2020-02-25 DIAGNOSIS — R53.81 PHYSICAL DECONDITIONING: ICD-10-CM

## 2020-02-25 DIAGNOSIS — Z79.4 TYPE 2 DIABETES MELLITUS WITH OTHER NEUROLOGIC COMPLICATION, WITH LONG-TERM CURRENT USE OF INSULIN (H): ICD-10-CM

## 2020-02-25 DIAGNOSIS — S32.432D CLOSED DISPLACED FRACTURE OF ANTERIOR COLUMN OF LEFT ACETABULUM WITH ROUTINE HEALING, SUBSEQUENT ENCOUNTER: ICD-10-CM

## 2020-02-25 DIAGNOSIS — Z98.890 S/P ORIF (OPEN REDUCTION INTERNAL FIXATION) FRACTURE: ICD-10-CM

## 2020-02-25 DIAGNOSIS — G89.18 POSTOPERATIVE PAIN: ICD-10-CM

## 2020-02-28 ENCOUNTER — OFFICE VISIT - HEALTHEAST (OUTPATIENT)
Dept: GERIATRICS | Facility: CLINIC | Age: 70
End: 2020-02-28

## 2020-02-28 ENCOUNTER — TRANSFERRED RECORDS (OUTPATIENT)
Dept: HEALTH INFORMATION MANAGEMENT | Facility: CLINIC | Age: 70
End: 2020-02-28

## 2020-02-28 DIAGNOSIS — S32.432D CLOSED DISPLACED FRACTURE OF ANTERIOR COLUMN OF LEFT ACETABULUM WITH ROUTINE HEALING, SUBSEQUENT ENCOUNTER: ICD-10-CM
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
## 2020-02-28 DIAGNOSIS — Z87.81 S/P ORIF (OPEN REDUCTION INTERNAL FIXATION) FRACTURE: ICD-10-CM

## 2020-02-28 DIAGNOSIS — M25.552 LEFT HIP PAIN: ICD-10-CM

## 2020-02-28 DIAGNOSIS — Z98.890 S/P ORIF (OPEN REDUCTION INTERNAL FIXATION) FRACTURE: ICD-10-CM

## 2020-03-03 ENCOUNTER — TRANSFERRED RECORDS (OUTPATIENT)
Dept: HEALTH INFORMATION MANAGEMENT | Facility: CLINIC | Age: 70
End: 2020-03-03

## 2020-03-03 ENCOUNTER — OFFICE VISIT - HEALTHEAST (OUTPATIENT)
Dept: GERIATRICS | Facility: CLINIC | Age: 70
End: 2020-03-03

## 2020-03-03 DIAGNOSIS — Z79.4 TYPE 2 DIABETES MELLITUS WITH OTHER NEUROLOGIC COMPLICATION, WITH LONG-TERM CURRENT USE OF INSULIN (H): ICD-10-CM

## 2020-03-03 DIAGNOSIS — R53.81 PHYSICAL DECONDITIONING: ICD-10-CM

## 2020-03-03 DIAGNOSIS — E11.49 TYPE 2 DIABETES MELLITUS WITH OTHER NEUROLOGIC COMPLICATION, WITH LONG-TERM CURRENT USE OF INSULIN (H): ICD-10-CM

## 2020-03-05 ENCOUNTER — OFFICE VISIT - HEALTHEAST (OUTPATIENT)
Dept: GERIATRICS | Facility: CLINIC | Age: 70
End: 2020-03-05

## 2020-03-05 ENCOUNTER — TRANSFERRED RECORDS (OUTPATIENT)
Dept: HEALTH INFORMATION MANAGEMENT | Facility: CLINIC | Age: 70
End: 2020-03-05

## 2020-03-05 DIAGNOSIS — E11.49 TYPE 2 DIABETES MELLITUS WITH OTHER NEUROLOGIC COMPLICATION, WITH LONG-TERM CURRENT USE OF INSULIN (H): ICD-10-CM

## 2020-03-05 DIAGNOSIS — Z79.4 TYPE 2 DIABETES MELLITUS WITH OTHER NEUROLOGIC COMPLICATION, WITH LONG-TERM CURRENT USE OF INSULIN (H): ICD-10-CM

## 2020-03-05 DIAGNOSIS — R53.81 PHYSICAL DECONDITIONING: ICD-10-CM

## 2020-03-09 ENCOUNTER — OFFICE VISIT - HEALTHEAST (OUTPATIENT)
Dept: GERIATRICS | Facility: CLINIC | Age: 70
End: 2020-03-09

## 2020-03-09 DIAGNOSIS — Z79.4 TYPE 2 DIABETES MELLITUS WITH OTHER NEUROLOGIC COMPLICATION, WITH LONG-TERM CURRENT USE OF INSULIN (H): ICD-10-CM

## 2020-03-09 DIAGNOSIS — E11.49 TYPE 2 DIABETES MELLITUS WITH OTHER NEUROLOGIC COMPLICATION, WITH LONG-TERM CURRENT USE OF INSULIN (H): ICD-10-CM

## 2020-03-09 DIAGNOSIS — R53.81 PHYSICAL DECONDITIONING: ICD-10-CM

## 2020-03-12 ENCOUNTER — OFFICE VISIT - HEALTHEAST (OUTPATIENT)
Dept: GERIATRICS | Facility: CLINIC | Age: 70
End: 2020-03-12

## 2020-03-12 DIAGNOSIS — E11.49 TYPE 2 DIABETES MELLITUS WITH OTHER NEUROLOGIC COMPLICATION, WITH LONG-TERM CURRENT USE OF INSULIN (H): ICD-10-CM

## 2020-03-12 DIAGNOSIS — R53.81 PHYSICAL DECONDITIONING: ICD-10-CM

## 2020-03-12 DIAGNOSIS — Z79.4 TYPE 2 DIABETES MELLITUS WITH OTHER NEUROLOGIC COMPLICATION, WITH LONG-TERM CURRENT USE OF INSULIN (H): ICD-10-CM

## 2020-04-02 ENCOUNTER — OFFICE VISIT - HEALTHEAST (OUTPATIENT)
Dept: GERIATRICS | Facility: CLINIC | Age: 70
End: 2020-04-02

## 2020-04-02 DIAGNOSIS — E11.49 TYPE 2 DIABETES MELLITUS WITH OTHER NEUROLOGIC COMPLICATION, WITH LONG-TERM CURRENT USE OF INSULIN (H): ICD-10-CM

## 2020-04-02 DIAGNOSIS — R53.81 PHYSICAL DECONDITIONING: ICD-10-CM

## 2020-04-02 DIAGNOSIS — Z79.4 TYPE 2 DIABETES MELLITUS WITH OTHER NEUROLOGIC COMPLICATION, WITH LONG-TERM CURRENT USE OF INSULIN (H): ICD-10-CM

## 2020-04-06 ENCOUNTER — OFFICE VISIT - HEALTHEAST (OUTPATIENT)
Dept: GERIATRICS | Facility: CLINIC | Age: 70
End: 2020-04-06

## 2020-04-06 DIAGNOSIS — E11.49 TYPE 2 DIABETES MELLITUS WITH OTHER NEUROLOGIC COMPLICATION, WITH LONG-TERM CURRENT USE OF INSULIN (H): ICD-10-CM

## 2020-04-06 DIAGNOSIS — I10 ESSENTIAL HYPERTENSION: ICD-10-CM

## 2020-04-06 DIAGNOSIS — R53.81 PHYSICAL DECONDITIONING: ICD-10-CM

## 2020-04-06 DIAGNOSIS — Z79.4 TYPE 2 DIABETES MELLITUS WITH OTHER NEUROLOGIC COMPLICATION, WITH LONG-TERM CURRENT USE OF INSULIN (H): ICD-10-CM

## 2020-04-17 ENCOUNTER — OFFICE VISIT - HEALTHEAST (OUTPATIENT)
Dept: GERIATRICS | Facility: CLINIC | Age: 70
End: 2020-04-17

## 2020-04-17 DIAGNOSIS — M25.552 LEFT HIP PAIN: ICD-10-CM

## 2020-04-17 DIAGNOSIS — S32.432D CLOSED DISPLACED FRACTURE OF ANTERIOR COLUMN OF LEFT ACETABULUM WITH ROUTINE HEALING, SUBSEQUENT ENCOUNTER: ICD-10-CM

## 2020-04-17 DIAGNOSIS — I10 ESSENTIAL HYPERTENSION: ICD-10-CM

## 2020-04-17 DIAGNOSIS — E11.49 TYPE 2 DIABETES MELLITUS WITH OTHER NEUROLOGIC COMPLICATION, WITH LONG-TERM CURRENT USE OF INSULIN (H): ICD-10-CM

## 2020-04-17 DIAGNOSIS — Z79.4 TYPE 2 DIABETES MELLITUS WITH OTHER NEUROLOGIC COMPLICATION, WITH LONG-TERM CURRENT USE OF INSULIN (H): ICD-10-CM

## 2020-04-17 DIAGNOSIS — Z87.81 S/P ORIF (OPEN REDUCTION INTERNAL FIXATION) FRACTURE: ICD-10-CM

## 2020-04-17 DIAGNOSIS — R53.81 PHYSICAL DECONDITIONING: ICD-10-CM

## 2020-04-17 DIAGNOSIS — Z98.890 S/P ORIF (OPEN REDUCTION INTERNAL FIXATION) FRACTURE: ICD-10-CM

## 2020-04-21 ENCOUNTER — AMBULATORY - HEALTHEAST (OUTPATIENT)
Dept: GERIATRICS | Facility: CLINIC | Age: 70
End: 2020-04-21

## 2020-04-23 ENCOUNTER — TRANSFERRED RECORDS (OUTPATIENT)
Dept: HEALTH INFORMATION MANAGEMENT | Facility: CLINIC | Age: 70
End: 2020-04-23

## 2020-04-23 ENCOUNTER — OFFICE VISIT - HEALTHEAST (OUTPATIENT)
Dept: GERIATRICS | Facility: CLINIC | Age: 70
End: 2020-04-23

## 2020-04-23 DIAGNOSIS — S32.432D CLOSED DISPLACED FRACTURE OF ANTERIOR COLUMN OF LEFT ACETABULUM WITH ROUTINE HEALING, SUBSEQUENT ENCOUNTER: ICD-10-CM

## 2020-04-23 DIAGNOSIS — M25.552 LEFT HIP PAIN: ICD-10-CM

## 2020-04-23 DIAGNOSIS — Z79.4 TYPE 2 DIABETES MELLITUS WITH OTHER NEUROLOGIC COMPLICATION, WITH LONG-TERM CURRENT USE OF INSULIN (H): ICD-10-CM

## 2020-04-23 DIAGNOSIS — I69.993 CVA, OLD, ATAXIA: ICD-10-CM

## 2020-04-23 DIAGNOSIS — Z87.81 S/P ORIF (OPEN REDUCTION INTERNAL FIXATION) FRACTURE: ICD-10-CM

## 2020-04-23 DIAGNOSIS — E11.49 TYPE 2 DIABETES MELLITUS WITH OTHER NEUROLOGIC COMPLICATION, WITH LONG-TERM CURRENT USE OF INSULIN (H): ICD-10-CM

## 2020-04-23 DIAGNOSIS — R53.81 PHYSICAL DECONDITIONING: ICD-10-CM

## 2020-04-23 DIAGNOSIS — I10 ESSENTIAL HYPERTENSION: ICD-10-CM

## 2020-04-23 DIAGNOSIS — Z98.890 S/P ORIF (OPEN REDUCTION INTERNAL FIXATION) FRACTURE: ICD-10-CM

## 2020-04-27 ENCOUNTER — TRANSFERRED RECORDS (OUTPATIENT)
Dept: HEALTH INFORMATION MANAGEMENT | Facility: CLINIC | Age: 70
End: 2020-04-27

## 2020-04-27 ENCOUNTER — OFFICE VISIT - HEALTHEAST (OUTPATIENT)
Dept: GERIATRICS | Facility: CLINIC | Age: 70
End: 2020-04-27

## 2020-04-27 DIAGNOSIS — I69.993 CVA, OLD, ATAXIA: ICD-10-CM

## 2020-04-27 DIAGNOSIS — R53.81 PHYSICAL DECONDITIONING: ICD-10-CM

## 2020-04-29 ENCOUNTER — RECORDS - HEALTHEAST (OUTPATIENT)
Dept: LAB | Facility: CLINIC | Age: 70
End: 2020-04-29

## 2020-04-29 ENCOUNTER — TRANSFERRED RECORDS (OUTPATIENT)
Dept: HEALTH INFORMATION MANAGEMENT | Facility: CLINIC | Age: 70
End: 2020-04-29

## 2020-04-29 ENCOUNTER — OFFICE VISIT - HEALTHEAST (OUTPATIENT)
Dept: GERIATRICS | Facility: CLINIC | Age: 70
End: 2020-04-29

## 2020-04-29 DIAGNOSIS — R25.1 SPELLS OF TREMBLING: ICD-10-CM

## 2020-04-29 LAB
ANION GAP SERPL CALCULATED.3IONS-SCNC: 10 MMOL/L (ref 5–18)
BUN SERPL-MCNC: 29 MG/DL (ref 8–22)
CALCIUM SERPL-MCNC: 9.4 MG/DL (ref 8.5–10.5)
CHLORIDE BLD-SCNC: 109 MMOL/L (ref 98–107)
CO2 SERPL-SCNC: 23 MMOL/L (ref 22–31)
CREAT SERPL-MCNC: 0.79 MG/DL (ref 0.6–1.1)
CREAT SERPL-MCNC: 0.79 MG/DL (ref 0.6–1.1)
GFR SERPL CREATININE-BSD FRML MDRD: >60 ML/MIN/1.73M2
GFR SERPL CREATININE-BSD FRML MDRD: >60 ML/MIN/1.73M2
GLUCOSE BLD-MCNC: 129 MG/DL (ref 70–125)
GLUCOSE SERPL-MCNC: 129 MG/DL (ref 70–125)
MAGNESIUM SERPL-MCNC: 1.5 MG/DL (ref 1.8–2.6)
POTASSIUM BLD-SCNC: 4.6 MMOL/L (ref 3.5–5)
POTASSIUM SERPL-SCNC: 4.6 MMOL/L (ref 3.5–5)
SODIUM SERPL-SCNC: 142 MMOL/L (ref 136–145)
VIT B12 SERPL-MCNC: 591 PG/ML (ref 213–816)

## 2020-04-30 ENCOUNTER — OFFICE VISIT - HEALTHEAST (OUTPATIENT)
Dept: GERIATRICS | Facility: CLINIC | Age: 70
End: 2020-04-30

## 2020-04-30 ENCOUNTER — TRANSFERRED RECORDS (OUTPATIENT)
Dept: HEALTH INFORMATION MANAGEMENT | Facility: CLINIC | Age: 70
End: 2020-04-30

## 2020-04-30 DIAGNOSIS — S32.432D CLOSED DISPLACED FRACTURE OF ANTERIOR COLUMN OF LEFT ACETABULUM WITH ROUTINE HEALING, SUBSEQUENT ENCOUNTER: ICD-10-CM

## 2020-04-30 DIAGNOSIS — Z79.4 TYPE 2 DIABETES MELLITUS WITH OTHER NEUROLOGIC COMPLICATION, WITH LONG-TERM CURRENT USE OF INSULIN (H): ICD-10-CM

## 2020-04-30 DIAGNOSIS — I69.993 CVA, OLD, ATAXIA: ICD-10-CM

## 2020-04-30 DIAGNOSIS — Z87.81 S/P ORIF (OPEN REDUCTION INTERNAL FIXATION) FRACTURE: ICD-10-CM

## 2020-04-30 DIAGNOSIS — E11.49 TYPE 2 DIABETES MELLITUS WITH OTHER NEUROLOGIC COMPLICATION, WITH LONG-TERM CURRENT USE OF INSULIN (H): ICD-10-CM

## 2020-04-30 DIAGNOSIS — R25.1 SPELLS OF TREMBLING: ICD-10-CM

## 2020-04-30 DIAGNOSIS — I10 ESSENTIAL HYPERTENSION: ICD-10-CM

## 2020-04-30 DIAGNOSIS — R53.81 PHYSICAL DECONDITIONING: ICD-10-CM

## 2020-04-30 DIAGNOSIS — Z98.890 S/P ORIF (OPEN REDUCTION INTERNAL FIXATION) FRACTURE: ICD-10-CM

## 2020-05-02 ENCOUNTER — TRANSFERRED RECORDS (OUTPATIENT)
Dept: HEALTH INFORMATION MANAGEMENT | Facility: CLINIC | Age: 70
End: 2020-05-02

## 2020-05-04 LAB
ALT SERPL-CCNC: 14 U/L (ref 0–55)
AST SERPL-CCNC: 12 U/L (ref 10–40)
CHOLEST SERPL-MCNC: 121 MG/DL (ref 100–199)
HBA1C MFR BLD: 6.6 % (ref 0–5.7)
HDLC SERPL-MCNC: 39 MG/DL
INR PPP: 0.9 (ref 0.9–1.1)
LDLC SERPL CALC-MCNC: 46 MG/DL
NONHDLC SERPL-MCNC: 82 MG/DL
TRIGL SERPL-MCNC: 180 MG/DL
TSH SERPL-ACNC: 2.02 UIU/ML (ref 0.3–4.5)

## 2020-05-06 ENCOUNTER — RECORDS - HEALTHEAST (OUTPATIENT)
Dept: LAB | Facility: CLINIC | Age: 70
End: 2020-05-06

## 2020-05-06 ENCOUNTER — TRANSFERRED RECORDS (OUTPATIENT)
Dept: MULTI SPECIALTY CLINIC | Facility: CLINIC | Age: 70
End: 2020-05-06

## 2020-05-06 LAB
CREAT SERPL-MCNC: 0.74 MG/DL (ref 0.55–1.02)
GFR SERPL CREATININE-BSD FRML MDRD: >60 ML/MIN/1.73M2
GLUCOSE SERPL-MCNC: 131 MG/DL (ref 70–100)
POTASSIUM SERPL-SCNC: 4.3 MMOL/L (ref 3.5–5.1)

## 2020-05-07 ENCOUNTER — AMBULATORY - HEALTHEAST (OUTPATIENT)
Dept: ADMINISTRATIVE | Facility: CLINIC | Age: 70
End: 2020-05-07

## 2020-05-07 ENCOUNTER — PATIENT OUTREACH (OUTPATIENT)
Dept: CARE COORDINATION | Facility: CLINIC | Age: 70
End: 2020-05-07

## 2020-05-07 ENCOUNTER — RECORDS - HEALTHEAST (OUTPATIENT)
Dept: LAB | Facility: CLINIC | Age: 70
End: 2020-05-07

## 2020-05-07 ENCOUNTER — TRANSFERRED RECORDS (OUTPATIENT)
Dept: HEALTH INFORMATION MANAGEMENT | Facility: CLINIC | Age: 70
End: 2020-05-07

## 2020-05-07 ENCOUNTER — OFFICE VISIT - HEALTHEAST (OUTPATIENT)
Dept: GERIATRICS | Facility: CLINIC | Age: 70
End: 2020-05-07

## 2020-05-07 DIAGNOSIS — Z71.89 COUNSELING AND COORDINATION OF CARE: Primary | ICD-10-CM

## 2020-05-07 DIAGNOSIS — R50.9 FEVER, UNSPECIFIED FEVER CAUSE: ICD-10-CM

## 2020-05-07 DIAGNOSIS — G40.909 SEIZURE DISORDER (H): ICD-10-CM

## 2020-05-07 LAB
ALBUMIN UR-MCNC: ABNORMAL MG/DL
APPEARANCE UR: ABNORMAL
BACTERIA #/AREA URNS HPF: ABNORMAL HPF
BILIRUB UR QL STRIP: NEGATIVE
COLOR UR AUTO: YELLOW
GLUCOSE UR STRIP-MCNC: ABNORMAL MG/DL
GRAN CASTS #/AREA URNS LPF: ABNORMAL LPF
HGB UR QL STRIP: NEGATIVE
KETONES UR STRIP-MCNC: NEGATIVE MG/DL
LEUKOCYTE ESTERASE UR QL STRIP: ABNORMAL
LEVETIRACETAM (KEPPRA): 28.8 UG/ML (ref 6–46)
MAGNESIUM SERPL-MCNC: 1.7 MG/DL (ref 1.8–2.6)
MUCOUS THREADS #/AREA URNS LPF: ABNORMAL LPF
NITRATE UR QL: NEGATIVE
PH UR STRIP: 5.5 [PH] (ref 4.5–8)
RBC #/AREA URNS AUTO: ABNORMAL HPF
SP GR UR STRIP: 1.02 (ref 1–1.03)
SQUAMOUS #/AREA URNS AUTO: >100 LPF
TRANS CELLS #/AREA URNS HPF: ABNORMAL LPF
UROBILINOGEN UR STRIP-ACNC: ABNORMAL
WBC #/AREA URNS AUTO: ABNORMAL HPF
WBC CLUMPS #/AREA URNS HPF: PRESENT /[HPF]

## 2020-05-07 NOTE — LETTER
May 8, 2020    Dear Addis,                                                                         You were recently referred to the [facility]'s Clinic Care Coordination service.  This is a service offered through your Primary Care Clinic which can help you access resources and services in regard to your health and well-being goals. The clinic Community Health Worker has placed two calls to you to discuss the nature of this service and to offer enrollment.  If you are interested in learning more about Clinic Care Coordination, please call your Primary Care Clinic's Community Health Worker, Ade, at [phone].                                                    Sincerely,                                                                              MANOJ Booker                                                                                          Clinic Care Coordination                                                  [facility]

## 2020-05-07 NOTE — PROGRESS NOTES
Clinic Care Coordination Contact  Care Team Conversations    Care coordination referral placed due to ED and hospitalization utilization per discharge from external organization report    Elda Stanford RN  [facility] Primary Care-Care Coordination  [facility] Primary [facility]  [facility]  [phone]

## 2020-05-07 NOTE — PROGRESS NOTES
Clinic Care Coordination Contact  [facility]/Voicemail       Clinical Data: Care Coordinator Outreach  Outreach attempted x 1.  Left message on patient's voicemail with call back information and requested return call.  Plan: Care Coordinator will try to reach patient again in 1-2 business days.

## 2020-05-08 ENCOUNTER — AMBULATORY - HEALTHEAST (OUTPATIENT)
Dept: ADMINISTRATIVE | Facility: CLINIC | Age: 70
End: 2020-05-08

## 2020-05-08 ENCOUNTER — TRANSFERRED RECORDS (OUTPATIENT)
Dept: HEALTH INFORMATION MANAGEMENT | Facility: CLINIC | Age: 70
End: 2020-05-08

## 2020-05-08 ENCOUNTER — RECORDS - HEALTHEAST (OUTPATIENT)
Dept: LAB | Facility: CLINIC | Age: 70
End: 2020-05-08

## 2020-05-08 ENCOUNTER — OFFICE VISIT - HEALTHEAST (OUTPATIENT)
Dept: GERIATRICS | Facility: CLINIC | Age: 70
End: 2020-05-08

## 2020-05-08 DIAGNOSIS — N30.00 ACUTE CYSTITIS WITHOUT HEMATURIA: ICD-10-CM

## 2020-05-08 LAB
BACTERIA SPEC CULT: NORMAL
MAGNESIUM SERPL-MCNC: 1.7 MG/DL (ref 1.8–2.6)

## 2020-05-08 NOTE — PROGRESS NOTES
Community Health Worker called and left a message for the patient.  If the patient is returning my call, please transfer the patient to [facility] at ext. [phone].   Patient has been mailed a unreachable letter and was provided with [facility] contact information if they are interested in accessing Clinic Care Coordination.  Order for Care Management has been closed, no further outreach will be done at this time and patient can be re-referred.

## 2020-05-09 ENCOUNTER — COMMUNICATION - HEALTHEAST (OUTPATIENT)
Dept: GERIATRICS | Facility: CLINIC | Age: 70
End: 2020-05-09

## 2020-05-13 ENCOUNTER — TRANSFERRED RECORDS (OUTPATIENT)
Dept: HEALTH INFORMATION MANAGEMENT | Facility: CLINIC | Age: 70
End: 2020-05-13

## 2020-05-13 ENCOUNTER — OFFICE VISIT - HEALTHEAST (OUTPATIENT)
Dept: GERIATRICS | Facility: CLINIC | Age: 70
End: 2020-05-13

## 2020-05-13 DIAGNOSIS — R53.81 PHYSICAL DECONDITIONING: ICD-10-CM

## 2020-05-13 DIAGNOSIS — I69.993 CVA, OLD, ATAXIA: ICD-10-CM

## 2020-05-13 DIAGNOSIS — S32.432D CLOSED DISPLACED FRACTURE OF ANTERIOR COLUMN OF LEFT ACETABULUM WITH ROUTINE HEALING, SUBSEQUENT ENCOUNTER: ICD-10-CM

## 2020-05-13 DIAGNOSIS — G40.909 SEIZURE DISORDER (H): ICD-10-CM

## 2020-05-18 ENCOUNTER — OFFICE VISIT - HEALTHEAST (OUTPATIENT)
Dept: GERIATRICS | Facility: CLINIC | Age: 70
End: 2020-05-18

## 2020-05-18 ENCOUNTER — TRANSFERRED RECORDS (OUTPATIENT)
Dept: HEALTH INFORMATION MANAGEMENT | Facility: CLINIC | Age: 70
End: 2020-05-18

## 2020-05-18 DIAGNOSIS — R53.81 PHYSICAL DECONDITIONING: ICD-10-CM

## 2020-05-18 DIAGNOSIS — N30.00 ACUTE CYSTITIS WITHOUT HEMATURIA: ICD-10-CM

## 2020-05-18 DIAGNOSIS — G40.909 SEIZURE DISORDER (H): ICD-10-CM

## 2020-05-20 ENCOUNTER — OFFICE VISIT - HEALTHEAST (OUTPATIENT)
Dept: GERIATRICS | Facility: CLINIC | Age: 70
End: 2020-05-20

## 2020-05-20 DIAGNOSIS — G40.909 SEIZURE DISORDER (H): ICD-10-CM

## 2020-05-20 DIAGNOSIS — R53.81 PHYSICAL DECONDITIONING: ICD-10-CM

## 2020-05-26 ENCOUNTER — TRANSFERRED RECORDS (OUTPATIENT)
Dept: HEALTH INFORMATION MANAGEMENT | Facility: CLINIC | Age: 70
End: 2020-05-26

## 2020-05-26 ENCOUNTER — OFFICE VISIT - HEALTHEAST (OUTPATIENT)
Dept: GERIATRICS | Facility: CLINIC | Age: 70
End: 2020-05-26

## 2020-05-26 DIAGNOSIS — E11.49 TYPE 2 DIABETES MELLITUS WITH OTHER NEUROLOGIC COMPLICATION, WITH LONG-TERM CURRENT USE OF INSULIN (H): ICD-10-CM

## 2020-05-26 DIAGNOSIS — R53.81 PHYSICAL DECONDITIONING: ICD-10-CM

## 2020-05-26 DIAGNOSIS — G40.909 SEIZURE DISORDER (H): ICD-10-CM

## 2020-05-26 DIAGNOSIS — Z79.4 TYPE 2 DIABETES MELLITUS WITH OTHER NEUROLOGIC COMPLICATION, WITH LONG-TERM CURRENT USE OF INSULIN (H): ICD-10-CM

## 2020-05-28 ENCOUNTER — OFFICE VISIT - HEALTHEAST (OUTPATIENT)
Dept: GERIATRICS | Facility: CLINIC | Age: 70
End: 2020-05-28

## 2020-05-28 ENCOUNTER — TRANSFERRED RECORDS (OUTPATIENT)
Dept: HEALTH INFORMATION MANAGEMENT | Facility: CLINIC | Age: 70
End: 2020-05-28

## 2020-05-28 DIAGNOSIS — I10 ESSENTIAL HYPERTENSION: ICD-10-CM

## 2020-05-28 DIAGNOSIS — E11.49 TYPE 2 DIABETES MELLITUS WITH OTHER NEUROLOGIC COMPLICATION, WITH LONG-TERM CURRENT USE OF INSULIN (H): ICD-10-CM

## 2020-05-28 DIAGNOSIS — Z98.890 S/P ORIF (OPEN REDUCTION INTERNAL FIXATION) FRACTURE: ICD-10-CM

## 2020-05-28 DIAGNOSIS — Z87.81 S/P ORIF (OPEN REDUCTION INTERNAL FIXATION) FRACTURE: ICD-10-CM

## 2020-05-28 DIAGNOSIS — G40.909 SEIZURE DISORDER (H): ICD-10-CM

## 2020-05-28 DIAGNOSIS — Z79.4 TYPE 2 DIABETES MELLITUS WITH OTHER NEUROLOGIC COMPLICATION, WITH LONG-TERM CURRENT USE OF INSULIN (H): ICD-10-CM

## 2020-06-01 ENCOUNTER — RECORDS - HEALTHEAST (OUTPATIENT)
Dept: LAB | Facility: CLINIC | Age: 70
End: 2020-06-01

## 2020-06-01 ENCOUNTER — OFFICE VISIT - HEALTHEAST (OUTPATIENT)
Dept: GERIATRICS | Facility: CLINIC | Age: 70
End: 2020-06-01

## 2020-06-01 DIAGNOSIS — R35.0 URINARY FREQUENCY: ICD-10-CM

## 2020-06-01 LAB
ALBUMIN UR-MCNC: ABNORMAL MG/DL
ANION GAP SERPL CALCULATED.3IONS-SCNC: 11 MMOL/L (ref 5–18)
APPEARANCE UR: CLEAR
BACTERIA #/AREA URNS HPF: ABNORMAL HPF
BILIRUB UR QL STRIP: NEGATIVE
BUN SERPL-MCNC: 21 MG/DL (ref 8–22)
CALCIUM SERPL-MCNC: 9.5 MG/DL (ref 8.5–10.5)
CAOX CRY #/AREA URNS HPF: PRESENT /[HPF]
CHLORIDE BLD-SCNC: 103 MMOL/L (ref 98–107)
CO2 SERPL-SCNC: 28 MMOL/L (ref 22–31)
COLOR UR AUTO: YELLOW
CREAT SERPL-MCNC: 0.64 MG/DL (ref 0.6–1.1)
CREAT SERPL-MCNC: 0.64 MG/DL (ref 0.6–1.1)
GFR SERPL CREATININE-BSD FRML MDRD: >60 ML/MIN/1.73M2
GFR SERPL CREATININE-BSD FRML MDRD: >60 ML/MIN/1.73M2
GLUCOSE BLD-MCNC: 105 MG/DL (ref 70–125)
GLUCOSE SERPL-MCNC: 105 MG/DL (ref 70–125)
GLUCOSE UR STRIP-MCNC: NEGATIVE MG/DL
HGB BLD-MCNC: 12.7 G/DL (ref 12–16)
HGB UR QL STRIP: NEGATIVE
HYALINE CASTS #/AREA URNS LPF: ABNORMAL LPF
KETONES UR STRIP-MCNC: ABNORMAL MG/DL
LEUKOCYTE ESTERASE UR QL STRIP: ABNORMAL
MAGNESIUM SERPL-MCNC: 1.7 MG/DL (ref 1.8–2.6)
NITRATE UR QL: NEGATIVE
PH UR STRIP: 5.5 [PH] (ref 4.5–8)
POTASSIUM BLD-SCNC: 3.8 MMOL/L (ref 3.5–5)
POTASSIUM SERPL-SCNC: 3.8 MMOL/L (ref 3.5–5)
RBC #/AREA URNS AUTO: ABNORMAL HPF
SODIUM SERPL-SCNC: 142 MMOL/L (ref 136–145)
SP GR UR STRIP: 1.02 (ref 1–1.03)
SQUAMOUS #/AREA URNS AUTO: ABNORMAL LPF
TRANS CELLS #/AREA URNS HPF: ABNORMAL LPF
UROBILINOGEN UR STRIP-ACNC: ABNORMAL
WBC #/AREA URNS AUTO: ABNORMAL HPF

## 2020-06-03 ENCOUNTER — TRANSFERRED RECORDS (OUTPATIENT)
Dept: HEALTH INFORMATION MANAGEMENT | Facility: CLINIC | Age: 70
End: 2020-06-03

## 2020-06-03 ENCOUNTER — OFFICE VISIT - HEALTHEAST (OUTPATIENT)
Dept: GERIATRICS | Facility: CLINIC | Age: 70
End: 2020-06-03

## 2020-06-03 ENCOUNTER — RECORDS - HEALTHEAST (OUTPATIENT)
Dept: LAB | Facility: CLINIC | Age: 70
End: 2020-06-03

## 2020-06-03 DIAGNOSIS — R35.0 URINARY FREQUENCY: ICD-10-CM

## 2020-06-03 LAB
BACTERIA SPEC CULT: ABNORMAL
PHENYTOIN SERPL-MCNC: 6.2 UG/ML (ref 10–20)

## 2020-06-08 ENCOUNTER — RECORDS - HEALTHEAST (OUTPATIENT)
Dept: LAB | Facility: CLINIC | Age: 70
End: 2020-06-08

## 2020-06-08 ENCOUNTER — OFFICE VISIT - HEALTHEAST (OUTPATIENT)
Dept: GERIATRICS | Facility: CLINIC | Age: 70
End: 2020-06-08

## 2020-06-08 DIAGNOSIS — G40.909 SEIZURE DISORDER (H): ICD-10-CM

## 2020-06-08 LAB
ALBUMIN SERPL-MCNC: 3.1 G/DL (ref 3.5–5)
ALP SERPL-CCNC: 158 U/L (ref 45–120)
ALT SERPL W P-5'-P-CCNC: 19 U/L (ref 0–45)
AST SERPL W P-5'-P-CCNC: 19 U/L (ref 0–40)
BASOPHILS # BLD AUTO: 0 THOU/UL (ref 0–0.2)
BASOPHILS NFR BLD AUTO: 1 % (ref 0–2)
BILIRUB DIRECT SERPL-MCNC: <0.1 MG/DL
BILIRUB SERPL-MCNC: 0.2 MG/DL (ref 0–1)
EOSINOPHIL # BLD AUTO: 0.4 THOU/UL (ref 0–0.4)
EOSINOPHIL NFR BLD AUTO: 7 % (ref 0–6)
ERYTHROCYTE [DISTWIDTH] IN BLOOD BY AUTOMATED COUNT: 13.6 % (ref 11–14.5)
HCT VFR BLD AUTO: 38.8 % (ref 35–47)
HGB BLD-MCNC: 12 G/DL (ref 12–16)
LEVETIRACETAM (KEPPRA): 29.9 UG/ML (ref 6–46)
LYMPHOCYTES # BLD AUTO: 1 THOU/UL (ref 0.8–4.4)
LYMPHOCYTES NFR BLD AUTO: 18 % (ref 20–40)
MCH RBC QN AUTO: 31 PG (ref 27–34)
MCHC RBC AUTO-ENTMCNC: 30.9 G/DL (ref 32–36)
MCV RBC AUTO: 100 FL (ref 80–100)
MONOCYTES # BLD AUTO: 0.6 THOU/UL (ref 0–0.9)
MONOCYTES NFR BLD AUTO: 10 % (ref 2–10)
NEUTROPHILS # BLD AUTO: 3.7 THOU/UL (ref 2–7.7)
NEUTROPHILS NFR BLD AUTO: 65 % (ref 50–70)
PHENYTOIN (DILATIN) FREE: 0.5 UG/ML (ref 1–2)
PHENYTOIN SERPL-MCNC: 6.3 UG/ML (ref 10–20)
PLATELET # BLD AUTO: 251 THOU/UL (ref 140–440)
PMV BLD AUTO: 10.3 FL (ref 8.5–12.5)
PROT SERPL-MCNC: 5.7 G/DL (ref 6–8)
RBC # BLD AUTO: 3.87 MILL/UL (ref 3.8–5.4)
WBC: 5.7 THOU/UL (ref 4–11)

## 2020-06-10 ENCOUNTER — OFFICE VISIT - HEALTHEAST (OUTPATIENT)
Dept: GERIATRICS | Facility: CLINIC | Age: 70
End: 2020-06-10

## 2020-06-10 DIAGNOSIS — F33.1 MODERATE EPISODE OF RECURRENT MAJOR DEPRESSIVE DISORDER (H): ICD-10-CM

## 2020-06-15 ENCOUNTER — MEDICAL CORRESPONDENCE (OUTPATIENT)
Dept: HEALTH INFORMATION MANAGEMENT | Facility: CLINIC | Age: 70
End: 2020-06-15

## 2020-06-18 ENCOUNTER — OFFICE VISIT - HEALTHEAST (OUTPATIENT)
Dept: GERIATRICS | Facility: CLINIC | Age: 70
End: 2020-06-18

## 2020-06-18 DIAGNOSIS — G40.909 SEIZURE DISORDER (H): ICD-10-CM

## 2020-06-18 DIAGNOSIS — I69.993 CVA, OLD, ATAXIA: ICD-10-CM

## 2020-06-18 DIAGNOSIS — F33.1 MODERATE EPISODE OF RECURRENT MAJOR DEPRESSIVE DISORDER (H): ICD-10-CM

## 2020-06-26 ENCOUNTER — TRANSFERRED RECORDS (OUTPATIENT)
Dept: HEALTH INFORMATION MANAGEMENT | Facility: CLINIC | Age: 70
End: 2020-06-26

## 2020-06-26 ENCOUNTER — OFFICE VISIT - HEALTHEAST (OUTPATIENT)
Dept: GERIATRICS | Facility: CLINIC | Age: 70
End: 2020-06-26

## 2020-06-26 DIAGNOSIS — Z79.4 TYPE 2 DIABETES MELLITUS WITH OTHER NEUROLOGIC COMPLICATION, WITH LONG-TERM CURRENT USE OF INSULIN (H): ICD-10-CM

## 2020-06-26 DIAGNOSIS — G40.909 SEIZURE DISORDER (H): ICD-10-CM

## 2020-06-26 DIAGNOSIS — I69.993 CVA, OLD, ATAXIA: ICD-10-CM

## 2020-06-26 DIAGNOSIS — I10 ESSENTIAL HYPERTENSION: ICD-10-CM

## 2020-06-26 DIAGNOSIS — E11.49 TYPE 2 DIABETES MELLITUS WITH OTHER NEUROLOGIC COMPLICATION, WITH LONG-TERM CURRENT USE OF INSULIN (H): ICD-10-CM

## 2020-07-17 ENCOUNTER — OFFICE VISIT - HEALTHEAST (OUTPATIENT)
Dept: GERIATRICS | Facility: CLINIC | Age: 70
End: 2020-07-17

## 2020-07-17 ENCOUNTER — TRANSFERRED RECORDS (OUTPATIENT)
Dept: HEALTH INFORMATION MANAGEMENT | Facility: CLINIC | Age: 70
End: 2020-07-17

## 2020-07-17 ENCOUNTER — RECORDS - HEALTHEAST (OUTPATIENT)
Dept: LAB | Facility: CLINIC | Age: 70
End: 2020-07-17

## 2020-07-17 DIAGNOSIS — I69.993 CVA, OLD, ATAXIA: ICD-10-CM

## 2020-07-17 DIAGNOSIS — R35.0 URINARY FREQUENCY: ICD-10-CM

## 2020-07-17 LAB
ALBUMIN UR-MCNC: ABNORMAL MG/DL
APPEARANCE UR: ABNORMAL
BACTERIA #/AREA URNS HPF: ABNORMAL HPF
BILIRUB UR QL STRIP: NEGATIVE
CAOX CRY #/AREA URNS HPF: PRESENT /[HPF]
COLOR UR AUTO: YELLOW
GLUCOSE UR STRIP-MCNC: ABNORMAL MG/DL
HGB UR QL STRIP: NEGATIVE
KETONES UR STRIP-MCNC: NEGATIVE MG/DL
LEUKOCYTE ESTERASE UR QL STRIP: ABNORMAL
NITRATE UR QL: NEGATIVE
PH UR STRIP: 5.5 [PH] (ref 4.5–8)
RBC #/AREA URNS AUTO: ABNORMAL HPF
SP GR UR STRIP: 1.02 (ref 1–1.03)
SQUAMOUS #/AREA URNS AUTO: ABNORMAL LPF
UROBILINOGEN UR STRIP-ACNC: ABNORMAL
WBC #/AREA URNS AUTO: ABNORMAL HPF

## 2020-07-18 LAB — BACTERIA SPEC CULT: NORMAL

## 2020-07-28 ENCOUNTER — TRANSFERRED RECORDS (OUTPATIENT)
Dept: HEALTH INFORMATION MANAGEMENT | Facility: CLINIC | Age: 70
End: 2020-07-28

## 2020-07-28 ENCOUNTER — OFFICE VISIT - HEALTHEAST (OUTPATIENT)
Dept: GERIATRICS | Facility: CLINIC | Age: 70
End: 2020-07-28

## 2020-07-28 DIAGNOSIS — F41.9 ANXIETY: ICD-10-CM

## 2020-07-28 DIAGNOSIS — R35.0 URINARY FREQUENCY: ICD-10-CM

## 2020-08-14 ENCOUNTER — MEDICAL CORRESPONDENCE (OUTPATIENT)
Dept: HEALTH INFORMATION MANAGEMENT | Facility: CLINIC | Age: 70
End: 2020-08-14

## 2020-08-14 ENCOUNTER — OFFICE VISIT - HEALTHEAST (OUTPATIENT)
Dept: GERIATRICS | Facility: CLINIC | Age: 70
End: 2020-08-14

## 2020-08-14 DIAGNOSIS — R53.81 PHYSICAL DECONDITIONING: ICD-10-CM

## 2020-08-14 DIAGNOSIS — F41.9 ANXIETY: ICD-10-CM

## 2020-08-24 ENCOUNTER — OFFICE VISIT - HEALTHEAST (OUTPATIENT)
Dept: GERIATRICS | Facility: CLINIC | Age: 70
End: 2020-08-24

## 2020-08-24 ENCOUNTER — MEDICAL CORRESPONDENCE (OUTPATIENT)
Dept: HEALTH INFORMATION MANAGEMENT | Facility: CLINIC | Age: 70
End: 2020-08-24

## 2020-08-24 DIAGNOSIS — F33.1 MODERATE EPISODE OF RECURRENT MAJOR DEPRESSIVE DISORDER (H): ICD-10-CM

## 2020-08-24 DIAGNOSIS — G40.909 SEIZURE DISORDER (H): ICD-10-CM

## 2020-08-24 DIAGNOSIS — I69.993 CVA, OLD, ATAXIA: ICD-10-CM

## 2020-08-24 DIAGNOSIS — G81.94 LEFT HEMIPLEGIA (H): ICD-10-CM

## 2020-09-01 ENCOUNTER — TELEPHONE (OUTPATIENT)
Dept: FAMILY MEDICINE | Facility: CLINIC | Age: 70
End: 2020-09-01

## 2020-09-04 ENCOUNTER — TRANSFERRED RECORDS (OUTPATIENT)
Dept: HEALTH INFORMATION MANAGEMENT | Facility: CLINIC | Age: 70
End: 2020-09-04

## 2020-09-04 ENCOUNTER — OFFICE VISIT - HEALTHEAST (OUTPATIENT)
Dept: GERIATRICS | Facility: CLINIC | Age: 70
End: 2020-09-04

## 2020-09-04 DIAGNOSIS — R53.81 PHYSICAL DECONDITIONING: ICD-10-CM

## 2020-09-04 DIAGNOSIS — I10 ESSENTIAL HYPERTENSION: ICD-10-CM

## 2020-09-04 DIAGNOSIS — I69.993 CVA, OLD, ATAXIA: ICD-10-CM

## 2020-09-04 DIAGNOSIS — Z79.4 TYPE 2 DIABETES MELLITUS WITH OTHER NEUROLOGIC COMPLICATION, WITH LONG-TERM CURRENT USE OF INSULIN (H): ICD-10-CM

## 2020-09-04 DIAGNOSIS — E11.49 TYPE 2 DIABETES MELLITUS WITH OTHER NEUROLOGIC COMPLICATION, WITH LONG-TERM CURRENT USE OF INSULIN (H): ICD-10-CM

## 2020-09-04 DIAGNOSIS — G40.909 SEIZURE DISORDER (H): ICD-10-CM

## 2020-09-11 ENCOUNTER — TRANSFERRED RECORDS (OUTPATIENT)
Dept: HEALTH INFORMATION MANAGEMENT | Facility: CLINIC | Age: 70
End: 2020-09-11

## 2020-09-11 ENCOUNTER — OFFICE VISIT - HEALTHEAST (OUTPATIENT)
Dept: GERIATRICS | Facility: CLINIC | Age: 70
End: 2020-09-11

## 2020-09-11 DIAGNOSIS — G81.94 LEFT HEMIPLEGIA (H): ICD-10-CM

## 2020-09-11 DIAGNOSIS — E11.49 TYPE 2 DIABETES MELLITUS WITH OTHER NEUROLOGIC COMPLICATION, WITH LONG-TERM CURRENT USE OF INSULIN (H): ICD-10-CM

## 2020-09-11 DIAGNOSIS — I69.993 CVA, OLD, ATAXIA: ICD-10-CM

## 2020-09-11 DIAGNOSIS — I10 ESSENTIAL HYPERTENSION: ICD-10-CM

## 2020-09-11 DIAGNOSIS — Z79.4 TYPE 2 DIABETES MELLITUS WITH OTHER NEUROLOGIC COMPLICATION, WITH LONG-TERM CURRENT USE OF INSULIN (H): ICD-10-CM

## 2020-09-11 DIAGNOSIS — G40.909 SEIZURE DISORDER (H): ICD-10-CM

## 2020-09-11 DIAGNOSIS — R53.81 PHYSICAL DECONDITIONING: ICD-10-CM

## 2020-09-17 ENCOUNTER — TELEPHONE (OUTPATIENT)
Dept: FAMILY MEDICINE | Facility: CLINIC | Age: 70
End: 2020-09-17

## 2020-09-17 NOTE — TELEPHONE ENCOUNTER
Guerda with [employer] [facility] is calling in wanting to get verbal conformation that Dr. Wegener will be following up with this patient during home care. Please call Guerda back at [phone] to discuss. Thank You    Marlen Shepherd on 9/17/2020 at 2:35 PM

## 2020-09-21 ENCOUNTER — AMBULATORY - HEALTHEAST (OUTPATIENT)
Dept: GERIATRICS | Facility: CLINIC | Age: 70
End: 2020-09-21

## 2020-09-30 ENCOUNTER — MYC MEDICAL ADVICE (OUTPATIENT)
Dept: FAMILY MEDICINE | Facility: CLINIC | Age: 70
End: 2020-09-30

## 2020-10-01 NOTE — TELEPHONE ENCOUNTER
Dr Wegener - please advise regarding gabapentin and Magnesium ordered by neurologist.  Ora Rios RN

## 2020-10-01 NOTE — TELEPHONE ENCOUNTER
Can you double book her as a virtual visit tomorrow and let her know I can do the visit sometime on Saturday or later Friday night?  Not sure my schedule yet.  Very full schedule until then.     In the meantime I recommend to take the medications as prescribed - she was likely already receiving them in rehab.     Joel Wegener,MD

## 2020-10-08 ENCOUNTER — MYC MEDICAL ADVICE (OUTPATIENT)
Dept: FAMILY MEDICINE | Facility: CLINIC | Age: 70
End: 2020-10-08

## 2020-10-09 ENCOUNTER — VIRTUAL VISIT (OUTPATIENT)
Dept: FAMILY MEDICINE | Facility: CLINIC | Age: 70
End: 2020-10-09
Payer: MEDICARE

## 2020-10-09 DIAGNOSIS — E55.9 HYPOVITAMINOSIS D: ICD-10-CM

## 2020-10-09 DIAGNOSIS — S72.002D CLOSED FRACTURE OF LEFT HIP WITH ROUTINE HEALING, SUBSEQUENT ENCOUNTER: ICD-10-CM

## 2020-10-09 DIAGNOSIS — E11.22 TYPE 2 DIABETES MELLITUS WITH STAGE 3B CHRONIC KIDNEY DISEASE, WITH LONG-TERM CURRENT USE OF INSULIN (H): ICD-10-CM

## 2020-10-09 DIAGNOSIS — R53.81 PHYSICAL DECONDITIONING: Primary | ICD-10-CM

## 2020-10-09 DIAGNOSIS — N18.32 TYPE 2 DIABETES MELLITUS WITH STAGE 3B CHRONIC KIDNEY DISEASE, WITH LONG-TERM CURRENT USE OF INSULIN (H): ICD-10-CM

## 2020-10-09 DIAGNOSIS — G40.909 SEIZURE DISORDER (H): ICD-10-CM

## 2020-10-09 DIAGNOSIS — Z79.4 TYPE 2 DIABETES MELLITUS WITH STAGE 3B CHRONIC KIDNEY DISEASE, WITH LONG-TERM CURRENT USE OF INSULIN (H): ICD-10-CM

## 2020-10-09 DIAGNOSIS — I10 HYPERTENSION, UNCONTROLLED: ICD-10-CM

## 2020-10-09 DIAGNOSIS — E78.5 HYPERLIPIDEMIA LDL GOAL <70: ICD-10-CM

## 2020-10-09 DIAGNOSIS — Z86.0101 H/O ADENOMATOUS POLYP OF COLON: ICD-10-CM

## 2020-10-09 DIAGNOSIS — J30.2 SEASONAL ALLERGIC RHINITIS, UNSPECIFIED TRIGGER: ICD-10-CM

## 2020-10-09 DIAGNOSIS — I69.30 HISTORY OF STROKE WITH RESIDUAL DEFICIT: ICD-10-CM

## 2020-10-09 DIAGNOSIS — I10 HYPERTENSION GOAL BP (BLOOD PRESSURE) < 140/90: ICD-10-CM

## 2020-10-09 DIAGNOSIS — M62.838 MUSCLE SPASM: ICD-10-CM

## 2020-10-09 DIAGNOSIS — Z98.890 HISTORY OF OPEN REDUCTION AND INTERNAL FIXATION (ORIF) PROCEDURE: ICD-10-CM

## 2020-10-09 DIAGNOSIS — K59.03 DRUG-INDUCED CONSTIPATION: ICD-10-CM

## 2020-10-09 DIAGNOSIS — R53.1 LEFT-SIDED WEAKNESS: ICD-10-CM

## 2020-10-09 DIAGNOSIS — F34.1 DYSTHYMIA: ICD-10-CM

## 2020-10-09 DIAGNOSIS — I63.9 CEREBROVASCULAR ACCIDENT (CVA), UNSPECIFIED MECHANISM (H): ICD-10-CM

## 2020-10-09 DIAGNOSIS — F33.0 MAJOR DEPRESSIVE DISORDER, RECURRENT EPISODE, MILD (H): ICD-10-CM

## 2020-10-09 PROCEDURE — 99442 PR PHYSICIAN TELEPHONE EVALUATION 11-20 MIN: CPT | Mod: TEL | Performed by: FAMILY MEDICINE

## 2020-10-09 NOTE — PATIENT INSTRUCTIONS
Had hip fracture/orif, subsequent repeat stroke (previous stroke) and then seizures at [facility].     Transferred to [facility] for rehab.     discharge home sept 18th.  Has had pt/ot/ temporary pca for showering.  They are working on resuming former pca services.     Currently homebound.  Still not able to walk without significant assistance, canot stand on own or transgfer on own.     Left arm getting stronger but still weak, unable to lift more than one lb approximately, definitely not a milk jug.     Left leg weaker than before.  Cannot walk with walker or cane alone.     Current needs:  1.  New wheelchair.  Current chair without appropriate lumbar support, foot rests, arm support.   Plan: refer to seated mobility clinic.     2.  Deconditioning/left sided weakness:  Home care nursing orders/pt/ot/temporary pca orders are expiring and would like them continued.     I will try to do so and send orders to:    [facility], [facility] [phone], [address] [place] [address].     3.  Mood/anxiety:  Feel doing well now.  Had started gabapentin for this .  I recommend stopping at this time.     4.  Seizure disorder:  Follow up with neurology recommended to take over management.     5.  Magnesium oxide:  Unclear why started, not o meds that would reduce magnesium. Likely for muscle spasm, tics.  Ok to discontinue now.     6.  Care coordination;  I will review hosppital/d/c summary and records spouse sent more closely and make sure our med list is up to date.  Will refill all medications.     Follow up one month for preventive physical.     Has had flu shot already.

## 2020-10-09 NOTE — PROGRESS NOTES
"Addis Peña is a 69 year old female who is being evaluated via a billable telephone visit.      The patient has been notified of following:     \"This telephone visit will be conducted via a call between you and your physician/provider. We have found that certain health care needs can be provided without the need for a physical exam.  This service lets us provide the care you need with a short phone conversation.  If a prescription is necessary we can send it directly to your pharmacy.  If lab work is needed we can place an order for that and you can then stop by our lab to have the test done at a later time.    Telephone visits are billed at different rates depending on your insurance coverage. During this emergency period, for some insurers they may be billed the same as an in-person visit.  Please reach out to your insurance provider with any questions.    If during the course of the call the physician/provider feels a telephone visit is not appropriate, you will not be charged for this service.\"    Patient has given verbal consent for Telephone visit?  Yes    What phone number would you like to be contacted at? [phone]    How would you like to obtain your AVS? Lisa Acosta     Addis Peña is a 69 year old female who presents via phone visit today for the following health issues:    HPI      Needs a new wheel chair            Physical deconditioning  Closed fracture of left hip with routine healing, subsequent encounter  History of stroke with residual deficit  Left-sided weakness  History of open reduction and internal fixation (ORIF) procedure  Seizure disorder (H) :history from patient and spouse and [facility] discahrge summary and Had hip fracture/orif, subsequent repeat stroke (previous stroke) and then seizures at [facility].     Transferred to [facility] for rehab.     discharge home sept 18th.  Has had pt/ot/ temporary pca for showering.  They are working on resuming former pca " services.     Currently homebound.  Still not able to walk without significant assistance, canot stand on own or transgfer on own.     Left arm getting stronger but still weak, unable to lift more than one lb approximately, definitely not a milk jug.     Left leg weaker than before.  Cannot walk with walker or cane alone.  Otherwise feels is doing well, little pain, breathing well just trying to get stronger.     She desires a wheelchair, not sure which kind exactly.     Would like home care/pt/ot/pca/nursing continued longer.         Problem list, Medication list, Allergies, and Medical/Social/Surgical histories reviewed in [facility] and updated as appropriate.  Labs reviewed in EPIC  BP Readings from Last 3 Encounters:   02/02/20 108/45   01/25/20 (!) 148/84   05/09/19 126/76    Wt Readings from Last 3 Encounters:   01/27/20 73.9 kg (163 lb)   01/25/20 73.9 kg (163 lb)   05/09/19 76.7 kg (169 lb)                  Patient Active Problem List   Diagnosis     Hypertension goal BP (blood pressure) < 140/90     Type 2 diabetes, HbA1C goal < 8% (H)     Rosacea     Cerebral artery occlusion with cerebral infarction (H)     Mild major depression (H)     GERD (gastroesophageal reflux disease)     Seasonal allergic rhinitis     Advanced directives, counseling/discussion     History of colonic polyps     Major depression in complete remission (H)     B12 deficiency anemia     Health Care Home     Hyperlipidemia LDL goal <70     Gout     Type 2 diabetes with stage 3 chronic kidney disease GFR 30-59 (H)     Altered mental status     MARI (acute kidney injury) (H)     Type 2 diabetes mellitus with stage 3 chronic kidney disease, without long-term current use of insulin (H)     Cervical cancer screening     ACE-inhibitor cough     History of stroke     Hemiplegia affecting right dominant side (H)     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)     Closed left hip fracture (H)     Past Surgical History:    Procedure Laterality Date     COLONOSCOPY       COLONOSCOPY N/A 5/9/2019    Procedure: COLONOSCOPY, WITH POLYPECTOMY AND BIOPSY;  Surgeon: Na Diehl MD;  Location: [facility] OR     colonscopy  3/2012    repeat 1 to 3 y     CYSTOSCOPY, URETEROSCOPY, COMBINED Right 2/22/2016    Procedure: COMBINED CYSTOSCOPY, URETEROSCOPY;  Surgeon: Madelaine Roland MD;  Location: [facility] OR       Social History     Tobacco Use     Smoking status: Never Smoker     Smokeless tobacco: Never Used   Substance Use Topics     Alcohol use: No     Family History   Problem Relation Age of Onset     Hypertension Mother      Heart Disease Mother      C.A.D. Father      Diabetes Sister      Hypertension Brother      Cancer Sister          Current Outpatient Medications   Medication Sig Dispense Refill     acetaminophen (TYLENOL) 500 MG tablet Take 500 mg by mouth 2 times daily        amLODIPine (NORVASC) 10 MG tablet Take 1 tablet (10 mg) by mouth daily 90 tablet 3     aspirin 81 MG tablet Take 1 tablet by mouth daily.       atorvastatin (LIPITOR) 40 MG tablet Take 1 tablet (40 mg) by mouth daily 90 tablet 3     baclofen (LIORESAL) 10 MG tablet Take 1 tablet (10 mg) by mouth 3 times daily as needed for muscle spasms 270 tablet 3     bisacodyl (DULCOLAX) 5 MG EC tablet Take 2 tablets (10 mg) by mouth daily as needed for constipation 4 tablet 0     blood glucose (IRENE CONTOUR) test strip Use to test blood sugars two times daily or as directed. 100 each 11     Cholecalciferol (VITAMIN D3) 1000 UNIT TABS Take 1,000 Units by mouth daily        ibuprofen (ADVIL/MOTRIN) 400 MG tablet Take 400 mg by mouth every 6 hours as needed for moderate pain       insulin pen needle (ULTICARE MINI) 31G X 6 MM miscellaneous Use one pen needles daily or as directed. 100 each 3     Lactase 250 MG CAPS Take 1 chew tab by mouth as needed (with dairy)        losartan (COZAAR) 25 MG tablet Take 1 tablet (25 mg) by mouth daily 90 tablet 3     metFORMIN  (GLUCOPHAGE) 1000 MG tablet Take 1 tablet (1,000 mg) by mouth 2 times daily (with meals) 180 tablet 3     metoprolol succinate ER (TOPROL-XL) 50 MG 24 hr tablet Take 1 tablet (50 mg) by mouth daily 90 tablet 3     insulin glargine (LANTUS SOLOSTAR) 100 UNIT/ML pen Inject 10 Units Subcutaneous every morning 9 mL 3     montelukast (SINGULAIR) 10 MG tablet Take 1 tablet (10 mg) by mouth daily (Patient not taking: Reported on 1/25/2020) 90 tablet 3     senna-docusate (SENOKOT-S;PERICOLACE) 8.6-50 MG per tablet Take 1 tablet by mouth 2 times daily To be taken with opioid (narcotic) pain medication to prevent constipation. (Patient not taking: Reported on 1/25/2020) 60 tablet 1     Allergies   Allergen Reactions     Lisinopril Cough     Milk Products GI Disturbance     Latex Rash     Recent Labs   Lab Test 06/01/20 05/06/20 05/04/20 02/02/20  0419 02/02/20  0419 10/04/19  0727 03/29/19  0746 12/04/17  0831 12/04/17  0831   A1C  --   --  6.6*  --  8.4* 7.6* 7.6*   < > 7.2*   LDL  --   --  46  --   --   --  66  --  51   HDL  --   --  39*  --   --   --  54  --  56   TRIG  --   --  180*  --   --   --  110  --  139   ALT  --   --  14  --  38  --  15  --  17   CR 0.64 0.74  --    < > 1.06* 0.74 0.80   < > 0.90   GFRESTIMATED >60 >60  --    < > 54* 84 77   < > 63   GFRESTBLACK >60 >60  --    < > 62 >90 89   < > 76   POTASSIUM 3.8 4.3  --    < > 3.4 4.0 4.9   < > 4.5   TSH  --   --  2.02  --   --   --  1.08  --   --     < > = values in this interval not displayed.        ROS:  Constitutional, HEENT, cardiovascular, pulmonary, GI, , musculoskeletal, neuro, skin, endocrine and psych systems are negative, except as otherwise noted.        OBJECTIVE:  There were no vitals taken for this visit.    EXAM:  GENERAL APPEARANCE: healthy, alert and no distress  Slightly slow but clear speech.     ASSESSMENT AND PLAN  Patient Instructions       Current needs:  1.  New wheelchair.  Current chair without appropriate lumbar support, foot  rests, arm support.   Plan: refer to seated mobility clinic.     Addendum 11/04/20:  Received seated mobility clinic recommendation  Wheelchair with back cushion.  This is to certify that she needs this high strength lightweight wheelchair since she needs a height which promotes her  to easily help with transfers since she is unable to transfer on her own which cannot be performed with a standard or lightweight wheelchair. She also needs a wider seat than those standard wheelchairs to allow for a back cushion and bottom cushioning devices to prevent pressure sores since she will spend more than two hours daily in the chair since it is her only means of movement around the house and it is difficult for her  to help transfer.      Mostly her  will push the chair but she can self propel for short distances.     2.  Deconditioning/left sided weakness:  Home care nursing orders/pt/ot/temporary pca orders are expiring and would like them continued.     I will try to do so and send orders to:    [facility] [phone], [address] [place] [address].     3.  Mood/anxiety:  Feel doing well now.  Had started gabapentin for this .  I recommend stopping at this time.     4.  Seizure disorder:  Follow up with neurology recommended to take over management.     5.  Magnesium oxide:  Unclear why started, not o meds that would reduce magnesium. Likely for muscle spasm, tics.  Ok to discontinue now.     6.  Care coordination;     Follow up one month for preventive physical.     Has had flu shot already.     Home bound certification:  I certify that due to general muscle weakness and left sided weakness from previous stroke that Ms. Peña is homebound due to inability to safely ambulate or transfer on her own or wheel a wheelchair on her own.     Related diagnoses/refills as below:    ASSESSMENT AND PLAN  1. Physical deconditioning    - OCCUPATIONAL THERAPY REFERRAL; Future    2. Closed fracture  of left hip with routine healing, subsequent encounter    - OCCUPATIONAL THERAPY REFERRAL; Future    3. History of stroke with residual deficit    - OCCUPATIONAL THERAPY REFERRAL; Future    4. Left-sided weakness    - OCCUPATIONAL THERAPY REFERRAL; Future    5. History of open reduction and internal fixation (ORIF) procedure    - OCCUPATIONAL THERAPY REFERRAL; Future    6. Seizure disorder (H)    - levETIRAcetam (KEPPRA) 500 MG tablet; Take 1250 mg twice daily  - phenytoin sodium extended (DILANTIN) 200 MG capsule; Take 1 capsule (200 mg) by mouth 2 times daily  - OCCUPATIONAL THERAPY REFERRAL; Future    7. Seasonal allergic rhinitis, unspecified trigger    - montelukast (SINGULAIR) 10 MG tablet; Take 1 tablet (10 mg) by mouth daily  Dispense: 90 tablet; Refill: 3    8. Hypertension goal BP (blood pressure) < 140/90    - amLODIPine (NORVASC) 10 MG tablet; Take 1 tablet (10 mg) by mouth daily  Dispense: 90 tablet; Refill: 3  - losartan (COZAAR) 25 MG tablet; Take 1 tablet (25 mg) by mouth daily  Dispense: 90 tablet; Refill: 3  - metoprolol succinate ER (TOPROL-XL) 50 MG 24 hr tablet; Take 1 tablet (50 mg) by mouth daily  Dispense: 90 tablet; Refill: 3    9. Hyperlipidemia LDL goal <70    - atorvastatin (LIPITOR) 40 MG tablet; Take 1 tablet (40 mg) by mouth daily  Dispense: 90 tablet; Refill: 3    10. Cerebrovascular accident (CVA), unspecified mechanism (H)    - baclofen (LIORESAL) 10 MG tablet; Take 1 tablet (10 mg) by mouth 3 times daily as needed for muscle spasms  Dispense: 270 tablet; Refill: 3  - OCCUPATIONAL THERAPY REFERRAL; Future    11. H/O adenomatous polyp of colon      12. Type 2 diabetes mellitus with stage 3b chronic kidney disease, with long-term current use of insulin (H)    - insulin glargine (LANTUS SOLOSTAR) 100 UNIT/ML pen; Inject 35 Units Subcutaneous every morning  Dispense: 31.5 mL; Refill: 3  - insulin lispro (HUMALOG KWIKPEN) 100 UNIT/ML (1 unit dial) KWIKPEN; Inject 8 Units Subcutaneous 3  times daily (before meals) (max per correction scale)  Dispense: 21 mL; Refill: 3  - metFORMIN (GLUCOPHAGE) 1000 MG tablet; Take 1 tablet (1,000 mg) by mouth 2 times daily (with meals)  Dispense: 180 tablet; Refill: 3    13. Hypertension, uncontrolled      14. Dysthymia    - citalopram (CELEXA) 10 MG tablet; Take 1 tablet (10 mg) by mouth daily  Dispense: 90 tablet; Refill: 3    15. Muscle spasm    - baclofen (LIORESAL) 10 MG tablet; Take 1 tablet (10 mg) by mouth 3 times daily as needed for muscle spasms  Dispense: 270 tablet; Refill: 3  - OCCUPATIONAL THERAPY REFERRAL; Future    16. Drug-induced constipation    - bisacodyl (DULCOLAX) 5 MG EC tablet; Take 2 tablets (10 mg) by mouth daily as needed for constipation  Dispense: 180 tablet; Refill: 3    17. Hypovitaminosis D    - Vitamin D3 (VITAMIN D3) 25 mcg (1000 units) tablet; Take 1 tablet (25 mcg) by mouth daily  Dispense: 90 tablet; Refill: 3    18. Major depressive disorder, recurrent episode, mild (H)      Telephone visit start: 2:09, 15 minutes total      Addendum:    After much communication with MsBrianna Peña, care coordination team and physical/occupational therapy team we have decide to move forward with ordering of a manual wheelchair. I will have this note re-sent to [employer] so we can move forward with ordering manual wheelchair of her choosing.     02/24/2021.         Joel Wegener,MD

## 2020-10-12 ENCOUNTER — ALLIED HEALTH/NURSE VISIT (OUTPATIENT)
Dept: NURSING | Facility: CLINIC | Age: 70
End: 2020-10-12
Payer: MEDICARE

## 2020-10-12 DIAGNOSIS — E53.8 VITAMIN B12 DEFICIENCY (NON ANEMIC): Primary | ICD-10-CM

## 2020-10-12 PROCEDURE — 96372 THER/PROPH/DIAG INJ SC/IM: CPT

## 2020-10-12 RX ADMIN — CYANOCOBALAMIN 1000 MCG: 1000 INJECTION, SOLUTION INTRAMUSCULAR; SUBCUTANEOUS at 15:28

## 2020-10-12 NOTE — PROGRESS NOTES
.Clinic Administered Medication Documentation      Injectable Medication Documentation    Patient was given Cyanocobalamin (B-12). Prior to medication administration, verified patients identity using patient s name and date of birth. Please see MAR and medication order for additional information. Patient instructed to remain in clinic for 15 minutes.      Was entire vial of medication used? Yes  Vial/Syringe: Single dose vial  Expiration Date:  02/2021  Was this medication supplied by the patient? No     Ioana Pierce CMA on 10/12/2020 at 3:30 PM

## 2020-10-13 ENCOUNTER — MYC MEDICAL ADVICE (OUTPATIENT)
Dept: FAMILY MEDICINE | Facility: CLINIC | Age: 70
End: 2020-10-13

## 2020-10-13 DIAGNOSIS — I69.351 FLACCID HEMIPLEGIA OF RIGHT DOMINANT SIDE AS LATE EFFECT OF CEREBRAL INFARCTION (H): ICD-10-CM

## 2020-10-13 DIAGNOSIS — I63.50 CEREBRAL ARTERY OCCLUSION WITH CEREBRAL INFARCTION (H): ICD-10-CM

## 2020-10-13 DIAGNOSIS — E11.22 TYPE 2 DIABETES MELLITUS WITH STAGE 3 CHRONIC KIDNEY DISEASE, WITHOUT LONG-TERM CURRENT USE OF INSULIN (H): Primary | ICD-10-CM

## 2020-10-13 DIAGNOSIS — N18.30 TYPE 2 DIABETES MELLITUS WITH STAGE 3 CHRONIC KIDNEY DISEASE, WITHOUT LONG-TERM CURRENT USE OF INSULIN (H): Primary | ICD-10-CM

## 2020-10-14 ENCOUNTER — MYC MEDICAL ADVICE (OUTPATIENT)
Dept: FAMILY MEDICINE | Facility: CLINIC | Age: 70
End: 2020-10-14

## 2020-10-14 NOTE — TELEPHONE ENCOUNTER
Dr Wegener,    Did you contact her home care or send them orders? If it is [facility] in [place] then I could not find their number. [phone] listed on line but that doesn't work.     Do you want to sign attached seating mobility clinic for her wheelchair?    Carol Fisher, RN, BSN

## 2020-10-14 NOTE — TELEPHONE ENCOUNTER
From 10/9/20 visit with Dr Wegener:    Current needs:  1.  New wheelchair.  Current chair without appropriate lumbar support, foot rests, arm support.   Plan: refer to seated mobility clinic.      2.  Deconditioning/left sided weakness:  Home care nursing orders/pt/ot/temporary pca orders are expiring and would like them continued.      I will try to do so and send orders to:     [facility], [facility] [phone], [address] [place] [address].  6.  Care coordination;  I will review hosppital/d/c summary and records spouse sent more closely and make sure our med list is up to date.  Will refill all medications.      Follow up one month for preventive physical.

## 2020-10-14 NOTE — TELEPHONE ENCOUNTER
Dr Wegener-See Mychart-I see no documentation/communication from Home Health. Wheelchair DME pended please physically sign. When would you like pt to follow up with you?

## 2020-10-17 ENCOUNTER — MYC MEDICAL ADVICE (OUTPATIENT)
Dept: FAMILY MEDICINE | Facility: CLINIC | Age: 70
End: 2020-10-17

## 2020-10-19 NOTE — TELEPHONE ENCOUNTER
See 10/13/20 MyChart encounter.    Writer responded to patient via [employer].    CARYN LopezN, RN

## 2020-10-20 RX ORDER — MONTELUKAST SODIUM 10 MG/1
10 TABLET ORAL DAILY
Qty: 90 TABLET | Refills: 3 | Status: SHIPPED | OUTPATIENT
Start: 2020-10-20 | End: 2021-11-02

## 2020-10-20 RX ORDER — VITAMIN B COMPLEX
25 TABLET ORAL DAILY
Qty: 90 TABLET | Refills: 3 | Status: SHIPPED | OUTPATIENT
Start: 2020-10-20 | End: 2021-11-16

## 2020-10-20 RX ORDER — CITALOPRAM HYDROBROMIDE 10 MG/1
10 TABLET ORAL DAILY
Qty: 90 TABLET | Refills: 3 | Status: SHIPPED | OUTPATIENT
Start: 2020-10-20 | End: 2021-11-02

## 2020-10-20 RX ORDER — LOSARTAN POTASSIUM 25 MG/1
25 TABLET ORAL DAILY
Qty: 90 TABLET | Refills: 3 | Status: SHIPPED | OUTPATIENT
Start: 2020-10-20 | End: 2021-10-25

## 2020-10-20 RX ORDER — BACLOFEN 10 MG/1
10 TABLET ORAL 3 TIMES DAILY PRN
Qty: 270 TABLET | Refills: 3 | Status: SHIPPED | OUTPATIENT
Start: 2020-10-20 | End: 2021-10-25

## 2020-10-20 RX ORDER — LEVETIRACETAM 500 MG/1
1000 TABLET ORAL 2 TIMES DAILY
COMMUNITY
Start: 2020-10-20

## 2020-10-20 RX ORDER — BISACODYL 5 MG/1
10 TABLET, DELAYED RELEASE ORAL DAILY PRN
Qty: 180 TABLET | Refills: 3 | Status: SHIPPED | OUTPATIENT
Start: 2020-10-20 | End: 2021-11-16

## 2020-10-20 RX ORDER — PHENYTOIN SODIUM 200 MG/1
100 CAPSULE, EXTENDED RELEASE ORAL 2 TIMES DAILY
COMMUNITY
Start: 2020-10-20 | End: 2021-12-20

## 2020-10-20 RX ORDER — ATORVASTATIN CALCIUM 40 MG/1
40 TABLET, FILM COATED ORAL DAILY
Qty: 90 TABLET | Refills: 3 | Status: SHIPPED | OUTPATIENT
Start: 2020-10-20 | End: 2021-11-02

## 2020-10-20 RX ORDER — INSULIN LISPRO 100 [IU]/ML
8 INJECTION, SOLUTION INTRAVENOUS; SUBCUTANEOUS
Qty: 21 ML | Refills: 3 | Status: SHIPPED | OUTPATIENT
Start: 2020-10-20 | End: 2020-10-20

## 2020-10-20 RX ORDER — AMLODIPINE BESYLATE 10 MG/1
10 TABLET ORAL DAILY
Qty: 90 TABLET | Refills: 3 | Status: SHIPPED | OUTPATIENT
Start: 2020-10-20 | End: 2021-11-02

## 2020-10-20 RX ORDER — METOPROLOL SUCCINATE 50 MG/1
50 TABLET, EXTENDED RELEASE ORAL DAILY
Qty: 90 TABLET | Refills: 3 | Status: SHIPPED | OUTPATIENT
Start: 2020-10-20 | End: 2021-10-25

## 2020-10-20 NOTE — TELEPHONE ENCOUNTER
"Joel Wegener MD     Writer spoke with the Homecare team, [facility], who states they discharged recently because the patient met her goals for \"2.  Deconditioning/left sided weakness\" and she does not think they would be able to continue for much longer with a new home care orders--    Please advise.    Thanks! Octavia Estrada RN    "

## 2020-10-20 NOTE — TELEPHONE ENCOUNTER
Triage - please call their home care and give orders to continue:   Home care nursing orders/pt/ot/pca       Their # is [phone] Their fax is [phone] for [facility].       I sent mychart back to pt/ regarding the rest.     Joel Wegener,MD

## 2020-10-24 ENCOUNTER — MYC MEDICAL ADVICE (OUTPATIENT)
Dept: FAMILY MEDICINE | Facility: CLINIC | Age: 70
End: 2020-10-24

## 2020-10-24 DIAGNOSIS — N18.30 TYPE 2 DIABETES MELLITUS WITH STAGE 3 CHRONIC KIDNEY DISEASE, WITHOUT LONG-TERM CURRENT USE OF INSULIN (H): ICD-10-CM

## 2020-10-24 DIAGNOSIS — E11.22 TYPE 2 DIABETES MELLITUS WITH STAGE 3 CHRONIC KIDNEY DISEASE, WITHOUT LONG-TERM CURRENT USE OF INSULIN (H): ICD-10-CM

## 2020-10-27 ENCOUNTER — TELEPHONE (OUTPATIENT)
Dept: FAMILY MEDICINE | Facility: CLINIC | Age: 70
End: 2020-10-27

## 2020-10-29 ENCOUNTER — MYC MEDICAL ADVICE (OUTPATIENT)
Dept: FAMILY MEDICINE | Facility: CLINIC | Age: 70
End: 2020-10-29

## 2020-10-29 DIAGNOSIS — N39.3 STRESS INCONTINENCE OF URINE: Primary | ICD-10-CM

## 2020-11-04 ENCOUNTER — MYC MEDICAL ADVICE (OUTPATIENT)
Dept: FAMILY MEDICINE | Facility: CLINIC | Age: 70
End: 2020-11-04

## 2020-11-05 ENCOUNTER — MYC MEDICAL ADVICE (OUTPATIENT)
Dept: FAMILY MEDICINE | Facility: CLINIC | Age: 70
End: 2020-11-05

## 2020-11-05 NOTE — TELEPHONE ENCOUNTER
"Dr. Wegener-Please review.    It appears this [employer] message follows 11/4/20 MyChart encounter.    Additional [employer] message sent from patient today:  \"Addis Peña  to Wegener, Joel Daniel Irwin, MD      11/5/20 9:42 AM  Unless the form was from [employer] for the wheelchair?  I am seeing them on Tuesday, that is the place you referred me to.   I don't know if they send out forms before I see them or use the referral that you sent? My wheelchair evaluation it this Tuesday the 10th. Is it another medical supply company?  As always thanks for your help.\"      Thank you!  CARYN LopezN, RN      "

## 2020-11-05 NOTE — TELEPHONE ENCOUNTER
Paperwork put in [facility] nurse basket to fax.     Joel Wegener,MD

## 2020-11-06 ENCOUNTER — MYC MEDICAL ADVICE (OUTPATIENT)
Dept: FAMILY MEDICINE | Facility: CLINIC | Age: 70
End: 2020-11-06

## 2020-11-06 NOTE — TELEPHONE ENCOUNTER
Triage--can we check on this DME order for patient pull-ups--    Joel Wegener MD states it is in the fax basket --dated 10/29--can we get it refaxed?      Thanks! Octavia Estrada RN

## 2020-11-06 NOTE — TELEPHONE ENCOUNTER
Team - please call [facility] and tell them to wait on processing the wheelchair order.  If you can find the order please bring back to me.  I put it in the scan basket late Wednesday evening.     Joel Wegener,MD

## 2020-11-08 NOTE — TELEPHONE ENCOUNTER
[facility] closed. LM regarding wait on processing the wheelchair order for pt.    Doreen MOELLER  [facility]

## 2020-11-10 ENCOUNTER — HOSPITAL ENCOUNTER (OUTPATIENT)
Dept: OCCUPATIONAL THERAPY | Facility: CLINIC | Age: 70
Setting detail: THERAPIES SERIES
End: 2020-11-10
Attending: FAMILY MEDICINE
Payer: COMMERCIAL

## 2020-11-10 DIAGNOSIS — R53.1 LEFT-SIDED WEAKNESS: ICD-10-CM

## 2020-11-10 DIAGNOSIS — Z98.890 HISTORY OF OPEN REDUCTION AND INTERNAL FIXATION (ORIF) PROCEDURE: ICD-10-CM

## 2020-11-10 DIAGNOSIS — G40.909 SEIZURE DISORDER (H): ICD-10-CM

## 2020-11-10 DIAGNOSIS — I69.30 HISTORY OF STROKE WITH RESIDUAL DEFICIT: ICD-10-CM

## 2020-11-10 DIAGNOSIS — I63.9 CEREBROVASCULAR ACCIDENT (CVA), UNSPECIFIED MECHANISM (H): ICD-10-CM

## 2020-11-10 DIAGNOSIS — S72.002D CLOSED FRACTURE OF LEFT HIP WITH ROUTINE HEALING, SUBSEQUENT ENCOUNTER: ICD-10-CM

## 2020-11-10 DIAGNOSIS — M62.838 MUSCLE SPASM: ICD-10-CM

## 2020-11-10 DIAGNOSIS — R53.81 PHYSICAL DECONDITIONING: ICD-10-CM

## 2020-11-10 PROCEDURE — 97542 WHEELCHAIR MNGMENT TRAINING: CPT | Mod: GO | Performed by: OCCUPATIONAL THERAPIST

## 2020-11-10 NOTE — PROGRESS NOTES
"   SEATING AND WHEELED MOBILITY ASSESSMENT  11/10/20 1300   Quick Adds   Quick Adds Certification;Current Manual Wheelchair   General Information   Rehab Discipline OT   Funding Medicare/MA   Service Outpatient;Occupational Therapy;Seating/Wheeled Mobility Evaluation   Height 5'3\"   Weight 145   Start Of Care Date 11/10/20   Referring Physician Joel Wegener   Orders Evaluate And Treat As Indicated;Per Therapist Evaluation   Orders Date 11/10/20   Patient/Caregiver Goals Wheelchair Replacement   Rehabilitation Technology Supplier Angie BAKER from [employer]   Current Community Support Family/Friend Caregiver;Personal Care Attendant  (4 hrs PCA approved)   Patient role/Employment history Disabled   General Information Comments Spouse worked full time   Fall Risk Screen   Fall screen completed by OT   Have you fallen 2 or more times in the past year? Yes   Have you fallen and had an injury in the past year? Yes  (L Hip ORIF)   Is patient a fall risk? Yes   Medical History   Onset Of Illness/injury Or Date Of Surgery 11/10/20   Medical Diagnosis CVA with L yesenia   Medical History L hip fracture with ORIF   Current Manual Wheelchair   Age 2014    Breezy Ultra 4   Cushion Gel  (M2)   Wheelchair Back Solid Curved   Footrest Style L swing away   Headrest Yes   Settings Used Home;Outdoor Community Mobility;Primary Means of Transportation   Condition Fair   Current Equipment Requires Replacement   Rationale for Equipment Changes Heavy duty user   Home Accessibility   Living Environment Apartment/condo   Primary Entrance Level;Elevator   Community ADL   Transportation Van  (SUV)   Community Mobility Requirements Medical Appointments;Shopping;[employer]   Cognitive/Visual/Hearing Status   Observations Difficulty Reporting Medical History   Vision Intact   Hearing Intact   ADL Status   Feeding Independent   Grooming/Hygiene Independent   Dressing Independent   Toileting Independent;Uses Equipment  (grab bars)   Bathing " Requires Assist;Uses Equipment  (sliding seat bench)   Meal Preparation Requires Assist   Home Management Requires Assist   Fatigue   Fatigue Measure Score Frequent rests and nap   Balance   Unsupported Sitting Balance Uses Upper Extremities for Balance   Sitting Balance in Chair Uses Upper Extremities for Balance   Standing Balance Uses Upper Extremities for Balance   Ambulation   Ambulation Non Ambulatory   Ambulation Assist Requires Assist   Ambulation Equipment Cane  (railing)   Ambulation Comments For exercises only with assist   Transfers   Transfer Assist Independent   Transfer Method Stand Pivot   Transfer Comments Needs grab bar to pull up   Sleep/Rest   Sleep Surface/Equipment hospital bed   Wheelchair Ability   Wheelchair Ability Quick Adds Manual Chair;Wheelchair Use   Manual Wheelchair Propulsion   Manual Wheelchair Propulsion Assist   Wheelchair Use   Ability to Perform Weight Shifts Assist   Bed Confined Without Wheelchair? Yes   Hours in Wheelchair Daily 3   Neuromuscular   History of Pressure Sores No   Current Pressure Sores No   Pain Yes   Pain Location back pain with prolonged sitting   Coordination UE Impaired;LE Impaired  (L yesenia)   Tone Spasticity   Sensory Deficits Reported L side impaired   Sensation on Seating Surface Impaired per Report  (L side)   Head and Neck   Head and Neck Position Functional   Head Control Good   Upper Extremities   UE ROM L shoulder 0-90 L elbow  d   UE Strength L 1/5 R 4-/5   Dominance Right   Pelvis   Anterior/Posterior Pelvis Position Posterior Tilt   Trunk   Anterior/Posterior Trunk Position Increased Thoracic Kyphosis   Lower Extremities   LE ROM L PROM WFL R WFL   LE Strength L 0/5 R 4-/5   Foot Positioning Plantar flexed   LE Comments L AFO   Patient Measurements   Other Per atp notes   Education Assessment   Barriers to Learning Physical   Preferred Learning Style Listening;Demonstration   Assessment/Plan   Criteria for Skilled Interventions Met Yes,  Treatment Indicated   Treatment Diagnosis impaired participaiton in MRADLS   Therapy Frequency once   Planned Therapy Interventions Wheelchair Management/Propulsion Training   Planned Therapy Interventions Comments Educated patient of power wc vs manual chair options.  Patient demo'd use of chair safely in clinic with some assist for safety on L side, but was teachable.  Trialed tilt, recline, and power legs.    Risks and benefits of treatment have been explained Yes   Patient/family & other staff in agreement with plan of care Yes   Comments Home trials of stretto to make sure transfers and independence is still the same. Possible need of home care if chair is approved in order to train in use in tight apartment and with transfers.     Session Time   OT Wheelchair Management Minutes (94844) 75   Certification   Certification date from 11/10/20   Certification date to 11/10/20   Adult OT Eval Goals   OT Eval Goals (Adult) 1    OT Goal 1   Goal Identifier wheelchair   Goal Description Patient to demonstrate a successful home trial with the recommended equipment   Target Date 11/10/20   Date Met 11/10/20   Electronically signed by:  Yohana CONNELLY/TINO, ATP      Occupational Therapist, Assistive   [phone]      fax: [phone]      [email or web address]  Seating Clinic- [facility], [address]  [address]  [place]   [address]

## 2020-11-12 DIAGNOSIS — I63.9 FLACCID HEMIPLEGIA OF RIGHT DOMINANT SIDE DUE TO INFARCTION OF BRAIN (H): Primary | ICD-10-CM

## 2020-11-12 DIAGNOSIS — G81.01 FLACCID HEMIPLEGIA OF RIGHT DOMINANT SIDE DUE TO INFARCTION OF BRAIN (H): Primary | ICD-10-CM

## 2020-11-13 ENCOUNTER — PATIENT OUTREACH (OUTPATIENT)
Dept: CARE COORDINATION | Facility: CLINIC | Age: 70
End: 2020-11-13

## 2020-11-13 NOTE — PROGRESS NOTES
"Clinic Care Coordination Contact  [facility]/Voicemail       Clinical Data: Care Coordinator Outreach  Outreach attempted x 1.  Left message on patient's voicemail with call back information and requested return call.  Plan: Care Coordinator will try to reach patient again in 1-2 business days.      Reason for Referral: DME, please schedule with CC RN  Additional pertinent details: From RN Triage: \"Please let me know if you [facility]? Do you need to send them another referral? I saw Yohana there, again not happy with the pressure to go to a power chair. Do you stillhave the other wheelchair form you were going to fax? I really need to get going on this, winter weather travel is not something I like to do\"  Clinical Staff have discussed the Care Coordination Referral with the patient and/or caregiver: no  "

## 2020-11-16 NOTE — PROGRESS NOTES
Clinic Care Coordination Contact  Community Health Worker Initial Outreach    CHW Initial Information Gathering:  Referral Source: PCP  Current living arrangement:: I live alone  Community Resources: [facility]  Informal Support system:: Friends  No PCP office visit in Past Year: No  Transportation means:: Friend  CHW Additional Questions  If ED/Hospital discharge, follow-up appointment scheduled as recommended?: N/A  Medication changes made following ED/Hospital discharge?: N/A  MyChart active?: Yes    Patient accepts CC: Yes. Patient scheduled for assessment with CC JOSE GUADALUPE Moreira on Wednesday, November 18th  at 1 pm by phone. Patient noted desire to discuss getting a wheelchair and PCA services.

## 2020-11-16 NOTE — TELEPHONE ENCOUNTER
fyi - message sent to pt in another encounter.     Yohana Mancini wrote back to me.  She will start working on getting a non-motorized wheelchair.  She is looking at better options for you from a different vendor, she doesn't feel [facility] has the type of non-motorized wheelchairs that would be best for you.  She is trying to get one that is lighter and easier to set up than the ones you can get through [facility].  I don't believe she needs to see you back again but I'll ask her to reach out to you if she does.     Best,

## 2020-11-17 ENCOUNTER — MYC MEDICAL ADVICE (OUTPATIENT)
Dept: FAMILY MEDICINE | Facility: CLINIC | Age: 70
End: 2020-11-17

## 2020-11-17 ENCOUNTER — TELEPHONE (OUTPATIENT)
Dept: FAMILY MEDICINE | Facility: CLINIC | Age: 70
End: 2020-11-17

## 2020-11-17 NOTE — TELEPHONE ENCOUNTER
Dr. Wegener-Please review.  Per chart review of 11/17/20 telephone encounter, patient's plan of care with [facility] Was addressed/faxed today.    Thank you!  CARYN LopezN, RN

## 2020-11-19 ENCOUNTER — PATIENT OUTREACH (OUTPATIENT)
Dept: NURSING | Facility: CLINIC | Age: 70
End: 2020-11-19
Payer: MEDICARE

## 2020-11-19 ENCOUNTER — PATIENT OUTREACH (OUTPATIENT)
Dept: CARE COORDINATION | Facility: CLINIC | Age: 70
End: 2020-11-19

## 2020-11-19 ASSESSMENT — ACTIVITIES OF DAILY LIVING (ADL): DEPENDENT_IADLS:: LAUNDRY;SHOPPING;MEDICATION MANAGEMENT

## 2020-11-19 NOTE — PROGRESS NOTES
Clinic Care Coordination Contact  Community Health Worker Initial Outreach    Patient accepts CC: Yes. Patient scheduled for assessment with JEFF Moreira on Thursday, November 19th at 3 pm by phone. Patient noted desire to discuss wheelchair and PCA services.     Rescheduled pt. She apologized for missing CC JOSE GUADALUPE's calls yesterday--pt was charging her cell phone. Asked to have JEFF SHI call her home phone today first.

## 2020-11-20 ENCOUNTER — OFFICE VISIT (OUTPATIENT)
Dept: FAMILY MEDICINE | Facility: CLINIC | Age: 70
End: 2020-11-20
Payer: MEDICARE

## 2020-11-20 ENCOUNTER — MYC MEDICAL ADVICE (OUTPATIENT)
Dept: FAMILY MEDICINE | Facility: CLINIC | Age: 70
End: 2020-11-20

## 2020-11-20 VITALS
TEMPERATURE: 98.4 F | DIASTOLIC BLOOD PRESSURE: 64 MMHG | SYSTOLIC BLOOD PRESSURE: 127 MMHG | HEART RATE: 69 BPM | OXYGEN SATURATION: 98 %

## 2020-11-20 DIAGNOSIS — Z79.4 TYPE 2 DIABETES MELLITUS WITH STAGE 3B CHRONIC KIDNEY DISEASE, WITH LONG-TERM CURRENT USE OF INSULIN (H): ICD-10-CM

## 2020-11-20 DIAGNOSIS — E11.22 TYPE 2 DIABETES MELLITUS WITH STAGE 3B CHRONIC KIDNEY DISEASE, WITHOUT LONG-TERM CURRENT USE OF INSULIN (H): ICD-10-CM

## 2020-11-20 DIAGNOSIS — N18.32 TYPE 2 DIABETES MELLITUS WITH STAGE 3B CHRONIC KIDNEY DISEASE, WITHOUT LONG-TERM CURRENT USE OF INSULIN (H): ICD-10-CM

## 2020-11-20 DIAGNOSIS — Z23 NEED FOR DIPHTHERIA-TETANUS-PERTUSSIS (TDAP) VACCINE: ICD-10-CM

## 2020-11-20 DIAGNOSIS — I10 HYPERTENSION GOAL BP (BLOOD PRESSURE) < 140/90: ICD-10-CM

## 2020-11-20 DIAGNOSIS — N18.32 TYPE 2 DIABETES MELLITUS WITH STAGE 3B CHRONIC KIDNEY DISEASE, WITH LONG-TERM CURRENT USE OF INSULIN (H): ICD-10-CM

## 2020-11-20 DIAGNOSIS — M10.9 GOUT, UNSPECIFIED CAUSE, UNSPECIFIED CHRONICITY, UNSPECIFIED SITE: ICD-10-CM

## 2020-11-20 DIAGNOSIS — E78.5 HYPERLIPIDEMIA LDL GOAL <70: ICD-10-CM

## 2020-11-20 DIAGNOSIS — E11.22 TYPE 2 DIABETES MELLITUS WITH STAGE 3B CHRONIC KIDNEY DISEASE, WITH LONG-TERM CURRENT USE OF INSULIN (H): ICD-10-CM

## 2020-11-20 DIAGNOSIS — D51.9 ANEMIA DUE TO VITAMIN B12 DEFICIENCY, UNSPECIFIED B12 DEFICIENCY TYPE: ICD-10-CM

## 2020-11-20 DIAGNOSIS — Z00.00 ENCOUNTER FOR MEDICARE ANNUAL WELLNESS EXAM: Primary | ICD-10-CM

## 2020-11-20 LAB
ALBUMIN SERPL-MCNC: 3.4 G/DL (ref 3.4–5)
ALP SERPL-CCNC: 196 U/L (ref 40–150)
ALT SERPL W P-5'-P-CCNC: 24 U/L (ref 0–50)
ANION GAP SERPL CALCULATED.3IONS-SCNC: 5 MMOL/L (ref 3–14)
AST SERPL W P-5'-P-CCNC: 20 U/L (ref 0–45)
BILIRUB SERPL-MCNC: 0.1 MG/DL (ref 0.2–1.3)
BUN SERPL-MCNC: 20 MG/DL (ref 7–30)
CALCIUM SERPL-MCNC: 9 MG/DL (ref 8.5–10.1)
CHLORIDE SERPL-SCNC: 108 MMOL/L (ref 94–109)
CHOLEST SERPL-MCNC: 180 MG/DL
CO2 SERPL-SCNC: 26 MMOL/L (ref 20–32)
CREAT SERPL-MCNC: 0.65 MG/DL (ref 0.52–1.04)
CREAT UR-MCNC: 138 MG/DL
ERYTHROCYTE [DISTWIDTH] IN BLOOD BY AUTOMATED COUNT: 11.8 % (ref 10–15)
FOLATE SERPL-MCNC: 4.5 NG/ML
GFR SERPL CREATININE-BSD FRML MDRD: >90 ML/MIN/{1.73_M2}
GLUCOSE SERPL-MCNC: 104 MG/DL (ref 70–99)
HBA1C MFR BLD: 6.3 % (ref 0–5.6)
HCT VFR BLD AUTO: 40.9 % (ref 35–47)
HDLC SERPL-MCNC: 60 MG/DL
HGB BLD-MCNC: 13.2 G/DL (ref 11.7–15.7)
LDLC SERPL CALC-MCNC: 87 MG/DL
MCH RBC QN AUTO: 32.6 PG (ref 26.5–33)
MCHC RBC AUTO-ENTMCNC: 32.3 G/DL (ref 31.5–36.5)
MCV RBC AUTO: 101 FL (ref 78–100)
MICROALBUMIN UR-MCNC: 478 MG/L
MICROALBUMIN/CREAT UR: 346.38 MG/G CR (ref 0–25)
NONHDLC SERPL-MCNC: 120 MG/DL
PLATELET # BLD AUTO: 286 10E9/L (ref 150–450)
POTASSIUM SERPL-SCNC: 4.3 MMOL/L (ref 3.4–5.3)
PROT SERPL-MCNC: 7 G/DL (ref 6.8–8.8)
RBC # BLD AUTO: 4.05 10E12/L (ref 3.8–5.2)
SODIUM SERPL-SCNC: 139 MMOL/L (ref 133–144)
TRIGL SERPL-MCNC: 165 MG/DL
TSH SERPL DL<=0.005 MIU/L-ACNC: 2.89 MU/L (ref 0.4–4)
URATE SERPL-MCNC: 4.3 MG/DL (ref 2.6–6)
VIT B12 SERPL-MCNC: >6000 PG/ML (ref 193–986)
WBC # BLD AUTO: 7.7 10E9/L (ref 4–11)

## 2020-11-20 PROCEDURE — G0439 PPPS, SUBSEQ VISIT: HCPCS | Performed by: FAMILY MEDICINE

## 2020-11-20 PROCEDURE — 82746 ASSAY OF FOLIC ACID SERUM: CPT | Performed by: FAMILY MEDICINE

## 2020-11-20 PROCEDURE — 96372 THER/PROPH/DIAG INJ SC/IM: CPT | Performed by: FAMILY MEDICINE

## 2020-11-20 PROCEDURE — 83036 HEMOGLOBIN GLYCOSYLATED A1C: CPT | Performed by: FAMILY MEDICINE

## 2020-11-20 PROCEDURE — 85027 COMPLETE CBC AUTOMATED: CPT | Performed by: FAMILY MEDICINE

## 2020-11-20 PROCEDURE — 90715 TDAP VACCINE 7 YRS/> IM: CPT | Performed by: FAMILY MEDICINE

## 2020-11-20 PROCEDURE — 80053 COMPREHEN METABOLIC PANEL: CPT | Performed by: FAMILY MEDICINE

## 2020-11-20 PROCEDURE — 84443 ASSAY THYROID STIM HORMONE: CPT | Performed by: FAMILY MEDICINE

## 2020-11-20 PROCEDURE — 80061 LIPID PANEL: CPT | Performed by: FAMILY MEDICINE

## 2020-11-20 PROCEDURE — 90471 IMMUNIZATION ADMIN: CPT | Performed by: FAMILY MEDICINE

## 2020-11-20 PROCEDURE — 84550 ASSAY OF BLOOD/URIC ACID: CPT | Performed by: FAMILY MEDICINE

## 2020-11-20 PROCEDURE — 36415 COLL VENOUS BLD VENIPUNCTURE: CPT | Performed by: FAMILY MEDICINE

## 2020-11-20 PROCEDURE — 82607 VITAMIN B-12: CPT | Performed by: FAMILY MEDICINE

## 2020-11-20 PROCEDURE — 82043 UR ALBUMIN QUANTITATIVE: CPT | Performed by: FAMILY MEDICINE

## 2020-11-20 RX ADMIN — CYANOCOBALAMIN 1000 MCG: 1000 INJECTION, SOLUTION INTRAMUSCULAR; SUBCUTANEOUS at 10:57

## 2020-11-20 NOTE — NURSING NOTE
Prior to immunization administration, verified patients identity using patient s name and date of birth. Please see Immunization Activity for additional information.     Screening Questionnaire for Adult Immunization    Are you sick today?   No   Do you have allergies to medications, food, a vaccine component or latex?   Yes   Have you ever had a serious reaction after receiving a vaccination?   No   Do you have a long-term health problem with heart, lung, kidney, or metabolic disease (e.g., diabetes), asthma, a blood disorder, no spleen, complement component deficiency, a cochlear implant, or a spinal fluid leak?  Are you on long-term aspirin therapy?   Yes   Do you have cancer, leukemia, HIV/AIDS, or any other immune system problem?   No   Do you have a parent, brother, or sister with an immune system problem?   No   In the past 3 months, have you taken medications that affect  your immune system, such as prednisone, other steroids, or anticancer drugs; drugs for the treatment of rheumatoid arthritis, Crohn s disease, or psoriasis; or have you had radiation treatments?   No   Have you had a seizure, or a brain or other nervous system problem?   Yes   During the past year, have you received a transfusion of blood or blood    products, or been given immune (gamma) globulin or antiviral drug?   No   For women: Are you pregnant or is there a chance you could become       pregnant during the next month?   No   Have you received any vaccinations in the past 4 weeks?   No     Immunization questionnaire was positive for at least one answer.  Notified. Wegener        Per orders of Dr. Wegener injection of Adacel given by Allyn Diehl CMA. Patient instructed to remain in clinic for 15 minutes afterwards, and to report any adverse reaction to me immediately.       Screening performed by Allyn Diehl CMA on 11/20/2020 at 10:56 AM.

## 2020-11-20 NOTE — PATIENT INSTRUCTIONS
Patient Education   Personalized Prevention Plan  You are due for the preventive services outlined below.  Your care team is available to assist you in scheduling these services.  If you have already completed any of these items, please share that information with your care team to update in your medical record.  Health Maintenance Due   Topic Date Due     Diabetic Foot Exam  12/04/2018     Diptheria Tetanus Pertussis (DTAP/TDAP/TD) Vaccine (2 - Td) 02/01/2020     Kidney Microalbumin Urine Test  03/29/2020     Depression Assessment  06/13/2020     Basic Metabolic Panel  08/02/2020     A1C Lab  11/04/2020     Eye Exam  11/12/2020

## 2020-11-20 NOTE — PROGRESS NOTES
"  SUBJECTIVE:   Addis Peña is a 70 year old female who presents for Preventive Visit.      Patient has been advised of split billing requirements and indicates understanding: Yes  Are you in the first 12 months of your Medicare Part B coverage?  No    Physical Health:    In general, how would you rate your overall physical health? good    Outside of work, how many days during the week do you exercise? 2-3 days/week    Outside of work, approximately how many minutes a day do you exercise?45-60 minutes    If you drink alcohol do you typically have >3 drinks per day or >7 drinks per week? No    Do you usually eat at least 4 servings of fruit and vegetables a day, include whole grains & fiber and avoid regularly eating high fat or \"junk\" foods? NO    Do you have any problems taking medications regularly?  No    Do you have any side effects from medications? none    Needs assistance for the following daily activities: transportation, shopping, preparing meals, housework, bathing and laundry    Which of the following safety concerns are present in your home?  none identified     Hearing impairment: No    In the past 6 months, have you been bothered by leaking of urine? no    Mental Health:    In general, how would you rate your overall mental or emotional health? good  PHQ-2 Score:      Do you feel safe in your environment? Yes    Have you ever done Advance Care Planning? (For example, a Health Directive, POLST, or a discussion with a medical provider or your loved ones about your wishes): Yes, advance care planning is on file.    Additional concerns to address?  No    Fall risk: yes       Cognitive Screenin) Repeat 3 items (Leader, Season, Table)    2) Clock draw: NORMAL  3) 3 item recall: Recalls 2 objects   Results: NORMAL clock, 1-2 items recalled: COGNITIVE IMPAIRMENT LESS LIKELY    Mini-CogTM Copyright TOBY Rodriguez. Licensed by the author for use in [facility]; reprinted with permission " (carina@[facility]). All rights reserved.      Do you have sleep apnea, excessive snoring or daytime drowsiness?: no            Reviewed and updated as needed this visit by clinical staff  Tobacco  Allergies    Med Hx  Surg Hx  Fam Hx  Soc Hx        Reviewed and updated as needed this visit by Provider                Social History     Tobacco Use     Smoking status: Never Smoker     Smokeless tobacco: Never Used   Substance Use Topics     Alcohol use: No                           Current providers sharing in care for this patient include:   Patient Care Team:  Wegener, Joel Daniel Irwin, MD as PCP - General (Family Practice)  Madelaine Roland MD as MD (Urology)  Wegener, Joel Daniel Irwin, MD as Referring Physician (Family Practice)  Annette Perez, TYLER as Registered Nurse (Urology)  Steve Foster MD as Referring Physician (Family Practice)  Steve Foster MD as Assigned Musculoskeletal Provider  Wegener, Joel Daniel Irwin, MD as Assigned PCP    The following health maintenance items are reviewed in Epic and correct as of today:  Health Maintenance   Topic Date Due     DIABETIC FOOT EXAM  12/04/2018     DTAP/TDAP/TD IMMUNIZATION (2 - Td) 02/01/2020     MICROALBUMIN  03/29/2020     PHQ-9  06/13/2020     BMP  08/02/2020     A1C  11/04/2020     EYE EXAM  11/12/2020     CBC  02/02/2021     LIPID  05/04/2021     FALL RISK ASSESSMENT  11/19/2021     MEDICARE ANNUAL WELLNESS VISIT  11/20/2021     MAMMO SCREENING  12/05/2021     COLORECTAL CANCER SCREENING  05/09/2022     ADVANCE CARE PLANNING  11/20/2025     DEXA  Completed     HEPATITIS C SCREENING  Completed     DEPRESSION ACTION PLAN  Completed     INFLUENZA VACCINE  Completed     Pneumococcal Vaccine: 65+ Years  Completed     ZOSTER IMMUNIZATION  Completed     Pneumococcal Vaccine: Pediatrics (0 to 5 Years) and At-Risk Patients (6 to 64 Years)  Aged Out     IPV IMMUNIZATION  Aged Out     MENINGITIS IMMUNIZATION  Aged Out  "          ROS:  Constitutional, HEENT, cardiovascular, pulmonary, GI, , musculoskeletal, neuro, skin, endocrine and psych systems are negative, except as otherwise noted.    OBJECTIVE:   /64   Pulse 69   Temp 98.4  F (36.9  C) (Oral)   SpO2 98%  Estimated body mass index is 25.91 kg/m  as calculated from the following:    Height as of 1/27/20: 1.689 m (5' 6.5\").    Weight as of 1/27/20: 73.9 kg (163 lb).  EXAM:   GENERAL: healthy, alert and no distress  EYES: Eyes grossly normal to inspection, PERRL and conjunctivae and sclerae normal  HENT: ear canals and TM's normal, nose and mouth without ulcers or lesions    RESP: lungs clear to auscultation - no rales, rhonchi or wheezes  CV: regular rate and rhythm, normal S1 S2, no S3 or S4, no murmur, click or rub, no peripheral edema and peripheral pulses strong  ABDOMEN: soft, nontender, no hepatosplenomegaly, no masses and bowel sounds normal  MS: in wheelchair.  Left lower leg in rigid brace, decreased left arm/leg strength.  Can raise right leg,  with hand    reports left sided neglect/unawareness.   No ankle edema.     Bilateral diabetic monofilament foot exam with decreased sensation in toes (comeplete loss) but dp/pt pulses 2+, callous lateral great toes, no sores.     Spouse and pca check for bed sores and none.       SKIN: no suspicious lesions or rashes  NEURO: Normal strength and tone, mentation intact and speech normal  PSYCH: mentation appears normal, affect normal/bright        ASSESSMENT / PLAN:   1. Encounter for Medicare annual wellness exam  Flu/pneumonia/shingles vaccines utd.     tdap today.     Advanced directive on file.      2. Type 2 diabetes mellitus with stage 3b chronic kidney disease, without long-term current use of insulin (H)  Refilled strips, uses twice daily appropriately.     Assuming a1c at goal today follow up 5 months for lab visit then virtual fiu   - blood glucose (IRENE CONTOUR) test strip; Use to test blood " "sugars two times daily or as directed.  Dispense: 200 each; Refill: 11  - Hemoglobin A1c; Future  - Basic metabolic panel; Future    3. Hypertension goal BP (blood pressure) < 140/90  Controlled.     4. Anemia due to vitamin B12 deficiency, unspecified B12 deficiency type  b12 today, monitoring labs.   - CBC with platelets  - Vitamin B12  - Folate    5. Hyperlipidemia LDL goal <70    - Lipid panel reflex to direct LDL Fasting    6. Gout, unspecified cause, unspecified chronicity, unspecified site    - Uric acid    7. Type 2 diabetes mellitus with stage 3b chronic kidney disease, with long-term current use of insulin (H)  New eye doctor desired. Referral given.   - Comprehensive metabolic panel  - Albumin Random Urine Quantitative with Creat Ratio  - TSH with free T4 reflex  - Hemoglobin A1c  - EYE ADULT REFERRAL; Future    Patient has been advised of split billing requirements and indicates understanding: Yes    COUNSELING:  Reviewed preventive health counseling, as reflected in patient instructions       Regular exercise       Healthy diet/nutrition       Colon cancer screening    Estimated body mass index is 25.91 kg/m  as calculated from the following:    Height as of 1/27/20: 1.689 m (5' 6.5\").    Weight as of 1/27/20: 73.9 kg (163 lb).        She reports that she has never smoked. She has never used smokeless tobacco.    Appropriate preventive services were discussed with this patient, including applicable screening as appropriate for cardiovascular disease, diabetes, osteopenia/osteoporosis, and glaucoma.  As appropriate for age/gender, discussed screening for colorectal cancer, prostate cancer, breast cancer, and cervical cancer. Checklist reviewing preventive services available has been given to the patient.    Reviewed patients plan of care and provided an AVS. The Basic Care Plan (routine screening as documented in Health Maintenance) for Addis meets the Care Plan requirement. This Care Plan has been " established and reviewed with the Patient and spouse.    Counseling Resources:  ATP IV Guidelines  Pooled Cohorts Equation Calculator  Breast Cancer Risk Calculator  BRCA-Related Cancer Risk Assessment: FHS-7 Tool  FRAX Risk Assessment  ICSI Preventive Guidelines  Dietary Guidelines for Americans, 2010  USDA's MyPlate  ASA Prophylaxis  Lung CA Screening    Joel Daniel Wegener, MD  [facility]

## 2020-11-22 ENCOUNTER — MYC MEDICAL ADVICE (OUTPATIENT)
Dept: FAMILY MEDICINE | Facility: CLINIC | Age: 70
End: 2020-11-22

## 2020-11-22 RX ORDER — FOLIC ACID 1 MG/1
1 TABLET ORAL DAILY
COMMUNITY
Start: 2020-11-22 | End: 2021-11-16

## 2020-11-24 NOTE — PROGRESS NOTES
Clinic Care Coordination Contact    Clinic Care Coordination Contact  OUTREACH    Referral Information:  Referral Source: PCP         Chief Complaint   Patient presents with     Clinic Care Coordination - Initial             [place] Utilization: [facility]   Clinic Utilization  Difficulty keeping appointments:: No  Compliance Concerns: No  No-Show Concerns: No  No PCP office visit in Past Year: No  Utilization    Last refreshed: 11/24/2020  1:45 PM: Hospital Admissions 0           Last refreshed: 11/24/2020  1:45 PM: ED Visits 1           Last refreshed: 11/24/2020  1:45 PM: No Show Count (past year) 1              Current as of: 11/24/2020  1:45 PM              Clinical Concerns:  Current Medical Concerns:  Pt shared that she overall is doing ok. Pt discussed needing a new wheelchair. Pt shared that her's is very beat up and would like to get a new one. Pt stated her current chair is called the christelle christian. Pt is also looking for further care with PCA services. She stated that she has a [facility]  with [employer] named Rupal Hernández. Pt gave verbal concent that it was ok for 'er to reach out to Rupal.       Current Behavioral Concerns: Pt denied any behavioral concerns. Pt stated she was doing alright.       Education Provided to patient:    Pain  Pain (GOAL):: No  Health Maintenance Reviewed: Due/Overdue   Health Maintenance Due   Topic Date Due     DIABETIC FOOT EXAM  12/04/2018     PHQ-9  06/13/2020     EYE EXAM  11/12/2020       Clinical Pathway: None    Medication Management:       Functional Status:  Dependent ADLs:: Bathing  Dependent IADLs:: Laundry, Shopping, Medication Management  Bed or wheelchair confined:: Yes  Fallen 2 or more times in the past year?: No  Any fall with injury in the past year?: No    Living Situation:  Current living arrangement:: I live alone  Type of residence:: [facility]    Lifestyle & Psychosocial Needs:        Diet::  Regular  Inadequate nutrition (GOAL):: No  Tube Feeding: No  Inadequate activity/exercise (GOAL):: No  Significant changes in sleep pattern (GOAL): No  Transportation means:: Friend     Congregation or spiritual beliefs that impact treatment:: No  Mental health DX:: No  Mental health management concern (GOAL):: No  Chemical Dependency Status: Other (see comment)(no concerns)  Chemical Dependency Management: Other (see comment)  Informal Support system:: Friends   Socioeconomic History     Marital status:      Spouse name: Not on file     Number of children: Not on file     Years of education: Not on file     Highest education level: Not on file     Tobacco Use     Smoking status: Never Smoker     Smokeless tobacco: Never Used   Substance and Sexual Activity     Alcohol use: No     Drug use: No     Sexual activity: Not Currently             Resources and Interventions:  Current Resources:      Community Resources: [facility] Programs  Supplies used at home:: Diabetic Supplies, Incontinence Supplies  Equipment Currently Used at Home: wheelchair, manual  Employment Status: disabled)   )    Advance Care Plan/Directive  Advanced Care Plans/Directives on file:: Yes  Advanced Care Plan/Directive Status: Not Applicable          Goals:   Goals        General    Functional (pt-stated)     Notes - Note created  11/24/2020  2:48 PM by Khushbu Moreira BSW    Goal Statement:I would like help getting a manual wheelchair in the next 2 months.   Date Goal set: 11/24/2020  Barriers: unsure of the best place to get a wheelchair.   Strengths: knows what she wants in a product.   Date to Achieve By: 1/24/2020  Patient expressed understanding of goal: yes  Action steps to achieve this goal:  1. I will contact different medical companies.   2. I will talk with my  and get it faxed over to different companies.   3. I will talk with my insurance to see what is covered.               Patient/Caregiver understanding: yes         Future Appointments              Today Jose Guadalupe, [facility] [place], ELISA    In 3 days Carlos Bustos MD [facility], [facility]    In 3 weeks [facility] MEDICAL ASSISTANT [facility],     In 4 months Wegener, Joel Daniel Irwin, MD [facility],           Plan: 1) SW will set up a time to talk with pt and pt's friend next week.   2) SW'er will review chart further to look into wheel chair evaluation.   3.CHW will follow up in 30 days.  4. JOSE GUADALUPE will outreach to [place].     TYREL Khan  Clinic Care Coordinator   [facility]  [phone]

## 2020-11-25 ENCOUNTER — PATIENT OUTREACH (OUTPATIENT)
Dept: CARE COORDINATION | Facility: CLINIC | Age: 70
End: 2020-11-25

## 2020-11-25 NOTE — PROGRESS NOTES
Clinic Care Coordination Contact  Care Team Conversations    Sw'er spoke with pt's [facility] waiver , Rupal. She stated that she has been working with pt since October and they have gone through a few PCA's. Rupal stated some of the PCA's had concerns with the patient and they would quit or the company would terminate the contract.   Ilianabreonna stated she would put in another referral for patient with a different PCA company. Ilianabreonna stated that pt's insurance will be changing on December first to blue plus and pt will have a different . Rupal will provide on the referral they they should contact [facility] to coordinate with her new  after the first of December.      updated pt that she will be putting in referral for a new PCA company.         TYREL Khan  Clinic Care Coordinator   [facility]  [phone]

## 2020-11-27 ENCOUNTER — OFFICE VISIT (OUTPATIENT)
Dept: OPHTHALMOLOGY | Facility: CLINIC | Age: 70
End: 2020-11-27
Attending: FAMILY MEDICINE
Payer: MEDICARE

## 2020-11-27 ENCOUNTER — PATIENT OUTREACH (OUTPATIENT)
Dept: CARE COORDINATION | Facility: CLINIC | Age: 70
End: 2020-11-27

## 2020-11-27 DIAGNOSIS — H33.321 RETINAL HOLE OF RIGHT EYE: ICD-10-CM

## 2020-11-27 DIAGNOSIS — H25.813 COMBINED FORMS OF AGE-RELATED CATARACT OF BOTH EYES: ICD-10-CM

## 2020-11-27 DIAGNOSIS — H52.7 REFRACTIVE ERROR: ICD-10-CM

## 2020-11-27 DIAGNOSIS — H35.372 EPIRETINAL MEMBRANE (ERM) OF LEFT EYE: ICD-10-CM

## 2020-11-27 DIAGNOSIS — E11.3293 MILD NONPROLIFERATIVE DIABETIC RETINOPATHY OF BOTH EYES WITHOUT MACULAR EDEMA ASSOCIATED WITH TYPE 2 DIABETES MELLITUS (H): Primary | ICD-10-CM

## 2020-11-27 PROCEDURE — 92015 DETERMINE REFRACTIVE STATE: CPT

## 2020-11-27 PROCEDURE — 92015 DETERMINE REFRACTIVE STATE: CPT | Mod: GY | Performed by: STUDENT IN AN ORGANIZED HEALTH CARE EDUCATION/TRAINING PROGRAM

## 2020-11-27 PROCEDURE — G0463 HOSPITAL OUTPT CLINIC VISIT: HCPCS

## 2020-11-27 PROCEDURE — 92004 COMPRE OPH EXAM NEW PT 1/>: CPT | Mod: GC | Performed by: STUDENT IN AN ORGANIZED HEALTH CARE EDUCATION/TRAINING PROGRAM

## 2020-11-27 ASSESSMENT — VISUAL ACUITY
OS_PH_SC: 20/40
OD_CC: J10
OD_PH_SC: 20/40-1
OS_CC: J7
OS_SC+: -2
OD_SC: 20/50
METHOD: SNELLEN - LINEAR
OD_SC+: -2
OS_SC: 20/50

## 2020-11-27 ASSESSMENT — REFRACTION_MANIFEST
OD_SPHERE: +1.50
OD_ADD: +2.25
OS_SPHERE: +1.75
OS_ADD: +2.25
OS_CYLINDER: SPHERE
OD_CYLINDER: SPHERE

## 2020-11-27 ASSESSMENT — CUP TO DISC RATIO
OD_RATIO: 0.5
OS_RATIO: 0.5

## 2020-11-27 ASSESSMENT — TONOMETRY
OS_IOP_MMHG: 16
IOP_METHOD: TONOPEN
OD_IOP_MMHG: 17

## 2020-11-27 ASSESSMENT — CONF VISUAL FIELD
METHOD: COUNTING FINGERS
OS_NORMAL: 1
OD_NORMAL: 1

## 2020-11-27 ASSESSMENT — EXTERNAL EXAM - LEFT EYE: OS_EXAM: NORMAL

## 2020-11-27 ASSESSMENT — SLIT LAMP EXAM - LIDS
COMMENTS: NORMAL
COMMENTS: NORMAL

## 2020-11-27 ASSESSMENT — EXTERNAL EXAM - RIGHT EYE: OD_EXAM: NORMAL

## 2020-11-27 NOTE — LETTER
11/27/2020       RE: Addis Peña  [address]  [address]  [place] [address]     Dear Colleague,    Thank you for referring your patient, Addis Peña, to the [facility] at [facility]. Please see a copy of my visit note below.    Chief Complaint/Presenting Concern: Diabetic annual eye exam    History of Present Illness:   Ms. Peña is a 70F with T2DM and stage 3 CKD presenting for annual dilated eye examination for her diabetes. Her last dilated exam was around 1 year ago.  Her near vision is a little worse over the last year but seeing well at distance without changes. No new flashes/floaters.  Her diabetes is under good control with metformin, last HbA1c 6.3% on 11/20/2020.      Additional Ocular History:   Hyperopia and presbyopia  Stick to the left eye as a child    Relevant Past Medical/Family/Social History:  CVA 1/25/2020   DM2  HTN  HLD    Lives with a roommate at home.  She used to work at the [employer] department until her stroke.  No ETOH.  No tobacco.  No drugs.      Relevant Review of Systems: No headaches, no cough/cold symtoms    Laboratory Testing November 2020  HbA1c 6.3%     Current eye related medications None    Retina/Uveitis Imaging:None    Assessment:    1. T2DM with mild NPDR, OU  Good control, HbA1c 6.3% on metformin.  Very rare MA's on exam both eyes suggesting only mild non-proliferative diabetic retinopathy.  No DME clinically, although no OCT today.    2.  History of operculated hole, right eye   Identified on exam.  Asymptomatic.  Chronic appearing with pigmented rim.  No SRF or RD.     3.  Epiretinal membrane, left eye  BCVA 20/30, possibly limited by cataracts.  Mild ERM.       4.  Cataracts, each eye   Visually significant but patient doesn't drive and doesn't feel her quality of life is degraded by cataracts at this time.       5.  Refractive error, each eye   MRx today  11/27/2020    Plan/Recommendations:    Discussed findings with patient. There is only minimal diabetic retinopathy and there are mild cataracts in each eye     Discussed importance of good blood glucose and blood pressure control.  Continue to monitor with annual examinations    The operculated hole right eye appears chronic with pigmented rim.  No concerning features.  Continue to monitor.  Discussed S/Sx of RD.      Discussed cataracts and joint decision to continue to monitor them.  She will call if she desires cataract surgery    Dispensed MRx today for refractive error    ERM left eye is mild.  Will continue to monitor.    RTC 1 year VTD + MRx + Oct Macula    Crow Bustos, PGY3  Ophthalmology Resident    Attending Physician Attestation:  Complete documentation of historical and exam elements from today's encounter can be found in the full encounter summary report (not reduplicated in this progress note). I personally obtained the chief complaint(s) and history of present illness. I confirmed and edited as necessary the review of systems, past medical/surgical history, family history, social history, and examination findings as documented by others; and I examined the patient myself. I personally reviewed the relevant tests, images, and reports as documented above. I formulated and edited as necessary the assessment and plan and discussed the findings and management plan with the patient and family.  Alpesh Joyce MD.      Sincerely,    Alpesh Joyce MD  [facility] Dept of Ophthalmology  Uveitis and Medical Retina

## 2020-11-27 NOTE — PROGRESS NOTES
Clinic Care Coordination Contact  Care Team Conversations    SW'er talked with pt's provider to get a better understanding of what pt is in need of. Pt's provider discussed that there was a fitting for patient to get a new wheel chair. Pt was not happy about how the fitting went, so she stated she no longer wanted to work with that vendor. (pt believes her name was Angie). Pt stated she is open to working with the OT department to get a chair that fits her as long as it's not with the same vendor.     SW'er notified pt that a referral was put in by her county worker for getting PCA hours from another company. Pt stated she was happy about this. SW'er will continue to follow up with pt in the next week and talk with pt's provider about how to best go about getting her a new manual wheelchair.     TYREL Khan  Clinic Care Coordinator   [facility]  [phone]'

## 2020-11-27 NOTE — NURSING NOTE
Chief Complaints and History of Present Illnesses   Patient presents with     Annual Eye Exam     Diabetic       Chief Complaint(s) and History of Present Illness(es)     Annual Eye Exam     Treatments tried: no treatments    Pain scale: 0/10    Comments: Diabetic                Comments     Last eye exam was a year ago. Feels like NVA has changed over the past year. Has needed to increase the strength of reading. Just got new ones and they are working fine.   Distance vision seem fine with each eye. No issue with discomfort. No flashes or floaters. Diabetes is under good control.  DM2 BS: 151 this morning per pt.  Lab Results       Component                Value               Date                       A1C                      6.3                 11/20/2020                 A1C                      6.6                 05/04/2020                 A1C                      8.4                 02/02/2020                 A1C                      7.6                 10/04/2019                 A1C                      7.6                 03/29/2019              María Elena Ziyad, COT COT 9:45 AM November 27, 2020

## 2020-11-27 NOTE — PROGRESS NOTES
Chief Complaint/Presenting Concern: Diabetic annual eye exam    History of Present Illness:   Ms. Peña is a 70F with T2DM and stage 3 CKD presenting for annual dilated eye examination for her diabetes. Her last dilated exam was around 1 year ago.  Her near vision is a little worse over the last year but seeing well at distance without changes. No new flashes/floaters.  Her diabetes is under good control with metformin, last HbA1c 6.3% on 11/20/2020.      Additional Ocular History:   Hyperopia and presbyopia  Stick to the left eye as a child    Relevant Past Medical/Family/Social History:  CVA 1/25/2020   DM2  HTN  HLD    Lives with a roommate at home.  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  No ETOH.  No tobacco.  No drugs.      Relevant Review of Systems: No headaches, no cough/cold symtoms    Laboratory Testing November 2020  HbA1c 6.3%     Current eye related medications None    Retina/Uveitis Imaging:None    Assessment:    1. T2DM with mild NPDR, OU  Good control, HbA1c 6.3% on metformin.  Very rare MA's on exam both eyes suggesting only mild non-proliferative diabetic retinopathy.  No DME clinically, although no OCT today.    2.  History of operculated hole, right eye   Identified on exam.  Asymptomatic.  Chronic appearing with pigmented rim.  No SRF or RD.     3.  Epiretinal membrane, left eye  BCVA 20/30, possibly limited by cataracts.  Mild ERM.       4.  Cataracts, each eye   Visually significant but patient doesn't drive and doesn't feel her quality of life is degraded by cataracts at this time.       5.  Refractive error, each eye   MRx today 11/27/2020    Plan/Recommendations:    Discussed findings with patient. There is only minimal diabetic retinopathy and there are mild cataracts in each eye     Discussed importance of good blood glucose and blood pressure control.  Continue to monitor with annual examinations    The operculated hole right eye appears chronic  with pigmented rim.  No concerning features.  Continue to monitor.  Discussed S/Sx of RD.      Discussed cataracts and joint decision to continue to monitor them.  She will call if she desires cataract surgery    Dispensed MRx today for refractive error    ERM left eye is mild.  Will continue to monitor.    RTC 1 year VTD + MRx + Oct Macula    Crow Juli, PGY3  Ophthalmology Resident    Attending Physician Attestation:  Complete documentation of historical and exam elements from today's encounter can be found in the full encounter summary report (not reduplicated in this progress note). I reviewed the chief complaint(s) and history of present illness, and  confirmed and edited as necessary the review of systems, past medical/surgical history, family history, social history, and examination findings as documented by others and the treating Resident Physician.    I examined the patient myself, discussed the findings, reviewed all ancillary testing data and modified these results and reports along with the assessment and plan with the Treating Resident Physician. I agree with the note as detailed above.   Alpesh Joyce M.D.  Uveitis and Medical Retina  November 27, 2020

## 2020-12-02 ENCOUNTER — PATIENT OUTREACH (OUTPATIENT)
Dept: CARE COORDINATION | Facility: CLINIC | Age: 70
End: 2020-12-02

## 2020-12-02 NOTE — PROGRESS NOTES
Clinic Care Coordination Contact    Follow Up Progress Note      Assessment: Jose Guadalupe'er talked with pt over the phone with her friend Buck. They discussed that someone from [facility] keeps contacting them about the wheel chair and an order for the wheel chair. Pt stated she is very confused about this and does not feel she wants to go through with them.   Jose Guadalupe'er talked with them about going through [employer]- the company that had called yesterday. Pt stated she was just really confused and didn't know what she should do.      Intervention/Education provided during outreach:   JOSE GUADALUPE'er let pt know that she can still set up a time to do a fitting to get the correct wheel chair for her.   SW'er let pt know that she will contact FATUMA Muller and ask for another call for scheduling from [employer]. JOSE GUADALUPE'er explained to pt that they will call to set up a time to come do a wheel chair assessment and fit her for the best chair. Pt stated she was comfortable with this.             Plan:   1) Jose Guadalupe'er will discuss new plan with Yohana.   2) CHW will contact pt in 3 weeks.   Care Coordinator will follow up in 45 days.     TYREL Khan  Clinic Care Coordinator   [facility]  [phone]

## 2020-12-15 ENCOUNTER — PATIENT OUTREACH (OUTPATIENT)
Dept: CARE COORDINATION | Facility: CLINIC | Age: 70
End: 2020-12-15

## 2020-12-15 NOTE — PROGRESS NOTES
Clinic Care Coordination Contact  [facility]/Voicemail       Clinical Data: Care Coordinator Outreach  Outreach attempted x 1.  Left message on patient's voicemail with call back information and requested return call.  Plan: Care Coordinator sent care coordination introduction letter via [employer]. Care Coordinator will try to reach patient again in 1-2 business days.    TYREL Khan  Clinic Care Coordinator   [facility]  [phone]

## 2020-12-16 ENCOUNTER — PATIENT OUTREACH (OUTPATIENT)
Dept: CARE COORDINATION | Facility: CLINIC | Age: 70
End: 2020-12-16

## 2020-12-17 ENCOUNTER — ANCILLARY PROCEDURE (OUTPATIENT)
Dept: MAMMOGRAPHY | Facility: CLINIC | Age: 70
End: 2020-12-17
Payer: MEDICARE

## 2020-12-17 DIAGNOSIS — Z12.31 VISIT FOR SCREENING MAMMOGRAM: ICD-10-CM

## 2020-12-17 PROCEDURE — 77067 SCR MAMMO BI INCL CAD: CPT | Performed by: RADIOLOGY

## 2020-12-17 NOTE — PROGRESS NOTES
Clinic Care Coordination Contact    Follow Up Progress Note      Assessment: SW'er talked with pt and pt's friend Buck over the phone. Buck shared that patient had a wheel chair fitting on Monday and it is now getting ordered. Pt stated she was happy about this and has no new needs at this time. They are still working with someone from the [facility] to get patient a new PCA.     Goals addressed this encounter:   Goals Addressed                 This Visit's Progress       Patient Stated      Functional (pt-stated)   On track     Goal Statement:I would like help getting a manual wheelchair in the next 2 months.   Date Goal set: 11/24/2020  Barriers: unsure of the best place to get a wheelchair.   Strengths: knows what she wants in a product.   Date to Achieve By: 1/24/2020  Patient expressed understanding of goal: yes  Action steps to achieve this goal:  1. I will contact different medical companies.   2. I will talk with my  and get it faxed over to different companies.   3. I will talk with my insurance to see what is covered.                Intervention/Education provided during outreach: SW'er was excited to hear that patient was able to get her fitting completed. Let pt know that the CHW would be following up with them in 30 days to see if they needed anything further.         Plan:   1) SW'er will let OT and PCP know that fitting was completed.   2) CHW will outreach in 30 days.   Care Coordinator will follow up in 45 days.     TYREL Khan  Clinic Care Coordinator   [facility]  [phone]

## 2020-12-18 ENCOUNTER — ALLIED HEALTH/NURSE VISIT (OUTPATIENT)
Dept: NURSING | Facility: CLINIC | Age: 70
End: 2020-12-18
Payer: MEDICARE

## 2020-12-18 DIAGNOSIS — Z23 ENCOUNTER FOR IMMUNIZATION: Primary | ICD-10-CM

## 2020-12-18 PROCEDURE — 96372 THER/PROPH/DIAG INJ SC/IM: CPT

## 2020-12-18 RX ADMIN — CYANOCOBALAMIN 1000 MCG: 1000 INJECTION, SOLUTION INTRAMUSCULAR; SUBCUTANEOUS at 09:58

## 2020-12-18 NOTE — NURSING NOTE
Clinic Administered Medication Documentation      Injectable Medication Documentation    Patient was given Cyanocobalamin (B-12). Prior to medication administration, verified patients identity using patient s name and date of birth. Please see MAR and medication order for additional information. Patient instructed to remain in clinic for 15 minutes.      Was entire vial of medication used? Yes  Vial/Syringe: Single dose vial  Expiration Date:    Was this medication supplied by the patient? No     Kathryn Hawkins MA

## 2021-01-15 ENCOUNTER — ALLIED HEALTH/NURSE VISIT (OUTPATIENT)
Dept: NURSING | Facility: CLINIC | Age: 71
End: 2021-01-15
Payer: MEDICARE

## 2021-01-15 DIAGNOSIS — E53.8 VITAMIN B12 DEFICIENCY (NON ANEMIC): Primary | ICD-10-CM

## 2021-01-15 PROCEDURE — 96372 THER/PROPH/DIAG INJ SC/IM: CPT

## 2021-01-15 RX ADMIN — CYANOCOBALAMIN 1000 MCG: 1000 INJECTION, SOLUTION INTRAMUSCULAR; SUBCUTANEOUS at 10:55

## 2021-01-15 NOTE — NURSING NOTE
Clinic Administered Medication Documentation      Injectable Medication Documentation    Patient was given Cyanocobalamin (B-12). Prior to medication administration, verified patients identity using patient s name and date of birth. Please see MAR and medication order for additional information. Patient instructed to remain in clinic for 15 minutes and report any adverse reaction to staff immediately .      Was entire vial of medication used? Yes  Vial/Syringe: Single dose vial  Expiration Date:  05/31/2021  Was this medication supplied by the patient? No   Mala Olmstead MA on 1/15/2021 at 10:58 AM

## 2021-01-20 NOTE — PROGRESS NOTES
REQUISITION AND JUSTIFICATION FOR DURABLE MEDICAL EQUIPMENT    Patient Name:  Addis Peña  MR #:  [id number]  :  1950  Age/Gender:  70 year old female  Visit Date:  Addis Peña seen for seating and wheeled mobility evaluation by Yohana Decker OTR/L, ATP and ATP from [facility] on 11/10/20.    CLINICAL CRITERIA FOR MOBILITY ASSISTIVE EQUIPMENT  Coverage Criteria Per Local Coverage Determination  A) Addis has mobility limitations due to CVA with L Ramone and L hip fracture with ORIF that significantly impairs her ability to participate in all of her mobility-related activities of daily living (MRADL). Specifically affected are toileting (being able to get there in time to prevent accidents), dressing, and bathing (getting into the bathroom of designated place). Current equipment used is 2015 Uni-Pixel Ultra 4 manual chair that needs replacement as she is a heavy duty user. This patient needs the new equipment requested to be able to continue to allow for continued participation in MRADLS and IADLS. Please see additional documentation in the seating and wheeled mobility report for details.   Addis had a successful clinical trial here, and also a successful trial at home with the recommended equipment. Addis is very willing and physically / cognitively able to use the recommended equipment to assist her with mobility-related activities of daily living and general mobility.     B) Addis's mobility limitation cannot be sufficiently and safely resolved by the use of an appropriately fitted cane or walker because she is non ambulatory due to hemiplegia. Strength of legs is L 0/5 R 4-/5 for one maximal repetition. Fatigue also impacts this patient's ability to ambulate, regardless of the gait aid.    C) Addis does not have sufficient upper extremity function to self-propel a K1-4 manual chair and requires a K5 optimally-configured manual wheelchair in her home to perform MRADLs during a typical  "day due to hemiplegia and dependency with mobility.  Strength of arms is L 1/5 R 4-/5.  D) The need for this equipment is LIFETIME.     RECOMMENDATIONS FOR MOBILITY BASE, SEATING SYSTEM AND COMPONENTS  Catalyst K5 ultra lightweight manual wheelchair - this ultra light weight manual wheelchair is appropriate and necessary for her to be able to assist and complete all of her MRADLs within her residence. She has mobility limitations impacting her ability to ambulate independently or with any ambulation aid. She has had a thorough clinical evaluation, and this manual wheelchair is the best option for this patient.  Any less costly wheelchair option would be unable to accommodate anterior-posterior axle adjustments to maximize propulsion mechanics required for shoulder preservation in full-time, active manual wheelchair propeller.      6\" poly casters - for smooth ride not to jar/shake her    Heel loops for footrests- needed to prevent rearward movement of LE with chair use.    Angle adjustable footrests - for leg support    8 degree back canes- allows for proper integration with backrest and prevents digging into her back with sitting in chair.    Extension handles for wheel locks- allows for independence use of brakes for safety with chair use.    2 post, flip back, height adjustable, removable, full length armrests - needed for arm support at appropriate height, not available with standard armrests    Rear anti-tip tubes - for safety to prevent w/c tipping rearward    Pelvic positioning strap - to assist maintaining pelvis position for functional activities    M2 seat cushion - this pressure distribution and positioning seat cushion will optimally  distribute seating pressures to prevent pressure ulcers, but also provide a stable base of support for her to use during MRADLs.    Eilte deep Solid curved and padded back support - firm and contoured back support is needed to support Addis's thoracolumbar area in an " upright and midline position, with appropriate support pads as needed. This back support is essential to provide sufficient posterior support to maximize her postural alignment and minimize her tendency to develop scoliosis and other secondary complications.    This equipment is reasonable and necessary with reference to accepted standards of medical practice and treatment of this patient's condition and is not being recommended as a convenience item. Without this recommended equipment, she is highly likely to sustain injuries from falls, develop pressure sores or postural compensation, and/or be bed confined, which those costs far exceed the cost of the requested equipment.    Electronically signed by:  Yohana HERNANDEZ, ATP      Occupational Therapist, Assistive   [phone]      fax: [phone]      [email or web address]  [facility], [place] [address]  January 20, 2021    I have read and concur with the above recommendations.    Physician Printed Name __________________________________________    Physician SIgnature  _____________________________________________    Date of SIgnature ______________________________    Physician Phone  ______________________________      I have read and concur with the above recommendations.    Physician Printed Name __________________________________________    Physician SIgnature  _____________________________________________    Date of SIgnature ______________________________    Physician Phone  ______________________________

## 2021-01-23 ENCOUNTER — MYC MEDICAL ADVICE (OUTPATIENT)
Dept: FAMILY MEDICINE | Facility: CLINIC | Age: 71
End: 2021-01-23

## 2021-01-25 ENCOUNTER — TELEPHONE (OUTPATIENT)
Dept: FAMILY MEDICINE | Facility: CLINIC | Age: 71
End: 2021-01-25

## 2021-01-25 ENCOUNTER — MEDICAL CORRESPONDENCE (OUTPATIENT)
Dept: HEALTH INFORMATION MANAGEMENT | Facility: CLINIC | Age: 71
End: 2021-01-25

## 2021-01-25 NOTE — TELEPHONE ENCOUNTER
Reason for Call:  Form, our goal is to have forms completed with 72 hours, however, some forms may require a visit or additional information.    Type of letter, form or note:  homecare dc summary     Who is the form from?: home th care (if other please explain)    Where did the form come from: form was faxed in    What clinic location was the form placed at?: [facility]    Where the form was placed: Given to physician    What number is listed as a contact on the form?:   [phone] fax [phone]       Additional comments:     Call taken on 1/25/2021 at 11:40 AM by Garcia Jang

## 2021-01-25 NOTE — TELEPHONE ENCOUNTER
Faxed to [facility] [phone] & copy sent to scan.    Nicole RAMOS

## 2021-02-01 ENCOUNTER — TELEPHONE (OUTPATIENT)
Dept: FAMILY MEDICINE | Facility: CLINIC | Age: 71
End: 2021-02-01

## 2021-02-01 NOTE — TELEPHONE ENCOUNTER
Reason for Call:  Form, our goal is to have forms completed with 72 hours, however, some forms may require a visit or additional information.    Type of letter, form or note:   Rehab order/manual wheelchair    Who is the form from?: [employer]  (if other please explain)    Where did the form come from: form was faxed in    What clinic location was the form placed at?: [facility]    Where the form was placed: given to neisha    What number is listed as a contact on the form?:  [phone]       Additional comments:     Call taken on 2/1/2021 at 3:02 PM by Garcia Jang

## 2021-02-02 ENCOUNTER — MEDICAL CORRESPONDENCE (OUTPATIENT)
Dept: HEALTH INFORMATION MANAGEMENT | Facility: CLINIC | Age: 71
End: 2021-02-02

## 2021-02-06 ENCOUNTER — MYC MEDICAL ADVICE (OUTPATIENT)
Dept: FAMILY MEDICINE | Facility: CLINIC | Age: 71
End: 2021-02-06

## 2021-02-09 ENCOUNTER — PATIENT OUTREACH (OUTPATIENT)
Dept: CARE COORDINATION | Facility: CLINIC | Age: 71
End: 2021-02-09

## 2021-02-09 NOTE — PROGRESS NOTES
Clinic Care Coordination Contact  [facility]/Voicemail       Clinical Data: Care Coordinator Outreach  Outreach attempted x 1.  Left message on patient's voicemail with call back information and requested return call.  Plan: Care Coordinator will try to reach patient again in 10 business days.

## 2021-02-09 NOTE — PROGRESS NOTES
"Clinic Care Coordination Contact  Community Health Worker Follow Up    Goals:   Goals Addressed as of 2/9/2021 at 10:25 AM                 Today    12/16/20       Patient Stated      Functional (pt-stated)   80%  On track    Added 11/24/20 by Khushbu Moreira BSW     Goal Statement:I would like help getting a manual wheelchair in the next 2 months.   Date Goal set: 11/24/2020  Barriers: unsure of the best place to get a wheelchair.   Strengths: knows what she wants in a product.   Date to Achieve By: 1/24/2021  Patient expressed understanding of goal: yes    Action steps to achieve this goal:  1. I will continue working with Wenceslao, my PCP and the CC team to see when my new wheelchair will be ready.  2. I will give the CHW updates at outreach calls.    Updated: 2/9/21          Other-PCA hours (pt-stated)   60%      Added 11/25/20 by Khushbu Moreira BSW     Goal Statement: I would like to get a new PCA in the next two months.   Date Goal set: 11/24/2020  Barriers: unsure how to get another  Strengths: Asking for help by sw'er   Date to Achieve By: 1/24/2021  Patient expressed understanding of goal: yes     Action steps to achieve this goal:  1. I will continue working with my [facility] , Rupal, to find me a new Personal Care Assistant (PCA).  2. I will speak with a new home healthcare agency when they call me on Wednesday to see how they could help me at home.  3. I will give the CHW updates at outreach calls.    Updated: 2/9/21              Intervention and Education during outreach:     Pt returned my call. She thanked me for reaching out. Pt said that they \"ordered the new wheelchair in December.\" Pt said she hasn't heard any updates yet but has the phone number ([phone]) to call and check in if needed. Pt said she is hoping it will be ready by the end of the month, but she isn't sure. I offered to send a note to check in with the OT she'd been working with, and she agreed. I let her know I'll " "follow up if/when I hear an update. Updated goal.    I noted at last outreach that  JOSE GUADALUPE called and spoke with pt's [facility] , Rupal. I asked if pt has started working with a new PCA yet, and she said no. She said she's been speaking with Rupal and that \"she says she's working on it\" but it's been a while. I asked if pt knew what was taking so long, and pt said no. Pt shared that she has a \"new home healthcare company calling me on Wednesday.\" I thanked her for sharing and asked what they're calling about. Pt said she wasn't sure but said she \"will see how it goes\" and let me know the next time I call. Updated goal.    Asked if pt had any other questions or concerns today, and she declined. Encouraged her to call or message me through [employer] if she had updates or questions. She said she would and thanked me for calling to follow up.    CHW Plan: CHW will send OT a note to see if there are any updates about or an ETA for pt's wheelchair. Pt will continue working with her  to find a new PCA. Pt will speak with the [facility] healthcare agency on Wednesday. Pt will reach out to CHW before next outreach with questions or updates. CHW will follow up with pt in one month.      "

## 2021-02-10 ENCOUNTER — PATIENT OUTREACH (OUTPATIENT)
Dept: CARE COORDINATION | Facility: CLINIC | Age: 71
End: 2021-02-10

## 2021-02-10 NOTE — LETTER
[facility] CARE COORDINATION  [facility]  [address]  [place] [address]    February 10, 2021        Addis Peña  [address]  [address]  [place] [address]          Dear Yue Mancini is an updated Complex Care Plan for your continued enrollment in Care Coordination. Please let us know if you have additional questions, concerns, or goals that we can assist with.    Sincerely,    JOSIAS Khan            Replaced by [facility]  Complex Care Plan  About Me:    Patient Name:  Addis Peña    YOB: 1950  Age:         70 year old   [facility] MRN:    [id number] Telephone Information:  Home Phone [phone]   Mobile [phone]       Address:  [address]  [address]  [place] [address] Email address:  [email or web address]      Emergency Contact(s)    Name Relationship Lgl Grd Work Phone Home Phone Mobile Phone   1. SULY ULRICH Significant ot* No  [phone]   2. ROBERT Daughter   [phone]            Primary language:  English     needed? No   [facility]:  [phone] op. 1  Other communication barriers:    Preferred Method of Communication:  Mail  Current living arrangement:    Mobility Status/ Medical Equipment:      Health Maintenance  Health Maintenance Reviewed:      My Access Plan  Medical Emergency 911   Primary Clinic Line [facility] [phone]   24 Hour Appointment Line [phone] or  [phone]) (toll-free)   24 Hour Nurse Line [phone] (toll-free)   Preferred Urgent Care     Preferred Hospital     [id number] - [place], [place] - [address] AT [facility]     Behavioral Health Crisis Line The National Suicide Prevention Lifeline at 1-177.347.6140 or 911             My Care Team Members  Patient Care Team       Relationship Specialty Notifications Start End     Wegener, Joel Daniel Irwin, MD PCP - General Family Practice  5/28/13     Phone: [phone] Fax: [phone]         [facility] [address]    Madelaine Roland MD MD Urology  2/3/16     Phone: [phone] Fax: [phone]         [address] [place] [address] [address] [facility] [address]    Wegener, Joel Daniel Irwin, MD Referring Physician Family Practice  2/3/16     Phone: [phone] Fax: [phone]         [facility] [address]    Annette Perez, RN Registered Nurse Urology  2/3/16     Phone: [phone] Pager: [phone]        Steve Foster MD Referring Physician Family Practice  1/30/20     Phone: [phone] Fax: [phone]         [address] [place] [address]    Steve Foster MD Assigned Musculoskeletal Provider   10/23/20     Phone: [phone] Fax: [phone]         [address] [place] [address]    Wegener, Joel Daniel Irwin, MD Assigned PCP   10/25/20     Phone: [phone] Fax: [phone]         [facility] [address]    Khushbu Moreira BSW Lead Care Coordinator Primary Care - CC Admissions 11/19/20     Phone: [phone]         Marjorie Starr Community Health Worker Primary Care - CC Admissions 11/19/20     Phone: [phone]         Rupal    2/9/21     [facility]     Phone: [phone]                 My Care Plans  Self Management and Treatment Plan  Goals and (Comments)  Goals        General    Functional (pt-stated)     Notes - Note edited  2/10/2021 11:04 AM by Prema Starr LSW    Goal Statement:I would like help getting a manual wheelchair in the next 2 months.   Date Goal set: 11/24/2020  Barriers: unsure of the best place to get a wheelchair.   Strengths: knows what she wants in a product.   Date to Achieve By: 3/24/2021  Patient expressed understanding of goal: yes    Action steps to achieve this goal:  1. I will  continue working with Wenceslao, my PCP and the CC team to see when my new wheelchair will be ready.  2. I will give the CHW updates at outreach calls.    Updated: 2/9/21        Other-PCA hours (pt-stated)     Notes - Note edited  2/10/2021 11:02 AM by Prema Starr LSW    Goal Statement: I would like to get a new PCA in the next two months.   Date Goal set: 11/24/2020  Barriers: unsure how to get another  Strengths: Asking for help by sw'er   Date to Achieve By: 3/24/2021  Patient expressed understanding of goal: yes     Action steps to achieve this goal:  1. I will continue working with my [facility] , Rupal, to find me a new Personal Care Assistant (PCA).  2. I will speak with a new home healthcare agency when they call me on Wednesday to see how they could help me at home.  3. I will give the CHW updates at outreach calls.    Updated: 2/9/21               Action Plans on File:            Depression          Advance Care Plans/Directives Type:        My Medical and Care Information  Problem List   Patient Active Problem List   Diagnosis     Hypertension goal BP (blood pressure) < 140/90     Type 2 diabetes, HbA1C goal < 8% (H)     Rosacea     Cerebral artery occlusion with cerebral infarction (H)     Mild major depression (H)     GERD (gastroesophageal reflux disease)     Seasonal allergic rhinitis     Advanced directives, counseling/discussion     History of colonic polyps     Major depression in complete remission (H)     B12 deficiency anemia     Health Care Home     Hyperlipidemia LDL goal <70     Gout     Type 2 diabetes with stage 3 chronic kidney disease GFR 30-59 (H)     Altered mental status     MARI (acute kidney injury) (H)     Type 2 diabetes mellitus with stage 3 chronic kidney disease, without long-term current use of insulin (H)     Cervical cancer screening     ACE-inhibitor cough     History of stroke     Hemiplegia affecting right dominant side (H)     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    Care Coordination Start Date: 11/19/2020   Frequency of Care Coordination: monthly   Form Last Updated: 02/10/2021

## 2021-02-10 NOTE — PROGRESS NOTES
Clinic Care Coordination Contact  Clinic Care Coordination Contact     Situation: Patient chart reviewed by care coordinator.     Background: SW CC's initial assessment and enrollment to Care Coordination was 11/19/20.   Patient centered goals were developed with participation from patient.  SW CC handed patient off to CHW for continued outreach every 30 days.      Assessment: CHW has been in contact with patient monthly. Patient has made 60-80% progress to goals. Patient wheelchair was ordered in December and they're still working on the PCA. [facility] is working on it and communicating with patient. Patient is due for updated Complex Care Plan.     Plan/Recommendations: CHW will involve SW CC as needed or if patient is ready to move to maintenance.  SW CC will continue to monitor progress to goals and CHW outreaches every 6 weeks.     Complex Care Plan updated and mailed to patient: yes- via [employer].     JOSIAS Lemus for [facility] Coordination Supervisor   [facility] - Care Coordination   2/10/2021 11:15 AM  [phone]

## 2021-02-12 ENCOUNTER — ALLIED HEALTH/NURSE VISIT (OUTPATIENT)
Dept: NURSING | Facility: CLINIC | Age: 71
End: 2021-02-12
Payer: MEDICARE

## 2021-02-12 DIAGNOSIS — E53.8 VITAMIN B12 DEFICIENCY (NON ANEMIC): Primary | ICD-10-CM

## 2021-02-12 PROCEDURE — 96372 THER/PROPH/DIAG INJ SC/IM: CPT

## 2021-02-12 RX ADMIN — CYANOCOBALAMIN 1000 MCG: 1000 INJECTION, SOLUTION INTRAMUSCULAR; SUBCUTANEOUS at 10:09

## 2021-02-12 NOTE — NURSING NOTE
Clinic Administered Medication Documentation      Injectable Medication Documentation    Patient was given Cyanocobalamin (B-12). Prior to medication administration, verified patients identity using patient s name and date of birth. Please see MAR and medication order for additional information. Patient instructed to remain in clinic for 15 minutes and report any adverse reaction to staff immediately .      Was entire vial of medication used? Yes  Vial/Syringe: Single dose vial  Expiration Date: 05 /2021  Was this medication supplied by the patient? No   Mala Olmstead MA on 2/12/2021 at 10:09 AM

## 2021-02-16 ENCOUNTER — MYC MEDICAL ADVICE (OUTPATIENT)
Dept: FAMILY MEDICINE | Facility: CLINIC | Age: 71
End: 2021-02-16

## 2021-02-17 NOTE — TELEPHONE ENCOUNTER
JW,    Please see [employer] message below.  Spoke with Desirae at [facility].    Chart notes insufficient.    Need an addendum to records (notes in chart) you sent for Wheelchair    You recommended one type of wheelchair and PT recommended another type.    They do not need specific information on what type of chair, just that it's either manual, power or scooter.  Need less descriptive information    Your recommendation and PT needs to match in order for insurance to cover    Let me know if you have any questions.  Thanks,  Nicole Drake RN

## 2021-02-18 ENCOUNTER — MYC MEDICAL ADVICE (OUTPATIENT)
Dept: FAMILY MEDICINE | Facility: CLINIC | Age: 71
End: 2021-02-18

## 2021-02-24 NOTE — TELEPHONE ENCOUNTER
Levar Hernandez calling from [facility] Coordinator calling about notes from last visit and needing a correction for insurance to cover a wheelchair. Previous note : If you could take out the wheelchair mention and just have it say the need for a chair not a certain brand or one.      Fax updated note [employer] [phone]      Thanks  Nicole Ayala  TC

## 2021-02-24 NOTE — TELEPHONE ENCOUNTER
JW,    Please see [employer] message below.  See message below from 2/17 - encounter routed to you.  Not sure if you had a chance to address.    Thanks,  Nicole Drake RN

## 2021-02-26 ENCOUNTER — PATIENT OUTREACH (OUTPATIENT)
Dept: CARE COORDINATION | Facility: CLINIC | Age: 71
End: 2021-02-26

## 2021-02-26 NOTE — PROGRESS NOTES
Clinic Care Coordination Contact  Community Health Worker Follow Up    Goals:   Goals Addressed as of 2/26/2021 at 8:32 AM                 2/9/21 12/16/20       Patient Stated      Functional (pt-stated)   80%  On track    Added 11/24/20 by Khushbu Moreira BSW     Goal Statement:I would like help getting a manual wheelchair in the next 2 months.   Date Goal set: 11/24/2020  Barriers: unsure of the best place to get a wheelchair.   Strengths: knows what she wants in a product.   Date to Achieve By: 3/24/2021  Patient expressed understanding of goal: yes    Action steps to achieve this goal:  1. I will continue working with Wenceslao, my PCP and the CC team to see when my new wheelchair will be ready.  2. If I would like updates on my new chair, I will call [employer] at   3. I will give the CHW updates at outreach calls.    Updated: 2/9/21              Intervention and Education during outreach:     Pt left a voicemail asking for my help to follow up with the clinic and make sure that her PCP has faxed the new paperwork for her wheelchair to [facility]. Per chart review, paperwork was faxed by TC to [facility] on 2/24. PCP's team also spoke with pt's [employer] care coordinator, Slime Hernandez, about what documentation is needed for pt's insurance.    Sent pt a [employer] message to let her know that paperwork has been faxed and that clinic staff spoke with pt's insurance regarding her chair. Thanked them for their patience and flexibility with this process. Reassured them that we will continue working on this as a team and asked pt to reach out with any other updates or questions.    CHW Plan: Pt will continue working with [employer] to get updates on her wheelchair. Pt will contact the CHW with questions or updates before next outreach. CHW will follow up with pt in one month.

## 2021-03-01 ENCOUNTER — MYC MEDICAL ADVICE (OUTPATIENT)
Dept: FAMILY MEDICINE | Facility: CLINIC | Age: 71
End: 2021-03-01

## 2021-03-02 NOTE — TELEPHONE ENCOUNTER
JW  Please see mychart message  No way around an office visit, correct?    Thank you,  Mary Rice RN

## 2021-03-03 ENCOUNTER — TELEPHONE (OUTPATIENT)
Dept: FAMILY MEDICINE | Facility: CLINIC | Age: 71
End: 2021-03-03

## 2021-03-03 NOTE — TELEPHONE ENCOUNTER
Reason for Call:  Form, our goal is to have forms completed with 72 hours, however, some forms may require a visit or additional information.    Type of letter, form or note:  dme order    Who is the form from?: [employer]  (if other please explain)    Where did the form come from: form was faxed in    What clinic location was the form placed at?: [facility]    Where the form was placed: [employer] [facility]Folder    What number is listed as a contact on the form?:  Fax [phone]       Additional comments:     Call taken on 3/3/2021 at 10:36 AM by Garcia Jang

## 2021-03-04 ENCOUNTER — TELEPHONE (OUTPATIENT)
Dept: FAMILY MEDICINE | Facility: CLINIC | Age: 71
End: 2021-03-04

## 2021-03-04 NOTE — TELEPHONE ENCOUNTER
Faxed signed orders for wheel chair from  to Madelaine Barnes [phone].  She has not received any fax yet.    She gave me a direct fax to her [phone] and ask that it be faxed to that number.    Called back and left a message for her to call back.    Want to verify that they don't need anything else per s request. Order for wheel chair has been ongoing without resolve since November.      Amanda Parker RN

## 2021-03-04 NOTE — TELEPHONE ENCOUNTER
I will give orders to triage team which I received to fax and follow up with Leann jensen, ftf encounter already completed and sent previously.     Joel Wegener,MD

## 2021-03-05 NOTE — TELEPHONE ENCOUNTER
Gave form to Amanda SCOTT to fax to [employer].     Did they receive - is anything more needed for wheelchair?     Thank you,     Joel Wegener,MD  w

## 2021-03-12 ENCOUNTER — ALLIED HEALTH/NURSE VISIT (OUTPATIENT)
Dept: NURSING | Facility: CLINIC | Age: 71
End: 2021-03-12
Payer: MEDICARE

## 2021-03-12 DIAGNOSIS — Z23 ENCOUNTER FOR IMMUNIZATION: Primary | ICD-10-CM

## 2021-03-12 PROCEDURE — 96372 THER/PROPH/DIAG INJ SC/IM: CPT

## 2021-03-12 RX ADMIN — CYANOCOBALAMIN 1000 MCG: 1000 INJECTION, SOLUTION INTRAMUSCULAR; SUBCUTANEOUS at 10:06

## 2021-03-12 NOTE — NURSING NOTE
Clinic Administered Medication Documentation      Injectable Medication Documentation    Patient was given Cyanocobalamin (B-12). Prior to medication administration, verified patients identity using patient s name and date of birth. Please see MAR and medication order for additional information. Patient instructed to remain in clinic for 15 minutes.      Was entire vial of medication used? Yes  Vial/Syringe: Single dose vial  Expiration Date:  11-01-22  Was this medication supplied by the patient? No     Kathryn Hawkins MA

## 2021-03-12 NOTE — TELEPHONE ENCOUNTER
No thank you. I sent Addis and follow up message to make sure mikey is proceeding with ordering the wheelchair. Joel Wegener,MD

## 2021-03-12 NOTE — TELEPHONE ENCOUNTER
Madelaine has not returned call.  Left message for her again to call back and let us know if she received fax and/or anything else to add.  Amanda Parker RN

## 2021-03-12 NOTE — TELEPHONE ENCOUNTER
JW,  Sending this to you as FYI unless you have more to add.  Form on [facility] desk.  Please advise.  Thanks,  Amanda Parker RN

## 2021-03-18 ENCOUNTER — PATIENT OUTREACH (OUTPATIENT)
Dept: CARE COORDINATION | Facility: CLINIC | Age: 71
End: 2021-03-18

## 2021-03-18 NOTE — PROGRESS NOTES
"Clinic Care Coordination Contact  Care Team Conversations    Called Desirae Escamilla at [facility] to follow up on pt's wheelchair. Desirae said they did receive everything that was faxed over by the clinic on March 4th. She said that they just received the prior authorization this morning from Medicare. Desirae said \"I just released it to the [employer] department\" and she expects that it will arrive in 3 to 4 weeks. I thanked Desirae for the update and explained my role. I offered to give her my contact information if she has updates or questions, and she agreed. I let her know that I am trying to update pt and her caregiver Buck regularly since the process has been taking so long. She acknowledged that and explained some of the changes that happened on Medicare's end during the process, which ended up causing delays.     Desirae said she is going to call Addis this morning to share this update. I thanked her again for letting me know and encouraged her to reach out to me if I can help communicate with the pt in any way.    Sending pt and Shellibie a [employer] message to share this update. Plan on following up with pt in one month by phone to see if the chair has arrived.           "

## 2021-04-05 ENCOUNTER — MYC MEDICAL ADVICE (OUTPATIENT)
Dept: FAMILY MEDICINE | Facility: CLINIC | Age: 71
End: 2021-04-05

## 2021-04-05 DIAGNOSIS — Z79.4 TYPE 2 DIABETES MELLITUS WITH STAGE 3B CHRONIC KIDNEY DISEASE, WITH LONG-TERM CURRENT USE OF INSULIN (H): Primary | ICD-10-CM

## 2021-04-05 DIAGNOSIS — N18.32 TYPE 2 DIABETES MELLITUS WITH STAGE 3B CHRONIC KIDNEY DISEASE, WITH LONG-TERM CURRENT USE OF INSULIN (H): Primary | ICD-10-CM

## 2021-04-05 DIAGNOSIS — E11.22 TYPE 2 DIABETES MELLITUS WITH STAGE 3B CHRONIC KIDNEY DISEASE, WITH LONG-TERM CURRENT USE OF INSULIN (H): Primary | ICD-10-CM

## 2021-04-05 NOTE — TELEPHONE ENCOUNTER
JW,    Please see [employer] message below.  Not sure what labs you want to order.    Triage can give patient phone number for [place] to call and schedule lab appointment after orders signed.    Nicole Drake RN

## 2021-04-06 ENCOUNTER — PATIENT OUTREACH (OUTPATIENT)
Dept: CARE COORDINATION | Facility: CLINIC | Age: 71
End: 2021-04-06

## 2021-04-06 NOTE — PROGRESS NOTES
Clinic Care Coordination Contact  Care Coordination Clinician Chart Review  Situation: Patient chart reviewed by GUSTAVO care coordinator.       Background: Care Coordination initial assessment and enrollment to Care Coordination was 11/19/2020.   Patient centered goals were developed with participation from patient.  GUSTAVO CC handed patient off to CHW for continued outreach every 30 days.        Assessment: Per chart review, patient outreach completed by CC CHW on .  Patient is actively working to accomplish goals.  Patient's goals remain appropriate and relevant at this time.   Patient not due for updated Complex Care Plan.  Annual assessment will be due 11/19/2021. Chart indicates pt is getting her wheelchair on 4/8/2021. Cntinues to look for new aide.       Goals       Functional (pt-stated)      Goal Statement:I would like help getting a manual wheelchair in the next 2 months.   Date Goal set: 11/24/2020  Barriers: unsure of the best place to get a wheelchair.   Strengths: knows what she wants in a product.   Date to Achieve By: 3/24/2021  Patient expressed understanding of goal: yes    Action steps to achieve this goal:  1. I will continue working with Wenceslao, my PCP and the CC team to see when my new wheelchair will be ready.  2. If I would like updates on my [facility] chair, [facility]   3. I will give the CHW updates at outreach calls.    Updated: 2/9/21          Other-PCA hours (pt-stated)      Goal Statement: I would like to get a new PCA in the next two months.   Date Goal set: 11/24/2020  Barriers: unsure how to get another  Strengths: Asking for help by gustavo'er   Date to Achieve By: 3/24/2021  Patient expressed understanding of goal: yes     Action steps to achieve this goal:  1. I will continue working with my [facility] , Rupal, to find me a new Personal Care Assistant (PCA).  2. I will speak with a new home healthcare agency when they call me on Wednesday to see how they could help me  at home.  3. I will give the CHW updates at outreach calls.    Updated: 2/9/21                  Plan/Recommendations: The patient will continue working with Care Coordination to achieve goals as above.  CHW will involve SW CC as needed or if patient is ready to move to maintenance.  SW CC will continue to monitor progress to goals and CHW outreaches every 6 weeks.   Care Plan updated and mailed to patient: JOSIAS Quezada / martine SARAH  [facility] Primary Care   Care Coordination  [facility]  4/6/2021 9:00 AM

## 2021-04-09 ENCOUNTER — ALLIED HEALTH/NURSE VISIT (OUTPATIENT)
Dept: NURSING | Facility: CLINIC | Age: 71
End: 2021-04-09
Payer: MEDICARE

## 2021-04-09 DIAGNOSIS — Z23 ENCOUNTER FOR IMMUNIZATION: Primary | ICD-10-CM

## 2021-04-09 PROCEDURE — 96372 THER/PROPH/DIAG INJ SC/IM: CPT

## 2021-04-09 RX ADMIN — CYANOCOBALAMIN 1000 MCG: 1000 INJECTION, SOLUTION INTRAMUSCULAR; SUBCUTANEOUS at 09:57

## 2021-04-09 NOTE — NURSING NOTE
Clinic Administered Medication Documentation      Injectable Medication Documentation    Patient was given Cyanocobalamin (B-12). Prior to medication administration, verified patients identity using patient s name and date of birth. Please see MAR and medication order for additional information. Patient instructed to remain in clinic for 15 minutes.      Was entire vial of medication used? Yes  Vial/Syringe: Single dose vial  Expiration Date:  11-1-22  Was this medication supplied by the patient? No     Kathryn Hawkins MA

## 2021-04-12 ENCOUNTER — PATIENT OUTREACH (OUTPATIENT)
Dept: CARE COORDINATION | Facility: CLINIC | Age: 71
End: 2021-04-12

## 2021-04-12 NOTE — PROGRESS NOTES
"Clinic Care Coordination Contact  Community Health Worker Follow Up    Goals:   Goals Addressed as of 4/12/2021 at 12:01 PM                 Today    2/9/21       Patient Stated      Other-PCA hours (pt-stated)   60%  60%    Added 11/25/20 by Khushbu Moreira BSW     Goal Statement: I would like to get a new PCA in the next two months.   Date Goal set: 11/24/2020  Barriers: unsure how to get another  Strengths: Asking for help by sw'er   Date to Achieve By: 3/24/2021  Patient expressed understanding of goal: yes     Action steps to achieve this goal:  1. I will continue working with my [facility] , Rupal, to find me a new Personal Care Assistant (PCA).  2. I will speak with a new home healthcare agency when they call me on Wednesday to see how they could help me at home.  3. I will give the CHW updates at outreach calls.    Updated: 4/12/21              Intervention and Education during outreach:     Received a [employer] message from pt and her caregiver, Buck, to share an update that pt's new wheelchair was delivered on April 8th. They wrote \"except for some minor problems, like the brake extensions are too long,\" they wrote that everything else is great.    I responded and thanked them for taking the time to share this update with me. Completed pt's goal regarding the new wheelchair.     CHW Plan: CHW will follow up with pt by phone in one month.   "

## 2021-04-15 DIAGNOSIS — Z79.4 TYPE 2 DIABETES MELLITUS WITH STAGE 3B CHRONIC KIDNEY DISEASE, WITH LONG-TERM CURRENT USE OF INSULIN (H): ICD-10-CM

## 2021-04-15 DIAGNOSIS — E11.22 TYPE 2 DIABETES MELLITUS WITH STAGE 3B CHRONIC KIDNEY DISEASE, WITH LONG-TERM CURRENT USE OF INSULIN (H): ICD-10-CM

## 2021-04-15 DIAGNOSIS — E11.22 TYPE 2 DIABETES MELLITUS WITH STAGE 3B CHRONIC KIDNEY DISEASE, WITHOUT LONG-TERM CURRENT USE OF INSULIN (H): ICD-10-CM

## 2021-04-15 DIAGNOSIS — N18.32 TYPE 2 DIABETES MELLITUS WITH STAGE 3B CHRONIC KIDNEY DISEASE, WITH LONG-TERM CURRENT USE OF INSULIN (H): ICD-10-CM

## 2021-04-15 DIAGNOSIS — N18.32 TYPE 2 DIABETES MELLITUS WITH STAGE 3B CHRONIC KIDNEY DISEASE, WITHOUT LONG-TERM CURRENT USE OF INSULIN (H): ICD-10-CM

## 2021-04-15 LAB
ALBUMIN SERPL-MCNC: 3.5 G/DL (ref 3.4–5)
ALP SERPL-CCNC: 159 U/L (ref 40–150)
ALT SERPL W P-5'-P-CCNC: 23 U/L (ref 0–50)
ANION GAP SERPL CALCULATED.3IONS-SCNC: 9 MMOL/L (ref 3–14)
AST SERPL W P-5'-P-CCNC: 18 U/L (ref 0–45)
BILIRUB SERPL-MCNC: 0.2 MG/DL (ref 0.2–1.3)
BUN SERPL-MCNC: 24 MG/DL (ref 7–30)
CALCIUM SERPL-MCNC: 9.2 MG/DL (ref 8.5–10.1)
CHLORIDE SERPL-SCNC: 106 MMOL/L (ref 94–109)
CO2 SERPL-SCNC: 25 MMOL/L (ref 20–32)
CREAT SERPL-MCNC: 0.72 MG/DL (ref 0.52–1.04)
GFR SERPL CREATININE-BSD FRML MDRD: 86 ML/MIN/{1.73_M2}
GLUCOSE SERPL-MCNC: 150 MG/DL (ref 70–99)
HBA1C MFR BLD: 6.2 % (ref 0–5.6)
POTASSIUM SERPL-SCNC: 4.6 MMOL/L (ref 3.4–5.3)
PROT SERPL-MCNC: 6.8 G/DL (ref 6.8–8.8)
SODIUM SERPL-SCNC: 140 MMOL/L (ref 133–144)

## 2021-04-15 PROCEDURE — 80053 COMPREHEN METABOLIC PANEL: CPT | Performed by: FAMILY MEDICINE

## 2021-04-15 PROCEDURE — 83036 HEMOGLOBIN GLYCOSYLATED A1C: CPT | Performed by: FAMILY MEDICINE

## 2021-04-15 PROCEDURE — 36415 COLL VENOUS BLD VENIPUNCTURE: CPT | Performed by: FAMILY MEDICINE

## 2021-04-20 ENCOUNTER — OFFICE VISIT (OUTPATIENT)
Dept: FAMILY MEDICINE | Facility: CLINIC | Age: 71
End: 2021-04-20
Payer: MEDICARE

## 2021-04-20 VITALS
BODY MASS INDEX: 23.09 KG/M2 | HEART RATE: 71 BPM | TEMPERATURE: 97.7 F | WEIGHT: 147.1 LBS | DIASTOLIC BLOOD PRESSURE: 72 MMHG | RESPIRATION RATE: 20 BRPM | SYSTOLIC BLOOD PRESSURE: 132 MMHG | OXYGEN SATURATION: 95 % | HEIGHT: 67 IN

## 2021-04-20 DIAGNOSIS — E11.22 TYPE 2 DIABETES MELLITUS WITH STAGE 3B CHRONIC KIDNEY DISEASE, WITH LONG-TERM CURRENT USE OF INSULIN (H): Primary | ICD-10-CM

## 2021-04-20 DIAGNOSIS — Z79.4 TYPE 2 DIABETES MELLITUS WITH STAGE 3B CHRONIC KIDNEY DISEASE, WITH LONG-TERM CURRENT USE OF INSULIN (H): Primary | ICD-10-CM

## 2021-04-20 DIAGNOSIS — I69.351 FLACCID HEMIPLEGIA OF RIGHT DOMINANT SIDE AS LATE EFFECT OF CEREBRAL INFARCTION (H): ICD-10-CM

## 2021-04-20 DIAGNOSIS — E11.3293 MILD NONPROLIFERATIVE DIABETIC RETINOPATHY OF BOTH EYES WITHOUT MACULAR EDEMA ASSOCIATED WITH TYPE 2 DIABETES MELLITUS (H): ICD-10-CM

## 2021-04-20 DIAGNOSIS — N18.32 TYPE 2 DIABETES MELLITUS WITH STAGE 3B CHRONIC KIDNEY DISEASE, WITH LONG-TERM CURRENT USE OF INSULIN (H): Primary | ICD-10-CM

## 2021-04-20 DIAGNOSIS — F33.0 MAJOR DEPRESSIVE DISORDER, RECURRENT EPISODE, MILD (H): ICD-10-CM

## 2021-04-20 DIAGNOSIS — I63.9 FLACCID HEMIPLEGIA OF RIGHT DOMINANT SIDE DUE TO INFARCTION OF BRAIN (H): ICD-10-CM

## 2021-04-20 DIAGNOSIS — G81.01 FLACCID HEMIPLEGIA OF RIGHT DOMINANT SIDE DUE TO INFARCTION OF BRAIN (H): ICD-10-CM

## 2021-04-20 DIAGNOSIS — G40.909 SEIZURE DISORDER (H): ICD-10-CM

## 2021-04-20 PROCEDURE — 99214 OFFICE O/P EST MOD 30 MIN: CPT | Performed by: FAMILY MEDICINE

## 2021-04-20 PROCEDURE — 99207 PR FOOT EXAM NO CHARGE: CPT | Performed by: FAMILY MEDICINE

## 2021-04-20 ASSESSMENT — PATIENT HEALTH QUESTIONNAIRE - PHQ9: SUM OF ALL RESPONSES TO PHQ QUESTIONS 1-9: 0

## 2021-04-20 ASSESSMENT — MIFFLIN-ST. JEOR: SCORE: 1216.93

## 2021-04-20 NOTE — PROGRESS NOTES
Assessment & Plan     Type 2 diabetes mellitus with stage 3b chronic kidney disease, with long-term current use of insulin (H)  Normal foot exam today.     Hemoglobin A1C   Date Value Ref Range Status   04/15/2021 6.2 (H) 0 - 5.6 % Final     Comment:     Normal <5.7% Prediabetes 5.7-6.4%  Diabetes 6.5% or higher - adopted from ADA   consensus guidelines.     11/20/2020 6.3 (H) 0 - 5.6 % Final     Comment:     Normal <5.7% Prediabetes 5.7-6.4%  Diabetes 6.5% or higher - adopted from ADA   consensus guidelines.     05/04/2020 6.6 (H) <=5.6 % Final     a1c at goal.  No changes.      GFR Estimate   Date Value Ref Range Status   04/15/2021 86 >60 mL/min/[1.73_m2] Final     Comment:     Non  GFR Calc  Starting 12/18/2018, serum creatinine based estimated GFR (eGFR) will be   calculated using the Chronic Kidney Disease Epidemiology Collaboration   (CKD-EPI) equation.     11/20/2020 >90 >60 mL/min/[1.73_m2] Final     Comment:     Non  GFR Calc  Starting 12/18/2018, serum creatinine based estimated GFR (eGFR) will be   calculated using the Chronic Kidney Disease Epidemiology Collaboration   (CKD-EPI) equation.     06/01/2020 >60 >60 ml/min/1.73m2 Final     GFR Estimate If Black   Date Value Ref Range Status   04/15/2021 >90 >60 mL/min/[1.73_m2] Final     Comment:      GFR Calc  Starting 12/18/2018, serum creatinine based estimated GFR (eGFR) will be   calculated using the Chronic Kidney Disease Epidemiology Collaboration   (CKD-EPI) equation.     11/20/2020 >90 >60 mL/min/[1.73_m2] Final     Comment:      GFR Calc  Starting 12/18/2018, serum creatinine based estimated GFR (eGFR) will be   calculated using the Chronic Kidney Disease Epidemiology Collaboration   (CKD-EPI) equation.     06/01/2020 >60 >60 ml/min/1.73m2 Final     gfr stable.   - FOOT EXAM    Controlled.  No changes.     Major depressive disorder, recurrent episode, mild (H)  No changes.  Doing  well. phq9 and izabella 7 zero today.     Mild nonproliferative diabetic retinopathy of both eyes without macular edema associated with type 2 diabetes mellitus (H)  Up to date  On eye exam.     Seizure disorder (H)  Quiescent, continues on lamictal/care with neurologist.     Follow up November for physical scheduled.     Hemiplegia s/p stroke:  FINALLY!  Able to get her new wheelchair with appropriate support/adjustments to promote mobility and help decrease pressure sore risk.  Small enough to move around apartment.     Not walking but  helping to transfer if needed.                    Return in 7 months (on 11/19/2021) for wellness/physical 2:40 pm.    Joel Daniel Wegener, MD  [facility]    Dave Mancini is a 70 year old who presents for the following health issues     HPI     Diabetes Follow-up    How often are you checking your blood sugar? Two times daily  Blood sugar testing frequency justification:  none  What time of day are you checking your blood sugars (select all that apply)?  Before and after meals  Have you had any blood sugars above 200?  No  Have you had any blood sugars below 70?  No    What symptoms do you notice when your blood sugar is low?  None    What concerns do you have today about your diabetes? None     Do you have any of these symptoms? (Select all that apply)  No numbness or tingling in feet.  No redness, sores or blisters on feet.  No complaints of excessive thirst.  No reports of blurry vision.  No significant changes to weight.      BP Readings from Last 2 Encounters:   04/20/21 132/72   11/20/20 127/64     Hemoglobin A1C (%)   Date Value   04/15/2021 6.2 (H)   11/20/2020 6.3 (H)     LDL Cholesterol Calculated (mg/dL)   Date Value   11/20/2020 87   05/04/2020 46                 How many servings of fruits and vegetables do you eat daily?  0-1    On average, how many sweetened beverages do you drink each day (Examples: soda, juice, sweet tea, etc.  Do  "NOT count diet or artificially sweetened beverages)?   0    How many days per week do you exercise enough to make your heart beat faster? 3 or less    How many minutes a day do you exercise enough to make your heart beat faster? 9 or less    How many days per week do you miss taking your medication? 0      Extremely happy with new wheelchair!!      Review of Systems         Objective    /72   Pulse 71   Temp 97.7  F (36.5  C) (Tympanic)   Resp 20   Ht 1.689 m (5' 6.5\")   Wt 66.7 kg (147 lb 1.6 oz)   SpO2 95%   BMI 23.39 kg/m    Body mass index is 23.39 kg/m .  Physical Exam   Bilateral diabetic monofilament foot exam normal dp/pt pulses strong and hair present on toes.                Joel Wegener,MD         "

## 2021-05-12 ENCOUNTER — TELEPHONE (OUTPATIENT)
Dept: FAMILY MEDICINE | Facility: CLINIC | Age: 71
End: 2021-05-12

## 2021-05-12 NOTE — TELEPHONE ENCOUNTER
Reason for Call:  Form, our goal is to have forms completed with 72 hours, however, some forms may require a visit or additional information.    Type of letter, form or note:    [facility] Diabetic Detailed Written Order.  Date of order: 04/28/2021    Who is the form from?:   [facility] [employer]    Where did the form come from: form was faxed in    What clinic location was the form placed at?:   [facility]    Where the form was placed: Dr. Wegener's desk.    What number is listed as a contact on the form?:   FAX: [phone]       Additional comments: none    Call taken on 5/12/2021 at 2:54 PM by Ness Vaughn

## 2021-05-14 ENCOUNTER — ALLIED HEALTH/NURSE VISIT (OUTPATIENT)
Dept: NURSING | Facility: CLINIC | Age: 71
End: 2021-05-14
Payer: MEDICARE

## 2021-05-14 DIAGNOSIS — E53.8 VITAMIN B12 DEFICIENCY (NON ANEMIC): Primary | ICD-10-CM

## 2021-05-14 PROCEDURE — 96372 THER/PROPH/DIAG INJ SC/IM: CPT

## 2021-05-14 RX ADMIN — CYANOCOBALAMIN 1000 MCG: 1000 INJECTION, SOLUTION INTRAMUSCULAR; SUBCUTANEOUS at 10:25

## 2021-05-14 NOTE — PROGRESS NOTES
Clinic Administered Medication Documentation      Injectable Medication Documentation    Patient was given Cyanocobalamin (B-12). Prior to medication administration, verified patients identity using patient s name and date of birth. Please see MAR and medication order for additional information. Patient instructed to remain in clinic for 15 minutes.      Was entire vial of medication used? Yes  Vial/Syringe: Single dose vial  Expiration Date:  11/01/22  Was this medication supplied by the patient? No     Bertha Duque MA

## 2021-05-18 ENCOUNTER — PATIENT OUTREACH (OUTPATIENT)
Dept: CARE COORDINATION | Facility: CLINIC | Age: 71
End: 2021-05-18

## 2021-05-18 ENCOUNTER — TELEPHONE (OUTPATIENT)
Dept: FAMILY MEDICINE | Facility: CLINIC | Age: 71
End: 2021-05-18

## 2021-05-18 NOTE — TELEPHONE ENCOUNTER
Reason for Call:  Form, our goal is to have forms completed with 72 hours, however, some forms may require a visit or additional information.    Type of letter:   Diabetic detailed written order needs more information.      Missing ICD-10 code. Please update line number 3 with valid diagnosis code.    Who is the form from?:   [facility] Medicare Processing    Where did the form come from: form was faxed in    What clinic location was the form placed at?:   [facility]      Where the form was placed: Dr. Wegener's desk.    What number is listed as a contact on the form?:   FAX: [phone]       Additional comments: none    Call taken on 5/18/2021 at 2:49 PM by Ness Jama

## 2021-05-18 NOTE — PROGRESS NOTES
Clinic Care Coordination Contact  [facility]/Voicemail       Clinical Data: [facility] Outreach  Outreach attempted x 1. Left message on patient's voicemail with call back information and requested return call.    Plan: [facility] will try to reach patient again in 3-5 business days.    Chelly Patten  Community Health Worker   [facility]  Care Coordination  [facility]  [email or web address]   Office: [phone]  Fax: [phone]

## 2021-05-19 ENCOUNTER — TELEPHONE (OUTPATIENT)
Dept: FAMILY MEDICINE | Facility: CLINIC | Age: 71
End: 2021-05-19

## 2021-05-19 NOTE — TELEPHONE ENCOUNTER
[phone] & copy sent to scan.    Nicole RAMOS

## 2021-05-19 NOTE — TELEPHONE ENCOUNTER
Reason for Call:  Form, our goal is to have forms completed with 72 hours, however, some forms may require a visit or additional information.    Type of letter, form or note:   diabetic supply form/medicare    Who is the form from?: jerson (if other please explain)    Where did the form come from: form was faxed in    What clinic location was the form placed at?: [facility]    Where the form was placed: wegener  Box/Folder    What number is listed as a contact on the form?:  Fax [phone]       Additional comments:     Call taken on 5/19/2021 at 2:44 PM by Garcia Jang

## 2021-05-25 ENCOUNTER — PATIENT OUTREACH (OUTPATIENT)
Dept: NURSING | Facility: CLINIC | Age: 71
End: 2021-05-25
Payer: MEDICARE

## 2021-05-25 NOTE — LETTER
PCA Services:       [place] TELEPHONE   1-0 DIONTE'S HELPFUL HANDS [facility] [phone]   1ST ATTENTIVE SERVICES [facility] [phone]   1ST CHOICE HOME [facility] [phone]   1ST CHOICE PEDIATRIC HOME CARE [place] [phone]   24 - SEVEN HOME CARE INC [facility] [phone]   A CARING HOME [facility] [phone]   A HELPING HAND SENIOR CARE [facility] [phone]   A+ HOME HEALTH CARE INC [place] [phone]   ACCRA HOME HEALTH [facility] [phone]   ACE HOMES [facility] [phone]   Bono HEALTH CARE SERVICES [place] [phone]   ADEG GROUP HOME [facility] [phone]   ADF CARE SERVICES [facility] [phone]   ADNA HOMES [facility] [phone]   ADNA K HOME HEALTH INC [place] [phone]   ADVANCED ONE CARE CENTER INC [facility] [phone]   AGAPE HEALTHCARE SERVICES [facility] [phone]   AGAPE HOME CARE PROVIDER [place] [phone]   AGAPECARE SERVICES [facility] [phone]   AGILITY ADVANTAGE HOME HEALTHCanton-Potsdam Hospital [phone]   AHRAN CARE [facility] [phone]   ALAAFIA HOMECARE [facility] [phone]   ALL HOME CARE [facility] [phone]   ALL [facility] [phone]   ALL SEASONS HOME HEALTH [facility] [phone]   ALL LLBanner Casa Grande Medical CenterARE [facility] [phone]   ALLIANCE GROUP HOME [facility] [phone]   ALLIANCE [facility] & NURSING SERVICE [place] [phone]   ALLIED PROFESSIONALS INC [place] [phone]   ALLINA HEALTH HOME HEALTH Gates [phone]   ALLY GROUP HOMES [facility] [phone]   AMA GROUP HOME [place] [phone]   AMAL HOME HEALTH CARE INC [place] [phone]   AMANA HOME CARE [facility] [phone]   AMAZING HOME [facility] [phone]   AMAZING HOMECARE AND NURSING TOBY STAHL [phone]   AMAZING [facility] CRYSTAL [phone]   Sche[facility] [phone]    AMECARE SERVICES [facility] [phone]   AMERICAN BEST HOME CARE INC [facility] [phone]   AMERICAN FAMILY HOME CARE [facility] [phone]   AMERICAN HOME HEALTH SERVICES [place] [phone]   AMRAN HOME HEALTH CARE [facility] [phone]   ANCHOR HOUSE [facility] [phone]   RADHA CARE HOMES [facility] [phone]   RADHA HEARTS HEALTH CARE SERVI [facility] [phone]   RADHA HOME CARE [facility] [phone]   ANGELS ASSISTED LIVING INC [place] [phone]   ANGELS HEALTH & HOME CARE SERV [facility] [phone]   ANGELS OF [facility] ASSISTED Cape Coral HospitalIN [facility] [phone]   CYRIL HOME CARE [facility] [phone]   [email or web address] [email or web address] CARE [place] [facility] [phone]   APPARENT PLAN [place] [phone]   APPLE GROUP HOME [facility] [phone]   My Fashion DatabaseHomestead HOME HEALTH CARE [facility]a [facility] [phone]   Victor [facility] [phone]   ARISE HOMECARE SERVICES [facility] [phone]   ARMS HOME HEALTH CARE [facility] [phone]   SONThe Hospitals of Providence Horizon [facility] [phone]    AMERICAN ELDERLY ASSISTE [facility] [phone]   ASPEN HOME HEALTH CARE INC [facility] [phone]   ASSISTED LIVING IN [facility] [phone]   ASSISTING HANDS OF [facility] [phone]   ASSURED CARE [place] [phone]   ASTRAL HOME CARE [facility] [phone]   AT YOUR SERVICE SENIOR ASST [facility] [phone]   ATTENTIVE CARE [place] [phone]   QING CHAVARRIA [facility] [phone]   [facility] HOME HEALTH SERVICES [place] [phone]   AUGUSTHealthSouth Rehabilitation Hospital of [facility] RESIDENTIAL CARE [place] [phone]   VIVIAN MEMORY CARE [place] [phone]   AVINITY HOME CARE [place] [phone]   Avogy [facility] [phone]   Tampa [facility]ers [facility] HEALTH CARE [facility] [phone]   Marion [facility] [phone]   Park Sanitarium  INC [place] [phone]   BAYPatrick Springs HOME CARE [place] [phone]    HOME HEALTHCARE [place] [phone]   W. D[facility] Partlow [facility] [phone]   BEAUTIFUL HEARTS [facility] [phone]   ANKITA'S Lincoln Hospital INC RONALDO [place] [phone]   BEE HIVE HOMES [facility] [phone]   Bellevue [facility] SERVICES [facility] [phone]   Meadow[facility] HEALTH CARE [facility] [phone]   BENEDICTINE SENIOR LIVING AT [facility] [phone]   Nicholas H Noyes Memorial [facility] [phone]   BEST CARE HOME HEALTH INC [place] [phone]   BEST CHOICE HOME CARE [facility] [phone]   BEST HOME HEALTH CARE SERVICE [place] [phone]   BETSouth County Hospital PAVILION HOMECARE [facility] [phone]   BETTER CARE SERVICES [facility] [phone]   BEYOND EXPECTATIONS HOME HEALT [place] [phone]   BLISSUniversity Hospitals Beachwood Medical Center HOMECARE [facility] [phone]   K[facility] [phone]   Deaconess [facility] [place] RESIDENTIAL CARE [facility]en [facility] [phone]   Pen[place]ese Island Leper Hospital HEALTH Community [facility] [phone]   Kettering [facility] [phone]   A[facility] [phone]   [place] River [facility]unction [facility] [phone]   BRIGHT Surgeons [facility] [phone]   BRIGHTMackinac Straits Hospital SERVICES [facility] [phone]   Blue Mountain [facility], Inc. [phone]   Bristol Regional [facility] Center PRADeaconess Health SystemE [place] [phone]   McKenzie Regional Hospital [phone]   Atrium Health Carolinas [facility] [phone]   Palm [facility] [phone]   LENKAAPI Healthcare HEALTH CARE [place] [phone]   BUTTERFLY BOUND CARE [facility] [phone]   CAPERNAUM PEDIATRIC THERAPY IN [place] [phone]   Prime Healthcare Services – [facility] CARE [facility] [phone]   CARE PARTNERS HOMECARE [facility] [phone]   CARE PLUS HOME CARE [facility] [phone]   CAREBUILDERS AT HOME [place] [phone]   CAREGVR SUPT PRG BENY SRV CT  [place] [phone]   CAREMARK HOME HEALTH CARE [facility] [phone]   CAREMINDERS HOME CARE [place] [phone]   CARESERVICES [facility] [phone]   CAREVIEW HOME HEALTH Huntington [facility] [phone]   CAREXTRA HOME HEALTH [place] [phone]   CARING ARMS JODY RONALDO STAHL [phone]   CARING FRIENDS [place] [phone]   CARING GROUP HOME [facility] [phone]   CARING HEART HOME HEALTHCARE Perham [facility] [phone]   CARING HEARTS HOME CARE INC [place] [phone]   CARING HOME HEALTH INC [place] [phone]   CARING NURSES [facility] [phone]   CARINGHANDS [facility] [phone]   Faith ELDERCARE AT HOME [place] [phone]   CENTER CARE INC [place] [phone]   W[facility] CARE [facility] [phone]   Atlantic [facility] [phone]   Pioneer Memorial [facility] [phone]   [facility] River [facility] [phone]   FERNIE AUGSPURGER [facility] [phone]   CHOICES BY DESIGN [facility] [phone]   Yuhaaviatam OF Carilion [facility] HC ANISHINAABE [place] [phone]   VAL [facility] [phone]   CLINAtrium Health HEALTH CARE SERVICES [place] [phone]   COMFORT CARE ASSISTED LVChildren's [place] [phone]   COMFORT CARE CENTER [facility] [phone]   COMFORT CARE HOMES [facility] [phone]   COMFORT KEEPERS OSSEO [phone]   COMFORT KEEPERS OSSEO [phone]   COMFORT LIVING INC [place] [phone]   COMPASSIONATE HOMECARE NURSING [facility] [phone]   COMPASSIONATE NURSES NETWORK [facility]ham [facility] [phone]   COMPASSIONCARE [place] [phone]   JAMIE HOME HEALTHCARE SERVICES [place] [phone]   CSL [facility] [place] [facility] [phone]   CURE HOME HEALTH INC [place] [phone]   CUSTOMCARE HOME HEALTH CARE CHRISTUS Mother Frances Hospital – TylerA [phone]   St Surin Group [facility] NESTOR [phone]   H[facility]  [phone]   DESNOL HEALTHCARE SERVICES [facility] [phone]   GASTON HOME CARE SERVICES INC [place] [phone]   DIAMONDS HOME HEALTH CARE INC [place] [phone]   DIAMONDS HOME HEALTHCARE INC [place] [phone]   DIGNITY HOME CARE Clark Memorial Health[1] [phone]   DIS-GENERATION GROUP INC [facility] [phone]   DIVINE HEALING HOME CARE LLC [facility] [phone]   DIVINE HOME CARE SERVICES [facility] [phone]   DIVINE NURTURING HOMECARE SERV [place] [phone]   DOMINION HEALTHCARE SERVICES [facility] [phone]   DOUnited States Marine Hospital'S BEST CARE INC [place] [phone]   DUALE INC [place] [phone]   Aurora Medical Center Oshkosh BEHAVIORAL HEALTH Carondelet St. [facility] [phone]   EAGLE GROUP HOME [facility] [phone]   EASE COMFORT Eureka [facility] Services / Avera Health [phone]   RONALDO HOMES INC [place] [phone]   RONALDO PATHWAYS [place] [phone]   RONALDO PRAIRIE SENIOR LIVING [facility] RONALDO PRAIRIE [phone]   EL-BARCENAS Nuvance Health [phone]   ELEGED HOME CARE INC [place] [phone]   [place] [facility] [phone]   ELITE NURSING SERVICE INC [facility] [phone]   ELITE QUALITY SERVICES [facility] [phone]   ELIZJOSEPH HEALTHCARE NYU Langone [facility] System [phone]   ELSELIA HOME HEALTHCARE SERV ALIYA [phone]   EMPATHY HOME CARE INC [facility] [phone]   EMPIRE SYSTEMS HOME CARE [facility] [phone]   EMPOWERING HOMECARE JODY [place] [phone]   EMPOWERMENT HEALTHCARE [facility] [phone]   ESSENTIAL HEALTH SERVICES [facility] [phone]   ESTEEM HOME HEALTH SERVICES [place] [phone]   ESTEEM NURSING CARE INC [facility] [phone]   ESTHERRA CARE Nuvance Health [phone]   CLARISSA JEFFERSON [facility] [phone]   Sauk Centre Hospital [phone]   EVERYTIME HOME CARE [facility] [phone]   Brighton Hospital CONNECTIONS [place] [phone]   Hunt Memorial Hospital  INFUSION [place] [phone]   FAMILY CARE SERVICES INC [place] [phone]   FAMILY TREE HOMES [facility] [phone]   FAMILY TRUST HOME HEALTH [facility]itan [facility] [phone]   FANT HOME HEALTH CARE [facility] Newf[facility] [phone]   FIRST CHOICE ASSISTED LIVING [place] [phone]   FIRST CHOICE HOME CARE INC [place] [phone]   FIRST CHOICE NURSING & HOMECAR [facility]et [facility] [phone]   FIRST CHOICE RESIDENTIAL PISANO [phone]   FIRST CHOICE RESIDENTIAL CARE [place] [phone]   FIRST LIGHT RESIDENTIAL CARE Freeman [facility] [phone]   FIRST [facility] [phone]   FIRSTLIGHT HOME CARE OF [facility] [phone]   Regional [facility] [phone]   FOUNDERS Shriners Children's [phone]   [place]lay HOMECARE SERVICES [place] [phone]   Mountain States Health Alliance HOME CARE Canby Medical Center [phone]   GENERATION CARE INC [place] [phone]   GENTLE CARE HOMES [facility] [phone]   GENTLE TOUCH HEALTH INITIATIVE [place] [phone]   GENTLE TOUCH HEALTH INITIATIVE [place] [phone]   SUNITHA HOMES - DEVORAH' PLACE [facility] [phone]   SUNITHA HOMES - SURSUSt. Mary's Hospital [phone]   GLOBAL ALLIANCE HOME HEALTH CA CRYSTAL [phone]   GLORY HOME HEALTH INC [place] [phone]   VASQUEZ [facility]tering [facility] Preble HOMES [facility] [phone]   VASQUEZ CARE [facility] [phone]   VASQUEZ CARE SERVICES Kindred [facility] [phone]   VASQUEZ NEST [facility] [phone]   VASQUEZ OAKS HOME CARE Eastern Niagara [facility], Newfane Division [phone]   VASQUEZ POND HOME [place] [phone]   VASQUEZ TOUCH HEALTH CARE Good Samaritan Hospital [phone]   Austin [facility] [phone]   Aurora Las Encinas Hospital ROBPETER [phone]   GOODNESS AND MERCY HEALTH Jewish Maternity Hospital [phone]   MIGUEL GROUP [facility]entia [facility] [phone]   Cuyuna Regional [facility] [phone]   Fairview Range [facility] Center  [phone]   GRACEFUL LODGE HOME CARE [facility] [phone]   GRANDMA'S HELPER [place] [phone]   GRANDMA'S HELPER [place] [phone]   GRANDMA'S PLACE INC [place] Regional Hospital [phone]   Lynndyl RELIABLE CARE Brookings [facility] [phone]   Morgan Stanley Children's Hospital CARE Community Health [phone]   Franklin Memorial Hospital [phone]   Johnson Memorial [facility] and Home HOME HEALTH [facility] [phone]   GUARANTEED HOME HEALTH CARE A.O. Fox Memorial [facility] [phone]   GUARDIAN Plan Me Up Hutchinson Health [facility] [phone]   H & H GROUP HOME Encompass [facility] [phone]   H AND S RESIDENCE [facility] [phone]   Caustic Graphics HEALTH MANAGEMENT Aitkin Hospital [phone]   HARMONY CARE PROVIDERS [facility] [phone]   HARMONY HEALTH SERVICES [place] [phone]   HARMONY HOMES [facility] [phone]   HAVEBristol County Tuberculosis Hospital ASSISTED LIVING [place] [phone]   ANAND Emanate Health/Queen of the Valley Hospital [phone]   HEALING HOME SERVICES [place] [phone]   HEALTHGUARD PARTNERS INC [place] [phone]   HEART OF A STAR HOME CARE [facility] [phone]   HEARTFELT CARE [facility] [phone]   HEATS MANOR CRYSTAL [phone]   HELIOPOLIS HOME HEALTHCARE LLC [place] [phone]   HELPFUL HANDS HOME CARE [facility] [phone]   HENNEPIN HOME HEALTH CARE INC [place] Regional [facility] [phone]   HERITAGE Gl[facility] [phone]   HERITAGE HOUSE ASSISTED LIVING [place] [phone]   HERITAGE OF KOLE INC KOLE [phone]   HIS SERVICES HOME CARE [facility] [phone]   HMONG SENIOR HEALTH CARE Smallpox Hospital [phone]   [facility]. [facility] HOME, [place] [phone]   HOME CARE ASSISTANCE OF JESSICA MAPLE GROVE [phone]   HOME CARE [facility] [phone]   HOME HEALTH AT [facility] [phone]   HOME HEALTH CARE Anaheim [facility] [phone]   HOME INSTEAD [address] CARSON RODRIGUES [phone] home  INSTEAD SENIOR CARE Centerview [phone]   HOME INSTEAD SENIOR CARE [address] KOLE [phone]   HOME EDUIN HOME CARE INC [facility] [phone]   HOMECARE RESOURCE [facility] [phone]   HOMEWATCH CAREGIVERS Centerview [phone]   HONESTY CARE HOME Resnick Neuropsychiatric [facility] [phone]   HOPE ASSISTED LIVING [facility] [phone]   HOPE REST HOMES Community [facility] [phone]   TriHealth Bethes[facility] CENTER Community [facility] [phone]   HOPE'S RESIDENCE [facility] [phone]   HOPE'S RESIDENCE [place] [phone]   Lifecare [facility] [phone]   Baptist Memorial [facility] for Women HOME CARE SERVICES Our Lady of Lourdes Memorial Hospital [phone]   Westlake Outpatient Medical CenterRE Artesia General Hospital [phone]   Providence Sacred Heart Medical Center SERVICES Northland [facility] [phone]   University Hospitals Geauga Medical CenterN Woodhull Medical Center [phone]   INDEPENDENT HOME CARE AGENCY NewYork-Presbyterian [facility] [phone]   INDIGO HEALTH CARE SERVICES Northwest [facility] [phone]   Veterans Administration Medical Center CARE Hermann Area District Hospital [phone]   INTEGRA HOME HEALTH The Rehabilitation Institute of [facility] [phone]   INTREPID USA HEALTHCARE SRVCS Centerview [phone]   INVITING HOMES [facility] [phone]   JABEZ CUSTOMIZED LIVING SERVIC [facility] [phone]   George L. Mee Memorial Hospital HEALTH CARE SERVICES Woodhull [facility] [phone]   Methodist McKinney Hospital RESIDENCE TANIAOlmsted Medical Center [phone]   JOURNEYS HOME CARE Herkimer Memorial Hospital [phone]   EDUIN CARE HOMES The Jewish [facility] [phone]   EDUIN LIVING INC [place] [phone]   JOYFUL COMPANIONS HOME CARE [place] [phone]   JOYFUL HOME HEALTH CARE Thomas Hospital [phone]   JOYOUS CARE & FAMILY PRESERVAT [facility] [phone]   KIND CARE City [facility] [phone]   KINDSt. Mary's Medical Center, Ironton Campus HOMES Our Lady of Lourdes Memorial [facility] [phone]   Cavalier County Memorial [facility] [phone]   Sutter Davis Hospital HOME CARE INC [place] [phone]   STEPHANIE NICOLE E Sutter Tracy Community [facility] [phone]   University of [facility] Children's Hospital [phone]   Monrovia Community Hospital [phone]   Samaritan Pacific Communities Hospital [phone]    LEGACY HOME HEALTH CARE INC [place] [phone]   LEVEL [place]ord HOME CARE Spearfish Regional Hospital [phone]   LIFE INSPIRED [facility] [phone]   LIFESPATurning Art PERSONAL SERVICES [place] [phone]   LIFESPRK [facility] View [facility] [phone]   LIFESPRK HOME HEALTH Phillips [facility] Institute [phone]   LIFESPRK Martha's Vineyard Hospital [phone]   LINK  HOME HEALTHCARE [place] [phone]   LIVETennova Healthcare [phone]   LIVING MIGUEL HOME CARE Cohen [facility] [phone]   LIVING LIFE HOME CARE [place] [phone]   LIVING WELL HOME CARE [facility] [phone]   Luck ASSISTED LIVING Freeman Ne[facility]ho [facility] [phone]   LOVE & CARE Central Islip Psychiatric [facility] [phone]   LOVING HOMES INC CRYSTAL [phone]   LOVING TOUCH INC Cohen [facility] [phone]   LOGreen Cross Hospital HOME HEALTH CARE AGENCY Pilgrim Psych[facility] [phone]   LOYAL HOME HEALTHCARE Pilgrim Psychiatric Center [phone]   Florence Community Healthcare'S Sanford Aberdeen Medical Center [phone]   Christus St. Patrick Hospital [phone]   Lake Taylor Transitional Care Hospital [phone]   MARANAT PLACE Cohen Children's Medical Center [phone]   MATRIX HOME HEALTH CARE SPECPrairie Lakes Hospital & Care Center [phone]   Allegheny Valley Hospital CARE Ira Davenport Memorial Hospital [phone]   MERCY CARE HOME Essentia Health [phone]   MERCY HOME HEALTH Good Shepherd Healthcare System [phone]   SMICY Sell My Timeshare NOW [facility] [phone]   MERY CARE HOME Martha's Vineyard Hospital [phone]   Abrazo Arrowhead CampusRY Wesson Memorial Hospital [phone]   Lakes Medical Center [phone]   South Pittsburg Hospital HOMEBagley Medical Center [phone]   East Liverpool City Hospital Ceradis SERVICES New Ulm [facility] [phone]   Altus GROUP LIVING INC [place] [phone]   Carvoyant INC [place] [phone]   Altus PROFESSIONAL SERVICES [place] [phone]   Lutheran Hospital [facility] [phone]   Veterans Administration [facility] Center [phone]   UNC Health Rex [facility] Springs [phone]   Anaheim General Hospital [phone]    Eleanor Slater Hospital HOME HEALTH CARE AGENCY [place] [phone]   Northern Light Acadia Hospital SENIOR LIVING [place] [phone]   MINNESOTA GROUP HOMES Wellstone Regional [facility] [phone]   Houston Methodist Hospital [phone]   MIRAGE HOMES Laird Hospital [phone]   [place] GROUP HOME Mary Imogene Bassett Hospital [phone]   [place] GROUP HOME SRVCS [place] [phone]   MN BEST HOME CARE Mary Imogene Bassett Hospital [phone]   MN CARE ACCESS St[facility] [phone]   MN GROUP HOME LLC [place] [phone]   MOONOmegawave HOME HEALTH CARE LLC [place] [phone]   Hansen Family HospitalAR CARE SERVICES St. [facility] [phone]   MORNING GLORY HOMES Melrose Area Hospital [phone]   MORNING Amity HEALTH CARE SERVI [place] [phone]   Formerly Pardee [facility] HOME SERVICES Essex [facility] [phone]   MVNA [place] [phone]   N & V HELPFUL HEART CARE INC CRYSTAL [phone]   NASIYE GROUP HOME JODY [place] [phone]   NERVANAS CARING HANDS INC [place] [phone]   NEST CARE INC ROBMorton Hospital [phone]   RiverView Health Clinic [phone]   McAlester Regional [facility] Center – McAlester [phone]   M[place] Freewater HEALTH CARE Presbyterian/St[facility] Luke's Medical [facility] [phone]   NEW Emerald-Hodgson Hospital ASSISTED LIVING [facility] Regional [facility] [phone]   NEW Sentara [facility] Center HEALTH SERVICE PISANO [phone]   NEW Saint [facility] SENIOR LIVING [place] [phone]   NIGHTINGALE HOME HEALTHCARE [place] [phone]   SULTANA ASSISTING LIVING INC [place] [phone]   NOBLE HOME CARE SERVICE St[facility] [phone]   NOBLE HOME CARE SERVICES St[facility] [phone]   NOVA HOME CARE Mary Imogene Bassett Hospital [phone]   NTINGALE IN-HOUSE SKILLED NURS [place] [phone]   NURTURING CARE RESIDENCE [place] [phone]   NUVISION HOMECARE SERVICES [facility] [phone]   Hayward Area [facility] - Hayward [phone]   [place] [phone]   Dignity Health [facility] Specialty [facility] [phone]   Cascade Valley Hospital  [phone]   OMOCARE Adirondack Medical Center [phone]   OPEN ARMS GROUP HOME [facility] [phone]   OPEN ARMS NURSING SERVICES Montefiore Nyack [facility] [phone]   OPEN HEART Phillips [facility] [phone]   OPTIMUM CARE SERVICES [facility] [phone]   Federal Correction Institution Hospital [phone]   AMAYA CARLOS [place] [phone]   AMAYA AGUAYO [place] [phone]   PARADISE ASSISTED LIVING Owatonna Clinic [phone]   PARADISE CARE HOMES [facility] [phone]   Nazareth NICOLLET METHODCanonsburg Hospital [phone]   Kettering [facility] [phone]   PARKINSON SPECIALTY CARE L[facility] [phone]   Baylor University [facility] [phone]   PARTNERS IN CARE Beaumont Hospital [phone]   PATIENT CENTERED HOME CARE Columbia University Irving [facility] Center [phone]   Summit Pacific Medical Center HOMECARE SERVICES Lake Martin Community Hospital [phone]   Lexington Medical Center [phone]   ProDeaf Community Memorial Hospital [phone]   PHOENIX COVE ADULT DAY Reston Hospital Center [phone]   PILGRIM HOME CARE Adirondack Medical Center [phone]   PINEOhio State [facility] HOME HEALTH [facility] [phone]   PINNACLE HOME CARE [place] [phone]   PINNACLE SERVICES [facility] [phone]   [facility] [phone]   [facility] HEALTHCARE [facility] ([facility] [phone]   PLEASANT LIGHT [facility] [phone]   POINT-ONE HOME HEALTH [facility] [phone]   POSSIBLE HEALTHCARE INC [facility] [phone]   PRAIRIE BLUFFS [facility] PRAWilson Health [phone]   PRACasey County HospitalE [facility] CARE [facility] [phone]   PREMIER ASSISTED LIVING [facility] [phone]   PREMIER HOME CARE [facility] [phone]   PREMIUM HEALTH CARE INC [place] [phone]   PREMIUM HOME HEALTHCARE [facility] [phone]   [facility] [phone]   [facility] HOME HEALTH CARE [facility] [phone]   [facility] LIVING [facility] [phone]   [facility]  CARE SERVICES INC [place] [phone]   PRIMUS INCORPORATED [facility] [phone]   PRO MEDICAL SERVICES INC [place] [phone]   PROCARE HEALTH [facility] [phone]   PROFESSIONAL RESOURCE NETWORK RONALDOROC SHEAVARINDER [phone]   PROMISE CARE INC [place] [phone]   PROSPER HOME HEALTH & [facility] [phone]   [facility] HOME [facility] [phone]   QUALITY CARE COMPANIONS RONALDOROC SHEARICH [phone]   QUALITY CARE SERVICES [facility] [phone]   QUALITY FOOTCARE [place] [phone]   QUALITY HOME CARE INC [place] [phone]   QUALITY HOME CARE INC [place] [phone]   RAKHMA INC [place] [phone]   Community [facility] CARE SERVICES [facility] [phone]   RECOVER CARE [place] [phone]   RECOVER HEALTH [place] [phone]   REDESIGN LIFESTYLE CARE [facility] [phone]   [facility] SRVCS [place] [phone]   REHSkyline Hospital [phone]   YAJAIRA'S TENDER CARE [place] [phone]   RES CARE AT [facility] [phone]   RESIDENT SERVICES INC [place] [phone]   RESILIENCE CARE SERVICES [facility] [phone]   REST CARE HOME SERVICES [place] [phone]   RESTART INC [place] [phone]   REVO HOME HEALTH [place] [phone]   [facility] HEALTHCARE SERVICES IN [facility] [phone]   [facility] Center[facility] [place] [phone]   RIGHT AT HOME [place] [phone]   RIGHT AT HOME [place] [phone]   RIGHT AT HOME Cable [phone]   RIGHTWAY HOME CARE SERVICES IN [facility] [phone]   [facility] CARE [facility] [phone]   ROYAL CARE [facility] [phone]   ROYAL [facility] HEALTH [facility] [phone]   [facility] [phone]   S &  HEALTH CARE SERVICES [facility] [phone]   [facility] [phone]   [facility]  [place] [phone]   SAFE [facility] RESIDENTIAL CARE [facility] [phone]   SAFECARE HOMES [facility] [phone]   SANCTBrentwood Hospital HEALTHCARE SERVICES [place] [phone]   SECOND HORIZON LIVING [facility] [phone]   SECURED HEALTH AND HOME CARE [facility] [phone]   SEED HOME HEALTHCARE [facility] [phone]   SENovant Health [facility]over [facility]opedic [facility] HEALTH CARE [facility] [phone]   SENATIVE SERVICES [facility] [phone]   SENIOR HOME HEALTH CARE [facility] [phone]   SENIOR LIVING [facility] [phone]   SENIOR SUPPORT AND CARE [place] [phone]   SENIORS HELPING SENIORS Industry [phone]   [facility] [phone]   SERENITY GROUP HOMES [facility] [phone]   SEROur Lady of Fatima Hospital HEALTH CARE SERVICES [place] [phone]   SHAMGlencoe Regional Health Services HOME HEALTH CARE INC [place] [phone]   T[facility] HOME HEALTH CARE INC [place] [phone]   D.[facility] CARE HOME [facility] [phone]   DINORAH'S GROUP HOME INC SERVICE [place] [phone]   [facility] [phone]   SIDE BY SIDE HOME CARE SERVICE [place] [phone]   [facility] [phone]   [facility] [phone]   [facility] HEALTH CARE [facility] [phone]   SOJOURN AT HOME [facility] [phone]   SOJOURN SUITES [facility] [phone]   [facility] HEALTH SERVICES [facility] [phone]   [facility] [phone]   SOLUTION HOME CARE [facility] [phone]   SOURIYATHAY HOUSING WITH SERVI [facility] [phone]   [place] ASSISTED LIVING [facility] [phone]   [facility] [phone]   [facility] CARE [facility] [phone]   [facility] [phone]   ST LEANN [facility] [phone]    LEANNValley Health [phone]   [facility] WITH SERVICES [place] [phone]   [facility]  [place] [phone]   STARCARE HEALTH SERVICES [facility] [phone]   STONECREST LIVING [facility] [phone]   LEMOS CASA HOME CARE [place] [phone]   SUBURBAN RESIDENTIAL CARE [facility] [phone]   [facility] [phone]   SUMMIT HEALTHCARE INC [place] [phone]   SUMMIT HOME HEALTH CARE [facility] [phone]   SUNRISE OF KOLE [place] [phone]   SUNRISE OF [facility] [phone]   SUNRISE OF DRE [place] [phone]   SUNSHINE ASSISTED LIVING [facility] [phone]   SUNSHINE GROUP HOME [facility] [phone]   SUNSHINE RESIDENTIAL [facility] [phone]   [facility] LIVING [facility] [phone]   SYNCARE [facility] [phone]   SYNCARE [facility] [phone]   SYNERGY HOMECARE [facility] [phone]   TAILORED CARE RESIDENCE INC [place] [phone]   TAILORED CARE RESIDENCE INC. [place] [phone]   TEAM HOME HEALTHCARE [facility] [phone]   TENDER CARE GROUP HOME INC [place] [phone]   TENDER CARE GROUP HOMES [facility] [phone]   [facility] CARE [facility] DANAFairlawn Rehabilitation Hospital [phone]   THE CARING CREW [facility] [phone]   THE CARING SISTERS HOME CARE [facility] [phone]   THE GENEVA SUITES [place] [phone]   THE MIGUEL HOME HEALTH SERVICES [facility] [phone]   THE GUARDIAN HOME CARE [facility] [phone]   THE LINDA [facility] [phone]   THE KENWOOD custodial COMMUNI [place] [phone]   THE LEGACY OF ST JUDITH WONG [phone]   THE LODGE OF [facility] [phone]   THE PRAIRIE LODGE AT [facility] [phone]   THE [facility] [phone]   THE TransBiodieselNorton Suburban Hospital [phone]   THE [facility] CELESTINA PISANO [phone]   [facility] [phone]   POLI REESE IN HOME CARE [facility] [phone]    TOPCARE HEALTH SERVICES [facility] [phone]   TOTAL HOME HEALTH SERVICES [facility] [phone]   TOTALCARE ASSISTED LIVING [facility] [phone]   TOUCHING HEARTS AT HOME [place] [phone]   TOUCHING HEARTS AT HOME [place] [phone]   TOUCHPocahontas MENTAL [facility] [phone]   TRADITION [facility] [phone]   TRANQUIL LIVING [facility] AuroraRADHA [phone]   TRANSITIONS HOME CARE [place] [phone]   [facility] MEMORY CARE SUITES [place] [phone]   TRUE CARE HOMES [place] [phone]   [facility] ASSISTED LIVING [facility] [phone]   [facility] GROUP [facility] [phone]   [facility] [phone]   [facility] HEALTH CARE [facility] [phone]   ULTIMATE HOME CARE [facility] [phone]   UNIQUE HOMES [place] [facility] [phone]   UNISON GROUP HOME [facility] [phone]   UNITED PROVIDERS [facility] [phone]   Carroll SOCIAL SERVICE [facility] [phone]   UNITY TRUST HOME CARE [facility] [phone]   UNIVERSAL CARE HOMES [place] [facility] [phone]   UPLUAB Callahan Eye HospitalED CARE SERVICES [facility] [phone]   [facility] [phone]   VAIMAR SUITES [facility] [phone]   VIKINGS HOME HEALTH CARE [facility] [phone]   [id number] GROUP [facility] [phone]   VISITING ORIN BAZAN [phone]   VISITING ORIN [facility] [phone]   VISITING ORIN [facility] [phone]   VITAL HOME CARE [facility] [phone]   VOA HOME HEALTH AT ELDER HOMES [place] [phone]   VOLUNTEERS OF [facility] RONALDO [facility] Memorial HospitalIRIE [phone]   WALKER HOME SERVICES [place] [phone]   WALKER Shinto CARE SUITEHospital for Behavioral Medicine [phone]   WARM TOUCH HOME CARE INC [place] [phone]   WATCHEN HOMES [facility] [phone]   WATCHFUL CAREGIVERS [facility] [phone]   Straughn  RONNY VASQUEZ [place] [phone]   [facility] HEALTH SYSTEM [facility] [phone]   Veterans Affairs [facility] CARE [place] [phone]   Formerly [facility] RICHMaria Parham Health [phone]   WELLNESS GROUP HOME CORPORATIO [facility] [phone]   WELLNESS HOMES [facility] [phone]   [facility] PolarLake Johnson Memorial [facility] and Home [phone]   [facility] [phone]   [facility] [phone]   YORKSHIRE OF KOLE SENIOR YAKELIN KOLE [phone]   YOU MATTER HOME HEALTH CARE [facility] [phone]

## 2021-05-25 NOTE — PROGRESS NOTES
Clinic Care Coordination Contact    Follow Up Progress Note      Assessment: [facility] called patient and introduced self title and role. Patient reports she is doing well and her only need is help finding a PCA as she still hasnt been able to. CC reviewed potential options for a PCA and patient was aware. Patient reports she would like a list of organizations that provide PCA services.    Goals addressed this encounter:   Goals Addressed                 This Visit's Progress      Other-PCA hours (pt-stated)   60%     Goal Statement: I would like to get a new PCA in the next two months.   Date Goal set: 11/24/2020  Barriers: unsure how to get another  Strengths: Asking for help by sw'er   Date to Achieve By: 3/24/2021  Patient expressed understanding of goal: yes   Action steps to achieve this goal:  1. I will continue working with my [facility] , Rupal, to find me a new Personal Care Assistant (PCA).  2. I will speak with a new home healthcare agency when they call me on Wednesday to see how they could help me at home.  3. I will give the CHW updates at outreach calls.    Updated: 4/12/21               Intervention/Education provided during outreach: [facility] to send list of PCA services to patient     Outreach Frequency: monthly    Plan:   Patient will review list.  Care Coordinator will follow up in 1 month.    JOSIAS Diaz   [facility] Care Coordination  Tel: [phone]

## 2021-05-27 VITALS
SYSTOLIC BLOOD PRESSURE: 148 MMHG | HEART RATE: 84 BPM | TEMPERATURE: 98.8 F | OXYGEN SATURATION: 95 % | RESPIRATION RATE: 18 BRPM | DIASTOLIC BLOOD PRESSURE: 76 MMHG

## 2021-06-04 VITALS
SYSTOLIC BLOOD PRESSURE: 139 MMHG | RESPIRATION RATE: 20 BRPM | OXYGEN SATURATION: 94 % | BODY MASS INDEX: 25.61 KG/M2 | WEIGHT: 161.1 LBS | TEMPERATURE: 98.6 F | DIASTOLIC BLOOD PRESSURE: 62 MMHG | HEART RATE: 82 BPM

## 2021-06-04 VITALS
HEART RATE: 69 BPM | RESPIRATION RATE: 18 BRPM | DIASTOLIC BLOOD PRESSURE: 70 MMHG | TEMPERATURE: 98.7 F | WEIGHT: 142.9 LBS | OXYGEN SATURATION: 94 % | SYSTOLIC BLOOD PRESSURE: 121 MMHG | RESPIRATION RATE: 18 BRPM | TEMPERATURE: 98.7 F | DIASTOLIC BLOOD PRESSURE: 70 MMHG | SYSTOLIC BLOOD PRESSURE: 121 MMHG | WEIGHT: 142.9 LBS | OXYGEN SATURATION: 94 % | HEART RATE: 69 BPM | BODY MASS INDEX: 22.72 KG/M2 | BODY MASS INDEX: 22.72 KG/M2

## 2021-06-04 VITALS
OXYGEN SATURATION: 94 % | HEART RATE: 82 BPM | DIASTOLIC BLOOD PRESSURE: 62 MMHG | WEIGHT: 161.1 LBS | BODY MASS INDEX: 25.61 KG/M2 | RESPIRATION RATE: 20 BRPM | SYSTOLIC BLOOD PRESSURE: 139 MMHG | TEMPERATURE: 98.6 F

## 2021-06-04 VITALS
HEART RATE: 84 BPM | DIASTOLIC BLOOD PRESSURE: 76 MMHG | OXYGEN SATURATION: 95 % | BODY MASS INDEX: 22.54 KG/M2 | RESPIRATION RATE: 18 BRPM | SYSTOLIC BLOOD PRESSURE: 148 MMHG | TEMPERATURE: 98.8 F | WEIGHT: 141.8 LBS

## 2021-06-04 VITALS
RESPIRATION RATE: 18 BRPM | SYSTOLIC BLOOD PRESSURE: 162 MMHG | HEART RATE: 76 BPM | DIASTOLIC BLOOD PRESSURE: 70 MMHG | OXYGEN SATURATION: 94 % | BODY MASS INDEX: 24.8 KG/M2 | WEIGHT: 156 LBS | TEMPERATURE: 98.2 F

## 2021-06-04 VITALS
DIASTOLIC BLOOD PRESSURE: 54 MMHG | BODY MASS INDEX: 22.42 KG/M2 | OXYGEN SATURATION: 93 % | SYSTOLIC BLOOD PRESSURE: 92 MMHG | RESPIRATION RATE: 18 BRPM | WEIGHT: 141 LBS | HEART RATE: 63 BPM | TEMPERATURE: 98.6 F

## 2021-06-04 VITALS
TEMPERATURE: 98.9 F | RESPIRATION RATE: 18 BRPM | SYSTOLIC BLOOD PRESSURE: 118 MMHG | WEIGHT: 142.4 LBS | DIASTOLIC BLOOD PRESSURE: 54 MMHG | HEART RATE: 70 BPM | OXYGEN SATURATION: 96 % | BODY MASS INDEX: 22.64 KG/M2

## 2021-06-04 VITALS
BODY MASS INDEX: 22.42 KG/M2 | WEIGHT: 141 LBS | OXYGEN SATURATION: 95 % | DIASTOLIC BLOOD PRESSURE: 64 MMHG | TEMPERATURE: 98.3 F | HEART RATE: 66 BPM | RESPIRATION RATE: 20 BRPM | SYSTOLIC BLOOD PRESSURE: 146 MMHG

## 2021-06-04 VITALS
SYSTOLIC BLOOD PRESSURE: 147 MMHG | OXYGEN SATURATION: 93 % | TEMPERATURE: 97.7 F | BODY MASS INDEX: 22.7 KG/M2 | RESPIRATION RATE: 16 BRPM | HEART RATE: 72 BPM | WEIGHT: 142.8 LBS | DIASTOLIC BLOOD PRESSURE: 73 MMHG

## 2021-06-04 VITALS
TEMPERATURE: 98 F | OXYGEN SATURATION: 92 % | SYSTOLIC BLOOD PRESSURE: 142 MMHG | HEART RATE: 93 BPM | WEIGHT: 156 LBS | DIASTOLIC BLOOD PRESSURE: 78 MMHG | BODY MASS INDEX: 24.8 KG/M2

## 2021-06-04 VITALS
TEMPERATURE: 98.7 F | BODY MASS INDEX: 22.54 KG/M2 | HEART RATE: 107 BPM | RESPIRATION RATE: 18 BRPM | OXYGEN SATURATION: 92 % | SYSTOLIC BLOOD PRESSURE: 144 MMHG | WEIGHT: 141.8 LBS | DIASTOLIC BLOOD PRESSURE: 76 MMHG

## 2021-06-04 VITALS
DIASTOLIC BLOOD PRESSURE: 70 MMHG | TEMPERATURE: 98.7 F | HEART RATE: 69 BPM | BODY MASS INDEX: 22.72 KG/M2 | OXYGEN SATURATION: 94 % | RESPIRATION RATE: 18 BRPM | WEIGHT: 142.9 LBS | SYSTOLIC BLOOD PRESSURE: 121 MMHG

## 2021-06-04 VITALS
RESPIRATION RATE: 18 BRPM | TEMPERATURE: 99 F | OXYGEN SATURATION: 91 % | SYSTOLIC BLOOD PRESSURE: 131 MMHG | HEART RATE: 72 BPM | DIASTOLIC BLOOD PRESSURE: 80 MMHG | BODY MASS INDEX: 26.9 KG/M2 | WEIGHT: 169.2 LBS

## 2021-06-04 VITALS
SYSTOLIC BLOOD PRESSURE: 94 MMHG | DIASTOLIC BLOOD PRESSURE: 58 MMHG | TEMPERATURE: 99.7 F | HEART RATE: 72 BPM | OXYGEN SATURATION: 94 % | BODY MASS INDEX: 22.45 KG/M2 | WEIGHT: 141.2 LBS | RESPIRATION RATE: 18 BRPM

## 2021-06-04 VITALS
TEMPERATURE: 98.8 F | DIASTOLIC BLOOD PRESSURE: 55 MMHG | OXYGEN SATURATION: 95 % | HEART RATE: 75 BPM | RESPIRATION RATE: 17 BRPM | WEIGHT: 143 LBS | SYSTOLIC BLOOD PRESSURE: 103 MMHG | BODY MASS INDEX: 22.74 KG/M2

## 2021-06-04 VITALS
SYSTOLIC BLOOD PRESSURE: 121 MMHG | WEIGHT: 142.9 LBS | OXYGEN SATURATION: 94 % | RESPIRATION RATE: 18 BRPM | TEMPERATURE: 98.7 F | DIASTOLIC BLOOD PRESSURE: 70 MMHG | BODY MASS INDEX: 22.72 KG/M2 | HEART RATE: 69 BPM

## 2021-06-04 VITALS
TEMPERATURE: 98 F | OXYGEN SATURATION: 94 % | DIASTOLIC BLOOD PRESSURE: 75 MMHG | SYSTOLIC BLOOD PRESSURE: 125 MMHG | WEIGHT: 162 LBS | HEART RATE: 66 BPM | RESPIRATION RATE: 20 BRPM | BODY MASS INDEX: 25.76 KG/M2

## 2021-06-04 VITALS
DIASTOLIC BLOOD PRESSURE: 66 MMHG | TEMPERATURE: 97.3 F | SYSTOLIC BLOOD PRESSURE: 141 MMHG | WEIGHT: 141.8 LBS | BODY MASS INDEX: 22.54 KG/M2 | OXYGEN SATURATION: 96 % | HEART RATE: 71 BPM | RESPIRATION RATE: 17 BRPM

## 2021-06-04 VITALS
OXYGEN SATURATION: 95 % | WEIGHT: 141.8 LBS | SYSTOLIC BLOOD PRESSURE: 148 MMHG | BODY MASS INDEX: 22.54 KG/M2 | TEMPERATURE: 98.8 F | HEART RATE: 84 BPM | DIASTOLIC BLOOD PRESSURE: 76 MMHG | RESPIRATION RATE: 18 BRPM

## 2021-06-04 VITALS
SYSTOLIC BLOOD PRESSURE: 127 MMHG | DIASTOLIC BLOOD PRESSURE: 62 MMHG | HEART RATE: 61 BPM | WEIGHT: 139.8 LBS | OXYGEN SATURATION: 96 % | BODY MASS INDEX: 22.23 KG/M2 | TEMPERATURE: 98.6 F | RESPIRATION RATE: 16 BRPM

## 2021-06-04 VITALS
OXYGEN SATURATION: 91 % | HEART RATE: 72 BPM | SYSTOLIC BLOOD PRESSURE: 131 MMHG | BODY MASS INDEX: 26.55 KG/M2 | TEMPERATURE: 99 F | DIASTOLIC BLOOD PRESSURE: 80 MMHG | RESPIRATION RATE: 18 BRPM | WEIGHT: 167 LBS

## 2021-06-04 VITALS
HEART RATE: 78 BPM | OXYGEN SATURATION: 92 % | DIASTOLIC BLOOD PRESSURE: 68 MMHG | TEMPERATURE: 98.4 F | RESPIRATION RATE: 20 BRPM | BODY MASS INDEX: 22.7 KG/M2 | WEIGHT: 142.8 LBS | SYSTOLIC BLOOD PRESSURE: 135 MMHG

## 2021-06-04 VITALS
BODY MASS INDEX: 22.45 KG/M2 | SYSTOLIC BLOOD PRESSURE: 121 MMHG | WEIGHT: 141.2 LBS | OXYGEN SATURATION: 93 % | HEART RATE: 81 BPM | TEMPERATURE: 97.7 F | RESPIRATION RATE: 20 BRPM | DIASTOLIC BLOOD PRESSURE: 62 MMHG

## 2021-06-04 VITALS
RESPIRATION RATE: 18 BRPM | WEIGHT: 143 LBS | OXYGEN SATURATION: 95 % | TEMPERATURE: 99.5 F | DIASTOLIC BLOOD PRESSURE: 57 MMHG | HEART RATE: 79 BPM | SYSTOLIC BLOOD PRESSURE: 104 MMHG | BODY MASS INDEX: 22.74 KG/M2

## 2021-06-04 VITALS
RESPIRATION RATE: 20 BRPM | HEART RATE: 71 BPM | TEMPERATURE: 98.8 F | SYSTOLIC BLOOD PRESSURE: 128 MMHG | DIASTOLIC BLOOD PRESSURE: 69 MMHG | OXYGEN SATURATION: 95 % | WEIGHT: 162.9 LBS | BODY MASS INDEX: 25.9 KG/M2

## 2021-06-04 VITALS
BODY MASS INDEX: 22.35 KG/M2 | HEART RATE: 76 BPM | RESPIRATION RATE: 18 BRPM | SYSTOLIC BLOOD PRESSURE: 115 MMHG | OXYGEN SATURATION: 94 % | DIASTOLIC BLOOD PRESSURE: 68 MMHG | WEIGHT: 140.6 LBS | TEMPERATURE: 97.1 F

## 2021-06-04 VITALS
BODY MASS INDEX: 22.54 KG/M2 | TEMPERATURE: 97.3 F | OXYGEN SATURATION: 96 % | RESPIRATION RATE: 17 BRPM | WEIGHT: 141.8 LBS | DIASTOLIC BLOOD PRESSURE: 66 MMHG | SYSTOLIC BLOOD PRESSURE: 141 MMHG | HEART RATE: 71 BPM

## 2021-06-04 VITALS
SYSTOLIC BLOOD PRESSURE: 131 MMHG | TEMPERATURE: 97.7 F | WEIGHT: 140.6 LBS | OXYGEN SATURATION: 96 % | BODY MASS INDEX: 22.35 KG/M2 | DIASTOLIC BLOOD PRESSURE: 50 MMHG | RESPIRATION RATE: 18 BRPM | HEART RATE: 68 BPM

## 2021-06-04 VITALS
SYSTOLIC BLOOD PRESSURE: 138 MMHG | DIASTOLIC BLOOD PRESSURE: 74 MMHG | RESPIRATION RATE: 12 BRPM | HEART RATE: 65 BPM | OXYGEN SATURATION: 95 % | WEIGHT: 156.4 LBS | TEMPERATURE: 97.7 F | BODY MASS INDEX: 24.87 KG/M2

## 2021-06-05 VITALS
OXYGEN SATURATION: 95 % | WEIGHT: 141.8 LBS | HEART RATE: 84 BPM | BODY MASS INDEX: 22.54 KG/M2 | DIASTOLIC BLOOD PRESSURE: 76 MMHG | SYSTOLIC BLOOD PRESSURE: 148 MMHG | RESPIRATION RATE: 18 BRPM | TEMPERATURE: 97.7 F

## 2021-06-06 NOTE — PROGRESS NOTES
[facility]    Facility:   [facility] [[id number]]   Code Status: POLST AVAILABLE      CHIEF COMPLAINT/REASON FOR VISIT:  Chief Complaint   Patient presents with     Review Of Multiple Medical Conditions       HISTORY:      HPI: Addis is a 69 y.o. female who was admitted to [facility] from 2/2/20-2/7/20 for left hip ORIF s/p fall at home.  There were not postoperative complications noted in her hospital discharge summary.  It was recommended by her orthopedic provider that she remain on Lovenox for one month postoperatively for DVT prophylaxis.  Addis  has a past medical history of Allergic rhinitis, CKD (chronic kidney disease), Depression, Displaced dome fracture of left acetabulum (H), DM2 (diabetes mellitus, type 2) (H), GERD (gastroesophageal reflux disease), Gout, HTN (hypertension), Left hemiplegia (H), Migraine, 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), Nephrolithiasis, Stroke (H), and Vitamin B12 deficiency anemia.  She is currently at [facility] s/p hospitalization for acute rehabilitation.      Today Ms. Peña is being evaluated for a review of multiple medical conditions.  Addis denied pain at this visit and was agreeable to discontinuation of her oxycodone at this time.  She remains on Tylenol 1 g three times a day for acute pain management.  She is also taking Baclofen for chronic pain and muscle spasm.  Her daughter was also present at this visit and denied any concerns for the patient at this time.  Addis said that she has been progressing well with therapy and feels that she has been getting stronger daily.  Nursing staff noted that her confusion is slowly improving.  We discussed her laboratory results as returning to the patient's baseline today and she was happy to hear.  The patient denied lightheadedness, dizziness, breathing difficulty, chest pain, palpitations, constipation, urinary symptoms, numbness or  tingling in extremities, and pain.  Nursing staff denied any new concerns for the patient at this time.    Past Medical History:   Diagnosis Date     Allergic rhinitis      CKD (chronic kidney disease)      Depression      Displaced dome fracture of left acetabulum (H)      DM2 (diabetes mellitus, type 2) (H)      GERD (gastroesophageal reflux disease)      Gout      HTN (hypertension)      Left hemiplegia (H)      Migraine      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)      Nephrolithiasis      Stroke (H)      Vitamin B12 deficiency anemia              Family History   Problem Relation Age of Onset     Heart disease Mother      Hypertension Mother      Coronary artery disease Father      Diabetes Sister      Hypertension Brother      Cancer Sister      Social History     Socioeconomic History     Marital status:      Spouse name: None     Number of children: None     Years of education: None     Highest education level: None   Occupational History     None   Social Needs     Financial resource strain: None     Food insecurity:     Worry: None     Inability: None     Transportation needs:     Medical: None     Non-medical: None   Tobacco Use     Smoking status: Never Smoker     Smokeless tobacco: Never Used   Substance and Sexual Activity     Alcohol use: Not Currently     Drug use: None     Sexual activity: None   Lifestyle     Physical activity:     Days per week: None     Minutes per session: None     Stress: None   Relationships     Social connections:     Talks on phone: None     Gets together: None     Attends Alevism service: None     Active member of club or organization: None     Attends meetings of clubs or organizations: None     Relationship status: None     Intimate partner violence:     Fear of current or ex partner: None     Emotionally abused: None     Physically abused: None     Forced sexual activity: None   Other Topics Concern     None   Social History Narrative      None       REVIEW OF SYSTEM:  Pertinent items are noted in HPI.  A 12 point comprehensive review of systems was negative except as noted.    PHYSICAL EXAM:     General Appearance:    Alert, cooperative, no distress, appears stated age   Head:    Normocephalic, without obvious abnormality, atraumatic   Eyes:    PERRL, conjunctiva/corneas clear, both eyes   Ears:    Normal external ear canals, both ears   Nose:   Nares normal, septum midline, mucosa normal, no drainage    or sinus tenderness   Throat:   Lips, mucosa, and tongue normal; teeth and gums normal   Neck:   Supple, symmetrical, trachea midline, no adenopathy;     thyroid:  no enlargement/tenderness/nodules; no carotid    bruit or JVD   Back:     Symmetric, no curvature, ROM normal, no CVA tenderness   Lungs:     Clear to auscultation bilaterally, respirations unlabored   Chest Wall:    No tenderness or deformity    Heart:    Regular rate and rhythm, S1 and S2 normal, no murmur, rub   or gallop   Abdomen:     Soft, non-tender, bowel sounds active all four quadrants,     no masses, no organomegaly; incision site CDI with glue   Extremities:   Extremities normal, atraumatic, no cyanosis or edema; incision site CDI   Pulses:   2+ and symmetric all extremities   Skin:   Skin color, texture, turgor normal, no rashes or lesions   Neurologic:   Oriented to person, place, time, and situation; exhibits logical thought processes; generalized weakness           LABS:     Lab Results   Component Value Date    WBC 8.3 02/11/2020    HGB 10.6 (L) 02/11/2020    HCT 36.7 02/11/2020     02/11/2020     02/14/2020    K 4.6 02/14/2020     (H) 02/11/2020    CREATININE 0.76 02/14/2020    BUN 33 (H) 02/11/2020    CO2 23 02/11/2020        ASSESSMENT:      ICD-10-CM    1. Physical deconditioning R53.81    2. Closed displaced fracture of anterior column of left acetabulum with routine healing, subsequent encounter S32.432D    3. S/P ORIF (open reduction internal  fixation) fracture Z98.890     Z87.81        PLAN:      Physical deconditioning  -PT/OT as directed  -Discharge from facility per therapy recommendations     S/p left hip ORIF  -DISCONTINUE oxycodone PRN  -Continue Tylenol 1 g three times a day   -Encouraged patient to utilize cold therapy three times a day and prn  -Encouraged patient to utilize integrative therapies such as distraction and deep breathing for pain management  -Notify provider if patient has new or worsening pain   -Follow-up with orthopedic provider on 2/19/20     Otherwise continue current care plan for all other chronic medical conditions, as they are stable. Encouraged patient to engage in healthy lifestyle behaviors such as engaging in social activities, exercising (PT/OT), eating well, and following care plan. Follow up for routine check-up, or sooner if needed. Will continue to monitor patient and work with nursing staff collaboratively to work toward positive patient outcomes.     Electronically signed by: Danelle Strickland, CNP

## 2021-06-06 NOTE — PROGRESS NOTES
[facility]    Facility:   [facility] [[id number]]   Code Status: POLST AVAILABLE      CHIEF COMPLAINT/REASON FOR VISIT:  Chief Complaint   Patient presents with     Review Of Multiple Medical Conditions       HISTORY:      HPI: Addis is a 69 y.o. female who was admitted to [facility] from 2/2/20-2/7/20 for left hip ORIF s/p fall at home.  There were not postoperative complications noted in her hospital discharge summary.  It was recommended by her orthopedic provider that she remain on Lovenox for one month postoperatively for DVT prophylaxis.  Addis  has a past medical history of Allergic rhinitis, CKD (chronic kidney disease), Depression, Displaced dome fracture of left acetabulum (H), DM2 (diabetes mellitus, type 2) (H), GERD (gastroesophageal reflux disease), Gout, HTN (hypertension), Left hemiplegia (H), Migraine, 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), Nephrolithiasis, Stroke (H), and Vitamin B12 deficiency anemia.  She is currently at [facility] s/p hospitalization for acute rehabilitation.      Today Ms. Peña is being evaluated for a review of multiple medical conditions.  Her Lantus was decreased from 30 units daily to 25 units daily due to hypoglycemia in the AM before breakfast and she continues to have low blood glucose with range  over the past week.  She denied pain at this visit and was agreeable to discontinuation of her ibuprofen at this time.  She continues to be NWB in her LLE and will follow-up with her orthopedic provider on 3/23/20.  The patient denied lightheadedness, dizziness, breathing difficulty, chest pain, palpitations, constipation, urinary symptoms, numbness or tingling in extremities, and pain.  Nursing staff denied any new concerns for the patient at this time.    Past Medical History:   Diagnosis Date     Allergic rhinitis      CKD (chronic kidney disease)      Depression      Displaced dome  fracture of left acetabulum (H)      DM2 (diabetes mellitus, type 2) (H)      GERD (gastroesophageal reflux disease)      Gout      HTN (hypertension)      Left hemiplegia (H)      Migraine      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)      Nephrolithiasis      Stroke (H)      Vitamin B12 deficiency anemia              Family History   Problem Relation Age of Onset     Heart disease Mother      Hypertension Mother      Coronary artery disease Father      Diabetes Sister      Hypertension Brother      Cancer Sister      Social History     Socioeconomic History     Marital status:      Spouse name: None     Number of children: None     Years of education: None     Highest education level: None   Occupational History     None   Social Needs     Financial resource strain: None     Food insecurity:     Worry: None     Inability: None     Transportation needs:     Medical: None     Non-medical: None   Tobacco Use     Smoking status: Never Smoker     Smokeless tobacco: Never Used   Substance and Sexual Activity     Alcohol use: Not Currently     Drug use: None     Sexual activity: None   Lifestyle     Physical activity:     Days per week: None     Minutes per session: None     Stress: None   Relationships     Social connections:     Talks on phone: None     Gets together: None     Attends Jehovah's witness service: None     Active member of club or organization: None     Attends meetings of clubs or organizations: None     Relationship status: None     Intimate partner violence:     Fear of current or ex partner: None     Emotionally abused: None     Physically abused: None     Forced sexual activity: None   Other Topics Concern     None   Social History Narrative     None       REVIEW OF SYSTEM:  Pertinent items are noted in HPI.  A 12 point comprehensive review of systems was negative except as noted.    PHYSICAL EXAM:     General Appearance:    Alert, cooperative, no distress, appears stated age    Head:    Normocephalic, without obvious abnormality, atraumatic   Eyes:    PERRL, conjunctiva/corneas clear, both eyes   Ears:    Normal external ear canals, both ears   Nose:   Nares normal, septum midline, mucosa normal, no drainage    or sinus tenderness   Throat:   Lips, mucosa, and tongue normal; teeth and gums normal   Neck:   Supple, symmetrical, trachea midline, no adenopathy;     thyroid:  no enlargement/tenderness/nodules; no carotid    bruit or JVD   Back:     Symmetric, no curvature, ROM normal, no CVA tenderness   Lungs:     Clear to auscultation bilaterally, respirations unlabored   Chest Wall:    No tenderness or deformity    Heart:    Regular rate and rhythm, S1 and S2 normal, no murmur, rub   or gallop   Abdomen:     Soft, non-tender, bowel sounds active all four quadrants,     no masses, no organomegaly; incision site CDI with glue   Extremities:   Extremities normal, atraumatic, no cyanosis or edema; incision site CDI   Pulses:   2+ and symmetric all extremities   Skin:   Skin color, texture, turgor normal, no rashes or lesions   Neurologic:   Oriented to person, place, time, and situation; exhibits logical thought processes; generalized weakness           LABS:     Lab Results   Component Value Date    WBC 8.3 02/11/2020    HGB 10.6 (L) 02/11/2020    HCT 36.7 02/11/2020     02/11/2020     02/14/2020    K 4.6 02/14/2020     (H) 02/11/2020    CREATININE 0.76 02/14/2020    BUN 33 (H) 02/11/2020    CO2 23 02/11/2020        ASSESSMENT:      ICD-10-CM    1. Physical deconditioning R53.81    2. Closed displaced fracture of anterior column of left acetabulum with routine healing, subsequent encounter S32.432D    3. S/P ORIF (open reduction internal fixation) fracture Z98.890     Z87.81    4. Postoperative pain G89.18    5. Left hip pain M25.552    6. Type 2 diabetes mellitus with other neurologic complication, with long-term current use of insulin (H) E11.49     Z79.4        PLAN:       Physical deconditioning  -PT/OT as directed  -Discharge from facility per therapy recommendations     Postoperative pain  -Continue Tylenol 1 g three times a day   -DISCONTINUE ibuprofen orders  -Encouraged patient to utilize cold therapy three times a day and prn  -Encouraged patient to utilize integrative therapies such as distraction and deep breathing for pain management  -Notify provider if patient has new or worsening pain   -Follow-up with orthopedic provider on 3/23/20    DMII  -DECREASE Lantus from 25 units to 20 units daily  -Continue metformin 1 g two times a day   -Notify provider if BG < 70 or > 500    Otherwise continue current care plan for all other chronic medical conditions, as they are stable. Encouraged patient to engage in healthy lifestyle behaviors such as engaging in social activities, exercising (PT/OT), eating well, and following care plan. Follow up for routine check-up, or sooner if needed. Will continue to monitor patient and work with nursing staff collaboratively to work toward positive patient outcomes.     Electronically signed by: Danelle Strickland, CNP

## 2021-06-06 NOTE — PROGRESS NOTES
[facility]    Facility:   [facility] [[id number]]   Code Status: POLST AVAILABLE      CHIEF COMPLAINT/REASON FOR VISIT:  Chief Complaint   Patient presents with     Problem Visit     diabetes mellitus type 2, physical deconditioning        HISTORY:      HPI: Addis is a 69 y.o. female who was admitted to [facility] from 2/2/20-2/7/20 for left hip ORIF s/p fall at home.  There were not postoperative complications noted in her hospital discharge summary.  It was recommended by her orthopedic provider that she remain on Lovenox for one month postoperatively for DVT prophylaxis.  Addis  has a past medical history of Allergic rhinitis, CKD (chronic kidney disease), Depression, Displaced dome fracture of left acetabulum (H), DM2 (diabetes mellitus, type 2) (H), GERD (gastroesophageal reflux disease), Gout, HTN (hypertension), Left hemiplegia (H), Migraine, 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), Nephrolithiasis, Stroke (H), and Vitamin B12 deficiency anemia.  She is currently at [facility] s/p hospitalization for acute rehabilitation.      Today Ms. Peña is being evaluated for diabetes mellitus type 2.  Addis reported that she would like to be off insulin altogether if possible.  We discussed that her POC blood glucose should be under 200 to keep her A1Cat goal < 8.0 and she was agreeable to this plan.  Her blood glucose range has been  over the past week taking Lantus 15 units daily and metformin 1 g two times a day for blood glucose management.  She continues to work on strength and conditioning exercises in therapy until she can be WBAT on her left leg with her next orthopedic follow-up on 3/23/20.  She is planning to stay at [facility] until she can ambulate again.  The patient denied lightheadedness, dizziness, breathing difficulty, chest pain, palpitations, constipation, urinary symptoms, numbness or tingling in extremities, and  pain.  Nursing staff denied any new concerns for the patient at this time.    Past Medical History:   Diagnosis Date     Allergic rhinitis      CKD (chronic kidney disease)      Depression      Displaced dome fracture of left acetabulum (H)      DM2 (diabetes mellitus, type 2) (H)      GERD (gastroesophageal reflux disease)      Gout      HTN (hypertension)      Left hemiplegia (H)      Migraine      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)      Nephrolithiasis      Stroke (H)      Vitamin B12 deficiency anemia              Family History   Problem Relation Age of Onset     Heart disease Mother      Hypertension Mother      Coronary artery disease Father      Diabetes Sister      Hypertension Brother      Cancer Sister      Social History     Socioeconomic History     Marital status:      Spouse name: None     Number of children: None     Years of education: None     Highest education level: None   Occupational History     None   Social Needs     Financial resource strain: None     Food insecurity     Worry: None     Inability: None     Transportation needs     Medical: None     Non-medical: None   Tobacco Use     Smoking status: Never Smoker     Smokeless tobacco: Never Used   Substance and Sexual Activity     Alcohol use: Not Currently     Drug use: None     Sexual activity: None   Lifestyle     Physical activity     Days per week: None     Minutes per session: None     Stress: None   Relationships     Social connections     Talks on phone: None     Gets together: None     Attends Sabianism service: None     Active member of club or organization: None     Attends meetings of clubs or organizations: None     Relationship status: None     Intimate partner violence     Fear of current or ex partner: None     Emotionally abused: None     Physically abused: None     Forced sexual activity: None   Other Topics Concern     None   Social History Narrative     None       REVIEW OF  SYSTEM:  Pertinent items are noted in HPI.  A 12 point comprehensive review of systems was negative except as noted.    PHYSICAL EXAM:     General Appearance:    Alert, cooperative, no distress, appears stated age   Head:    Normocephalic, without obvious abnormality, atraumatic   Eyes:    PERRL, conjunctiva/corneas clear, both eyes   Ears:    Normal external ear canals, both ears   Nose:   Nares normal, septum midline, mucosa normal, no drainage    or sinus tenderness   Throat:   Lips, mucosa, and tongue normal; teeth and gums normal   Neck:   Supple, symmetrical, trachea midline, no adenopathy;     thyroid:  no enlargement/tenderness/nodules; no carotid    bruit or JVD   Back:     Symmetric, no curvature, ROM normal, no CVA tenderness   Lungs:     Clear to auscultation bilaterally, respirations unlabored   Chest Wall:    No tenderness or deformity    Heart:    Regular rate and rhythm, S1 and S2 normal, no murmur, rub   or gallop   Abdomen:     Soft, non-tender, bowel sounds active all four quadrants,     no masses, no organomegaly; incision site CDI with glue   Extremities:   Extremities normal, atraumatic, no cyanosis or edema; incision site CDI   Pulses:   2+ and symmetric all extremities   Skin:   Skin color, texture, turgor normal, no rashes or lesions   Neurologic:   Oriented to person, place, time, and situation; exhibits logical thought processes; generalized weakness           LABS:     Lab Results   Component Value Date    WBC 8.3 02/11/2020    HGB 10.6 (L) 02/11/2020    HCT 36.7 02/11/2020     02/11/2020     02/14/2020    K 4.6 02/14/2020     (H) 02/11/2020    CREATININE 0.76 02/14/2020    BUN 33 (H) 02/11/2020    CO2 23 02/11/2020        ASSESSMENT:      ICD-10-CM    1. Physical deconditioning  R53.81    2. Type 2 diabetes mellitus with other neurologic complication, with long-term current use of insulin (H)  E11.49     Z79.4        PLAN:      Physical deconditioning  -PT/OT as  directed  -Discharge from facility per therapy recommendations on WBAT    DMII  -DECREASE Lantus from 15 units to 10 units daily  -Continue metformin 1 g two times a day   -Notify provider if BG < 70 or > 500    Otherwise continue current care plan for all other chronic medical conditions, as they are stable. Encouraged patient to engage in healthy lifestyle behaviors such as engaging in social activities, exercising (PT/OT), eating well, and following care plan. Follow up for routine check-up, or sooner if needed. Will continue to monitor patient and work with nursing staff collaboratively to work toward positive patient outcomes.     Electronically signed by: Danelle Strickland, CNP

## 2021-06-06 NOTE — PROGRESS NOTES
[facility]    Facility:   [facility] [[id number]]   Code Status: POLST AVAILABLE      CHIEF COMPLAINT/REASON FOR VISIT:  Chief Complaint   Patient presents with     Problem Visit     hypoglycemia       HISTORY:      HPI: Addis is a 69 y.o. female who was admitted to [facility] from 2/2/20-2/7/20 for left hip ORIF s/p fall at home.  There were not postoperative complications noted in her hospital discharge summary.  It was recommended by her orthopedic provider that she remain on Lovenox for one month postoperatively for DVT prophylaxis.  Addis  has a past medical history of Allergic rhinitis, CKD (chronic kidney disease), Depression, Displaced dome fracture of left acetabulum (H), DM2 (diabetes mellitus, type 2) (H), GERD (gastroesophageal reflux disease), Gout, HTN (hypertension), Left hemiplegia (H), Migraine, 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), Nephrolithiasis, Stroke (H), and Vitamin B12 deficiency anemia.  She is currently at [facility] s/p hospitalization for acute rehabilitation.      Today Ms. Peña is being evaluated for hypoglycemia.  Her blood glucose has been running low with BG range  over the past week with Lantus 20 units daily and metformin 1 g two times a day for blood glucose management.  She denied any s/s of hypoglycemia.  Addis asked if she could be titrated off her insulin during her TCU stay.  She continues to be NWB on her LLE and will be following up with her orthopedic provider on 3/20/20.  The patient denied lightheadedness, dizziness, breathing difficulty, chest pain, palpitations, constipation, urinary symptoms, numbness or tingling in extremities, and pain.  Nursing staff denied any new concerns for the patient at this time.    Past Medical History:   Diagnosis Date     Allergic rhinitis      CKD (chronic kidney disease)      Depression      Displaced dome fracture of left acetabulum (H)       DM2 (diabetes mellitus, type 2) (H)      GERD (gastroesophageal reflux disease)      Gout      HTN (hypertension)      Left hemiplegia (H)      Migraine      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)      Nephrolithiasis      Stroke (H)      Vitamin B12 deficiency anemia              Family History   Problem Relation Age of Onset     Heart disease Mother      Hypertension Mother      Coronary artery disease Father      Diabetes Sister      Hypertension Brother      Cancer Sister      Social History     Socioeconomic History     Marital status:      Spouse name: None     Number of children: None     Years of education: None     Highest education level: None   Occupational History     None   Social Needs     Financial resource strain: None     Food insecurity:     Worry: None     Inability: None     Transportation needs:     Medical: None     Non-medical: None   Tobacco Use     Smoking status: Never Smoker     Smokeless tobacco: Never Used   Substance and Sexual Activity     Alcohol use: Not Currently     Drug use: None     Sexual activity: None   Lifestyle     Physical activity:     Days per week: None     Minutes per session: None     Stress: None   Relationships     Social connections:     Talks on phone: None     Gets together: None     Attends Yazidi service: None     Active member of club or organization: None     Attends meetings of clubs or organizations: None     Relationship status: None     Intimate partner violence:     Fear of current or ex partner: None     Emotionally abused: None     Physically abused: None     Forced sexual activity: None   Other Topics Concern     None   Social History Narrative     None       REVIEW OF SYSTEM:  Pertinent items are noted in HPI.  A 12 point comprehensive review of systems was negative except as noted.    PHYSICAL EXAM:     General Appearance:    Alert, cooperative, no distress, appears stated age   Head:    Normocephalic, without obvious  abnormality, atraumatic   Eyes:    PERRL, conjunctiva/corneas clear, both eyes   Ears:    Normal external ear canals, both ears   Nose:   Nares normal, septum midline, mucosa normal, no drainage    or sinus tenderness   Throat:   Lips, mucosa, and tongue normal; teeth and gums normal   Neck:   Supple, symmetrical, trachea midline, no adenopathy;     thyroid:  no enlargement/tenderness/nodules; no carotid    bruit or JVD   Back:     Symmetric, no curvature, ROM normal, no CVA tenderness   Lungs:     Clear to auscultation bilaterally, respirations unlabored   Chest Wall:    No tenderness or deformity    Heart:    Regular rate and rhythm, S1 and S2 normal, no murmur, rub   or gallop   Abdomen:     Soft, non-tender, bowel sounds active all four quadrants,     no masses, no organomegaly; incision site CDI with glue   Extremities:   Extremities normal, atraumatic, no cyanosis or edema; incision site CDI   Pulses:   2+ and symmetric all extremities   Skin:   Skin color, texture, turgor normal, no rashes or lesions   Neurologic:   Oriented to person, place, time, and situation; exhibits logical thought processes; generalized weakness           LABS:     Lab Results   Component Value Date    WBC 8.3 02/11/2020    HGB 10.6 (L) 02/11/2020    HCT 36.7 02/11/2020     02/11/2020     02/14/2020    K 4.6 02/14/2020     (H) 02/11/2020    CREATININE 0.76 02/14/2020    BUN 33 (H) 02/11/2020    CO2 23 02/11/2020        ASSESSMENT:      ICD-10-CM    1. Physical deconditioning R53.81    2. Type 2 diabetes mellitus with other neurologic complication, with long-term current use of insulin (H) E11.49     Z79.4        PLAN:      Physical deconditioning  -PT/OT as directed  -Discharge from facility per therapy recommendations     DMII  -DECREASE Lantus from 20 units to 15 units daily  -Continue metformin 1 g two times a day   -Notify provider if BG < 70 or > 500    Otherwise continue current care plan for all other chronic  medical conditions, as they are stable. Encouraged patient to engage in healthy lifestyle behaviors such as engaging in social activities, exercising (PT/OT), eating well, and following care plan. Follow up for routine check-up, or sooner if needed. Will continue to monitor patient and work with nursing staff collaboratively to work toward positive patient outcomes.     Electronically signed by: Danelle Strickland CNP

## 2021-06-06 NOTE — PROGRESS NOTES
[facility]    Facility:   [facility] [[id number]]   Code Status: POLST AVAILABLE      CHIEF COMPLAINT/REASON FOR VISIT:  Chief Complaint   Patient presents with     Review Of Multiple Medical Conditions       HISTORY:      HPI: Addis is a 69 y.o. female who was admitted to [facility] from 2/2/20-2/7/20 for left hip ORIF s/p fall at home.  There were not postoperative complications noted in her hospital discharge summary.  It was recommended by her orthopedic provider that she remain on Lovenox for one month postoperatively for DVT prophylaxis.  Addis  has a past medical history of Allergic rhinitis, CKD (chronic kidney disease), Depression, Displaced dome fracture of left acetabulum (H), DM2 (diabetes mellitus, type 2) (H), GERD (gastroesophageal reflux disease), Gout, HTN (hypertension), Left hemiplegia (H), Migraine, 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), Nephrolithiasis, Stroke (H), and Vitamin B12 deficiency anemia.  She is currently at [facility] s/p hospitalization for acute rehabilitation.      Today Ms. Peña is being evaluated for a review of multiple medical conditions.  Nursing staff noted that Addis's blood glucose in the AM has been in 80s over the past few days taking Lantus 30 units daily in AM and 1 g metformin two times a day for blood glucose management.  Addis was agreeable to decrease in her Lantus at this visit.  Her blood pressure has been well-controlled since TCU admission with -130s on her current antihypertensive regimen.  She continues to have moderate confusion per nursing staff with forgetfulness.  The patient denied sleep disturbances, changes in mood or appetite,  lightheadedness, dizziness, breathing difficulty, chest pain, palpitations, constipation, urinary symptoms, numbness or tingling in extremities, and pain.  Nursing staff denied any other concerns for the patient at this time and  feel that they have been at their baseline functioning over the past month.     Past Medical History:   Diagnosis Date     Allergic rhinitis      CKD (chronic kidney disease)      Depression      Displaced dome fracture of left acetabulum (H)      DM2 (diabetes mellitus, type 2) (H)      GERD (gastroesophageal reflux disease)      Gout      HTN (hypertension)      Left hemiplegia (H)      Migraine      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)      Nephrolithiasis      Stroke (H)      Vitamin B12 deficiency anemia              Family History   Problem Relation Age of Onset     Heart disease Mother      Hypertension Mother      Coronary artery disease Father      Diabetes Sister      Hypertension Brother      Cancer Sister      Social History     Socioeconomic History     Marital status:      Spouse name: None     Number of children: None     Years of education: None     Highest education level: None   Occupational History     None   Social Needs     Financial resource strain: None     Food insecurity:     Worry: None     Inability: None     Transportation needs:     Medical: None     Non-medical: None   Tobacco Use     Smoking status: Never Smoker     Smokeless tobacco: Never Used   Substance and Sexual Activity     Alcohol use: Not Currently     Drug use: None     Sexual activity: None   Lifestyle     Physical activity:     Days per week: None     Minutes per session: None     Stress: None   Relationships     Social connections:     Talks on phone: None     Gets together: None     Attends Oriental orthodox service: None     Active member of club or organization: None     Attends meetings of clubs or organizations: None     Relationship status: None     Intimate partner violence:     Fear of current or ex partner: None     Emotionally abused: None     Physically abused: None     Forced sexual activity: None   Other Topics Concern     None   Social History Narrative     None       REVIEW OF  SYSTEM:  Pertinent items are noted in HPI.  A 12 point comprehensive review of systems was negative except as noted.    PHYSICAL EXAM:     General Appearance:    Alert, cooperative, no distress, appears stated age   Head:    Normocephalic, without obvious abnormality, atraumatic   Eyes:    PERRL, conjunctiva/corneas clear, both eyes   Ears:    Normal external ear canals, both ears   Nose:   Nares normal, septum midline, mucosa normal, no drainage    or sinus tenderness   Throat:   Lips, mucosa, and tongue normal; teeth and gums normal   Neck:   Supple, symmetrical, trachea midline, no adenopathy;     thyroid:  no enlargement/tenderness/nodules; no carotid    bruit or JVD   Back:     Symmetric, no curvature, ROM normal, no CVA tenderness   Lungs:     Clear to auscultation bilaterally, respirations unlabored   Chest Wall:    No tenderness or deformity    Heart:    Regular rate and rhythm, S1 and S2 normal, no murmur, rub   or gallop   Abdomen:     Soft, non-tender, bowel sounds active all four quadrants,     no masses, no organomegaly; incision site CDI with glue   Extremities:   Extremities normal, atraumatic, no cyanosis or edema; incision site CDI   Pulses:   2+ and symmetric all extremities   Skin:   Skin color, texture, turgor normal, no rashes or lesions   Neurologic:   Oriented to person, place, time, and situation; exhibits logical thought processes; generalized weakness           LABS:     Lab Results   Component Value Date    WBC 8.3 02/11/2020    HGB 10.6 (L) 02/11/2020    HCT 36.7 02/11/2020     02/11/2020     02/14/2020    K 4.6 02/14/2020     (H) 02/11/2020    CREATININE 0.76 02/14/2020    BUN 33 (H) 02/11/2020    CO2 23 02/11/2020        ASSESSMENT:      ICD-10-CM    1. Type 2 diabetes mellitus with other neurologic complication, with long-term current use of insulin (H) E11.49     Z79.4    2. Essential hypertension I10    3. Physical deconditioning R53.81        PLAN:      Physical  deconditioning  -PT/OT as directed  -Discharge from facility per therapy recommendations     Hypertension  -Encouraged cardiac diet, low sodium diet, exercise and stress reduction techniques.   -Emphasized importance of blood pressure control.   -Continue current medications as prescribed.   -Notify provider if pt's SBP<90 or >180 and DBP <50 or >100     DMII  -DECREASE Lantus from 30 units to 25 units daily  -Continue metformin 1 g two times a day   -Notify provider if BG < 70 or > 500    Otherwise continue current care plan for all other chronic medical conditions, as they are stable. Encouraged patient to engage in healthy lifestyle behaviors such as engaging in social activities, exercising (PT/OT), eating well, and following care plan. Follow up for routine check-up, or sooner if needed. Will continue to monitor patient and work with nursing staff collaboratively to work toward positive patient outcomes.     Electronically signed by: Danelle Strickland, CNP

## 2021-06-06 NOTE — PROGRESS NOTES
[facility]    Facility:   [facility] [[id number]]   Code Status: POLST AVAILABLE      CHIEF COMPLAINT/REASON FOR VISIT:  Chief Complaint   Patient presents with     Review Of Multiple Medical Conditions       HISTORY:      HPI: Addis is a 69 y.o. female who was admitted to [facility] from 2/2/20-2/7/20 for left hip ORIF s/p fall at home.  There were not postoperative complications noted in her hospital discharge summary.  It was recommended by her orthopedic provider that she remain on Lovenox for one month postoperatively for DVT prophylaxis.  Addis  has a past medical history of Allergic rhinitis, CKD (chronic kidney disease), Depression, Displaced dome fracture of left acetabulum (H), DM2 (diabetes mellitus, type 2) (H), GERD (gastroesophageal reflux disease), Gout, HTN (hypertension), Left hemiplegia (H), Migraine, 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), Nephrolithiasis, Stroke (H), and Vitamin B12 deficiency anemia.  She is currently at [facility] s/p hospitalization for acute rehabilitation.      Today Ms. Peña is being evaluated for a review of multiple medical conditions.  She denied pain at this time and feels that her postoperative pain has been well-managed with Tylenol PRN and oxycodone PRN for pain management.  She said that she has not been utilizing ice packs but has increased pain after her therapy sessions.  She said that she has mostly been working with therapy on exercises in her bed over the weekend.  Her blood glucose has been well-managed on her current regimen with range 105-119 since TCU admission.  The patient denied lightheadedness, dizziness, breathing difficulty, chest pain, palpitations, constipation, urinary symptoms, numbness or tingling in extremities, and pain.  Nursing staff denied any new concerns for the patient at this time.  Addis noted that Aubie is her primary contact that helps manage her  care and they do not have any concerns over the phone at this visit.    Past Medical History:   Diagnosis Date     Allergic rhinitis      CKD (chronic kidney disease)      Depression      Displaced dome fracture of left acetabulum (H)      DM2 (diabetes mellitus, type 2) (H)      GERD (gastroesophageal reflux disease)      Gout      HTN (hypertension)      Left hemiplegia (H)      Migraine      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)      Nephrolithiasis      Stroke (H)      Vitamin B12 deficiency anemia              Family History   Problem Relation Age of Onset     Heart disease Mother      Hypertension Mother      Coronary artery disease Father      Diabetes Sister      Hypertension Brother      Cancer Sister      Social History     Socioeconomic History     Marital status:      Spouse name: None     Number of children: None     Years of education: None     Highest education level: None   Occupational History     None   Social Needs     Financial resource strain: None     Food insecurity:     Worry: None     Inability: None     Transportation needs:     Medical: None     Non-medical: None   Tobacco Use     Smoking status: Never Smoker     Smokeless tobacco: Never Used   Substance and Sexual Activity     Alcohol use: Not Currently     Drug use: None     Sexual activity: None   Lifestyle     Physical activity:     Days per week: None     Minutes per session: None     Stress: None   Relationships     Social connections:     Talks on phone: None     Gets together: None     Attends Muslim service: None     Active member of club or organization: None     Attends meetings of clubs or organizations: None     Relationship status: None     Intimate partner violence:     Fear of current or ex partner: None     Emotionally abused: None     Physically abused: None     Forced sexual activity: None   Other Topics Concern     None   Social History Narrative     None       REVIEW OF  SYSTEM:  Pertinent items are noted in HPI.  A 12 point comprehensive review of systems was negative except as noted.    PHYSICAL EXAM:     General Appearance:    Alert, cooperative, no distress, appears stated age   Head:    Normocephalic, without obvious abnormality, atraumatic   Eyes:    PERRL, conjunctiva/corneas clear, both eyes   Ears:    Normal external ear canals, both ears   Nose:   Nares normal, septum midline, mucosa normal, no drainage    or sinus tenderness   Throat:   Lips, mucosa, and tongue normal; teeth and gums normal   Neck:   Supple, symmetrical, trachea midline, no adenopathy;     thyroid:  no enlargement/tenderness/nodules; no carotid    bruit or JVD   Back:     Symmetric, no curvature, ROM normal, no CVA tenderness   Lungs:     Clear to auscultation bilaterally, respirations unlabored   Chest Wall:    No tenderness or deformity    Heart:    Regular rate and rhythm, S1 and S2 normal, no murmur, rub   or gallop   Abdomen:     Soft, non-tender, bowel sounds active all four quadrants,     no masses, no organomegaly; incision site CDI with glue   Extremities:   Extremities normal, atraumatic, no cyanosis or edema; incision site CDI   Pulses:   2+ and symmetric all extremities   Skin:   Skin color, texture, turgor normal, no rashes or lesions   Neurologic:   Oriented to person, place, time, and situation; exhibits logical thought processes; generalized weakness           LABS:     Check BMP, Mg, and CBC on 2/11/20.    ASSESSMENT:      ICD-10-CM    1. Left hip pain M25.552    2. Postoperative pain G89.18    3. Physical deconditioning R53.81    4. Closed displaced fracture of anterior column of left acetabulum with routine healing, subsequent encounter S32.432D        PLAN:      Physical deconditioning  -PT/OT as directed  -Discharge from facility per therapy recommendations     S/p left hip ORIF  -Check BMP, Mg, and CBC on 2/11/20  -Continue oxycodone PRN  -START Tylenol 1 g three times a day    -Encouraged patient to utilize cold therapy three times a day and prn  -Encouraged patient to utilize integrative therapies such as distraction and deep breathing for pain management  -Notify provider if patient has new or worsening pain   -Follow-up with orthopedic provider on 2/19/20     Otherwise continue current care plan for all other chronic medical conditions, as they are stable. Encouraged patient to engage in healthy lifestyle behaviors such as engaging in social activities, exercising (PT/OT), eating well, and following care plan. Follow up for routine check-up, or sooner if needed. Will continue to monitor patient and work with nursing staff collaboratively to work toward positive patient outcomes.     Electronically signed by: Danelle Strickland CNP     Total floor/unit time was 60 minutes and 40 minutes was spent in counseling patient on pain management and acute rehabilitation and coordination of care with nursing staff, SW, and therapy services.

## 2021-06-06 NOTE — PROGRESS NOTES
[facility]    Facility:   [facility] [[id number]]   Code Status: POLST AVAILABLE      CHIEF COMPLAINT/REASON FOR VISIT:  Chief Complaint   Patient presents with     Review Of Multiple Medical Conditions       HISTORY:      HPI: Addis is a 69 y.o. female who was admitted to [facility] from 2/2/20-2/7/20 for left hip ORIF s/p fall at home.  There were not postoperative complications noted in her hospital discharge summary.  It was recommended by her orthopedic provider that she remain on Lovenox for one month postoperatively for DVT prophylaxis.  Addis  has a past medical history of Allergic rhinitis, CKD (chronic kidney disease), Depression, Displaced dome fracture of left acetabulum (H), DM2 (diabetes mellitus, type 2) (H), GERD (gastroesophageal reflux disease), Gout, HTN (hypertension), Left hemiplegia (H), Migraine, 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), Nephrolithiasis, Stroke (H), and Vitamin B12 deficiency anemia.  She is currently at [facility] s/p hospitalization for acute rehabilitation.      Today Ms. Peña is being evaluated for a review of multiple medical conditions.  Addis denied pain at this time taking Tylenol 1 g three times a day for her left hip pain.  She is on Baclofen for chronic muscle spasticity as well.  She said that she has been working on standing on her nonsurgical RLE in therapy with good progress.  Her blood pressure has been well-controlled on her current antihypertensive regimen with -130s since TCU admission.  Her blood glucose has been on the lower end of normal with range  since U admission taking Lantus 30 units daily in AM and metformin 1 g two times a day for blood glucose management.  The patient denied lightheadedness, dizziness, breathing difficulty, chest pain, palpitations, constipation, urinary symptoms, numbness or tingling in extremities, and pain.  Nursing staff denied  any new concerns for the patient at this time.    Past Medical History:   Diagnosis Date     Allergic rhinitis      CKD (chronic kidney disease)      Depression      Displaced dome fracture of left acetabulum (H)      DM2 (diabetes mellitus, type 2) (H)      GERD (gastroesophageal reflux disease)      Gout      HTN (hypertension)      Left hemiplegia (H)      Migraine      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)      Nephrolithiasis      Stroke (H)      Vitamin B12 deficiency anemia              Family History   Problem Relation Age of Onset     Heart disease Mother      Hypertension Mother      Coronary artery disease Father      Diabetes Sister      Hypertension Brother      Cancer Sister      Social History     Socioeconomic History     Marital status:      Spouse name: None     Number of children: None     Years of education: None     Highest education level: None   Occupational History     None   Social Needs     Financial resource strain: None     Food insecurity:     Worry: None     Inability: None     Transportation needs:     Medical: None     Non-medical: None   Tobacco Use     Smoking status: Never Smoker     Smokeless tobacco: Never Used   Substance and Sexual Activity     Alcohol use: Not Currently     Drug use: None     Sexual activity: None   Lifestyle     Physical activity:     Days per week: None     Minutes per session: None     Stress: None   Relationships     Social connections:     Talks on phone: None     Gets together: None     Attends Adventism service: None     Active member of club or organization: None     Attends meetings of clubs or organizations: None     Relationship status: None     Intimate partner violence:     Fear of current or ex partner: None     Emotionally abused: None     Physically abused: None     Forced sexual activity: None   Other Topics Concern     None   Social History Narrative     None       REVIEW OF SYSTEM:  Pertinent items are noted in  HPI.  A 12 point comprehensive review of systems was negative except as noted.    PHYSICAL EXAM:     General Appearance:    Alert, cooperative, no distress, appears stated age   Head:    Normocephalic, without obvious abnormality, atraumatic   Eyes:    PERRL, conjunctiva/corneas clear, both eyes   Ears:    Normal external ear canals, both ears   Nose:   Nares normal, septum midline, mucosa normal, no drainage    or sinus tenderness   Throat:   Lips, mucosa, and tongue normal; teeth and gums normal   Neck:   Supple, symmetrical, trachea midline, no adenopathy;     thyroid:  no enlargement/tenderness/nodules; no carotid    bruit or JVD   Back:     Symmetric, no curvature, ROM normal, no CVA tenderness   Lungs:     Clear to auscultation bilaterally, respirations unlabored   Chest Wall:    No tenderness or deformity    Heart:    Regular rate and rhythm, S1 and S2 normal, no murmur, rub   or gallop   Abdomen:     Soft, non-tender, bowel sounds active all four quadrants,     no masses, no organomegaly; incision site CDI with glue   Extremities:   Extremities normal, atraumatic, no cyanosis or edema; incision site CDI   Pulses:   2+ and symmetric all extremities   Skin:   Skin color, texture, turgor normal, no rashes or lesions   Neurologic:   Oriented to person, place, time, and situation; exhibits logical thought processes; generalized weakness           LABS:     Lab Results   Component Value Date    WBC 8.3 02/11/2020    HGB 10.6 (L) 02/11/2020    HCT 36.7 02/11/2020     02/11/2020     02/14/2020    K 4.6 02/14/2020     (H) 02/11/2020    CREATININE 0.76 02/14/2020    BUN 33 (H) 02/11/2020    CO2 23 02/11/2020        ASSESSMENT:      ICD-10-CM    1. S/P ORIF (open reduction internal fixation) fracture Z98.890     Z87.81    2. Closed displaced fracture of anterior column of left acetabulum with routine healing, subsequent encounter S32.432D    3. Physical deconditioning R53.81    4. Postoperative pain  G89.18    5. Left hip pain M25.552    6. Essential hypertension I10    7. Type 2 diabetes mellitus with other neurologic complication, with long-term current use of insulin (H) E11.49     Z79.4        PLAN:      Physical deconditioning  -PT/OT as directed  -Discharge from facility per therapy recommendations     S/p left hip ORIF  -Continue Tylenol 1 g three times a day   -Encouraged patient to utilize cold therapy three times a day and prn  -Encouraged patient to utilize integrative therapies such as distraction and deep breathing for pain management  -Notify provider if patient has new or worsening pain   -Follow-up with orthopedic provider on 2/19/20     Hypertension  -Encouraged cardiac diet, low sodium diet, exercise and stress reduction techniques.   -Emphasized importance of blood pressure control.   -Continue current medications as prescribed.   -Notify provider if pt's SBP<90 or >180 and DBP <50 or >100     DMII  -Continue Lantus 30 units daily  -Continue metformin 1 g two times a day   -Notify provider if BG < 70 or > 500    Otherwise continue current care plan for all other chronic medical conditions, as they are stable. Encouraged patient to engage in healthy lifestyle behaviors such as engaging in social activities, exercising (PT/OT), eating well, and following care plan. Follow up for routine check-up, or sooner if needed. Will continue to monitor patient and work with nursing staff collaboratively to work toward positive patient outcomes.     Electronically signed by: Danelle Strickland, CNP

## 2021-06-06 NOTE — PROGRESS NOTES
[facility]    Facility:   [facility] [[id number]]   Code Status: POLST AVAILABLE      CHIEF COMPLAINT/REASON FOR VISIT:  Chief Complaint   Patient presents with     Problem Visit     incision follow up. hip pain.        HISTORY:      HPI: Addis is a 69 y.o. female who was admitted to [facility] from 2/2/20-2/7/20 for left hip ORIF s/p fall at home.  There were not postoperative complications noted in her hospital discharge summary.  It was recommended by her orthopedic provider that she remain on Lovenox for one month postoperatively for DVT prophylaxis.  Addis  has a past medical history of Allergic rhinitis, CKD (chronic kidney disease), Depression, Displaced dome fracture of left acetabulum (H), DM2 (diabetes mellitus, type 2) (H), GERD (gastroesophageal reflux disease), Gout, HTN (hypertension), Left hemiplegia (H), Migraine, 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), Nephrolithiasis, Stroke (H), and Vitamin B12 deficiency anemia.  She is currently at [facility] s/p hospitalization for acute rehabilitation.      Today Ms. Peña is seen at request of nursing to review her abdomen and incision.  Apparently there were nursing students on the unit today and the nurse instructor had concerns with a lump underneath her incision.  I palpated this and it feels like incisional scar tissue, however there is no pain, redness, drainage from anywhere.  We will continue to monitor this closely for any of the above-mentioned.  She continues to be nonweightbearing to the left lower extremity.  She says her pain is well controlled and she would like her ice discontinued.  No need to continue to check temperature every shift.  She is denying any other complaints.    Past Medical History:   Diagnosis Date     Allergic rhinitis      CKD (chronic kidney disease)      Depression      Displaced dome fracture of left acetabulum (H)      DM2 (diabetes  mellitus, type 2) (H)      GERD (gastroesophageal reflux disease)      Gout      HTN (hypertension)      Left hemiplegia (H)      Migraine      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)      Nephrolithiasis      Stroke (H)      Vitamin B12 deficiency anemia              Family History   Problem Relation Age of Onset     Heart disease Mother      Hypertension Mother      Coronary artery disease Father      Diabetes Sister      Hypertension Brother      Cancer Sister      Social History     Socioeconomic History     Marital status:      Spouse name: Not on file     Number of children: Not on file     Years of education: Not on file     Highest education level: Not on file   Occupational History     Not on file   Social Needs     Financial resource strain: Not on file     Food insecurity:     Worry: Not on file     Inability: Not on file     Transportation needs:     Medical: Not on file     Non-medical: Not on file   Tobacco Use     Smoking status: Never Smoker     Smokeless tobacco: Never Used   Substance and Sexual Activity     Alcohol use: Not Currently     Drug use: Not on file     Sexual activity: Not on file   Lifestyle     Physical activity:     Days per week: Not on file     Minutes per session: Not on file     Stress: Not on file   Relationships     Social connections:     Talks on phone: Not on file     Gets together: Not on file     Attends Adventism service: Not on file     Active member of club or organization: Not on file     Attends meetings of clubs or organizations: Not on file     Relationship status: Not on file     Intimate partner violence:     Fear of current or ex partner: Not on file     Emotionally abused: Not on file     Physically abused: Not on file     Forced sexual activity: Not on file   Other Topics Concern     Not on file   Social History Narrative     Not on file       REVIEW OF SYSTEM:  Pertinent items are noted in HPI.  A 12 point comprehensive review of  systems was negative except as noted.    PHYSICAL EXAM:     General Appearance:    Alert, cooperative, no distress, appears stated age   Head:    Normocephalic, without obvious abnormality, atraumatic   Eyes:    PERRL, conjunctiva/corneas clear, both eyes   Ears:    Normal external ear canals, both ears   Nose:   Nares normal, septum midline, mucosa normal, no drainage    or sinus tenderness   Throat:   Lips, mucosa, and tongue normal; teeth and gums normal   Neck:   Supple, symmetrical, trachea midline, no adenopathy;     thyroid:  no enlargement/tenderness/nodules; no carotid    bruit or JVD   Back:     Symmetric, no curvature, ROM normal, no CVA tenderness   Lungs:     Clear to auscultation bilaterally, respirations unlabored   Chest Wall:    No tenderness or deformity    Heart:    Regular rate and rhythm, S1 and S2 normal, no murmur, rub   or gallop   Abdomen:     Soft, non-tender, bowel sounds active all four quadrants,     no masses, no organomegaly; incision site CDI with glue   Extremities:   Extremities normal, atraumatic, no cyanosis or edema; incision site CDI   Pulses:   2+ and symmetric all extremities   Skin:   Skin color, texture, turgor normal, no rashes or lesions   Neurologic:   Oriented to person, place, time, and situation; exhibits logical thought processes; generalized weakness           LABS:     Lab Results   Component Value Date    WBC 8.3 02/11/2020    HGB 10.6 (L) 02/11/2020    HCT 36.7 02/11/2020     02/11/2020     02/14/2020    K 4.6 02/14/2020     (H) 02/11/2020    CREATININE 0.76 02/14/2020    BUN 33 (H) 02/11/2020    CO2 23 02/11/2020        ASSESSMENT:      ICD-10-CM    1. S/P ORIF (open reduction internal fixation) fracture Z98.890     Z87.81    2. Fall, subsequent encounter W19.XXXD    3. Closed displaced fracture of anterior column of left acetabulum with routine healing, subsequent encounter S32.432D    4. Left hip pain M25.552        PLAN:      Physical  deconditioning  -PT/OT as directed  -Discharge from facility per therapy recommendations     Postoperative pain  -Continue Tylenol 1 g three times a day   -okay to discontinue scheduled ice to left hip for patient preference.   -Follow-up with orthopedic provider on 3/23/20  -Discontinue temps every shift, check temp per facility protocol and as needed.          Electronically signed by: Clau Gama CNP

## 2021-06-07 NOTE — PROGRESS NOTES
[facility]    Facility:   [facility] [[id number]]   Code Status: POLST AVAILABLE      CHIEF COMPLAINT/REASON FOR VISIT:  Chief Complaint   Patient presents with     Problem Visit     DM type 2       HISTORY:      HPI: Addis is a 69 y.o. female who was admitted to [facility] from 2/2/20-2/7/20 for left hip ORIF s/p fall at home.  There were not postoperative complications noted in her hospital discharge summary.  It was recommended by her orthopedic provider that she remain on Lovenox for one month postoperatively for DVT prophylaxis.  Addis  has a past medical history of Allergic rhinitis, CKD (chronic kidney disease), Depression, Displaced dome fracture of left acetabulum (H), DM2 (diabetes mellitus, type 2) (H), GERD (gastroesophageal reflux disease), Gout, HTN (hypertension), Left hemiplegia (H), Migraine, 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), Nephrolithiasis, Stroke (H), and Vitamin B12 deficiency anemia.  She is currently at [facility] s/p hospitalization for acute rehabilitation.      Today Ms. Peña is being evaluated for diabetes mellitus type 2.  Addis has been tolerating decrease in her metformin to 1 g in AM and 500 mg at HS with blood glucose range 131-188 over the past week.  She does not want to titrate her metformin any further at this time.  Her blood pressure has been well-controlled on her current antihypertensive regimen with -120s during TCU stay.  She continues work with therapy services on ambulating short distances but she has not progressed much per PT/OT.  The patient denied lightheadedness, dizziness, breathing difficulty, chest pain, palpitations, constipation, urinary symptoms, numbness or tingling in extremities, and pain.  Nursing staff denied any new concerns for the patient at this time and feel that they have been at their baseline functioning over the past month.     Past Medical History:    Diagnosis Date     Allergic rhinitis      CKD (chronic kidney disease)      Depression      Displaced dome fracture of left acetabulum (H)      DM2 (diabetes mellitus, type 2) (H)      GERD (gastroesophageal reflux disease)      Gout      HTN (hypertension)      Left hemiplegia (H)      Migraine      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)      Nephrolithiasis      Stroke (H)      Vitamin B12 deficiency anemia              Family History   Problem Relation Age of Onset     Heart disease Mother      Hypertension Mother      Coronary artery disease Father      Diabetes Sister      Hypertension Brother      Cancer Sister      Social History     Socioeconomic History     Marital status:      Spouse name: Not on file     Number of children: Not on file     Years of education: Not on file     Highest education level: Not on file   Occupational History     Not on file   Social Needs     Financial resource strain: Not on file     Food insecurity     Worry: Not on file     Inability: Not on file     Transportation needs     Medical: Not on file     Non-medical: Not on file   Tobacco Use     Smoking status: Never Smoker     Smokeless tobacco: Never Used   Substance and Sexual Activity     Alcohol use: Not Currently     Drug use: Not on file     Sexual activity: Not on file   Lifestyle     Physical activity     Days per week: Not on file     Minutes per session: Not on file     Stress: Not on file   Relationships     Social connections     Talks on phone: Not on file     Gets together: Not on file     Attends Jain service: Not on file     Active member of club or organization: Not on file     Attends meetings of clubs or organizations: Not on file     Relationship status: Not on file     Intimate partner violence     Fear of current or ex partner: Not on file     Emotionally abused: Not on file     Physically abused: Not on file     Forced sexual activity: Not on file   Other Topics Concern      Not on file   Social History Narrative     Not on file       REVIEW OF SYSTEM:  Pertinent items are noted in HPI.  A 12 point comprehensive review of systems was negative except as noted.    PHYSICAL EXAM:     General Appearance:    Alert, cooperative, no distress, appears stated age   Head:    Normocephalic, without obvious abnormality, atraumatic   Eyes:    PERRL, conjunctiva/corneas clear, both eyes   Ears:    Normal external ear canals, both ears   Nose:   Nares normal, septum midline, mucosa normal, no drainage    or sinus tenderness   Throat:   Lips, mucosa, and tongue normal; teeth and gums normal   Neck:   Supple, symmetrical, trachea midline, no adenopathy;     thyroid:  no enlargement/tenderness/nodules; no carotid    bruit or JVD   Back:     Symmetric, no curvature, ROM normal, no CVA tenderness   Lungs:     Clear to auscultation bilaterally, respirations unlabored   Chest Wall:    No tenderness or deformity    Heart:    Regular rate and rhythm, S1 and S2 normal, no murmur, rub   or gallop   Abdomen:     Soft, non-tender, bowel sounds active all four quadrants,     no masses, no organomegaly; incision site CDI with glue   Extremities:   Extremities normal, atraumatic, no cyanosis or edema; incision site CDI   Pulses:   2+ and symmetric all extremities   Skin:   Skin color, texture, turgor normal, no rashes or lesions   Neurologic:   Oriented to person, place, time, and situation; exhibits logical thought processes; generalized weakness           LABS:     Lab Results   Component Value Date    WBC 8.3 02/11/2020    HGB 10.6 (L) 02/11/2020    HCT 36.7 02/11/2020     02/11/2020     02/14/2020    K 4.6 02/14/2020     (H) 02/11/2020    CREATININE 0.76 02/14/2020    BUN 33 (H) 02/11/2020    CO2 23 02/11/2020        ASSESSMENT:      ICD-10-CM    1. Physical deconditioning  R53.81    2. Type 2 diabetes mellitus with other neurologic complication, with long-term current use of insulin (H)   E11.49     Z79.4    3. Essential hypertension  I10        PLAN:      Physical deconditioning  -PT/OT as directed  -Discharge from facility per therapy recommendations on WBAT    DMII  -DISCONTINUED Lantus on 3/12/20  -Continue metoformin to 1 g in AM and 500 mg at HS  -Continue to monitor BG two times a day   -Notify provider if BG < 70 or > 500    Hypertension  -Encouraged cardiac diet, low sodium diet, exercise and stress reduction techniques.   -Emphasized importance of blood pressure control.   -Continue current medications as prescribed.   -Notify provider if pt's SBP<90 or >180 and DBP <50 or >100     Otherwise continue current care plan for all other chronic medical conditions, as they are stable. Encouraged patient to engage in healthy lifestyle behaviors such as engaging in social activities, exercising (PT/OT), eating well, and following care plan. Follow up for routine check-up, or sooner if needed. Will continue to monitor patient and work with nursing staff collaboratively to work toward positive patient outcomes.     Electronically signed by: Danelle Strickland CNP

## 2021-06-07 NOTE — PROGRESS NOTES
[facility]    Facility:  [facility] [[id number]]    Code Status:  POLST AVAILABLE    Chief Complaint   Patient presents with     Problem Visit   :                    Patient Active Problem List   Diagnosis     Left hip pain     Postoperative pain     Physical deconditioning     Closed displaced fracture of anterior column of left acetabulum with routine healing, subsequent encounter     S/P ORIF (open reduction internal fixation) fracture     Falls     Essential hypertension     Type 2 diabetes mellitus with other neurologic complication, with long-term current use of insulin (H)     CVA, old,2010     Spells of trembling       History:  Ms. Peña is a 69-year-old female with a past medical history of CVA resulting in left hemiplegia, depression, hypertension, insulin-dependent type 2 diabetes complicated by retinopathy, hyperlipidemia, environmental allergies, CKD 3, possible gout, seen today for admission to [facility]--a transfer from [facility], where she had been staying since her recent hospitalization.    [facility] course: February 7 through April 20    While the patient reports dissatisfaction with her physical therapy, and requested transfer, there were some positive outcomes report here.  Patient experienced improved blood sugar control throughout her stay resulting in discontinuation of her Lantus on March 12 and an increase in her metformin from 500 twice daily to 1000 twice daily.  Blood sugars reportedly quite favorable thereafter generally less than 170 as well.    Patient also reports that her pain came under good control with scheduled Tylenol and baclofen without need for ongoing opiates.    Patient's blood pressure was generally adequately controlled on current medications.    FiberCon was started for her bowels which tend to be on the loose side with improvement.        Hospital course: Patient was admitted to [facility] February 2  through February 7 though she was originally seen in the emergency room at [facility] and transferred to [facility] once fracture was identified.                Patient reports 2 falls the first on January 25 with some left hip pain.  She was seen for left hip pain on the 28th, diagnosed with left acetabular fracture as well as metatarsal fractures 2 through 4 on the left foot.  Case was discussed with orthopedics, Lovenox prophylaxis was prescribed.  However she fell again on February 1 trying to transfer self in her home this time with very severe left hip pain incompatible with ambulation.  She now was diagnosed with displaced dome fracture of the left acetabulum requiring ORIF, repaired by Dr. Dinh.  There were no complications reported.  Patient was treated with DVT prophylaxis, prophylactic antibiotic therapy and deemed safe for discharge by February 7 with pain tolerated by oral pain medications.  Lovenox 40 mg subcutaneously recommended for 28 days.  Oxycodone 5 to 10 mg every 4 hours along with scheduled Tylenol given for pain.  Her aspirin was held at discharge.  There is also recommendation for pursuing osteoporosis work-up including DEXA scanning.  Notably she also received some ceftriaxone in the emergency room at [facility] for abnormal urinalysis.         Subjective/ROS:    -augmented by discussion with facility staff involved in direct care    I am asked to see the patient today for some spells she has been having.  These were reported to the office and some blood was ordered including a magnesium which has come back slightly low at 1.5 today.    However when I talked to the patient she describes spells worrisome for seizure.  She has had a right-sided CVA with left-sided plegia arm greater than leg since 2010.  She has had never had a seizure before that she knows of.  However she had 2 episodes yesterday and 2 episodes 4 days ago on Sunday.  She says they were all strikingly  "similar.  Without warning she suddenly lost control of her left arm and her left leg.  She says that her arm gets stiff and seems to rise up off whenever it is resting on\" trembles\" she cannot control it at the same time that the arm is affected her left leg stiffens and she cannot control it.  She estimates each spell lasted less than 5 minutes and was self-limited.  She is aware of the circumstances during the spells without any confusion.  She did not suffer any pain following the spells.    Otherwise patient says she is felt well she said no fever sweats or chills cough shortness of breath nausea vomiting diarrhea melena bright red blood per rectum constipation dysuria falls injuries.  She says she is doing well with therapy.  Remainder ROS negative    Past Medical History:   Diagnosis Date     Allergic rhinitis      CKD (chronic kidney disease)      Depression      Displaced dome fracture of left acetabulum (H)      DM2 (diabetes mellitus, type 2) (H)      GERD (gastroesophageal reflux disease)      Gout      HTN (hypertension)      Left hemiplegia (H)      Migraine      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)      Nephrolithiasis      Stroke (H)      Vitamin B12 deficiency anemia      Past Surgical History:   Procedure Laterality Date     COLONOSCOPY W/ BIOPSIES AND POLYPECTOMY  05/09/2019    Procedure: COLONOSCOPY, WITH POLYPECTOMY AND BIOPSY; Surgeon: Na Diehl MD; Location: [facility] OR       CYSTOSCOPY W/ URETEROSCOPY Right 02/22/2016    Procedure: COMBINED CYSTOSCOPY, URETEROSCOPY; Surgeon: Madelaine Roland MD; Location: [facility] OR       ORIF ACETABULUM FRACTURE Left           Family History   Problem Relation Age of Onset     Heart disease Mother      Hypertension Mother      Coronary artery disease Father      Diabetes Sister      Hypertension Brother      Cancer Sister    :       Social History     Socioeconomic History     Marital status:      Spouse name: " Not on file     Number of children: Not on file     Years of education: Not on file     Highest education level: Not on file   Occupational History     Not on file   Social Needs     Financial resource strain: Not on file     Food insecurity     Worry: Not on file     Inability: Not on file     Transportation needs     Medical: Not on file     Non-medical: Not on file   Tobacco Use     Smoking status: Never Smoker     Smokeless tobacco: Never Used   Substance and Sexual Activity     Alcohol use: Not Currently     Drug use: Not on file     Sexual activity: Not on file   Lifestyle     Physical activity     Days per week: Not on file     Minutes per session: Not on file     Stress: Not on file   Relationships     Social connections     Talks on phone: Not on file     Gets together: Not on file     Attends Cheondoism service: Not on file     Active member of club or organization: Not on file     Attends meetings of clubs or organizations: Not on file     Relationship status: Not on file     Intimate partner violence     Fear of current or ex partner: Not on file     Emotionally abused: Not on file     Physically abused: Not on file     Forced sexual activity: Not on file   Other Topics Concern     Not on file   Social History Narrative     Not on file   :        Current Outpatient Medications on File Prior to Visit   Medication Sig Dispense Refill     acetaminophen (TYLENOL) 500 MG tablet Take 1,000 mg by mouth 3 (three) times a day.        amLODIPine (NORVASC) 10 MG tablet Take 10 mg by mouth daily.       aspirin 81 MG EC tablet Take 81 mg by mouth daily.       atorvastatin (LIPITOR) 40 MG tablet Take 40 mg by mouth at bedtime.       baclofen (LIORESAL) 10 MG tablet Take 10 mg by mouth 3 (three) times a day.       calcium polycarbophiL (FIBERCON) 625 mg tablet Take 1,250 mg by mouth daily.       cholecalciferol, vitamin D3, 1,000 unit (25 mcg) tablet Take 1,000 Units by mouth daily.       citalopram (CELEXA) 10 MG  tablet Take 10 mg by mouth at bedtime.       cyanocobalamin 1,000 mcg/mL injection Inject 1,000 mcg into the shoulder, thigh, or buttocks every 30 (thirty) days.       lactase 4,500 unit Tab Take 4 tablets by mouth as needed.        levETIRAcetam (KEPPRA) 500 MG tablet Take 500 mg by mouth 2 (two) times a day.       losartan (COZAAR) 25 MG tablet Take 25 mg by mouth daily.       metFORMIN (GLUCOPHAGE) 1000 MG tablet Take 1,000 mg by mouth 2 (two) times a day with meals. Take 2 tabs by mouth in AM and 1 tab by mouth at HS.       metoprolol succinate (TOPROL-XL) 50 MG 24 hr tablet Take 50 mg by mouth daily.       montelukast (SINGULAIR) 10 mg tablet Take 10 mg by mouth daily.       No current facility-administered medications on file prior to visit.    :      ALLERGIES:  Lisinopril and Latex    Vitals:  There were no vitals taken for this visit. except as noted below    Vital signs: Reviewed per facility EMR vitals including as follows:                  Most Recent Vitals Date/Time Taken  Temperature: 97.3  F 04/30/2020 06:31 PM  Pulse: 66 per minute 04/30/2020 06:31 PM  Respirations: 16 per minute 04/30/2020 06:31 PM  Blood Pressure: 140 / 68 mmHg 04/30/2020 08:14 AM  O2 Saturation: 96 % 04/30/2020 06:31 PM  Blood Sugar: 155 mg/dL 04/30/2020 08:13 AM  Weight: 153.4 lbs / Routine       BMI: 24.76 04/30/2020 09:42 AM  Height: 5ft 6.0in 04/20/2020 02:43 PM    There is no height or weight on file to calculate BMI.    Physical exam:    General:  Alert  oriented x3 appears in no apparent distress.  Patient is just finishing her lunch.  She eats with her right hand.  She has some movement in her left arm and hand but it is minimal.  Similar in her left leg is weak compared to the right leg.    She is in no acute distress.  She is breathing comfortably.  She is normally conversant and pleasant.  Skin is clear in visualized areas.      Due to the 2020 Covid 19 pandemic, except as noted above, the patient was visually observed  at a 6 foot plus distance.  An observational exam was performed in an effort to keep patient safe from Covid 19 and other communicable diseases.   Labs:  Lab Results   Component Value Date    WBC 8.3 02/11/2020    HGB 10.6 (L) 02/11/2020    HCT 36.7 02/11/2020     (H) 02/11/2020     02/11/2020     Results for orders placed or performed in visit on 04/29/20   Basic Metabolic Panel   Result Value Ref Range    Sodium 142 136 - 145 mmol/L    Potassium 4.6 3.5 - 5.0 mmol/L    Chloride 109 (H) 98 - 107 mmol/L    CO2 23 22 - 31 mmol/L    Anion Gap, Calculation 10 5 - 18 mmol/L    Glucose 129 (H) 70 - 125 mg/dL    Calcium 9.4 8.5 - 10.5 mg/dL    BUN 29 (H) 8 - 22 mg/dL    Creatinine 0.79 0.60 - 1.10 mg/dL    GFR MDRD Af Amer >60 >60 mL/min/1.73m2    GFR MDRD Non Af Amer >60 >60 mL/min/1.73m2       Magnesium 1.5  No results found for: TSH  No results found for: HGBA1C  [unfilled]  Lab Results   Component Value Date    TFVPNULV25 591 04/29/2020     No results found for: BNP  [unfilled]        Invalid input(s): PRINTERVAL      Assessment/Plan:      ICD-10-CM    1. Essential hypertension  I10    2. Type 2 diabetes mellitus with other neurologic complication, with long-term current use of insulin (H)  E11.49     Z79.4    3. S/P ORIF (open reduction internal fixation) fracture  Z98.890     Z87.81    4. CVA, old,2010  I69.993    5. Closed displaced fracture of anterior column of left acetabulum with routine healing, subsequent encounter  S32.432D    6. Physical deconditioning  R53.81    7. Spells of trembling  R25.1        Spells    I am concerned about seizures by her description.  Fortunately they have not generalized.  This sounds unlikely extension of CVA.  She has not kept in relationship with any neurologist and is open to seeing whomever I recommend.  -Keppra 500 twice daily for prophylaxis   -Check Keppra level next week   -Education given to patient and staff   -Referral to Dr. Otto Hernández.  Referral  and EEG may be delayed by pandemic precautions   -I also believe head imaging is appropriate.  In this nonurgent state MRI may be test of choice: Patient has no new neurologic findings except as described above).  If patient cannot get into Dr. Hernández or partner quickly, we could order it directly here from the facility, but I did not do that today, hoping for full consult with neurology quickly.  Staff will begin working on that now.    -We did restart aspirin as noted below    Hypomagnesemia    Present but doubtfully the cause of her spells.  Will replace and recheck magnesium next week       The following is copied from my recent note for a more complete picture of her health:  ==============================================================  acetabular fracture status post ORIF February 2020   Patient reports dissatisfaction with her therapy at prior TCU resulting in the transfer.  She is encouraged by her early days here.  She reports good pain control.  She would like to return to independent living.  She has a roommate at the apartment in a senior complex where she lives.  PT, OT,  involved.  Anticipate discharge per therapy timing.    Generalized weakness  Residual left-sided weakness following CVA  Frequent falls  Unsteady gait   PT, OT,  involved.    Anemia   Hemoglobin 10.6 back in February.  It has not been rechecked.  She has good appetite steady weight good bowel function without evidence of bleeding.  Expect spontaneous resolution.  Since she is looking so well and asymptomatic this does not need to be checked while she is in the TCU but it certainly could be.  I did not order it today    Presumed osteoporosis   Outpatient DEXA scan anticipated.  Patient had been on both cholecalciferol and calcium at the prior TCU.  Due to medication reduction pandemic recall they have not been restarted yet.    CVA  Left hemiplegia   It looks like patient's aspirin was never restarted  "after discontinuation of her Lovenox at her previous TCU stay.  The aspirin has been held for her surgery/fracture and during the postoperative Lovenox.  We will restart it today 81 EC daily.  She has never had bleeding complications in her life ulcers etc.  Otherwise we will continue secondary control measures with good blood pressure control.  Continue atorvastatin as current    Type 2 diabetes   Patient has made great strides.  She attributes this to \"watching what I am eating\" which has been easier for her in the facility.  Though she has lost weight she think she is making better choices.  Her Lantus insulin has been discontinued.  She continues on metformin at increased dose of thousand milligrams twice daily.  Her blood sugars are not as good here as reported elsewhere but acceptable.  This needs watching as she has had a couple of readings above 200.  No changes were made today.    Hyperlipidemia   Atorvastatin    CKD 3   Excellent renal function in February.  Asymptomatic.  No additional blood work ordered at this time    Possible gout   Patient repeats the story to me about how she had recurrent toe pain \"gout\" until her podiatrist worked on her toe and removed \"some extra skin\" and she has not had any bouts since then.  On if she had recurrent ingrowing?    Depression   Patient reports excellent control on current Celexa 10 mg daily    ===============================================================      Case discussed with  Facility staff         Rasta Michael MD  "

## 2021-06-07 NOTE — PROGRESS NOTES
[facility]    Facility:   [facility] [[id number]]   Code Status: POLST AVAILABLE      CHIEF COMPLAINT/REASON FOR VISIT:  Chief Complaint   Patient presents with     Problem Visit     DM type 2       HISTORY:      HPI: Addis is a 69 y.o. female who was admitted to [facility] from 2/2/20-2/7/20 for left hip ORIF s/p fall at home.  There were not postoperative complications noted in her hospital discharge summary.  It was recommended by her orthopedic provider that she remain on Lovenox for one month postoperatively for DVT prophylaxis.  Addis  has a past medical history of Allergic rhinitis, CKD (chronic kidney disease), Depression, Displaced dome fracture of left acetabulum (H), DM2 (diabetes mellitus, type 2) (H), GERD (gastroesophageal reflux disease), Gout, HTN (hypertension), Left hemiplegia (H), Migraine, 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), Nephrolithiasis, Stroke (H), and Vitamin B12 deficiency anemia.  She is currently at [facility] s/p hospitalization for acute rehabilitation.      Today Ms. Peña is being evaluated for diabetes mellitus type 2.  Addis has been tolerating discontinuation of her Lantus well with blood glucose range 121-181 over the past month.  She is agreeable to plan to continue decreasing her metformin to minimize risk for hypoglycemia with her now ambulating with therapy services.  She noted that she has been walking a few feet in therapy daily.  Nursing staff reported that the patient has been in bed most of the day and still requires moderate assistance for ADLs at this time.  The patient denied lightheadedness, dizziness, breathing difficulty, chest pain, palpitations, constipation, urinary symptoms, numbness or tingling in extremities, and pain.     Past Medical History:   Diagnosis Date     Allergic rhinitis      CKD (chronic kidney disease)      Depression      Displaced dome fracture of left  acetabulum (H)      DM2 (diabetes mellitus, type 2) (H)      GERD (gastroesophageal reflux disease)      Gout      HTN (hypertension)      Left hemiplegia (H)      Migraine      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)      Nephrolithiasis      Stroke (H)      Vitamin B12 deficiency anemia              Family History   Problem Relation Age of Onset     Heart disease Mother      Hypertension Mother      Coronary artery disease Father      Diabetes Sister      Hypertension Brother      Cancer Sister      Social History     Socioeconomic History     Marital status:      Spouse name: Not on file     Number of children: Not on file     Years of education: Not on file     Highest education level: Not on file   Occupational History     Not on file   Social Needs     Financial resource strain: Not on file     Food insecurity     Worry: Not on file     Inability: Not on file     Transportation needs     Medical: Not on file     Non-medical: Not on file   Tobacco Use     Smoking status: Never Smoker     Smokeless tobacco: Never Used   Substance and Sexual Activity     Alcohol use: Not Currently     Drug use: Not on file     Sexual activity: Not on file   Lifestyle     Physical activity     Days per week: Not on file     Minutes per session: Not on file     Stress: Not on file   Relationships     Social connections     Talks on phone: Not on file     Gets together: Not on file     Attends U[employer] service: Not on file     Active member of club or organization: Not on file     Attends meetings of clubs or organizations: Not on file     Relationship status: Not on file     Intimate partner violence     Fear of current or ex partner: Not on file     Emotionally abused: Not on file     Physically abused: Not on file     Forced sexual activity: Not on file   Other Topics Concern     Not on file   Social History Narrative     Not on file       REVIEW OF SYSTEM:  Pertinent items are noted in HPI.  A 12  point comprehensive review of systems was negative except as noted.    PHYSICAL EXAM:     General Appearance:    Alert, cooperative, no distress, appears stated age   Head:    Normocephalic, without obvious abnormality, atraumatic   Eyes:    PERRL, conjunctiva/corneas clear, both eyes   Ears:    Normal external ear canals, both ears   Nose:   Nares normal, septum midline, mucosa normal, no drainage    or sinus tenderness   Throat:   Lips, mucosa, and tongue normal; teeth and gums normal   Neck:   Supple, symmetrical, trachea midline, no adenopathy;     thyroid:  no enlargement/tenderness/nodules; no carotid    bruit or JVD   Back:     Symmetric, no curvature, ROM normal, no CVA tenderness   Lungs:     Clear to auscultation bilaterally, respirations unlabored   Chest Wall:    No tenderness or deformity    Heart:    Regular rate and rhythm, S1 and S2 normal, no murmur, rub   or gallop   Abdomen:     Soft, non-tender, bowel sounds active all four quadrants,     no masses, no organomegaly; incision site CDI with glue   Extremities:   Extremities normal, atraumatic, no cyanosis or edema; incision site CDI   Pulses:   2+ and symmetric all extremities   Skin:   Skin color, texture, turgor normal, no rashes or lesions   Neurologic:   Oriented to person, place, time, and situation; exhibits logical thought processes; generalized weakness           LABS:     Lab Results   Component Value Date    WBC 8.3 02/11/2020    HGB 10.6 (L) 02/11/2020    HCT 36.7 02/11/2020     02/11/2020     02/14/2020    K 4.6 02/14/2020     (H) 02/11/2020    CREATININE 0.76 02/14/2020    BUN 33 (H) 02/11/2020    CO2 23 02/11/2020        ASSESSMENT:      ICD-10-CM    1. Physical deconditioning  R53.81    2. Type 2 diabetes mellitus with other neurologic complication, with long-term current use of insulin (H)  E11.49     Z79.4        PLAN:      Physical deconditioning  -PT/OT as directed  -Discharge from facility per therapy  recommendations on WBAT    DMII  -DISCONTINUED Lantus on 3/12/20  -DECRASE metoformin to 1 g in AM and 500 mg at HS  -Continue to monitor BG two times a day   -Notify provider if BG < 70 or > 500    Otherwise continue current care plan for all other chronic medical conditions, as they are stable. Encouraged patient to engage in healthy lifestyle behaviors such as engaging in social activities, exercising (PT/OT), eating well, and following care plan. Follow up for routine check-up, or sooner if needed. Will continue to monitor patient and work with nursing staff collaboratively to work toward positive patient outcomes.     Electronically signed by: Danelle Strickland, CNP

## 2021-06-07 NOTE — PROGRESS NOTES
[facility]    Facility:   [facility] [[id number]]   Code Status: POLST AVAILABLE      CHIEF COMPLAINT/REASON FOR VISIT:  Chief Complaint   Patient presents with     Problem Visit     DM type 2       HISTORY:      HPI: Addis is a 69 y.o. female who was admitted to [facility] from 2/2/20-2/7/20 for left hip ORIF s/p fall at home.  There were not postoperative complications noted in her hospital discharge summary.  It was recommended by her orthopedic provider that she remain on Lovenox for one month postoperatively for DVT prophylaxis.  Addis  has a past medical history of Allergic rhinitis, CKD (chronic kidney disease), Depression, Displaced dome fracture of left acetabulum (H), DM2 (diabetes mellitus, type 2) (H), GERD (gastroesophageal reflux disease), Gout, HTN (hypertension), Left hemiplegia (H), Migraine, 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), Nephrolithiasis, Stroke (H), and Vitamin B12 deficiency anemia.  She is currently at [facility] s/p hospitalization for acute rehabilitation.      Today Ms. Peña is being evaluated for diabetes mellitus type 2.  Addis reported that she would like to be off insulin altogether if possible.  Her blood glucose range has been 107-170 over the past week taking Lantus 5 units daily and metformin 1 g two times a day for DM management.  She was agreeable to plan to discontinue Lantus at this time with continued two times a day blood glucose monitoring.  She continues to work with therapy services on strength and conditioning with current plan to remain at [facility] for continued rehabilitation.  The patient denied lightheadedness, dizziness, breathing difficulty, chest pain, palpitations, constipation, urinary symptoms, numbness or tingling in extremities, and pain. Nursing staff denied any new concerns for the patient at this time and feel that they have been at their baseline  functioning over the past month.     Past Medical History:   Diagnosis Date     Allergic rhinitis      CKD (chronic kidney disease)      Depression      Displaced dome fracture of left acetabulum (H)      DM2 (diabetes mellitus, type 2) (H)      GERD (gastroesophageal reflux disease)      Gout      HTN (hypertension)      Left hemiplegia (H)      Migraine      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)      Nephrolithiasis      Stroke (H)      Vitamin B12 deficiency anemia              Family History   Problem Relation Age of Onset     Heart disease Mother      Hypertension Mother      Coronary artery disease Father      Diabetes Sister      Hypertension Brother      Cancer Sister      Social History     Socioeconomic History     Marital status:      Spouse name: Not on file     Number of children: Not on file     Years of education: Not on file     Highest education level: Not on file   Occupational History     Not on file   Social Needs     Financial resource strain: Not on file     Food insecurity     Worry: Not on file     Inability: Not on file     Transportation needs     Medical: Not on file     Non-medical: Not on file   Tobacco Use     Smoking status: Never Smoker     Smokeless tobacco: Never Used   Substance and Sexual Activity     Alcohol use: Not Currently     Drug use: Not on file     Sexual activity: Not on file   Lifestyle     Physical activity     Days per week: Not on file     Minutes per session: Not on file     Stress: Not on file   Relationships     Social connections     Talks on phone: Not on file     Gets together: Not on file     Attends Tenriism service: Not on file     Active member of club or organization: Not on file     Attends meetings of clubs or organizations: Not on file     Relationship status: Not on file     Intimate partner violence     Fear of current or ex partner: Not on file     Emotionally abused: Not on file     Physically abused: Not on file      Forced sexual activity: Not on file   Other Topics Concern     Not on file   Social History Narrative     Not on file       REVIEW OF SYSTEM:  Pertinent items are noted in HPI.  A 12 point comprehensive review of systems was negative except as noted.    PHYSICAL EXAM:     General Appearance:    Alert, cooperative, no distress, appears stated age   Head:    Normocephalic, without obvious abnormality, atraumatic   Eyes:    PERRL, conjunctiva/corneas clear, both eyes   Ears:    Normal external ear canals, both ears   Nose:   Nares normal, septum midline, mucosa normal, no drainage    or sinus tenderness   Throat:   Lips, mucosa, and tongue normal; teeth and gums normal   Neck:   Supple, symmetrical, trachea midline, no adenopathy;     thyroid:  no enlargement/tenderness/nodules; no carotid    bruit or JVD   Back:     Symmetric, no curvature, ROM normal, no CVA tenderness   Lungs:     Clear to auscultation bilaterally, respirations unlabored   Chest Wall:    No tenderness or deformity    Heart:    Regular rate and rhythm, S1 and S2 normal, no murmur, rub   or gallop   Abdomen:     Soft, non-tender, bowel sounds active all four quadrants,     no masses, no organomegaly; incision site CDI with glue   Extremities:   Extremities normal, atraumatic, no cyanosis or edema; incision site CDI   Pulses:   2+ and symmetric all extremities   Skin:   Skin color, texture, turgor normal, no rashes or lesions   Neurologic:   Oriented to person, place, time, and situation; exhibits logical thought processes; generalized weakness           LABS:     Lab Results   Component Value Date    WBC 8.3 02/11/2020    HGB 10.6 (L) 02/11/2020    HCT 36.7 02/11/2020     02/11/2020     02/14/2020    K 4.6 02/14/2020     (H) 02/11/2020    CREATININE 0.76 02/14/2020    BUN 33 (H) 02/11/2020    CO2 23 02/11/2020        ASSESSMENT:      ICD-10-CM    1. Physical deconditioning  R53.81    2. Type 2 diabetes mellitus with other neurologic  complication, with long-term current use of insulin (H)  E11.49     Z79.4        PLAN:      Physical deconditioning  -PT/OT as directed  -Discharge from facility per therapy recommendations on WBAT    DMII  -DISCONTINUE Lantus  -Continue metformin 1 g two times a day   -Notify provider if BG < 70 or > 500    Otherwise continue current care plan for all other chronic medical conditions, as they are stable. Encouraged patient to engage in healthy lifestyle behaviors such as engaging in social activities, exercising (PT/OT), eating well, and following care plan. Follow up for routine check-up, or sooner if needed. Will continue to monitor patient and work with nursing staff collaboratively to work toward positive patient outcomes.     Electronically signed by: Danelle Strickland CNP

## 2021-06-07 NOTE — PROGRESS NOTES
[facility]    Facility:   [facility] [[id number]]   Code Status: POLST AVAILABLE      CHIEF COMPLAINT/REASON FOR VISIT:  Chief Complaint   Patient presents with     Problem Visit     DM type 2       HISTORY:      HPI: Addis is a 69 y.o. female who was admitted to [facility] from 2/2/20-2/7/20 for left hip ORIF s/p fall at home.  There were not postoperative complications noted in her hospital discharge summary.  It was recommended by her orthopedic provider that she remain on Lovenox for one month postoperatively for DVT prophylaxis.  Addis  has a past medical history of Allergic rhinitis, CKD (chronic kidney disease), Depression, Displaced dome fracture of left acetabulum (H), DM2 (diabetes mellitus, type 2) (H), GERD (gastroesophageal reflux disease), Gout, HTN (hypertension), Left hemiplegia (H), Migraine, 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), Nephrolithiasis, Stroke (H), and Vitamin B12 deficiency anemia.  She is currently at [facility] s/p hospitalization for acute rehabilitation.      Today Ms. Peña is being evaluated for diabetes mellitus type 2.  Addis reported that she would like to be off insulin altogether if possible.  She has tolerated decrease to 10 units Lantus daily along with metformin 1 g two times a day for management of blood glucose with range  over the past week.  She denied pain at this time and continues to work on strength and conditioning with therapy services.  The patient denied lightheadedness, dizziness, breathing difficulty, chest pain, palpitations, constipation, urinary symptoms, numbness or tingling in extremities, and pain.  Nursing staff denied any new concerns for the patient at this time and feel that they have been at their baseline functioning over the past month.     Past Medical History:   Diagnosis Date     Allergic rhinitis      CKD (chronic kidney disease)      Depression       Displaced dome fracture of left acetabulum (H)      DM2 (diabetes mellitus, type 2) (H)      GERD (gastroesophageal reflux disease)      Gout      HTN (hypertension)      Left hemiplegia (H)      Migraine      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)      Nephrolithiasis      Stroke (H)      Vitamin B12 deficiency anemia              Family History   Problem Relation Age of Onset     Heart disease Mother      Hypertension Mother      Coronary artery disease Father      Diabetes Sister      Hypertension Brother      Cancer Sister      Social History     Socioeconomic History     Marital status:      Spouse name: Not on file     Number of children: Not on file     Years of education: Not on file     Highest education level: Not on file   Occupational History     Not on file   Social Needs     Financial resource strain: Not on file     Food insecurity     Worry: Not on file     Inability: Not on file     Transportation needs     Medical: Not on file     Non-medical: Not on file   Tobacco Use     Smoking status: Never Smoker     Smokeless tobacco: Never Used   Substance and Sexual Activity     Alcohol use: Not Currently     Drug use: Not on file     Sexual activity: Not on file   Lifestyle     Physical activity     Days per week: Not on file     Minutes per session: Not on file     Stress: Not on file   Relationships     Social connections     Talks on phone: Not on file     Gets together: Not on file     Attends Yarsani service: Not on file     Active member of club or organization: Not on file     Attends meetings of clubs or organizations: Not on file     Relationship status: Not on file     Intimate partner violence     Fear of current or ex partner: Not on file     Emotionally abused: Not on file     Physically abused: Not on file     Forced sexual activity: Not on file   Other Topics Concern     Not on file   Social History Narrative     Not on file       REVIEW OF SYSTEM:  Pertinent  items are noted in HPI.  A 12 point comprehensive review of systems was negative except as noted.    PHYSICAL EXAM:     General Appearance:    Alert, cooperative, no distress, appears stated age   Head:    Normocephalic, without obvious abnormality, atraumatic   Eyes:    PERRL, conjunctiva/corneas clear, both eyes   Ears:    Normal external ear canals, both ears   Nose:   Nares normal, septum midline, mucosa normal, no drainage    or sinus tenderness   Throat:   Lips, mucosa, and tongue normal; teeth and gums normal   Neck:   Supple, symmetrical, trachea midline, no adenopathy;     thyroid:  no enlargement/tenderness/nodules; no carotid    bruit or JVD   Back:     Symmetric, no curvature, ROM normal, no CVA tenderness   Lungs:     Clear to auscultation bilaterally, respirations unlabored   Chest Wall:    No tenderness or deformity    Heart:    Regular rate and rhythm, S1 and S2 normal, no murmur, rub   or gallop   Abdomen:     Soft, non-tender, bowel sounds active all four quadrants,     no masses, no organomegaly; incision site CDI with glue   Extremities:   Extremities normal, atraumatic, no cyanosis or edema; incision site CDI   Pulses:   2+ and symmetric all extremities   Skin:   Skin color, texture, turgor normal, no rashes or lesions   Neurologic:   Oriented to person, place, time, and situation; exhibits logical thought processes; generalized weakness           LABS:     Lab Results   Component Value Date    WBC 8.3 02/11/2020    HGB 10.6 (L) 02/11/2020    HCT 36.7 02/11/2020     02/11/2020     02/14/2020    K 4.6 02/14/2020     (H) 02/11/2020    CREATININE 0.76 02/14/2020    BUN 33 (H) 02/11/2020    CO2 23 02/11/2020        ASSESSMENT:      ICD-10-CM    1. Physical deconditioning  R53.81    2. Type 2 diabetes mellitus with other neurologic complication, with long-term current use of insulin (H)  E11.49     Z79.4        PLAN:      Physical deconditioning  -PT/OT as directed  -Discharge from  facility per therapy recommendations on WBAT    DMII  -DECREASE Lantus from 10 units to 5 units daily  -Continue metformin 1 g two times a day   -Notify provider if BG < 70 or > 500    Otherwise continue current care plan for all other chronic medical conditions, as they are stable. Encouraged patient to engage in healthy lifestyle behaviors such as engaging in social activities, exercising (PT/OT), eating well, and following care plan. Follow up for routine check-up, or sooner if needed. Will continue to monitor patient and work with nursing staff collaboratively to work toward positive patient outcomes.     Electronically signed by: Danelle Strickland, CNP

## 2021-06-07 NOTE — PROGRESS NOTES
[facility]    Facility:   [facility] [[id number]]   Code Status: POLST AVAILABLE      CHIEF COMPLAINT/REASON FOR VISIT:  Chief Complaint   Patient presents with     Problem Visit     spasms       HISTORY:      HPI: Addis is a 69 y.o. female who  has a past medical history of Allergic rhinitis, CKD (chronic kidney disease), Depression, Displaced dome fracture of left acetabulum (H), DM2 (diabetes mellitus, type 2) (H), GERD (gastroesophageal reflux disease), Gout, HTN (hypertension), Left hemiplegia (H), Migraine, 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), Nephrolithiasis, Stroke (H), and Vitamin B12 deficiency anemia. Addis was recently transferred from [facility]. She was undergoing acute rehab after suffering a hip fracture and undergoing ORIF repair.     Today Addis is being evaluated for a follow up regarding recent episodes of spasticity and trembling. She apparently had another episode of spasticity and trembling. It lasted a few minutes. She had one episode a few days ago-- that one last much longer. She does not lose consciousness and does not have any flaccidity or pain. No postictal states. Doesn't appear to be a seizure per her report. Will await labs that were drawn this morning to determine if there is a metabolic deficiency. Addis agrees to this approach. Addis denies any other concerns including fevers/chills, cough or cold symptoms, headaches, vision changes, chest pain/pressure, difficulty breathing, SOB, abdominal pain, nausea, vomiting, diarrhea, dysuria, increasing or ongoing weakness past baseline, increasing pain.     Past Medical History:   Diagnosis Date     Allergic rhinitis      CKD (chronic kidney disease)      Depression      Displaced dome fracture of left acetabulum (H)      DM2 (diabetes mellitus, type 2) (H)      GERD (gastroesophageal reflux disease)      Gout      HTN (hypertension)       Left hemiplegia (H)      Migraine      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)      Nephrolithiasis      Stroke (H)      Vitamin B12 deficiency anemia              Family History   Problem Relation Age of Onset     Heart disease Mother      Hypertension Mother      Coronary artery disease Father      Diabetes Sister      Hypertension Brother      Cancer Sister      Social History     Socioeconomic History     Marital status:      Spouse name: Not on file     Number of children: Not on file     Years of education: Not on file     Highest education level: Not on file   Occupational History     Not on file   Social Needs     Financial resource strain: Not on file     Food insecurity     Worry: Not on file     Inability: Not on file     Transportation needs     Medical: Not on file     Non-medical: Not on file   Tobacco Use     Smoking status: Never Smoker     Smokeless tobacco: Never Used   Substance and Sexual Activity     Alcohol use: Not Currently     Drug use: Not on file     Sexual activity: Not on file   Lifestyle     Physical activity     Days per week: Not on file     Minutes per session: Not on file     Stress: Not on file   Relationships     Social connections     Talks on phone: Not on file     Gets together: Not on file     Attends Taoism service: Not on file     Active member of club or organization: Not on file     Attends meetings of clubs or organizations: Not on file     Relationship status: Not on file     Intimate partner violence     Fear of current or ex partner: Not on file     Emotionally abused: Not on file     Physically abused: Not on file     Forced sexual activity: Not on file   Other Topics Concern     Not on file   Social History Narrative     Not on file       REVIEW OF SYSTEM:  Per HPI    PHYSICAL EXAM:   /54   Pulse 70   Temp 98.9  F (37.2  C)   Resp 18   Wt 142 lb 6.4 oz (64.6 kg)   SpO2 96%     A limited exam was performed due to  recommendations for care during COVID-19 pandemic. Due to the 2020 COVID-19 pandemic, except as noted above, the patient was visually observed at a 6 foot plus distance.  An observational exam was performed in an effort to keep patient safe from COVID-19 and other communicable diseases.     General appearance: alert, appears stated age and cooperative  HEENT: Head is normocephalic with normal hair distribution. No evidence of trauma. Ears: Without lesions or deformity. No acute purulent discharge. Eyes: Conjunctivae pink with no scleral icterus or erythema. Nose: Normal. Oropharnyx: mmm.  Lungs: respirations without effort.  Extremities: extremities normal, atraumatic, no cyanosis.  Skin: Skin color, texture normal. No rashes or lesions on exposed skin.   Neurologic: Grossly normal   Psych: interacts well with caregivers, exhibits logical thought processes and connections, pleasant.      LABS:   CBC, BMP, Mag, B12 levels.    ASSESSMENT:      ICD-10-CM    1. Spells of trembling  R25.1        PLAN:    Spells of Trembling, spasticity  -CBC, BMP, Mag level, B12 level.   -ASA 81 mg by mouth daily.   -Baclofen 10 mg by mouth three times a day.  -May warrant follow up with neurology. Will await labs given recent COVID-19 pandemic.  -Follow carefully.     Otherwise continue current care plan for all other chronic medical conditions, as they are stable. Encouraged patient to engage in healthy lifestyle behaviors such as engaging in social activities, exercising (PT/OT), eating well, and following care plan. Follow up for routine check-up, or sooner if needed. Will continue to monitor patient and work with nursing staff collaboratively to work toward positive patient outcomes.    Electronically signed by: Octavia Serna CNP

## 2021-06-07 NOTE — PROGRESS NOTES
[facility]    Facility:   [facility] [[id number]]   Code Status: POLST AVAILABLE      CHIEF COMPLAINT/REASON FOR VISIT:  Chief Complaint   Patient presents with     Review Of Multiple Medical Conditions     Problem Visit     tremor       HISTORY:      HPI: Addis is a 69 y.o. female who  has a past medical history of Allergic rhinitis, CKD (chronic kidney disease), Depression, Displaced dome fracture of left acetabulum (H), DM2 (diabetes mellitus, type 2) (H), GERD (gastroesophageal reflux disease), Gout, HTN (hypertension), Left hemiplegia (H), Migraine, 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), Nephrolithiasis, Stroke (H), and Vitamin B12 deficiency anemia. Addis was recently transferred from [facility]. She was undergoing acute rehab after suffering a hip fracture and undergoing ORIF repair.     Today Addis is being evaluated for a routine review of multiple medical problems while in the TCU. However, most importantly she is requesting evaluation of having some tremor and spasticity in her left body. She states it happened yesterday and that she was having issues for approximately 3 hours. She states it was only her left side. She has not had anything like this in the past. Addis felt like it was a worsening deficit of her already weak side. However, she states that other than that there were not any stroke like symptoms. She states she did not have any flaccidity of her face, no drooping of her face, no other weakness, she has been able to breathe normally, has not been dizzy or lightheaded. Blood pressures and vitals were stable at that time. Addis shares she has no other concerns at this time. Addis denies any other concerns including fevers/chills, cough or cold symptoms, headaches, vision changes, chest pain/pressure, difficulty breathing, SOB, abdominal pain, nausea, vomiting, diarrhea, dysuria, increasing or  ongoing weakness past baseline, increasing pain.     Past Medical History:   Diagnosis Date     Allergic rhinitis      CKD (chronic kidney disease)      Depression      Displaced dome fracture of left acetabulum (H)      DM2 (diabetes mellitus, type 2) (H)      GERD (gastroesophageal reflux disease)      Gout      HTN (hypertension)      Left hemiplegia (H)      Migraine      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)      Nephrolithiasis      Stroke (H)      Vitamin B12 deficiency anemia              Family History   Problem Relation Age of Onset     Heart disease Mother      Hypertension Mother      Coronary artery disease Father      Diabetes Sister      Hypertension Brother      Cancer Sister      Social History     Socioeconomic History     Marital status:      Spouse name: Not on file     Number of children: Not on file     Years of education: Not on file     Highest education level: Not on file   Occupational History     Not on file   Social Needs     Financial resource strain: Not on file     Food insecurity     Worry: Not on file     Inability: Not on file     Transportation needs     Medical: Not on file     Non-medical: Not on file   Tobacco Use     Smoking status: Never Smoker     Smokeless tobacco: Never Used   Substance and Sexual Activity     Alcohol use: Not Currently     Drug use: Not on file     Sexual activity: Not on file   Lifestyle     Physical activity     Days per week: Not on file     Minutes per session: Not on file     Stress: Not on file   Relationships     Social connections     Talks on phone: Not on file     Gets together: Not on file     Attends Jainism service: Not on file     Active member of club or organization: Not on file     Attends meetings of clubs or organizations: Not on file     Relationship status: Not on file     Intimate partner violence     Fear of current or ex partner: Not on file     Emotionally abused: Not on file     Physically abused:  Not on file     Forced sexual activity: Not on file   Other Topics Concern     Not on file   Social History Narrative     Not on file       REVIEW OF SYSTEM:  Per HPI    PHYSICAL EXAM:   /62   Pulse 61   Temp 98.6  F (37  C)   Resp 16   Wt 139 lb 12.8 oz (63.4 kg)   SpO2 96%     A limited exam was performed due to recommendations for care during COVID-19 pandemic. Due to the 2020 COVID-19 pandemic, except as noted above, the patient was visually observed at a 6 foot plus distance.  An observational exam was performed in an effort to keep patient safe from COVID-19 and other communicable diseases.     General appearance: alert, appears stated age and cooperative  HEENT: Head is normocephalic with normal hair distribution. No evidence of trauma. Ears: Without lesions or deformity. No acute purulent discharge. Eyes: Conjunctivae pink with no scleral icterus or erythema. Nose: Normal. Oropharnyx: mmm.  Lungs: respirations without effort.  Extremities: extremities normal, atraumatic, no cyanosis.  Skin: Skin color, texture normal. No rashes or lesions on exposed skin.   Neurologic: Grossly normal   Psych: interacts well with caregivers, exhibits logical thought processes and connections, pleasant.      LABS:   CBC, BMP, Mag, B12 levels.    ASSESSMENT:      ICD-10-CM    1. Physical deconditioning  R53.81    2. CVA, old,2010  I69.993        PLAN:    Physical Deconditioning  -Continue PT/OT and other therapies as per care plan.  -Encouraged good nutrition and movement habits.   -Discussed care plan and expected course of stay.   -Continue to follow-up per routine schedule or sooner if needed.     Old CVA-- may have some spasticity  -CBC, BMP, Mag level, B12 level.   -ASA 81 mg by mouth daily.   -Baclofen 10 mg by mouth three times a day.  -Follow carefully.     Otherwise continue current care plan for all other chronic medical conditions, as they are stable. Encouraged patient to engage in healthy lifestyle  behaviors such as engaging in social activities, exercising (PT/OT), eating well, and following care plan. Follow up for routine check-up, or sooner if needed. Will continue to monitor patient and work with nursing staff collaboratively to work toward positive patient outcomes.    Electronically signed by: Octavia Serna CNP

## 2021-06-07 NOTE — PROGRESS NOTES
[facility]    Facility:   [facility] [[id number]]   Code Status: POLST AVAILABLE  PCP: Danelle Strickland CNP   Phone: [phone]   Fax: [phone]      CHIEF COMPLAINT/REASON FOR VISIT:  Chief Complaint   Patient presents with     Discharge Summary       HISTORY COURSE:    HPI: Addis is a 69 y.o. female who was admitted to [facility] from 2/2/20-2/7/20 for left hip ORIF s/p fall at home.  There were not postoperative complications noted in her hospital discharge summary.  It was recommended by her orthopedic provider that she remain on Lovenox for one month postoperatively for DVT prophylaxis.  Addis  has a past medical history of Allergic rhinitis, CKD (chronic kidney disease), Depression, Displaced dome fracture of left acetabulum (H), DM2 (diabetes mellitus, type 2) (H), GERD (gastroesophageal reflux disease), Gout, HTN (hypertension), Left hemiplegia (H), Migraine, 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), Nephrolithiasis, Stroke (H), and Vitamin B12 deficiency anemia.  [facility]/p hospitalization for acute rehabilitation.       Today Ms. Peña is being evaluated for a review of multiple medical conditions prior to TCU discharge.  Addis denied any concerns at this visit and feels that her left pain has been well-controlled with Tylenol 1 g three times a day and her baseline Baclofen 10 mg three times a day.  Her blood pressure has been well-managed on her current antihypertensive regimen with SBP 90-110s during her TCU stay.  She has tolerated discontinuation of her Lantus during her TCU stay.  She is currently taking metformin 1g in AM and 500 mg at HS with blood glucose range 106-170 over the past month.  Nursing staff reported that Addis continues to need moderate assist with ADLs at this time.  She is looking forward to transferring to another facility for further therapy as she feels that she  still has potential to make more progress with ambulation beyond the few steps she has taken at this time.  The patient denied lightheadedness, dizziness, breathing difficulty, chest pain, palpitations, constipation, urinary symptoms, numbness or tingling in extremities, and pain. Nursing staff denied any new concerns for the patient at this time.    Past Medical History:   Diagnosis Date     Allergic rhinitis      CKD (chronic kidney disease)      Depression      Displaced dome fracture of left acetabulum (H)      DM2 (diabetes mellitus, type 2) (H)      GERD (gastroesophageal reflux disease)      Gout      HTN (hypertension)      Left hemiplegia (H)      Migraine      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)      Nephrolithiasis      Stroke (H)      Vitamin B12 deficiency anemia       Past Surgical History:   Procedure Laterality Date     COLONOSCOPY W/ BIOPSIES AND POLYPECTOMY  05/09/2019    Procedure: COLONOSCOPY, WITH POLYPECTOMY AND BIOPSY; Surgeon: Na Diehl MD; Location: [facility] OR       CYSTOSCOPY W/ URETEROSCOPY Right 02/22/2016    Procedure: COMBINED CYSTOSCOPY, URETEROSCOPY; Surgeon: Madelaine Roland MD; Location: [facility] OR       ORIF ACETABULUM FRACTURE Left       Social History     Tobacco Use     Smoking status: Never Smoker     Smokeless tobacco: Never Used   Substance Use Topics     Alcohol use: Not Currently      PHYSICAL EXAM:   General Appearance:    Alert, cooperative, no distress, appears stated age   Head:    Normocephalic, without obvious abnormality, atraumatic   Eyes:    PERRL, conjunctiva/corneas clear, both eyes   Ears:    Normal external ear canals, both ears   Nose:   Nares normal, septum midline, mucosa normal, no drainage    or sinus tenderness   Throat:   Lips, mucosa, and tongue normal; teeth and gums normal   Neck:   Supple, symmetrical, trachea midline, no adenopathy;     thyroid:  no enlargement/tenderness/nodules; no carotid    bruit or JVD    Back:     Symmetric, no curvature, ROM normal, no CVA tenderness   Lungs:     Clear to auscultation bilaterally, respirations unlabored   Chest Wall:    No tenderness or deformity    Heart:    Regular rate and rhythm, S1 and S2 normal, no murmur, rub   or gallop   Abdomen:     Soft, non-tender, bowel sounds active all four quadrants,     no masses, no organomegaly; incision site healed   Extremities:   Extremities normal, atraumatic, no cyanosis or edema; incision site healed on    Pulses:   2+ and symmetric all extremities   Skin:   Skin color, texture, turgor normal, no rashes or lesions   Neurologic:   Oriented to person, place, time, and situation; exhibits logical thought processes; generalized weakness            MEDICATION LIST:  Current Outpatient Medications   Medication Sig     acetaminophen (TYLENOL) 500 MG tablet Take 1,000 mg by mouth 3 (three) times a day.      amLODIPine (NORVASC) 10 MG tablet Take 10 mg by mouth daily.     atorvastatin (LIPITOR) 40 MG tablet Take 40 mg by mouth at bedtime.     baclofen (LIORESAL) 10 MG tablet Take 10 mg by mouth 3 (three) times a day.     calcium polycarbophiL (FIBERCON) 625 mg tablet Take 1,250 mg by mouth daily.     cholecalciferol, vitamin D3, 1,000 unit (25 mcg) tablet Take 1,000 Units by mouth daily.     citalopram (CELEXA) 10 MG tablet Take 10 mg by mouth at bedtime.     cyanocobalamin 1,000 mcg/mL injection Inject 1,000 mcg into the shoulder, thigh, or buttocks every 30 (thirty) days.     lactase 4,500 unit Tab Take 4 tablets by mouth as needed.      losartan (COZAAR) 25 MG tablet Take 25 mg by mouth daily.     metFORMIN (GLUCOPHAGE) 1000 MG tablet Take 500 mg by mouth 2 (two) times a day with meals. Take 2 tabs by mouth in AM and 1 tab by mouth at HS.     metoprolol succinate (TOPROL-XL) 50 MG 24 hr tablet Take 50 mg by mouth daily.     montelukast (SINGULAIR) 10 mg tablet Take 10 mg by mouth daily.       DISCHARGE DIAGNOSIS:    ICD-10-CM    1. Physical  deconditioning  R53.81    2. Closed displaced fracture of anterior column of left acetabulum with routine healing, subsequent encounter  S32.432D    3. S/P ORIF (open reduction internal fixation) fracture  Z98.890     Z87.81    4. Left hip pain  M25.552    5. Type 2 diabetes mellitus with other neurologic complication, with long-term current use of insulin (H)  E11.49     Z79.4    6. Essential hypertension  I10       Physical deconditioning s/p left hip ORIF  -Discharge to SNF for further rehabilitation per patient request    Hypertension  -Encouraged cardiac diet, low sodium diet, exercise and stress reduction techniques.   -Emphasized importance of blood pressure control.   -Continue current medications as prescribed.   -Notify provider if pt's SBP<90 or >180 and DBP <50 or >100     DMII  -DISCONTINUED Lantus on 3/12/20  -Continue metoformin to 1 g in AM and 500 mg at HS  -Continue to monitor BG two times a day   -Notify provider if BG < 70 or > 500    Left hip pain  -Continue Tylenol 1 g three times a day  -Continue Baclofen 10 mg three times a day   -Encouraged patient to utilize integrative therapies such as distraction and deep breathing for pain management  -Notify provider if patient has new or worsening pain     Otherwise continue current care plan for all other chronic medical conditions, as they are stable. Encouraged patient to engage in healthy lifestyle behaviors such as engaging in social activities, exercising (PT/OT), eating well, and following care plan. Follow up for routine check-up, or sooner if needed. Will continue to monitor patient and work with nursing staff collaboratively to work toward positive patient outcomes.       MEDICAL EQUIPMENT NEEDS:    The patient has mobility limitation significantly impairing her ability to participate in mobility-related activities of daily living, such as toileting, feeding, dressing, grooming, and bathing in customary locations in the home. The mobility  limitation cannot be sufficiently and safely resolved by use of an appropriately fitted walker. The patient or caregiver is able to safely use a walker. The patient's functional mobility deficit can be sufficiently resolved by the use of a walker.  The patient has mobility limitations that impairs her ability to perform ADLs without use of a walker to be mobile in the home.      DISCHARGE PLAN/FACE TO FACE:  I certify that services are/were furnished while this patient was under the care of a physician and that a physician or an allowed non-physician practitioner (NPP), had a face-to-face encounter that meets the physician face-to-face encounter requirements. The encounter was in whole, or in part, related to the primary reason for home health. The patient is confined to her home and needs intermittent skilled nursing, physical therapy, speech-language pathology, or the continued need for occupational therapy. A plan of care has been established by a physician and is periodically reviewed by a physician.  Date of Face-to-Face Encounter: 4/17/20    I certify that, based on my findings, the following services are medically necessary home health services: home health aid for bathing and ADLs, skilled nursing (RN) for medication set-up and teaching, symptoms and disease process monitoring and education, PT for strengthening, balance, endurance and safety within the home, OT for strengthening, ADL needs, adaptive equipment and safety.     My clinical findings support the need for the above skilled services because: (Please write a brief narrative summary that describes what the RN, PT, SLP, or other services will be doing in the home. A list of diagnoses in this section does not meet the CMS requirements.) home health aid for bathing and ADLs, skilled nursing (RN) for medication set-up and teaching, symptoms and disease process monitoring and education, PT for strengthening, balance, endurance and safety within the home,  OT for strengthening, ADL needs, adaptive equipment and safety.     This patient is homebound because: (Please write a brief narrative summary describing the functional limitations as to why this patient is homebound and specifically what makes this patient homebound.) she is a frail elderly woman with multiple comorbidities and recent hospitalizations making it unsafe for her to go out into the community for services.     The patient is, or has been, under my care and I have initiated the establishment of the plan of care. This patient will be followed by a physician who will periodically review the plan of care.    Schedule follow up visit with primary care provider within 7 days to reestablish care.    Electronically signed by: Danelle Strickland CNP     Post Discharge Medication Reconciliation Status: discharge medications reconciled, continue medications without change

## 2021-06-07 NOTE — PROGRESS NOTES
[facility]    Facility:  [facility] [[id number]]    Code Status:  POLST AVAILABLE    Chief Complaint   Patient presents with     H & P   :                    Patient Active Problem List   Diagnosis     Left hip pain     Postoperative pain     Physical deconditioning     Closed displaced fracture of anterior column of left acetabulum with routine healing, subsequent encounter     S/P ORIF (open reduction internal fixation) fracture     Falls     Essential hypertension     Type 2 diabetes mellitus with other neurologic complication, with long-term current use of insulin (H)     CVA, old,2010       History:  Ms. Peña is a 69-year-old female with a past medical history of CVA resulting in left hemiplegia, depression, hypertension, insulin-dependent type 2 diabetes complicated by retinopathy, hyperlipidemia, environmental allergies, CKD 3, possible gout, seen today for admission to [facility]--a transfer from [facility], where she had been staying since her recent hospitalization.    [facility] course: February 7 through April 20    While the patient reports dissatisfaction with her physical therapy, and requested transfer, there were some positive outcomes report here.  Patient experienced improved blood sugar control throughout her stay resulting in discontinuation of her Lantus on March 12 and an increase in her metformin from 500 twice daily to 1000 twice daily.  Blood sugars reportedly quite favorable thereafter generally less than 170 as well.    Patient also reports that her pain came under good control with scheduled Tylenol and baclofen without need for ongoing opiates.    Patient's blood pressure was generally adequately controlled on current medications.    FiberCon was started for her bowels which tend to be on the loose side with improvement.        Hospital course: Patient was admitted to [facility] February 2 through February 7 though she was  originally seen in the emergency room at [facility] and transferred to [facility] once fracture was identified.                Patient reports 2 falls the first on January 25 with some left hip pain.  She was seen for left hip pain on the 28th, diagnosed with left acetabular fracture as well as metatarsal fractures 2 through 4 on the left foot.  Case was discussed with orthopedics, Lovenox prophylaxis was prescribed.  However she fell again on February 1 trying to transfer self in her home this time with very severe left hip pain incompatible with ambulation.  She now was diagnosed with displaced dome fracture of the left acetabulum requiring ORIF, repaired by Dr. Dinh.  There were no complications reported.  Patient was treated with DVT prophylaxis, prophylactic antibiotic therapy and deemed safe for discharge by February 7 with pain tolerated by oral pain medications.  Lovenox 40 mg subcutaneously recommended for 28 days.  Oxycodone 5 to 10 mg every 4 hours along with scheduled Tylenol given for pain.  Her aspirin was held at discharge.  There is also recommendation for pursuing osteoporosis work-up including DEXA scanning.  Notably she also received some ceftriaxone in the emergency room at [facility] for abnormal urinalysis.         Subjective/ROS:    -augmented by discussion with facility staff involved in direct care      Patient reports that she is feeling good about the transfer.  She feels like physical therapy here has already improved her ambulation and working more aggressively towards her own goals..  She says she has no concerns and has made the transition simply.    She says she is eating well her weight has been quite stable.  She is not constipated tends to be on the loose side but that has improved.  She has no dysuria.  She has no fever sweats or chills.  She has had no cough or exposure to known or named illness.  She denies headaches change in vision speaking swallowing  chest pain shortness of breath peripheral edema wheezing.  She does report a history of frequent falls since her CVA in January 2010 leaving her with left hemiplegia.  She reports her depression is under good control.  Remainder 13 system ROS negative.    Past Medical History:   Diagnosis Date     Allergic rhinitis      CKD (chronic kidney disease)      Depression      Displaced dome fracture of left acetabulum (H)      DM2 (diabetes mellitus, type 2) (H)      GERD (gastroesophageal reflux disease)      Gout      HTN (hypertension)      Left hemiplegia (H)      Migraine      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)      Nephrolithiasis      Stroke (H)      Vitamin B12 deficiency anemia      Past Surgical History:   Procedure Laterality Date     COLONOSCOPY W/ BIOPSIES AND POLYPECTOMY  05/09/2019    Procedure: COLONOSCOPY, WITH POLYPECTOMY AND BIOPSY; Surgeon: Na Diehl MD; Location: [facility] OR       CYSTOSCOPY W/ URETEROSCOPY Right 02/22/2016    Procedure: COMBINED CYSTOSCOPY, URETEROSCOPY; Surgeon: Madelaine Roland MD; Location: [facility] OR       ORIF ACETABULUM FRACTURE Left           Family History   Problem Relation Age of Onset     Heart disease Mother      Hypertension Mother      Coronary artery disease Father      Diabetes Sister      Hypertension Brother      Cancer Sister    :       Social History     Socioeconomic History     Marital status:      Spouse name: Not on file     Number of children: Not on file     Years of education: Not on file     Highest education level: Not on file   Occupational History     Not on file   Social Needs     Financial resource strain: Not on file     Food insecurity     Worry: Not on file     Inability: Not on file     Transportation needs     Medical: Not on file     Non-medical: Not on file   Tobacco Use     Smoking status: Never Smoker     Smokeless tobacco: Never Used   Substance and Sexual Activity     Alcohol use: Not  Currently     Drug use: Not on file     Sexual activity: Not on file   Lifestyle     Physical activity     Days per week: Not on file     Minutes per session: Not on file     Stress: Not on file   Relationships     Social connections     Talks on phone: Not on file     Gets together: Not on file     Attends Taoist service: Not on file     Active member of club or organization: Not on file     Attends meetings of clubs or organizations: Not on file     Relationship status: Not on file     Intimate partner violence     Fear of current or ex partner: Not on file     Emotionally abused: Not on file     Physically abused: Not on file     Forced sexual activity: Not on file   Other Topics Concern     Not on file   Social History Narrative     Not on file   :        Current Outpatient Medications on File Prior to Visit   Medication Sig Dispense Refill     acetaminophen (TYLENOL) 500 MG tablet Take 1,000 mg by mouth 3 (three) times a day.        amLODIPine (NORVASC) 10 MG tablet Take 10 mg by mouth daily.       baclofen (LIORESAL) 10 MG tablet Take 10 mg by mouth 3 (three) times a day.       calcium polycarbophiL (FIBERCON) 625 mg tablet Take 1,250 mg by mouth daily.       citalopram (CELEXA) 10 MG tablet Take 10 mg by mouth at bedtime.       cyanocobalamin 1,000 mcg/mL injection Inject 1,000 mcg into the shoulder, thigh, or buttocks every 30 (thirty) days.       lactase 4,500 unit Tab Take 4 tablets by mouth as needed.        losartan (COZAAR) 25 MG tablet Take 25 mg by mouth daily.       metFORMIN (GLUCOPHAGE) 1000 MG tablet Take 1,000 mg by mouth 2 (two) times a day with meals. Take 2 tabs by mouth in AM and 1 tab by mouth at HS.       metoprolol succinate (TOPROL-XL) 50 MG 24 hr tablet Take 50 mg by mouth daily.       montelukast (SINGULAIR) 10 mg tablet Take 10 mg by mouth daily.       atorvastatin (LIPITOR) 40 MG tablet Take 40 mg by mouth at bedtime.       cholecalciferol, vitamin D3, 1,000 unit (25 mcg) tablet  Take 1,000 Units by mouth daily.       [DISCONTINUED] calcium polycarbophiL (FIBERCON) 625 mg tablet Take 1,250 mg by mouth daily.       No current facility-administered medications on file prior to visit.    :      ALLERGIES:  Lisinopril and Latex    Vitals:  There were no vitals taken for this visit. except as noted below    Vital signs: Reviewed per facility EMR vitals including as follows:                    Most Recent Vitals Date/Time Taken  Temperature: 99  F 04/23/2020 05:19 PM  Pulse: 73 per minute 04/23/2020 05:19 PM  Respirations: 16 per minute 04/23/2020 05:19 PM  Blood Pressure: 125 / 62 mmHg 04/23/2020 05:19 PM  O2 Saturation: 95 % 04/23/2020 05:19 PM  Blood Sugar: 232 mg/dL 04/23/2020 04:51 PM  Weight: 145.2 lbs / Routine       BMI: 23.43 04/23/2020 10:37 AM  Height: 5ft 6.0in 04/20/2020 02:43 PM    There is no height or weight on file to calculate BMI.    Physical exam:    General:  Alert  oriented x3 appears in no apparent distress.  Patient working on the exercycle in physical therapy.  Normocephalic atraumatic.  Sclera clear.  EOMI.  She demonstrates left arm and left leg weakness compared to the right.  She has antigravity strength on the left side.  She has clear speech with strong voice.  There is no facial asymmetry.  She has no lower extremity edema.  Skin is intact in visualized areas.      Due to the 2020 Covid 19 pandemic, except as noted above, the patient was visually observed at a 6 foot plus distance.  An observational exam was performed in an effort to keep patient safe from Covid 19 and other communicable diseases.   Labs:  Lab Results   Component Value Date    WBC 8.3 02/11/2020    HGB 10.6 (L) 02/11/2020    HCT 36.7 02/11/2020     (H) 02/11/2020     02/11/2020     Results for orders placed or performed in visit on 02/11/20   Basic Metabolic Panel   Result Value Ref Range    Sodium 142 136 - 145 mmol/L    Potassium 5.3 (H) 3.5 - 5.0 mmol/L    Chloride 109 (H) 98 - 107  mmol/L    CO2 23 22 - 31 mmol/L    Anion Gap, Calculation 10 5 - 18 mmol/L    Glucose 90 70 - 125 mg/dL    Calcium 9.1 8.5 - 10.5 mg/dL    BUN 33 (H) 8 - 22 mg/dL    Creatinine 0.94 0.60 - 1.10 mg/dL    GFR MDRD Af Amer >60 >60 mL/min/1.73m2    GFR MDRD Non Af Amer 59 (L) >60 mL/min/1.73m2         No results found for: TSH  No results found for: HGBA1C  [unfilled]  No results found for: DSJHQYTI97  No results found for: BNP  [unfilled]        Invalid input(s): PRINTERVAL      Assessment/Plan:      ICD-10-CM    1. Type 2 diabetes mellitus with other neurologic complication, with long-term current use of insulin (H)  E11.49     Z79.4    2. Essential hypertension  I10    3. Left hip pain  M25.552    4. Physical deconditioning  R53.81    5. Closed displaced fracture of anterior column of left acetabulum with routine healing, subsequent encounter  S32.432D    6. S/P ORIF (open reduction internal fixation) fracture  Z98.890     Z87.81    7. CVA, old, ataxia  I69.993        Acetabular fracture status post ORIF February 2020   Patient reports dissatisfaction with her therapy at prior TCU resulting in the transfer.  She is encouraged by her early days here.  She reports good pain control.  She would like to return to independent living.  She has a roommate at the apartment in a senior complex where she lives.  PT, OT,  involved.  Anticipate discharge per therapy timing.    Generalized weakness  Residual left-sided weakness following CVA  Frequent falls  Unsteady gait   PT, OT,  involved.    Anemia   Hemoglobin 10.6 back in February.  It has not been rechecked.  She has good appetite steady weight good bowel function without evidence of bleeding.  Expect spontaneous resolution.  Since she is looking so well and asymptomatic this does not need to be checked while she is in the TCU but it certainly could be.  I did not order it today    Presumed osteoporosis   Outpatient DEXA scan anticipated.   "Patient had been on both cholecalciferol and calcium at the prior TCU.  Due to medication reduction pandemic recall they have not been restarted yet.    CVA  Left hemiplegia   It looks like patient's aspirin was never restarted after discontinuation of her Lovenox at her previous TCU stay.  The aspirin has been held for her surgery/fracture and during the postoperative Lovenox.  We will restart it today 81 EC daily.  She has never had bleeding complications in her life ulcers etc.  Otherwise we will continue secondary control measures with good blood pressure control.  Continue atorvastatin as current    Type 2 diabetes   Patient has made great strides.  She attributes this to \"watching what I am eating\" which has been easier for her in the facility.  Though she has lost weight she think she is making better choices.  Her Lantus insulin has been discontinued.  She continues on metformin at increased dose of thousand milligrams twice daily.  Her blood sugars are not as good here as reported elsewhere but acceptable.  This needs watching as she has had a couple of readings above 200.  No changes were made today.    Hyperlipidemia   Atorvastatin    CKD 3   Excellent renal function in February.  Asymptomatic.  No additional blood work ordered at this time    Possible gout   Patient repeats the story to me about how she had recurrent toe pain \"gout\" until her podiatrist worked on her toe and removed \"some extra skin\" and she has not had any bouts since then.  On if she had recurrent ingrowing?    Depression   Patient reports excellent control on current Celexa 10 mg daily          Case discussed with  Facility staff         Rasta Michael MD    "

## 2021-06-08 NOTE — PROGRESS NOTES
[facility]    Facility:   [facility] [[id number]]   Code Status: POLST AVAILABLE      CHIEF COMPLAINT/REASON FOR VISIT:  Chief Complaint   Patient presents with     Follow Up     Labs, seizure disorder       HISTORY:      HPI: Addis is a 69 y.o. female who  has a past medical history of Allergic rhinitis, CKD (chronic kidney disease), Depression, Displaced dome fracture of left acetabulum (H), DM2 (diabetes mellitus, type 2) (H), GERD (gastroesophageal reflux disease), Gout, HTN (hypertension), Left hemiplegia (H), Migraine, 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), Nephrolithiasis, Seizure disorder (H), Stroke (H), and Vitamin B12 deficiency anemia. Addis was recently transferred from [facility]. She was undergoing acute rehab after suffering a hip fracture and undergoing ORIF repair.     Today Addis is being evaluated for a follow-up regarding recent lab work.  She recently was evaluated by consultant pharmacy who requested several labs.  These labs were completed and returned.  She does not have any ongoing issues with seizure activity, tremors, muscle spasticity.  She appears to be doing well at this time.  All labs were appropriate.  We will continue to monitor and work with patient to determine needs.  She denies any symptoms including fevers/chills, cough or cold symptoms, headaches, vision changes, chest pain/pressure, difficulty breathing, SOB, abdominal pain, nausea, vomiting, diarrhea, increasing or ongoing weakness past baseline, increasing pain.     Past Medical History:   Diagnosis Date     Allergic rhinitis      CKD (chronic kidney disease)      Depression      Displaced dome fracture of left acetabulum (H)      DM2 (diabetes mellitus, type 2) (H)      GERD (gastroesophageal reflux disease)      Gout      HTN (hypertension)      Left hemiplegia (H)      Migraine      Mild nonproliferative diabetic retinopathy of  both eyes (H)      Nephrolithiasis      Seizure disorder (H)      Stroke (H)      Vitamin B12 deficiency anemia              Family History   Problem Relation Age of Onset     Heart disease Mother      Hypertension Mother      Coronary artery disease Father      Diabetes Sister      Hypertension Brother      Cancer Sister      Social History     Socioeconomic History     Marital status:      Spouse name: Not on file     Number of children: Not on file     Years of education: Not on file     Highest education level: Not on file   Occupational History     Not on file   Social Needs     Financial resource strain: Not on file     Food insecurity     Worry: Not on file     Inability: Not on file     Transportation needs     Medical: Not on file     Non-medical: Not on file   Tobacco Use     Smoking status: Never Smoker     Smokeless tobacco: Never Used   Substance and Sexual Activity     Alcohol use: Not Currently     Drug use: Not on file     Sexual activity: Not on file   Lifestyle     Physical activity     Days per week: Not on file     Minutes per session: Not on file     Stress: Not on file   Relationships     Social connections     Talks on phone: Not on file     Gets together: Not on file     Attends Yarsanism service: Not on file     Active member of club or organization: Not on file     Attends meetings of clubs or organizations: Not on file     Relationship status: Not on file     Intimate partner violence     Fear of current or ex partner: Not on file     Emotionally abused: Not on file     Physically abused: Not on file     Forced sexual activity: Not on file   Other Topics Concern     Not on file   Social History Narrative     Not on file       REVIEW OF SYSTEM:  Per HPI    PHYSICAL EXAM:   /57   Pulse 79   Temp 99.5  F (37.5  C)   Resp 18   Wt 143 lb (64.9 kg)   SpO2 95%     A limited exam was performed due to recommendations for care during COVID-19 pandemic. Due to the 2020 COVID-19  pandemic, except as noted above, the patient was visually observed at a 6 foot plus distance.  An observational exam was performed in an effort to keep patient safe from COVID-19 and other communicable diseases.     General appearance: alert, appears stated age and cooperative  HEENT: Head is normocephalic with normal hair distribution. No evidence of trauma. Ears: Without lesions or deformity. No acute purulent discharge. Eyes: Conjunctivae pink with no scleral icterus or erythema. Nose: Normal. Oropharnyx: mmm.  Lungs: respirations without effort.  Extremities: extremities normal, atraumatic, no cyanosis.  Skin: Skin color, texture normal. No rashes or lesions on exposed skin.   Neurologic: Grossly normal   Psych: interacts well with caregivers, exhibits logical thought processes and connections, pleasant.      LABS:   None today.     ASSESSMENT:      ICD-10-CM    1. Seizure disorder (H)  G40.909        PLAN:    Seizure disorder  -Keppra 250 mg tabs, take 5 tabs twice daily.    -Phenytoin 200 mg by mouth twice daily.  -Continue to monitor phenytoin level.  Keppra level is subjective.  -Follow-up as needed.    Otherwise continue current care plan for all other chronic medical conditions, as they are stable. Encouraged patient to engage in healthy lifestyle behaviors such as engaging in social activities, exercising (PT/OT), eating well, and following care plan. Follow up for routine check-up, or sooner if needed. Will continue to monitor patient and work with nursing staff collaboratively to work toward positive patient outcomes.    Electronically signed by: Octavia Serna CNP

## 2021-06-08 NOTE — PROGRESS NOTES
[facility]    Facility:   [facility] [[id number]]   Code Status: POLST AVAILABLE      CHIEF COMPLAINT/REASON FOR VISIT:  Chief Complaint   Patient presents with     Review Of Multiple Medical Conditions     Physical deconditioning, old CVA, seizure disorder, UTI       HISTORY:      HPI: Addis is a 69 y.o. female who  has a past medical history of Allergic rhinitis, CKD (chronic kidney disease), Depression, Displaced dome fracture of left acetabulum (H), DM2 (diabetes mellitus, type 2) (H), GERD (gastroesophageal reflux disease), Gout, HTN (hypertension), Left hemiplegia (H), Migraine, 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), Nephrolithiasis, Seizure disorder (H), Stroke (H), and Vitamin B12 deficiency anemia. Addis was recently transferred from [facility]. She was undergoing acute rehab after suffering a hip fracture and undergoing ORIF repair.     Today Addis is being evaluated for a follow-up after having a UTI and also for physical deconditioning, with old CVA and acetabular fracture.  She states that she is doing much better and is feeling quite well.  She is participating in physical and occupational therapies.  Addis does report eating, drinking and eliminating well.  She denies any symptoms including fevers/chills, cough or cold symptoms, headaches, vision changes, chest pain/pressure, difficulty breathing, SOB, abdominal pain, nausea, vomiting, diarrhea, increasing or ongoing weakness past baseline, increasing pain.     Past Medical History:   Diagnosis Date     Allergic rhinitis      CKD (chronic kidney disease)      Depression      Displaced dome fracture of left acetabulum (H)      DM2 (diabetes mellitus, type 2) (H)      GERD (gastroesophageal reflux disease)      Gout      HTN (hypertension)      Left hemiplegia (H)      Migraine      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)       Nephrolithiasis      Seizure disorder (H)      Stroke (H)      Vitamin B12 deficiency anemia              Family History   Problem Relation Age of Onset     Heart disease Mother      Hypertension Mother      Coronary artery disease Father      Diabetes Sister      Hypertension Brother      Cancer Sister      Social History     Socioeconomic History     Marital status:      Spouse name: Not on file     Number of children: Not on file     Years of education: Not on file     Highest education level: Not on file   Occupational History     Not on file   Social Needs     Financial resource strain: Not on file     Food insecurity     Worry: Not on file     Inability: Not on file     Transportation needs     Medical: Not on file     Non-medical: Not on file   Tobacco Use     Smoking status: Never Smoker     Smokeless tobacco: Never Used   Substance and Sexual Activity     Alcohol use: Not Currently     Drug use: Not on file     Sexual activity: Not on file   Lifestyle     Physical activity     Days per week: Not on file     Minutes per session: Not on file     Stress: Not on file   Relationships     Social connections     Talks on phone: Not on file     Gets together: Not on file     Attends Zoroastrian service: Not on file     Active member of club or organization: Not on file     Attends meetings of clubs or organizations: Not on file     Relationship status: Not on file     Intimate partner violence     Fear of current or ex partner: Not on file     Emotionally abused: Not on file     Physically abused: Not on file     Forced sexual activity: Not on file   Other Topics Concern     Not on file   Social History Narrative     Not on file       REVIEW OF SYSTEM:  Per HPI    PHYSICAL EXAM:   /62   Pulse 81   Temp 97.7  F (36.5  C)   Resp 20   Wt 141 lb 3.2 oz (64 kg)   SpO2 93%     A limited exam was performed due to recommendations for care during COVID-19 pandemic. Due to the 2020 COVID-19 pandemic, except  as noted above, the patient was visually observed at a 6 foot plus distance.  An observational exam was performed in an effort to keep patient safe from COVID-19 and other communicable diseases.     General appearance: alert, appears stated age and cooperative  HEENT: Head is normocephalic with normal hair distribution. No evidence of trauma. Ears: Without lesions or deformity. No acute purulent discharge. Eyes: Conjunctivae pink with no scleral icterus or erythema. Nose: Normal. Oropharnyx: mmm.  Lungs: respirations without effort.  Extremities: extremities normal, atraumatic, no cyanosis.  Skin: Skin color, texture normal. No rashes or lesions on exposed skin.   Neurologic: Grossly normal   Psych: interacts well with caregivers, exhibits logical thought processes and connections, pleasant.      LABS:   None today.     ASSESSMENT:      ICD-10-CM    1. Physical deconditioning  R53.81    2. Seizure disorder (H)  G40.909    3. CVA, old,2010  I69.993    4. Closed displaced fracture of anterior column of left acetabulum with routine healing, subsequent encounter  S32.432D        PLAN:    Physical Deconditioning, old CVA  -Continue PT/OT and other therapies as per care plan.  -Encouraged good nutrition and movement habits.   -Discussed care plan and expected course of stay.   -Continue to follow-up per routine schedule or sooner if needed.     Seizure disorder  -Baclofen 10 mg by mouth 3 times daily.  -Keppra 250 mg tablets, take 5 tablets by mouth twice daily.  -Phenytoin 200 mg by mouth twice daily.    Left acetabular fracture  -Tylenol 1 g every 6 hours as needed for pain.    UTI--no further symptoms  -Encourage fluids.   -Tylenol 1000 mg by mouth ever 6 hours as needed.   -Doxycycline 100 mg by mouth two times a day x 7 days.   -Follow up as needed.     Otherwise continue current care plan for all other chronic medical conditions, as they are stable. Encouraged patient to engage in healthy lifestyle behaviors such as  engaging in social activities, exercising (PT/OT), eating well, and following care plan. Follow up for routine check-up, or sooner if needed. Will continue to monitor patient and work with nursing staff collaboratively to work toward positive patient outcomes.    Electronically signed by: Octavia Serna CNP

## 2021-06-08 NOTE — PROGRESS NOTES
"[facility]   Telephone        Addis Peña is a 69 y.o. female who is being evaluated via a billable video visit.      The patient has been notified of following:     \"This video visit will be conducted via a call between you and your physician/provider. We have found that certain health care needs can be provided without the need for an in-person physical exam.  This service lets us provide the care you need with a video conversation.  If a prescription is necessary we can send it to the facility team.  If lab work is needed we can place an order through the facility team to have that test done at a later time.    If during the course of the call the physician/provider feels a video visit is not appropriate, you will not be charged for this service.\"     Physician/provider has received verbal consent for a telephone visit from the patient? Yes      11:40 AM    PCP: Wegener, Joel D, MD   Phone: [phone]   Fax: [phone]    Video-Visit Details    Type of service:  Video Visit    Video End Time 11:54 AM    Originating Location (pt. Location):[facility] [[id number]]    Distant Location (provider location):  [facility]     Mode of Communication:  Telephone conference    Rasta Michael MD            Facility:  [facility] [[id number]]    Code Status:  POLST AVAILABLE    Chief Complaint   Patient presents with     H & P   :                    Patient Active Problem List   Diagnosis     Left hip pain     Postoperative pain     Physical deconditioning     Closed displaced fracture of anterior column of left acetabulum with routine healing, subsequent encounter     S/P ORIF (open reduction internal fixation) fracture     Falls     Essential hypertension     Type 2 diabetes mellitus with other neurologic complication, with long-term current use of insulin (H)     CVA, old,2010     Spells of trembling     Fever     Seizure disorder (H) " "      History:  Ms. 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    Hospital course: Recall that I had seen patient April 30 for \"spells\", she had had 4 of them over previous days.  I was concerned for seizure, but patient was stable at that time and not having spells so I simply started her on Keppra and have staff arrange ASAP neurology appointment.  Unfortunately 2 days later, before she saw the neurologist, and after 3 doses of Keppra, she had another spell.  She was appropriately sent to the emergency room at that point.    Patient was admitted between May 2 and May 6.  Patient had MRI MRA head and neck vessels as noted below.  She has severe right SOCO stenosis.  There was also some diffusion changes in the MRI in the area of her previous 2010 CVA.  While new/acute CVA could not be ruled out, neurology felt that the MRI changes were more consistent with changes associated with seizure activity.  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    Patient's Keppra was increased to 1250 minutes twice daily and she was also started on phenytoin  mg twice daily.    Neurologist has asked her to see his partner/seizure specialist in 3 to 4 weeks.    Hospitalization with otherwise unremarkable    ===============================================================  Recall that patient was transferred recently from a different TCU details below:    [facility] February 7 through April 20    While the patient reports dissatisfaction with her physical therapy, and requested transfer, there were some positive outcomes report " here.  Patient experienced improved blood sugar control throughout her stay resulting in discontinuation of her Lantus on March 12 and an increase in her metformin from 500 twice daily to 1000 twice daily.  Blood sugars reportedly quite favorable thereafter generally less than 170 as well.    Patient also reports that her pain came under good control with scheduled Tylenol and baclofen without need for ongoing opiates.    Patient's blood pressure was generally adequately controlled on current medications.    FiberCon was started for her bowels which tend to be on the loose side with improvement.    Preceding hospital course for the [facility] stay follows:  Hospital course: Patient was admitted to [facility] February 2 through February 7 though she was originally seen in the emergency room at [facility] and transferred to [facility] once fracture was identified.                Patient reports 2 falls the first on January 25 with some left hip pain.  She was seen for left hip pain on the 28th, diagnosed with left acetabular fracture as well as metatarsal fractures 2 through 4 on the left foot.  Case was discussed with orthopedics, Lovenox prophylaxis was prescribed.  However she fell again on February 1 trying to transfer self in her home this time with very severe left hip pain incompatible with ambulation.  She now was diagnosed with displaced dome fracture of the left acetabulum requiring ORIF, repaired by Dr. Dinh.  There were no complications reported.  Patient was treated with DVT prophylaxis, prophylactic antibiotic therapy and deemed safe for discharge by February 7 with pain tolerated by oral pain medications.  Lovenox 40 mg subcutaneously recommended for 28 days.  Oxycodone 5 to 10 mg every 4 hours along with scheduled Tylenol given for pain.  Her aspirin was held at discharge.  There is also recommendation for pursuing osteoporosis work-up including DEXA scanning.  Notably she  also received some ceftriaxone in the emergency room at [facility] for abnormal urinalysis.   ==============================================================      Subjective/ROS:    -augmented by discussion with facility staff involved in direct care    Patient is disappointed that her left hand and arm are not coming back from the paralysis state.  We discussed Quentin paralysis and the likelihood of improvement over the next couple of days assuming the diagnosis is correct.  Neurology was feeling that Quentin's was more likely than new acute on chronic CVA.    Other than that she says she is feeling better.  She is not having any more of the shaking or spastic spells.  She has had return of her appetite and has been eating fine.  Her bowels are working well.  She urinates frequently but does not have dysuria.  She denies body aches cough chills shakes.  She was unaware of her low-grade fever today.  She reports that she tested negative for COVID in the hospital x1.    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)      Nephrolithiasis      Seizure disorder (H)      Stroke (H)      Vitamin B12 deficiency anemia      Past Surgical History:   Procedure Laterality Date     COLONOSCOPY W/ BIOPSIES AND POLYPECTOMY  05/09/2019    Procedure: COLONOSCOPY, WITH POLYPECTOMY AND BIOPSY; Surgeon: Na Diehl MD; Location: [facility]       CYSTOSCOPY W/ URETEROSCOPY Right  02/22/2016    Procedure: COMBINED CYSTOSCOPY, URETEROSCOPY; Surgeon: Madelaine Roland MD; Location: [facility] OR       ORIF ACETABULUM FRACTURE Left           Family History   Problem Relation Age of Onset     Heart disease Mother      Hypertension Mother      Coronary artery disease Father      Diabetes Sister      Hypertension Brother      Cancer Sister    :       Social History     Socioeconomic History     Marital status:      Spouse name: Not on file     Number of children: Not on file     Years of education: Not on file     Highest education level: Not on file   Occupational History     Not on file   Social Needs     Financial resource strain: Not on file     Food insecurity     Worry: Not on file     Inability: Not on file     Transportation needs     Medical: Not on file     Non-medical: Not on file   Tobacco Use     Smoking status: Never Smoker     Smokeless tobacco: Never Used   Substance and Sexual Activity     Alcohol use: Not Currently     Drug use: Not on file     Sexual activity: Not on file   Lifestyle     Physical activity     Days per week: Not on file     Minutes per session: Not on file     Stress: Not on file   Relationships     Social connections     Talks on phone: Not on file     Gets together: Not on file     Attends [employer] service: Not on file     Active member of club or organization: Not on file     Attends meetings of clubs or organizations: Not on file     Relationship status: Not on file     Intimate partner violence     Fear of current or ex partner: Not on file     Emotionally abused: Not on file     Physically abused: Not on file     Forced sexual activity: Not on file   Other Topics Concern     Not on file   Social History Narrative     Not on file   :        Current Outpatient Medications on File Prior to Visit   Medication Sig Dispense Refill     acetaminophen (TYLENOL) 500 MG tablet Take 1,000 mg by mouth 3 (three) times a day.        amLODIPine (NORVASC) 10 MG  tablet Take 10 mg by mouth daily.       atorvastatin (LIPITOR) 40 MG tablet Take 40 mg by mouth at bedtime.       baclofen (LIORESAL) 10 MG tablet Take 10 mg by mouth 3 (three) times a day.       bisacodyL (DULCOLAX) 5 mg EC tablet Take 5 mg by mouth daily as needed for constipation.       cholecalciferol, vitamin D3, 1,000 unit (25 mcg) tablet Take 1,000 Units by mouth daily.       citalopram (CELEXA) 10 MG tablet Take 10 mg by mouth at bedtime.       cyanocobalamin 1,000 mcg/mL injection Inject 1,000 mcg into the shoulder, thigh, or buttocks every 30 (thirty) days.       ibuprofen (ADVIL,MOTRIN) 200 MG tablet Take 200 mg by mouth every 6 (six) hours as needed for pain.       lactase 4,500 unit Tab Take 4 tablets by mouth as needed.        levETIRAcetam (KEPPRA) 500 MG tablet Take 500 mg by mouth 2 (two) times a day.       losartan (COZAAR) 25 MG tablet Take 25 mg by mouth daily.       magnesium oxide 250 mg magnesium Tab Take 250 mg by mouth.       metFORMIN (GLUCOPHAGE) 1000 MG tablet Take 1,000 mg by mouth 2 (two) times a day with meals. Take 2 tabs by mouth in AM and 1 tab by mouth at HS.       metoprolol succinate (TOPROL-XL) 50 MG 24 hr tablet Take 50 mg by mouth daily.       phenytoin extended (DILANTIN) 200 MG ER capsule Take 200 mg by mouth 3 (three) times a day.       senna-docusate (SENNOSIDES-DOCUSATE SODIUM) 8.6-50 mg tablet Take 1 tablet by mouth daily.       [DISCONTINUED] aspirin 81 MG EC tablet Take 81 mg by mouth daily.       [DISCONTINUED] calcium polycarbophiL (FIBERCON) 625 mg tablet Take 1,250 mg by mouth daily.       [DISCONTINUED] montelukast (SINGULAIR) 10 mg tablet Take 10 mg by mouth daily.       Current Facility-Administered Medications on File Prior to Visit   Medication Dose Route Frequency Provider Last Rate Last Dose     [DISCONTINUED] amLODIPine tablet (NORVASC)  10 mg Oral  GENERIC EXTERNAL DATA PROVIDER         [DISCONTINUED] aspirin chewable tablet  81 mg Oral  GENERIC EXTERNAL  DATA PROVIDER         [DISCONTINUED] atorvastatin tablet (LIPITOR)  40 mg Oral  GENERIC EXTERNAL DATA PROVIDER         [DISCONTINUED] baclofen tablet (LIORESAL)  10 mg Oral  GENERIC EXTERNAL DATA PROVIDER         [DISCONTINUED] bisacodyL EC tablet (DULCOLAX)  10 mg Oral  GENERIC EXTERNAL DATA PROVIDER         [DISCONTINUED] cholecalciferol (vitamin D3) 1,000 unit (25 mcg) tablet  1,000 Units Oral  GENERIC EXTERNAL DATA PROVIDER         [DISCONTINUED] citalopram tablet (celeXA)  10 mg Oral  GENERIC EXTERNAL DATA PROVIDER         [DISCONTINUED] dextrose 10%  12.5-25 g Intravenous  GENERIC EXTERNAL DATA PROVIDER         [DISCONTINUED] dextrose-vitamin D3 4-400 gram-unit Chew  4 tablet Oral  GENERIC EXTERNAL DATA PROVIDER         [DISCONTINUED] enoxaparin ANTICOAGULANT syringe (LOVENOX)  40 mg Subcutaneous  GENERIC EXTERNAL DATA PROVIDER         [DISCONTINUED] GENERIC EXTERNAL MEDICATION     GENERIC EXTERNAL DATA PROVIDER         [DISCONTINUED] GENERIC EXTERNAL MEDICATION     GENERIC EXTERNAL DATA PROVIDER         [DISCONTINUED] GENERIC EXTERNAL MEDICATION  190 mg Oral  GENERIC EXTERNAL DATA PROVIDER         [DISCONTINUED] GENERIC EXTERNAL MEDICATION  1,250 mg Oral  GENERIC EXTERNAL DATA PROVIDER         [DISCONTINUED] glucagon (human recombinant) injection  1 mg Intramuscular  GENERIC EXTERNAL DATA PROVIDER         [DISCONTINUED] hydrALAZINE injection (APRESOLINE)  10 mg Intravenous  GENERIC EXTERNAL DATA PROVIDER         [DISCONTINUED] insulin lispro injection (HumaLOG)  0-8 Units Subcutaneous  GENERIC EXTERNAL DATA PROVIDER         [DISCONTINUED] losartan tablet (COZAAR)  25 mg Oral  GENERIC EXTERNAL DATA PROVIDER         [DISCONTINUED] melatonin tablet  3 mg Oral  GENERIC EXTERNAL DATA PROVIDER         [DISCONTINUED] metoprolol succinate 24 hr tablet (TOPROL-XL)  50 mg Oral  GENERIC EXTERNAL DATA PROVIDER         [DISCONTINUED] montelukast tablet (SINGULAIR)  10 mg Oral  GENERIC EXTERNAL DATA PROVIDER          [DISCONTINUED] senna-docusate 8.6-50 mg tablet (PERICOLACE)  1 tablet Oral  GENERIC EXTERNAL DATA PROVIDER       :      ALLERGIES:  Lisinopril; Milk; and Latex    Vitals:  There were no vitals taken for this visit. except as noted below    Vital signs: Reviewed per facility EMR vitals including as follows:                  Most Recent Vitals Date/Time Taken  Temperature: 98.9  F 05/07/2020 05:44 PM  Pulse: 61 per minute 05/07/2020 05:44 PM  Respirations: 18 per minute 05/07/2020 05:44 PM  Blood Pressure: 115 / 62 mmHg 05/07/2020 05:44 PM  O2 Saturation: 95 % 05/07/2020 05:44 PM  Blood Sugar: 207 mg/dL 05/07/2020 07:07 PM  Weight: 142.4 lbs / Routine       BMI: 22.98 05/02/2020 10:48 AM  Height: 5ft 6.0in 04/20/2020 02:43 PM      There is no height or weight on file to calculate BMI.    Physical exam:    General:  Alert  oriented x3 appears in conversation.  Her voice is strong speech is fluent she answers questions appropriately and quickly without psychomotor slowing.  Nurse on duty reports that the patient looks better to him than she did all last week.  Generally stronger and more engaged, the patient agrees with that.      Due to the 2020 Covid 19 pandemic, except as noted above, the patient was visually observed at a 6 foot plus distance.  An observational exam was performed in an effort to keep patient safe from Covid 19 and other communicable diseases.   Labs:  Lab Results   Component Value Date    WBC 8.3 02/11/2020    HGB 10.6 (L) 02/11/2020    HCT 36.7 02/11/2020     (H) 02/11/2020     02/11/2020     Results for orders placed or performed in visit on 04/29/20   Basic Metabolic Panel   Result Value Ref Range    Sodium 142 136 - 145 mmol/L    Potassium 4.6 3.5 - 5.0 mmol/L    Chloride 109 (H) 98 - 107 mmol/L    CO2 23 22 - 31 mmol/L    Anion Gap, Calculation 10 5 - 18 mmol/L    Glucose 129 (H) 70 - 125 mg/dL    Calcium 9.4 8.5 - 10.5 mg/dL    BUN 29 (H) 8 - 22 mg/dL    Creatinine 0.79 0.60 -  1.10 mg/dL    GFR MDRD Af Amer >60 >60 mL/min/1.73m2    GFR MDRD Non Af Amer >60 >60 mL/min/1.73m2       Magnesium 1.5  No results found for: TSH  No results found for: HGBA1C  [unfilled]  Lab Results   Component Value Date    [id number] 591 04/29/2020     No results found for: BNP  [unfilled]        Invalid input(s): PRINTERVAL      Procedures/Significant Imaging & Testing:   MRI:  IMPRESSION:  HEAD MRI:   1. Chronic right SOCO territory infarct, with a punctate focus of acute ischemia in the area of encephalomalacia and gliosis in the right precentral gyrus.  2. Age-related changes with cerebral volume loss and background chronic microangiopathic ischemic disease.        HEAD MRA:   1. Severe stenosis/near occlusion of the A2 and A3 segments of the right anterior cerebral artery.  2. Suspect 2 mm medially directed aneurysm arising from the right cavernous segment of the internal carotid artery, which can be confirmed with a dedicated CTA of the head.     NECK MRA:  1. Nonflow limiting atherosclerotic plaque of the carotid bifurcations.  2. Irregular atherosclerotic disease in the right subclavian artery, with areas of moderate stenosis.       Assessment/Plan:      ICD-10-CM    1. Fever, unspecified fever cause  R50.9    2. Seizure disorder (H)  G40.909        Seizures  Quentin's paralysis  Chronic left-sided weakness/hemiplegia   Unfortunately patient had another spell despite Keppra prophylaxis before she could follow-up with neurology resulting in this admission.  Assuming neurology's impression of MRI changes due to seizure rather than stroke are accurate, expect left arm to recover to previous impaired baseline.    Patient is now on higher dose of Keppra 1250 twice daily plus phenytoin 200 twice daily.  She has neurology follow-up in 3 weeks.    I restarted aspirin last week but I do not see it on her list now.  I reviewed the neurology notes from the hospital and they do recommend continuing aspirin 81 mg  daily.  I copy the neurologist note below so there is no confusion on the matter.  69F w h/o R SOCO stroke now admitted with focal seizures arising from the chronic encephalomalacia. The MRI showed some scattered DWI restriction which I think more likely to be from the seizure not a new stroke. Still can't rule out stroke entirely given the severely stenosed R SOCO which seems to be atherosclerotic.     The patient has been on VEEG and although we did not technically see seizures, she did have periodic slowing arising from the right hemisphere that sometimes correlated with loss of function of the left UE. These episodes of slowing have been steadily decreasing in frequency with the current AED regimen of keppra 1250 bid and dilantin 190 bid. Her left UE strength and function has improved but is not back to baseline. I think her left UE is weak and spastic at baseline because of the biceps contracture.     -continue current AED regimen.   -continue ASA 81 mg/d  -continue atorvastatin 40.   -Ok to discharge from neurology standpoint.   -Follow up with one of our epileptologists (Dr Shaw or Dr Sagastume) in 3-4 weeks.     Discussed with hospitalist.     Neurology will sign off. Please call with questions.       CVA 2010  Left hemiplegia   It looks like patient's aspirin was never restarted after discontinuation of her Lovenox at her previous TCU stay.  The aspirin has been held for her surgery/fracture and during the postoperative Lovenox.  See above note    Fever   Isolated fever today 99.9.  Still at 99 after 1000 mg of Tylenol.  However it is unassociated with cough shortness of breath O2 sat drops headache sore throat body aches loss of taste or smell or other COVID type symptoms.  However she has been out on transport to and from the hospital and is at risk.  She did test negative in the hospital.  Other focus of infection not evident from discussion today.  She does have urinary frequency but that is not a  "new issue.    -Droplet precautions  -Retest for COVID now  -Frequent O2 sat/fever monitoring  -UA UC  -If fevers continue or patient becomes ill may need blood work or other additional work-up such as chest x-rays etc.          acetabular fracture status post ORIF February 2020   This was the original reason for patient's TCU need.  Patient reports dissatisfaction with her therapy at prior TCU resulting in the transfer.  She is encouraged by her early days here.  She reports good pain control.  She would like to return to independent living.  She has a roommate at the apartment in a senior complex where she lives.  PT, OT,  involved.  Anticipate discharge per therapy timing.    Generalized weakness  Residual left-sided weakness following CVA  Quentin's paralysis/acute left arm weakness  Frequent falls  Unsteady gait   PT, OT, services resumed    Anemia   Hemoglobin 10.6 back in February.  Hemoglobin has not been rechecked in the hospital 13.0.  No further treatment or work-up indicated.    Presumed osteoporosis   Outpatient DEXA scan anticipated.  Cholecalciferol      Type 2 diabetes   Nothing new to report here.  Patient has had dramatic improvement.    Her Lantus insulin has been recently discontinued.  She continues on metformin at increased dose of thousand milligrams twice daily.  Her blood sugars are not as good here as reported elsewhere but acceptable.  This needs watching as she has had a couple of readings above 200.  No changes were made today.    Hyperlipidemia   Atorvastatin unchanged  Hypomagnesemia    1.7 in the hospital, still mildly low.  Taking mag oxide 250 daily.  No changes made today.    CKD 3   Blood work from hospital reviewed, patient is stable    Possible gout   Patient repeats the story to me about how she had recurrent toe pain \"gout\" until her podiatrist worked on her toe and removed \"some extra skin\" and she has not had any bouts since then.  On if she had recurrent " ingrowing?    Depression   Patient reports excellent control on current Celexa 10 mg daily    Appreciate help of RN on duty today to help with this phone visit.        Rasta Michael MD

## 2021-06-08 NOTE — PROGRESS NOTES
[facility]    Facility:   [facility] [[id number]]   Code Status: POLST AVAILABLE      CHIEF COMPLAINT/REASON FOR VISIT:  Chief Complaint   Patient presents with     Problem Visit     Urinary frequency       HISTORY:      HPI: Addis is a 69 y.o. female who  has a past medical history of Allergic rhinitis, CKD (chronic kidney disease), Depression, Displaced dome fracture of left acetabulum (H), DM2 (diabetes mellitus, type 2) (H), GERD (gastroesophageal reflux disease), Gout, HTN (hypertension), Left hemiplegia (H), Migraine, 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), Nephrolithiasis, Seizure disorder (H), Stroke (H), and Vitamin B12 deficiency anemia. Addis was recently transferred from [facility]. She was undergoing acute rehab after suffering a hip fracture and undergoing ORIF repair.     Today Addis is being evaluated to discuss recent issues with urinary frequency.  Her follow-up UA/UC were essentially negative.  Did discuss overactive bladder with her.  She states that she does continue to have some urinary frequency.  Symptoms, no dysuria, no flank pain, no abdominal pain, no fevers or chills.  States that she would like to think about treatment for neck frequency.  Did discuss that we can work on this with OT and PT as well for pelvic floor strengthening.  Think about it and let us know.  She denies any symptoms including fevers/chills, cough or cold symptoms, headaches, vision changes, chest pain/pressure, difficulty breathing, SOB, abdominal pain, nausea, vomiting, diarrhea, increasing or ongoing weakness past baseline, increasing pain.     Past Medical History:   Diagnosis Date     Allergic rhinitis      CKD (chronic kidney disease)      Depression      Displaced dome fracture of left acetabulum (H)      DM2 (diabetes mellitus, type 2) (H)      GERD (gastroesophageal reflux disease)      Gout      HTN (hypertension)       Left hemiplegia (H)      Migraine      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)      Nephrolithiasis      Seizure disorder (H)      Stroke (H)      Vitamin B12 deficiency anemia              Family History   Problem Relation Age of Onset     Heart disease Mother      Hypertension Mother      Coronary artery disease Father      Diabetes Sister      Hypertension Brother      Cancer Sister      Social History     Socioeconomic History     Marital status:      Spouse name: Not on file     Number of children: Not on file     Years of education: Not on file     Highest education level: Not on file   Occupational History     Not on file   Social Needs     Financial resource strain: Not on file     Food insecurity     Worry: Not on file     Inability: Not on file     Transportation needs     Medical: Not on file     Non-medical: Not on file   Tobacco Use     Smoking status: Never Smoker     Smokeless tobacco: Never Used   Substance and Sexual Activity     Alcohol use: Not Currently     Drug use: Not on file     Sexual activity: Not on file   Lifestyle     Physical activity     Days per week: Not on file     Minutes per session: Not on file     Stress: Not on file   Relationships     Social connections     Talks on phone: Not on file     Gets together: Not on file     Attends Jehovah's witness service: Not on file     Active member of club or organization: Not on file     Attends meetings of clubs or organizations: Not on file     Relationship status: Not on file     Intimate partner violence     Fear of current or ex partner: Not on file     Emotionally abused: Not on file     Physically abused: Not on file     Forced sexual activity: Not on file   Other Topics Concern     Not on file   Social History Narrative     Not on file       REVIEW OF SYSTEM:  Per HPI    PHYSICAL EXAM:   /64   Pulse 66   Temp 98.3  F (36.8  C)   Resp 20   Wt 141 lb (64 kg)   SpO2 95%     A limited exam was performed  due to recommendations for care during COVID-19 pandemic. Due to the 2020 COVID-19 pandemic, except as noted above, the patient was visually observed at a 6 foot plus distance.  An observational exam was performed in an effort to keep patient safe from COVID-19 and other communicable diseases.     General appearance: alert, appears stated age and cooperative  HEENT: Head is normocephalic with normal hair distribution. No evidence of trauma. Ears: Without lesions or deformity. No acute purulent discharge. Eyes: Conjunctivae pink with no scleral icterus or erythema. Nose: Normal. Oropharnyx: mmm.  Lungs: respirations without effort.  Extremities: extremities normal, atraumatic, no cyanosis.  Skin: Skin color, texture normal. No rashes or lesions on exposed skin.   Neurologic: Grossly normal   Psych: interacts well with caregivers, exhibits logical thought processes and connections, pleasant.      LABS:   None today.     ASSESSMENT:      ICD-10-CM    1. Urinary frequency  R35.0        PLAN:    Urinary Frequency  -UA/UC-slightly dirty but essentially negative culture grew out 10,000-50,000 E. coli.  This shows improvement.  We will continue to follow very carefully.  May need to treat for overactive bladder.    Otherwise continue current care plan for all other chronic medical conditions, as they are stable. Encouraged patient to engage in healthy lifestyle behaviors such as engaging in social activities, exercising (PT/OT), eating well, and following care plan. Follow up for routine check-up, or sooner if needed. Will continue to monitor patient and work with nursing staff collaboratively to work toward positive patient outcomes.    Electronically signed by: Octavia Serna CNP

## 2021-06-08 NOTE — PROGRESS NOTES
[facility]    Facility:   [facility] [[id number]]   Code Status: POLST AVAILABLE      CHIEF COMPLAINT/REASON FOR VISIT:  Chief Complaint   Patient presents with     Review Of Multiple Medical Conditions     Physical deconditioning, seizure disorder, status post stroke, diabetes       HISTORY:      HPI: Addis is a 69 y.o. female who  has a past medical history of Allergic rhinitis, CKD (chronic kidney disease), Depression, Displaced dome fracture of left acetabulum (H), DM2 (diabetes mellitus, type 2) (H), GERD (gastroesophageal reflux disease), Gout, HTN (hypertension), Left hemiplegia (H), Migraine, 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), Nephrolithiasis, Seizure disorder (H), Stroke (H), and Vitamin B12 deficiency anemia. Addis was recently transferred from [facility]. She was undergoing acute rehab after suffering a hip fracture and undergoing ORIF repair.     Today Addis is being evaluated for a routine review of multiple medical problems while in the TCU.  She is also requesting evaluation of her current diabetes regimen and blood sugar checks.  We discussed blood sugar checks as she is refusing the evening time blood sugar checked.  She states that it is always well within normal range and she has never taken it that many times before.  She states that her fingers are quite sore and feels that a reprieve from at least 1 of the polyps would be great.  She otherwise states that she has been doing well and does not have any new concerns.  Her physical and occupational therapy are going well and she does have some return of use and function to her left arm.  Addis has been eating, drinking and eliminating well.  She denies any symptoms including fevers/chills, cough or cold symptoms, headaches, vision changes, chest pain/pressure, difficulty breathing, SOB, abdominal pain, nausea, vomiting, diarrhea, increasing or  ongoing weakness past baseline, increasing pain.     Past Medical History:   Diagnosis Date     Allergic rhinitis      CKD (chronic kidney disease)      Depression      Displaced dome fracture of left acetabulum (H)      DM2 (diabetes mellitus, type 2) (H)      GERD (gastroesophageal reflux disease)      Gout      HTN (hypertension)      Left hemiplegia (H)      Migraine      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)      Nephrolithiasis      Seizure disorder (H)      Stroke (H)      Vitamin B12 deficiency anemia              Family History   Problem Relation Age of Onset     Heart disease Mother      Hypertension Mother      Coronary artery disease Father      Diabetes Sister      Hypertension Brother      Cancer Sister      Social History     Socioeconomic History     Marital status:      Spouse name: Not on file     Number of children: Not on file     Years of education: Not on file     Highest education level: Not on file   Occupational History     Not on file   Social Needs     Financial resource strain: Not on file     Food insecurity     Worry: Not on file     Inability: Not on file     Transportation needs     Medical: Not on file     Non-medical: Not on file   Tobacco Use     Smoking status: Never Smoker     Smokeless tobacco: Never Used   Substance and Sexual Activity     Alcohol use: Not Currently     Drug use: Not on file     Sexual activity: Not on file   Lifestyle     Physical activity     Days per week: Not on file     Minutes per session: Not on file     Stress: Not on file   Relationships     Social connections     Talks on phone: Not on file     Gets together: Not on file     Attends Latter day service: Not on file     Active member of club or organization: Not on file     Attends meetings of clubs or organizations: Not on file     Relationship status: Not on file     Intimate partner violence     Fear of current or ex partner: Not on file     Emotionally abused: Not on  file     Physically abused: Not on file     Forced sexual activity: Not on file   Other Topics Concern     Not on file   Social History Narrative     Not on file       REVIEW OF SYSTEM:  Per HPI    PHYSICAL EXAM:   /73   Pulse 72   Temp 97.7  F (36.5  C)   Resp 16   Wt 142 lb 12.8 oz (64.8 kg)   SpO2 93%     A limited exam was performed due to recommendations for care during COVID-19 pandemic. Due to the 2020 COVID-19 pandemic, except as noted above, the patient was visually observed at a 6 foot plus distance.  An observational exam was performed in an effort to keep patient safe from COVID-19 and other communicable diseases.     General appearance: alert, appears stated age and cooperative  HEENT: Head is normocephalic with normal hair distribution. No evidence of trauma. Ears: Without lesions or deformity. No acute purulent discharge. Eyes: Conjunctivae pink with no scleral icterus or erythema. Nose: Normal. Oropharnyx: mmm.  Lungs: respirations without effort.  Extremities: extremities normal, atraumatic, no cyanosis.  Skin: Skin color, texture normal. No rashes or lesions on exposed skin.   Neurologic: Grossly normal   Psych: interacts well with caregivers, exhibits logical thought processes and connections, pleasant.      LABS:   None today.     ASSESSMENT:      ICD-10-CM    1. Physical deconditioning  R53.81    2. Seizure disorder (H)  G40.909    3. Type 2 diabetes mellitus with other neurologic complication, with long-term current use of insulin (H)  E11.49     Z79.4        PLAN:    Care plan reviewed and remains appropriate.     Physical Deconditioning, old CVA  -Continue PT/OT and other therapies as per care plan.  -Encouraged good nutrition and movement habits.   -Discussed care plan and expected course of stay.   -Continue to follow-up per routine schedule or sooner if needed.     Seizure disorder  -Baclofen 10 mg by mouth 3 times daily.  -Keppra 250 mg tablets, take 5 tablets by mouth twice  daily.  -Phenytoin 200 mg by mouth twice daily.    DMII  -Blood sugars with meals.   -Glargine 40 units subcutaneously in the morning.  -Insulin lispro per sliding scale 3 times a day with meals.  -Follow-up as needed.    Otherwise continue current care plan for all other chronic medical conditions, as they are stable. Encouraged patient to engage in healthy lifestyle behaviors such as engaging in social activities, exercising (PT/OT), eating well, and following care plan. Follow up for routine check-up, or sooner if needed. Will continue to monitor patient and work with nursing staff collaboratively to work toward positive patient outcomes.    Electronically signed by: Octavia Serna CNP

## 2021-06-08 NOTE — PROGRESS NOTES
[facility]    Facility:   [facility] [[id number]]   Code Status: POLST AVAILABLE      CHIEF COMPLAINT/REASON FOR VISIT:  Chief Complaint   Patient presents with     Problem Visit     Depression       HISTORY:      HPI: Addis is a 69 y.o. female who  has a past medical history of Allergic rhinitis, CKD (chronic kidney disease), Depression, Displaced dome fracture of left acetabulum (H), DM2 (diabetes mellitus, type 2) (H), GERD (gastroesophageal reflux disease), Gout, HTN (hypertension), Left hemiplegia (H), Migraine, 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), Nephrolithiasis, Seizure disorder (H), Stroke (H), and Vitamin B12 deficiency anemia. Addis was recently transferred from [facility]. She was undergoing acute rehab after suffering a hip fracture and undergoing ORIF repair.     Today Addis is being evaluated by request of nursing staff for issues with depression.  Apparently family did state that they have noticed she is quite depressed and have requested further evaluation.  Addis however states that she knows she is depressed and she is very upset about her recent last day of therapy.  She is now going to be moving into long-term care and is upset about this as well.  She states that this is situational.  Did empathize with her.  This is quite a situation and a significant life change.  She does agree to seeing a therapist.  She denies any suicidal or homicidal ideation.  She refuses medication adjustment at this time.  We will continue to monitor and follow.  She denies any symptoms including fevers/chills, cough or cold symptoms, headaches, vision changes, chest pain/pressure, difficulty breathing, SOB, abdominal pain, nausea, vomiting, diarrhea, increasing or ongoing weakness past baseline, increasing pain.     Past Medical History:   Diagnosis Date     Allergic rhinitis      CKD (chronic kidney disease)       Depression      Displaced dome fracture of left acetabulum (H)      DM2 (diabetes mellitus, type 2) (H)      GERD (gastroesophageal reflux disease)      Gout      HTN (hypertension)      Left hemiplegia (H)      Migraine      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)      Nephrolithiasis      Seizure disorder (H)      Stroke (H)      Vitamin B12 deficiency anemia              Family History   Problem Relation Age of Onset     Heart disease Mother      Hypertension Mother      Coronary artery disease Father      Diabetes Sister      Hypertension Brother      Cancer Sister      Social History     Socioeconomic History     Marital status:      Spouse name: Not on file     Number of children: Not on file     Years of education: Not on file     Highest education level: Not on file   Occupational History     Not on file   Social Needs     Financial resource strain: Not on file     Food insecurity     Worry: Not on file     Inability: Not on file     Transportation needs     Medical: Not on file     Non-medical: Not on file   Tobacco Use     Smoking status: Never Smoker     Smokeless tobacco: Never Used   Substance and Sexual Activity     Alcohol use: Not Currently     Drug use: Not on file     Sexual activity: Not on file   Lifestyle     Physical activity     Days per week: Not on file     Minutes per session: Not on file     Stress: Not on file   Relationships     Social connections     Talks on phone: Not on file     Gets together: Not on file     Attends Shinto service: Not on file     Active member of club or organization: Not on file     Attends meetings of clubs or organizations: Not on file     Relationship status: Not on file     Intimate partner violence     Fear of current or ex partner: Not on file     Emotionally abused: Not on file     Physically abused: Not on file     Forced sexual activity: Not on file   Other Topics Concern     Not on file   Social History Narrative     Not on  file       REVIEW OF SYSTEM:  Per HPI    PHYSICAL EXAM:   /55   Pulse 75   Temp 98.8  F (37.1  C)   Resp 17   Wt 143 lb (64.9 kg)   SpO2 95%     A limited exam was performed due to recommendations for care during COVID-19 pandemic. Due to the 2020 COVID-19 pandemic, except as noted above, the patient was visually observed at a 6 foot plus distance.  An observational exam was performed in an effort to keep patient safe from COVID-19 and other communicable diseases.     General appearance: alert, appears stated age and cooperative  HEENT: Head is normocephalic with normal hair distribution. No evidence of trauma. Ears: Without lesions or deformity. No acute purulent discharge. Eyes: Conjunctivae pink with no scleral icterus or erythema. Nose: Normal. Oropharnyx: mmm.  Lungs: respirations without effort.  Extremities: extremities normal, atraumatic, no cyanosis.  Skin: Skin color, texture normal. No rashes or lesions on exposed skin.   Neurologic: Grossly normal   Psych: interacts well with caregivers, exhibits logical thought processes and connections, pleasant.      LABS:   None today.     ASSESSMENT:      ICD-10-CM    1. Moderate episode of recurrent major depressive disorder (H)  F33.1        PLAN:    Major depression  -Declined adjustment in antidepressant.  -Psych eval and treat.  -Celexa 10 mg by mouth daily.  -Follow-up as needed.    Otherwise continue current care plan for all other chronic medical conditions, as they are stable. Encouraged patient to engage in healthy lifestyle behaviors such as engaging in social activities, exercising (PT/OT), eating well, and following care plan. Follow up for routine check-up, or sooner if needed. Will continue to monitor patient and work with nursing staff collaboratively to work toward positive patient outcomes.    Electronically signed by: Octavia Serna CNP

## 2021-06-08 NOTE — PROGRESS NOTES
[facility]    Facility:   [facility] [[id number]]   Code Status: POLST AVAILABLE      CHIEF COMPLAINT/REASON FOR VISIT:  Chief Complaint   Patient presents with     Review Of Multiple Medical Conditions     physical deconditioning, seizure disorder       HISTORY:      HPI: Addis is a 69 y.o. female who  has a past medical history of Allergic rhinitis, CKD (chronic kidney disease), Depression, Displaced dome fracture of left acetabulum (H), DM2 (diabetes mellitus, type 2) (H), GERD (gastroesophageal reflux disease), Gout, HTN (hypertension), Left hemiplegia (H), Migraine, 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), Nephrolithiasis, Seizure disorder (H), Stroke (H), and Vitamin B12 deficiency anemia. Addis was recently transferred from [facility]. She was undergoing acute rehab after suffering a hip fracture and undergoing ORIF repair.     Today Addis is being evaluated for a routine review of multiple medical problems while in the TCU. Addis recently had significant changes in neuro status with tremors and near seizures. She was admitted to the hospital and was treated for exacerbation of stroke issues. She reports that she was told that the old stroke area grew. She then had a UTI, which has since resolved. Addis otherwise has been feeling well. She has not had any ongoing neuro deficits, no further seizure like activity.  She denies any symptoms including fevers/chills, cough or cold symptoms, headaches, vision changes, chest pain/pressure, difficulty breathing, SOB, abdominal pain, nausea, vomiting, diarrhea, increasing or ongoing weakness past baseline, increasing pain.     Past Medical History:   Diagnosis Date     Allergic rhinitis      CKD (chronic kidney disease)      Depression      Displaced dome fracture of left acetabulum (H)      DM2 (diabetes mellitus, type 2) (H)      GERD (gastroesophageal reflux disease)       Gout      HTN (hypertension)      Left hemiplegia (H)      Migraine      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)      Nephrolithiasis      Seizure disorder (H)      Stroke (H)      Vitamin B12 deficiency anemia              Family History   Problem Relation Age of Onset     Heart disease Mother      Hypertension Mother      Coronary artery disease Father      Diabetes Sister      Hypertension Brother      Cancer Sister      Social History     Socioeconomic History     Marital status:      Spouse name: Not on file     Number of children: Not on file     Years of education: Not on file     Highest education level: Not on file   Occupational History     Not on file   Social Needs     Financial resource strain: Not on file     Food insecurity     Worry: Not on file     Inability: Not on file     Transportation needs     Medical: Not on file     Non-medical: Not on file   Tobacco Use     Smoking status: Never Smoker     Smokeless tobacco: Never Used   Substance and Sexual Activity     Alcohol use: Not Currently     Drug use: Not on file     Sexual activity: Not on file   Lifestyle     Physical activity     Days per week: Not on file     Minutes per session: Not on file     Stress: Not on file   Relationships     Social connections     Talks on phone: Not on file     Gets together: Not on file     Attends Jewish service: Not on file     Active member of club or organization: Not on file     Attends meetings of clubs or organizations: Not on file     Relationship status: Not on file     Intimate partner violence     Fear of current or ex partner: Not on file     Emotionally abused: Not on file     Physically abused: Not on file     Forced sexual activity: Not on file   Other Topics Concern     Not on file   Social History Narrative     Not on file       REVIEW OF SYSTEM:  Per HPI    PHYSICAL EXAM:   /68   Pulse 76   Temp 97.1  F (36.2  C)   Resp 18   Wt 140 lb 9.6 oz (63.8 kg)    SpO2 94%     A limited exam was performed due to recommendations for care during COVID-19 pandemic. Due to the 2020 COVID-19 pandemic, except as noted above, the patient was visually observed at a 6 foot plus distance.  An observational exam was performed in an effort to keep patient safe from COVID-19 and other communicable diseases.     General appearance: alert, appears stated age and cooperative  HEENT: Head is normocephalic with normal hair distribution. No evidence of trauma. Ears: Without lesions or deformity. No acute purulent discharge. Eyes: Conjunctivae pink with no scleral icterus or erythema. Nose: Normal. Oropharnyx: mmm.  Lungs: respirations without effort.  Extremities: extremities normal, atraumatic, no cyanosis.  Skin: Skin color, texture normal. No rashes or lesions on exposed skin.   Neurologic: Grossly normal   Psych: interacts well with caregivers, exhibits logical thought processes and connections, pleasant.      LABS:   None today.     ASSESSMENT:      ICD-10-CM    1. Physical deconditioning  R53.81    2. Seizure disorder (H)  G40.909        PLAN:    Care plan reviewed and remains appropriate.     Physical Deconditioning, old CVA  -Continue PT/OT and other therapies as per care plan.  -Encouraged good nutrition and movement habits.   -Discussed care plan and expected course of stay.   -Continue to follow-up per routine schedule or sooner if needed.     Seizure disorder  -Baclofen 10 mg by mouth 3 times daily.  -Keppra 250 mg tablets, take 5 tablets by mouth twice daily.  -Phenytoin 200 mg by mouth twice daily.    Left acetabular fracture  -Tylenol 1 g every 6 hours as needed for pain.    Otherwise continue current care plan for all other chronic medical conditions, as they are stable. Encouraged patient to engage in healthy lifestyle behaviors such as engaging in social activities, exercising (PT/OT), eating well, and following care plan. Follow up for routine check-up, or sooner if needed.  Will continue to monitor patient and work with nursing staff collaboratively to work toward positive patient outcomes.    Electronically signed by: Octavia Serna CNP

## 2021-06-08 NOTE — PROGRESS NOTES
" [facility]    Facility:  [facility] [[id number]]    Code Status:  DNR/DNI    Chief Complaint   Patient presents with     Review Of Multiple Medical Conditions   : Including seizures, Quentin's paralysis, diabetes                  Patient Active Problem List   Diagnosis     Left hip pain     Postoperative pain     Physical deconditioning     Closed displaced fracture of anterior column of left acetabulum with routine healing, subsequent encounter     S/P ORIF (open reduction internal fixation) fracture     Falls     Essential hypertension     Type 2 diabetes mellitus with other neurologic complication, with long-term current use of insulin (H)     CVA, old,2010     Spells of trembling     Fever     Seizure disorder (H)       History:  Ms. 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     Recent Hospital course: Recall that I had seen patient April 30 for \"spells\", she had had 4 of them over previous days.  I was concerned for seizure, but patient was stable at that time and not having spells so I simply started her on Keppra and have staff arrange ASAP neurology appointment.  Unfortunately 2 days later, before she saw the neurologist, and after 3 doses of Keppra, she had another spell.  She was appropriately sent to the emergency room at that point.     Patient was admitted between May 2 and May 6.  Patient had MRI MRA head and neck vessels as noted below.  She has severe right SOCO stenosis.  There was also some diffusion changes in the MRI in the area of her previous 2010 CVA.  While new/acute CVA could not be ruled out, neurology felt that the MRI changes were more consistent with changes associated with seizure activity.  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     Patient's Keppra was increased to 1250 minutes twice daily and she was also started on phenytoin  mg twice daily.     Neurologist has asked her to see his partner/seizure specialist in 3 to 4 weeks.  Patient reports that follow-up is not happen by phone and no changes were made.     Hospitalization with otherwise unremarkable     ===============================================================  Recall that patient was transferred recently from a different TCU details below:     [facility] TCU course: February 7 through April 20     While the patient reports dissatisfaction with her physical therapy, and requested transfer, there were some positive outcomes report here.  Patient experienced improved blood sugar control throughout her stay resulting in discontinuation of her Lantus on March 12 and an increase in her metformin from 500 twice daily to 1000 twice daily.  Blood sugars reportedly quite favorable thereafter generally less than 170 as well.     Patient also reports that her pain came under good control with scheduled Tylenol and baclofen without need for ongoing opiates.     Patient's blood pressure was generally adequately controlled on current medications.     FiberCon was started for her bowels which tend to be on the loose side with improvement.     Preceding hospital course for the [facility] TCU stay follows:  Hospital course: Patient was admitted to [facility] February 2 through February 7 though she was originally seen in the emergency room at [facility] and transferred to [facility] once fracture was identified.                Patient reports 2 falls the first on January 25 with some left hip pain.  She was seen for left hip pain on the 28th, diagnosed with left acetabular fracture as well as metatarsal  fractures 2 through 4 on the left foot.  Case was discussed with orthopedics, Lovenox prophylaxis was prescribed.  However she fell again on February 1 trying to transfer self in her home this time with very severe left hip pain incompatible with ambulation.  She now was diagnosed with displaced dome fracture of the left acetabulum requiring ORIF, repaired by Dr. Dinh.  There were no complications reported.  Patient was treated with DVT prophylaxis, prophylactic antibiotic therapy and deemed safe for discharge by February 7 with pain tolerated by oral pain medications.  Lovenox 40 mg subcutaneously recommended for 28 days.  Oxycodone 5 to 10 mg every 4 hours along with scheduled Tylenol given for pain.  Her aspirin was held at discharge.  There is also recommendation for pursuing osteoporosis work-up including DEXA scanning.  Notably she also received some ceftriaxone in the emergency room at [facility] for abnormal urinalysis.   ==============================================================         Subjective/ROS:    -augmented by discussion with facility staff involved in direct care      Patient reports that she has had no further spells trembling seizure activities.  Happily she reports that her left arm strength has returned to its previous baseline which while impaired is functional.  She thinks she might still have a little weakness in the leg but again had some chronically so she is not quite sure.    Overall she is quite pleased with her current wellbeing.  She is not having side effects that she can tell from the Keppra and phenytoin.  She does sleep during the day but says that that is not entirely unusual for her.    She is having no dysuria fever sweats chills cough shortness of breath headaches change in vision swallowing speaking hearing.  The quality of her sleep has improved.  She is having no skin rashes.  No falls or injuries.  No vomiting diarrhea melena bright red blood per rectum  dysuria.  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)      Nephrolithiasis      Seizure disorder (H)      Stroke (H)      Vitamin B12 deficiency anemia      Past Surgical History:   Procedure Laterality Date     COLONOSCOPY W/ BIOPSIES AND POLYPECTOMY  05/09/2019    Procedure: COLONOSCOPY, WITH POLYPECTOMY AND BIOPSY; Surgeon: Na Diehl MD; Location: [facility] OR       CYSTOSCOPY W/ URETEROSCOPY Right 02/22/2016    Procedure: COMBINED CYSTOSCOPY, URETEROSCOPY; Surgeon: Madelaine Roland MD; Location: [facility] OR       ORIF ACETABULUM FRACTURE Left           Family History   Problem Relation Age of Onset     Heart disease Mother      Hypertension Mother      Coronary artery disease Father      Diabetes Sister      Hypertension Brother      Cancer Sister    :       Social History     Socioeconomic History     Marital status:      Spouse name: Not on file     Number of children: Not on file     Years of education: Not on file     Highest education level: Not on file   Occupational History     Not on file   Social Needs     Financial resource strain: Not on file     Food insecurity     Worry: Not on file     Inability: Not on file     Transportation needs     Medical: Not on file     Non-medical: Not on file   Tobacco Use     Smoking status: Never Smoker     Smokeless tobacco: Never Used   Substance and Sexual Activity     Alcohol use: Not Currently     Drug use: Not on file     Sexual activity: Not on file   Lifestyle     Physical activity     Days per week: Not on file     Minutes per session: Not on file     Stress: Not  on file   Relationships     Social connections     Talks on phone: Not on file     Gets together: Not on file     Attends Uats[employer] service: Not on file     Active member of club or organization: Not on file     Attends meetings of clubs or organizations: Not on file     Relationship status: Not on file     Intimate partner violence     Fear of current or ex partner: Not on file     Emotionally abused: Not on file     Physically abused: Not on file     Forced sexual activity: Not on file   Other Topics Concern     Not on file   Social History Narrative     Not on file   :        Current Outpatient Medications on File Prior to Visit   Medication Sig Dispense Refill     acetaminophen (TYLENOL) 500 MG tablet Take 1,000 mg by mouth 3 (three) times a day.        amLODIPine (NORVASC) 10 MG tablet Take 10 mg by mouth daily.       aspirin 81 MG EC tablet Take 81 mg by mouth daily.       atorvastatin (LIPITOR) 40 MG tablet Take 40 mg by mouth at bedtime.       baclofen (LIORESAL) 10 MG tablet Take 10 mg by mouth 3 (three) times a day.       bisacodyL (DULCOLAX) 5 mg EC tablet Take 5 mg by mouth daily as needed for constipation.       cholecalciferol, vitamin D3, 1,000 unit (25 mcg) tablet Take 1,000 Units by mouth daily.       citalopram (CELEXA) 10 MG tablet Take 10 mg by mouth at bedtime.       cyanocobalamin 1,000 mcg/mL injection Inject 1,000 mcg into the shoulder, thigh, or buttocks every 30 (thirty) days.       ibuprofen (ADVIL,MOTRIN) 200 MG tablet Take 200 mg by mouth every 6 (six) hours as needed for pain.       insulin glargine (BASAGLAR KWIKPEN) 100 unit/mL (3 mL) pen Inject 40 Units under the skin daily.        insulin lispro (HUMALOG) 100 unit/mL injection Inject under the skin 3 (three) times a day before meals. Per Sliding Scale;  If Blood Sugar is 71 to 150, give 0 Units.  If Blood Sugar is 151 to 200, give 2 Units.  If Blood Sugar is 201 to 250, give 4 Units.  If Blood Sugar is 251 to 300, give 8  Units.  subcutaneous       lactase 4,500 unit Tab Take 4 tablets by mouth as needed.        levETIRAcetam (KEPPRA) 500 MG tablet Take 1,250 mg by mouth 2 (two) times a day.        losartan (COZAAR) 25 MG tablet Take 25 mg by mouth daily.       magnesium oxide 250 mg magnesium Tab Take 250 mg by mouth.       metFORMIN (GLUCOPHAGE) 1000 MG tablet Take 1,000 mg by mouth 2 (two) times a day with meals. Take 2 tabs by mouth in AM and 1 tab by mouth at HS.       metoprolol succinate (TOPROL-XL) 50 MG 24 hr tablet Take 50 mg by mouth daily.       montelukast (SINGULAIR) 10 mg tablet Take 10 mg by mouth at bedtime.       phenytoin extended (DILANTIN) 200 MG ER capsule Take 200 mg by mouth 2 (two) times a day.        senna-docusate (SENNOSIDES-DOCUSATE SODIUM) 8.6-50 mg tablet Take 1 tablet by mouth daily.       No current facility-administered medications on file prior to visit.    :      ALLERGIES:  Lisinopril; Milk; and Latex    Vitals:  There were no vitals taken for this visit. except as noted below    Vital signs: Reviewed per facility EMR vitals including as follows:                Most Recent Vitals Date/Time Taken  Temperature: 97.2  F 05/28/2020 04:41 PM  Pulse: 76 per minute 05/28/2020 04:41 PM  Respirations: 18 per minute 05/28/2020 04:41 PM  Blood Pressure: 147 / 73 mmHg 05/28/2020 07:20 AM  O2 Saturation: 93 % 05/28/2020 04:41 PM  Blood Sugar: 109 mg/dL 05/28/2020 04:10 PM  Weight: 142.0 lbs / Routine       BMI: 22.92 05/28/2020 12:45 PM  Height: 5ft 6.0in 04/20/2020 02:43 PM  There is no height or weight on file to calculate BMI.    Physical exam:    General:  Alert  oriented x3 appears in no acute distress    HEENT:  NC/AT, sclera clear, EOMI gaze is conjugate sclera clear,   Patient demonstrates good range of motion of all 4 extremities at all joints.  However she has a subjective sense of it being harder to move her left leg at times.  She is breathing comfortably with no tachypnea or accessory muscle  use.  Skin is clear in visualized areas      Due to the 2020 Covid 19 pandemic, except as noted above, the patient was visually observed at a 6 foot plus distance.  An observational exam was performed in an effort to keep patient safe from Covid 19 and other communicable diseases.   Labs:  Lab Results   Component Value Date    WBC 8.3 02/11/2020    HGB 10.6 (L) 02/11/2020    HCT 36.7 02/11/2020     (H) 02/11/2020     02/11/2020     Results for orders placed or performed in visit on 04/29/20   Basic Metabolic Panel   Result Value Ref Range    Sodium 142 136 - 145 mmol/L    Potassium 4.6 3.5 - 5.0 mmol/L    Chloride 109 (H) 98 - 107 mmol/L    CO2 23 22 - 31 mmol/L    Anion Gap, Calculation 10 5 - 18 mmol/L    Glucose 129 (H) 70 - 125 mg/dL    Calcium 9.4 8.5 - 10.5 mg/dL    BUN 29 (H) 8 - 22 mg/dL    Creatinine 0.79 0.60 - 1.10 mg/dL    GFR MDRD Af Amer >60 >60 mL/min/1.73m2    GFR MDRD Non Af Amer >60 >60 mL/min/1.73m2         No results found for: TSH  No results found for: HGBA1C  [unfilled]  Lab Results   Component Value Date    CKZWZOWZ15 591 04/29/2020     No results found for: BNP  [unfilled]        Invalid input(s): PRINTERVAL      Assessment/Plan:      ICD-10-CM    1. Essential hypertension  I10    2. Type 2 diabetes mellitus with other neurologic complication, with long-term current use of insulin (H)  E11.49     Z79.4    3. Seizure disorder (H)  G40.909    4. S/P ORIF (open reduction internal fixation) fracture  Z98.890     Z87.81    Seizures  Quentin's paralysis  Chronic left-sided weakness/hemiplegia          Please see my recent note for more complete details.  Since then patient has done extremely well.  She is tolerating both of her antiepileptic medications Keppra and phenytoin.  She has had follow-up with the neurologist by phone.  There were no changes.  No seizure activity.  Quentin's paralysis of the arm has resolved.  Unclear what is causing her subjective leg symptoms, might just be  her chronic symptoms?... No new changes today     CVA 2010  Left hemiplegia              See my previous note, patient is back on aspirin appropriately.  Blood pressure control, lipid control, diabetic control important     Fever  UTI             Resolved.  She tested negative for COVID-19 had negative chest x-ray.  She has been appropriately treated for UTI with relief of her symptoms     acetabular fracture status post ORIF February 2020              This was the original reason for patient's initial TCU need.  Patient reports dissatisfaction with her therapy at prior TCU resulting in the transfer.    She is pleased with her treatments here.  However she is disappointed that long-term care is becoming a serious consideration at this point.     Generalized weakness  Residual left-sided weakness following CVA  Frequent falls  Unsteady gait              PT, OT, services appreciated     Anemia              Hemoglobin 10.6 back in February.  Hemoglobin  rechecked in the hospital 13.0.    Recheck hemoglobin anticipated with next blood draw     Presumed osteoporosis              Outpatient DEXA scan anticipated.  Cholecalciferol        Type 2 diabetes           2020 04:10 PM   Blood Sugar: 109 mg/dL  Luna Larkin LPN    05/28/2020 11:44 AM   Blood Sugar: 111 mg/dL  Tylor Foster RN    05/28/2020 07:17 AM   Blood Sugar: 77 mg/dL  Tylor Foster RN    05/27/2020 04:47 PM   Blood Sugar: 125 mg/dL  Luna Larkin LPN    05/27/2020 11:45 AM   Blood Sugar: 175 mg/dL  Tiki Hernandez LPN    05/27/2020 07:48 AM   Blood Sugar: 104 mg/dL  Tiki Hernandez LPN    05/26/2020 04:35 PM   Blood Sugar: 91 mg/dL  Tsering Pettit RN    05/26/2020 11:49 AM   Blood Sugar: 156 mg/dL  Tiki Hernandez LPN    05/26/2020 08:03 AM   Blood Sugar: 102 mg/dL  Tiki Hernandez LPN    05/25/2020 07:36 PM   Blood Sugar: 136 mg/dL  Nando Mora RN    05/25/2020 03:57 PM   Blood Sugar: 154 mg/dL  Nando Mora RN    05/25/2020 11:13 AM   Blood Sugar: 119  mg/dL  Tiki Anumsmaw LPN    05/25/2020 07:40 AM   Blood Sugar: 93 mg/dL  Tiki Yismaw LPN    05/24/2020 07:19 PM   Blood Sugar: 127 mg/dL  Luna Trae LPN    05/24/2020 04:59 PM   Blood Sugar: 84 mg/dL  Luna Trae LPN    05/24/2020 04:59 PM   Blood Sugar: 84 mg/dL  Luna Trae LPN    05/24/2020 11:33 AM   Blood Sugar: 194 mg/dL  Tiki Rowansmaw LPN    05/24/2020 08:38 AM   Blood Sugar: 123 mg/dL  Tiki Anumsmaw LPN    05/24/2020 08:33 AM   Blood Sugar: 123 mg/dL  Tiki Anumsmaw LPN    05/23/2020 07:57 PM   Blood Sugar: 94 mg/dL  Luna Trae LPN    05/23/2020 04:58 PM   Blood Sugar: 113 mg/dL  Luna Trae LPN    05/23/2020 11:48 AM   Blood Sugar: 120 mg/dL  Tiki Hannaaw LPN    05/23/2020 08:01 AM   Blood Sugar: 90 mg/dL  Tiki Rowansmaw LPN    05/22/2020 08:44 PM   Blood Sugar: 145 mg/dL  Luna Trae LPN    05/22/2020 04:10 PM   Blood Sugar: 110 mg/dL  Luna Trae LPN    05/22/2020 12:11 PM   Blood Sugar: 98 mg/dL  Stanislaw Mazaa LPN    05/22/2020 09:05 AM   Blood Sugar: 165 mg/dL  Anais Ayala RN NM    05/21/2020 07:38 PM   Blood Sugar: 186 mg/dL  Tsering Pettit RN    05/21/2020 04:08 PM   Blood Sugar: 158 mg/dL  Tsering Pettit RN    05/21/2020 11:08 AM   Blood Sugar: 128 mg/dL  Chidi albright RN    05/21/2020 07:08 AM   Blood Sugar: 92 mg/dL  Chidi albright RN    05/20/2020 09:16 PM   Blood Sugar: 207 mg/dL  Luna Trae LPN    05/20/2020 05:28 PM   Blood Sugar: 116 mg/dL  Luna Trae LPN    05/20/2020 11:25 AM   Blood Sugar: 106 mg/dL  Tylor Foster RN    05/20/2020 08:42 AM   Blood Sugar: 129 mg/dL  Tylor Foster RN    05/19/2020 08:17 PM   Blood Sugar: 211 mg/dL  Luna Larkin LPN    05/19/2020 04:40 PM   Blood Sugar: 139 mg/dL  Luna Larkin LPN    05/19/2020 11:35 AM   Blood Sugar: 116 mg/dL       Patient has been briefly off of her Lantus.  However her sugars steadily juan and now she is back on Lantus up to 40 units currently.  This leads to excellent control however I worry about low sugars as she has  "been in the 80s.  Even high 70s.  Recommend decrease Lantus to 35 units daily.     Hyperlipidemia              Atorvastatin unchanged  Hypomagnesemia               1.7 at last check, has not been followed up.  Will check that along with BMP and hemoglobin     CKD 3              Blood work from hospital reviewed, patient is stable     Possible gout              Patient repeats the story to me about how she had recurrent toe pain \"gout\" until her podiatrist worked on her toe and removed \"some extra skin\" and she has not had any bouts since then.  On if she had recurrent ingrowing?     Depression              Patient reports excellent control on current Celexa 10 mg daily          Case discussed with:    Facility staff         Rasta Michael MD    "

## 2021-06-08 NOTE — PROGRESS NOTES
[facility]    Facility:   [facility] [[id number]]   Code Status: POLST AVAILABLE      CHIEF COMPLAINT/REASON FOR VISIT:  Chief Complaint   Patient presents with     Problem Visit     dysuria       HISTORY:      HPI: Addis is a 69 y.o. female who  has a past medical history of Allergic rhinitis, CKD (chronic kidney disease), Depression, Displaced dome fracture of left acetabulum (H), DM2 (diabetes mellitus, type 2) (H), GERD (gastroesophageal reflux disease), Gout, HTN (hypertension), Left hemiplegia (H), Migraine, 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), Nephrolithiasis, Seizure disorder (H), Stroke (H), and Vitamin B12 deficiency anemia. Addis was recently transferred from [facility]. She was undergoing acute rehab after suffering a hip fracture and undergoing ORIF repair.     Today Addis is being evaluated after she had a positive UA/UC. She shares she is doing well. She does have some dysuria. She would like to have the UTI treated given symptoms and clear result on UC. Addis otherwise has been doing well. She does not have any other symptoms. No further seizure-like activity or spasms. Addis denies any other concerns including fevers/chills, cough or cold symptoms, headaches, vision changes, chest pain/pressure, difficulty breathing, SOB, abdominal pain, nausea, vomiting, diarrhea, increasing or ongoing weakness past baseline, increasing pain.     Past Medical History:   Diagnosis Date     Allergic rhinitis      CKD (chronic kidney disease)      Depression      Displaced dome fracture of left acetabulum (H)      DM2 (diabetes mellitus, type 2) (H)      GERD (gastroesophageal reflux disease)      Gout      HTN (hypertension)      Left hemiplegia (H)      Migraine      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)      Nephrolithiasis      Seizure disorder (H)      Stroke (H)      Vitamin B12 deficiency  anemia              Family History   Problem Relation Age of Onset     Heart disease Mother      Hypertension Mother      Coronary artery disease Father      Diabetes Sister      Hypertension Brother      Cancer Sister      Social History     Socioeconomic History     Marital status:      Spouse name: Not on file     Number of children: Not on file     Years of education: Not on file     Highest education level: Not on file   Occupational History     Not on file   Social Needs     Financial resource strain: Not on file     Food insecurity     Worry: Not on file     Inability: Not on file     Transportation needs     Medical: Not on file     Non-medical: Not on file   Tobacco Use     Smoking status: Never Smoker     Smokeless tobacco: Never Used   Substance and Sexual Activity     Alcohol use: Not Currently     Drug use: Not on file     Sexual activity: Not on file   Lifestyle     Physical activity     Days per week: Not on file     Minutes per session: Not on file     Stress: Not on file   Relationships     Social connections     Talks on phone: Not on file     Gets together: Not on file     Attends Zoroastrian service: Not on file     Active member of club or organization: Not on file     Attends meetings of clubs or organizations: Not on file     Relationship status: Not on file     Intimate partner violence     Fear of current or ex partner: Not on file     Emotionally abused: Not on file     Physically abused: Not on file     Forced sexual activity: Not on file   Other Topics Concern     Not on file   Social History Narrative     Not on file       REVIEW OF SYSTEM:  Per HPI    PHYSICAL EXAM:   BP 94/58   Pulse 72   Temp 99.7  F (37.6  C)   Resp 18   Wt 141 lb 3.2 oz (64 kg)   SpO2 94%     A limited exam was performed due to recommendations for care during COVID-19 pandemic. Due to the 2020 COVID-19 pandemic, except as noted above, the patient was visually observed at a 6 foot plus distance.  An  observational exam was performed in an effort to keep patient safe from COVID-19 and other communicable diseases.     General appearance: alert, appears stated age and cooperative  HEENT: Head is normocephalic with normal hair distribution. No evidence of trauma. Ears: Without lesions or deformity. No acute purulent discharge. Eyes: Conjunctivae pink with no scleral icterus or erythema. Nose: Normal. Oropharnyx: mmm.  Lungs: respirations without effort.  Extremities: extremities normal, atraumatic, no cyanosis.  Skin: Skin color, texture normal. No rashes or lesions on exposed skin.   Neurologic: Grossly normal   Psych: interacts well with caregivers, exhibits logical thought processes and connections, pleasant.      LABS:   None today.     ASSESSMENT:      ICD-10-CM    1. Acute cystitis without hematuria  N30.00        PLAN:    UTI  -Encourage fluids.   -Tylenol 1000 mg by mouth ever 6 hours as needed.   -Doxycycline 100 mg by mouth two times a day x 7 days.   -Follow up as needed.     Otherwise continue current care plan for all other chronic medical conditions, as they are stable. Encouraged patient to engage in healthy lifestyle behaviors such as engaging in social activities, exercising (PT/OT), eating well, and following care plan. Follow up for routine check-up, or sooner if needed. Will continue to monitor patient and work with nursing staff collaboratively to work toward positive patient outcomes.    Electronically signed by: Octavia Serna CNP

## 2021-06-08 NOTE — PROGRESS NOTES
[facility]    Facility:   [facility] [[id number]]   Code Status: POLST AVAILABLE      CHIEF COMPLAINT/REASON FOR VISIT:  Chief Complaint   Patient presents with     Review Of Multiple Medical Conditions     Physical deconditioning, seizure disorder, UTI       HISTORY:      HPI: Addis is a 69 y.o. female who  has a past medical history of Allergic rhinitis, CKD (chronic kidney disease), Depression, Displaced dome fracture of left acetabulum (H), DM2 (diabetes mellitus, type 2) (H), GERD (gastroesophageal reflux disease), Gout, HTN (hypertension), Left hemiplegia (H), Migraine, 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), Nephrolithiasis, Seizure disorder (H), Stroke (H), and Vitamin B12 deficiency anemia. Addis was recently transferred from [facility]. She was undergoing acute rehab after suffering a hip fracture and undergoing ORIF repair.     Today Addis is being evaluated for a routine review of multiple medical problems while in the TCU.  Addis states that she is doing very well.  We did discuss trembling and seizure-like activity as well as spasms.  She states that she is doing quite well and has not had any of the symptoms.  It does appear that her UTI has fully resolved.  She has been working with PT and OT and feels that it is going well.  She has no new aches or pains.  She has been eating, drinking and eliminating well.  She denies any symptoms including fevers/chills, cough or cold symptoms, headaches, vision changes, chest pain/pressure, difficulty breathing, SOB, abdominal pain, nausea, vomiting, diarrhea, increasing or ongoing weakness past baseline, increasing pain.     Past Medical History:   Diagnosis Date     Allergic rhinitis      CKD (chronic kidney disease)      Depression      Displaced dome fracture of left acetabulum (H)      DM2 (diabetes mellitus, type 2) (H)      GERD (gastroesophageal reflux disease)       Gout      HTN (hypertension)      Left hemiplegia (H)      Migraine      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)      Nephrolithiasis      Seizure disorder (H)      Stroke (H)      Vitamin B12 deficiency anemia              Family History   Problem Relation Age of Onset     Heart disease Mother      Hypertension Mother      Coronary artery disease Father      Diabetes Sister      Hypertension Brother      Cancer Sister      Social History     Socioeconomic History     Marital status:      Spouse name: Not on file     Number of children: Not on file     Years of education: Not on file     Highest education level: Not on file   Occupational History     Not on file   Social Needs     Financial resource strain: Not on file     Food insecurity     Worry: Not on file     Inability: Not on file     Transportation needs     Medical: Not on file     Non-medical: Not on file   Tobacco Use     Smoking status: Never Smoker     Smokeless tobacco: Never Used   Substance and Sexual Activity     Alcohol use: Not Currently     Drug use: Not on file     Sexual activity: Not on file   Lifestyle     Physical activity     Days per week: Not on file     Minutes per session: Not on file     Stress: Not on file   Relationships     Social connections     Talks on phone: Not on file     Gets together: Not on file     Attends Mormonism service: Not on file     Active member of club or organization: Not on file     Attends meetings of clubs or organizations: Not on file     Relationship status: Not on file     Intimate partner violence     Fear of current or ex partner: Not on file     Emotionally abused: Not on file     Physically abused: Not on file     Forced sexual activity: Not on file   Other Topics Concern     Not on file   Social History Narrative     Not on file       REVIEW OF SYSTEM:  Per HPI    PHYSICAL EXAM:   /50   Pulse 68   Temp 97.7  F (36.5  C)   Resp 18   Wt 140 lb 9.6 oz (63.8 kg)    SpO2 96%     A limited exam was performed due to recommendations for care during COVID-19 pandemic. Due to the 2020 COVID-19 pandemic, except as noted above, the patient was visually observed at a 6 foot plus distance.  An observational exam was performed in an effort to keep patient safe from COVID-19 and other communicable diseases.     General appearance: alert, appears stated age and cooperative  HEENT: Head is normocephalic with normal hair distribution. No evidence of trauma. Ears: Without lesions or deformity. No acute purulent discharge. Eyes: Conjunctivae pink with no scleral icterus or erythema. Nose: Normal. Oropharnyx: mmm.  Lungs: respirations without effort.  Extremities: extremities normal, atraumatic, no cyanosis.  Skin: Skin color, texture normal. No rashes or lesions on exposed skin.   Neurologic: Grossly normal   Psych: interacts well with caregivers, exhibits logical thought processes and connections, pleasant.      LABS:   None today.     ASSESSMENT:      ICD-10-CM    1. Seizure disorder (H)  G40.909    2. Physical deconditioning  R53.81    3. Acute cystitis without hematuria  N30.00        PLAN:    Physical Deconditioning, old CVA  -Continue PT/OT and other therapies as per care plan.  -Encouraged good nutrition and movement habits.   -Discussed care plan and expected course of stay.   -Continue to follow-up per routine schedule or sooner if needed.     Seizure disorder  -Baclofen 10 mg by mouth 3 times daily.  -Keppra 250 mg tablets, take 5 tablets by mouth twice daily.  -Phenytoin 200 mg by mouth twice daily.    Left acetabular fracture  -Tylenol 1 g every 6 hours as needed for pain.    UTI--no further symptoms  -Encourage fluids.   -Tylenol 1000 mg by mouth ever 6 hours as needed.   -Doxycycline just finishing- UTI considered resolved.  -Follow up as needed.     Otherwise continue current care plan for all other chronic medical conditions, as they are stable. Encouraged patient to engage  in healthy lifestyle behaviors such as engaging in social activities, exercising (PT/OT), eating well, and following care plan. Follow up for routine check-up, or sooner if needed. Will continue to monitor patient and work with nursing staff collaboratively to work toward positive patient outcomes.    Electronically signed by: Octavia Serna CNP

## 2021-06-08 NOTE — PROGRESS NOTES
[facility]    Facility:   [facility] [[id number]]   Code Status: POLST AVAILABLE      CHIEF COMPLAINT/REASON FOR VISIT:  Chief Complaint   Patient presents with     Problem Visit     Urinary frequency       HISTORY:      HPI: Addis is a 69 y.o. female who  has a past medical history of Allergic rhinitis, CKD (chronic kidney disease), Depression, Displaced dome fracture of left acetabulum (H), DM2 (diabetes mellitus, type 2) (H), GERD (gastroesophageal reflux disease), Gout, HTN (hypertension), Left hemiplegia (H), Migraine, 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), Nephrolithiasis, Seizure disorder (H), Stroke (H), and Vitamin B12 deficiency anemia. Addis was recently transferred from [facility]. She was undergoing acute rehab after suffering a hip fracture and undergoing ORIF repair.     Today Addis is being evaluated by her request for urinary frequency.  She states that she feels like she has a UTI again.  She states that she is not had any pain but has had some ongoing issues with having to go to the bathroom every hour or more frequently.  She states that she does not have any abdominal pain or flank pain.  No fevers or chills.  She states that this feels just like when she had a UTI though and she cannot describe it.  She is very vague about her symptoms.  She is a finished course of antibiotics and had recent UTI.  Will retest urine for test of cure.  Did discuss possibility of overactive bladder.  She denies any symptoms including fevers/chills, cough or cold symptoms, headaches, vision changes, chest pain/pressure, difficulty breathing, SOB, abdominal pain, nausea, vomiting, diarrhea, increasing or ongoing weakness past baseline, increasing pain.     Past Medical History:   Diagnosis Date     Allergic rhinitis      CKD (chronic kidney disease)      Depression      Displaced dome fracture of left acetabulum (H)       DM2 (diabetes mellitus, type 2) (H)      GERD (gastroesophageal reflux disease)      Gout      HTN (hypertension)      Left hemiplegia (H)      Migraine      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)      Nephrolithiasis      Seizure disorder (H)      Stroke (H)      Vitamin B12 deficiency anemia              Family History   Problem Relation Age of Onset     Heart disease Mother      Hypertension Mother      Coronary artery disease Father      Diabetes Sister      Hypertension Brother      Cancer Sister      Social History     Socioeconomic History     Marital status:      Spouse name: Not on file     Number of children: Not on file     Years of education: Not on file     Highest education level: Not on file   Occupational History     Not on file   Social Needs     Financial resource strain: Not on file     Food insecurity     Worry: Not on file     Inability: Not on file     Transportation needs     Medical: Not on file     Non-medical: Not on file   Tobacco Use     Smoking status: Never Smoker     Smokeless tobacco: Never Used   Substance and Sexual Activity     Alcohol use: Not Currently     Drug use: Not on file     Sexual activity: Not on file   Lifestyle     Physical activity     Days per week: Not on file     Minutes per session: Not on file     Stress: Not on file   Relationships     Social connections     Talks on phone: Not on file     Gets together: Not on file     Attends Restorationism service: Not on file     Active member of club or organization: Not on file     Attends meetings of clubs or organizations: Not on file     Relationship status: Not on file     Intimate partner violence     Fear of current or ex partner: Not on file     Emotionally abused: Not on file     Physically abused: Not on file     Forced sexual activity: Not on file   Other Topics Concern     Not on file   Social History Narrative     Not on file       REVIEW OF SYSTEM:  Per HPI    PHYSICAL EXAM:   /68    Pulse 78   Temp 98.4  F (36.9  C)   Resp 20   Wt 142 lb 12.8 oz (64.8 kg)   SpO2 92%     A limited exam was performed due to recommendations for care during COVID-19 pandemic. Due to the 2020 COVID-19 pandemic, except as noted above, the patient was visually observed at a 6 foot plus distance.  An observational exam was performed in an effort to keep patient safe from COVID-19 and other communicable diseases.     General appearance: alert, appears stated age and cooperative  HEENT: Head is normocephalic with normal hair distribution. No evidence of trauma. Ears: Without lesions or deformity. No acute purulent discharge. Eyes: Conjunctivae pink with no scleral icterus or erythema. Nose: Normal. Oropharnyx: mmm.  Lungs: respirations without effort.  Extremities: extremities normal, atraumatic, no cyanosis.  Skin: Skin color, texture normal. No rashes or lesions on exposed skin.   Neurologic: Grossly normal   Psych: interacts well with caregivers, exhibits logical thought processes and connections, pleasant.      LABS:   None today.     ASSESSMENT:      ICD-10-CM    1. Urinary frequency  R35.0        PLAN:    Urinary Frequency  -UA/UC.   -Encourage fluids.   -Follow up with results of UA/UC.     Otherwise continue current care plan for all other chronic medical conditions, as they are stable. Encouraged patient to engage in healthy lifestyle behaviors such as engaging in social activities, exercising (PT/OT), eating well, and following care plan. Follow up for routine check-up, or sooner if needed. Will continue to monitor patient and work with nursing staff collaboratively to work toward positive patient outcomes.    Electronically signed by: Octavia Serna CNP

## 2021-06-09 NOTE — PROGRESS NOTES
"[facility]  Video Visit    Facility:   [facility] [[id number]]   Code Status: POLST AVAILABLE       Addis Peña is a 69 y.o. female who is being evaluated via a billable video visit.      The patient has been notified of following:     \"This video visit will be conducted via a call between you and your physician/provider. We have found that certain health care needs can be provided without the need for an in-person physical exam.  This service lets us provide the care you need with a video conversation.  If a prescription is necessary we can send it to the facility team.  If lab work is needed we can place an order through the facility team to have that test done at a later time.    If during the course of the call the physician/provider feels a video visit is not appropriate, you will not be charged for this service.\"     Physician/provider has received verbal consent for a Video Visit from the patient? Yes    Patient would like the video invitation sent by: Send to: [employer]    Video Start Time: 0946      CHIEF COMPLAINT/REASON FOR VISIT:  Chief Complaint   Patient presents with     Review Of Multiple Medical Conditions     Physical deconditioning, seizure disorder, hypertension, old CVA       HISTORY:      HPI: Addis is a 69 y.o. female who  has a past medical history of Allergic rhinitis, CKD (chronic kidney disease), Depression, Displaced dome fracture of left acetabulum (H), DM2 (diabetes mellitus, type 2) (H), GERD (gastroesophageal reflux disease), Gout, HTN (hypertension), Left hemiplegia (H), Migraine, 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), Nephrolithiasis, Seizure disorder (H), Stroke (H), and Vitamin B12 deficiency anemia. Addis was recently transferred from [facility]. She was undergoing acute rehab after suffering a hip fracture and undergoing ORIF repair.     Today Addis is being evaluated for a routine " review of multiple medical problems while in long term care. She shares she is settling in well. She is a recent transfer into the LTC unit. She has not had any problems. She does not have any tremor or seizure activity. No other concerns at this time. She denies any symptoms including fevers/chills, cough or cold symptoms, headaches, vision changes, chest pain/pressure, difficulty breathing, SOB, abdominal pain, nausea, vomiting, diarrhea, increasing or ongoing weakness past baseline, increasing pain.     Past Medical History:   Diagnosis Date     Allergic rhinitis      CKD (chronic kidney disease)      Depression      Displaced dome fracture of left acetabulum (H)      DM2 (diabetes mellitus, type 2) (H)      GERD (gastroesophageal reflux disease)      Gout      HTN (hypertension)      Left hemiplegia (H)      Migraine      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)      Nephrolithiasis      Seizure disorder (H)      Stroke (H)      Vitamin B12 deficiency anemia              Family History   Problem Relation Age of Onset     Heart disease Mother      Hypertension Mother      Coronary artery disease Father      Diabetes Sister      Hypertension Brother      Cancer Sister      Social History     Socioeconomic History     Marital status:      Spouse name: Not on file     Number of children: Not on file     Years of education: Not on file     Highest education level: Not on file   Occupational History     Not on file   Social Needs     Financial resource strain: Not on file     Food insecurity     Worry: Not on file     Inability: Not on file     Transportation needs     Medical: Not on file     Non-medical: Not on file   Tobacco Use     Smoking status: Never Smoker     Smokeless tobacco: Never Used   Substance and Sexual Activity     Alcohol use: Not Currently     Drug use: Not on file     Sexual activity: Not on file   Lifestyle     Physical activity     Days per week: Not on file      Minutes per session: Not on file     Stress: Not on file   Relationships     Social connections     Talks on phone: Not on file     Gets together: Not on file     Attends Hoahaoism service: Not on file     Active member of club or organization: Not on file     Attends meetings of clubs or organizations: Not on file     Relationship status: Not on file     Intimate partner violence     Fear of current or ex partner: Not on file     Emotionally abused: Not on file     Physically abused: Not on file     Forced sexual activity: Not on file   Other Topics Concern     Not on file   Social History Narrative     Not on file       REVIEW OF SYSTEM:  Per HPI    PHYSICAL EXAM:   /76   Pulse (!) 107   Temp 98.7  F (37.1  C)   Resp 18   Wt 141 lb 12.8 oz (64.3 kg)   SpO2 92%     A limited exam was performed due to recommendations for care during COVID-19 pandemic. Due to the 2020 COVID-19 pandemic, except as noted above, the patient was visually observed at a 6 foot plus distance.  An observational exam was performed in an effort to keep patient safe from COVID-19 and other communicable diseases.     General appearance: alert, appears stated age and cooperative  HEENT: Head is normocephalic with normal hair distribution. No evidence of trauma. Ears: Without lesions or deformity. No acute purulent discharge. Eyes: Conjunctivae pink with no scleral icterus or erythema. Nose: Normal. Oropharnyx: mmm.  Lungs: respirations without effort.  Extremities: extremities normal, atraumatic, no cyanosis.  Skin: Skin color, texture normal. No rashes or lesions on exposed skin.   Neurologic: Grossly normal   Psych: interacts well with caregivers, exhibits logical thought processes and connections, pleasant.      LABS:   None today.     ASSESSMENT:      ICD-10-CM    1. Type 2 diabetes mellitus with other neurologic complication, with long-term current use of insulin (H)  E11.49     Z79.4    2. Seizure disorder (H)  G40.909    3.  Essential hypertension  I10    4. CVA, old,2010  I69.993        PLAN:    Physical Deconditioning, old CVA  -Continue PT/OT and other therapies as per care plan.  -Encouraged good nutrition and movement habits.   -Discussed care plan and expected course of stay.   -Continue to follow-up per routine schedule or sooner if needed.     Seizure disorder  -Baclofen 10 mg by mouth 3 times daily.  -Keppra 250 mg tablets, take 5 tablets by mouth twice daily.  -Phenytoin 200 mg by mouth twice daily.    DMII  -Blood sugars with meals.   -Glargine 35 units subcutaneously in the morning.  -Insulin lispro per sliding scale 3 times a day with meals.  -Metformin 1000 mg by mouth two times a day.   -Follow-up as needed.    Hypertension  -Amlodipine 10 mg by mouth daily at bedside.   -Lisinopril 25 mg by mouth daily.   -Toprol XL 50 mg by mouth daily.   -Encouraged cardiac diet, low sodium diet, exercise and stress reduction techniques.   -Emphasized importance of blood pressure control.   -Continue current medications as prescribed.   -Follow up per routine schedule or sooner with any complaints or concerns.    Otherwise continue current care plan for all other chronic medical conditions, as they are stable. Encouraged patient to engage in healthy lifestyle behaviors such as engaging in social activities, exercising (PT/OT), eating well, and following care plan. Follow up for routine check-up, or sooner if needed. Will continue to monitor patient and work with nursing staff collaboratively to work toward positive patient outcomes.    Video-Visit Details    Type of service:  Video Visit    Video End Time (time video stopped): 0950    Originating Location (pt. Location):[facility] [[id number]]    Distant Location (provider location):  [facility]     Mode of Communication: Samra Serna CNP

## 2021-06-09 NOTE — PROGRESS NOTES
" [facility]    Facility:  [facility] [[id number]]    Code Status:  DNR/DNI    Chief Complaint   Patient presents with     Problem Visit   : Including seizures, Quentin's paralysis, depression                  Patient Active Problem List   Diagnosis     Left hip pain     Postoperative pain     Physical deconditioning     Closed displaced fracture of anterior column of left acetabulum with routine healing, subsequent encounter     S/P ORIF (open reduction internal fixation) fracture     Falls     Essential hypertension     Type 2 diabetes mellitus with other neurologic complication, with long-term current use of insulin (H)     CVA, old,2010     Spells of trembling     Fever     Seizure disorder (H)     Urinary frequency     Depression       History:  Ms. 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     Recent Hospital course: Recall that I had seen patient April 30 for \"spells\", she had had 4 of them over previous days.  I was concerned for seizure, but patient was stable at that time and not having spells so I simply started her on Keppra and have staff arrange ASAP neurology appointment.  Unfortunately 2 days later, before she saw the neurologist, and after 3 doses of Keppra, she had another spell.  She was appropriately sent to the emergency room at that point.     Patient was admitted between May 2 and May 6.  Patient had MRI MRA head and neck vessels as noted below.  She has severe right SOCO stenosis.  There was also some diffusion changes in the MRI in the area of her previous 2010 CVA.  While new/acute CVA could not be ruled out, neurology felt that the MRI changes were more consistent with changes associated with seizure " activity.  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     Patient's Keppra was increased to 1250 minutes twice daily and she was also started on phenytoin  mg twice daily.     Neurologist has asked her to see his partner/seizure specialist in 3 to 4 weeks.  Patient reports that follow-up is not happen by phone and no changes were made.     Hospitalization with otherwise unremarkable     ===============================================================  Recall that patient was transferred recently from a different TCU details below:     [facility] TCU course: February 7 through April 20     While the patient reports dissatisfaction with her physical therapy, and requested transfer, there were some positive outcomes report here.  Patient experienced improved blood sugar control throughout her stay resulting in discontinuation of her Lantus on March 12 and an increase in her metformin from 500 twice daily to 1000 twice daily.  Blood sugars reportedly quite favorable thereafter generally less than 170 as well.     Patient also reports that her pain came under good control with scheduled Tylenol and baclofen without need for ongoing opiates.     Patient's blood pressure was generally adequately controlled on current medications.     FiberCon was started for her bowels which tend to be on the loose side with improvement.     Preceding hospital course for the [facility] TCU stay follows:  Hospital course: Patient was admitted to [facility] February 2 through February 7 though she was originally seen in the emergency room at [facility] and transferred to [facility] once fracture was identified.                Patient reports 2 falls the first on January 25 with some left hip pain.  She was seen for left hip pain on the 28th, diagnosed with left acetabular fracture as well as  metatarsal fractures 2 through 4 on the left foot.  Case was discussed with orthopedics, Lovenox prophylaxis was prescribed.  However she fell again on February 1 trying to transfer self in her home this time with very severe left hip pain incompatible with ambulation.  She now was diagnosed with displaced dome fracture of the left acetabulum requiring ORIF, repaired by Dr. Dinh.  There were no complications reported.  Patient was treated with DVT prophylaxis, prophylactic antibiotic therapy and deemed safe for discharge by February 7 with pain tolerated by oral pain medications.  Lovenox 40 mg subcutaneously recommended for 28 days.  Oxycodone 5 to 10 mg every 4 hours along with scheduled Tylenol given for pain.  Her aspirin was held at discharge.  There is also recommendation for pursuing osteoporosis work-up including DEXA scanning.  Notably she also received some ceftriaxone in the emergency room at [facility] for abnormal urinalysis.   ==============================================================         Subjective/ROS:    -augmented by discussion with facility staff involved in direct care  - Patient reports that she finally feels like her left hand is back to its previous baseline.  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  She reports that there is been no new seizure-like activity.  She reports that she is tolerating the Keppra and phenytoin well.    - There is been concern about depression by staff and Ms. Myers.  I spoke with the patient about that in detail.  She says that she was upset that she was not returning to her previous home situation.  She was upset that physical therapy was coming to and and.  She has been psychologically dependent on the physical therapy for many months.  She was at a previous TCU and when she reached stability there and was to be cut off from physical therapy she managed transfer to this facility where she has received  several more months of treatment.    However she now reports that she is coming to terms with staying here in the long-term care facility.  She realizes that she needs the help with that is given to her.  She is also relieved that physical therapy has continued for now up on the fourth floor.    She says she was on Celexa following her CVA.  She says she was told she should take it but she does not feel like it ever really helped.  She strongly wants to avoid medication now.  She has been willing to reconnect with psychology and had a previous relationship outside of this facility.  She will be seeing the in-house psychologist going forward.  She simply says that she is not feeling depressed but was upset about her change in living status.      Staff reports that the patient has been complaining that people not coming in to see her.  Nursing director tells me that is not true that she is on the call light frequently and they are responding quickly as she is new on the floor and they are doing their best to accommodate her needs and integrate her to the floor.    She just saw Dr. Hernandez her neurologist and had lots of lab work on the eighth.  Results for ELAINE STEVENSON (MRN [id number]) as of 6/18/2020 19:14   Ref. Range 6/8/2020 06:35   AST Latest Ref Range: 0 - 40 U/L 19   ALT Latest Ref Range: 0 - 45 U/L 19   ALBUMIN Latest Ref Range: 3.5 - 5.0 g/dL 3.1 (L)   Protein, Total Latest Ref Range: 6.0 - 8.0 g/dL 5.7 (L)   Alkaline Phosphatase Latest Ref Range: 45 - 120 U/L 158 (H)   Bilirubin, Total Latest Ref Range: 0.0 - 1.0 mg/dL 0.2   Bilirubin, Direct Latest Ref Range: <=0.5 mg/dL <0.1   WBC Latest Ref Range: 4.0 - 11.0 thou/uL 5.7   RBC Latest Ref Range: 3.80 - 5.40 mill/uL 3.87   Hemoglobin Latest Ref Range: 12.0 - 16.0 g/dL 12.0   Hematocrit Latest Ref Range: 35.0 - 47.0 % 38.8   MCV Latest Ref Range: 80 - 100 fL 100   MCH Latest Ref Range: 27.0 - 34.0 pg 31.0   MCHC Latest Ref Range: 32.0 - 36.0 g/dL 30.9  (L)   RDW Latest Ref Range: 11.0 - 14.5 % 13.6   Platelets Latest Ref Range: 140 - 440 thou/uL 251   MPV Latest Ref Range: 8.5 - 12.5 fL 10.3   Neutrophils % Latest Ref Range: 50 - 70 % 65   Lymphocytes % Latest Ref Range: 20 - 40 % 18 (L)   Monocytes % Latest Ref Range: 2 - 10 % 10   Eosinophils % Latest Ref Range: 0 - 6 % 7 (H)   Basophils % Latest Ref Range: 0 - 2 % 1   Neutrophils Absolute Latest Ref Range: 2.0 - 7.7 thou/uL 3.7   Lymphocytes Absolute Latest Ref Range: 0.8 - 4.4 thou/uL 1.0   Monocytes Absolute Latest Ref Range: 0.0 - 0.9 thou/uL 0.6   Eosinophils Absolute Latest Ref Range: 0.0 - 0.4 thou/uL 0.4   Basophils Absolute Latest Ref Range: 0.0 - 0.2 thou/uL 0.0   Levetiracetam Latest Ref Range: 6.0 - 46.0 ug/mL 29.9   Phenytoin Latest Ref Range: 10.0 - 20.0 ug/mL 6.3 (L)   Phenytoin, Free Latest Ref Range: 1.0 - 2.0 ug/mL 0.5 (L)     Remainder ROS is negative including no fever sweats chills falls injuries change in appetite sleep bowels bladder swallowing    Past Medical History:   Diagnosis Date     Allergic rhinitis      CKD (chronic kidney disease)      Depression      Displaced dome fracture of left acetabulum (H)      DM2 (diabetes mellitus, type 2) (H)      GERD (gastroesophageal reflux disease)      Gout      HTN (hypertension)      Left hemiplegia (H)      Migraine      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)      Nephrolithiasis      Seizure disorder (H)      Stroke (H)      Vitamin B12 deficiency anemia      Past Surgical History:   Procedure Laterality Date     COLONOSCOPY W/ BIOPSIES AND POLYPECTOMY  05/09/2019    Procedure: COLONOSCOPY, WITH POLYPECTOMY AND BIOPSY; Surgeon: Na Diehl MD; Location: [facility] OR       CYSTOSCOPY W/ URETEROSCOPY Right 02/22/2016    Procedure: COMBINED CYSTOSCOPY, URETEROSCOPY; Surgeon: Madelaine Roland MD; Location: [facility] OR       ORIF ACETABULUM FRACTURE Left           Family History   Problem Relation Age of Onset      Heart disease Mother      Hypertension Mother      Coronary artery disease Father      Diabetes Sister      Hypertension Brother      Cancer Sister    :       Social History     Socioeconomic History     Marital status:      Spouse name: Not on file     Number of children: Not on file     Years of education: Not on file     Highest education level: Not on file   Occupational History     Not on file   Social Needs     Financial resource strain: Not on file     Food insecurity     Worry: Not on file     Inability: Not on file     Transportation needs     Medical: Not on file     Non-medical: Not on file   Tobacco Use     Smoking status: Never Smoker     Smokeless tobacco: Never Used   Substance and Sexual Activity     Alcohol use: Not Currently     Drug use: Not on file     Sexual activity: Not on file   Lifestyle     Physical activity     Days per week: Not on file     Minutes per session: Not on file     Stress: Not on file   Relationships     Social connections     Talks on phone: Not on file     Gets together: Not on file     Attends [employer] service: Not on file     Active member of club or organization: Not on file     Attends meetings of clubs or organizations: Not on file     Relationship status: Not on file     Intimate partner violence     Fear of current or ex partner: Not on file     Emotionally abused: Not on file     Physically abused: Not on file     Forced sexual activity: Not on file   Other Topics Concern     Not on file   Social History Narrative     Not on file   :        Current Outpatient Medications on File Prior to Visit   Medication Sig Dispense Refill     acetaminophen (TYLENOL) 500 MG tablet Take 1,000 mg by mouth 3 (three) times a day.        amLODIPine (NORVASC) 10 MG tablet Take 10 mg by mouth daily.       aspirin 81 MG EC tablet Take 81 mg by mouth daily.       atorvastatin (LIPITOR) 40 MG tablet Take 40 mg by mouth at bedtime.       baclofen (LIORESAL) 10 MG tablet Take 10  mg by mouth 3 (three) times a day.       bisacodyL (DULCOLAX) 5 mg EC tablet Take 5 mg by mouth daily as needed for constipation.       cholecalciferol, vitamin D3, 1,000 unit (25 mcg) tablet Take 1,000 Units by mouth daily.       citalopram (CELEXA) 10 MG tablet Take 10 mg by mouth at bedtime.       cyanocobalamin 1,000 mcg/mL injection Inject 1,000 mcg into the shoulder, thigh, or buttocks every 30 (thirty) days.       ibuprofen (ADVIL,MOTRIN) 200 MG tablet Take 200 mg by mouth every 6 (six) hours as needed for pain.       insulin glargine (BASAGLAR KWIKPEN) 100 unit/mL (3 mL) pen Inject 35 Units under the skin daily.        insulin lispro (HUMALOG) 100 unit/mL injection Inject under the skin 3 (three) times a day before meals. Per Sliding Scale;  If Blood Sugar is 71 to 150, give 0 Units.  If Blood Sugar is 151 to 200, give 2 Units.  If Blood Sugar is 201 to 250, give 4 Units.  If Blood Sugar is 251 to 300, give 8 Units.  subcutaneous       lactase 4,500 unit Tab Take 4 tablets by mouth as needed.        levETIRAcetam (KEPPRA) 500 MG tablet Take 1,250 mg by mouth 2 (two) times a day.        losartan (COZAAR) 25 MG tablet Take 25 mg by mouth daily.       magnesium oxide 250 mg magnesium Tab Take 250 mg by mouth.       metFORMIN (GLUCOPHAGE) 1000 MG tablet Take 1,000 mg by mouth 2 (two) times a day with meals. Take 2 tabs by mouth in AM and 1 tab by mouth at HS.       metoprolol succinate (TOPROL-XL) 50 MG 24 hr tablet Take 50 mg by mouth daily.       montelukast (SINGULAIR) 10 mg tablet Take 10 mg by mouth at bedtime.       phenytoin extended (DILANTIN) 200 MG ER capsule Take 200 mg by mouth 2 (two) times a day.        senna-docusate (SENNOSIDES-DOCUSATE SODIUM) 8.6-50 mg tablet Take 1 tablet by mouth daily.       No current facility-administered medications on file prior to visit.    :      ALLERGIES:  Lisinopril; Milk; and Latex    Vitals:  There were no vitals taken for this visit. except as noted  below    Vital signs: Reviewed per facility EMR vitals including as follows:                Most Recent Vitals Date/Time Taken  Temperature: 97.2  F 05/28/2020 04:41 PM  Pulse: 76 per minute 05/28/2020 04:41 PM  Respirations: 18 per minute 05/28/2020 04:41 PM  Blood Pressure: 147 / 73 mmHg 05/28/2020 07:20 AM  O2 Saturation: 93 % 05/28/2020 04:41 PM  Blood Sugar: 109 mg/dL 05/28/2020 04:10 PM  Weight: 142.0 lbs / Routine       BMI: 22.92 05/28/2020 12:45 PM  Height: 5ft 6.0in 04/20/2020 02:43 PM  There is no height or weight on file to calculate BMI.    Physical exam:    General:  Alert  oriented x3 appears in no acute distress    Patient is up in her wheelchair.  She was with the OT before I came in and she is with the PT after I leave.  She feels good about all the attention she is getting today and says she is having a good day.  She is breathing comfortably without tachypnea or accessory muscle use.  She has no edema    She is hemiplegic left-sided partial, skin is clear visualized areas.      Due to the 2020 Covid 19 pandemic, except as noted above, the patient was visually observed at a 6 foot plus distance.  An observational exam was performed in an effort to keep patient safe from Covid 19 and other communicable diseases.   Labs:  Lab Results   Component Value Date    WBC 5.7 06/08/2020    HGB 12.0 06/08/2020    HCT 38.8 06/08/2020     06/08/2020     06/08/2020     Results for orders placed or performed in visit on 06/01/20   Basic Metabolic Panel   Result Value Ref Range    Sodium 142 136 - 145 mmol/L    Potassium 3.8 3.5 - 5.0 mmol/L    Chloride 103 98 - 107 mmol/L    CO2 28 22 - 31 mmol/L    Anion Gap, Calculation 11 5 - 18 mmol/L    Glucose 105 70 - 125 mg/dL    Calcium 9.5 8.5 - 10.5 mg/dL    BUN 21 8 - 22 mg/dL    Creatinine 0.64 0.60 - 1.10 mg/dL    GFR MDRD Af Amer >60 >60 mL/min/1.73m2    GFR MDRD Non Af Amer >60 >60 mL/min/1.73m2         No results found for: TSH  No results found  for: HGBA1C  [unfilled]  Lab Results   Component Value Date    RLBUGXPY60 591 04/29/2020     No results found for: BNP  [unfilled]        Invalid input(s): PRINTERVAL      Assessment/Plan:      ICD-10-CM    1. CVA, old,2010  I69.993    2. Moderate episode of recurrent major depressive disorder (H)  F33.1    3. Seizure disorder (H)  G40.909    Depression   Patient was started on Celexa following her CVA.  It was discontinued at her request recently.  Staff was worried about depression.  It appears that she has had some adjustment mood changes with transfer of care to the fourth floor and inability to return to her previous living situation.  However she says she is coming out of it and does not want to consider going back on medication.  She will see psychology.  Situation will be monitored without change in medication today        Seizures  Quentin's paralysis  Chronic left-sided weakness/hemiplegia          See my recent notes.  She has just seen neurology.  There have been no changes.  See the blood work including drug levels above.  Continue Keppra and phenytoin      CVA 2010  Left hemiplegia              See my previous notes, patient is back on aspirin appropriately.  Blood pressure control, lipid control, diabetic control important     Fever  UTI           Resolved no recurrence     acetabular fracture status post ORIF February 2020              This was the original reason for patient's initial TCU need.  Patient reports dissatisfaction with her therapy at prior TCU resulting in the transfer.    She was upset as noted above but is feeling better now that therapy has continued here on the fourth floor for now.     Generalized weakness  Residual left-sided weakness following CVA  Frequent falls  Unsteady gait              PT, OT, services appreciated     Anemia             Resolved and stable 12.0 on June 8     Presumed osteoporosis              Outpatient DEXA scan anticipated as pandemic precautions allow.     "Continue cholecalciferol        Type 2 diabetes           Patient has been briefly off of her Lantus.  Ms. Myers has been adjusting appropriately, she is currently on 35 units of the Lantus along with lispro insulin sliding scale.  Blood sugars are adequately controlled..     Hyperlipidemia              Atorvastatin unchanged  Hypomagnesemia               1.7 at last check, has not been followed up.  Will check that along with BMP and hemoglobin     CKD 3         Results for orders placed or performed in visit on 06/01/20   Basic Metabolic Panel   Result Value Ref Range    Sodium 142 136 - 145 mmol/L    Potassium 3.8 3.5 - 5.0 mmol/L    Chloride 103 98 - 107 mmol/L    CO2 28 22 - 31 mmol/L    Anion Gap, Calculation 11 5 - 18 mmol/L    Glucose 105 70 - 125 mg/dL    Calcium 9.5 8.5 - 10.5 mg/dL    BUN 21 8 - 22 mg/dL    Creatinine 0.64 0.60 - 1.10 mg/dL    GFR MDRD Af Amer >60 >60 mL/min/1.73m2    GFR MDRD Non Af Amer >60 >60 mL/min/1.73m2       Things look good avoid nephrotoxins     Possible gout              Patient repeats the story to me about how she had recurrent toe pain \"gout\" until her podiatrist worked on her toe and removed \"some extra skin\" and she has not had any bouts since then.  On if she had recurrent ingrowing?             Case discussed with:    Facility staff         Rasta Michael MD  "

## 2021-06-10 NOTE — PROGRESS NOTES
[facility]     Facility:   [facility] [[id number]]   Code Status: POLST AVAILABLE     CHIEF COMPLAINT/REASON FOR VISIT:  Chief Complaint   Patient presents with     Problem Visit     need for a private room       HISTORY:      HPI: Addis is a 69 y.o. female who  has a past medical history of Allergic rhinitis, CKD (chronic kidney disease), Depression, Displaced dome fracture of left acetabulum (H), DM2 (diabetes mellitus, type 2) (H), GERD (gastroesophageal reflux disease), Gout, HTN (hypertension), Left hemiplegia (H), Migraine, 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), Nephrolithiasis, Seizure disorder (H), Stroke (H), and Vitamin B12 deficiency anemia. Addis was recently transferred from [facility]. She was undergoing acute rehab after suffering a hip fracture and undergoing ORIF repair.     Today Addis is being evaluated by request of nursing staff and  to determine if she would qualify for a private room. Addis has suffered from a stroke and has many care needs. She also has a seizure disorder after the stroke and is at risk for seizures. Last seizure a few months ago. Addis also has severe depression following the CVA, this stems from the loss of independence. Addis does much better in a room where she has privacy and she has more space. Given her CVA with hemiplegia she does use a lift. With her many needs it is imperative she has a private room. She otherwise has been feeling really well, she is actually looking forward to going home soon. Addis is trying to make it home and is planning on discharge but no plans have been made yet. Until that time a private room is necessary for her physical and mental health. She denies any symptoms including fevers/chills, cough or cold symptoms, headaches, vision changes, chest pain/pressure, difficulty breathing, SOB, abdominal pain, nausea, vomiting,  diarrhea, increasing or ongoing weakness past baseline, increasing pain.     Past Medical History:   Diagnosis Date     Allergic rhinitis      CKD (chronic kidney disease)      Depression      Displaced dome fracture of left acetabulum (H)      DM2 (diabetes mellitus, type 2) (H)      GERD (gastroesophageal reflux disease)      Gout      HTN (hypertension)      Left hemiplegia (H)      Migraine      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)      Nephrolithiasis      Seizure disorder (H)      Stroke (H)      Vitamin B12 deficiency anemia              Family History   Problem Relation Age of Onset     Heart disease Mother      Hypertension Mother      Coronary artery disease Father      Diabetes Sister      Hypertension Brother      Cancer Sister      Social History     Socioeconomic History     Marital status:      Spouse name: Not on file     Number of children: Not on file     Years of education: Not on file     Highest education level: Not on file   Occupational History     Not on file   Social Needs     Financial resource strain: Not on file     Food insecurity     Worry: Not on file     Inability: Not on file     Transportation needs     Medical: Not on file     Non-medical: Not on file   Tobacco Use     Smoking status: Never Smoker     Smokeless tobacco: Never Used   Substance and Sexual Activity     Alcohol use: Not Currently     Drug use: Not on file     Sexual activity: Not on file   Lifestyle     Physical activity     Days per week: Not on file     Minutes per session: Not on file     Stress: Not on file   Relationships     Social connections     Talks on phone: Not on file     Gets together: Not on file     Attends Sabianism service: Not on file     Active member of club or organization: Not on file     Attends meetings of clubs or organizations: Not on file     Relationship status: Not on file     Intimate partner violence     Fear of current or ex partner: Not on file      Emotionally abused: Not on file     Physically abused: Not on file     Forced sexual activity: Not on file   Other Topics Concern     Not on file   Social History Narrative     Not on file       REVIEW OF SYSTEM:  Per HPI    PHYSICAL EXAM:   /76   Pulse 84   Temp 98.8  F (37.1  C)   Resp 18   Wt 141 lb 12.8 oz (64.3 kg)   SpO2 95%     A limited exam was performed due to recommendations for care during COVID-19 pandemic. Due to the 2020 COVID-19 pandemic, except as noted above, the patient was visually observed at a 6 foot plus distance.  An observational exam was performed in an effort to keep patient safe from COVID-19 and other communicable diseases.     General appearance: alert, appears stated age and cooperative  HEENT: Head is normocephalic with normal hair distribution. No evidence of trauma. Ears: Without lesions or deformity. No acute purulent discharge. Eyes: Conjunctivae pink with no scleral icterus or erythema. Nose: Normal. Oropharnyx: mmm.  Lungs: respirations without effort.  Extremities: extremities normal, atraumatic, no cyanosis.  Skin: Skin color, texture normal. No rashes or lesions on exposed skin.   Neurologic: Grossly normal   Psych: interacts well with caregivers, exhibits logical thought processes and connections, pleasant.      LABS:   None today.     ASSESSMENT:      ICD-10-CM    1. Moderate episode of recurrent major depressive disorder (H)  F33.1    2. CVA, old,2010  I69.993    3. Left hemiplegia (H)  G81.94    4. Seizure disorder (H)  G40.909        PLAN:    Depression  -Celexa 10 mg by mouth daily.   -Gabapentin 100 mg by mouth three times a day.   -Lipitor 40 mg by mouth daily.     CVA, Left Hemiplegia  -Tylenol 500 mg by mouth every 6 hours as needed.   -Baclofen 10 mg by mouth three times a day.     Seizure Disorder  -Keppra 250 mg tablets, take 5 tablets by mouth two times a day.   -Phenytoin 200 mg by mouth two times a day.     Private room is necessary for the care of  this patient and her physical and mental health needs.     Otherwise continue current care plan for all other chronic medical conditions, as they are stable. Encouraged patient to engage in healthy lifestyle behaviors such as engaging in social activities, exercising (PT/OT), eating well, and following care plan. Follow up for routine check-up, or sooner if needed. Will continue to monitor patient and work with nursing staff collaboratively to work toward positive patient outcomes.    Octavia Serna, CNP

## 2021-06-10 NOTE — PROGRESS NOTES
[facility]     Facility:   [facility] [[id number]]   Code Status: POLST AVAILABLE     CHIEF COMPLAINT/REASON FOR VISIT:  Chief Complaint   Patient presents with     Problem Visit     physical deconditioning, anxiety       HISTORY:      HPI: Addis is a 69 y.o. female who  has a past medical history of Allergic rhinitis, CKD (chronic kidney disease), Depression, Displaced dome fracture of left acetabulum (H), DM2 (diabetes mellitus, type 2) (H), GERD (gastroesophageal reflux disease), Gout, HTN (hypertension), Left hemiplegia (H), Migraine, 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), Nephrolithiasis, Seizure disorder (H), Stroke (H), and Vitamin B12 deficiency anemia. Addis was recently transferred from [facility]. She was undergoing acute rehab after suffering a hip fracture and undergoing ORIF repair.     Today Addis is being evaluated for a follow-up regarding anxiety with recent treatment with gabapentin. Addis is also planning to go home soon. Addis shares that the gabapentin has significantly helped with anxiety. She shares that she has now stopped calling to go to the bathroom so frequently. Call light use has gone way down. Addis is satisfied with her current anxiety treatment. She feels really good. In addition, she has been working with PT/OT and will be discharging to home soon. She does need a great deal of care. Does understand that she will need ongoing support. Did discuss that we have go prepare for safety. She will need in home care for several hours of the day. Addis is working with [facility]  Amalia Lopez to ensure appropriate care is sought and put in place.  She denies any symptoms including fevers/chills, cough or cold symptoms, headaches, vision changes, chest pain/pressure, difficulty breathing, SOB, abdominal pain, nausea, vomiting, diarrhea, increasing or ongoing weakness  past baseline, increasing pain.     Past Medical History:   Diagnosis Date     Allergic rhinitis      CKD (chronic kidney disease)      Depression      Displaced dome fracture of left acetabulum (H)      DM2 (diabetes mellitus, type 2) (H)      GERD (gastroesophageal reflux disease)      Gout      HTN (hypertension)      Left hemiplegia (H)      Migraine      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)      Nephrolithiasis      Seizure disorder (H)      Stroke (H)      Vitamin B12 deficiency anemia              Family History   Problem Relation Age of Onset     Heart disease Mother      Hypertension Mother      Coronary artery disease Father      Diabetes Sister      Hypertension Brother      Cancer Sister      Social History     Socioeconomic History     Marital status:      Spouse name: Not on file     Number of children: Not on file     Years of education: Not on file     Highest education level: Not on file   Occupational History     Not on file   Social Needs     Financial resource strain: Not on file     Food insecurity     Worry: Not on file     Inability: Not on file     Transportation needs     Medical: Not on file     Non-medical: Not on file   Tobacco Use     Smoking status: Never Smoker     Smokeless tobacco: Never Used   Substance and Sexual Activity     Alcohol use: Not Currently     Drug use: Not on file     Sexual activity: Not on file   Lifestyle     Physical activity     Days per week: Not on file     Minutes per session: Not on file     Stress: Not on file   Relationships     Social connections     Talks on phone: Not on file     Gets together: Not on file     Attends Yarsani service: Not on file     Active member of club or organization: Not on file     Attends meetings of clubs or organizations: Not on file     Relationship status: Not on file     Intimate partner violence     Fear of current or ex partner: Not on file     Emotionally abused: Not on file     Physically  abused: Not on file     Forced sexual activity: Not on file   Other Topics Concern     Not on file   Social History Narrative     Not on file       REVIEW OF SYSTEM:  Per HPI    PHYSICAL EXAM:   /76   Pulse 84   Temp 98.8  F (37.1  C)   Resp 18   Wt 141 lb 12.8 oz (64.3 kg)   SpO2 95%     A limited exam was performed due to recommendations for care during COVID-19 pandemic. Due to the 2020 COVID-19 pandemic, except as noted above, the patient was visually observed at a 6 foot plus distance.  An observational exam was performed in an effort to keep patient safe from COVID-19 and other communicable diseases.     General appearance: alert, appears stated age and cooperative  HEENT: Head is normocephalic with normal hair distribution. No evidence of trauma. Ears: Without lesions or deformity. No acute purulent discharge. Eyes: Conjunctivae pink with no scleral icterus or erythema. Nose: Normal. Oropharnyx: mmm.  Lungs: respirations without effort.  Extremities: extremities normal, atraumatic, no cyanosis.  Skin: Skin color, texture normal. No rashes or lesions on exposed skin.   Neurologic: Grossly normal   Psych: interacts well with caregivers, exhibits logical thought processes and connections, pleasant.      LABS:   None today.     ASSESSMENT:      ICD-10-CM    1. Anxiety  F41.9    2. Physical deconditioning  R53.81        PLAN:    Anxiety  -Continues to be improved.   -Gabapentin 100 mg 3 times daily.  -Psych to eval and treat.    Physical Deconditioning  -Plan to discharge with care arranged by [facility].     Otherwise continue current care plan for all other chronic medical conditions, as they are stable. Encouraged patient to engage in healthy lifestyle behaviors such as engaging in social activities, exercising (PT/OT), eating well, and following care plan. Follow up for routine check-up, or sooner if needed. Will continue to monitor patient and work with nursing staff collaboratively to work  toward positive patient outcomes.    Octavia Serna, CNP

## 2021-06-10 NOTE — PROGRESS NOTES
"[facility]   Video Visit    Addis Peña is a 69 y.o. female who is being evaluated via a billable video visit.      The patient has been notified of following:     \"This video visit will be conducted via a call between you and your physician/provider. We have found that certain health care needs can be provided without the need for an in-person physical exam.  This service lets us provide the care you need with a video conversation.  If a prescription is necessary we can send it to the facility team.  If lab work is needed we can place an order through the facility team to have that test done at a later time.    If during the course of the call the physician/provider feels a video visit is not appropriate, you will not be charged for this service.\"     Physician/provider has received verbal consent for a Video Visit from the patient? Yes    Patient would like the video invitation sent by: Send to [employer] Start Time:   1230    Facility:   [facility] [[id number]]   Code Status: POLST AVAILABLE     CHIEF COMPLAINT/REASON FOR VISIT:  Chief Complaint   Patient presents with     Follow Up     anxiety, frequent urination       HISTORY:      HPI: Addis is a 69 y.o. female who  has a past medical history of Allergic rhinitis, CKD (chronic kidney disease), Depression, Displaced dome fracture of left acetabulum (H), DM2 (diabetes mellitus, type 2) (H), GERD (gastroesophageal reflux disease), Gout, HTN (hypertension), Left hemiplegia (H), Migraine, 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), Nephrolithiasis, Seizure disorder (H), Stroke (H), and Vitamin B12 deficiency anemia. Addis was recently transferred from [facility]. She was undergoing acute rehab after suffering a hip fracture and undergoing ORIF repair.     Today Addis is being evaluated for a follow-up for recent initiation of gabapentin after having significant " issues with urinary frequency and anxiety. She reported that they were related to the CVA. She actually has been doing quite well with the gabapentin. Her urination impulses have decreased significantly. She was using the call light up to 30 times per day to request bathroom use. It has declined sharply per nursing staff and Addis. She shares she has been doing really good and would like to continue the medication. UA/UC were negative. She denies any symptoms including fevers/chills, cough or cold symptoms, headaches, vision changes, chest pain/pressure, difficulty breathing, SOB, abdominal pain, nausea, vomiting, diarrhea, increasing or ongoing weakness past baseline, increasing pain.     Past Medical History:   Diagnosis Date     Allergic rhinitis      CKD (chronic kidney disease)      Depression      Displaced dome fracture of left acetabulum (H)      DM2 (diabetes mellitus, type 2) (H)      GERD (gastroesophageal reflux disease)      Gout      HTN (hypertension)      Left hemiplegia (H)      Migraine      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)      Nephrolithiasis      Seizure disorder (H)      Stroke (H)      Vitamin B12 deficiency anemia              Family History   Problem Relation Age of Onset     Heart disease Mother      Hypertension Mother      Coronary artery disease Father      Diabetes Sister      Hypertension Brother      Cancer Sister      Social History     Socioeconomic History     Marital status:      Spouse name: Not on file     Number of children: Not on file     Years of education: Not on file     Highest education level: Not on file   Occupational History     Not on file   Social Needs     Financial resource strain: Not on file     Food insecurity     Worry: Not on file     Inability: Not on file     Transportation needs     Medical: Not on file     Non-medical: Not on file   Tobacco Use     Smoking status: Never Smoker     Smokeless tobacco: Never Used    Substance and Sexual Activity     Alcohol use: Not Currently     Drug use: Not on file     Sexual activity: Not on file   Lifestyle     Physical activity     Days per week: Not on file     Minutes per session: Not on file     Stress: Not on file   Relationships     Social connections     Talks on phone: Not on file     Gets together: Not on file     Attends Restorationism service: Not on file     Active member of club or organization: Not on file     Attends meetings of clubs or organizations: Not on file     Relationship status: Not on file     Intimate partner violence     Fear of current or ex partner: Not on file     Emotionally abused: Not on file     Physically abused: Not on file     Forced sexual activity: Not on file   Other Topics Concern     Not on file   Social History Narrative     Not on file       REVIEW OF SYSTEM:  Per HPI    PHYSICAL EXAM:   /66   Pulse 71   Temp 97.3  F (36.3  C)   Resp 17   Wt 141 lb 12.8 oz (64.3 kg)   SpO2 96%     A limited exam was performed due to recommendations for care during COVID-19 pandemic. Due to the 2020 COVID-19 pandemic, except as noted above, the patient was visually observed at a 6 foot plus distance.  An observational exam was performed in an effort to keep patient safe from COVID-19 and other communicable diseases.     General appearance: alert, appears stated age and cooperative  HEENT: Head is normocephalic with normal hair distribution. No evidence of trauma. Ears: Without lesions or deformity. No acute purulent discharge. Eyes: Conjunctivae pink with no scleral icterus or erythema. Nose: Normal. Oropharnyx: mmm.  Lungs: respirations without effort.  Extremities: extremities normal, atraumatic, no cyanosis.  Skin: Skin color, texture normal. No rashes or lesions on exposed skin.   Neurologic: Grossly normal   Psych: interacts well with caregivers, exhibits logical thought processes and connections, pleasant.      LABS:   None today.     ASSESSMENT:       ICD-10-CM    1. Urinary frequency  R35.0    2. Anxiety  F41.9        PLAN:    Urinary frequency, Anxiety  -Much improved!  -Gabapentin 100 mg 3 times daily.  -Psych to eval and treat.  -Follow-up with results of UA/UC.    Otherwise continue current care plan for all other chronic medical conditions, as they are stable. Encouraged patient to engage in healthy lifestyle behaviors such as engaging in social activities, exercising (PT/OT), eating well, and following care plan. Follow up for routine check-up, or sooner if needed. Will continue to monitor patient and work with nursing staff collaboratively to work toward positive patient outcomes.    Video-Visit Details    Type of service:  Video Visit    Video End Time (time video stopped): 1236    Originating Location (pt. Location):[facility] [[id number]]    Distant Location (provider location):  [facility]     Mode of Communication:  Samra Serna, CNP

## 2021-06-10 NOTE — PROGRESS NOTES
[facility]    Facility:   [facility] [[id number]]   Code Status: POLST AVAILABLE     CHIEF COMPLAINT/REASON FOR VISIT:  Chief Complaint   Patient presents with     Problem Visit     Urinary Frequency, Anxiety       HISTORY:      HPI: Addis is a 69 y.o. female who  has a past medical history of Allergic rhinitis, CKD (chronic kidney disease), Depression, Displaced dome fracture of left acetabulum (H), DM2 (diabetes mellitus, type 2) (H), GERD (gastroesophageal reflux disease), Gout, HTN (hypertension), Left hemiplegia (H), Migraine, 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), Nephrolithiasis, Seizure disorder (H), Stroke (H), and Vitamin B12 deficiency anemia. Addis was recently transferred from [facility]. She was undergoing acute rehab after suffering a hip fracture and undergoing ORIF repair.     Today Addis is being evaluated by request of nursing staff for ongoing issues with urinary urgency.  Addis states that she has to go to the bathroom several times per hour.  Nursing staff report that she is on the call light greater than 30 times per day.  Each time she is requesting to go to the bathroom.  Apparently this was a behavior from her CVA.  However, she denies any issues with anxiety or perseveration over the bathroom.  She states that she goes to the bathroom every time she is taken to the bathroom.  Did discuss potential issues with behaviors and she denies any worries at this time.  Did discuss initiation of gabapentin to help alleviate any concerns.  She does agree starting gabapentin and will do a UA UC.  She denies any symptoms including fevers/chills, cough or cold symptoms, headaches, vision changes, chest pain/pressure, difficulty breathing, SOB, abdominal pain, nausea, vomiting, diarrhea, increasing or ongoing weakness past baseline, increasing pain.     Past Medical History:   Diagnosis Date     Allergic  rhinitis      CKD (chronic kidney disease)      Depression      Displaced dome fracture of left acetabulum (H)      DM2 (diabetes mellitus, type 2) (H)      GERD (gastroesophageal reflux disease)      Gout      HTN (hypertension)      Left hemiplegia (H)      Migraine      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)      Nephrolithiasis      Seizure disorder (H)      Stroke (H)      Vitamin B12 deficiency anemia              Family History   Problem Relation Age of Onset     Heart disease Mother      Hypertension Mother      Coronary artery disease Father      Diabetes Sister      Hypertension Brother      Cancer Sister      Social History     Socioeconomic History     Marital status:      Spouse name: Not on file     Number of children: Not on file     Years of education: Not on file     Highest education level: Not on file   Occupational History     Not on file   Social Needs     Financial resource strain: Not on file     Food insecurity     Worry: Not on file     Inability: Not on file     Transportation needs     Medical: Not on file     Non-medical: Not on file   Tobacco Use     Smoking status: Never Smoker     Smokeless tobacco: Never Used   Substance and Sexual Activity     Alcohol use: Not Currently     Drug use: Not on file     Sexual activity: Not on file   Lifestyle     Physical activity     Days per week: Not on file     Minutes per session: Not on file     Stress: Not on file   Relationships     Social connections     Talks on phone: Not on file     Gets together: Not on file     Attends Holiness service: Not on file     Active member of club or organization: Not on file     Attends meetings of clubs or organizations: Not on file     Relationship status: Not on file     Intimate partner violence     Fear of current or ex partner: Not on file     Emotionally abused: Not on file     Physically abused: Not on file     Forced sexual activity: Not on file   Other Topics Concern      Not on file   Social History Narrative     Not on file       REVIEW OF SYSTEM:  Per HPI    PHYSICAL EXAM:   /66   Pulse 71   Temp 97.3  F (36.3  C)   Resp 17   Wt 141 lb 12.8 oz (64.3 kg)   SpO2 96%     A limited exam was performed due to recommendations for care during COVID-19 pandemic. Due to the 2020 COVID-19 pandemic, except as noted above, the patient was visually observed at a 6 foot plus distance.  An observational exam was performed in an effort to keep patient safe from COVID-19 and other communicable diseases.     General appearance: alert, appears stated age and cooperative  HEENT: Head is normocephalic with normal hair distribution. No evidence of trauma. Ears: Without lesions or deformity. No acute purulent discharge. Eyes: Conjunctivae pink with no scleral icterus or erythema. Nose: Normal. Oropharnyx: mmm.  Lungs: respirations without effort.  Extremities: extremities normal, atraumatic, no cyanosis.  Skin: Skin color, texture normal. No rashes or lesions on exposed skin.   Neurologic: Grossly normal   Psych: interacts well with caregivers, exhibits logical thought processes and connections, pleasant.      LABS:   None today.     ASSESSMENT:      ICD-10-CM    1. Urinary frequency  R35.0    2. CVA, old,2010  I69.993        PLAN:    Urinary frequency, CVA  -UA/UC.  -Gabapentin 100 mg 3 times daily.  -Psych to eval and treat.  -Follow-up with results of UA/UC.    Otherwise continue current care plan for all other chronic medical conditions, as they are stable. Encouraged patient to engage in healthy lifestyle behaviors such as engaging in social activities, exercising (PT/OT), eating well, and following care plan. Follow up for routine check-up, or sooner if needed. Will continue to monitor patient and work with nursing staff collaboratively to work toward positive patient outcomes.    Octavia Serna, CNP

## 2021-06-11 ENCOUNTER — ALLIED HEALTH/NURSE VISIT (OUTPATIENT)
Dept: NURSING | Facility: CLINIC | Age: 71
End: 2021-06-11
Payer: MEDICARE

## 2021-06-11 DIAGNOSIS — E53.8 VITAMIN B12 DEFICIENCY (NON ANEMIC): Primary | ICD-10-CM

## 2021-06-11 PROCEDURE — 96372 THER/PROPH/DIAG INJ SC/IM: CPT

## 2021-06-11 RX ADMIN — CYANOCOBALAMIN 1000 MCG: 1000 INJECTION, SOLUTION INTRAMUSCULAR; SUBCUTANEOUS at 10:04

## 2021-06-11 NOTE — NURSING NOTE
Pt reported bruising in the arms the last two times she got B-12 shots. I huddle with TYLER Triplett about it and she talk to pt and I got the clear to give shot. I inform the patient that we will need to rotate the arms injections.    Bertha Duque MA      Clinic Administered Medication Documentation      Injectable Medication Documentation    Patient was given Cyanocobalamin (B-12). Prior to medication administration, verified patients identity using patient s name and date of birth. Please see MAR and medication order for additional information. Patient instructed to remain in clinic for 15 minutes.      Was entire vial of medication used? Yes  Vial/Syringe: Single dose vial  Expiration Date:  01/2023  Was this medication supplied by the patient? No     Bertha Duque MA

## 2021-06-11 NOTE — PROGRESS NOTES
[facility]    Facility:   [facility] [[id number]]   Code Status: DNR/DNI and POLST AVAILABLE  PCP: Wegener, Joel D, MD   Phone: [phone]   Fax: [phone]      CHIEF COMPLAINT/REASON FOR VISIT:  Chief Complaint   Patient presents with     Discharge Summary       HISTORY COURSE:  Addis is a 69 y.o. female who  has a past medical history of Allergic rhinitis, CKD (chronic kidney disease), Depression, Displaced dome fracture of left acetabulum (H), DM2 (diabetes mellitus, type 2) (H), GERD (gastroesophageal reflux disease), Gout, HTN (hypertension), Left hemiplegia (H), Migraine, 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), Nephrolithiasis, Seizure disorder (H), Stroke (H), and Vitamin B12 deficiency anemia. Addis was recently transferred from [facility]. She was undergoing acute rehab after suffering a hip fracture and undergoing ORIF repair.     Today Addis is being evaluated for a discharge from the long term care unit. Addis is very, very happy to be going home. She shares she has been stable. She feels she has all of the resources necessary to be safe and successful. Addis has been eating and drinking well. Blood pressure, blood sugar have been very well controlled. She denies any seizure activity. She denies any symptoms including fevers/chills, cough or cold symptoms, headaches, vision changes, chest pain/pressure, difficulty breathing, SOB, abdominal pain, nausea, vomiting, diarrhea, increasing or ongoing weakness past baseline, increasing pain.     PHYSICAL EXAM:   /76   Pulse 84   Temp 98.8  F (37.1  C)   Resp 18   SpO2 95%     A limited exam was performed due to recommendations for care during COVID-19 pandemic. Due to the 2020 COVID-19 pandemic, except as noted above, the patient was visually observed at a 6 foot plus distance.  An observational exam was performed in an effort to keep patient safe  from COVID-19 and other communicable diseases.     General appearance: alert, appears stated age and cooperative  HEENT: Head is normocephalic with normal hair distribution. No evidence of trauma. Ears: Without lesions or deformity. No acute purulent discharge. Eyes: Conjunctivae pink with no scleral icterus or erythema. Nose: Normal. Oropharnyx: mmm.  Lungs: respirations without effort.  Extremities: extremities normal, atraumatic, no cyanosis.  Skin: Skin color, texture normal. No rashes or lesions on exposed skin.   Neurologic: Grossly normal   Psych: interacts well with caregivers, exhibits logical thought processes and connections, pleasant.    MEDICATION LIST:  Current Outpatient Medications   Medication Sig     acetaminophen (TYLENOL) 500 MG tablet Take 1,000 mg by mouth 3 (three) times a day.      amLODIPine (NORVASC) 10 MG tablet Take 10 mg by mouth daily.     aspirin 81 MG EC tablet Take 81 mg by mouth daily.     atorvastatin (LIPITOR) 40 MG tablet Take 40 mg by mouth at bedtime.     baclofen (LIORESAL) 10 MG tablet Take 10 mg by mouth 3 (three) times a day.     bisacodyL (DULCOLAX) 5 mg EC tablet Take 5 mg by mouth daily as needed for constipation.     cholecalciferol, vitamin D3, 1,000 unit (25 mcg) tablet Take 1,000 Units by mouth daily.     citalopram (CELEXA) 10 MG tablet Take 10 mg by mouth at bedtime.     cyanocobalamin 1,000 mcg/mL injection Inject 1,000 mcg into the shoulder, thigh, or buttocks every 30 (thirty) days.     ibuprofen (ADVIL,MOTRIN) 200 MG tablet Take 200 mg by mouth every 6 (six) hours as needed for pain.     insulin glargine (BASAGLAR KWIKPEN) 100 unit/mL (3 mL) pen Inject 35 Units under the skin daily.      insulin lispro (HUMALOG) 100 unit/mL injection Inject under the skin 3 (three) times a day before meals. Per Sliding Scale;  If Blood Sugar is 71 to 150, give 0 Units.  If Blood Sugar is 151 to 200, give 2 Units.  If Blood Sugar is 201 to 250, give 4 Units.  If Blood Sugar is  251 to 300, give 8 Units.  subcutaneous     lactase 4,500 unit Tab Take 4 tablets by mouth as needed.      levETIRAcetam (KEPPRA) 500 MG tablet Take 1,250 mg by mouth 2 (two) times a day.      losartan (COZAAR) 25 MG tablet Take 25 mg by mouth daily.     magnesium oxide 250 mg magnesium Tab Take 250 mg by mouth.     metFORMIN (GLUCOPHAGE) 1000 MG tablet Take 1,000 mg by mouth 2 (two) times a day with meals. Take 2 tabs by mouth in AM and 1 tab by mouth at HS.     metoprolol succinate (TOPROL-XL) 50 MG 24 hr tablet Take 50 mg by mouth daily.     montelukast (SINGULAIR) 10 mg tablet Take 10 mg by mouth at bedtime.     phenytoin extended (DILANTIN) 200 MG ER capsule Take 200 mg by mouth 2 (two) times a day.      senna-docusate (SENNOSIDES-DOCUSATE SODIUM) 8.6-50 mg tablet Take 1 tablet by mouth daily.       DISCHARGE DIAGNOSIS:    ICD-10-CM    1. Physical deconditioning  R53.81    2. Seizure disorder (H)  G40.909    3. Type 2 diabetes mellitus with other neurologic complication, with long-term current use of insulin (H)  E11.49     Z79.4    4. CVA, old,2010  I69.993    5. Essential hypertension  I10      Physical Deconditioning, old CVA  -Continue PT/OT and other therapies as per care plan.  -Encouraged good nutrition and movement habits.   -Discussed care plan and expected course of stay.   -Continue to follow-up per routine schedule or sooner if needed.     Seizure disorder  -Baclofen 10 mg by mouth 3 times daily.  -Keppra 250 mg tablets, take 5 tablets by mouth twice daily.  -Phenytoin 200 mg by mouth twice daily.    DMII  -Blood sugars with meals.   -Glargine 35 units subcutaneously in the morning.  -Insulin lispro per sliding scale 3 times a day with meals.  -Metformin 1000 mg by mouth two times a day.   -Follow-up as needed.    Hypertension  -Amlodipine 10 mg by mouth daily at bedside.   -Lisinopril 25 mg by mouth daily.   -Toprol XL 50 mg by mouth daily.   -Encouraged cardiac diet, low sodium diet, exercise and  stress reduction techniques.   -Emphasized importance of blood pressure control.   -Continue current medications as prescribed.   -Follow up per routine schedule or sooner with any complaints or concerns.    Otherwise continue current care plan for all other chronic medical conditions, as they are stable. Encouraged patient to engage in healthy lifestyle behaviors such as engaging in social activities, exercising (PT/OT), eating well, and following care plan. Follow up for routine check-up, or sooner if needed. Will continue to monitor patient and work with nursing staff collaboratively to work toward positive patient outcomes.    MEDICAL EQUIPMENT NEEDS:  Per previous notes, visits (ulysses).     DISCHARGE PLAN/FACE TO FACE:  I certify that services are/were furnished while this patient was under the care of a physician and that a physician or an allowed non-physician practitioner (NPP), had a face-to-face encounter that meets the physician face-to-face encounter requirements. The encounter was in whole, or in part, related to the primary reason for home health. The patient is confined to his/her home and needs intermittent skilled nursing, physical therapy, speech-language pathology, or the continued need for occupational therapy. A plan of care has been established by a physician and is periodically reviewed by a physician.  Date of Face-to-Face Encounter: 9/4/2020    I certify that, based on my findings, the following services are medically necessary home health services: home health aid for bathing and ADLs, skilled nursing (RN) for medication set-up and teaching, symptoms and disease process monitoring and education, PT for strengthening, balance, endurance and safety within the home, OT for strengthening, ADL needs, adaptive equipment and safety.    My clinical findings support the need for the above skilled services because: home health aid for bathing and ADLs, skilled nursing (RN) for medication set-up  and teaching, symptoms and disease process monitoring and education, PT for strengthening, balance, endurance and safety within the home, OT for strengthening, ADL needs, adaptive equipment and safety.    This patient is homebound because: she is a frail elderly woman with multiple comorbidities and recent hospitalizations and then a stay in TCU and LTC making it unsafe for her to go out into the community for services.    The patient is, or has been, under my care and I have initiated the establishment of the plan of care. This patient will be followed by a physician who will periodically review the plan of care. Schedule follow up visit with primary care provider within 7 days to reestablish care.    Total unit/floor time of 50 minutes time spent on discharge planning, discharge follow-up discussion and discharge medication review.    Electronically signed by: Octavia Serna CNP

## 2021-06-11 NOTE — PROGRESS NOTES
[facility]    Facility:   [facility] [[id number]]   Code Status: DNR/DNI and POLST AVAILABLE  PCP: Wegener, Joel D, MD   Phone: [phone]   Fax: [phone]      CHIEF COMPLAINT/REASON FOR VISIT:  Chief Complaint   Patient presents with     Discharge Summary       HISTORY COURSE:  Addis is a 69 y.o. female who  has a past medical history of Allergic rhinitis, CKD (chronic kidney disease), Depression, Displaced dome fracture of left acetabulum (H), DM2 (diabetes mellitus, type 2) (H), GERD (gastroesophageal reflux disease), Gout, HTN (hypertension), Left hemiplegia (H), Migraine, 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), Nephrolithiasis, Seizure disorder (H), Stroke (H), and Vitamin B12 deficiency anemia. Addis was transferred from [facility]. She was undergoing acute rehab after suffering a hip fracture and undergoing ORIF repair.  Addis underwent acute rehabilitation at [facility], was stabilized and transferred to long-term care.  She never wanted to stay in long-term care.  This was essentially a means to getting all of her ducks in a row in order to discharge home.    Today Addis is being evaluated for a discharge from the long term care unit.  She has had a plan to discharge that was pushed back due to several equipment needs.  Addis is now ready to go home.  The final needs include a AFO/KAFO and a hospital bed.  This evaluation serves as a final discharge examination and face-to-face for these equipment needs.  She continues to states she is doing well.  She has no new concerns or issues.  She is very, very happy to be going home.  She continues to work with therapies to ensure all of her needs are met.  Blood pressure, blood sugar have been very well controlled. She denies any seizure activity.  Nursing staff did not have any new concerns or issues to report.  She denies any symptoms including  fevers/chills, cough or cold symptoms, headaches, vision changes, chest pain/pressure, difficulty breathing, SOB, abdominal pain, nausea, vomiting, diarrhea, increasing or ongoing weakness past baseline, increasing pain.     PHYSICAL EXAM:   /76   Pulse 84   Temp 97.7  F (36.5  C)   Resp 18   Wt 141 lb 12.8 oz (64.3 kg)   SpO2 95%     A limited exam was performed due to recommendations for care during COVID-19 pandemic. Due to the 2020 COVID-19 pandemic, except as noted above, the patient was visually observed at a 6 foot plus distance.  An observational exam was performed in an effort to keep patient safe from COVID-19 and other communicable diseases.     General appearance: alert, appears stated age and cooperative  HEENT: Head is normocephalic with normal hair distribution. No evidence of trauma. Ears: Without lesions or deformity. No acute purulent discharge. Eyes: Conjunctivae pink with no scleral icterus or erythema. Nose: Normal. Oropharnyx: mmm.  Lungs: respirations without effort.  Extremities: extremities normal, atraumatic, no cyanosis.  Musculoskeletal: Left knee hyperextension, pes planus, foot drop and toeing out present.  Given her significant pes planus foot deformity it alters the position of the ankle joint.  These issues resulted in significant instability of the ankle and foot as a result.  Skin: Skin color, texture normal. No rashes or lesions on exposed skin.   Neurologic: Grossly normal   Psych: interacts well with caregivers, exhibits logical thought processes and connections, pleasant.    MEDICATION LIST:  Current Outpatient Medications   Medication Sig     acetaminophen (TYLENOL) 500 MG tablet Take 1,000 mg by mouth 3 (three) times a day.      amLODIPine (NORVASC) 10 MG tablet Take 10 mg by mouth daily.     aspirin 81 MG EC tablet Take 81 mg by mouth daily.     atorvastatin (LIPITOR) 40 MG tablet Take 40 mg by mouth at bedtime.     baclofen (LIORESAL) 10 MG tablet Take 10 mg by  mouth 3 (three) times a day.     bisacodyL (DULCOLAX) 5 mg EC tablet Take 5 mg by mouth daily as needed for constipation.     cholecalciferol, vitamin D3, 1,000 unit (25 mcg) tablet Take 1,000 Units by mouth daily.     citalopram (CELEXA) 10 MG tablet Take 10 mg by mouth at bedtime.     cyanocobalamin 1,000 mcg/mL injection Inject 1,000 mcg into the shoulder, thigh, or buttocks every 30 (thirty) days.     ibuprofen (ADVIL,MOTRIN) 200 MG tablet Take 200 mg by mouth every 6 (six) hours as needed for pain.     insulin glargine (BASAGLAR KWIKPEN) 100 unit/mL (3 mL) pen Inject 35 Units under the skin daily.      insulin lispro (HUMALOG) 100 unit/mL injection Inject under the skin 3 (three) times a day before meals. Per Sliding Scale;  If Blood Sugar is 71 to 150, give 0 Units.  If Blood Sugar is 151 to 200, give 2 Units.  If Blood Sugar is 201 to 250, give 4 Units.  If Blood Sugar is 251 to 300, give 8 Units.  subcutaneous     lactase 4,500 unit Tab Take 4 tablets by mouth as needed.      levETIRAcetam (KEPPRA) 500 MG tablet Take 1,250 mg by mouth 2 (two) times a day.      losartan (COZAAR) 25 MG tablet Take 25 mg by mouth daily.     magnesium oxide 250 mg magnesium Tab Take 250 mg by mouth.     metFORMIN (GLUCOPHAGE) 1000 MG tablet Take 1,000 mg by mouth 2 (two) times a day with meals. Take 2 tabs by mouth in AM and 1 tab by mouth at HS.     metoprolol succinate (TOPROL-XL) 50 MG 24 hr tablet Take 50 mg by mouth daily.     montelukast (SINGULAIR) 10 mg tablet Take 10 mg by mouth at bedtime.     phenytoin extended (DILANTIN) 200 MG ER capsule Take 200 mg by mouth 2 (two) times a day.      senna-docusate (SENNOSIDES-DOCUSATE SODIUM) 8.6-50 mg tablet Take 1 tablet by mouth daily.       DISCHARGE DIAGNOSIS:    ICD-10-CM    1. CVA, old,2010  I69.993    2. Left hemiplegia (H)  G81.94    3. Physical deconditioning  R53.81    4. Seizure disorder (H)  G40.909    5. Type 2 diabetes mellitus with other neurologic complication,  with long-term current use of insulin (H)  E11.49     Z79.4    6. Essential hypertension  I10      Physical Deconditioning, old CVA  -Continue PT/OT and other therapies as per care plan.  -Orthotist to eval and treat for AFO/KAFO. Orders below in DME.  -Hospital bed, orders below in DME section.  -Encouraged good nutrition and movement habits.   -Discussed care plan and expected course of stay.   -Continue to follow-up per routine schedule or sooner if needed.     Seizure disorder  -Baclofen 10 mg by mouth 3 times daily.  -Keppra 250 mg tablets, take 5 tablets by mouth twice daily.  -Phenytoin 200 mg by mouth twice daily.    DMII  -Blood sugars with meals.   -Glargine 35 units subcutaneously in the morning.  -Insulin lispro per sliding scale 3 times a day with meals.  -Metformin 1000 mg by mouth two times a day.   -Follow-up as needed.    Hypertension  -Amlodipine 10 mg by mouth daily at bedside.   -Lisinopril 25 mg by mouth daily.   -Toprol XL 50 mg by mouth daily.   -Encouraged cardiac diet, low sodium diet, exercise and stress reduction techniques.   -Emphasized importance of blood pressure control.   -Continue current medications as prescribed.   -Follow up per routine schedule or sooner with any complaints or concerns.    Otherwise continue current care plan for all other chronic medical conditions, as they are stable. Encouraged patient to engage in healthy lifestyle behaviors such as engaging in social activities, exercising (PT/OT), eating well, and following care plan. Follow up for routine check-up, or sooner if needed. Will continue to monitor patient and work with nursing staff collaboratively to work toward positive patient outcomes.    MEDICAL EQUIPMENT NEEDS:  DME Order  Hospital Bed   Hospital bed is required for body positioning, to allow for safe transfers to wheelchair and standing and frequent changes in body position, not feasible in an ordinary bed.     1. Does the patient require positioning  of the body in ways not feasible with an ordinary bed due to a medical condition that is expected to last at least 1 month? Yes, she has a history of CVA with left-sided hemiplegia.    2. Does the patient require, for the alleviation of pain, positioning of the body in ways not feasible with an ordinary bed? Yes, she has a history of CVA with left-sided hemiplegia.    3. Does the patient require the head of the bed to be elevated more than 30 degrees most of the time due to congestive heart failure, chronic pulmonary disease, or aspiration?  No    4. Does the patient require traction that can only be attached to a hospital bed?  No    5. Does the patient require a bed height different than a fixed height hospital bed to permit transfers to chair, wheelchair, or standing position? Yes, she has a history of CVA with left-sided hemiplegia.    6. Does the patient require frequent changes in body position and/or have an immediate need for change in body position? Yes, she has a history of CVA with left-sided hemiplegia.    For trapeze- Patient must meet standard hospital bed criteria also:   1. Does patient need this device to sit up because of a respiratory condition, for change in body position for other medical reasons, or to get in or out of bed?  Not applicable    Date of face to face encounter: 9/11/2020    Signature with date, time, NPI #  Octavia Serna CNP   [id number]    DME Order:   AFO/KAFO for left leg--custom orthotic  -Eval and treat for custom orthotic.    Addis is expected to be ambulatory for transfers with AFO and/or KAFO she has a specific diagnosis of CVA with left-sided yesenia-plegia with foot drop.  She does have the potential to benefit functionally.  She could not be fitted with a pre-fabricated AFO.  She had a CVA and has resultant left-sided hemiplegia which is expected to be a lifelong issue.  There is a need to control the knee ankle or foot in more than 1 plane.  Her original AFO is  greater than 7-10 years old, it no longer provides adequate support or stability for patient during transfers or ambulation.  Patient ambulates and transfers with current AFO due to left-sided yesenia-plegia following a CVA.  She demonstrates significant left knee ankle and foot instability as a result.  She would greatly benefit from orthotic support to increase mobility and safety.  Prefab orthotic not appropriate as patient would require orthotic for life, needs custom support to manage left knee hyperextension, pes planus, foot drop and toeing out, all due to above left hemiplegia..    DISCHARGE PLAN/FACE TO FACE:  I certify that services are/were furnished while this patient was under the care of a physician and that a physician or an allowed non-physician practitioner (NPP), had a face-to-face encounter that meets the physician face-to-face encounter requirements. The encounter was in whole, or in part, related to the primary reason for home health. The patient is confined to his/her home and needs intermittent skilled nursing, physical therapy, speech-language pathology, or the continued need for occupational therapy. A plan of care has been established by a physician and is periodically reviewed by a physician.  Date of Face-to-Face Encounter: 9/11/2020    I certify that, based on my findings, the following services are medically necessary home health services: home health aid for bathing and ADLs, skilled nursing (RN) for medication set-up and teaching, symptoms and disease process monitoring and education, PT for strengthening, balance, endurance and safety within the home, OT for strengthening, ADL needs, adaptive equipment and safety.    My clinical findings support the need for the above skilled services because: home health aid for bathing and ADLs, skilled nursing (RN) for medication set-up and teaching, symptoms and disease process monitoring and education, PT for strengthening, balance, endurance and  safety within the home, OT for strengthening, ADL needs, adaptive equipment and safety.    This patient is homebound because: she is a frail elderly woman with multiple comorbidities and recent hospitalizations and a TCU and long-term care stay making it unsafe for her to go out into the community for services.    The patient is, or has been, under my care and I have initiated the establishment of the plan of care. This patient will be followed by a physician who will periodically review the plan of care. Schedule follow up visit with primary care provider within 7 days to reestablish care.    Total unit/floor time of 35 minutes time spent on discharge planning, discharge follow-up discussion and discharge medication review.    Electronically signed by: Octavia Serna CNP

## 2021-06-20 NOTE — LETTER
Letter by Octavia Serna CNP at      Author: Octavia Serna CNP Service: -- Author Type: --    Filed:  Encounter Date: 5/20/2020 Status: (Other)         Patient: Addis Peña   MR Number: [id number]   YOB: 1950   Date of Visit: 5/20/2020     [facility]    Facility:   [facility] [[id number]]   Code Status: POLST AVAILABLE      CHIEF COMPLAINT/REASON FOR VISIT:  Chief Complaint   Patient presents with   ? Review Of Multiple Medical Conditions     physical deconditioning, seizure disorder       HISTORY:      HPI: Addis is a 69 y.o. female who  has a past medical history of Allergic rhinitis, CKD (chronic kidney disease), Depression, Displaced dome fracture of left acetabulum (H), DM2 (diabetes mellitus, type 2) (H), GERD (gastroesophageal reflux disease), Gout, HTN (hypertension), Left hemiplegia (H), Migraine, 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), Nephrolithiasis, Seizure disorder (H), Stroke (H), and Vitamin B12 deficiency anemia. Addis was recently transferred from [facility]. She was undergoing acute rehab after suffering a hip fracture and undergoing ORIF repair.     Today Addis is being evaluated for a routine review of multiple medical problems while in the TCU. Addis recently had significant changes in neuro status with tremors and near seizures. She was admitted to the hospital and was treated for exacerbation of stroke issues. She reports that she was told that the old stroke area grew. She then had a UTI, which has since resolved. Addis otherwise has been feeling well. She has not had any ongoing neuro deficits, no further seizure like activity.  She denies any symptoms including fevers/chills, cough or cold symptoms, headaches, vision changes, chest pain/pressure, difficulty breathing, SOB, abdominal pain, nausea, vomiting, diarrhea, increasing or ongoing weakness past baseline,  increasing pain.     Past Medical History:   Diagnosis Date   ? Allergic rhinitis    ? CKD (chronic kidney disease)    ? Depression    ? Displaced dome fracture of left acetabulum (H)    ? DM2 (diabetes mellitus, type 2) (H)    ? GERD (gastroesophageal reflux disease)    ? Gout    ? HTN (hypertension)    ? Left hemiplegia (H)    ? Migraine    ? 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)    ? Nephrolithiasis    ? Seizure disorder (H)    ? Stroke (H)    ? Vitamin B12 deficiency anemia              Family History   Problem Relation Age of Onset   ? Heart disease Mother    ? Hypertension Mother    ? Coronary artery disease Father    ? Diabetes Sister    ? Hypertension Brother    ? Cancer Sister      Social History     Socioeconomic History   ? Marital status:      Spouse name: Not on file   ? Number of children: Not on file   ? Years of education: Not on file   ? Highest education level: Not on file   Occupational History   ? Not on file   Social Needs   ? Financial resource strain: Not on file   ? Food insecurity     Worry: Not on file     Inability: Not on file   ? Transportation needs     Medical: Not on file     Non-medical: Not on file   Tobacco Use   ? Smoking status: Never Smoker   ? Smokeless tobacco: Never Used   Substance and Sexual Activity   ? Alcohol use: Not Currently   ? Drug use: Not on file   ? Sexual activity: Not on file   Lifestyle   ? Physical activity     Days per week: Not on file     Minutes per session: Not on file   ? Stress: Not on file   Relationships   ? Social connections     Talks on phone: Not on file     Gets together: Not on file     Attends Orthodoxy service: Not on file     Active member of club or organization: Not on file     Attends meetings of clubs or organizations: Not on file     Relationship status: Not on file   ? Intimate partner violence     Fear of current or ex partner: Not on file     Emotionally abused: Not on file     Physically abused: Not on  file     Forced sexual activity: Not on file   Other Topics Concern   ? Not on file   Social History Narrative   ? Not on file       REVIEW OF SYSTEM:  Per HPI    PHYSICAL EXAM:   /68   Pulse 76   Temp 97.1  F (36.2  C)   Resp 18   Wt 140 lb 9.6 oz (63.8 kg)   SpO2 94%     A limited exam was performed due to recommendations for care during COVID-19 pandemic. Due to the 2020 COVID-19 pandemic, except as noted above, the patient was visually observed at a 6 foot plus distance.  An observational exam was performed in an effort to keep patient safe from COVID-19 and other communicable diseases.     General appearance: alert, appears stated age and cooperative  HEENT: Head is normocephalic with normal hair distribution. No evidence of trauma. Ears: Without lesions or deformity. No acute purulent discharge. Eyes: Conjunctivae pink with no scleral icterus or erythema. Nose: Normal. Oropharnyx: mmm.  Lungs: respirations without effort.  Extremities: extremities normal, atraumatic, no cyanosis.  Skin: Skin color, texture normal. No rashes or lesions on exposed skin.   Neurologic: Grossly normal   Psych: interacts well with caregivers, exhibits logical thought processes and connections, pleasant.      LABS:   None today.     ASSESSMENT:      ICD-10-CM    1. Physical deconditioning  R53.81    2. Seizure disorder (H)  G40.909        PLAN:    Care plan reviewed and remains appropriate.     Physical Deconditioning, old CVA  -Continue PT/OT and other therapies as per care plan.  -Encouraged good nutrition and movement habits.   -Discussed care plan and expected course of stay.   -Continue to follow-up per routine schedule or sooner if needed.     Seizure disorder  -Baclofen 10 mg by mouth 3 times daily.  -Keppra 250 mg tablets, take 5 tablets by mouth twice daily.  -Phenytoin 200 mg by mouth twice daily.    Left acetabular fracture  -Tylenol 1 g every 6 hours as needed for pain.    Otherwise continue current care plan  for all other chronic medical conditions, as they are stable. Encouraged patient to engage in healthy lifestyle behaviors such as engaging in social activities, exercising (PT/OT), eating well, and following care plan. Follow up for routine check-up, or sooner if needed. Will continue to monitor patient and work with nursing staff collaboratively to work toward positive patient outcomes.    Electronically signed by: Octavia Serna CNP

## 2021-06-20 NOTE — LETTER
Letter by Octavia Serna CNP at      Author: Octavia Serna CNP Service: -- Author Type: --    Filed:  Encounter Date: 4/27/2020 Status: (Other)         Patient: Addis Peña   MR Number: [id number]   YOB: 1950   Date of Visit: 4/27/2020     [facility]    Facility:   [facility] [[id number]]   Code Status: POLST AVAILABLE      CHIEF COMPLAINT/REASON FOR VISIT:  Chief Complaint   Patient presents with   ? Review Of Multiple Medical Conditions   ? Problem Visit     tremor       HISTORY:      HPI: Addis is a 69 y.o. female who  has a past medical history of Allergic rhinitis, CKD (chronic kidney disease), Depression, Displaced dome fracture of left acetabulum (H), DM2 (diabetes mellitus, type 2) (H), GERD (gastroesophageal reflux disease), Gout, HTN (hypertension), Left hemiplegia (H), Migraine, 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), Nephrolithiasis, Stroke (H), and Vitamin B12 deficiency anemia. Addis was recently transferred from [facility]. She was undergoing acute rehab after suffering a hip fracture and undergoing ORIF repair.     Today Addis is being evaluated for a routine review of multiple medical problems while in the TCU. However, most importantly she is requesting evaluation of having some tremor and spasticity in her left body. She states it happened yesterday and that she was having issues for approximately 3 hours. She states it was only her left side. She has not had anything like this in the past. Addis felt like it was a worsening deficit of her already weak side. However, she states that other than that there were not any stroke like symptoms. She states she did not have any flaccidity of her face, no drooping of her face, no other weakness, she has been able to breathe normally, has not been dizzy or lightheaded. Blood pressures and vitals were stable at that time. Addis shares  she has no other concerns at this time. Addis denies any other concerns including fevers/chills, cough or cold symptoms, headaches, vision changes, chest pain/pressure, difficulty breathing, SOB, abdominal pain, nausea, vomiting, diarrhea, dysuria, increasing or ongoing weakness past baseline, increasing pain.     Past Medical History:   Diagnosis Date   ? Allergic rhinitis    ? CKD (chronic kidney disease)    ? Depression    ? Displaced dome fracture of left acetabulum (H)    ? DM2 (diabetes mellitus, type 2) (H)    ? GERD (gastroesophageal reflux disease)    ? Gout    ? HTN (hypertension)    ? Left hemiplegia (H)    ? Migraine    ? 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)    ? Nephrolithiasis    ? Stroke (H)    ? Vitamin B12 deficiency anemia              Family History   Problem Relation Age of Onset   ? Heart disease Mother    ? Hypertension Mother    ? Coronary artery disease Father    ? Diabetes Sister    ? Hypertension Brother    ? Cancer Sister      Social History     Socioeconomic History   ? Marital status:      Spouse name: Not on file   ? Number of children: Not on file   ? Years of education: Not on file   ? Highest education level: Not on file   Occupational History   ? Not on file   Social Needs   ? Financial resource strain: Not on file   ? Food insecurity     Worry: Not on file     Inability: Not on file   ? Transportation needs     Medical: Not on file     Non-medical: Not on file   Tobacco Use   ? Smoking status: Never Smoker   ? Smokeless tobacco: Never Used   Substance and Sexual Activity   ? Alcohol use: Not Currently   ? Drug use: Not on file   ? Sexual activity: Not on file   Lifestyle   ? Physical activity     Days per week: Not on file     Minutes per session: Not on file   ? Stress: Not on file   Relationships   ? Social connections     Talks on phone: Not on file     Gets together: Not on file     Attends Confucianism service: Not on file     Active member of  club or organization: Not on file     Attends meetings of clubs or organizations: Not on file     Relationship status: Not on file   ? Intimate partner violence     Fear of current or ex partner: Not on file     Emotionally abused: Not on file     Physically abused: Not on file     Forced sexual activity: Not on file   Other Topics Concern   ? Not on file   Social History Narrative   ? Not on file       REVIEW OF SYSTEM:  Per HPI    PHYSICAL EXAM:   /62   Pulse 61   Temp 98.6  F (37  C)   Resp 16   Wt 139 lb 12.8 oz (63.4 kg)   SpO2 96%     A limited exam was performed due to recommendations for care during COVID-19 pandemic. Due to the 2020 COVID-19 pandemic, except as noted above, the patient was visually observed at a 6 foot plus distance.  An observational exam was performed in an effort to keep patient safe from COVID-19 and other communicable diseases.     General appearance: alert, appears stated age and cooperative  HEENT: Head is normocephalic with normal hair distribution. No evidence of trauma. Ears: Without lesions or deformity. No acute purulent discharge. Eyes: Conjunctivae pink with no scleral icterus or erythema. Nose: Normal. Oropharnyx: mmm.  Lungs: respirations without effort.  Extremities: extremities normal, atraumatic, no cyanosis.  Skin: Skin color, texture normal. No rashes or lesions on exposed skin.   Neurologic: Grossly normal   Psych: interacts well with caregivers, exhibits logical thought processes and connections, pleasant.      LABS:   CBC, BMP, Mag, B12 levels.    ASSESSMENT:      ICD-10-CM    1. Physical deconditioning  R53.81    2. CVA, old,2010  I69.993        PLAN:    Physical Deconditioning  -Continue PT/OT and other therapies as per care plan.  -Encouraged good nutrition and movement habits.   -Discussed care plan and expected course of stay.   -Continue to follow-up per routine schedule or sooner if needed.     Old CVA-- may have some spasticity  -CBC, BMP, Mag  level, B12 level.   -ASA 81 mg by mouth daily.   -Baclofen 10 mg by mouth three times a day.  -Follow carefully.     Otherwise continue current care plan for all other chronic medical conditions, as they are stable. Encouraged patient to engage in healthy lifestyle behaviors such as engaging in social activities, exercising (PT/OT), eating well, and following care plan. Follow up for routine check-up, or sooner if needed. Will continue to monitor patient and work with nursing staff collaboratively to work toward positive patient outcomes.    Electronically signed by: Octavia Serna CNP

## 2021-06-20 NOTE — LETTER
"Letter by Rasta Michael MD at      Author: Rasta Michael MD Service: -- Author Type: --    Filed:  Encounter Date: 6/18/2020 Status: (Other)         Patient: Addis Peña   MR Number: [id number]   YOB: 1950   Date of Visit: 6/18/2020      [facility]    Facility:  [facility] [[id number]]    Code Status:  DNR/DNI    Chief Complaint   Patient presents with   ? Problem Visit   : Including seizures, Quentin's paralysis, depression                  Patient Active Problem List   Diagnosis   ? Left hip pain   ? Postoperative pain   ? Physical deconditioning   ? Closed displaced fracture of anterior column of left acetabulum with routine healing, subsequent encounter   ? S/P ORIF (open reduction internal fixation) fracture   ? Falls   ? Essential hypertension   ? Type 2 diabetes mellitus with other neurologic complication, with long-term current use of insulin (H)   ? CVA, old,2010   ? Spells of trembling   ? Fever   ? Seizure disorder (H)   ? Urinary frequency   ? Depression       History:  Ms. 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     Recent Hospital course: Recall that I had seen patient April 30 for \"spells\", she had had 4 of them over previous days.  I was concerned for seizure, but patient was stable at that time and not having spells so I simply started her on Keppra and have staff arrange ASAP neurology appointment.  Unfortunately 2 days later, before she saw the neurologist, and after 3 doses of Keppra, she had another spell.  She was appropriately sent to the emergency room at that point.     Patient was admitted between May 2 and May 6.  Patient had MRI MRA head and neck " vessels as noted below.  She has severe right SOCO stenosis.  There was also some diffusion changes in the MRI in the area of her previous 2010 CVA.  While new/acute CVA could not be ruled out, neurology felt that the MRI changes were more consistent with changes associated with seizure activity.  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     Patient's Keppra was increased to 1250 minutes twice daily and she was also started on phenytoin  mg twice daily.     Neurologist has asked her to see his partner/seizure specialist in 3 to 4 weeks.  Patient reports that follow-up is not happen by phone and no changes were made.     Hospitalization with otherwise unremarkable     ===============================================================  Recall that patient was transferred recently from a different TCU details below:     [facility] TCU course: February 7 through April 20     While the patient reports dissatisfaction with her physical therapy, and requested transfer, there were some positive outcomes report here.  Patient experienced improved blood sugar control throughout her stay resulting in discontinuation of her Lantus on March 12 and an increase in her metformin from 500 twice daily to 1000 twice daily.  Blood sugars reportedly quite favorable thereafter generally less than 170 as well.     Patient also reports that her pain came under good control with scheduled Tylenol and baclofen without need for ongoing opiates.     Patient's blood pressure was generally adequately controlled on current medications.     FiberCon was started for her bowels which tend to be on the loose side with improvement.     Preceding hospital course for the [facility] TCU stay follows:  Hospital course: Patient was admitted to [facility] February 2 through February 7 though she was originally seen in the  emergency room at [facility] and transferred to [facility] once fracture was identified.                Patient reports 2 falls the first on January 25 with some left hip pain.  She was seen for left hip pain on the 28th, diagnosed with left acetabular fracture as well as metatarsal fractures 2 through 4 on the left foot.  Case was discussed with orthopedics, Lovenox prophylaxis was prescribed.  However she fell again on February 1 trying to transfer self in her home this time with very severe left hip pain incompatible with ambulation.  She now was diagnosed with displaced dome fracture of the left acetabulum requiring ORIF, repaired by Dr. Dinh.  There were no complications reported.  Patient was treated with DVT prophylaxis, prophylactic antibiotic therapy and deemed safe for discharge by February 7 with pain tolerated by oral pain medications.  Lovenox 40 mg subcutaneously recommended for 28 days.  Oxycodone 5 to 10 mg every 4 hours along with scheduled Tylenol given for pain.  Her aspirin was held at discharge.  There is also recommendation for pursuing osteoporosis work-up including DEXA scanning.  Notably she also received some ceftriaxone in the emergency room at [facility] for abnormal urinalysis.   ==============================================================         Subjective/ROS:    -augmented by discussion with facility staff involved in direct care  - Patient reports that she finally feels like her left hand is back to its previous baseline.  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  She reports that there is been no new seizure-like activity.  She reports that she is tolerating the Keppra and phenytoin well.    - There is been concern about depression by staff and Ms. Myers.  I spoke with the patient about that in detail.  She says that she was upset that she was not returning to her previous home situation.  She was upset that  physical therapy was coming to and and.  She has been psychologically dependent on the physical therapy for many months.  She was at a previous TCU and when she reached stability there and was to be cut off from physical therapy she managed transfer to this facility where she has received several more months of treatment.    However she now reports that she is coming to terms with staying here in the long-term care facility.  She realizes that she needs the help with that is given to her.  She is also relieved that physical therapy has continued for now up on the fourth floor.    She says she was on Celexa following her CVA.  She says she was told she should take it but she does not feel like it ever really helped.  She strongly wants to avoid medication now.  She has been willing to reconnect with psychology and had a previous relationship outside of this facility.  She will be seeing the in-house psychologist going forward.  She simply says that she is not feeling depressed but was upset about her change in living status.      Staff reports that the patient has been complaining that people not coming in to see her.  Nursing director tells me that is not true that she is on the call light frequently and they are responding quickly as she is new on the floor and they are doing their best to accommodate her needs and integrate her to the floor.    She just saw Dr. Hernandez her neurologist and had lots of lab work on the eighth.  Results for ELAINE STEVENSON (MRN [id number]) as of 6/18/2020 19:14   Ref. Range 6/8/2020 06:35   AST Latest Ref Range: 0 - 40 U/L 19   ALT Latest Ref Range: 0 - 45 U/L 19   ALBUMIN Latest Ref Range: 3.5 - 5.0 g/dL 3.1 (L)   Protein, Total Latest Ref Range: 6.0 - 8.0 g/dL 5.7 (L)   Alkaline Phosphatase Latest Ref Range: 45 - 120 U/L 158 (H)   Bilirubin, Total Latest Ref Range: 0.0 - 1.0 mg/dL 0.2   Bilirubin, Direct Latest Ref Range: <=0.5 mg/dL <0.1   WBC Latest Ref Range: 4.0 - 11.0 thou/uL  5.7   RBC Latest Ref Range: 3.80 - 5.40 mill/uL 3.87   Hemoglobin Latest Ref Range: 12.0 - 16.0 g/dL 12.0   Hematocrit Latest Ref Range: 35.0 - 47.0 % 38.8   MCV Latest Ref Range: 80 - 100 fL 100   MCH Latest Ref Range: 27.0 - 34.0 pg 31.0   MCHC Latest Ref Range: 32.0 - 36.0 g/dL 30.9 (L)   RDW Latest Ref Range: 11.0 - 14.5 % 13.6   Platelets Latest Ref Range: 140 - 440 thou/uL 251   MPV Latest Ref Range: 8.5 - 12.5 fL 10.3   Neutrophils % Latest Ref Range: 50 - 70 % 65   Lymphocytes % Latest Ref Range: 20 - 40 % 18 (L)   Monocytes % Latest Ref Range: 2 - 10 % 10   Eosinophils % Latest Ref Range: 0 - 6 % 7 (H)   Basophils % Latest Ref Range: 0 - 2 % 1   Neutrophils Absolute Latest Ref Range: 2.0 - 7.7 thou/uL 3.7   Lymphocytes Absolute Latest Ref Range: 0.8 - 4.4 thou/uL 1.0   Monocytes Absolute Latest Ref Range: 0.0 - 0.9 thou/uL 0.6   Eosinophils Absolute Latest Ref Range: 0.0 - 0.4 thou/uL 0.4   Basophils Absolute Latest Ref Range: 0.0 - 0.2 thou/uL 0.0   Levetiracetam Latest Ref Range: 6.0 - 46.0 ug/mL 29.9   Phenytoin Latest Ref Range: 10.0 - 20.0 ug/mL 6.3 (L)   Phenytoin, Free Latest Ref Range: 1.0 - 2.0 ug/mL 0.5 (L)     Remainder ROS is negative including no fever sweats chills falls injuries change in appetite sleep bowels bladder swallowing    Past Medical History:   Diagnosis Date   ? Allergic rhinitis    ? CKD (chronic kidney disease)    ? Depression    ? Displaced dome fracture of left acetabulum (H)    ? DM2 (diabetes mellitus, type 2) (H)    ? GERD (gastroesophageal reflux disease)    ? Gout    ? HTN (hypertension)    ? Left hemiplegia (H)    ? Migraine    ? 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)    ? Nephrolithiasis    ? Seizure disorder (H)    ? Stroke (H)    ? Vitamin B12 deficiency anemia      Past Surgical History:   Procedure Laterality Date   ? COLONOSCOPY W/ BIOPSIES AND POLYPECTOMY  05/09/2019    Procedure: COLONOSCOPY, WITH POLYPECTOMY AND BIOPSY; Surgeon: Fazal  Na JOYCE MD; Location: [facility] OR     ? CYSTOSCOPY W/ URETEROSCOPY Right 02/22/2016    Procedure: COMBINED CYSTOSCOPY, URETEROSCOPY; Surgeon: Madelaine Roland MD; Location: [facility] OR     ? ORIF ACETABULUM FRACTURE Left           Family History   Problem Relation Age of Onset   ? Heart disease Mother    ? Hypertension Mother    ? Coronary artery disease Father    ? Diabetes Sister    ? Hypertension Brother    ? Cancer Sister    :       Social History     Socioeconomic History   ? Marital status:      Spouse name: Not on file   ? Number of children: Not on file   ? Years of education: Not on file   ? Highest education level: Not on file   Occupational History   ? Not on file   Social Needs   ? Financial resource strain: Not on file   ? Food insecurity     Worry: Not on file     Inability: Not on file   ? Transportation needs     Medical: Not on file     Non-medical: Not on file   Tobacco Use   ? Smoking status: Never Smoker   ? Smokeless tobacco: Never Used   Substance and Sexual Activity   ? Alcohol use: Not Currently   ? Drug use: Not on file   ? Sexual activity: Not on file   Lifestyle   ? Physical activity     Days per week: Not on file     Minutes per session: Not on file   ? Stress: Not on file   Relationships   ? Social connections     Talks on phone: Not on file     Gets together: Not on file     Attends [employer] service: Not on file     Active member of club or organization: Not on file     Attends meetings of clubs or organizations: Not on file     Relationship status: Not on file   ? Intimate partner violence     Fear of current or ex partner: Not on file     Emotionally abused: Not on file     Physically abused: Not on file     Forced sexual activity: Not on file   Other Topics Concern   ? Not on file   Social History Narrative   ? Not on file   :        Current Outpatient Medications on File Prior to Visit   Medication Sig Dispense Refill   ? acetaminophen (TYLENOL) 500 MG tablet  Take 1,000 mg by mouth 3 (three) times a day.      ? amLODIPine (NORVASC) 10 MG tablet Take 10 mg by mouth daily.     ? aspirin 81 MG EC tablet Take 81 mg by mouth daily.     ? atorvastatin (LIPITOR) 40 MG tablet Take 40 mg by mouth at bedtime.     ? baclofen (LIORESAL) 10 MG tablet Take 10 mg by mouth 3 (three) times a day.     ? bisacodyL (DULCOLAX) 5 mg EC tablet Take 5 mg by mouth daily as needed for constipation.     ? cholecalciferol, vitamin D3, 1,000 unit (25 mcg) tablet Take 1,000 Units by mouth daily.     ? citalopram (CELEXA) 10 MG tablet Take 10 mg by mouth at bedtime.     ? cyanocobalamin 1,000 mcg/mL injection Inject 1,000 mcg into the shoulder, thigh, or buttocks every 30 (thirty) days.     ? ibuprofen (ADVIL,MOTRIN) 200 MG tablet Take 200 mg by mouth every 6 (six) hours as needed for pain.     ? insulin glargine (BASAGLAR KWIKPEN) 100 unit/mL (3 mL) pen Inject 35 Units under the skin daily.      ? insulin lispro (HUMALOG) 100 unit/mL injection Inject under the skin 3 (three) times a day before meals. Per Sliding Scale;  If Blood Sugar is 71 to 150, give 0 Units.  If Blood Sugar is 151 to 200, give 2 Units.  If Blood Sugar is 201 to 250, give 4 Units.  If Blood Sugar is 251 to 300, give 8 Units.  subcutaneous     ? lactase 4,500 unit Tab Take 4 tablets by mouth as needed.      ? levETIRAcetam (KEPPRA) 500 MG tablet Take 1,250 mg by mouth 2 (two) times a day.      ? losartan (COZAAR) 25 MG tablet Take 25 mg by mouth daily.     ? magnesium oxide 250 mg magnesium Tab Take 250 mg by mouth.     ? metFORMIN (GLUCOPHAGE) 1000 MG tablet Take 1,000 mg by mouth 2 (two) times a day with meals. Take 2 tabs by mouth in AM and 1 tab by mouth at HS.     ? metoprolol succinate (TOPROL-XL) 50 MG 24 hr tablet Take 50 mg by mouth daily.     ? montelukast (SINGULAIR) 10 mg tablet Take 10 mg by mouth at bedtime.     ? phenytoin extended (DILANTIN) 200 MG ER capsule Take 200 mg by mouth 2 (two) times a day.      ?  senna-docusate (SENNOSIDES-DOCUSATE SODIUM) 8.6-50 mg tablet Take 1 tablet by mouth daily.       No current facility-administered medications on file prior to visit.    :      ALLERGIES:  Lisinopril; Milk; and Latex    Vitals:  There were no vitals taken for this visit. except as noted below    Vital signs: Reviewed per facility EMR vitals including as follows:                Most Recent Vitals Date/Time Taken  Temperature: 97.2  F 05/28/2020 04:41 PM  Pulse: 76 per minute 05/28/2020 04:41 PM  Respirations: 18 per minute 05/28/2020 04:41 PM  Blood Pressure: 147 / 73 mmHg 05/28/2020 07:20 AM  O2 Saturation: 93 % 05/28/2020 04:41 PM  Blood Sugar: 109 mg/dL 05/28/2020 04:10 PM  Weight: 142.0 lbs / Routine       BMI: 22.92 05/28/2020 12:45 PM  Height: 5ft 6.0in 04/20/2020 02:43 PM  There is no height or weight on file to calculate BMI.    Physical exam:    General:  Alert  oriented x3 appears in no acute distress    Patient is up in her wheelchair.  She was with the OT before I came in and she is with the PT after I leave.  She feels good about all the attention she is getting today and says she is having a good day.  She is breathing comfortably without tachypnea or accessory muscle use.  She has no edema    She is hemiplegic left-sided partial, skin is clear visualized areas.      Due to the 2020 Covid 19 pandemic, except as noted above, the patient was visually observed at a 6 foot plus distance.  An observational exam was performed in an effort to keep patient safe from Covid 19 and other communicable diseases.   Labs:  Lab Results   Component Value Date    WBC 5.7 06/08/2020    HGB 12.0 06/08/2020    HCT 38.8 06/08/2020     06/08/2020     06/08/2020     Results for orders placed or performed in visit on 06/01/20   Basic Metabolic Panel   Result Value Ref Range    Sodium 142 136 - 145 mmol/L    Potassium 3.8 3.5 - 5.0 mmol/L    Chloride 103 98 - 107 mmol/L    CO2 28 22 - 31 mmol/L    Anion Gap,  Calculation 11 5 - 18 mmol/L    Glucose 105 70 - 125 mg/dL    Calcium 9.5 8.5 - 10.5 mg/dL    BUN 21 8 - 22 mg/dL    Creatinine 0.64 0.60 - 1.10 mg/dL    GFR MDRD Af Amer >60 >60 mL/min/1.73m2    GFR MDRD Non Af Amer >60 >60 mL/min/1.73m2         No results found for: TSH  No results found for: HGBA1C  [unfilled]  Lab Results   Component Value Date    AFLTHVEE40 591 04/29/2020     No results found for: BNP  [unfilled]        Invalid input(s): PRINTERVAL      Assessment/Plan:      ICD-10-CM    1. CVA, old,2010  I69.993    2. Moderate episode of recurrent major depressive disorder (H)  F33.1    3. Seizure disorder (H)  G40.909    Depression   Patient was started on Celexa following her CVA.  It was discontinued at her request recently.  Staff was worried about depression.  It appears that she has had some adjustment mood changes with transfer of care to the fourth floor and inability to return to her previous living situation.  However she says she is coming out of it and does not want to consider going back on medication.  She will see psychology.  Situation will be monitored without change in medication today        Seizures  Quentin's paralysis  Chronic left-sided weakness/hemiplegia          See my recent notes.  She has just seen neurology.  There have been no changes.  See the blood work including drug levels above.  Continue Keppra and phenytoin      CVA 2010  Left hemiplegia              See my previous notes, patient is back on aspirin appropriately.  Blood pressure control, lipid control, diabetic control important     Fever  UTI           Resolved no recurrence     acetabular fracture status post ORIF February 2020              This was the original reason for patient's initial TCU need.  Patient reports dissatisfaction with her therapy at prior TCU resulting in the transfer.    She was upset as noted above but is feeling better now that therapy has continued here on the fourth floor for  "now.     Generalized weakness  Residual left-sided weakness following CVA  Frequent falls  Unsteady gait              PT, OT, services appreciated     Anemia             Resolved and stable 12.0 on June 8     Presumed osteoporosis              Outpatient DEXA scan anticipated as pandemic precautions allow.    Continue cholecalciferol        Type 2 diabetes           Patient has been briefly off of her Lantus.  Ms. Myers has been adjusting appropriately, she is currently on 35 units of the Lantus along with lispro insulin sliding scale.  Blood sugars are adequately controlled..     Hyperlipidemia              Atorvastatin unchanged  Hypomagnesemia               1.7 at last check, has not been followed up.  Will check that along with BMP and hemoglobin     CKD 3         Results for orders placed or performed in visit on 06/01/20   Basic Metabolic Panel   Result Value Ref Range    Sodium 142 136 - 145 mmol/L    Potassium 3.8 3.5 - 5.0 mmol/L    Chloride 103 98 - 107 mmol/L    CO2 28 22 - 31 mmol/L    Anion Gap, Calculation 11 5 - 18 mmol/L    Glucose 105 70 - 125 mg/dL    Calcium 9.5 8.5 - 10.5 mg/dL    BUN 21 8 - 22 mg/dL    Creatinine 0.64 0.60 - 1.10 mg/dL    GFR MDRD Af Amer >60 >60 mL/min/1.73m2    GFR MDRD Non Af Amer >60 >60 mL/min/1.73m2       Things look good avoid nephrotoxins     Possible gout              Patient repeats the story to me about how she had recurrent toe pain \"gout\" until her podiatrist worked on her toe and removed \"some extra skin\" and she has not had any bouts since then.  On if she had recurrent ingrowing?             Case discussed with:    Facility staff         Rasta Michael MD         "

## 2021-06-20 NOTE — LETTER
Letter by Danelle Strickland CNP at      Author: Danelle Strickland CNP Service: -- Author Type: --    Filed:  Encounter Date: 2/18/2020 Status: (Other)         Patient: Addis Peña   MR Number: [id number]   YOB: 1950   Date of Visit: 2/18/2020     [facility]    Facility:   [facility] [[id number]]   Code Status: POLST AVAILABLE      CHIEF COMPLAINT/REASON FOR VISIT:  Chief Complaint   Patient presents with   ? Review Of Multiple Medical Conditions       HISTORY:      HPI: Addis is a 69 y.o. female who was admitted to [facility] from 2/2/20-2/7/20 for left hip ORIF s/p fall at home.  There were not postoperative complications noted in her hospital discharge summary.  It was recommended by her orthopedic provider that she remain on Lovenox for one month postoperatively for DVT prophylaxis.  Addis  has a past medical history of Allergic rhinitis, CKD (chronic kidney disease), Depression, Displaced dome fracture of left acetabulum (H), DM2 (diabetes mellitus, type 2) (H), GERD (gastroesophageal reflux disease), Gout, HTN (hypertension), Left hemiplegia (H), Migraine, 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), Nephrolithiasis, Stroke (H), and Vitamin B12 deficiency anemia.  She is currently at [facility] s/p hospitalization for acute rehabilitation.      Today Ms. Peña is being evaluated for a review of multiple medical conditions.  Addis denied pain at this time taking Tylenol 1 g three times a day for her left hip pain.  She is on Baclofen for chronic muscle spasticity as well.  She said that she has been working on standing on her nonsurgical RLE in therapy with good progress.  Her blood pressure has been well-controlled on her current antihypertensive regimen with -130s since TCU admission.  Her blood glucose has been on the lower end of normal with range  since TCU admission taking Lantus 30 units  daily in AM and metformin 1 g two times a day for blood glucose management.  The patient denied lightheadedness, dizziness, breathing difficulty, chest pain, palpitations, constipation, urinary symptoms, numbness or tingling in extremities, and pain.  Nursing staff denied any new concerns for the patient at this time.    Past Medical History:   Diagnosis Date   ? Allergic rhinitis    ? CKD (chronic kidney disease)    ? Depression    ? Displaced dome fracture of left acetabulum (H)    ? DM2 (diabetes mellitus, type 2) (H)    ? GERD (gastroesophageal reflux disease)    ? Gout    ? HTN (hypertension)    ? Left hemiplegia (H)    ? Migraine    ? 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)    ? Nephrolithiasis    ? Stroke (H)    ? Vitamin B12 deficiency anemia              Family History   Problem Relation Age of Onset   ? Heart disease Mother    ? Hypertension Mother    ? Coronary artery disease Father    ? Diabetes Sister    ? Hypertension Brother    ? Cancer Sister      Social History     Socioeconomic History   ? Marital status:      Spouse name: None   ? Number of children: None   ? Years of education: None   ? Highest education level: None   Occupational History   ? None   Social Needs   ? Financial resource strain: None   ? Food insecurity:     Worry: None     Inability: None   ? Transportation needs:     Medical: None     Non-medical: None   Tobacco Use   ? Smoking status: Never Smoker   ? Smokeless tobacco: Never Used   Substance and Sexual Activity   ? Alcohol use: Not Currently   ? Drug use: None   ? Sexual activity: None   Lifestyle   ? Physical activity:     Days per week: None     Minutes per session: None   ? Stress: None   Relationships   ? Social connections:     Talks on phone: None     Gets together: None     Attends Hoahaoism service: None     Active member of club or organization: None     Attends meetings of clubs or organizations: None     Relationship status: None   ?  Intimate partner violence:     Fear of current or ex partner: None     Emotionally abused: None     Physically abused: None     Forced sexual activity: None   Other Topics Concern   ? None   Social History Narrative   ? None       REVIEW OF SYSTEM:  Pertinent items are noted in HPI.  A 12 point comprehensive review of systems was negative except as noted.    PHYSICAL EXAM:     General Appearance:    Alert, cooperative, no distress, appears stated age   Head:    Normocephalic, without obvious abnormality, atraumatic   Eyes:    PERRL, conjunctiva/corneas clear, both eyes   Ears:    Normal external ear canals, both ears   Nose:   Nares normal, septum midline, mucosa normal, no drainage    or sinus tenderness   Throat:   Lips, mucosa, and tongue normal; teeth and gums normal   Neck:   Supple, symmetrical, trachea midline, no adenopathy;     thyroid:  no enlargement/tenderness/nodules; no carotid    bruit or JVD   Back:     Symmetric, no curvature, ROM normal, no CVA tenderness   Lungs:     Clear to auscultation bilaterally, respirations unlabored   Chest Wall:    No tenderness or deformity    Heart:    Regular rate and rhythm, S1 and S2 normal, no murmur, rub   or gallop   Abdomen:     Soft, non-tender, bowel sounds active all four quadrants,     no masses, no organomegaly; incision site CDI with glue   Extremities:   Extremities normal, atraumatic, no cyanosis or edema; incision site CDI   Pulses:   2+ and symmetric all extremities   Skin:   Skin color, texture, turgor normal, no rashes or lesions   Neurologic:   Oriented to person, place, time, and situation; exhibits logical thought processes; generalized weakness           LABS:     Lab Results   Component Value Date    WBC 8.3 02/11/2020    HGB 10.6 (L) 02/11/2020    HCT 36.7 02/11/2020     02/11/2020     02/14/2020    K 4.6 02/14/2020     (H) 02/11/2020    CREATININE 0.76 02/14/2020    BUN 33 (H) 02/11/2020    CO2 23 02/11/2020         ASSESSMENT:      ICD-10-CM    1. S/P ORIF (open reduction internal fixation) fracture Z98.890     Z87.81    2. Closed displaced fracture of anterior column of left acetabulum with routine healing, subsequent encounter S32.432D    3. Physical deconditioning R53.81    4. Postoperative pain G89.18    5. Left hip pain M25.552    6. Essential hypertension I10    7. Type 2 diabetes mellitus with other neurologic complication, with long-term current use of insulin (H) E11.49     Z79.4        PLAN:      Physical deconditioning  -PT/OT as directed  -Discharge from facility per therapy recommendations     S/p left hip ORIF  -Continue Tylenol 1 g three times a day   -Encouraged patient to utilize cold therapy three times a day and prn  -Encouraged patient to utilize integrative therapies such as distraction and deep breathing for pain management  -Notify provider if patient has new or worsening pain   -Follow-up with orthopedic provider on 2/19/20     Hypertension  -Encouraged cardiac diet, low sodium diet, exercise and stress reduction techniques.   -Emphasized importance of blood pressure control.   -Continue current medications as prescribed.   -Notify provider if pt's SBP<90 or >180 and DBP <50 or >100     DMII  -Continue Lantus 30 units daily  -Continue metformin 1 g two times a day   -Notify provider if BG < 70 or > 500    Otherwise continue current care plan for all other chronic medical conditions, as they are stable. Encouraged patient to engage in healthy lifestyle behaviors such as engaging in social activities, exercising (PT/OT), eating well, and following care plan. Follow up for routine check-up, or sooner if needed. Will continue to monitor patient and work with nursing staff collaboratively to work toward positive patient outcomes.     Electronically signed by: Danelle Strickland, CNP

## 2021-06-20 NOTE — LETTER
Letter by Octavia Serna CNP at      Author: Octavia Serna CNP Service: -- Author Type: --    Filed:  Encounter Date: 8/14/2020 Status: (Other)         Patient: Addis Peña   MR Number: [id number]   YOB: 1950   Date of Visit: 8/14/2020     [facility]     Facility:   [facility] [[id number]]   Code Status: POLST AVAILABLE     CHIEF COMPLAINT/REASON FOR VISIT:  Chief Complaint   Patient presents with   ? Problem Visit     physical deconditioning, anxiety       HISTORY:      HPI: Addis is a 69 y.o. female who  has a past medical history of Allergic rhinitis, CKD (chronic kidney disease), Depression, Displaced dome fracture of left acetabulum (H), DM2 (diabetes mellitus, type 2) (H), GERD (gastroesophageal reflux disease), Gout, HTN (hypertension), Left hemiplegia (H), Migraine, 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), Nephrolithiasis, Seizure disorder (H), Stroke (H), and Vitamin B12 deficiency anemia. Addis was recently transferred from [facility]. She was undergoing acute rehab after suffering a hip fracture and undergoing ORIF repair.     Today Addis is being evaluated for a follow-up regarding anxiety with recent treatment with gabapentin. Addis is also planning to go home soon. Addis shares that the gabapentin has significantly helped with anxiety. She shares that she has now stopped calling to go to the bathroom so frequently. Call light use has gone way down. Addis is satisfied with her current anxiety treatment. She feels really good. In addition, she has been working with PT/OT and will be discharging to home soon. She does need a great deal of care. Does understand that she will need ongoing support. Did discuss that we have go prepare for safety. She will need in home care for several hours of the day. Addis is working with [facility]  Amalia Lopez to ensure  appropriate care is sought and put in place.  She denies any symptoms including fevers/chills, cough or cold symptoms, headaches, vision changes, chest pain/pressure, difficulty breathing, SOB, abdominal pain, nausea, vomiting, diarrhea, increasing or ongoing weakness past baseline, increasing pain.     Past Medical History:   Diagnosis Date   ? Allergic rhinitis    ? CKD (chronic kidney disease)    ? Depression    ? Displaced dome fracture of left acetabulum (H)    ? DM2 (diabetes mellitus, type 2) (H)    ? GERD (gastroesophageal reflux disease)    ? Gout    ? HTN (hypertension)    ? Left hemiplegia (H)    ? Migraine    ? 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)    ? Nephrolithiasis    ? Seizure disorder (H)    ? Stroke (H)    ? Vitamin B12 deficiency anemia              Family History   Problem Relation Age of Onset   ? Heart disease Mother    ? Hypertension Mother    ? Coronary artery disease Father    ? Diabetes Sister    ? Hypertension Brother    ? Cancer Sister      Social History     Socioeconomic History   ? Marital status:      Spouse name: Not on file   ? Number of children: Not on file   ? Years of education: Not on file   ? Highest education level: Not on file   Occupational History   ? Not on file   Social Needs   ? Financial resource strain: Not on file   ? Food insecurity     Worry: Not on file     Inability: Not on file   ? Transportation needs     Medical: Not on file     Non-medical: Not on file   Tobacco Use   ? Smoking status: Never Smoker   ? Smokeless tobacco: Never Used   Substance and Sexual Activity   ? Alcohol use: Not Currently   ? Drug use: Not on file   ? Sexual activity: Not on file   Lifestyle   ? Physical activity     Days per week: Not on file     Minutes per session: Not on file   ? Stress: Not on file   Relationships   ? Social connections     Talks on phone: Not on file     Gets together: Not on file     Attends Mormonism service: Not on file     Active  member of club or organization: Not on file     Attends meetings of clubs or organizations: Not on file     Relationship status: Not on file   ? Intimate partner violence     Fear of current or ex partner: Not on file     Emotionally abused: Not on file     Physically abused: Not on file     Forced sexual activity: Not on file   Other Topics Concern   ? Not on file   Social History Narrative   ? Not on file       REVIEW OF SYSTEM:  Per HPI    PHYSICAL EXAM:   /76   Pulse 84   Temp 98.8  F (37.1  C)   Resp 18   Wt 141 lb 12.8 oz (64.3 kg)   SpO2 95%     A limited exam was performed due to recommendations for care during COVID-19 pandemic. Due to the 2020 COVID-19 pandemic, except as noted above, the patient was visually observed at a 6 foot plus distance.  An observational exam was performed in an effort to keep patient safe from COVID-19 and other communicable diseases.     General appearance: alert, appears stated age and cooperative  HEENT: Head is normocephalic with normal hair distribution. No evidence of trauma. Ears: Without lesions or deformity. No acute purulent discharge. Eyes: Conjunctivae pink with no scleral icterus or erythema. Nose: Normal. Oropharnyx: mmm.  Lungs: respirations without effort.  Extremities: extremities normal, atraumatic, no cyanosis.  Skin: Skin color, texture normal. No rashes or lesions on exposed skin.   Neurologic: Grossly normal   Psych: interacts well with caregivers, exhibits logical thought processes and connections, pleasant.      LABS:   None today.     ASSESSMENT:      ICD-10-CM    1. Anxiety  F41.9    2. Physical deconditioning  R53.81        PLAN:    Anxiety  -Continues to be improved.   -Gabapentin 100 mg 3 times daily.  -Psych to eval and treat.    Physical Deconditioning  -Plan to discharge with care arranged by [facility].     Otherwise continue current care plan for all other chronic medical conditions, as they are stable. Encouraged patient to  engage in healthy lifestyle behaviors such as engaging in social activities, exercising (PT/OT), eating well, and following care plan. Follow up for routine check-up, or sooner if needed. Will continue to monitor patient and work with nursing staff collaboratively to work toward positive patient outcomes.    Octavia Serna, CNP

## 2021-06-20 NOTE — LETTER
Letter by Octavia Serna CNP at      Author: Octavia Serna CNP Service: -- Author Type: --    Filed:  Encounter Date: 5/8/2020 Status: (Other)         Patient: Addis Peña   MR Number: [id number]   YOB: 1950   Date of Visit: 5/8/2020     [facility]    Facility:   [facility] [[id number]]   Code Status: POLST AVAILABLE      CHIEF COMPLAINT/REASON FOR VISIT:  Chief Complaint   Patient presents with   ? Problem Visit     dysuria       HISTORY:      HPI: Addis is a 69 y.o. female who  has a past medical history of Allergic rhinitis, CKD (chronic kidney disease), Depression, Displaced dome fracture of left acetabulum (H), DM2 (diabetes mellitus, type 2) (H), GERD (gastroesophageal reflux disease), Gout, HTN (hypertension), Left hemiplegia (H), Migraine, 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), Nephrolithiasis, Seizure disorder (H), Stroke (H), and Vitamin B12 deficiency anemia. Addis was recently transferred from [facility]. She was undergoing acute rehab after suffering a hip fracture and undergoing ORIF repair.     Today Addis is being evaluated after she had a positive UA/UC. She shares she is doing well. She does have some dysuria. She would like to have the UTI treated given symptoms and clear result on UC. Addis otherwise has been doing well. She does not have any other symptoms. No further seizure-like activity or spasms. Addis denies any other concerns including fevers/chills, cough or cold symptoms, headaches, vision changes, chest pain/pressure, difficulty breathing, SOB, abdominal pain, nausea, vomiting, diarrhea, increasing or ongoing weakness past baseline, increasing pain.     Past Medical History:   Diagnosis Date   ? Allergic rhinitis    ? CKD (chronic kidney disease)    ? Depression    ? Displaced dome fracture of left acetabulum (H)    ? DM2 (diabetes mellitus, type 2) (H)    ?  GERD (gastroesophageal reflux disease)    ? Gout    ? HTN (hypertension)    ? Left hemiplegia (H)    ? Migraine    ? 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)    ? Nephrolithiasis    ? Seizure disorder (H)    ? Stroke (H)    ? Vitamin B12 deficiency anemia              Family History   Problem Relation Age of Onset   ? Heart disease Mother    ? Hypertension Mother    ? Coronary artery disease Father    ? Diabetes Sister    ? Hypertension Brother    ? Cancer Sister      Social History     Socioeconomic History   ? Marital status:      Spouse name: Not on file   ? Number of children: Not on file   ? Years of education: Not on file   ? Highest education level: Not on file   Occupational History   ? Not on file   Social Needs   ? Financial resource strain: Not on file   ? Food insecurity     Worry: Not on file     Inability: Not on file   ? Transportation needs     Medical: Not on file     Non-medical: Not on file   Tobacco Use   ? Smoking status: Never Smoker   ? Smokeless tobacco: Never Used   Substance and Sexual Activity   ? Alcohol use: Not Currently   ? Drug use: Not on file   ? Sexual activity: Not on file   Lifestyle   ? Physical activity     Days per week: Not on file     Minutes per session: Not on file   ? Stress: Not on file   Relationships   ? Social connections     Talks on phone: Not on file     Gets together: Not on file     Attends Muslim service: Not on file     Active member of club or organization: Not on file     Attends meetings of clubs or organizations: Not on file     Relationship status: Not on file   ? Intimate partner violence     Fear of current or ex partner: Not on file     Emotionally abused: Not on file     Physically abused: Not on file     Forced sexual activity: Not on file   Other Topics Concern   ? Not on file   Social History Narrative   ? Not on file       REVIEW OF SYSTEM:  Per HPI    PHYSICAL EXAM:   BP 94/58   Pulse 72   Temp 99.7  F (37.6  C)    Resp 18   Wt 141 lb 3.2 oz (64 kg)   SpO2 94%     A limited exam was performed due to recommendations for care during COVID-19 pandemic. Due to the 2020 COVID-19 pandemic, except as noted above, the patient was visually observed at a 6 foot plus distance.  An observational exam was performed in an effort to keep patient safe from COVID-19 and other communicable diseases.     General appearance: alert, appears stated age and cooperative  HEENT: Head is normocephalic with normal hair distribution. No evidence of trauma. Ears: Without lesions or deformity. No acute purulent discharge. Eyes: Conjunctivae pink with no scleral icterus or erythema. Nose: Normal. Oropharnyx: mmm.  Lungs: respirations without effort.  Extremities: extremities normal, atraumatic, no cyanosis.  Skin: Skin color, texture normal. No rashes or lesions on exposed skin.   Neurologic: Grossly normal   Psych: interacts well with caregivers, exhibits logical thought processes and connections, pleasant.      LABS:   None today.     ASSESSMENT:      ICD-10-CM    1. Acute cystitis without hematuria  N30.00        PLAN:    UTI  -Encourage fluids.   -Tylenol 1000 mg by mouth ever 6 hours as needed.   -Doxycycline 100 mg by mouth two times a day x 7 days.   -Follow up as needed.     Otherwise continue current care plan for all other chronic medical conditions, as they are stable. Encouraged patient to engage in healthy lifestyle behaviors such as engaging in social activities, exercising (PT/OT), eating well, and following care plan. Follow up for routine check-up, or sooner if needed. Will continue to monitor patient and work with nursing staff collaboratively to work toward positive patient outcomes.    Electronically signed by: Octavia Serna CNP

## 2021-06-20 NOTE — LETTER
"Letter by Octavia Serna CNP at      Author: Octavia Serna CNP Service: -- Author Type: --    Filed:  Encounter Date: 7/28/2020 Status: (Other)         Patient: Addis Peña   MR Number: [id number]   YOB: 1950   Date of Visit: 7/28/2020     [facility]   Video Visit    Addis Peña is a 69 y.o. female who is being evaluated via a billable video visit.      The patient has been notified of following:     \"This video visit will be conducted via a call between you and your physician/provider. We have found that certain health care needs can be provided without the need for an in-person physical exam.  This service lets us provide the care you need with a video conversation.  If a prescription is necessary we can send it to the facility team.  If lab work is needed we can place an order through the facility team to have that test done at a later time.    If during the course of the call the physician/provider feels a video visit is not appropriate, you will not be charged for this service.\"     Physician/provider has received verbal consent for a Video Visit from the patient? Yes    Patient would like the video invitation sent by: Send to [employer] Start Time:   1230    Facility:   [facility] [[id number]]   Code Status: POLST AVAILABLE     CHIEF COMPLAINT/REASON FOR VISIT:  Chief Complaint   Patient presents with   ? Follow Up     anxiety, frequent urination       HISTORY:      HPI: Addis is a 69 y.o. female who  has a past medical history of Allergic rhinitis, CKD (chronic kidney disease), Depression, Displaced dome fracture of left acetabulum (H), DM2 (diabetes mellitus, type 2) (H), GERD (gastroesophageal reflux disease), Gout, HTN (hypertension), Left hemiplegia (H), Migraine, 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), Nephrolithiasis, Seizure disorder (H), Stroke (H), and Vitamin B12 deficiency anemia. Addis was recently " transferred from [facility]. She was undergoing acute rehab after suffering a hip fracture and undergoing ORIF repair.     Today Addis is being evaluated for a follow-up for recent initiation of gabapentin after having significant issues with urinary frequency and anxiety. She reported that they were related to the CVA. She actually has been doing quite well with the gabapentin. Her urination impulses have decreased significantly. She was using the call light up to 30 times per day to request bathroom use. It has declined sharply per nursing staff and Addis. She shares she has been doing really good and would like to continue the medication. UA/UC were negative. She denies any symptoms including fevers/chills, cough or cold symptoms, headaches, vision changes, chest pain/pressure, difficulty breathing, SOB, abdominal pain, nausea, vomiting, diarrhea, increasing or ongoing weakness past baseline, increasing pain.     Past Medical History:   Diagnosis Date   ? Allergic rhinitis    ? CKD (chronic kidney disease)    ? Depression    ? Displaced dome fracture of left acetabulum (H)    ? DM2 (diabetes mellitus, type 2) (H)    ? GERD (gastroesophageal reflux disease)    ? Gout    ? HTN (hypertension)    ? Left hemiplegia (H)    ? Migraine    ? 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)    ? Nephrolithiasis    ? Seizure disorder (H)    ? Stroke (H)    ? Vitamin B12 deficiency anemia              Family History   Problem Relation Age of Onset   ? Heart disease Mother    ? Hypertension Mother    ? Coronary artery disease Father    ? Diabetes Sister    ? Hypertension Brother    ? Cancer Sister      Social History     Socioeconomic History   ? Marital status:      Spouse name: Not on file   ? Number of children: Not on file   ? Years of education: Not on file   ? Highest education level: Not on file   Occupational History   ? Not on file   Social Needs   ? Financial  resource strain: Not on file   ? Food insecurity     Worry: Not on file     Inability: Not on file   ? Transportation needs     Medical: Not on file     Non-medical: Not on file   Tobacco Use   ? Smoking status: Never Smoker   ? Smokeless tobacco: Never Used   Substance and Sexual Activity   ? Alcohol use: Not Currently   ? Drug use: Not on file   ? Sexual activity: Not on file   Lifestyle   ? Physical activity     Days per week: Not on file     Minutes per session: Not on file   ? Stress: Not on file   Relationships   ? Social connections     Talks on phone: Not on file     Gets together: Not on file     Attends [employer] service: Not on file     Active member of club or organization: Not on file     Attends meetings of clubs or organizations: Not on file     Relationship status: Not on file   ? Intimate partner violence     Fear of current or ex partner: Not on file     Emotionally abused: Not on file     Physically abused: Not on file     Forced sexual activity: Not on file   Other Topics Concern   ? Not on file   Social History Narrative   ? Not on file       REVIEW OF SYSTEM:  Per HPI    PHYSICAL EXAM:   /66   Pulse 71   Temp 97.3  F (36.3  C)   Resp 17   Wt 141 lb 12.8 oz (64.3 kg)   SpO2 96%     A limited exam was performed due to recommendations for care during COVID-19 pandemic. Due to the 2020 COVID-19 pandemic, except as noted above, the patient was visually observed at a 6 foot plus distance.  An observational exam was performed in an effort to keep patient safe from COVID-19 and other communicable diseases.     General appearance: alert, appears stated age and cooperative  HEENT: Head is normocephalic with normal hair distribution. No evidence of trauma. Ears: Without lesions or deformity. No acute purulent discharge. Eyes: Conjunctivae pink with no scleral icterus or erythema. Nose: Normal. Oropharnyx: mmm.  Lungs: respirations without effort.  Extremities: extremities normal, atraumatic, no  cyanosis.  Skin: Skin color, texture normal. No rashes or lesions on exposed skin.   Neurologic: Grossly normal   Psych: interacts well with caregivers, exhibits logical thought processes and connections, pleasant.      LABS:   None today.     ASSESSMENT:      ICD-10-CM    1. Urinary frequency  R35.0    2. Anxiety  F41.9        PLAN:    Urinary frequency, Anxiety  -Much improved!  -Gabapentin 100 mg 3 times daily.  -Psych to eval and treat.  -Follow-up with results of UA/UC.    Otherwise continue current care plan for all other chronic medical conditions, as they are stable. Encouraged patient to engage in healthy lifestyle behaviors such as engaging in social activities, exercising (PT/OT), eating well, and following care plan. Follow up for routine check-up, or sooner if needed. Will continue to monitor patient and work with nursing staff collaboratively to work toward positive patient outcomes.    Video-Visit Details    Type of service:  Video Visit    Video End Time (time video stopped): 1236    Originating Location (pt. Location):[facility] [[id number]]    Distant Location (provider location):  [facility]     Mode of Communication:  Samra Serna CNP

## 2021-06-20 NOTE — LETTER
Letter by Octavia Serna CNP at      Author: Octavia Serna CNP Service: -- Author Type: --    Filed:  Encounter Date: 7/17/2020 Status: (Other)         Patient: Addis Peña   MR Number: [id number]   YOB: 1950   Date of Visit: 7/17/2020     [facility]    Facility:   [facility] [[id number]]   Code Status: POLST AVAILABLE     CHIEF COMPLAINT/REASON FOR VISIT:  Chief Complaint   Patient presents with   ? Problem Visit     Urinary Frequency, Anxiety       HISTORY:      HPI: Addis is a 69 y.o. female who  has a past medical history of Allergic rhinitis, CKD (chronic kidney disease), Depression, Displaced dome fracture of left acetabulum (H), DM2 (diabetes mellitus, type 2) (H), GERD (gastroesophageal reflux disease), Gout, HTN (hypertension), Left hemiplegia (H), Migraine, 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), Nephrolithiasis, Seizure disorder (H), Stroke (H), and Vitamin B12 deficiency anemia. Addis was recently transferred from [facility]. She was undergoing acute rehab after suffering a hip fracture and undergoing ORIF repair.     Today Addis is being evaluated by request of nursing staff for ongoing issues with urinary urgency.  Addis states that she has to go to the bathroom several times per hour.  Nursing staff report that she is on the call light greater than 30 times per day.  Each time she is requesting to go to the bathroom.  Apparently this was a behavior from her CVA.  However, she denies any issues with anxiety or perseveration over the bathroom.  She states that she goes to the bathroom every time she is taken to the bathroom.  Did discuss potential issues with behaviors and she denies any worries at this time.  Did discuss initiation of gabapentin to help alleviate any concerns.  She does agree starting gabapentin and will do a UA UC.  She denies any symptoms including fevers/chills,  cough or cold symptoms, headaches, vision changes, chest pain/pressure, difficulty breathing, SOB, abdominal pain, nausea, vomiting, diarrhea, increasing or ongoing weakness past baseline, increasing pain.     Past Medical History:   Diagnosis Date   ? Allergic rhinitis    ? CKD (chronic kidney disease)    ? Depression    ? Displaced dome fracture of left acetabulum (H)    ? DM2 (diabetes mellitus, type 2) (H)    ? GERD (gastroesophageal reflux disease)    ? Gout    ? HTN (hypertension)    ? Left hemiplegia (H)    ? Migraine    ? 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)    ? Nephrolithiasis    ? Seizure disorder (H)    ? Stroke (H)    ? Vitamin B12 deficiency anemia              Family History   Problem Relation Age of Onset   ? Heart disease Mother    ? Hypertension Mother    ? Coronary artery disease Father    ? Diabetes Sister    ? Hypertension Brother    ? Cancer Sister      Social History     Socioeconomic History   ? Marital status:      Spouse name: Not on file   ? Number of children: Not on file   ? Years of education: Not on file   ? Highest education level: Not on file   Occupational History   ? Not on file   Social Needs   ? Financial resource strain: Not on file   ? Food insecurity     Worry: Not on file     Inability: Not on file   ? Transportation needs     Medical: Not on file     Non-medical: Not on file   Tobacco Use   ? Smoking status: Never Smoker   ? Smokeless tobacco: Never Used   Substance and Sexual Activity   ? Alcohol use: Not Currently   ? Drug use: Not on file   ? Sexual activity: Not on file   Lifestyle   ? Physical activity     Days per week: Not on file     Minutes per session: Not on file   ? Stress: Not on file   Relationships   ? Social connections     Talks on phone: Not on file     Gets together: Not on file     Attends Muslim service: Not on file     Active member of club or organization: Not on file     Attends meetings of clubs or organizations: Not  on file     Relationship status: Not on file   ? Intimate partner violence     Fear of current or ex partner: Not on file     Emotionally abused: Not on file     Physically abused: Not on file     Forced sexual activity: Not on file   Other Topics Concern   ? Not on file   Social History Narrative   ? Not on file       REVIEW OF SYSTEM:  Per HPI    PHYSICAL EXAM:   /66   Pulse 71   Temp 97.3  F (36.3  C)   Resp 17   Wt 141 lb 12.8 oz (64.3 kg)   SpO2 96%     A limited exam was performed due to recommendations for care during COVID-19 pandemic. Due to the 2020 COVID-19 pandemic, except as noted above, the patient was visually observed at a 6 foot plus distance.  An observational exam was performed in an effort to keep patient safe from COVID-19 and other communicable diseases.     General appearance: alert, appears stated age and cooperative  HEENT: Head is normocephalic with normal hair distribution. No evidence of trauma. Ears: Without lesions or deformity. No acute purulent discharge. Eyes: Conjunctivae pink with no scleral icterus or erythema. Nose: Normal. Oropharnyx: mmm.  Lungs: respirations without effort.  Extremities: extremities normal, atraumatic, no cyanosis.  Skin: Skin color, texture normal. No rashes or lesions on exposed skin.   Neurologic: Grossly normal   Psych: interacts well with caregivers, exhibits logical thought processes and connections, pleasant.      LABS:   None today.     ASSESSMENT:      ICD-10-CM    1. Urinary frequency  R35.0    2. CVA, old,2010  I69.993        PLAN:    Urinary frequency, CVA  -UA/UC.  -Gabapentin 100 mg 3 times daily.  -Psych to eval and treat.  -Follow-up with results of UA/UC.    Otherwise continue current care plan for all other chronic medical conditions, as they are stable. Encouraged patient to engage in healthy lifestyle behaviors such as engaging in social activities, exercising (PT/OT), eating well, and following care plan. Follow up for routine  check-up, or sooner if needed. Will continue to monitor patient and work with nursing staff collaboratively to work toward positive patient outcomes.    Octavia Serna, CNP

## 2021-06-20 NOTE — LETTER
"Letter by Rasta Michael MD at      Author: Rasta Michael MD Service: -- Author Type: --    Filed:  Encounter Date: 5/28/2020 Status: (Other)         Patient: Addis Peña   MR Number: [id number]   YOB: 1950   Date of Visit: 5/28/2020      [facility]    Facility:  [facility] [[id number]]    Code Status:  DNR/DNI    Chief Complaint   Patient presents with   ? Review Of Multiple Medical Conditions   : Including seizures, Quentin's paralysis, diabetes                  Patient Active Problem List   Diagnosis   ? Left hip pain   ? Postoperative pain   ? Physical deconditioning   ? Closed displaced fracture of anterior column of left acetabulum with routine healing, subsequent encounter   ? S/P ORIF (open reduction internal fixation) fracture   ? Falls   ? Essential hypertension   ? Type 2 diabetes mellitus with other neurologic complication, with long-term current use of insulin (H)   ? CVA, old,2010   ? Spells of trembling   ? Fever   ? Seizure disorder (H)       History:  Ms. 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     Recent Hospital course: Recall that I had seen patient April 30 for \"spells\", she had had 4 of them over previous days.  I was concerned for seizure, but patient was stable at that time and not having spells so I simply started her on Keppra and have staff arrange ASAP neurology appointment.  Unfortunately 2 days later, before she saw the neurologist, and after 3 doses of Keppra, she had another spell.  She was appropriately sent to the emergency room at that point.     Patient was admitted between May 2 and May 6.  Patient had MRI MRA head and neck vessels as noted " below.  She has severe right SOCO stenosis.  There was also some diffusion changes in the MRI in the area of her previous 2010 CVA.  While new/acute CVA could not be ruled out, neurology felt that the MRI changes were more consistent with changes associated with seizure activity.  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     Patient's Keppra was increased to 1250 minutes twice daily and she was also started on phenytoin  mg twice daily.     Neurologist has asked her to see his partner/seizure specialist in 3 to 4 weeks.  Patient reports that follow-up is not happen by phone and no changes were made.     Hospitalization with otherwise unremarkable     ===============================================================  Recall that patient was transferred recently from a different TCU details below:     [facility] TCU course: February 7 through April 20     While the patient reports dissatisfaction with her physical therapy, and requested transfer, there were some positive outcomes report here.  Patient experienced improved blood sugar control throughout her stay resulting in discontinuation of her Lantus on March 12 and an increase in her metformin from 500 twice daily to 1000 twice daily.  Blood sugars reportedly quite favorable thereafter generally less than 170 as well.     Patient also reports that her pain came under good control with scheduled Tylenol and baclofen without need for ongoing opiates.     Patient's blood pressure was generally adequately controlled on current medications.     FiberCon was started for her bowels which tend to be on the loose side with improvement.     Preceding hospital course for the [facility] TCU stay follows:  Hospital course: Patient was admitted to [facility] February 2 through February 7 though she was originally seen in the emergency room at  [facility] and transferred to [facility] once fracture was identified.                Patient reports 2 falls the first on January 25 with some left hip pain.  She was seen for left hip pain on the 28th, diagnosed with left acetabular fracture as well as metatarsal fractures 2 through 4 on the left foot.  Case was discussed with orthopedics, Lovenox prophylaxis was prescribed.  However she fell again on February 1 trying to transfer self in her home this time with very severe left hip pain incompatible with ambulation.  She now was diagnosed with displaced dome fracture of the left acetabulum requiring ORIF, repaired by Dr. Dinh.  There were no complications reported.  Patient was treated with DVT prophylaxis, prophylactic antibiotic therapy and deemed safe for discharge by February 7 with pain tolerated by oral pain medications.  Lovenox 40 mg subcutaneously recommended for 28 days.  Oxycodone 5 to 10 mg every 4 hours along with scheduled Tylenol given for pain.  Her aspirin was held at discharge.  There is also recommendation for pursuing osteoporosis work-up including DEXA scanning.  Notably she also received some ceftriaxone in the emergency room at [facility] for abnormal urinalysis.   ==============================================================         Subjective/ROS:    -augmented by discussion with facility staff involved in direct care      Patient reports that she has had no further spells trembling seizure activities.  Happily she reports that her left arm strength has returned to its previous baseline which while impaired is functional.  She thinks she might still have a little weakness in the leg but again had some chronically so she is not quite sure.    Overall she is quite pleased with her current wellbeing.  She is not having side effects that she can tell from the Keppra and phenytoin.  She does sleep during the day but says that that is not entirely unusual for her.    She  is having no dysuria fever sweats chills cough shortness of breath headaches change in vision swallowing speaking hearing.  The quality of her sleep has improved.  She is having no skin rashes.  No falls or injuries.  No vomiting diarrhea melena bright red blood per rectum dysuria.  Remainder 13 system ROS is negative    Past Medical History:   Diagnosis Date   ? Allergic rhinitis    ? CKD (chronic kidney disease)    ? Depression    ? Displaced dome fracture of left acetabulum (H)    ? DM2 (diabetes mellitus, type 2) (H)    ? GERD (gastroesophageal reflux disease)    ? Gout    ? HTN (hypertension)    ? Left hemiplegia (H)    ? Migraine    ? 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)    ? Nephrolithiasis    ? Seizure disorder (H)    ? Stroke (H)    ? Vitamin B12 deficiency anemia      Past Surgical History:   Procedure Laterality Date   ? COLONOSCOPY W/ BIOPSIES AND POLYPECTOMY  05/09/2019    Procedure: COLONOSCOPY, WITH POLYPECTOMY AND BIOPSY; Surgeon: Na Diehl MD; Location: [facility] OR     ? CYSTOSCOPY W/ URETEROSCOPY Right 02/22/2016    Procedure: COMBINED CYSTOSCOPY, URETEROSCOPY; Surgeon: Madelaine Roland MD; Location: [facility] OR     ? ORIF ACETABULUM FRACTURE Left           Family History   Problem Relation Age of Onset   ? Heart disease Mother    ? Hypertension Mother    ? Coronary artery disease Father    ? Diabetes Sister    ? Hypertension Brother    ? Cancer Sister    :       Social History     Socioeconomic History   ? Marital status:      Spouse name: Not on file   ? Number of children: Not on file   ? Years of education: Not on file   ? Highest education level: Not on file   Occupational History   ? Not on file   Social Needs   ? Financial resource strain: Not on file   ? Food insecurity     Worry: Not on file     Inability: Not on file   ? Transportation needs     Medical: Not on file     Non-medical: Not on file   Tobacco Use   ? Smoking status: Never Smoker    ? Smokeless tobacco: Never Used   Substance and Sexual Activity   ? Alcohol use: Not Currently   ? Drug use: Not on file   ? Sexual activity: Not on file   Lifestyle   ? Physical activity     Days per week: Not on file     Minutes per session: Not on file   ? Stress: Not on file   Relationships   ? Social connections     Talks on phone: Not on file     Gets together: Not on file     Attends [employer] service: Not on file     Active member of club or organization: Not on file     Attends meetings of clubs or organizations: Not on file     Relationship status: Not on file   ? Intimate partner violence     Fear of current or ex partner: Not on file     Emotionally abused: Not on file     Physically abused: Not on file     Forced sexual activity: Not on file   Other Topics Concern   ? Not on file   Social History Narrative   ? Not on file   :        Current Outpatient Medications on File Prior to Visit   Medication Sig Dispense Refill   ? acetaminophen (TYLENOL) 500 MG tablet Take 1,000 mg by mouth 3 (three) times a day.      ? amLODIPine (NORVASC) 10 MG tablet Take 10 mg by mouth daily.     ? aspirin 81 MG EC tablet Take 81 mg by mouth daily.     ? atorvastatin (LIPITOR) 40 MG tablet Take 40 mg by mouth at bedtime.     ? baclofen (LIORESAL) 10 MG tablet Take 10 mg by mouth 3 (three) times a day.     ? bisacodyL (DULCOLAX) 5 mg EC tablet Take 5 mg by mouth daily as needed for constipation.     ? cholecalciferol, vitamin D3, 1,000 unit (25 mcg) tablet Take 1,000 Units by mouth daily.     ? citalopram (CELEXA) 10 MG tablet Take 10 mg by mouth at bedtime.     ? cyanocobalamin 1,000 mcg/mL injection Inject 1,000 mcg into the shoulder, thigh, or buttocks every 30 (thirty) days.     ? ibuprofen (ADVIL,MOTRIN) 200 MG tablet Take 200 mg by mouth every 6 (six) hours as needed for pain.     ? insulin glargine (BASAGLAR KWIKPEN) 100 unit/mL (3 mL) pen Inject 40 Units under the skin daily.      ? insulin lispro (HUMALOG) 100  unit/mL injection Inject under the skin 3 (three) times a day before meals. Per Sliding Scale;  If Blood Sugar is 71 to 150, give 0 Units.  If Blood Sugar is 151 to 200, give 2 Units.  If Blood Sugar is 201 to 250, give 4 Units.  If Blood Sugar is 251 to 300, give 8 Units.  subcutaneous     ? lactase 4,500 unit Tab Take 4 tablets by mouth as needed.      ? levETIRAcetam (KEPPRA) 500 MG tablet Take 1,250 mg by mouth 2 (two) times a day.      ? losartan (COZAAR) 25 MG tablet Take 25 mg by mouth daily.     ? magnesium oxide 250 mg magnesium Tab Take 250 mg by mouth.     ? metFORMIN (GLUCOPHAGE) 1000 MG tablet Take 1,000 mg by mouth 2 (two) times a day with meals. Take 2 tabs by mouth in AM and 1 tab by mouth at HS.     ? metoprolol succinate (TOPROL-XL) 50 MG 24 hr tablet Take 50 mg by mouth daily.     ? montelukast (SINGULAIR) 10 mg tablet Take 10 mg by mouth at bedtime.     ? phenytoin extended (DILANTIN) 200 MG ER capsule Take 200 mg by mouth 2 (two) times a day.      ? senna-docusate (SENNOSIDES-DOCUSATE SODIUM) 8.6-50 mg tablet Take 1 tablet by mouth daily.       No current facility-administered medications on file prior to visit.    :      ALLERGIES:  Lisinopril; Milk; and Latex    Vitals:  There were no vitals taken for this visit. except as noted below    Vital signs: Reviewed per facility EMR vitals including as follows:                Most Recent Vitals Date/Time Taken  Temperature: 97.2  F 05/28/2020 04:41 PM  Pulse: 76 per minute 05/28/2020 04:41 PM  Respirations: 18 per minute 05/28/2020 04:41 PM  Blood Pressure: 147 / 73 mmHg 05/28/2020 07:20 AM  O2 Saturation: 93 % 05/28/2020 04:41 PM  Blood Sugar: 109 mg/dL 05/28/2020 04:10 PM  Weight: 142.0 lbs / Routine       BMI: 22.92 05/28/2020 12:45 PM  Height: 5ft 6.0in 04/20/2020 02:43 PM  There is no height or weight on file to calculate BMI.    Physical exam:    General:  Alert  oriented x3 appears in no acute distress    HEENT:  NC/AT, sclera clear, EOMI gaze  is conjugate sclera clear,   Patient demonstrates good range of motion of all 4 extremities at all joints.  However she has a subjective sense of it being harder to move her left leg at times.  She is breathing comfortably with no tachypnea or accessory muscle use.  Skin is clear in visualized areas      Due to the 2020 Covid 19 pandemic, except as noted above, the patient was visually observed at a 6 foot plus distance.  An observational exam was performed in an effort to keep patient safe from Covid 19 and other communicable diseases.   Labs:  Lab Results   Component Value Date    WBC 8.3 02/11/2020    HGB 10.6 (L) 02/11/2020    HCT 36.7 02/11/2020     (H) 02/11/2020     02/11/2020     Results for orders placed or performed in visit on 04/29/20   Basic Metabolic Panel   Result Value Ref Range    Sodium 142 136 - 145 mmol/L    Potassium 4.6 3.5 - 5.0 mmol/L    Chloride 109 (H) 98 - 107 mmol/L    CO2 23 22 - 31 mmol/L    Anion Gap, Calculation 10 5 - 18 mmol/L    Glucose 129 (H) 70 - 125 mg/dL    Calcium 9.4 8.5 - 10.5 mg/dL    BUN 29 (H) 8 - 22 mg/dL    Creatinine 0.79 0.60 - 1.10 mg/dL    GFR MDRD Af Amer >60 >60 mL/min/1.73m2    GFR MDRD Non Af Amer >60 >60 mL/min/1.73m2         No results found for: TSH  No results found for: HGBA1C  [unfilled]  Lab Results   Component Value Date    QKGHFFSC17 591 04/29/2020     No results found for: BNP  [unfilled]        Invalid input(s): PRINTERVAL      Assessment/Plan:      ICD-10-CM    1. Essential hypertension  I10    2. Type 2 diabetes mellitus with other neurologic complication, with long-term current use of insulin (H)  E11.49     Z79.4    3. Seizure disorder (H)  G40.909    4. S/P ORIF (open reduction internal fixation) fracture  Z98.890     Z87.81    Seizures  Quentin's paralysis  Chronic left-sided weakness/hemiplegia          Please see my recent note for more complete details.  Since then patient has done extremely well.  She is tolerating both of her  antiepileptic medications Keppra and phenytoin.  She has had follow-up with the neurologist by phone.  There were no changes.  No seizure activity.  Quentin's paralysis of the arm has resolved.  Unclear what is causing her subjective leg symptoms, might just be her chronic symptoms?... No new changes today     CVA 2010  Left hemiplegia              See my previous note, patient is back on aspirin appropriately.  Blood pressure control, lipid control, diabetic control important     Fever  UTI             Resolved.  She tested negative for COVID-19 had negative chest x-ray.  She has been appropriately treated for UTI with relief of her symptoms     acetabular fracture status post ORIF February 2020              This was the original reason for patient's initial TCU need.  Patient reports dissatisfaction with her therapy at prior TCU resulting in the transfer.    She is pleased with her treatments here.  However she is disappointed that long-term care is becoming a serious consideration at this point.     Generalized weakness  Residual left-sided weakness following CVA  Frequent falls  Unsteady gait              PT, OT, services appreciated     Anemia              Hemoglobin 10.6 back in February.  Hemoglobin  rechecked in the hospital 13.0.    Recheck hemoglobin anticipated with next blood draw     Presumed osteoporosis              Outpatient DEXA scan anticipated.  Cholecalciferol        Type 2 diabetes           2020 04:10 PM   Blood Sugar: 109 mg/dL  Luna Larkin LPN    05/28/2020 11:44 AM   Blood Sugar: 111 mg/dL  yTlor Foster RN    05/28/2020 07:17 AM   Blood Sugar: 77 mg/dL  Tylor Foster RN    05/27/2020 04:47 PM   Blood Sugar: 125 mg/dL  Luna Larkin LPN    05/27/2020 11:45 AM   Blood Sugar: 175 mg/dL  Tiki Hernandez LPN    05/27/2020 07:48 AM   Blood Sugar: 104 mg/dL  Tiki Hernandez LPN    05/26/2020 04:35 PM   Blood Sugar: 91 mg/dL  Tsering Pettit RN    05/26/2020 11:49 AM   Blood Sugar: 156 mg/dL  Tiki  Mary LPN    05/26/2020 08:03 AM   Blood Sugar: 102 mg/dL  Tiki Hernandez LPN    05/25/2020 07:36 PM   Blood Sugar: 136 mg/dL  Nando Mora RN    05/25/2020 03:57 PM   Blood Sugar: 154 mg/dL  Nando Moar RN    05/25/2020 11:13 AM   Blood Sugar: 119 mg/dL  Tiki Hernandez LPN    05/25/2020 07:40 AM   Blood Sugar: 93 mg/dL  Tiki Hernandez LPN    05/24/2020 07:19 PM   Blood Sugar: 127 mg/dL  Luna Trae LPN    05/24/2020 04:59 PM   Blood Sugar: 84 mg/dL  Luna Trae LPN    05/24/2020 04:59 PM   Blood Sugar: 84 mg/dL  Luna Trae LPN    05/24/2020 11:33 AM   Blood Sugar: 194 mg/dL  Tiki Hernandez LPN    05/24/2020 08:38 AM   Blood Sugar: 123 mg/dL  Tiki Hernandez LPN    05/24/2020 08:33 AM   Blood Sugar: 123 mg/dL  Tiki Hernandez LPN    05/23/2020 07:57 PM   Blood Sugar: 94 mg/dL  Luna Trae LPN    05/23/2020 04:58 PM   Blood Sugar: 113 mg/dL  Luna Trae LPN    05/23/2020 11:48 AM   Blood Sugar: 120 mg/dL  Tiki Hernandez LPN    05/23/2020 08:01 AM   Blood Sugar: 90 mg/dL  Tiki Hernandez LPN    05/22/2020 08:44 PM   Blood Sugar: 145 mg/dL  Luna Trae LPN    05/22/2020 04:10 PM   Blood Sugar: 110 mg/dL  Luna Trae LPN    05/22/2020 12:11 PM   Blood Sugar: 98 mg/dL  Stanislaw Gallardo LPN    05/22/2020 09:05 AM   Blood Sugar: 165 mg/dL  Anais Ayala RN NM    05/21/2020 07:38 PM   Blood Sugar: 186 mg/dL  Tsering Pettit RN    05/21/2020 04:08 PM   Blood Sugar: 158 mg/dL  Tsering Pettit RN    05/21/2020 11:08 AM   Blood Sugar: 128 mg/dL  Chidi albright RN    05/21/2020 07:08 AM   Blood Sugar: 92 mg/dL  Chidi albright RN    05/20/2020 09:16 PM   Blood Sugar: 207 mg/dL  Luna Figueredou TOM    05/20/2020 05:28 PM   Blood Sugar: 116 mg/dL  Luna Fernandezoku TOM    05/20/2020 11:25 AM   Blood Sugar: 106 mg/dL  Tylor Foster RN    05/20/2020 08:42 AM   Blood Sugar: 129 mg/dL  Tylor Foster RN    05/19/2020 08:17 PM   Blood Sugar: 211 mg/dL  Luna Larkin LPN    05/19/2020 04:40 PM   Blood Sugar: 139 mg/dL  Luna Larkin  "LPN    05/19/2020 11:35 AM   Blood Sugar: 116 mg/dL       Patient has been briefly off of her Lantus.  However her sugars steadily juan and now she is back on Lantus up to 40 units currently.  This leads to excellent control however I worry about low sugars as she has been in the 80s.  Even high 70s.  Recommend decrease Lantus to 35 units daily.     Hyperlipidemia              Atorvastatin unchanged  Hypomagnesemia               1.7 at last check, has not been followed up.  Will check that along with BMP and hemoglobin     CKD 3              Blood work from hospital reviewed, patient is stable     Possible gout              Patient repeats the story to me about how she had recurrent toe pain \"gout\" until her podiatrist worked on her toe and removed \"some extra skin\" and she has not had any bouts since then.  On if she had recurrent ingrowing?     Depression              Patient reports excellent control on current Celexa 10 mg daily          Case discussed with:    Facility staff         Rasta Michael MD           "

## 2021-06-20 NOTE — LETTER
Letter by Chelsi Dickson RN at      Author: Chelsi Dickson RN Service: -- Author Type: --    Filed:  Encounter Date: 5/9/2020 Status: (Other)       5/9/2020        Addis Peña  [address]  [place] [address]    This letter provides a written record that you were tested for COVID-19 on 5/7/20.     Your result was negative.    This means that we didnt find the virus that causes COVID-19 in your sample. A test may show negative when you do actually have the virus. This can happen when the virus is in the early stages of infection, before you feel illness symptoms.    Even if you dont have symptoms, they may still appear. For safety, its very important to follow these rules.    Keep yourself away from others (self-isolation):      Stay home. Dont go to work, school or anywhere else.     Stay away from others in your home. No hugging, kissing or shaking hands.    Dont let anyone visit.    Cover your mouth and nose with a mask, tissue or wash cloth to avoid spreading germs.    Stay in self-isolation until you meet ALL of the guidelines below:    1. You have had no fever for at least 72 hours (that is 3 full days of no fever without the use of medicine that reduces fevers), AND  2. other symptoms (such as cough, shortness of breath) have gotten better, AND  3. at least 7 days have passed since your symptoms first appeared.    Going back to work  Check with your employer for any guidelines to follow for going back to work.    Employers: This document serves as formal notice that your employee tested negative for COVID-19, as of the testing date shown above.    For questions regarding this letter or your Negative COVID-19 result, call [phone] between 8A to 6:30P (M-F) and 10A to 6:30P (weekends).

## 2021-06-20 NOTE — LETTER
Letter by Danelle Strickland CNP at      Author: Danelle Strickland CNP Service: -- Author Type: --    Filed:  Encounter Date: 2/25/2020 Status: (Other)         Patient: Addis Peña   MR Number: [id number]   YOB: 1950   Date of Visit: 2/25/2020     [facility]    Facility:   [facility] [[id number]]   Code Status: POLST AVAILABLE      CHIEF COMPLAINT/REASON FOR VISIT:  Chief Complaint   Patient presents with   ? Review Of Multiple Medical Conditions       HISTORY:      HPI: Addis is a 69 y.o. female who was admitted to [facility] from 2/2/20-2/7/20 for left hip ORIF s/p fall at home.  There were not postoperative complications noted in her hospital discharge summary.  It was recommended by her orthopedic provider that she remain on Lovenox for one month postoperatively for DVT prophylaxis.  Addis  has a past medical history of Allergic rhinitis, CKD (chronic kidney disease), Depression, Displaced dome fracture of left acetabulum (H), DM2 (diabetes mellitus, type 2) (H), GERD (gastroesophageal reflux disease), Gout, HTN (hypertension), Left hemiplegia (H), Migraine, 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), Nephrolithiasis, Stroke (H), and Vitamin B12 deficiency anemia.  She is currently at [facility] s/p hospitalization for acute rehabilitation.      Today Ms. Peña is being evaluated for a review of multiple medical conditions.  Her Lantus was decreased from 30 units daily to 25 units daily due to hypoglycemia in the AM before breakfast and she continues to have low blood glucose with range  over the past week.  She denied pain at this visit and was agreeable to discontinuation of her ibuprofen at this time.  She continues to be NWB in her LLE and will follow-up with her orthopedic provider on 3/23/20.  The patient denied lightheadedness, dizziness, breathing difficulty, chest pain, palpitations,  constipation, urinary symptoms, numbness or tingling in extremities, and pain.  Nursing staff denied any new concerns for the patient at this time.    Past Medical History:   Diagnosis Date   ? Allergic rhinitis    ? CKD (chronic kidney disease)    ? Depression    ? Displaced dome fracture of left acetabulum (H)    ? DM2 (diabetes mellitus, type 2) (H)    ? GERD (gastroesophageal reflux disease)    ? Gout    ? HTN (hypertension)    ? Left hemiplegia (H)    ? Migraine    ? 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)    ? Nephrolithiasis    ? Stroke (H)    ? Vitamin B12 deficiency anemia              Family History   Problem Relation Age of Onset   ? Heart disease Mother    ? Hypertension Mother    ? Coronary artery disease Father    ? Diabetes Sister    ? Hypertension Brother    ? Cancer Sister      Social History     Socioeconomic History   ? Marital status:      Spouse name: None   ? Number of children: None   ? Years of education: None   ? Highest education level: None   Occupational History   ? None   Social Needs   ? Financial resource strain: None   ? Food insecurity:     Worry: None     Inability: None   ? Transportation needs:     Medical: None     Non-medical: None   Tobacco Use   ? Smoking status: Never Smoker   ? Smokeless tobacco: Never Used   Substance and Sexual Activity   ? Alcohol use: Not Currently   ? Drug use: None   ? Sexual activity: None   Lifestyle   ? Physical activity:     Days per week: None     Minutes per session: None   ? Stress: None   Relationships   ? Social connections:     Talks on phone: None     Gets together: None     Attends Catholic service: None     Active member of club or organization: None     Attends meetings of clubs or organizations: None     Relationship status: None   ? Intimate partner violence:     Fear of current or ex partner: None     Emotionally abused: None     Physically abused: None     Forced sexual activity: None   Other Topics  Concern   ? None   Social History Narrative   ? None       REVIEW OF SYSTEM:  Pertinent items are noted in HPI.  A 12 point comprehensive review of systems was negative except as noted.    PHYSICAL EXAM:     General Appearance:    Alert, cooperative, no distress, appears stated age   Head:    Normocephalic, without obvious abnormality, atraumatic   Eyes:    PERRL, conjunctiva/corneas clear, both eyes   Ears:    Normal external ear canals, both ears   Nose:   Nares normal, septum midline, mucosa normal, no drainage    or sinus tenderness   Throat:   Lips, mucosa, and tongue normal; teeth and gums normal   Neck:   Supple, symmetrical, trachea midline, no adenopathy;     thyroid:  no enlargement/tenderness/nodules; no carotid    bruit or JVD   Back:     Symmetric, no curvature, ROM normal, no CVA tenderness   Lungs:     Clear to auscultation bilaterally, respirations unlabored   Chest Wall:    No tenderness or deformity    Heart:    Regular rate and rhythm, S1 and S2 normal, no murmur, rub   or gallop   Abdomen:     Soft, non-tender, bowel sounds active all four quadrants,     no masses, no organomegaly; incision site CDI with glue   Extremities:   Extremities normal, atraumatic, no cyanosis or edema; incision site CDI   Pulses:   2+ and symmetric all extremities   Skin:   Skin color, texture, turgor normal, no rashes or lesions   Neurologic:   Oriented to person, place, time, and situation; exhibits logical thought processes; generalized weakness           LABS:     Lab Results   Component Value Date    WBC 8.3 02/11/2020    HGB 10.6 (L) 02/11/2020    HCT 36.7 02/11/2020     02/11/2020     02/14/2020    K 4.6 02/14/2020     (H) 02/11/2020    CREATININE 0.76 02/14/2020    BUN 33 (H) 02/11/2020    CO2 23 02/11/2020        ASSESSMENT:      ICD-10-CM    1. Physical deconditioning R53.81    2. Closed displaced fracture of anterior column of left acetabulum with routine healing, subsequent encounter  S32.432D    3. S/P ORIF (open reduction internal fixation) fracture Z98.890     Z87.81    4. Postoperative pain G89.18    5. Left hip pain M25.552    6. Type 2 diabetes mellitus with other neurologic complication, with long-term current use of insulin (H) E11.49     Z79.4        PLAN:      Physical deconditioning  -PT/OT as directed  -Discharge from facility per therapy recommendations     Postoperative pain  -Continue Tylenol 1 g three times a day   -DISCONTINUE ibuprofen orders  -Encouraged patient to utilize cold therapy three times a day and prn  -Encouraged patient to utilize integrative therapies such as distraction and deep breathing for pain management  -Notify provider if patient has new or worsening pain   -Follow-up with orthopedic provider on 3/23/20    DMII  -DECREASE Lantus from 25 units to 20 units daily  -Continue metformin 1 g two times a day   -Notify provider if BG < 70 or > 500    Otherwise continue current care plan for all other chronic medical conditions, as they are stable. Encouraged patient to engage in healthy lifestyle behaviors such as engaging in social activities, exercising (PT/OT), eating well, and following care plan. Follow up for routine check-up, or sooner if needed. Will continue to monitor patient and work with nursing staff collaboratively to work toward positive patient outcomes.     Electronically signed by: Danelle Strickland CNP

## 2021-06-20 NOTE — LETTER
Letter by Danelle Strickland CNP at      Author: Danelle Strickland CNP Service: -- Author Type: --    Filed:  Encounter Date: 4/2/2020 Status: (Other)         Patient: Addis Peña   MR Number: [id number]   YOB: 1950   Date of Visit: 4/2/2020     [facility]    Facility:   [facility] [[id number]]   Code Status: POLST AVAILABLE      CHIEF COMPLAINT/REASON FOR VISIT:  Chief Complaint   Patient presents with   ? Problem Visit     DM type 2       HISTORY:      HPI: Addis is a 69 y.o. female who was admitted to [facility] from 2/2/20-2/7/20 for left hip ORIF s/p fall at home.  There were not postoperative complications noted in her hospital discharge summary.  It was recommended by her orthopedic provider that she remain on Lovenox for one month postoperatively for DVT prophylaxis.  Addis  has a past medical history of Allergic rhinitis, CKD (chronic kidney disease), Depression, Displaced dome fracture of left acetabulum (H), DM2 (diabetes mellitus, type 2) (H), GERD (gastroesophageal reflux disease), Gout, HTN (hypertension), Left hemiplegia (H), Migraine, 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), Nephrolithiasis, Stroke (H), and Vitamin B12 deficiency anemia.  She is currently at [facility] s/p hospitalization for acute rehabilitation.      Today Ms. Peña is being evaluated for diabetes mellitus type 2.  Addis has been tolerating discontinuation of her Lantus well with blood glucose range 121-181 over the past month.  She is agreeable to plan to continue decreasing her metformin to minimize risk for hypoglycemia with her now ambulating with therapy services.  She noted that she has been walking a few feet in therapy daily.  Nursing staff reported that the patient has been in bed most of the day and still requires moderate assistance for ADLs at this time.  The patient denied lightheadedness, dizziness, breathing  difficulty, chest pain, palpitations, constipation, urinary symptoms, numbness or tingling in extremities, and pain.     Past Medical History:   Diagnosis Date   ? Allergic rhinitis    ? CKD (chronic kidney disease)    ? Depression    ? Displaced dome fracture of left acetabulum (H)    ? DM2 (diabetes mellitus, type 2) (H)    ? GERD (gastroesophageal reflux disease)    ? Gout    ? HTN (hypertension)    ? Left hemiplegia (H)    ? Migraine    ? 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)    ? Nephrolithiasis    ? Stroke (H)    ? Vitamin B12 deficiency anemia              Family History   Problem Relation Age of Onset   ? Heart disease Mother    ? Hypertension Mother    ? Coronary artery disease Father    ? Diabetes Sister    ? Hypertension Brother    ? Cancer Sister      Social History     Socioeconomic History   ? Marital status:      Spouse name: Not on file   ? Number of children: Not on file   ? Years of education: Not on file   ? Highest education level: Not on file   Occupational History   ? Not on file   Social Needs   ? Financial resource strain: Not on file   ? Food insecurity     Worry: Not on file     Inability: Not on file   ? Transportation needs     Medical: Not on file     Non-medical: Not on file   Tobacco Use   ? Smoking status: Never Smoker   ? Smokeless tobacco: Never Used   Substance and Sexual Activity   ? Alcohol use: Not Currently   ? Drug use: Not on file   ? Sexual activity: Not on file   Lifestyle   ? Physical activity     Days per week: Not on file     Minutes per session: Not on file   ? Stress: Not on file   Relationships   ? Social connections     Talks on phone: Not on file     Gets together: Not on file     Attends Religion service: Not on file     Active member of club or organization: Not on file     Attends meetings of clubs or organizations: Not on file     Relationship status: Not on file   ? Intimate partner violence     Fear of current or ex partner: Not  on file     Emotionally abused: Not on file     Physically abused: Not on file     Forced sexual activity: Not on file   Other Topics Concern   ? Not on file   Social History Narrative   ? Not on file       REVIEW OF SYSTEM:  Pertinent items are noted in HPI.  A 12 point comprehensive review of systems was negative except as noted.    PHYSICAL EXAM:     General Appearance:    Alert, cooperative, no distress, appears stated age   Head:    Normocephalic, without obvious abnormality, atraumatic   Eyes:    PERRL, conjunctiva/corneas clear, both eyes   Ears:    Normal external ear canals, both ears   Nose:   Nares normal, septum midline, mucosa normal, no drainage    or sinus tenderness   Throat:   Lips, mucosa, and tongue normal; teeth and gums normal   Neck:   Supple, symmetrical, trachea midline, no adenopathy;     thyroid:  no enlargement/tenderness/nodules; no carotid    bruit or JVD   Back:     Symmetric, no curvature, ROM normal, no CVA tenderness   Lungs:     Clear to auscultation bilaterally, respirations unlabored   Chest Wall:    No tenderness or deformity    Heart:    Regular rate and rhythm, S1 and S2 normal, no murmur, rub   or gallop   Abdomen:     Soft, non-tender, bowel sounds active all four quadrants,     no masses, no organomegaly; incision site CDI with glue   Extremities:   Extremities normal, atraumatic, no cyanosis or edema; incision site CDI   Pulses:   2+ and symmetric all extremities   Skin:   Skin color, texture, turgor normal, no rashes or lesions   Neurologic:   Oriented to person, place, time, and situation; exhibits logical thought processes; generalized weakness           LABS:     Lab Results   Component Value Date    WBC 8.3 02/11/2020    HGB 10.6 (L) 02/11/2020    HCT 36.7 02/11/2020     02/11/2020     02/14/2020    K 4.6 02/14/2020     (H) 02/11/2020    CREATININE 0.76 02/14/2020    BUN 33 (H) 02/11/2020    CO2 23 02/11/2020        ASSESSMENT:      ICD-10-CM    1.  Physical deconditioning  R53.81    2. Type 2 diabetes mellitus with other neurologic complication, with long-term current use of insulin (H)  E11.49     Z79.4        PLAN:      Physical deconditioning  -PT/OT as directed  -Discharge from facility per therapy recommendations on WBAT    DMII  -DISCONTINUED Lantus on 3/12/20  -DECRASE metoformin to 1 g in AM and 500 mg at HS  -Continue to monitor BG two times a day   -Notify provider if BG < 70 or > 500    Otherwise continue current care plan for all other chronic medical conditions, as they are stable. Encouraged patient to engage in healthy lifestyle behaviors such as engaging in social activities, exercising (PT/OT), eating well, and following care plan. Follow up for routine check-up, or sooner if needed. Will continue to monitor patient and work with nursing staff collaboratively to work toward positive patient outcomes.     Electronically signed by: Danelle Strickland, SANTOS

## 2021-06-20 NOTE — LETTER
Letter by Octavia Serna CNP at      Author: Octavia Serna CNP Service: -- Author Type: --    Filed:  Encounter Date: 8/24/2020 Status: (Other)         Patient: Addis Peña   MR Number: [id number]   YOB: 1950   Date of Visit: 8/24/2020     [facility]     Facility:   [facility] [[id number]]   Code Status: POLST AVAILABLE     CHIEF COMPLAINT/REASON FOR VISIT:  Chief Complaint   Patient presents with   ? Problem Visit     need for a private room       HISTORY:      HPI: Addis is a 69 y.o. female who  has a past medical history of Allergic rhinitis, CKD (chronic kidney disease), Depression, Displaced dome fracture of left acetabulum (H), DM2 (diabetes mellitus, type 2) (H), GERD (gastroesophageal reflux disease), Gout, HTN (hypertension), Left hemiplegia (H), Migraine, 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), Nephrolithiasis, Seizure disorder (H), Stroke (H), and Vitamin B12 deficiency anemia. Addis was recently transferred from [facility]. She was undergoing acute rehab after suffering a hip fracture and undergoing ORIF repair.     Today Addis is being evaluated by request of nursing staff and  to determine if she would qualify for a private room. Addis has suffered from a stroke and has many care needs. She also has a seizure disorder after the stroke and is at risk for seizures. Last seizure a few months ago. Addis also has severe depression following the CVA, this stems from the loss of independence. Addis does much better in a room where she has privacy and she has more space. Given her CVA with hemiplegia she does use a lift. With her many needs it is imperative she has a private room. She otherwise has been feeling really well, she is actually looking forward to going home soon. Addis is trying to make it home and is planning on discharge but no plans have been made yet.  Until that time a private room is necessary for her physical and mental health. She denies any symptoms including fevers/chills, cough or cold symptoms, headaches, vision changes, chest pain/pressure, difficulty breathing, SOB, abdominal pain, nausea, vomiting, diarrhea, increasing or ongoing weakness past baseline, increasing pain.     Past Medical History:   Diagnosis Date   ? Allergic rhinitis    ? CKD (chronic kidney disease)    ? Depression    ? Displaced dome fracture of left acetabulum (H)    ? DM2 (diabetes mellitus, type 2) (H)    ? GERD (gastroesophageal reflux disease)    ? Gout    ? HTN (hypertension)    ? Left hemiplegia (H)    ? Migraine    ? 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)    ? Nephrolithiasis    ? Seizure disorder (H)    ? Stroke (H)    ? Vitamin B12 deficiency anemia              Family History   Problem Relation Age of Onset   ? Heart disease Mother    ? Hypertension Mother    ? Coronary artery disease Father    ? Diabetes Sister    ? Hypertension Brother    ? Cancer Sister      Social History     Socioeconomic History   ? Marital status:      Spouse name: Not on file   ? Number of children: Not on file   ? Years of education: Not on file   ? Highest education level: Not on file   Occupational History   ? Not on file   Social Needs   ? Financial resource strain: Not on file   ? Food insecurity     Worry: Not on file     Inability: Not on file   ? Transportation needs     Medical: Not on file     Non-medical: Not on file   Tobacco Use   ? Smoking status: Never Smoker   ? Smokeless tobacco: Never Used   Substance and Sexual Activity   ? Alcohol use: Not Currently   ? Drug use: Not on file   ? Sexual activity: Not on file   Lifestyle   ? Physical activity     Days per week: Not on file     Minutes per session: Not on file   ? Stress: Not on file   Relationships   ? Social connections     Talks on phone: Not on file     Gets together: Not on file     Attends  Confucianist service: Not on file     Active member of club or organization: Not on file     Attends meetings of clubs or organizations: Not on file     Relationship status: Not on file   ? Intimate partner violence     Fear of current or ex partner: Not on file     Emotionally abused: Not on file     Physically abused: Not on file     Forced sexual activity: Not on file   Other Topics Concern   ? Not on file   Social History Narrative   ? Not on file       REVIEW OF SYSTEM:  Per HPI    PHYSICAL EXAM:   /76   Pulse 84   Temp 98.8  F (37.1  C)   Resp 18   Wt 141 lb 12.8 oz (64.3 kg)   SpO2 95%     A limited exam was performed due to recommendations for care during COVID-19 pandemic. Due to the 2020 COVID-19 pandemic, except as noted above, the patient was visually observed at a 6 foot plus distance.  An observational exam was performed in an effort to keep patient safe from COVID-19 and other communicable diseases.     General appearance: alert, appears stated age and cooperative  HEENT: Head is normocephalic with normal hair distribution. No evidence of trauma. Ears: Without lesions or deformity. No acute purulent discharge. Eyes: Conjunctivae pink with no scleral icterus or erythema. Nose: Normal. Oropharnyx: mmm.  Lungs: respirations without effort.  Extremities: extremities normal, atraumatic, no cyanosis.  Skin: Skin color, texture normal. No rashes or lesions on exposed skin.   Neurologic: Grossly normal   Psych: interacts well with caregivers, exhibits logical thought processes and connections, pleasant.      LABS:   None today.     ASSESSMENT:      ICD-10-CM    1. Moderate episode of recurrent major depressive disorder (H)  F33.1    2. CVA, old,2010  I69.993    3. Left hemiplegia (H)  G81.94    4. Seizure disorder (H)  G40.909        PLAN:    Depression  -Celexa 10 mg by mouth daily.   -Gabapentin 100 mg by mouth three times a day.   -Lipitor 40 mg by mouth daily.     CVA, Left Hemiplegia  -Tylenol  500 mg by mouth every 6 hours as needed.   -Baclofen 10 mg by mouth three times a day.     Seizure Disorder  -Keppra 250 mg tablets, take 5 tablets by mouth two times a day.   -Phenytoin 200 mg by mouth two times a day.     Private room is necessary for the care of this patient and her physical and mental health needs.     Otherwise continue current care plan for all other chronic medical conditions, as they are stable. Encouraged patient to engage in healthy lifestyle behaviors such as engaging in social activities, exercising (PT/OT), eating well, and following care plan. Follow up for routine check-up, or sooner if needed. Will continue to monitor patient and work with nursing staff collaboratively to work toward positive patient outcomes.    Octavia Serna, CNP

## 2021-06-20 NOTE — LETTER
Letter by Danelle Strickland CNP at      Author: Danelle Strickland CNP Service: -- Author Type: --    Filed:  Encounter Date: 3/3/2020 Status: (Other)         Patient: Addis Peña   MR Number: [id number]   YOB: 1950   Date of Visit: 3/3/2020     [facility]    Facility:   [facility] [[id number]]   Code Status: POLST AVAILABLE      CHIEF COMPLAINT/REASON FOR VISIT:  Chief Complaint   Patient presents with   ? Problem Visit     hypoglycemia       HISTORY:      HPI: Addis is a 69 y.o. female who was admitted to [facility] from 2/2/20-2/7/20 for left hip ORIF s/p fall at home.  There were not postoperative complications noted in her hospital discharge summary.  It was recommended by her orthopedic provider that she remain on Lovenox for one month postoperatively for DVT prophylaxis.  Addis  has a past medical history of Allergic rhinitis, CKD (chronic kidney disease), Depression, Displaced dome fracture of left acetabulum (H), DM2 (diabetes mellitus, type 2) (H), GERD (gastroesophageal reflux disease), Gout, HTN (hypertension), Left hemiplegia (H), Migraine, 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), Nephrolithiasis, Stroke (H), and Vitamin B12 deficiency anemia.  She is currently at [facility] s/p hospitalization for acute rehabilitation.      Today Ms. Peña is being evaluated for hypoglycemia.  Her blood glucose has been running low with BG range  over the past week with Lantus 20 units daily and metformin 1 g two times a day for blood glucose management.  She denied any s/s of hypoglycemia.  Addis asked if she could be titrated off her insulin during her TCU stay.  She continues to be NWB on her LLE and will be following up with her orthopedic provider on 3/20/20.  The patient denied lightheadedness, dizziness, breathing difficulty, chest pain, palpitations, constipation, urinary symptoms, numbness or  tingling in extremities, and pain.  Nursing staff denied any new concerns for the patient at this time.    Past Medical History:   Diagnosis Date   ? Allergic rhinitis    ? CKD (chronic kidney disease)    ? Depression    ? Displaced dome fracture of left acetabulum (H)    ? DM2 (diabetes mellitus, type 2) (H)    ? GERD (gastroesophageal reflux disease)    ? Gout    ? HTN (hypertension)    ? Left hemiplegia (H)    ? Migraine    ? 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)    ? Nephrolithiasis    ? Stroke (H)    ? Vitamin B12 deficiency anemia              Family History   Problem Relation Age of Onset   ? Heart disease Mother    ? Hypertension Mother    ? Coronary artery disease Father    ? Diabetes Sister    ? Hypertension Brother    ? Cancer Sister      Social History     Socioeconomic History   ? Marital status:      Spouse name: None   ? Number of children: None   ? Years of education: None   ? Highest education level: None   Occupational History   ? None   Social Needs   ? Financial resource strain: None   ? Food insecurity:     Worry: None     Inability: None   ? Transportation needs:     Medical: None     Non-medical: None   Tobacco Use   ? Smoking status: Never Smoker   ? Smokeless tobacco: Never Used   Substance and Sexual Activity   ? Alcohol use: Not Currently   ? Drug use: None   ? Sexual activity: None   Lifestyle   ? Physical activity:     Days per week: None     Minutes per session: None   ? Stress: None   Relationships   ? Social connections:     Talks on phone: None     Gets together: None     Attends [employer] service: None     Active member of club or organization: None     Attends meetings of clubs or organizations: None     Relationship status: None   ? Intimate partner violence:     Fear of current or ex partner: None     Emotionally abused: None     Physically abused: None     Forced sexual activity: None   Other Topics Concern   ? None   Social History Narrative   ?  None       REVIEW OF SYSTEM:  Pertinent items are noted in HPI.  A 12 point comprehensive review of systems was negative except as noted.    PHYSICAL EXAM:     General Appearance:    Alert, cooperative, no distress, appears stated age   Head:    Normocephalic, without obvious abnormality, atraumatic   Eyes:    PERRL, conjunctiva/corneas clear, both eyes   Ears:    Normal external ear canals, both ears   Nose:   Nares normal, septum midline, mucosa normal, no drainage    or sinus tenderness   Throat:   Lips, mucosa, and tongue normal; teeth and gums normal   Neck:   Supple, symmetrical, trachea midline, no adenopathy;     thyroid:  no enlargement/tenderness/nodules; no carotid    bruit or JVD   Back:     Symmetric, no curvature, ROM normal, no CVA tenderness   Lungs:     Clear to auscultation bilaterally, respirations unlabored   Chest Wall:    No tenderness or deformity    Heart:    Regular rate and rhythm, S1 and S2 normal, no murmur, rub   or gallop   Abdomen:     Soft, non-tender, bowel sounds active all four quadrants,     no masses, no organomegaly; incision site CDI with glue   Extremities:   Extremities normal, atraumatic, no cyanosis or edema; incision site CDI   Pulses:   2+ and symmetric all extremities   Skin:   Skin color, texture, turgor normal, no rashes or lesions   Neurologic:   Oriented to person, place, time, and situation; exhibits logical thought processes; generalized weakness           LABS:     Lab Results   Component Value Date    WBC 8.3 02/11/2020    HGB 10.6 (L) 02/11/2020    HCT 36.7 02/11/2020     02/11/2020     02/14/2020    K 4.6 02/14/2020     (H) 02/11/2020    CREATININE 0.76 02/14/2020    BUN 33 (H) 02/11/2020    CO2 23 02/11/2020        ASSESSMENT:      ICD-10-CM    1. Physical deconditioning R53.81    2. Type 2 diabetes mellitus with other neurologic complication, with long-term current use of insulin (H) E11.49     Z79.4        PLAN:      Physical  deconditioning  -PT/OT as directed  -Discharge from facility per therapy recommendations     DMII  -DECREASE Lantus from 20 units to 15 units daily  -Continue metformin 1 g two times a day   -Notify provider if BG < 70 or > 500    Otherwise continue current care plan for all other chronic medical conditions, as they are stable. Encouraged patient to engage in healthy lifestyle behaviors such as engaging in social activities, exercising (PT/OT), eating well, and following care plan. Follow up for routine check-up, or sooner if needed. Will continue to monitor patient and work with nursing staff collaboratively to work toward positive patient outcomes.     Electronically signed by: Danelle Strickland, SANTOS

## 2021-06-20 NOTE — LETTER
"Letter by Octavia Serna CNP at      Author: Octavia Serna CNP Service: -- Author Type: --    Filed:  Encounter Date: 6/26/2020 Status: (Other)         Patient: Addis Peña   MR Number: [id number]   YOB: 1950   Date of Visit: 6/26/2020     [facility]  Video Visit    Facility:   [facility] [[id number]]   Code Status: POLST AVAILABLE       Addis Peña is a 69 y.o. female who is being evaluated via a billable video visit.      The patient has been notified of following:     \"This video visit will be conducted via a call between you and your physician/provider. We have found that certain health care needs can be provided without the need for an in-person physical exam.  This service lets us provide the care you need with a video conversation.  If a prescription is necessary we can send it to the facility team.  If lab work is needed we can place an order through the facility team to have that test done at a later time.    If during the course of the call the physician/provider feels a video visit is not appropriate, you will not be charged for this service.\"     Physician/provider has received verbal consent for a Video Visit from the patient? Yes    Patient would like the video invitation sent by: Send to: [employer]    Video Start Time: 0946      CHIEF COMPLAINT/REASON FOR VISIT:  Chief Complaint   Patient presents with   ? Review Of Multiple Medical Conditions     Physical deconditioning, seizure disorder, hypertension, old CVA       HISTORY:      HPI: Addis is a 69 y.o. female who  has a past medical history of Allergic rhinitis, CKD (chronic kidney disease), Depression, Displaced dome fracture of left acetabulum (H), DM2 (diabetes mellitus, type 2) (H), GERD (gastroesophageal reflux disease), Gout, HTN (hypertension), Left hemiplegia (H), Migraine, 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), Nephrolithiasis, Seizure disorder (H), " Stroke (H), and Vitamin B12 deficiency anemia. Addis was recently transferred from [facility]. She was undergoing acute rehab after suffering a hip fracture and undergoing ORIF repair.     Today Addis is being evaluated for a routine review of multiple medical problems while in long term care. She shares she is settling in well. She is a recent transfer into the LTC unit. She has not had any problems. She does not have any tremor or seizure activity. No other concerns at this time. She denies any symptoms including fevers/chills, cough or cold symptoms, headaches, vision changes, chest pain/pressure, difficulty breathing, SOB, abdominal pain, nausea, vomiting, diarrhea, increasing or ongoing weakness past baseline, increasing pain.     Past Medical History:   Diagnosis Date   ? Allergic rhinitis    ? CKD (chronic kidney disease)    ? Depression    ? Displaced dome fracture of left acetabulum (H)    ? DM2 (diabetes mellitus, type 2) (H)    ? GERD (gastroesophageal reflux disease)    ? Gout    ? HTN (hypertension)    ? Left hemiplegia (H)    ? Migraine    ? 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)    ? Nephrolithiasis    ? Seizure disorder (H)    ? Stroke (H)    ? Vitamin B12 deficiency anemia              Family History   Problem Relation Age of Onset   ? Heart disease Mother    ? Hypertension Mother    ? Coronary artery disease Father    ? Diabetes Sister    ? Hypertension Brother    ? Cancer Sister      Social History     Socioeconomic History   ? Marital status:      Spouse name: Not on file   ? Number of children: Not on file   ? Years of education: Not on file   ? Highest education level: Not on file   Occupational History   ? Not on file   Social Needs   ? Financial resource strain: Not on file   ? Food insecurity     Worry: Not on file     Inability: Not on file   ? Transportation needs     Medical: Not on file     Non-medical: Not on file   Tobacco  Use   ? Smoking status: Never Smoker   ? Smokeless tobacco: Never Used   Substance and Sexual Activity   ? Alcohol use: Not Currently   ? Drug use: Not on file   ? Sexual activity: Not on file   Lifestyle   ? Physical activity     Days per week: Not on file     Minutes per session: Not on file   ? Stress: Not on file   Relationships   ? Social connections     Talks on phone: Not on file     Gets together: Not on file     Attends Religion service: Not on file     Active member of club or organization: Not on file     Attends meetings of clubs or organizations: Not on file     Relationship status: Not on file   ? Intimate partner violence     Fear of current or ex partner: Not on file     Emotionally abused: Not on file     Physically abused: Not on file     Forced sexual activity: Not on file   Other Topics Concern   ? Not on file   Social History Narrative   ? Not on file       REVIEW OF SYSTEM:  Per HPI    PHYSICAL EXAM:   /76   Pulse (!) 107   Temp 98.7  F (37.1  C)   Resp 18   Wt 141 lb 12.8 oz (64.3 kg)   SpO2 92%     A limited exam was performed due to recommendations for care during COVID-19 pandemic. Due to the 2020 COVID-19 pandemic, except as noted above, the patient was visually observed at a 6 foot plus distance.  An observational exam was performed in an effort to keep patient safe from COVID-19 and other communicable diseases.     General appearance: alert, appears stated age and cooperative  HEENT: Head is normocephalic with normal hair distribution. No evidence of trauma. Ears: Without lesions or deformity. No acute purulent discharge. Eyes: Conjunctivae pink with no scleral icterus or erythema. Nose: Normal. Oropharnyx: mmm.  Lungs: respirations without effort.  Extremities: extremities normal, atraumatic, no cyanosis.  Skin: Skin color, texture normal. No rashes or lesions on exposed skin.   Neurologic: Grossly normal   Psych: interacts well with caregivers, exhibits logical thought  processes and connections, pleasant.      LABS:   None today.     ASSESSMENT:      ICD-10-CM    1. Type 2 diabetes mellitus with other neurologic complication, with long-term current use of insulin (H)  E11.49     Z79.4    2. Seizure disorder (H)  G40.909    3. Essential hypertension  I10    4. CVA, old,2010  I69.993        PLAN:    Physical Deconditioning, old CVA  -Continue PT/OT and other therapies as per care plan.  -Encouraged good nutrition and movement habits.   -Discussed care plan and expected course of stay.   -Continue to follow-up per routine schedule or sooner if needed.     Seizure disorder  -Baclofen 10 mg by mouth 3 times daily.  -Keppra 250 mg tablets, take 5 tablets by mouth twice daily.  -Phenytoin 200 mg by mouth twice daily.    DMII  -Blood sugars with meals.   -Glargine 35 units subcutaneously in the morning.  -Insulin lispro per sliding scale 3 times a day with meals.  -Metformin 1000 mg by mouth two times a day.   -Follow-up as needed.    Hypertension  -Amlodipine 10 mg by mouth daily at bedside.   -Lisinopril 25 mg by mouth daily.   -Toprol XL 50 mg by mouth daily.   -Encouraged cardiac diet, low sodium diet, exercise and stress reduction techniques.   -Emphasized importance of blood pressure control.   -Continue current medications as prescribed.   -Follow up per routine schedule or sooner with any complaints or concerns.    Otherwise continue current care plan for all other chronic medical conditions, as they are stable. Encouraged patient to engage in healthy lifestyle behaviors such as engaging in social activities, exercising (PT/OT), eating well, and following care plan. Follow up for routine check-up, or sooner if needed. Will continue to monitor patient and work with nursing staff collaboratively to work toward positive patient outcomes.    Video-Visit Details    Type of service:  Video Visit    Video End Time (time video stopped): 0950    Originating Location (pt. Location):[facility]  [facility] [[id number]]    Distant Location (provider location):  [facility]     Mode of Communication: Samra Serna, CNP

## 2021-06-20 NOTE — LETTER
Letter by Rasta Michael MD at      Author: Rasta Michael MD Service: -- Author Type: --    Filed:  Encounter Date: 2/13/2020 Status: (Other)         Patient: Addis Peña   MR Number: [id number]   YOB: 1950   Date of Visit: 2/13/2020     [facility]    Facility:   [facility] [[id number]]   Code Status: FULL CODE      CHIEF COMPLAINT/REASON FOR VISIT:  Chief Complaint   Patient presents with   ? H & P       HISTORY:      HPI: Ms. Peña is a 69-year-old female with a past medical history of CVA resulting in left hemiplegia, depression, hypertension, insulin-dependent type 2 diabetes complicated by retinopathy, hyperlipidemia, environmental allergies, CKD 3, possible gout, seen today for admission to TCU following her recent hospitalization.    Hospital course: Patient was admitted to [facility] February 2 through February 7 though she was originally seen in the emergency room at [facility] and transferred to [facility] once fracture was identified.     Patient reports 2 falls the first on January 25 with some left hip pain.  She was seen for left hip pain on the 28th, diagnosed with left acetabular fracture as well as metatarsal fractures 2 through 4 on the left foot.  Case was discussed with orthopedics, Lovenox prophylaxis was prescribed.  However she fell again on February 1 trying to transfer self in her home this time with very severe left hip pain incompatible with ambulation.  She now was diagnosed with displaced dome fracture of the left acetabulum requiring ORIF, repaired by Dr. Dinh.  There were no complications reported.  Patient was treated with DVT prophylaxis, prophylactic antibiotic therapy and deemed safe for discharge by February 7 with pain tolerated by oral pain medications.  Lovenox 40 mg subcutaneously recommended for 28 days.  Oxycodone 5 to 10 mg every 4 hours along with scheduled  "Tylenol given for pain.  Her aspirin was held at discharge.  There is also recommendation for pursuing osteoporosis work-up including DEXA scanning.  Notably she also received some ceftriaxone in the emergency room at [facility] for abnormal urinalysis.    Subjective/review of systems:  Patient reports that she is doing better.  She says she has very little pain at rest although her leg \"jumps around\" sometimes.  She has pain in the lateral thigh especially after PT or OT and has been using some cold packs.  She reports no bruising or bleeding.  She reports no edema.  She reports she is not using her AFO with her first fall.  Patient denies dysuria.  She denies fever sweats or chills.  She has had no headaches no change in vision speaking swallowing or hearing.  She reports no chest pain cough wheezing orthopnea PND shortness of breath abdominal pain nausea vomiting diarrhea melena bright red blood per rectum.  Last stool 2/12/2020.  She does have a history of renal stones.  She reports lactose intolerance.  She has been treated for B12 deficiency and vitamin D deficiency.  She reports stable mood.  She denies memory issue although staff expresses concerns about memory/cognitive impairment.    Past Medical History:   Diagnosis Date   ? Allergic rhinitis    ? CKD (chronic kidney disease)    ? Depression    ? Displaced dome fracture of left acetabulum (H)    ? DM2 (diabetes mellitus, type 2) (H)    ? GERD (gastroesophageal reflux disease)    ? Gout    ? HTN (hypertension)    ? Left hemiplegia (H)    ? Migraine    ? 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)    ? Nephrolithiasis    ? Stroke (H)    ? Vitamin B12 deficiency anemia              Family History   Problem Relation Age of Onset   ? Heart disease Mother    ? Hypertension Mother    ? Coronary artery disease Father    ? Diabetes Sister    ? Hypertension Brother    ? Cancer Sister      Social History     Socioeconomic History   ? Marital " status:      Spouse name: None   ? Number of children: None   ? Years of education: None   ? Highest education level: None   Occupational History   ? None   Social Needs   ? Financial resource strain: None   ? Food insecurity:     Worry: None     Inability: None   ? Transportation needs:     Medical: None     Non-medical: None   Tobacco Use   ? Smoking status: Never Smoker   ? Smokeless tobacco: Never Used   Substance and Sexual Activity   ? Alcohol use: Not Currently   ? Drug use: None   ? Sexual activity: None   Lifestyle   ? Physical activity:     Days per week: None     Minutes per session: None   ? Stress: None   Relationships   ? Social connections:     Talks on phone: None     Gets together: None     Attends Congregation service: None     Active member of club or organization: None     Attends meetings of clubs or organizations: None     Relationship status: None   ? Intimate partner violence:     Fear of current or ex partner: None     Emotionally abused: None     Physically abused: None     Forced sexual activity: None   Other Topics Concern   ? None   Social History Narrative   ? None         Review of Systems remainder 13 system ROS negative, otherwise see above    Vitals:    02/13/20 2056   BP: 131/80   Pulse: 72   Resp: 18   Temp: 99  F (37.2  C)   SpO2: 91%   Weight: 167 lb (75.8 kg)       Physical Exam  Patient is alert oriented x3.  She speaks with slow intentional speech pattern but no slurring accurate pronunciation/enunciation with normal content.  Normocephalic atraumatic sclera clear nonicteric EOMI oropharynx is clear tongue movements are normal  Neck is supple.  Mild left-sided weakness in both arm and leg.  Heart is regular in the 70s S1-S2 but she does have a 2 out of 6 systolic murmur the left lower sternal border area.  Lungs are clear she was raising no rales rhonchi or wheezing.  Abdomen is without organomegaly mass or tenderness.  She has a suprapubic scar which is intact with no  bleeding draining erythema or swelling.  She has a distal femoral tiny scar which she says a screw was placed over the medial condyle area with a tiny scab there.    LABS:   [facility] records are not available in care everywhere, I do not know her discharge lab results.    Results for ELAINE STEVENSON (MRN [id number]) as of 2/13/2020 21:16   Ref. Range 2/11/2020 07:00   Sodium Latest Ref Range: 136 - 145 mmol/L 142   Potassium Latest Ref Range: 3.5 - 5.0 mmol/L 5.3 (H)   Chloride Latest Ref Range: 98 - 107 mmol/L 109 (H)   CO2 Latest Ref Range: 22 - 31 mmol/L 23   Anion Gap, Calculation Latest Ref Range: 5 - 18 mmol/L 10   BUN Latest Ref Range: 8 - 22 mg/dL 33 (H)   Creatinine Latest Ref Range: 0.60 - 1.10 mg/dL 0.94   GFR MDRD Af Amer Latest Ref Range: >60 mL/min/1.73m2 >60   GFR MDRD Non Af Amer Latest Ref Range: >60 mL/min/1.73m2 59 (L)   Calcium Latest Ref Range: 8.5 - 10.5 mg/dL 9.1   Magnesium Latest Ref Range: 1.8 - 2.6 mg/dL 1.9   Glucose Latest Ref Range: 70 - 125 mg/dL 90   WBC Latest Ref Range: 4.0 - 11.0 thou/uL 8.3   RBC Latest Ref Range: 3.80 - 5.40 mill/uL 3.50 (L)   Hemoglobin Latest Ref Range: 12.0 - 16.0 g/dL 10.6 (L)   Hematocrit Latest Ref Range: 35.0 - 47.0 % 36.7   MCV Latest Ref Range: 80 - 100 fL 105 (H)   MCH Latest Ref Range: 27.0 - 34.0 pg 30.3   MCHC Latest Ref Range: 32.0 - 36.0 g/dL 28.9 (L)   RDW Latest Ref Range: 11.0 - 14.5 % 14.6 (H)   Platelets Latest Ref Range: 140 - 440 thou/uL 377   MPV Latest Ref Range: 8.5 - 12.5 fL 10.4       ASSESSMENT:      ICD-10-CM    1. S/P ORIF (open reduction internal fixation) fracture Z98.890     Z87.81    2. Fall, subsequent encounter W19.XXXD        Assessment/plan    Multiple falls  Chronic left-sided weakness with left AFO  Acetabular fracture status post ORIF  Lovenox prophylaxis     Patient is doing well overall.  Oxycodone has been recently decreased to 5 mg 3 times daily as needed and patient states this is adequate.  She has acetaminophen  ibuprofen available.  I told her to focus on acetaminophen considering the Lovenox presents as well.    Hyperkalemia    5.3.  Cannot rule out hemolysis though she is on losartan with possible that it was truly high.  -Recheck BMP    Anemia likely acute blood loss type    Hemoglobin 10.6.  She has good appetite, expect spontaneous recovery, recheck in a month    Borderline temperature elevation    I noticed her temperature is 99 on February 7.  It has not been rechecked since.  I asked the nursing staff to check once a day although she is joint no symptoms of infection.    Presumed osteoporosis    -Continue vitamin D, calcium  -Outpatient DEXA scan anticipated, see discharge summary    CVA  Left hemiplegia    Patient is currently off aspirin, presumably will restart it after Lovenox.  Patient to wear her AFO.  Patient has an excellent 6 day/week exercise program which she will return to eventually.    IDDM.       Patient reports some elevated blood sugars after her falls.  Usual program is glargine 30 units plus metformin thousand twice daily blood sugar control has been excellent.  No changes are made.    Hypertension     On losartan plus metoprolol.  Again blood pressure control here has been excellent.  No changes are made    Hyperlipidemia     Takes atorvastatin 40 mg daily.  Noncontributory to current issues, outpatient follow-up    CKD 3     Appears stable, BMP as above    Possible gout     The patient tells me the story of her toe pain and resolution after podiatry did some work which sounds like callus removal, I question whether she truly has it.  At any rate is noncontributory to her current issues.    Depression     Patient reports controlled on Celexa 10 mg daily.  Electronically signed by: Rasta Michael MD

## 2021-06-20 NOTE — LETTER
Letter by Danelle Strickland CNP at      Author: Danelle Strickland CNP Service: -- Author Type: --    Filed:  Encounter Date: 4/17/2020 Status: (Other)         Patient: Addis Peña   MR Number: [id number]   YOB: 1950   Date of Visit: 4/17/2020     [facility]    Facility:   [facility] [[id number]]   Code Status: POLST AVAILABLE  PCP: Danelle Strickland CNP   Phone: [phone]   Fax: [phone]      CHIEF COMPLAINT/REASON FOR VISIT:  Chief Complaint   Patient presents with   ? Discharge Summary       HISTORY COURSE:    HPI: Addis is a 69 y.o. female who was admitted to [facility] from 2/2/20-2/7/20 for left hip ORIF s/p fall at home.  There were not postoperative complications noted in her hospital discharge summary.  It was recommended by her orthopedic provider that she remain on Lovenox for one month postoperatively for DVT prophylaxis.  Addis  has a past medical history of Allergic rhinitis, CKD (chronic kidney disease), Depression, Displaced dome fracture of left acetabulum (H), DM2 (diabetes mellitus, type 2) (H), GERD (gastroesophageal reflux disease), Gout, HTN (hypertension), Left hemiplegia (H), Migraine, 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), Nephrolithiasis, Stroke (H), and Vitamin B12 deficiency anemia.  She is currently at [facility] s/p hospitalization for acute rehabilitation.       Today Ms. Peña is being evaluated for a review of multiple medical conditions prior to TCU discharge.  Addis denied any concerns at this visit and feels that her left pain has been well-controlled with Tylenol 1 g three times a day and her baseline Baclofen 10 mg three times a day.  Her blood pressure has been well-managed on her current antihypertensive regimen with SBP 90-110s during her TCU stay.  She has tolerated discontinuation of her Lantus during her TCU stay.  She is currently taking metformin 1g in  AM and 500 mg at HS with blood glucose range 106-170 over the past month.  Nursing staff reported that Addis continues to need moderate assist with ADLs at this time.  She is looking forward to transferring to another facility for further therapy as she feels that she still has potential to make more progress with ambulation beyond the few steps she has taken at this time.  The patient denied lightheadedness, dizziness, breathing difficulty, chest pain, palpitations, constipation, urinary symptoms, numbness or tingling in extremities, and pain. Nursing staff denied any new concerns for the patient at this time.    Past Medical History:   Diagnosis Date   ? Allergic rhinitis    ? CKD (chronic kidney disease)    ? Depression    ? Displaced dome fracture of left acetabulum (H)    ? DM2 (diabetes mellitus, type 2) (H)    ? GERD (gastroesophageal reflux disease)    ? Gout    ? HTN (hypertension)    ? Left hemiplegia (H)    ? Migraine    ? 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)    ? Nephrolithiasis    ? Stroke (H)    ? Vitamin B12 deficiency anemia       Past Surgical History:   Procedure Laterality Date   ? COLONOSCOPY W/ BIOPSIES AND POLYPECTOMY  05/09/2019    Procedure: COLONOSCOPY, WITH POLYPECTOMY AND BIOPSY; Surgeon: Na Diehl MD; Location: [facility] OR     ? CYSTOSCOPY W/ URETEROSCOPY Right 02/22/2016    Procedure: COMBINED CYSTOSCOPY, URETEROSCOPY; Surgeon: Madelaine Roland MD; Location: [facility] OR     ? ORIF ACETABULUM FRACTURE Left       Social History     Tobacco Use   ? Smoking status: Never Smoker   ? Smokeless tobacco: Never Used   Substance Use Topics   ? Alcohol use: Not Currently      PHYSICAL EXAM:   General Appearance:    Alert, cooperative, no distress, appears stated age   Head:    Normocephalic, without obvious abnormality, atraumatic   Eyes:    PERRL, conjunctiva/corneas clear, both eyes   Ears:    Normal external ear canals, both ears   Nose:   Nares normal,  septum midline, mucosa normal, no drainage    or sinus tenderness   Throat:   Lips, mucosa, and tongue normal; teeth and gums normal   Neck:   Supple, symmetrical, trachea midline, no adenopathy;     thyroid:  no enlargement/tenderness/nodules; no carotid    bruit or JVD   Back:     Symmetric, no curvature, ROM normal, no CVA tenderness   Lungs:     Clear to auscultation bilaterally, respirations unlabored   Chest Wall:    No tenderness or deformity    Heart:    Regular rate and rhythm, S1 and S2 normal, no murmur, rub   or gallop   Abdomen:     Soft, non-tender, bowel sounds active all four quadrants,     no masses, no organomegaly; incision site healed   Extremities:   Extremities normal, atraumatic, no cyanosis or edema; incision site healed on    Pulses:   2+ and symmetric all extremities   Skin:   Skin color, texture, turgor normal, no rashes or lesions   Neurologic:   Oriented to person, place, time, and situation; exhibits logical thought processes; generalized weakness            MEDICATION LIST:  Current Outpatient Medications   Medication Sig   ? acetaminophen (TYLENOL) 500 MG tablet Take 1,000 mg by mouth 3 (three) times a day.    ? amLODIPine (NORVASC) 10 MG tablet Take 10 mg by mouth daily.   ? atorvastatin (LIPITOR) 40 MG tablet Take 40 mg by mouth at bedtime.   ? baclofen (LIORESAL) 10 MG tablet Take 10 mg by mouth 3 (three) times a day.   ? calcium polycarbophiL (FIBERCON) 625 mg tablet Take 1,250 mg by mouth daily.   ? cholecalciferol, vitamin D3, 1,000 unit (25 mcg) tablet Take 1,000 Units by mouth daily.   ? citalopram (CELEXA) 10 MG tablet Take 10 mg by mouth at bedtime.   ? cyanocobalamin 1,000 mcg/mL injection Inject 1,000 mcg into the shoulder, thigh, or buttocks every 30 (thirty) days.   ? lactase 4,500 unit Tab Take 4 tablets by mouth as needed.    ? losartan (COZAAR) 25 MG tablet Take 25 mg by mouth daily.   ? metFORMIN (GLUCOPHAGE) 1000 MG tablet Take 500 mg by mouth 2 (two) times a day  with meals. Take 2 tabs by mouth in AM and 1 tab by mouth at HS.   ? metoprolol succinate (TOPROL-XL) 50 MG 24 hr tablet Take 50 mg by mouth daily.   ? montelukast (SINGULAIR) 10 mg tablet Take 10 mg by mouth daily.       DISCHARGE DIAGNOSIS:    ICD-10-CM    1. Physical deconditioning  R53.81    2. Closed displaced fracture of anterior column of left acetabulum with routine healing, subsequent encounter  S32.432D    3. S/P ORIF (open reduction internal fixation) fracture  Z98.890     Z87.81    4. Left hip pain  M25.552    5. Type 2 diabetes mellitus with other neurologic complication, with long-term current use of insulin (H)  E11.49     Z79.4    6. Essential hypertension  I10       Physical deconditioning s/p left hip ORIF  -Discharge to SNF for further rehabilitation per patient request    Hypertension  -Encouraged cardiac diet, low sodium diet, exercise and stress reduction techniques.   -Emphasized importance of blood pressure control.   -Continue current medications as prescribed.   -Notify provider if pt's SBP<90 or >180 and DBP <50 or >100     DMII  -DISCONTINUED Lantus on 3/12/20  -Continue metoformin to 1 g in AM and 500 mg at HS  -Continue to monitor BG two times a day   -Notify provider if BG < 70 or > 500    Left hip pain  -Continue Tylenol 1 g three times a day  -Continue Baclofen 10 mg three times a day   -Encouraged patient to utilize integrative therapies such as distraction and deep breathing for pain management  -Notify provider if patient has new or worsening pain     Otherwise continue current care plan for all other chronic medical conditions, as they are stable. Encouraged patient to engage in healthy lifestyle behaviors such as engaging in social activities, exercising (PT/OT), eating well, and following care plan. Follow up for routine check-up, or sooner if needed. Will continue to monitor patient and work with nursing staff collaboratively to work toward positive patient outcomes.        MEDICAL EQUIPMENT NEEDS:    The patient has mobility limitation significantly impairing her ability to participate in mobility-related activities of daily living, such as toileting, feeding, dressing, grooming, and bathing in customary locations in the home. The mobility limitation cannot be sufficiently and safely resolved by use of an appropriately fitted walker. The patient or caregiver is able to safely use a walker. The patient's functional mobility deficit can be sufficiently resolved by the use of a walker.  The patient has mobility limitations that impairs her ability to perform ADLs without use of a walker to be mobile in the home.      DISCHARGE PLAN/FACE TO FACE:  I certify that services are/were furnished while this patient was under the care of a physician and that a physician or an allowed non-physician practitioner (NPP), had a face-to-face encounter that meets the physician face-to-face encounter requirements. The encounter was in whole, or in part, related to the primary reason for home health. The patient is confined to her home and needs intermittent skilled nursing, physical therapy, speech-language pathology, or the continued need for occupational therapy. A plan of care has been established by a physician and is periodically reviewed by a physician.  Date of Face-to-Face Encounter: 4/17/20    I certify that, based on my findings, the following services are medically necessary home health services: home health aid for bathing and ADLs, skilled nursing (RN) for medication set-up and teaching, symptoms and disease process monitoring and education, PT for strengthening, balance, endurance and safety within the home, OT for strengthening, ADL needs, adaptive equipment and safety.     My clinical findings support the need for the above skilled services because: (Please write a brief narrative summary that describes what the RN, PT, SLP, or other services will be doing in the home. A list of  diagnoses in this section does not meet the CMS requirements.) home health aid for bathing and ADLs, skilled nursing (RN) for medication set-up and teaching, symptoms and disease process monitoring and education, PT for strengthening, balance, endurance and safety within the home, OT for strengthening, ADL needs, adaptive equipment and safety.     This patient is homebound because: (Please write a brief narrative summary describing the functional limitations as to why this patient is homebound and specifically what makes this patient homebound.) she is a frail elderly woman with multiple comorbidities and recent hospitalizations making it unsafe for her to go out into the community for services.     The patient is, or has been, under my care and I have initiated the establishment of the plan of care. This patient will be followed by a physician who will periodically review the plan of care.    Schedule follow up visit with primary care provider within 7 days to reestablish care.    Electronically signed by: Danelle Strickland CNP     Post Discharge Medication Reconciliation Status: discharge medications reconciled, continue medications without change

## 2021-06-20 NOTE — LETTER
Letter by Octavia Serna CNP at      Author: Octavia Serna CNP Service: -- Author Type: --    Filed:  Encounter Date: 5/26/2020 Status: (Other)         Patient: Addis Peña   MR Number: [id number]   YOB: 1950   Date of Visit: 5/26/2020     [facility]    Facility:   [facility] [[id number]]   Code Status: POLST AVAILABLE      CHIEF COMPLAINT/REASON FOR VISIT:  Chief Complaint   Patient presents with   ? Review Of Multiple Medical Conditions     Physical deconditioning, seizure disorder, status post stroke, diabetes       HISTORY:      HPI: Addis is a 69 y.o. female who  has a past medical history of Allergic rhinitis, CKD (chronic kidney disease), Depression, Displaced dome fracture of left acetabulum (H), DM2 (diabetes mellitus, type 2) (H), GERD (gastroesophageal reflux disease), Gout, HTN (hypertension), Left hemiplegia (H), Migraine, 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), Nephrolithiasis, Seizure disorder (H), Stroke (H), and Vitamin B12 deficiency anemia. Addis was recently transferred from [facility]. She was undergoing acute rehab after suffering a hip fracture and undergoing ORIF repair.     Today Addis is being evaluated for a routine review of multiple medical problems while in the TCU.  She is also requesting evaluation of her current diabetes regimen and blood sugar checks.  We discussed blood sugar checks as she is refusing the evening time blood sugar checked.  She states that it is always well within normal range and she has never taken it that many times before.  She states that her fingers are quite sore and feels that a reprieve from at least 1 of the polyps would be great.  She otherwise states that she has been doing well and does not have any new concerns.  Her physical and occupational therapy are going well and she does have some return of use and function to her left arm.   Addis has been eating, drinking and eliminating well.  She denies any symptoms including fevers/chills, cough or cold symptoms, headaches, vision changes, chest pain/pressure, difficulty breathing, SOB, abdominal pain, nausea, vomiting, diarrhea, increasing or ongoing weakness past baseline, increasing pain.     Past Medical History:   Diagnosis Date   ? Allergic rhinitis    ? CKD (chronic kidney disease)    ? Depression    ? Displaced dome fracture of left acetabulum (H)    ? DM2 (diabetes mellitus, type 2) (H)    ? GERD (gastroesophageal reflux disease)    ? Gout    ? HTN (hypertension)    ? Left hemiplegia (H)    ? Migraine    ? 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)    ? Nephrolithiasis    ? Seizure disorder (H)    ? Stroke (H)    ? Vitamin B12 deficiency anemia              Family History   Problem Relation Age of Onset   ? Heart disease Mother    ? Hypertension Mother    ? Coronary artery disease Father    ? Diabetes Sister    ? Hypertension Brother    ? Cancer Sister      Social History     Socioeconomic History   ? Marital status:      Spouse name: Not on file   ? Number of children: Not on file   ? Years of education: Not on file   ? Highest education level: Not on file   Occupational History   ? Not on file   Social Needs   ? Financial resource strain: Not on file   ? Food insecurity     Worry: Not on file     Inability: Not on file   ? Transportation needs     Medical: Not on file     Non-medical: Not on file   Tobacco Use   ? Smoking status: Never Smoker   ? Smokeless tobacco: Never Used   Substance and Sexual Activity   ? Alcohol use: Not Currently   ? Drug use: Not on file   ? Sexual activity: Not on file   Lifestyle   ? Physical activity     Days per week: Not on file     Minutes per session: Not on file   ? Stress: Not on file   Relationships   ? Social connections     Talks on phone: Not on file     Gets together: Not on file     Attends Latter day service: Not on file      Active member of club or organization: Not on file     Attends meetings of clubs or organizations: Not on file     Relationship status: Not on file   ? Intimate partner violence     Fear of current or ex partner: Not on file     Emotionally abused: Not on file     Physically abused: Not on file     Forced sexual activity: Not on file   Other Topics Concern   ? Not on file   Social History Narrative   ? Not on file       REVIEW OF SYSTEM:  Per HPI    PHYSICAL EXAM:   /73   Pulse 72   Temp 97.7  F (36.5  C)   Resp 16   Wt 142 lb 12.8 oz (64.8 kg)   SpO2 93%     A limited exam was performed due to recommendations for care during COVID-19 pandemic. Due to the 2020 COVID-19 pandemic, except as noted above, the patient was visually observed at a 6 foot plus distance.  An observational exam was performed in an effort to keep patient safe from COVID-19 and other communicable diseases.     General appearance: alert, appears stated age and cooperative  HEENT: Head is normocephalic with normal hair distribution. No evidence of trauma. Ears: Without lesions or deformity. No acute purulent discharge. Eyes: Conjunctivae pink with no scleral icterus or erythema. Nose: Normal. Oropharnyx: mmm.  Lungs: respirations without effort.  Extremities: extremities normal, atraumatic, no cyanosis.  Skin: Skin color, texture normal. No rashes or lesions on exposed skin.   Neurologic: Grossly normal   Psych: interacts well with caregivers, exhibits logical thought processes and connections, pleasant.      LABS:   None today.     ASSESSMENT:      ICD-10-CM    1. Physical deconditioning  R53.81    2. Seizure disorder (H)  G40.909    3. Type 2 diabetes mellitus with other neurologic complication, with long-term current use of insulin (H)  E11.49     Z79.4        PLAN:    Care plan reviewed and remains appropriate.     Physical Deconditioning, old CVA  -Continue PT/OT and other therapies as per care plan.  -Encouraged good nutrition  and movement habits.   -Discussed care plan and expected course of stay.   -Continue to follow-up per routine schedule or sooner if needed.     Seizure disorder  -Baclofen 10 mg by mouth 3 times daily.  -Keppra 250 mg tablets, take 5 tablets by mouth twice daily.  -Phenytoin 200 mg by mouth twice daily.    DMII  -Blood sugars with meals.   -Glargine 40 units subcutaneously in the morning.  -Insulin lispro per sliding scale 3 times a day with meals.  -Follow-up as needed.    Otherwise continue current care plan for all other chronic medical conditions, as they are stable. Encouraged patient to engage in healthy lifestyle behaviors such as engaging in social activities, exercising (PT/OT), eating well, and following care plan. Follow up for routine check-up, or sooner if needed. Will continue to monitor patient and work with nursing staff collaboratively to work toward positive patient outcomes.    Electronically signed by: Octavia Serna CNP

## 2021-06-20 NOTE — LETTER
Letter by Danelle Strickland CNP at      Author: Danelle Strickland CNP Service: -- Author Type: --    Filed:  Encounter Date: 3/12/2020 Status: (Other)         Patient: Addis Peña   MR Number: [id number]   YOB: 1950   Date of Visit: 3/12/2020     [facility]    Facility:   [facility] [[id number]]   Code Status: POLST AVAILABLE      CHIEF COMPLAINT/REASON FOR VISIT:  Chief Complaint   Patient presents with   ? Problem Visit     DM type 2       HISTORY:      HPI: Addsi is a 69 y.o. female who was admitted to [facility] from 2/2/20-2/7/20 for left hip ORIF s/p fall at home.  There were not postoperative complications noted in her hospital discharge summary.  It was recommended by her orthopedic provider that she remain on Lovenox for one month postoperatively for DVT prophylaxis.  Addis  has a past medical history of Allergic rhinitis, CKD (chronic kidney disease), Depression, Displaced dome fracture of left acetabulum (H), DM2 (diabetes mellitus, type 2) (H), GERD (gastroesophageal reflux disease), Gout, HTN (hypertension), Left hemiplegia (H), Migraine, 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), Nephrolithiasis, Stroke (H), and Vitamin B12 deficiency anemia.  She is currently at [facility] s/p hospitalization for acute rehabilitation.      Today Ms. Peña is being evaluated for diabetes mellitus type 2.  Addis reported that she would like to be off insulin altogether if possible.  Her blood glucose range has been 107-170 over the past week taking Lantus 5 units daily and metformin 1 g two times a day for DM management.  She was agreeable to plan to discontinue Lantus at this time with continued two times a day blood glucose monitoring.  She continues to work with therapy services on strength and conditioning with current plan to remain at [facility] for continued rehabilitation.  The patient denied  lightheadedness, dizziness, breathing difficulty, chest pain, palpitations, constipation, urinary symptoms, numbness or tingling in extremities, and pain. Nursing staff denied any new concerns for the patient at this time and feel that they have been at their baseline functioning over the past month.     Past Medical History:   Diagnosis Date   ? Allergic rhinitis    ? CKD (chronic kidney disease)    ? Depression    ? Displaced dome fracture of left acetabulum (H)    ? DM2 (diabetes mellitus, type 2) (H)    ? GERD (gastroesophageal reflux disease)    ? Gout    ? HTN (hypertension)    ? Left hemiplegia (H)    ? Migraine    ? 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)    ? Nephrolithiasis    ? Stroke (H)    ? Vitamin B12 deficiency anemia              Family History   Problem Relation Age of Onset   ? Heart disease Mother    ? Hypertension Mother    ? Coronary artery disease Father    ? Diabetes Sister    ? Hypertension Brother    ? Cancer Sister      Social History     Socioeconomic History   ? Marital status:      Spouse name: Not on file   ? Number of children: Not on file   ? Years of education: Not on file   ? Highest education level: Not on file   Occupational History   ? Not on file   Social Needs   ? Financial resource strain: Not on file   ? Food insecurity     Worry: Not on file     Inability: Not on file   ? Transportation needs     Medical: Not on file     Non-medical: Not on file   Tobacco Use   ? Smoking status: Never Smoker   ? Smokeless tobacco: Never Used   Substance and Sexual Activity   ? Alcohol use: Not Currently   ? Drug use: Not on file   ? Sexual activity: Not on file   Lifestyle   ? Physical activity     Days per week: Not on file     Minutes per session: Not on file   ? Stress: Not on file   Relationships   ? Social connections     Talks on phone: Not on file     Gets together: Not on file     Attends Tenriism service: Not on file     Active member of club or  organization: Not on file     Attends meetings of clubs or organizations: Not on file     Relationship status: Not on file   ? Intimate partner violence     Fear of current or ex partner: Not on file     Emotionally abused: Not on file     Physically abused: Not on file     Forced sexual activity: Not on file   Other Topics Concern   ? Not on file   Social History Narrative   ? Not on file       REVIEW OF SYSTEM:  Pertinent items are noted in HPI.  A 12 point comprehensive review of systems was negative except as noted.    PHYSICAL EXAM:     General Appearance:    Alert, cooperative, no distress, appears stated age   Head:    Normocephalic, without obvious abnormality, atraumatic   Eyes:    PERRL, conjunctiva/corneas clear, both eyes   Ears:    Normal external ear canals, both ears   Nose:   Nares normal, septum midline, mucosa normal, no drainage    or sinus tenderness   Throat:   Lips, mucosa, and tongue normal; teeth and gums normal   Neck:   Supple, symmetrical, trachea midline, no adenopathy;     thyroid:  no enlargement/tenderness/nodules; no carotid    bruit or JVD   Back:     Symmetric, no curvature, ROM normal, no CVA tenderness   Lungs:     Clear to auscultation bilaterally, respirations unlabored   Chest Wall:    No tenderness or deformity    Heart:    Regular rate and rhythm, S1 and S2 normal, no murmur, rub   or gallop   Abdomen:     Soft, non-tender, bowel sounds active all four quadrants,     no masses, no organomegaly; incision site CDI with glue   Extremities:   Extremities normal, atraumatic, no cyanosis or edema; incision site CDI   Pulses:   2+ and symmetric all extremities   Skin:   Skin color, texture, turgor normal, no rashes or lesions   Neurologic:   Oriented to person, place, time, and situation; exhibits logical thought processes; generalized weakness           LABS:     Lab Results   Component Value Date    WBC 8.3 02/11/2020    HGB 10.6 (L) 02/11/2020    HCT 36.7 02/11/2020      02/11/2020     02/14/2020    K 4.6 02/14/2020     (H) 02/11/2020    CREATININE 0.76 02/14/2020    BUN 33 (H) 02/11/2020    CO2 23 02/11/2020        ASSESSMENT:      ICD-10-CM    1. Physical deconditioning  R53.81    2. Type 2 diabetes mellitus with other neurologic complication, with long-term current use of insulin (H)  E11.49     Z79.4        PLAN:      Physical deconditioning  -PT/OT as directed  -Discharge from facility per therapy recommendations on WBAT    DMII  -DISCONTINUE Lantus  -Continue metformin 1 g two times a day   -Notify provider if BG < 70 or > 500    Otherwise continue current care plan for all other chronic medical conditions, as they are stable. Encouraged patient to engage in healthy lifestyle behaviors such as engaging in social activities, exercising (PT/OT), eating well, and following care plan. Follow up for routine check-up, or sooner if needed. Will continue to monitor patient and work with nursing staff collaboratively to work toward positive patient outcomes.     Electronically signed by: Danelle Strickland, CNP

## 2021-06-20 NOTE — LETTER
Letter by Danelle Strickland CNP at      Author: Danelle Strickland CNP Service: -- Author Type: --    Filed:  Encounter Date: 2/20/2020 Status: (Other)         Patient: Addis Peña   MR Number: [id number]   YOB: 1950   Date of Visit: 2/20/2020     [facility]    Facility:   [facility] [[id number]]   Code Status: POLST AVAILABLE      CHIEF COMPLAINT/REASON FOR VISIT:  Chief Complaint   Patient presents with   ? Review Of Multiple Medical Conditions       HISTORY:      HPI: Addis is a 69 y.o. female who was admitted to [facility] from 2/2/20-2/7/20 for left hip ORIF s/p fall at home.  There were not postoperative complications noted in her hospital discharge summary.  It was recommended by her orthopedic provider that she remain on Lovenox for one month postoperatively for DVT prophylaxis.  Addis  has a past medical history of Allergic rhinitis, CKD (chronic kidney disease), Depression, Displaced dome fracture of left acetabulum (H), DM2 (diabetes mellitus, type 2) (H), GERD (gastroesophageal reflux disease), Gout, HTN (hypertension), Left hemiplegia (H), Migraine, 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), Nephrolithiasis, Stroke (H), and Vitamin B12 deficiency anemia.  She is currently at [facility] s/p hospitalization for acute rehabilitation.      Today Ms. Peña is being evaluated for a review of multiple medical conditions.  Nursing staff noted that Addis's blood glucose in the AM has been in 80s over the past few days taking Lantus 30 units daily in AM and 1 g metformin two times a day for blood glucose management.  Addis was agreeable to decrease in her Lantus at this visit.  Her blood pressure has been well-controlled since TCU admission with -130s on her current antihypertensive regimen.  She continues to have moderate confusion per nursing staff with forgetfulness.  The patient denied sleep  disturbances, changes in mood or appetite,  lightheadedness, dizziness, breathing difficulty, chest pain, palpitations, constipation, urinary symptoms, numbness or tingling in extremities, and pain.  Nursing staff denied any other concerns for the patient at this time and feel that they have been at their baseline functioning over the past month.     Past Medical History:   Diagnosis Date   ? Allergic rhinitis    ? CKD (chronic kidney disease)    ? Depression    ? Displaced dome fracture of left acetabulum (H)    ? DM2 (diabetes mellitus, type 2) (H)    ? GERD (gastroesophageal reflux disease)    ? Gout    ? HTN (hypertension)    ? Left hemiplegia (H)    ? Migraine    ? 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)    ? Nephrolithiasis    ? Stroke (H)    ? Vitamin B12 deficiency anemia              Family History   Problem Relation Age of Onset   ? Heart disease Mother    ? Hypertension Mother    ? Coronary artery disease Father    ? Diabetes Sister    ? Hypertension Brother    ? Cancer Sister      Social History     Socioeconomic History   ? Marital status:      Spouse name: None   ? Number of children: None   ? Years of education: None   ? Highest education level: None   Occupational History   ? None   Social Needs   ? Financial resource strain: None   ? Food insecurity:     Worry: None     Inability: None   ? Transportation needs:     Medical: None     Non-medical: None   Tobacco Use   ? Smoking status: Never Smoker   ? Smokeless tobacco: Never Used   Substance and Sexual Activity   ? Alcohol use: Not Currently   ? Drug use: None   ? Sexual activity: None   Lifestyle   ? Physical activity:     Days per week: None     Minutes per session: None   ? Stress: None   Relationships   ? Social connections:     Talks on phone: None     Gets together: None     Attends Zoroastrianism service: None     Active member of club or organization: None     Attends meetings of clubs or organizations: None      Relationship status: None   ? Intimate partner violence:     Fear of current or ex partner: None     Emotionally abused: None     Physically abused: None     Forced sexual activity: None   Other Topics Concern   ? None   Social History Narrative   ? None       REVIEW OF SYSTEM:  Pertinent items are noted in HPI.  A 12 point comprehensive review of systems was negative except as noted.    PHYSICAL EXAM:     General Appearance:    Alert, cooperative, no distress, appears stated age   Head:    Normocephalic, without obvious abnormality, atraumatic   Eyes:    PERRL, conjunctiva/corneas clear, both eyes   Ears:    Normal external ear canals, both ears   Nose:   Nares normal, septum midline, mucosa normal, no drainage    or sinus tenderness   Throat:   Lips, mucosa, and tongue normal; teeth and gums normal   Neck:   Supple, symmetrical, trachea midline, no adenopathy;     thyroid:  no enlargement/tenderness/nodules; no carotid    bruit or JVD   Back:     Symmetric, no curvature, ROM normal, no CVA tenderness   Lungs:     Clear to auscultation bilaterally, respirations unlabored   Chest Wall:    No tenderness or deformity    Heart:    Regular rate and rhythm, S1 and S2 normal, no murmur, rub   or gallop   Abdomen:     Soft, non-tender, bowel sounds active all four quadrants,     no masses, no organomegaly; incision site CDI with glue   Extremities:   Extremities normal, atraumatic, no cyanosis or edema; incision site CDI   Pulses:   2+ and symmetric all extremities   Skin:   Skin color, texture, turgor normal, no rashes or lesions   Neurologic:   Oriented to person, place, time, and situation; exhibits logical thought processes; generalized weakness           LABS:     Lab Results   Component Value Date    WBC 8.3 02/11/2020    HGB 10.6 (L) 02/11/2020    HCT 36.7 02/11/2020     02/11/2020     02/14/2020    K 4.6 02/14/2020     (H) 02/11/2020    CREATININE 0.76 02/14/2020    BUN 33 (H) 02/11/2020    CO2  23 02/11/2020        ASSESSMENT:      ICD-10-CM    1. Type 2 diabetes mellitus with other neurologic complication, with long-term current use of insulin (H) E11.49     Z79.4    2. Essential hypertension I10    3. Physical deconditioning R53.81        PLAN:      Physical deconditioning  -PT/OT as directed  -Discharge from facility per therapy recommendations     Hypertension  -Encouraged cardiac diet, low sodium diet, exercise and stress reduction techniques.   -Emphasized importance of blood pressure control.   -Continue current medications as prescribed.   -Notify provider if pt's SBP<90 or >180 and DBP <50 or >100     DMII  -DECREASE Lantus from 30 units to 25 units daily  -Continue metformin 1 g two times a day   -Notify provider if BG < 70 or > 500    Otherwise continue current care plan for all other chronic medical conditions, as they are stable. Encouraged patient to engage in healthy lifestyle behaviors such as engaging in social activities, exercising (PT/OT), eating well, and following care plan. Follow up for routine check-up, or sooner if needed. Will continue to monitor patient and work with nursing staff collaboratively to work toward positive patient outcomes.     Electronically signed by: Danelle Strickland, CNP

## 2021-06-20 NOTE — LETTER
Letter by Octavia Serna CNP at      Author: Octavia Serna CNP Service: -- Author Type: --    Filed:  Encounter Date: 5/18/2020 Status: (Other)         Patient: Addis Peña   MR Number: [id number]   YOB: 1950   Date of Visit: 5/18/2020     [facility]    Facility:   [facility] [[id number]]   Code Status: POLST AVAILABLE      CHIEF COMPLAINT/REASON FOR VISIT:  Chief Complaint   Patient presents with   ? Review Of Multiple Medical Conditions     Physical deconditioning, seizure disorder, UTI       HISTORY:      HPI: Addis is a 69 y.o. female who  has a past medical history of Allergic rhinitis, CKD (chronic kidney disease), Depression, Displaced dome fracture of left acetabulum (H), DM2 (diabetes mellitus, type 2) (H), GERD (gastroesophageal reflux disease), Gout, HTN (hypertension), Left hemiplegia (H), Migraine, 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), Nephrolithiasis, Seizure disorder (H), Stroke (H), and Vitamin B12 deficiency anemia. Addis was recently transferred from [facility]. She was undergoing acute rehab after suffering a hip fracture and undergoing ORIF repair.     Today Addis is being evaluated for a routine review of multiple medical problems while in the TCU.  Addis states that she is doing very well.  We did discuss trembling and seizure-like activity as well as spasms.  She states that she is doing quite well and has not had any of the symptoms.  It does appear that her UTI has fully resolved.  She has been working with PT and OT and feels that it is going well.  She has no new aches or pains.  She has been eating, drinking and eliminating well.  She denies any symptoms including fevers/chills, cough or cold symptoms, headaches, vision changes, chest pain/pressure, difficulty breathing, SOB, abdominal pain, nausea, vomiting, diarrhea, increasing or ongoing weakness past baseline,  increasing pain.     Past Medical History:   Diagnosis Date   ? Allergic rhinitis    ? CKD (chronic kidney disease)    ? Depression    ? Displaced dome fracture of left acetabulum (H)    ? DM2 (diabetes mellitus, type 2) (H)    ? GERD (gastroesophageal reflux disease)    ? Gout    ? HTN (hypertension)    ? Left hemiplegia (H)    ? Migraine    ? 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)    ? Nephrolithiasis    ? Seizure disorder (H)    ? Stroke (H)    ? Vitamin B12 deficiency anemia              Family History   Problem Relation Age of Onset   ? Heart disease Mother    ? Hypertension Mother    ? Coronary artery disease Father    ? Diabetes Sister    ? Hypertension Brother    ? Cancer Sister      Social History     Socioeconomic History   ? Marital status:      Spouse name: Not on file   ? Number of children: Not on file   ? Years of education: Not on file   ? Highest education level: Not on file   Occupational History   ? Not on file   Social Needs   ? Financial resource strain: Not on file   ? Food insecurity     Worry: Not on file     Inability: Not on file   ? Transportation needs     Medical: Not on file     Non-medical: Not on file   Tobacco Use   ? Smoking status: Never Smoker   ? Smokeless tobacco: Never Used   Substance and Sexual Activity   ? Alcohol use: Not Currently   ? Drug use: Not on file   ? Sexual activity: Not on file   Lifestyle   ? Physical activity     Days per week: Not on file     Minutes per session: Not on file   ? Stress: Not on file   Relationships   ? Social connections     Talks on phone: Not on file     Gets together: Not on file     Attends Gnosticist service: Not on file     Active member of club or organization: Not on file     Attends meetings of clubs or organizations: Not on file     Relationship status: Not on file   ? Intimate partner violence     Fear of current or ex partner: Not on file     Emotionally abused: Not on file     Physically abused: Not on  file     Forced sexual activity: Not on file   Other Topics Concern   ? Not on file   Social History Narrative   ? Not on file       REVIEW OF SYSTEM:  Per HPI    PHYSICAL EXAM:   /50   Pulse 68   Temp 97.7  F (36.5  C)   Resp 18   Wt 140 lb 9.6 oz (63.8 kg)   SpO2 96%     A limited exam was performed due to recommendations for care during COVID-19 pandemic. Due to the 2020 COVID-19 pandemic, except as noted above, the patient was visually observed at a 6 foot plus distance.  An observational exam was performed in an effort to keep patient safe from COVID-19 and other communicable diseases.     General appearance: alert, appears stated age and cooperative  HEENT: Head is normocephalic with normal hair distribution. No evidence of trauma. Ears: Without lesions or deformity. No acute purulent discharge. Eyes: Conjunctivae pink with no scleral icterus or erythema. Nose: Normal. Oropharnyx: mmm.  Lungs: respirations without effort.  Extremities: extremities normal, atraumatic, no cyanosis.  Skin: Skin color, texture normal. No rashes or lesions on exposed skin.   Neurologic: Grossly normal   Psych: interacts well with caregivers, exhibits logical thought processes and connections, pleasant.      LABS:   None today.     ASSESSMENT:      ICD-10-CM    1. Seizure disorder (H)  G40.909    2. Physical deconditioning  R53.81    3. Acute cystitis without hematuria  N30.00        PLAN:    Physical Deconditioning, old CVA  -Continue PT/OT and other therapies as per care plan.  -Encouraged good nutrition and movement habits.   -Discussed care plan and expected course of stay.   -Continue to follow-up per routine schedule or sooner if needed.     Seizure disorder  -Baclofen 10 mg by mouth 3 times daily.  -Keppra 250 mg tablets, take 5 tablets by mouth twice daily.  -Phenytoin 200 mg by mouth twice daily.    Left acetabular fracture  -Tylenol 1 g every 6 hours as needed for pain.    UTI--no further symptoms  -Encourage  fluids.   -Tylenol 1000 mg by mouth ever 6 hours as needed.   -Doxycycline just finishing- UTI considered resolved.  -Follow up as needed.     Otherwise continue current care plan for all other chronic medical conditions, as they are stable. Encouraged patient to engage in healthy lifestyle behaviors such as engaging in social activities, exercising (PT/OT), eating well, and following care plan. Follow up for routine check-up, or sooner if needed. Will continue to monitor patient and work with nursing staff collaboratively to work toward positive patient outcomes.    Electronically signed by: Octavia Serna CNP

## 2021-06-20 NOTE — LETTER
Letter by Octavia Serna CNP at      Author: Octavia Serna CNP Service: -- Author Type: --    Filed:  Encounter Date: 9/11/2020 Status: (Other)         Patient: Addis Peña   MR Number: [id number]   YOB: 1950   Date of Visit: 9/11/2020     [facility]    Facility:   [facility] [[id number]]   Code Status: DNR/DNI and POLST AVAILABLE  PCP: Wegener, Joel D, MD   Phone: [phone]   Fax: [phone]      CHIEF COMPLAINT/REASON FOR VISIT:  Chief Complaint   Patient presents with   ? Discharge Summary       HISTORY COURSE:  Addis is a 69 y.o. female who  has a past medical history of Allergic rhinitis, CKD (chronic kidney disease), Depression, Displaced dome fracture of left acetabulum (H), DM2 (diabetes mellitus, type 2) (H), GERD (gastroesophageal reflux disease), Gout, HTN (hypertension), Left hemiplegia (H), Migraine, 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), Nephrolithiasis, Seizure disorder (H), Stroke (H), and Vitamin B12 deficiency anemia. Addis was transferred from [facility]. She was undergoing acute rehab after suffering a hip fracture and undergoing ORIF repair.  Addis underwent acute rehabilitation at [facility], was stabilized and transferred to long-term care.  She never wanted to stay in long-term care.  This was essentially a means to getting all of her ducks in a row in order to discharge home.    Today Addis is being evaluated for a discharge from the long term care unit.  She has had a plan to discharge that was pushed back due to several equipment needs.  Addis is now ready to go home.  The final needs include a AFO/KAFO and a hospital bed.  This evaluation serves as a final discharge examination and face-to-face for these equipment needs.  She continues to states she is doing well.  She has no new concerns or issues.  She is very, very happy to be going home.  She  continues to work with therapies to ensure all of her needs are met.  Blood pressure, blood sugar have been very well controlled. She denies any seizure activity.  Nursing staff did not have any new concerns or issues to report.  She denies any symptoms including fevers/chills, cough or cold symptoms, headaches, vision changes, chest pain/pressure, difficulty breathing, SOB, abdominal pain, nausea, vomiting, diarrhea, increasing or ongoing weakness past baseline, increasing pain.     PHYSICAL EXAM:   /76   Pulse 84   Temp 97.7  F (36.5  C)   Resp 18   Wt 141 lb 12.8 oz (64.3 kg)   SpO2 95%     A limited exam was performed due to recommendations for care during COVID-19 pandemic. Due to the 2020 COVID-19 pandemic, except as noted above, the patient was visually observed at a 6 foot plus distance.  An observational exam was performed in an effort to keep patient safe from COVID-19 and other communicable diseases.     General appearance: alert, appears stated age and cooperative  HEENT: Head is normocephalic with normal hair distribution. No evidence of trauma. Ears: Without lesions or deformity. No acute purulent discharge. Eyes: Conjunctivae pink with no scleral icterus or erythema. Nose: Normal. Oropharnyx: mmm.  Lungs: respirations without effort.  Extremities: extremities normal, atraumatic, no cyanosis.  Musculoskeletal: Left knee hyperextension, pes planus, foot drop and toeing out present.  Given her significant pes planus foot deformity it alters the position of the ankle joint.  These issues resulted in significant instability of the ankle and foot as a result.  Skin: Skin color, texture normal. No rashes or lesions on exposed skin.   Neurologic: Grossly normal   Psych: interacts well with caregivers, exhibits logical thought processes and connections, pleasant.    MEDICATION LIST:  Current Outpatient Medications   Medication Sig   ? acetaminophen (TYLENOL) 500 MG tablet Take 1,000 mg by mouth 3  (three) times a day.    ? amLODIPine (NORVASC) 10 MG tablet Take 10 mg by mouth daily.   ? aspirin 81 MG EC tablet Take 81 mg by mouth daily.   ? atorvastatin (LIPITOR) 40 MG tablet Take 40 mg by mouth at bedtime.   ? baclofen (LIORESAL) 10 MG tablet Take 10 mg by mouth 3 (three) times a day.   ? bisacodyL (DULCOLAX) 5 mg EC tablet Take 5 mg by mouth daily as needed for constipation.   ? cholecalciferol, vitamin D3, 1,000 unit (25 mcg) tablet Take 1,000 Units by mouth daily.   ? citalopram (CELEXA) 10 MG tablet Take 10 mg by mouth at bedtime.   ? cyanocobalamin 1,000 mcg/mL injection Inject 1,000 mcg into the shoulder, thigh, or buttocks every 30 (thirty) days.   ? ibuprofen (ADVIL,MOTRIN) 200 MG tablet Take 200 mg by mouth every 6 (six) hours as needed for pain.   ? insulin glargine (BASAGLAR KWIKPEN) 100 unit/mL (3 mL) pen Inject 35 Units under the skin daily.    ? insulin lispro (HUMALOG) 100 unit/mL injection Inject under the skin 3 (three) times a day before meals. Per Sliding Scale;  If Blood Sugar is 71 to 150, give 0 Units.  If Blood Sugar is 151 to 200, give 2 Units.  If Blood Sugar is 201 to 250, give 4 Units.  If Blood Sugar is 251 to 300, give 8 Units.  subcutaneous   ? lactase 4,500 unit Tab Take 4 tablets by mouth as needed.    ? levETIRAcetam (KEPPRA) 500 MG tablet Take 1,250 mg by mouth 2 (two) times a day.    ? losartan (COZAAR) 25 MG tablet Take 25 mg by mouth daily.   ? magnesium oxide 250 mg magnesium Tab Take 250 mg by mouth.   ? metFORMIN (GLUCOPHAGE) 1000 MG tablet Take 1,000 mg by mouth 2 (two) times a day with meals. Take 2 tabs by mouth in AM and 1 tab by mouth at HS.   ? metoprolol succinate (TOPROL-XL) 50 MG 24 hr tablet Take 50 mg by mouth daily.   ? montelukast (SINGULAIR) 10 mg tablet Take 10 mg by mouth at bedtime.   ? phenytoin extended (DILANTIN) 200 MG ER capsule Take 200 mg by mouth 2 (two) times a day.    ? senna-docusate (SENNOSIDES-DOCUSATE SODIUM) 8.6-50 mg tablet Take 1  tablet by mouth daily.       DISCHARGE DIAGNOSIS:    ICD-10-CM    1. CVA, old,2010  I69.993    2. Left hemiplegia (H)  G81.94    3. Physical deconditioning  R53.81    4. Seizure disorder (H)  G40.909    5. Type 2 diabetes mellitus with other neurologic complication, with long-term current use of insulin (H)  E11.49     Z79.4    6. Essential hypertension  I10      Physical Deconditioning, old CVA  -Continue PT/OT and other therapies as per care plan.  -Orthotist to eval and treat for AFO/KAFO. Orders below in DME.  -Hospital bed, orders below in DME section.  -Encouraged good nutrition and movement habits.   -Discussed care plan and expected course of stay.   -Continue to follow-up per routine schedule or sooner if needed.     Seizure disorder  -Baclofen 10 mg by mouth 3 times daily.  -Keppra 250 mg tablets, take 5 tablets by mouth twice daily.  -Phenytoin 200 mg by mouth twice daily.    DMII  -Blood sugars with meals.   -Glargine 35 units subcutaneously in the morning.  -Insulin lispro per sliding scale 3 times a day with meals.  -Metformin 1000 mg by mouth two times a day.   -Follow-up as needed.    Hypertension  -Amlodipine 10 mg by mouth daily at bedside.   -Lisinopril 25 mg by mouth daily.   -Toprol XL 50 mg by mouth daily.   -Encouraged cardiac diet, low sodium diet, exercise and stress reduction techniques.   -Emphasized importance of blood pressure control.   -Continue current medications as prescribed.   -Follow up per routine schedule or sooner with any complaints or concerns.    Otherwise continue current care plan for all other chronic medical conditions, as they are stable. Encouraged patient to engage in healthy lifestyle behaviors such as engaging in social activities, exercising (PT/OT), eating well, and following care plan. Follow up for routine check-up, or sooner if needed. Will continue to monitor patient and work with nursing staff collaboratively to work toward positive patient  outcomes.    MEDICAL EQUIPMENT NEEDS:  DME Order  Hospital Bed   Hospital bed is required for body positioning, to allow for safe transfers to wheelchair and standing and frequent changes in body position, not feasible in an ordinary bed.     1. Does the patient require positioning of the body in ways not feasible with an ordinary bed due to a medical condition that is expected to last at least 1 month? Yes, she has a history of CVA with left-sided hemiplegia.    2. Does the patient require, for the alleviation of pain, positioning of the body in ways not feasible with an ordinary bed? Yes, she has a history of CVA with left-sided hemiplegia.    3. Does the patient require the head of the bed to be elevated more than 30 degrees most of the time due to congestive heart failure, chronic pulmonary disease, or aspiration?  No    4. Does the patient require traction that can only be attached to a hospital bed?  No    5. Does the patient require a bed height different than a fixed height hospital bed to permit transfers to chair, wheelchair, or standing position? Yes, she has a history of CVA with left-sided hemiplegia.    6. Does the patient require frequent changes in body position and/or have an immediate need for change in body position? Yes, she has a history of CVA with left-sided hemiplegia.    For trapeze- Patient must meet standard hospital bed criteria also:   1. Does patient need this device to sit up because of a respiratory condition, for change in body position for other medical reasons, or to get in or out of bed?  Not applicable    Date of face to face encounter: 9/11/2020    Signature with date, time, NPI #  Octavia Kareem, CNP   [id number]    DME Order:   AFO/KAFO for left leg--custom orthotic  -Eval and treat for custom orthotic.    Addis is expected to be ambulatory for transfers with AFO and/or KAFO she has a specific diagnosis of CVA with left-sided yesenia-plegia with foot drop.  She does have the  potential to benefit functionally.  She could not be fitted with a pre-fabricated AFO.  She had a CVA and has resultant left-sided hemiplegia which is expected to be a lifelong issue.  There is a need to control the knee ankle or foot in more than 1 plane.  Her original AFO is greater than 7-10 years old, it no longer provides adequate support or stability for patient during transfers or ambulation.  Patient ambulates and transfers with current AFO due to left-sided yesenia-plegia following a CVA.  She demonstrates significant left knee ankle and foot instability as a result.  She would greatly benefit from orthotic support to increase mobility and safety.  Prefab orthotic not appropriate as patient would require orthotic for life, needs custom support to manage left knee hyperextension, pes planus, foot drop and toeing out, all due to above left hemiplegia..    DISCHARGE PLAN/FACE TO FACE:  I certify that services are/were furnished while this patient was under the care of a physician and that a physician or an allowed non-physician practitioner (NPP), had a face-to-face encounter that meets the physician face-to-face encounter requirements. The encounter was in whole, or in part, related to the primary reason for home health. The patient is confined to his/her home and needs intermittent skilled nursing, physical therapy, speech-language pathology, or the continued need for occupational therapy. A plan of care has been established by a physician and is periodically reviewed by a physician.  Date of Face-to-Face Encounter: 9/11/2020    I certify that, based on my findings, the following services are medically necessary home health services: home health aid for bathing and ADLs, skilled nursing (RN) for medication set-up and teaching, symptoms and disease process monitoring and education, PT for strengthening, balance, endurance and safety within the home, OT for strengthening, ADL needs, adaptive equipment and  safety.    My clinical findings support the need for the above skilled services because: home health aid for bathing and ADLs, skilled nursing (RN) for medication set-up and teaching, symptoms and disease process monitoring and education, PT for strengthening, balance, endurance and safety within the home, OT for strengthening, ADL needs, adaptive equipment and safety.    This patient is homebound because: she is a frail elderly woman with multiple comorbidities and recent hospitalizations and a TCU and long-term care stay making it unsafe for her to go out into the community for services.    The patient is, or has been, under my care and I have initiated the establishment of the plan of care. This patient will be followed by a physician who will periodically review the plan of care. Schedule follow up visit with primary care provider within 7 days to reestablish care.    Total unit/floor time of 35 minutes time spent on discharge planning, discharge follow-up discussion and discharge medication review.    Electronically signed by: Octavia Serna CNP

## 2021-06-20 NOTE — LETTER
Letter by Octavia Serna CNP at      Author: Octavia Serna CNP Service: -- Author Type: --    Filed:  Encounter Date: 4/29/2020 Status: (Other)         Patient: Addis Peña   MR Number: [id number]   YOB: 1950   Date of Visit: 4/29/2020     [facility]    Facility:   [facility] [[id number]]   Code Status: POLST AVAILABLE      CHIEF COMPLAINT/REASON FOR VISIT:  Chief Complaint   Patient presents with   ? Problem Visit     spasms       HISTORY:      HPI: Addis is a 69 y.o. female who  has a past medical history of Allergic rhinitis, CKD (chronic kidney disease), Depression, Displaced dome fracture of left acetabulum (H), DM2 (diabetes mellitus, type 2) (H), GERD (gastroesophageal reflux disease), Gout, HTN (hypertension), Left hemiplegia (H), Migraine, 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), Nephrolithiasis, Stroke (H), and Vitamin B12 deficiency anemia. Addis was recently transferred from [facility]. She was undergoing acute rehab after suffering a hip fracture and undergoing ORIF repair.     Today Addis is being evaluated for a follow up regarding recent episodes of spasticity and trembling. She apparently had another episode of spasticity and trembling. It lasted a few minutes. She had one episode a few days ago-- that one last much longer. She does not lose consciousness and does not have any flaccidity or pain. No postictal states. Doesn't appear to be a seizure per her report. Will await labs that were drawn this morning to determine if there is a metabolic deficiency. Addis agrees to this approach. Addis denies any other concerns including fevers/chills, cough or cold symptoms, headaches, vision changes, chest pain/pressure, difficulty breathing, SOB, abdominal pain, nausea, vomiting, diarrhea, dysuria, increasing or ongoing weakness past baseline, increasing pain.     Past Medical  History:   Diagnosis Date   ? Allergic rhinitis    ? CKD (chronic kidney disease)    ? Depression    ? Displaced dome fracture of left acetabulum (H)    ? DM2 (diabetes mellitus, type 2) (H)    ? GERD (gastroesophageal reflux disease)    ? Gout    ? HTN (hypertension)    ? Left hemiplegia (H)    ? Migraine    ? 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)    ? Nephrolithiasis    ? Stroke (H)    ? Vitamin B12 deficiency anemia              Family History   Problem Relation Age of Onset   ? Heart disease Mother    ? Hypertension Mother    ? Coronary artery disease Father    ? Diabetes Sister    ? Hypertension Brother    ? Cancer Sister      Social History     Socioeconomic History   ? Marital status:      Spouse name: Not on file   ? Number of children: Not on file   ? Years of education: Not on file   ? Highest education level: Not on file   Occupational History   ? Not on file   Social Needs   ? Financial resource strain: Not on file   ? Food insecurity     Worry: Not on file     Inability: Not on file   ? Transportation needs     Medical: Not on file     Non-medical: Not on file   Tobacco Use   ? Smoking status: Never Smoker   ? Smokeless tobacco: Never Used   Substance and Sexual Activity   ? Alcohol use: Not Currently   ? Drug use: Not on file   ? Sexual activity: Not on file   Lifestyle   ? Physical activity     Days per week: Not on file     Minutes per session: Not on file   ? Stress: Not on file   Relationships   ? Social connections     Talks on phone: Not on file     Gets together: Not on file     Attends Nondenominational service: Not on file     Active member of club or organization: Not on file     Attends meetings of clubs or organizations: Not on file     Relationship status: Not on file   ? Intimate partner violence     Fear of current or ex partner: Not on file     Emotionally abused: Not on file     Physically abused: Not on file     Forced sexual activity: Not on file   Other Topics  Concern   ? Not on file   Social History Narrative   ? Not on file       REVIEW OF SYSTEM:  Per HPI    PHYSICAL EXAM:   /54   Pulse 70   Temp 98.9  F (37.2  C)   Resp 18   Wt 142 lb 6.4 oz (64.6 kg)   SpO2 96%     A limited exam was performed due to recommendations for care during COVID-19 pandemic. Due to the 2020 COVID-19 pandemic, except as noted above, the patient was visually observed at a 6 foot plus distance.  An observational exam was performed in an effort to keep patient safe from COVID-19 and other communicable diseases.     General appearance: alert, appears stated age and cooperative  HEENT: Head is normocephalic with normal hair distribution. No evidence of trauma. Ears: Without lesions or deformity. No acute purulent discharge. Eyes: Conjunctivae pink with no scleral icterus or erythema. Nose: Normal. Oropharnyx: mmm.  Lungs: respirations without effort.  Extremities: extremities normal, atraumatic, no cyanosis.  Skin: Skin color, texture normal. No rashes or lesions on exposed skin.   Neurologic: Grossly normal   Psych: interacts well with caregivers, exhibits logical thought processes and connections, pleasant.      LABS:   CBC, BMP, Mag, B12 levels.    ASSESSMENT:      ICD-10-CM    1. Spells of trembling  R25.1        PLAN:    Spells of Trembling, spasticity  -CBC, BMP, Mag level, B12 level.   -ASA 81 mg by mouth daily.   -Baclofen 10 mg by mouth three times a day.  -May warrant follow up with neurology. Will await labs given recent COVID-19 pandemic.  -Follow carefully.     Otherwise continue current care plan for all other chronic medical conditions, as they are stable. Encouraged patient to engage in healthy lifestyle behaviors such as engaging in social activities, exercising (PT/OT), eating well, and following care plan. Follow up for routine check-up, or sooner if needed. Will continue to monitor patient and work with nursing staff collaboratively to work toward positive patient  outcomes.    Electronically signed by: Octavia Serna CNP

## 2021-06-20 NOTE — LETTER
Letter by Octavia Serna CNP at      Author: Octavia Serna CNP Service: -- Author Type: --    Filed:  Encounter Date: 6/3/2020 Status: (Other)         Patient: Addis Peña   MR Number: [id number]   YOB: 1950   Date of Visit: 6/3/2020     [facility]    Facility:   [facility] [[id number]]   Code Status: POLST AVAILABLE      CHIEF COMPLAINT/REASON FOR VISIT:  Chief Complaint   Patient presents with   ? Problem Visit     Urinary frequency       HISTORY:      HPI: Addis is a 69 y.o. female who  has a past medical history of Allergic rhinitis, CKD (chronic kidney disease), Depression, Displaced dome fracture of left acetabulum (H), DM2 (diabetes mellitus, type 2) (H), GERD (gastroesophageal reflux disease), Gout, HTN (hypertension), Left hemiplegia (H), Migraine, 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), Nephrolithiasis, Seizure disorder (H), Stroke (H), and Vitamin B12 deficiency anemia. Addis was recently transferred from [facility]. She was undergoing acute rehab after suffering a hip fracture and undergoing ORIF repair.     Today Addis is being evaluated to discuss recent issues with urinary frequency.  Her follow-up UA/UC were essentially negative.  Did discuss overactive bladder with her.  She states that she does continue to have some urinary frequency.  Symptoms, no dysuria, no flank pain, no abdominal pain, no fevers or chills.  States that she would like to think about treatment for neck frequency.  Did discuss that we can work on this with OT and PT as well for pelvic floor strengthening.  Think about it and let us know.  She denies any symptoms including fevers/chills, cough or cold symptoms, headaches, vision changes, chest pain/pressure, difficulty breathing, SOB, abdominal pain, nausea, vomiting, diarrhea, increasing or ongoing weakness past baseline, increasing pain.     Past Medical  History:   Diagnosis Date   ? Allergic rhinitis    ? CKD (chronic kidney disease)    ? Depression    ? Displaced dome fracture of left acetabulum (H)    ? DM2 (diabetes mellitus, type 2) (H)    ? GERD (gastroesophageal reflux disease)    ? Gout    ? HTN (hypertension)    ? Left hemiplegia (H)    ? Migraine    ? 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)    ? Nephrolithiasis    ? Seizure disorder (H)    ? Stroke (H)    ? Vitamin B12 deficiency anemia              Family History   Problem Relation Age of Onset   ? Heart disease Mother    ? Hypertension Mother    ? Coronary artery disease Father    ? Diabetes Sister    ? Hypertension Brother    ? Cancer Sister      Social History     Socioeconomic History   ? Marital status:      Spouse name: Not on file   ? Number of children: Not on file   ? Years of education: Not on file   ? Highest education level: Not on file   Occupational History   ? Not on file   Social Needs   ? Financial resource strain: Not on file   ? Food insecurity     Worry: Not on file     Inability: Not on file   ? Transportation needs     Medical: Not on file     Non-medical: Not on file   Tobacco Use   ? Smoking status: Never Smoker   ? Smokeless tobacco: Never Used   Substance and Sexual Activity   ? Alcohol use: Not Currently   ? Drug use: Not on file   ? Sexual activity: Not on file   Lifestyle   ? Physical activity     Days per week: Not on file     Minutes per session: Not on file   ? Stress: Not on file   Relationships   ? Social connections     Talks on phone: Not on file     Gets together: Not on file     Attends Protestant service: Not on file     Active member of club or organization: Not on file     Attends meetings of clubs or organizations: Not on file     Relationship status: Not on file   ? Intimate partner violence     Fear of current or ex partner: Not on file     Emotionally abused: Not on file     Physically abused: Not on file     Forced sexual activity:  Not on file   Other Topics Concern   ? Not on file   Social History Narrative   ? Not on file       REVIEW OF SYSTEM:  Per HPI    PHYSICAL EXAM:   /64   Pulse 66   Temp 98.3  F (36.8  C)   Resp 20   Wt 141 lb (64 kg)   SpO2 95%     A limited exam was performed due to recommendations for care during COVID-19 pandemic. Due to the 2020 COVID-19 pandemic, except as noted above, the patient was visually observed at a 6 foot plus distance.  An observational exam was performed in an effort to keep patient safe from COVID-19 and other communicable diseases.     General appearance: alert, appears stated age and cooperative  HEENT: Head is normocephalic with normal hair distribution. No evidence of trauma. Ears: Without lesions or deformity. No acute purulent discharge. Eyes: Conjunctivae pink with no scleral icterus or erythema. Nose: Normal. Oropharnyx: mmm.  Lungs: respirations without effort.  Extremities: extremities normal, atraumatic, no cyanosis.  Skin: Skin color, texture normal. No rashes or lesions on exposed skin.   Neurologic: Grossly normal   Psych: interacts well with caregivers, exhibits logical thought processes and connections, pleasant.      LABS:   None today.     ASSESSMENT:      ICD-10-CM    1. Urinary frequency  R35.0        PLAN:    Urinary Frequency  -UA/UC-slightly dirty but essentially negative culture grew out 10,000-50,000 E. coli.  This shows improvement.  We will continue to follow very carefully.  May need to treat for overactive bladder.    Otherwise continue current care plan for all other chronic medical conditions, as they are stable. Encouraged patient to engage in healthy lifestyle behaviors such as engaging in social activities, exercising (PT/OT), eating well, and following care plan. Follow up for routine check-up, or sooner if needed. Will continue to monitor patient and work with nursing staff collaboratively to work toward positive patient outcomes.    Electronically  signed by: Octavia Serna, CNP

## 2021-06-20 NOTE — LETTER
Letter by Octavia Serna CNP at      Author: Octavia Serna CNP Service: -- Author Type: --    Filed:  Encounter Date: 5/13/2020 Status: (Other)         Patient: Addis Peña   MR Number: [id number]   YOB: 1950   Date of Visit: 5/13/2020     [facility]    Facility:   [facility] [[id number]]   Code Status: POLST AVAILABLE      CHIEF COMPLAINT/REASON FOR VISIT:  Chief Complaint   Patient presents with   ? Review Of Multiple Medical Conditions     Physical deconditioning, old CVA, seizure disorder, UTI       HISTORY:      HPI: Addis is a 69 y.o. female who  has a past medical history of Allergic rhinitis, CKD (chronic kidney disease), Depression, Displaced dome fracture of left acetabulum (H), DM2 (diabetes mellitus, type 2) (H), GERD (gastroesophageal reflux disease), Gout, HTN (hypertension), Left hemiplegia (H), Migraine, 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), Nephrolithiasis, Seizure disorder (H), Stroke (H), and Vitamin B12 deficiency anemia. Addis was recently transferred from [facility]. She was undergoing acute rehab after suffering a hip fracture and undergoing ORIF repair.     Today Addis is being evaluated for a follow-up after having a UTI and also for physical deconditioning, with old CVA and acetabular fracture.  She states that she is doing much better and is feeling quite well.  She is participating in physical and occupational therapies.  Addis does report eating, drinking and eliminating well.  She denies any symptoms including fevers/chills, cough or cold symptoms, headaches, vision changes, chest pain/pressure, difficulty breathing, SOB, abdominal pain, nausea, vomiting, diarrhea, increasing or ongoing weakness past baseline, increasing pain.     Past Medical History:   Diagnosis Date   ? Allergic rhinitis    ? CKD (chronic kidney disease)    ? Depression    ? Displaced dome  fracture of left acetabulum (H)    ? DM2 (diabetes mellitus, type 2) (H)    ? GERD (gastroesophageal reflux disease)    ? Gout    ? HTN (hypertension)    ? Left hemiplegia (H)    ? Migraine    ? 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)    ? Nephrolithiasis    ? Seizure disorder (H)    ? Stroke (H)    ? Vitamin B12 deficiency anemia              Family History   Problem Relation Age of Onset   ? Heart disease Mother    ? Hypertension Mother    ? Coronary artery disease Father    ? Diabetes Sister    ? Hypertension Brother    ? Cancer Sister      Social History     Socioeconomic History   ? Marital status:      Spouse name: Not on file   ? Number of children: Not on file   ? Years of education: Not on file   ? Highest education level: Not on file   Occupational History   ? Not on file   Social Needs   ? Financial resource strain: Not on file   ? Food insecurity     Worry: Not on file     Inability: Not on file   ? Transportation needs     Medical: Not on file     Non-medical: Not on file   Tobacco Use   ? Smoking status: Never Smoker   ? Smokeless tobacco: Never Used   Substance and Sexual Activity   ? Alcohol use: Not Currently   ? Drug use: Not on file   ? Sexual activity: Not on file   Lifestyle   ? Physical activity     Days per week: Not on file     Minutes per session: Not on file   ? Stress: Not on file   Relationships   ? Social connections     Talks on phone: Not on file     Gets together: Not on file     Attends Taoist service: Not on file     Active member of club or organization: Not on file     Attends meetings of clubs or organizations: Not on file     Relationship status: Not on file   ? Intimate partner violence     Fear of current or ex partner: Not on file     Emotionally abused: Not on file     Physically abused: Not on file     Forced sexual activity: Not on file   Other Topics Concern   ? Not on file   Social History Narrative   ? Not on file       REVIEW OF  SYSTEM:  Per HPI    PHYSICAL EXAM:   /62   Pulse 81   Temp 97.7  F (36.5  C)   Resp 20   Wt 141 lb 3.2 oz (64 kg)   SpO2 93%     A limited exam was performed due to recommendations for care during COVID-19 pandemic. Due to the 2020 COVID-19 pandemic, except as noted above, the patient was visually observed at a 6 foot plus distance.  An observational exam was performed in an effort to keep patient safe from COVID-19 and other communicable diseases.     General appearance: alert, appears stated age and cooperative  HEENT: Head is normocephalic with normal hair distribution. No evidence of trauma. Ears: Without lesions or deformity. No acute purulent discharge. Eyes: Conjunctivae pink with no scleral icterus or erythema. Nose: Normal. Oropharnyx: mmm.  Lungs: respirations without effort.  Extremities: extremities normal, atraumatic, no cyanosis.  Skin: Skin color, texture normal. No rashes or lesions on exposed skin.   Neurologic: Grossly normal   Psych: interacts well with caregivers, exhibits logical thought processes and connections, pleasant.      LABS:   None today.     ASSESSMENT:      ICD-10-CM    1. Physical deconditioning  R53.81    2. Seizure disorder (H)  G40.909    3. CVA, old,2010  I69.993    4. Closed displaced fracture of anterior column of left acetabulum with routine healing, subsequent encounter  S32.432D        PLAN:    Physical Deconditioning, old CVA  -Continue PT/OT and other therapies as per care plan.  -Encouraged good nutrition and movement habits.   -Discussed care plan and expected course of stay.   -Continue to follow-up per routine schedule or sooner if needed.     Seizure disorder  -Baclofen 10 mg by mouth 3 times daily.  -Keppra 250 mg tablets, take 5 tablets by mouth twice daily.  -Phenytoin 200 mg by mouth twice daily.    Left acetabular fracture  -Tylenol 1 g every 6 hours as needed for pain.    UTI--no further symptoms  -Encourage fluids.   -Tylenol 1000 mg by mouth ever 6  hours as needed.   -Doxycycline 100 mg by mouth two times a day x 7 days.   -Follow up as needed.     Otherwise continue current care plan for all other chronic medical conditions, as they are stable. Encouraged patient to engage in healthy lifestyle behaviors such as engaging in social activities, exercising (PT/OT), eating well, and following care plan. Follow up for routine check-up, or sooner if needed. Will continue to monitor patient and work with nursing staff collaboratively to work toward positive patient outcomes.    Electronically signed by: Octavia Serna CNP

## 2021-06-20 NOTE — LETTER
Letter by Octavia Serna CNP at      Author: Octavia Serna CNP Service: -- Author Type: --    Filed:  Encounter Date: 6/10/2020 Status: (Other)         Patient: Addis Peña   MR Number: [id number]   YOB: 1950   Date of Visit: 6/10/2020     [facility]    Facility:   [facility] [[id number]]   Code Status: POLST AVAILABLE      CHIEF COMPLAINT/REASON FOR VISIT:  Chief Complaint   Patient presents with   ? Problem Visit     Depression       HISTORY:      HPI: Addis is a 69 y.o. female who  has a past medical history of Allergic rhinitis, CKD (chronic kidney disease), Depression, Displaced dome fracture of left acetabulum (H), DM2 (diabetes mellitus, type 2) (H), GERD (gastroesophageal reflux disease), Gout, HTN (hypertension), Left hemiplegia (H), Migraine, 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), Nephrolithiasis, Seizure disorder (H), Stroke (H), and Vitamin B12 deficiency anemia. Addis was recently transferred from [facility]. She was undergoing acute rehab after suffering a hip fracture and undergoing ORIF repair.     Today Addis is being evaluated by request of nursing staff for issues with depression.  Apparently family did state that they have noticed she is quite depressed and have requested further evaluation.  Addis however states that she knows she is depressed and she is very upset about her recent last day of therapy.  She is now going to be moving into long-term care and is upset about this as well.  She states that this is situational.  Did empathize with her.  This is quite a situation and a significant life change.  She does agree to seeing a therapist.  She denies any suicidal or homicidal ideation.  She refuses medication adjustment at this time.  We will continue to monitor and follow.  She denies any symptoms including fevers/chills, cough or cold symptoms, headaches, vision  changes, chest pain/pressure, difficulty breathing, SOB, abdominal pain, nausea, vomiting, diarrhea, increasing or ongoing weakness past baseline, increasing pain.     Past Medical History:   Diagnosis Date   ? Allergic rhinitis    ? CKD (chronic kidney disease)    ? Depression    ? Displaced dome fracture of left acetabulum (H)    ? DM2 (diabetes mellitus, type 2) (H)    ? GERD (gastroesophageal reflux disease)    ? Gout    ? HTN (hypertension)    ? Left hemiplegia (H)    ? Migraine    ? 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)    ? Nephrolithiasis    ? Seizure disorder (H)    ? Stroke (H)    ? Vitamin B12 deficiency anemia              Family History   Problem Relation Age of Onset   ? Heart disease Mother    ? Hypertension Mother    ? Coronary artery disease Father    ? Diabetes Sister    ? Hypertension Brother    ? Cancer Sister      Social History     Socioeconomic History   ? Marital status:      Spouse name: Not on file   ? Number of children: Not on file   ? Years of education: Not on file   ? Highest education level: Not on file   Occupational History   ? Not on file   Social Needs   ? Financial resource strain: Not on file   ? Food insecurity     Worry: Not on file     Inability: Not on file   ? Transportation needs     Medical: Not on file     Non-medical: Not on file   Tobacco Use   ? Smoking status: Never Smoker   ? Smokeless tobacco: Never Used   Substance and Sexual Activity   ? Alcohol use: Not Currently   ? Drug use: Not on file   ? Sexual activity: Not on file   Lifestyle   ? Physical activity     Days per week: Not on file     Minutes per session: Not on file   ? Stress: Not on file   Relationships   ? Social connections     Talks on phone: Not on file     Gets together: Not on file     Attends Voodoo service: Not on file     Active member of club or organization: Not on file     Attends meetings of clubs or organizations: Not on file     Relationship status: Not on  file   ? Intimate partner violence     Fear of current or ex partner: Not on file     Emotionally abused: Not on file     Physically abused: Not on file     Forced sexual activity: Not on file   Other Topics Concern   ? Not on file   Social History Narrative   ? Not on file       REVIEW OF SYSTEM:  Per HPI    PHYSICAL EXAM:   /55   Pulse 75   Temp 98.8  F (37.1  C)   Resp 17   Wt 143 lb (64.9 kg)   SpO2 95%     A limited exam was performed due to recommendations for care during COVID-19 pandemic. Due to the 2020 COVID-19 pandemic, except as noted above, the patient was visually observed at a 6 foot plus distance.  An observational exam was performed in an effort to keep patient safe from COVID-19 and other communicable diseases.     General appearance: alert, appears stated age and cooperative  HEENT: Head is normocephalic with normal hair distribution. No evidence of trauma. Ears: Without lesions or deformity. No acute purulent discharge. Eyes: Conjunctivae pink with no scleral icterus or erythema. Nose: Normal. Oropharnyx: mmm.  Lungs: respirations without effort.  Extremities: extremities normal, atraumatic, no cyanosis.  Skin: Skin color, texture normal. No rashes or lesions on exposed skin.   Neurologic: Grossly normal   Psych: interacts well with caregivers, exhibits logical thought processes and connections, pleasant.      LABS:   None today.     ASSESSMENT:      ICD-10-CM    1. Moderate episode of recurrent major depressive disorder (H)  F33.1        PLAN:    Major depression  -Declined adjustment in antidepressant.  -Psych eval and treat.  -Celexa 10 mg by mouth daily.  -Follow-up as needed.    Otherwise continue current care plan for all other chronic medical conditions, as they are stable. Encouraged patient to engage in healthy lifestyle behaviors such as engaging in social activities, exercising (PT/OT), eating well, and following care plan. Follow up for routine check-up, or sooner if  needed. Will continue to monitor patient and work with nursing staff collaboratively to work toward positive patient outcomes.    Electronically signed by: Octavia Serna CNP

## 2021-06-20 NOTE — LETTER
Letter by Octavia Serna CNP at      Author: Octavia Serna CNP Service: -- Author Type: --    Filed:  Encounter Date: 6/8/2020 Status: (Other)         Patient: Addis Peña   MR Number: [id number]   YOB: 1950   Date of Visit: 6/8/2020     [facility]    Facility:   [facility] [[id number]]   Code Status: POLST AVAILABLE      CHIEF COMPLAINT/REASON FOR VISIT:  Chief Complaint   Patient presents with   ? Follow Up     Labs, seizure disorder       HISTORY:      HPI: Addis is a 69 y.o. female who  has a past medical history of Allergic rhinitis, CKD (chronic kidney disease), Depression, Displaced dome fracture of left acetabulum (H), DM2 (diabetes mellitus, type 2) (H), GERD (gastroesophageal reflux disease), Gout, HTN (hypertension), Left hemiplegia (H), Migraine, 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), Nephrolithiasis, Seizure disorder (H), Stroke (H), and Vitamin B12 deficiency anemia. Addis was recently transferred from [facility]. She was undergoing acute rehab after suffering a hip fracture and undergoing ORIF repair.     Today Addis is being evaluated for a follow-up regarding recent lab work.  She recently was evaluated by consultant pharmacy who requested several labs.  These labs were completed and returned.  She does not have any ongoing issues with seizure activity, tremors, muscle spasticity.  She appears to be doing well at this time.  All labs were appropriate.  We will continue to monitor and work with patient to determine needs.  She denies any symptoms including fevers/chills, cough or cold symptoms, headaches, vision changes, chest pain/pressure, difficulty breathing, SOB, abdominal pain, nausea, vomiting, diarrhea, increasing or ongoing weakness past baseline, increasing pain.     Past Medical History:   Diagnosis Date   ? Allergic rhinitis    ? CKD (chronic kidney disease)    ?  Depression    ? Displaced dome fracture of left acetabulum (H)    ? DM2 (diabetes mellitus, type 2) (H)    ? GERD (gastroesophageal reflux disease)    ? Gout    ? HTN (hypertension)    ? Left hemiplegia (H)    ? Migraine    ? 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)    ? Nephrolithiasis    ? Seizure disorder (H)    ? Stroke (H)    ? Vitamin B12 deficiency anemia              Family History   Problem Relation Age of Onset   ? Heart disease Mother    ? Hypertension Mother    ? Coronary artery disease Father    ? Diabetes Sister    ? Hypertension Brother    ? Cancer Sister      Social History     Socioeconomic History   ? Marital status:      Spouse name: Not on file   ? Number of children: Not on file   ? Years of education: Not on file   ? Highest education level: Not on file   Occupational History   ? Not on file   Social Needs   ? Financial resource strain: Not on file   ? Food insecurity     Worry: Not on file     Inability: Not on file   ? Transportation needs     Medical: Not on file     Non-medical: Not on file   Tobacco Use   ? Smoking status: Never Smoker   ? Smokeless tobacco: Never Used   Substance and Sexual Activity   ? Alcohol use: Not Currently   ? Drug use: Not on file   ? Sexual activity: Not on file   Lifestyle   ? Physical activity     Days per week: Not on file     Minutes per session: Not on file   ? Stress: Not on file   Relationships   ? Social connections     Talks on phone: Not on file     Gets together: Not on file     Attends Gnosticist service: Not on file     Active member of club or organization: Not on file     Attends meetings of clubs or organizations: Not on file     Relationship status: Not on file   ? Intimate partner violence     Fear of current or ex partner: Not on file     Emotionally abused: Not on file     Physically abused: Not on file     Forced sexual activity: Not on file   Other Topics Concern   ? Not on file   Social History Narrative   ? Not on  file       REVIEW OF SYSTEM:  Per HPI    PHYSICAL EXAM:   /57   Pulse 79   Temp 99.5  F (37.5  C)   Resp 18   Wt 143 lb (64.9 kg)   SpO2 95%     A limited exam was performed due to recommendations for care during COVID-19 pandemic. Due to the 2020 COVID-19 pandemic, except as noted above, the patient was visually observed at a 6 foot plus distance.  An observational exam was performed in an effort to keep patient safe from COVID-19 and other communicable diseases.     General appearance: alert, appears stated age and cooperative  HEENT: Head is normocephalic with normal hair distribution. No evidence of trauma. Ears: Without lesions or deformity. No acute purulent discharge. Eyes: Conjunctivae pink with no scleral icterus or erythema. Nose: Normal. Oropharnyx: mmm.  Lungs: respirations without effort.  Extremities: extremities normal, atraumatic, no cyanosis.  Skin: Skin color, texture normal. No rashes or lesions on exposed skin.   Neurologic: Grossly normal   Psych: interacts well with caregivers, exhibits logical thought processes and connections, pleasant.      LABS:   None today.     ASSESSMENT:      ICD-10-CM    1. Seizure disorder (H)  G40.909        PLAN:    Seizure disorder  -Keppra 250 mg tabs, take 5 tabs twice daily.    -Phenytoin 200 mg by mouth twice daily.  -Continue to monitor phenytoin level.  Keppra level is subjective.  -Follow-up as needed.    Otherwise continue current care plan for all other chronic medical conditions, as they are stable. Encouraged patient to engage in healthy lifestyle behaviors such as engaging in social activities, exercising (PT/OT), eating well, and following care plan. Follow up for routine check-up, or sooner if needed. Will continue to monitor patient and work with nursing staff collaboratively to work toward positive patient outcomes.    Electronically signed by: Octavia Serna CNP

## 2021-06-20 NOTE — LETTER
Letter by Danelle Strickland CNP at      Author: Danelle Strickland CNP Service: -- Author Type: --    Filed:  Encounter Date: 4/6/2020 Status: (Other)         Patient: Addis Peña   MR Number: [id number]   YOB: 1950   Date of Visit: 4/6/2020     [facility]    Facility:   [facility] [[id number]]   Code Status: POLST AVAILABLE      CHIEF COMPLAINT/REASON FOR VISIT:  Chief Complaint   Patient presents with   ? Problem Visit     DM type 2       HISTORY:      HPI: Addis is a 69 y.o. female who was admitted to [facility] from 2/2/20-2/7/20 for left hip ORIF s/p fall at home.  There were not postoperative complications noted in her hospital discharge summary.  It was recommended by her orthopedic provider that she remain on Lovenox for one month postoperatively for DVT prophylaxis.  Addis  has a past medical history of Allergic rhinitis, CKD (chronic kidney disease), Depression, Displaced dome fracture of left acetabulum (H), DM2 (diabetes mellitus, type 2) (H), GERD (gastroesophageal reflux disease), Gout, HTN (hypertension), Left hemiplegia (H), Migraine, 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), Nephrolithiasis, Stroke (H), and Vitamin B12 deficiency anemia.  She is currently at [facility] s/p hospitalization for acute rehabilitation.      Today Ms. Peña is being evaluated for diabetes mellitus type 2.  Addis has been tolerating decrease in her metformin to 1 g in AM and 500 mg at HS with blood glucose range 131-188 over the past week.  She does not want to titrate her metformin any further at this time.  Her blood pressure has been well-controlled on her current antihypertensive regimen with -120s during TCU stay.  She continues work with therapy services on ambulating short distances but she has not progressed much per PT/OT.  The patient denied lightheadedness, dizziness, breathing difficulty, chest  pain, palpitations, constipation, urinary symptoms, numbness or tingling in extremities, and pain.  Nursing staff denied any new concerns for the patient at this time and feel that they have been at their baseline functioning over the past month.     Past Medical History:   Diagnosis Date   ? Allergic rhinitis    ? CKD (chronic kidney disease)    ? Depression    ? Displaced dome fracture of left acetabulum (H)    ? DM2 (diabetes mellitus, type 2) (H)    ? GERD (gastroesophageal reflux disease)    ? Gout    ? HTN (hypertension)    ? Left hemiplegia (H)    ? Migraine    ? 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)    ? Nephrolithiasis    ? Stroke (H)    ? Vitamin B12 deficiency anemia              Family History   Problem Relation Age of Onset   ? Heart disease Mother    ? Hypertension Mother    ? Coronary artery disease Father    ? Diabetes Sister    ? Hypertension Brother    ? Cancer Sister      Social History     Socioeconomic History   ? Marital status:      Spouse name: Not on file   ? Number of children: Not on file   ? Years of education: Not on file   ? Highest education level: Not on file   Occupational History   ? Not on file   Social Needs   ? Financial resource strain: Not on file   ? Food insecurity     Worry: Not on file     Inability: Not on file   ? Transportation needs     Medical: Not on file     Non-medical: Not on file   Tobacco Use   ? Smoking status: Never Smoker   ? Smokeless tobacco: Never Used   Substance and Sexual Activity   ? Alcohol use: Not Currently   ? Drug use: Not on file   ? Sexual activity: Not on file   Lifestyle   ? Physical activity     Days per week: Not on file     Minutes per session: Not on file   ? Stress: Not on file   Relationships   ? Social connections     Talks on phone: Not on file     Gets together: Not on file     Attends Sikhism service: Not on file     Active member of club or organization: Not on file     Attends meetings of clubs or  organizations: Not on file     Relationship status: Not on file   ? Intimate partner violence     Fear of current or ex partner: Not on file     Emotionally abused: Not on file     Physically abused: Not on file     Forced sexual activity: Not on file   Other Topics Concern   ? Not on file   Social History Narrative   ? Not on file       REVIEW OF SYSTEM:  Pertinent items are noted in HPI.  A 12 point comprehensive review of systems was negative except as noted.    PHYSICAL EXAM:     General Appearance:    Alert, cooperative, no distress, appears stated age   Head:    Normocephalic, without obvious abnormality, atraumatic   Eyes:    PERRL, conjunctiva/corneas clear, both eyes   Ears:    Normal external ear canals, both ears   Nose:   Nares normal, septum midline, mucosa normal, no drainage    or sinus tenderness   Throat:   Lips, mucosa, and tongue normal; teeth and gums normal   Neck:   Supple, symmetrical, trachea midline, no adenopathy;     thyroid:  no enlargement/tenderness/nodules; no carotid    bruit or JVD   Back:     Symmetric, no curvature, ROM normal, no CVA tenderness   Lungs:     Clear to auscultation bilaterally, respirations unlabored   Chest Wall:    No tenderness or deformity    Heart:    Regular rate and rhythm, S1 and S2 normal, no murmur, rub   or gallop   Abdomen:     Soft, non-tender, bowel sounds active all four quadrants,     no masses, no organomegaly; incision site CDI with glue   Extremities:   Extremities normal, atraumatic, no cyanosis or edema; incision site CDI   Pulses:   2+ and symmetric all extremities   Skin:   Skin color, texture, turgor normal, no rashes or lesions   Neurologic:   Oriented to person, place, time, and situation; exhibits logical thought processes; generalized weakness           LABS:     Lab Results   Component Value Date    WBC 8.3 02/11/2020    HGB 10.6 (L) 02/11/2020    HCT 36.7 02/11/2020     02/11/2020     02/14/2020    K 4.6 02/14/2020      (H) 02/11/2020    CREATININE 0.76 02/14/2020    BUN 33 (H) 02/11/2020    CO2 23 02/11/2020        ASSESSMENT:      ICD-10-CM    1. Physical deconditioning  R53.81    2. Type 2 diabetes mellitus with other neurologic complication, with long-term current use of insulin (H)  E11.49     Z79.4    3. Essential hypertension  I10        PLAN:      Physical deconditioning  -PT/OT as directed  -Discharge from facility per therapy recommendations on WBAT    DMII  -DISCONTINUED Lantus on 3/12/20  -Continue metoformin to 1 g in AM and 500 mg at HS  -Continue to monitor BG two times a day   -Notify provider if BG < 70 or > 500    Hypertension  -Encouraged cardiac diet, low sodium diet, exercise and stress reduction techniques.   -Emphasized importance of blood pressure control.   -Continue current medications as prescribed.   -Notify provider if pt's SBP<90 or >180 and DBP <50 or >100     Otherwise continue current care plan for all other chronic medical conditions, as they are stable. Encouraged patient to engage in healthy lifestyle behaviors such as engaging in social activities, exercising (PT/OT), eating well, and following care plan. Follow up for routine check-up, or sooner if needed. Will continue to monitor patient and work with nursing staff collaboratively to work toward positive patient outcomes.     Electronically signed by: Danelle Strickland CNP

## 2021-06-20 NOTE — LETTER
Letter by Danelle Strickland CNP at      Author: Danelle Strickland CNP Service: -- Author Type: --    Filed:  Encounter Date: 3/9/2020 Status: (Other)         Patient: Addis Peña   MR Number: [id number]   YOB: 1950   Date of Visit: 3/9/2020     [facility]    Facility:   [facility] [[id number]]   Code Status: POLST AVAILABLE      CHIEF COMPLAINT/REASON FOR VISIT:  Chief Complaint   Patient presents with   ? Problem Visit     DM type 2       HISTORY:      HPI: Addis is a 69 y.o. female who was admitted to [facility] from 2/2/20-2/7/20 for left hip ORIF s/p fall at home.  There were not postoperative complications noted in her hospital discharge summary.  It was recommended by her orthopedic provider that she remain on Lovenox for one month postoperatively for DVT prophylaxis.  Addis  has a past medical history of Allergic rhinitis, CKD (chronic kidney disease), Depression, Displaced dome fracture of left acetabulum (H), DM2 (diabetes mellitus, type 2) (H), GERD (gastroesophageal reflux disease), Gout, HTN (hypertension), Left hemiplegia (H), Migraine, 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), Nephrolithiasis, Stroke (H), and Vitamin B12 deficiency anemia.  She is currently at [facility] s/p hospitalization for acute rehabilitation.      Today Ms. Peña is being evaluated for diabetes mellitus type 2.  Addis reported that she would like to be off insulin altogether if possible.  She has tolerated decrease to 10 units Lantus daily along with metformin 1 g two times a day for management of blood glucose with range  over the past week.  She denied pain at this time and continues to work on strength and conditioning with therapy services.  The patient denied lightheadedness, dizziness, breathing difficulty, chest pain, palpitations, constipation, urinary symptoms, numbness or tingling in extremities, and  pain.  Nursing staff denied any new concerns for the patient at this time and feel that they have been at their baseline functioning over the past month.     Past Medical History:   Diagnosis Date   ? Allergic rhinitis    ? CKD (chronic kidney disease)    ? Depression    ? Displaced dome fracture of left acetabulum (H)    ? DM2 (diabetes mellitus, type 2) (H)    ? GERD (gastroesophageal reflux disease)    ? Gout    ? HTN (hypertension)    ? Left hemiplegia (H)    ? Migraine    ? 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)    ? Nephrolithiasis    ? Stroke (H)    ? Vitamin B12 deficiency anemia              Family History   Problem Relation Age of Onset   ? Heart disease Mother    ? Hypertension Mother    ? Coronary artery disease Father    ? Diabetes Sister    ? Hypertension Brother    ? Cancer Sister      Social History     Socioeconomic History   ? Marital status:      Spouse name: Not on file   ? Number of children: Not on file   ? Years of education: Not on file   ? Highest education level: Not on file   Occupational History   ? Not on file   Social Needs   ? Financial resource strain: Not on file   ? Food insecurity     Worry: Not on file     Inability: Not on file   ? Transportation needs     Medical: Not on file     Non-medical: Not on file   Tobacco Use   ? Smoking status: Never Smoker   ? Smokeless tobacco: Never Used   Substance and Sexual Activity   ? Alcohol use: Not Currently   ? Drug use: Not on file   ? Sexual activity: Not on file   Lifestyle   ? Physical activity     Days per week: Not on file     Minutes per session: Not on file   ? Stress: Not on file   Relationships   ? Social connections     Talks on phone: Not on file     Gets together: Not on file     Attends Anabaptist service: Not on file     Active member of club or organization: Not on file     Attends meetings of clubs or organizations: Not on file     Relationship status: Not on file   ? Intimate partner violence      Fear of current or ex partner: Not on file     Emotionally abused: Not on file     Physically abused: Not on file     Forced sexual activity: Not on file   Other Topics Concern   ? Not on file   Social History Narrative   ? Not on file       REVIEW OF SYSTEM:  Pertinent items are noted in HPI.  A 12 point comprehensive review of systems was negative except as noted.    PHYSICAL EXAM:     General Appearance:    Alert, cooperative, no distress, appears stated age   Head:    Normocephalic, without obvious abnormality, atraumatic   Eyes:    PERRL, conjunctiva/corneas clear, both eyes   Ears:    Normal external ear canals, both ears   Nose:   Nares normal, septum midline, mucosa normal, no drainage    or sinus tenderness   Throat:   Lips, mucosa, and tongue normal; teeth and gums normal   Neck:   Supple, symmetrical, trachea midline, no adenopathy;     thyroid:  no enlargement/tenderness/nodules; no carotid    bruit or JVD   Back:     Symmetric, no curvature, ROM normal, no CVA tenderness   Lungs:     Clear to auscultation bilaterally, respirations unlabored   Chest Wall:    No tenderness or deformity    Heart:    Regular rate and rhythm, S1 and S2 normal, no murmur, rub   or gallop   Abdomen:     Soft, non-tender, bowel sounds active all four quadrants,     no masses, no organomegaly; incision site CDI with glue   Extremities:   Extremities normal, atraumatic, no cyanosis or edema; incision site CDI   Pulses:   2+ and symmetric all extremities   Skin:   Skin color, texture, turgor normal, no rashes or lesions   Neurologic:   Oriented to person, place, time, and situation; exhibits logical thought processes; generalized weakness           LABS:     Lab Results   Component Value Date    WBC 8.3 02/11/2020    HGB 10.6 (L) 02/11/2020    HCT 36.7 02/11/2020     02/11/2020     02/14/2020    K 4.6 02/14/2020     (H) 02/11/2020    CREATININE 0.76 02/14/2020    BUN 33 (H) 02/11/2020    CO2 23 02/11/2020         ASSESSMENT:      ICD-10-CM    1. Physical deconditioning  R53.81    2. Type 2 diabetes mellitus with other neurologic complication, with long-term current use of insulin (H)  E11.49     Z79.4        PLAN:      Physical deconditioning  -PT/OT as directed  -Discharge from facility per therapy recommendations on WBAT    DMII  -DECREASE Lantus from 10 units to 5 units daily  -Continue metformin 1 g two times a day   -Notify provider if BG < 70 or > 500    Otherwise continue current care plan for all other chronic medical conditions, as they are stable. Encouraged patient to engage in healthy lifestyle behaviors such as engaging in social activities, exercising (PT/OT), eating well, and following care plan. Follow up for routine check-up, or sooner if needed. Will continue to monitor patient and work with nursing staff collaboratively to work toward positive patient outcomes.     Electronically signed by: Danelle Strickland, SANTOS

## 2021-06-20 NOTE — LETTER
Letter by Rasta Michael MD at      Author: Rasta Michael MD Service: -- Author Type: --    Filed:  Encounter Date: 4/30/2020 Status: (Other)         Patient: Addis Peña   MR Number: [id number]   YOB: 1950   Date of Visit: 4/30/2020      [facility]    Facility:  [facility] [[id number]]    Code Status:  POLST AVAILABLE    Chief Complaint   Patient presents with   ? Problem Visit   :                    Patient Active Problem List   Diagnosis   ? Left hip pain   ? Postoperative pain   ? Physical deconditioning   ? Closed displaced fracture of anterior column of left acetabulum with routine healing, subsequent encounter   ? S/P ORIF (open reduction internal fixation) fracture   ? Falls   ? Essential hypertension   ? Type 2 diabetes mellitus with other neurologic complication, with long-term current use of insulin (H)   ? CVA, old,2010   ? Spells of trembling       History:  Ms. Peña is a 69-year-old female with a past medical history of CVA resulting in left hemiplegia, depression, hypertension, insulin-dependent type 2 diabetes complicated by retinopathy, hyperlipidemia, environmental allergies, CKD 3, possible gout, seen today for admission to [facility]--a transfer from [facility], where she had been staying since her recent hospitalization.    [facility] course: February 7 through April 20    While the patient reports dissatisfaction with her physical therapy, and requested transfer, there were some positive outcomes report here.  Patient experienced improved blood sugar control throughout her stay resulting in discontinuation of her Lantus on March 12 and an increase in her metformin from 500 twice daily to 1000 twice daily.  Blood sugars reportedly quite favorable thereafter generally less than 170 as well.    Patient also reports that her pain came under good control with scheduled Tylenol and baclofen  without need for ongoing opiates.    Patient's blood pressure was generally adequately controlled on current medications.    FiberCon was started for her bowels which tend to be on the loose side with improvement.        Hospital course: Patient was admitted to [facility] February 2 through February 7 though she was originally seen in the emergency room at [facility] and transferred to [facility] once fracture was identified.                Patient reports 2 falls the first on January 25 with some left hip pain.  She was seen for left hip pain on the 28th, diagnosed with left acetabular fracture as well as metatarsal fractures 2 through 4 on the left foot.  Case was discussed with orthopedics, Lovenox prophylaxis was prescribed.  However she fell again on February 1 trying to transfer self in her home this time with very severe left hip pain incompatible with ambulation.  She now was diagnosed with displaced dome fracture of the left acetabulum requiring ORIF, repaired by Dr. Dinh.  There were no complications reported.  Patient was treated with DVT prophylaxis, prophylactic antibiotic therapy and deemed safe for discharge by February 7 with pain tolerated by oral pain medications.  Lovenox 40 mg subcutaneously recommended for 28 days.  Oxycodone 5 to 10 mg every 4 hours along with scheduled Tylenol given for pain.  Her aspirin was held at discharge.  There is also recommendation for pursuing osteoporosis work-up including DEXA scanning.  Notably she also received some ceftriaxone in the emergency room at [facility] for abnormal urinalysis.         Subjective/ROS:    -augmented by discussion with facility staff involved in direct care    I am asked to see the patient today for some spells she has been having.  These were reported to the office and some blood was ordered including a magnesium which has come back slightly low at 1.5 today.    However when I talked to the patient she  "describes spells worrisome for seizure.  She has had a right-sided CVA with left-sided plegia arm greater than leg since 2010.  She has had never had a seizure before that she knows of.  However she had 2 episodes yesterday and 2 episodes 4 days ago on Sunday.  She says they were all strikingly similar.  Without warning she suddenly lost control of her left arm and her left leg.  She says that her arm gets stiff and seems to rise up off whenever it is resting on\" trembles\" she cannot control it at the same time that the arm is affected her left leg stiffens and she cannot control it.  She estimates each spell lasted less than 5 minutes and was self-limited.  She is aware of the circumstances during the spells without any confusion.  She did not suffer any pain following the spells.    Otherwise patient says she is felt well she said no fever sweats or chills cough shortness of breath nausea vomiting diarrhea melena bright red blood per rectum constipation dysuria falls injuries.  She says she is doing well with therapy.  Remainder ROS negative    Past Medical History:   Diagnosis Date   ? Allergic rhinitis    ? CKD (chronic kidney disease)    ? Depression    ? Displaced dome fracture of left acetabulum (H)    ? DM2 (diabetes mellitus, type 2) (H)    ? GERD (gastroesophageal reflux disease)    ? Gout    ? HTN (hypertension)    ? Left hemiplegia (H)    ? Migraine    ? 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)    ? Nephrolithiasis    ? Stroke (H)    ? Vitamin B12 deficiency anemia      Past Surgical History:   Procedure Laterality Date   ? COLONOSCOPY W/ BIOPSIES AND POLYPECTOMY  05/09/2019    Procedure: COLONOSCOPY, WITH POLYPECTOMY AND BIOPSY; Surgeon: Na Diehl MD; Location: [facility] OR     ? CYSTOSCOPY W/ URETEROSCOPY Right 02/22/2016    Procedure: COMBINED CYSTOSCOPY, URETEROSCOPY; Surgeon: Madelaine Roland MD; Location: [facility] OR     ? ORIF ACETABULUM FRACTURE Left     "       Family History   Problem Relation Age of Onset   ? Heart disease Mother    ? Hypertension Mother    ? Coronary artery disease Father    ? Diabetes Sister    ? Hypertension Brother    ? Cancer Sister    :       Social History     Socioeconomic History   ? Marital status:      Spouse name: Not on file   ? Number of children: Not on file   ? Years of education: Not on file   ? Highest education level: Not on file   Occupational History   ? Not on file   Social Needs   ? Financial resource strain: Not on file   ? Food insecurity     Worry: Not on file     Inability: Not on file   ? Transportation needs     Medical: Not on file     Non-medical: Not on file   Tobacco Use   ? Smoking status: Never Smoker   ? Smokeless tobacco: Never Used   Substance and Sexual Activity   ? Alcohol use: Not Currently   ? Drug use: Not on file   ? Sexual activity: Not on file   Lifestyle   ? Physical activity     Days per week: Not on file     Minutes per session: Not on file   ? Stress: Not on file   Relationships   ? Social connections     Talks on phone: Not on file     Gets together: Not on file     Attends Christianity service: Not on file     Active member of club or organization: Not on file     Attends meetings of clubs or organizations: Not on file     Relationship status: Not on file   ? Intimate partner violence     Fear of current or ex partner: Not on file     Emotionally abused: Not on file     Physically abused: Not on file     Forced sexual activity: Not on file   Other Topics Concern   ? Not on file   Social History Narrative   ? Not on file   :        Current Outpatient Medications on File Prior to Visit   Medication Sig Dispense Refill   ? acetaminophen (TYLENOL) 500 MG tablet Take 1,000 mg by mouth 3 (three) times a day.      ? amLODIPine (NORVASC) 10 MG tablet Take 10 mg by mouth daily.     ? aspirin 81 MG EC tablet Take 81 mg by mouth daily.     ? atorvastatin (LIPITOR) 40 MG tablet Take 40 mg by mouth at  bedtime.     ? baclofen (LIORESAL) 10 MG tablet Take 10 mg by mouth 3 (three) times a day.     ? calcium polycarbophiL (FIBERCON) 625 mg tablet Take 1,250 mg by mouth daily.     ? cholecalciferol, vitamin D3, 1,000 unit (25 mcg) tablet Take 1,000 Units by mouth daily.     ? citalopram (CELEXA) 10 MG tablet Take 10 mg by mouth at bedtime.     ? cyanocobalamin 1,000 mcg/mL injection Inject 1,000 mcg into the shoulder, thigh, or buttocks every 30 (thirty) days.     ? lactase 4,500 unit Tab Take 4 tablets by mouth as needed.      ? levETIRAcetam (KEPPRA) 500 MG tablet Take 500 mg by mouth 2 (two) times a day.     ? losartan (COZAAR) 25 MG tablet Take 25 mg by mouth daily.     ? metFORMIN (GLUCOPHAGE) 1000 MG tablet Take 1,000 mg by mouth 2 (two) times a day with meals. Take 2 tabs by mouth in AM and 1 tab by mouth at HS.     ? metoprolol succinate (TOPROL-XL) 50 MG 24 hr tablet Take 50 mg by mouth daily.     ? montelukast (SINGULAIR) 10 mg tablet Take 10 mg by mouth daily.       No current facility-administered medications on file prior to visit.    :      ALLERGIES:  Lisinopril and Latex    Vitals:  There were no vitals taken for this visit. except as noted below    Vital signs: Reviewed per facility EMR vitals including as follows:                  Most Recent Vitals Date/Time Taken  Temperature: 97.3  F 04/30/2020 06:31 PM  Pulse: 66 per minute 04/30/2020 06:31 PM  Respirations: 16 per minute 04/30/2020 06:31 PM  Blood Pressure: 140 / 68 mmHg 04/30/2020 08:14 AM  O2 Saturation: 96 % 04/30/2020 06:31 PM  Blood Sugar: 155 mg/dL 04/30/2020 08:13 AM  Weight: 153.4 lbs / Routine       BMI: 24.76 04/30/2020 09:42 AM  Height: 5ft 6.0in 04/20/2020 02:43 PM    There is no height or weight on file to calculate BMI.    Physical exam:    General:  Alert  oriented x3 appears in no apparent distress.  Patient is just finishing her lunch.  She eats with her right hand.  She has some movement in her left arm and hand but it is  minimal.  Similar in her left leg is weak compared to the right leg.    She is in no acute distress.  She is breathing comfortably.  She is normally conversant and pleasant.  Skin is clear in visualized areas.      Due to the 2020 Covid 19 pandemic, except as noted above, the patient was visually observed at a 6 foot plus distance.  An observational exam was performed in an effort to keep patient safe from Covid 19 and other communicable diseases.   Labs:  Lab Results   Component Value Date    WBC 8.3 02/11/2020    HGB 10.6 (L) 02/11/2020    HCT 36.7 02/11/2020     (H) 02/11/2020     02/11/2020     Results for orders placed or performed in visit on 04/29/20   Basic Metabolic Panel   Result Value Ref Range    Sodium 142 136 - 145 mmol/L    Potassium 4.6 3.5 - 5.0 mmol/L    Chloride 109 (H) 98 - 107 mmol/L    CO2 23 22 - 31 mmol/L    Anion Gap, Calculation 10 5 - 18 mmol/L    Glucose 129 (H) 70 - 125 mg/dL    Calcium 9.4 8.5 - 10.5 mg/dL    BUN 29 (H) 8 - 22 mg/dL    Creatinine 0.79 0.60 - 1.10 mg/dL    GFR MDRD Af Amer >60 >60 mL/min/1.73m2    GFR MDRD Non Af Amer >60 >60 mL/min/1.73m2       Magnesium 1.5  No results found for: TSH  No results found for: HGBA1C  [unfilled]  Lab Results   Component Value Date    [id number] 591 04/29/2020     No results found for: BNP  [unfilled]        Invalid input(s): PRINTERVAL      Assessment/Plan:      ICD-10-CM    1. Essential hypertension  I10    2. Type 2 diabetes mellitus with other neurologic complication, with long-term current use of insulin (H)  E11.49     Z79.4    3. S/P ORIF (open reduction internal fixation) fracture  Z98.890     Z87.81    4. CVA, old,2010  I69.993    5. Closed displaced fracture of anterior column of left acetabulum with routine healing, subsequent encounter  S32.432D    6. Physical deconditioning  R53.81    7. Spells of trembling  R25.1        Spells    I am concerned about seizures by her description.  Fortunately they have not  generalized.  This sounds unlikely extension of CVA.  She has not kept in relationship with any neurologist and is open to seeing whomever I recommend.  -Keppra 500 twice daily for prophylaxis   -Check Keppra level next week   -Education given to patient and staff   -Referral to Dr. Otto Hernández.  Referral and EEG may be delayed by pandemic precautions   -I also believe head imaging is appropriate.  In this nonurgent state MRI may be test of choice: Patient has no new neurologic findings except as described above).  If patient cannot get into Dr. Hernández or partner quickly, we could order it directly here from the facility, but I did not do that today, hoping for full consult with neurology quickly.  Staff will begin working on that now.    -We did restart aspirin as noted below    Hypomagnesemia    Present but doubtfully the cause of her spells.  Will replace and recheck magnesium next week       The following is copied from my recent note for a more complete picture of her health:  ==============================================================  acetabular fracture status post ORIF February 2020   Patient reports dissatisfaction with her therapy at prior TCU resulting in the transfer.  She is encouraged by her early days here.  She reports good pain control.  She would like to return to independent living.  She has a roommate at the apartment in a senior complex where she lives.  PT, OT,  involved.  Anticipate discharge per therapy timing.    Generalized weakness  Residual left-sided weakness following CVA  Frequent falls  Unsteady gait   PT, OT,  involved.    Anemia   Hemoglobin 10.6 back in February.  It has not been rechecked.  She has good appetite steady weight good bowel function without evidence of bleeding.  Expect spontaneous resolution.  Since she is looking so well and asymptomatic this does not need to be checked while she is in the TCU but it certainly could be.  I did  "not order it today    Presumed osteoporosis   Outpatient DEXA scan anticipated.  Patient had been on both cholecalciferol and calcium at the prior TCU.  Due to medication reduction pandemic recall they have not been restarted yet.    CVA  Left hemiplegia   It looks like patient's aspirin was never restarted after discontinuation of her Lovenox at her previous TCU stay.  The aspirin has been held for her surgery/fracture and during the postoperative Lovenox.  We will restart it today 81 EC daily.  She has never had bleeding complications in her life ulcers etc.  Otherwise we will continue secondary control measures with good blood pressure control.  Continue atorvastatin as current    Type 2 diabetes   Patient has made great strides.  She attributes this to \"watching what I am eating\" which has been easier for her in the facility.  Though she has lost weight she think she is making better choices.  Her Lantus insulin has been discontinued.  She continues on metformin at increased dose of thousand milligrams twice daily.  Her blood sugars are not as good here as reported elsewhere but acceptable.  This needs watching as she has had a couple of readings above 200.  No changes were made today.    Hyperlipidemia   Atorvastatin    CKD 3   Excellent renal function in February.  Asymptomatic.  No additional blood work ordered at this time    Possible gout   Patient repeats the story to me about how she had recurrent toe pain \"gout\" until her podiatrist worked on her toe and removed \"some extra skin\" and she has not had any bouts since then.  On if she had recurrent ingrowing?    Depression   Patient reports excellent control on current Celexa 10 mg daily    ===============================================================      Case discussed with  Facility staff         Rasta Michael MD         "

## 2021-06-20 NOTE — LETTER
Letter by Danelle Strickland CNP at      Author: Danelle Strickland CNP Service: -- Author Type: --    Filed:  Encounter Date: 2/14/2020 Status: (Other)         Patient: Addis Peña   MR Number: [id number]   YOB: 1950   Date of Visit: 2/14/2020     [facility]    Facility:   [facility] [[id number]]   Code Status: POLST AVAILABLE      CHIEF COMPLAINT/REASON FOR VISIT:  Chief Complaint   Patient presents with   ? Review Of Multiple Medical Conditions       HISTORY:      HPI: Addis is a 69 y.o. female who was admitted to [facility] from 2/2/20-2/7/20 for left hip ORIF s/p fall at home.  There were not postoperative complications noted in her hospital discharge summary.  It was recommended by her orthopedic provider that she remain on Lovenox for one month postoperatively for DVT prophylaxis.  Addis  has a past medical history of Allergic rhinitis, CKD (chronic kidney disease), Depression, Displaced dome fracture of left acetabulum (H), DM2 (diabetes mellitus, type 2) (H), GERD (gastroesophageal reflux disease), Gout, HTN (hypertension), Left hemiplegia (H), Migraine, 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), Nephrolithiasis, Stroke (H), and Vitamin B12 deficiency anemia.  She is currently at [facility] s/p hospitalization for acute rehabilitation.      Today Ms. Peña is being evaluated for a review of multiple medical conditions.  Addis denied pain at this visit and was agreeable to discontinuation of her oxycodone at this time.  She remains on Tylenol 1 g three times a day for acute pain management.  She is also taking Baclofen for chronic pain and muscle spasm.  Her daughter was also present at this visit and denied any concerns for the patient at this time.  Addis said that she has been progressing well with therapy and feels that she has been getting stronger daily.  Nursing staff noted that her confusion  is slowly improving.  We discussed her laboratory results as returning to the patient's baseline today and she was happy to hear.  The patient denied lightheadedness, dizziness, breathing difficulty, chest pain, palpitations, constipation, urinary symptoms, numbness or tingling in extremities, and pain.  Nursing staff denied any new concerns for the patient at this time.    Past Medical History:   Diagnosis Date   ? Allergic rhinitis    ? CKD (chronic kidney disease)    ? Depression    ? Displaced dome fracture of left acetabulum (H)    ? DM2 (diabetes mellitus, type 2) (H)    ? GERD (gastroesophageal reflux disease)    ? Gout    ? HTN (hypertension)    ? Left hemiplegia (H)    ? Migraine    ? 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)    ? Nephrolithiasis    ? Stroke (H)    ? Vitamin B12 deficiency anemia              Family History   Problem Relation Age of Onset   ? Heart disease Mother    ? Hypertension Mother    ? Coronary artery disease Father    ? Diabetes Sister    ? Hypertension Brother    ? Cancer Sister      Social History     Socioeconomic History   ? Marital status:      Spouse name: None   ? Number of children: None   ? Years of education: None   ? Highest education level: None   Occupational History   ? None   Social Needs   ? Financial resource strain: None   ? Food insecurity:     Worry: None     Inability: None   ? Transportation needs:     Medical: None     Non-medical: None   Tobacco Use   ? Smoking status: Never Smoker   ? Smokeless tobacco: Never Used   Substance and Sexual Activity   ? Alcohol use: Not Currently   ? Drug use: None   ? Sexual activity: None   Lifestyle   ? Physical activity:     Days per week: None     Minutes per session: None   ? Stress: None   Relationships   ? Social connections:     Talks on phone: None     Gets together: None     Attends Worship service: None     Active member of club or organization: None     Attends meetings of clubs or  organizations: None     Relationship status: None   ? Intimate partner violence:     Fear of current or ex partner: None     Emotionally abused: None     Physically abused: None     Forced sexual activity: None   Other Topics Concern   ? None   Social History Narrative   ? None       REVIEW OF SYSTEM:  Pertinent items are noted in HPI.  A 12 point comprehensive review of systems was negative except as noted.    PHYSICAL EXAM:     General Appearance:    Alert, cooperative, no distress, appears stated age   Head:    Normocephalic, without obvious abnormality, atraumatic   Eyes:    PERRL, conjunctiva/corneas clear, both eyes   Ears:    Normal external ear canals, both ears   Nose:   Nares normal, septum midline, mucosa normal, no drainage    or sinus tenderness   Throat:   Lips, mucosa, and tongue normal; teeth and gums normal   Neck:   Supple, symmetrical, trachea midline, no adenopathy;     thyroid:  no enlargement/tenderness/nodules; no carotid    bruit or JVD   Back:     Symmetric, no curvature, ROM normal, no CVA tenderness   Lungs:     Clear to auscultation bilaterally, respirations unlabored   Chest Wall:    No tenderness or deformity    Heart:    Regular rate and rhythm, S1 and S2 normal, no murmur, rub   or gallop   Abdomen:     Soft, non-tender, bowel sounds active all four quadrants,     no masses, no organomegaly; incision site CDI with glue   Extremities:   Extremities normal, atraumatic, no cyanosis or edema; incision site CDI   Pulses:   2+ and symmetric all extremities   Skin:   Skin color, texture, turgor normal, no rashes or lesions   Neurologic:   Oriented to person, place, time, and situation; exhibits logical thought processes; generalized weakness           LABS:     Lab Results   Component Value Date    WBC 8.3 02/11/2020    HGB 10.6 (L) 02/11/2020    HCT 36.7 02/11/2020     02/11/2020     02/14/2020    K 4.6 02/14/2020     (H) 02/11/2020    CREATININE 0.76 02/14/2020    BUN 33  (H) 02/11/2020    CO2 23 02/11/2020        ASSESSMENT:      ICD-10-CM    1. Physical deconditioning R53.81    2. Closed displaced fracture of anterior column of left acetabulum with routine healing, subsequent encounter S32.432D    3. S/P ORIF (open reduction internal fixation) fracture Z98.890     Z87.81        PLAN:      Physical deconditioning  -PT/OT as directed  -Discharge from facility per therapy recommendations     S/p left hip ORIF  -DISCONTINUE oxycodone PRN  -Continue Tylenol 1 g three times a day   -Encouraged patient to utilize cold therapy three times a day and prn  -Encouraged patient to utilize integrative therapies such as distraction and deep breathing for pain management  -Notify provider if patient has new or worsening pain   -Follow-up with orthopedic provider on 2/19/20     Otherwise continue current care plan for all other chronic medical conditions, as they are stable. Encouraged patient to engage in healthy lifestyle behaviors such as engaging in social activities, exercising (PT/OT), eating well, and following care plan. Follow up for routine check-up, or sooner if needed. Will continue to monitor patient and work with nursing staff collaboratively to work toward positive patient outcomes.     Electronically signed by: Danelle Strickland, SANTOS

## 2021-06-20 NOTE — LETTER
Letter by Danelle Strickland CNP at      Author: Danelle Strickland CNP Service: -- Author Type: --    Filed:  Encounter Date: 3/5/2020 Status: (Other)         Patient: Addis Peña   MR Number: [id number]   YOB: 1950   Date of Visit: 3/5/2020     [facility]    Facility:   [facility] [[id number]]   Code Status: POLST AVAILABLE      CHIEF COMPLAINT/REASON FOR VISIT:  Chief Complaint   Patient presents with   ? Problem Visit     diabetes mellitus type 2, physical deconditioning        HISTORY:      HPI: Addis is a 69 y.o. female who was admitted to [facility] from 2/2/20-2/7/20 for left hip ORIF s/p fall at home.  There were not postoperative complications noted in her hospital discharge summary.  It was recommended by her orthopedic provider that she remain on Lovenox for one month postoperatively for DVT prophylaxis.  Addis  has a past medical history of Allergic rhinitis, CKD (chronic kidney disease), Depression, Displaced dome fracture of left acetabulum (H), DM2 (diabetes mellitus, type 2) (H), GERD (gastroesophageal reflux disease), Gout, HTN (hypertension), Left hemiplegia (H), Migraine, 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), Nephrolithiasis, Stroke (H), and Vitamin B12 deficiency anemia.  She is currently at [facility] s/p hospitalization for acute rehabilitation.      Today Ms. Peña is being evaluated for diabetes mellitus type 2.  Addis reported that she would like to be off insulin altogether if possible.  We discussed that her POC blood glucose should be under 200 to keep her A1Cat goal < 8.0 and she was agreeable to this plan.  Her blood glucose range has been  over the past week taking Lantus 15 units daily and metformin 1 g two times a day for blood glucose management.  She continues to work on strength and conditioning exercises in therapy until she can be WBAT on her left leg with her  next orthopedic follow-up on 3/23/20.  [facility] is planning to stay at [facility] until she can ambulate again.  The patient denied lightheadedness, dizziness, breathing difficulty, chest pain, palpitations, constipation, urinary symptoms, numbness or tingling in extremities, and pain.  Nursing staff denied any new concerns for the patient at this time.    Past Medical History:   Diagnosis Date   ? Allergic rhinitis    ? CKD (chronic kidney disease)    ? Depression    ? Displaced dome fracture of left acetabulum (H)    ? DM2 (diabetes mellitus, type 2) (H)    ? GERD (gastroesophageal reflux disease)    ? Gout    ? HTN (hypertension)    ? Left hemiplegia (H)    ? Migraine    ? 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)    ? Nephrolithiasis    ? Stroke (H)    ? Vitamin B12 deficiency anemia              Family History   Problem Relation Age of Onset   ? Heart disease Mother    ? Hypertension Mother    ? Coronary artery disease Father    ? Diabetes Sister    ? Hypertension Brother    ? Cancer Sister      Social History     Socioeconomic History   ? Marital status:      Spouse name: None   ? Number of children: None   ? Years of education: None   ? Highest education level: None   Occupational History   ? None   Social Needs   ? Financial resource strain: None   ? Food insecurity     Worry: None     Inability: None   ? Transportation needs     Medical: None     Non-medical: None   Tobacco Use   ? Smoking status: Never Smoker   ? Smokeless tobacco: Never Used   Substance and Sexual Activity   ? Alcohol use: Not Currently   ? Drug use: None   ? Sexual activity: None   Lifestyle   ? Physical activity     Days per week: None     Minutes per session: None   ? Stress: None   Relationships   ? Social connections     Talks on phone: None     Gets together: None     Attends Restoration service: None     Active member of club or organization: None     Attends meetings of clubs or organizations: None     Relationship  status: None   ? Intimate partner violence     Fear of current or ex partner: None     Emotionally abused: None     Physically abused: None     Forced sexual activity: None   Other Topics Concern   ? None   Social History Narrative   ? None       REVIEW OF SYSTEM:  Pertinent items are noted in HPI.  A 12 point comprehensive review of systems was negative except as noted.    PHYSICAL EXAM:     General Appearance:    Alert, cooperative, no distress, appears stated age   Head:    Normocephalic, without obvious abnormality, atraumatic   Eyes:    PERRL, conjunctiva/corneas clear, both eyes   Ears:    Normal external ear canals, both ears   Nose:   Nares normal, septum midline, mucosa normal, no drainage    or sinus tenderness   Throat:   Lips, mucosa, and tongue normal; teeth and gums normal   Neck:   Supple, symmetrical, trachea midline, no adenopathy;     thyroid:  no enlargement/tenderness/nodules; no carotid    bruit or JVD   Back:     Symmetric, no curvature, ROM normal, no CVA tenderness   Lungs:     Clear to auscultation bilaterally, respirations unlabored   Chest Wall:    No tenderness or deformity    Heart:    Regular rate and rhythm, S1 and S2 normal, no murmur, rub   or gallop   Abdomen:     Soft, non-tender, bowel sounds active all four quadrants,     no masses, no organomegaly; incision site CDI with glue   Extremities:   Extremities normal, atraumatic, no cyanosis or edema; incision site CDI   Pulses:   2+ and symmetric all extremities   Skin:   Skin color, texture, turgor normal, no rashes or lesions   Neurologic:   Oriented to person, place, time, and situation; exhibits logical thought processes; generalized weakness           LABS:     Lab Results   Component Value Date    WBC 8.3 02/11/2020    HGB 10.6 (L) 02/11/2020    HCT 36.7 02/11/2020     02/11/2020     02/14/2020    K 4.6 02/14/2020     (H) 02/11/2020    CREATININE 0.76 02/14/2020    BUN 33 (H) 02/11/2020    CO2 23 02/11/2020         ASSESSMENT:      ICD-10-CM    1. Physical deconditioning  R53.81    2. Type 2 diabetes mellitus with other neurologic complication, with long-term current use of insulin (H)  E11.49     Z79.4        PLAN:      Physical deconditioning  -PT/OT as directed  -Discharge from facility per therapy recommendations on WBAT    DMII  -DECREASE Lantus from 15 units to 10 units daily  -Continue metformin 1 g two times a day   -Notify provider if BG < 70 or > 500    Otherwise continue current care plan for all other chronic medical conditions, as they are stable. Encouraged patient to engage in healthy lifestyle behaviors such as engaging in social activities, exercising (PT/OT), eating well, and following care plan. Follow up for routine check-up, or sooner if needed. Will continue to monitor patient and work with nursing staff collaboratively to work toward positive patient outcomes.     Electronically signed by: Danelle Strickland, SANTOS

## 2021-06-20 NOTE — LETTER
"Letter by Rasta Michael MD at      Author: Rasta Michael MD Service: -- Author Type: --    Filed:  Encounter Date: 5/7/2020 Status: (Other)         Patient: Addis Peña   MR Number: [id number]   YOB: 1950   Date of Visit: 5/7/2020     [facility]   Telephone        Addis Peña is a 69 y.o. female who is being evaluated via a billable video visit.      The patient has been notified of following:     \"This video visit will be conducted via a call between you and your physician/provider. We have found that certain health care needs can be provided without the need for an in-person physical exam.  This service lets us provide the care you need with a video conversation.  If a prescription is necessary we can send it to the facility team.  If lab work is needed we can place an order through the facility team to have that test done at a later time.    If during the course of the call the physician/provider feels a video visit is not appropriate, you will not be charged for this service.\"     Physician/provider has received verbal consent for a telephone visit from the patient? Yes      11:40 AM    PCP: Wegener, Joel D, MD   Phone: [phone]   Fax: [phone]    Video-Visit Details    Type of service:  Video Visit    Video End Time 11:54 AM    Originating Location (pt. Location):[facility] [[id number]]    Distant Location (provider location):  [facility]     Mode of Communication:  Telephone conference    Rasta Michael MD            Facility:  [facility] [[id number]]    Code Status:  POLST AVAILABLE    Chief Complaint   Patient presents with   ? H & P   :                    Patient Active Problem List   Diagnosis   ? Left hip pain   ? Postoperative pain   ? Physical deconditioning   ? Closed displaced fracture of anterior column of left acetabulum with routine healing, subsequent " "encounter   ? S/P ORIF (open reduction internal fixation) fracture   ? Falls   ? Essential hypertension   ? Type 2 diabetes mellitus with other neurologic complication, with long-term current use of insulin (H)   ? CVA, old,2010   ? Spells of trembling   ? Fever   ? Seizure disorder (H)       History:  Ms. 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    Hospital course: Recall that I had seen patient April 30 for \"spells\", she had had 4 of them over previous days.  I was concerned for seizure, but patient was stable at that time and not having spells so I simply started her on Keppra and have staff arrange ASAP neurology appointment.  Unfortunately 2 days later, before she saw the neurologist, and after 3 doses of Keppra, she had another spell.  She was appropriately sent to the emergency room at that point.    Patient was admitted between May 2 and May 6.  Patient had MRI MRA head and neck vessels as noted below.  She has severe right SOCO stenosis.  There was also some diffusion changes in the MRI in the area of her previous 2010 CVA.  While new/acute CVA could not be ruled out, neurology felt that the MRI changes were more consistent with changes associated with seizure activity.  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    Patient's Keppra was increased to 1250 minutes twice daily and she was also started on phenytoin  mg twice daily.    Neurologist has asked her to see his partner/seizure specialist in 3 to 4 weeks.    Hospitalization with otherwise " unremarkable    ===============================================================  Recall that patient was transferred recently from a different TCU details below:    [facility] course: February 7 through April 20    While the patient reports dissatisfaction with her physical therapy, and requested transfer, there were some positive outcomes report here.  Patient experienced improved blood sugar control throughout her stay resulting in discontinuation of her Lantus on March 12 and an increase in her metformin from 500 twice daily to 1000 twice daily.  Blood sugars reportedly quite favorable thereafter generally less than 170 as well.    Patient also reports that her pain came under good control with scheduled Tylenol and baclofen without need for ongoing opiates.    Patient's blood pressure was generally adequately controlled on current medications.    FiberCon was started for her bowels which tend to be on the loose side with improvement.    Preceding hospital course for the [facility] stay follows:  Hospital course: Patient was admitted to [facility] February 2 through February 7 though she was originally seen in the emergency room at [facility] and transferred to [facility] once fracture was identified.                Patient reports 2 falls the first on January 25 with some left hip pain.  She was seen for left hip pain on the 28th, diagnosed with left acetabular fracture as well as metatarsal fractures 2 through 4 on the left foot.  Case was discussed with orthopedics, Lovenox prophylaxis was prescribed.  However she fell again on February 1 trying to transfer self in her home this time with very severe left hip pain incompatible with ambulation.  She now was diagnosed with displaced dome fracture of the left acetabulum requiring ORIF, repaired by Dr. Dinh.  There were no complications reported.  Patient was treated with DVT prophylaxis, prophylactic antibiotic therapy and  deemed safe for discharge by February 7 with pain tolerated by oral pain medications.  Lovenox 40 mg subcutaneously recommended for 28 days.  Oxycodone 5 to 10 mg every 4 hours along with scheduled Tylenol given for pain.  Her aspirin was held at discharge.  There is also recommendation for pursuing osteoporosis work-up including DEXA scanning.  Notably she also received some ceftriaxone in the emergency room at [facility] for abnormal urinalysis.   ==============================================================      Subjective/ROS:    -augmented by discussion with facility staff involved in direct care    Patient is disappointed that her left hand and arm are not coming back from the paralysis state.  We discussed Quentin paralysis and the likelihood of improvement over the next couple of days assuming the diagnosis is correct.  Neurology was feeling that Quentin's was more likely than new acute on chronic CVA.    Other than that she says she is feeling better.  She is not having any more of the shaking or spastic spells.  She has had return of her appetite and has been eating fine.  Her bowels are working well.  She urinates frequently but does not have dysuria.  She denies body aches cough chills shakes.  She was unaware of her low-grade fever today.  She reports that she tested negative for COVID in the hospital x1.    No headaches change in vision speaking swallowing hearing chest pain orthopnea PND peripheral edema abdominal pain nausea vomiting diarrhea melena bright red blood per rectum mood change remainder is negative    Past Medical History:   Diagnosis Date   ? Allergic rhinitis    ? CKD (chronic kidney disease)    ? Depression    ? Displaced dome fracture of left acetabulum (H)    ? DM2 (diabetes mellitus, type 2) (H)    ? GERD (gastroesophageal reflux disease)    ? Gout    ? HTN (hypertension)    ? Left hemiplegia (H)    ? Migraine    ? 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)    ?  Nephrolithiasis    ? Seizure disorder (H)    ? Stroke (H)    ? Vitamin B12 deficiency anemia      Past Surgical History:   Procedure Laterality Date   ? COLONOSCOPY W/ BIOPSIES AND POLYPECTOMY  05/09/2019    Procedure: COLONOSCOPY, WITH POLYPECTOMY AND BIOPSY; Surgeon: Na Diehl MD; Location: [facility]     ? CYSTOSCOPY W/ URETEROSCOPY Right 02/22/2016    Procedure: COMBINED CYSTOSCOPY, URETEROSCOPY; Surgeon: Madelaine Roland MD; Location: [facility] OR     ? ORIF ACETABULUM FRACTURE Left           Family History   Problem Relation Age of Onset   ? Heart disease Mother    ? Hypertension Mother    ? Coronary artery disease Father    ? Diabetes Sister    ? Hypertension Brother    ? Cancer Sister    :       Social History     Socioeconomic History   ? Marital status:      Spouse name: Not on file   ? Number of children: Not on file   ? Years of education: Not on file   ? Highest education level: Not on file   Occupational History   ? Not on file   Social Needs   ? Financial resource strain: Not on file   ? Food insecurity     Worry: Not on file     Inability: Not on file   ? Transportation needs     Medical: Not on file     Non-medical: Not on file   Tobacco Use   ? Smoking status: Never Smoker   ? Smokeless tobacco: Never Used   Substance and Sexual Activity   ? Alcohol use: Not Currently   ? Drug use: Not on file   ? Sexual activity: Not on file   Lifestyle   ? Physical activity     Days per week: Not on file     Minutes per session: Not on file   ? Stress: Not on file   Relationships   ? Social connections     Talks on phone: Not on file     Gets together: Not on file     Attends Shinto service: Not on file     Active member of club or organization: Not on file     Attends meetings of clubs or organizations: Not on file     Relationship status: Not on file   ? Intimate partner violence     Fear of current or ex partner: Not on file     Emotionally abused: Not on file     Physically abused:  Not on file     Forced sexual activity: Not on file   Other Topics Concern   ? Not on file   Social History Narrative   ? Not on file   :        Current Outpatient Medications on File Prior to Visit   Medication Sig Dispense Refill   ? acetaminophen (TYLENOL) 500 MG tablet Take 1,000 mg by mouth 3 (three) times a day.      ? amLODIPine (NORVASC) 10 MG tablet Take 10 mg by mouth daily.     ? atorvastatin (LIPITOR) 40 MG tablet Take 40 mg by mouth at bedtime.     ? baclofen (LIORESAL) 10 MG tablet Take 10 mg by mouth 3 (three) times a day.     ? bisacodyL (DULCOLAX) 5 mg EC tablet Take 5 mg by mouth daily as needed for constipation.     ? cholecalciferol, vitamin D3, 1,000 unit (25 mcg) tablet Take 1,000 Units by mouth daily.     ? citalopram (CELEXA) 10 MG tablet Take 10 mg by mouth at bedtime.     ? cyanocobalamin 1,000 mcg/mL injection Inject 1,000 mcg into the shoulder, thigh, or buttocks every 30 (thirty) days.     ? ibuprofen (ADVIL,MOTRIN) 200 MG tablet Take 200 mg by mouth every 6 (six) hours as needed for pain.     ? lactase 4,500 unit Tab Take 4 tablets by mouth as needed.      ? levETIRAcetam (KEPPRA) 500 MG tablet Take 500 mg by mouth 2 (two) times a day.     ? losartan (COZAAR) 25 MG tablet Take 25 mg by mouth daily.     ? magnesium oxide 250 mg magnesium Tab Take 250 mg by mouth.     ? metFORMIN (GLUCOPHAGE) 1000 MG tablet Take 1,000 mg by mouth 2 (two) times a day with meals. Take 2 tabs by mouth in AM and 1 tab by mouth at HS.     ? metoprolol succinate (TOPROL-XL) 50 MG 24 hr tablet Take 50 mg by mouth daily.     ? phenytoin extended (DILANTIN) 200 MG ER capsule Take 200 mg by mouth 3 (three) times a day.     ? senna-docusate (SENNOSIDES-DOCUSATE SODIUM) 8.6-50 mg tablet Take 1 tablet by mouth daily.     ? [DISCONTINUED] aspirin 81 MG EC tablet Take 81 mg by mouth daily.     ? [DISCONTINUED] calcium polycarbophiL (FIBERCON) 625 mg tablet Take 1,250 mg by mouth daily.     ? [DISCONTINUED] montelukast  (SINGULAIR) 10 mg tablet Take 10 mg by mouth daily.       Current Facility-Administered Medications on File Prior to Visit   Medication Dose Route Frequency Provider Last Rate Last Dose   ? [DISCONTINUED] amLODIPine tablet (NORVASC)  10 mg Oral  GENERIC EXTERNAL DATA PROVIDER       ? [DISCONTINUED] aspirin chewable tablet  81 mg Oral  GENERIC EXTERNAL DATA PROVIDER       ? [DISCONTINUED] atorvastatin tablet (LIPITOR)  40 mg Oral  GENERIC EXTERNAL DATA PROVIDER       ? [DISCONTINUED] baclofen tablet (LIORESAL)  10 mg Oral  GENERIC EXTERNAL DATA PROVIDER       ? [DISCONTINUED] bisacodyL EC tablet (DULCOLAX)  10 mg Oral  GENERIC EXTERNAL DATA PROVIDER       ? [DISCONTINUED] cholecalciferol (vitamin D3) 1,000 unit (25 mcg) tablet  1,000 Units Oral  GENERIC EXTERNAL DATA PROVIDER       ? [DISCONTINUED] citalopram tablet (celeXA)  10 mg Oral  GENERIC EXTERNAL DATA PROVIDER       ? [DISCONTINUED] dextrose 10%  12.5-25 g Intravenous  GENERIC EXTERNAL DATA PROVIDER       ? [DISCONTINUED] dextrose-vitamin D3 4-400 gram-unit Chew  4 tablet Oral  GENERIC EXTERNAL DATA PROVIDER       ? [DISCONTINUED] enoxaparin ANTICOAGULANT syringe (LOVENOX)  40 mg Subcutaneous  GENERIC EXTERNAL DATA PROVIDER       ? [DISCONTINUED] GENERIC EXTERNAL MEDICATION     GENERIC EXTERNAL DATA PROVIDER       ? [DISCONTINUED] GENERIC EXTERNAL MEDICATION     GENERIC EXTERNAL DATA PROVIDER       ? [DISCONTINUED] GENERIC EXTERNAL MEDICATION  190 mg Oral  GENERIC EXTERNAL DATA PROVIDER       ? [DISCONTINUED] GENERIC EXTERNAL MEDICATION  1,250 mg Oral  GENERIC EXTERNAL DATA PROVIDER       ? [DISCONTINUED] glucagon (human recombinant) injection  1 mg Intramuscular  GENERIC EXTERNAL DATA PROVIDER       ? [DISCONTINUED] hydrALAZINE injection (APRESOLINE)  10 mg Intravenous  GENERIC EXTERNAL DATA PROVIDER       ? [DISCONTINUED] insulin lispro injection (HumaLOG)  0-8 Units Subcutaneous  GENERIC EXTERNAL DATA PROVIDER       ? [DISCONTINUED] losartan tablet  (COZAAR)  25 mg Oral  GENERIC EXTERNAL DATA PROVIDER       ? [DISCONTINUED] melatonin tablet  3 mg Oral  GENERIC EXTERNAL DATA PROVIDER       ? [DISCONTINUED] metoprolol succinate 24 hr tablet (TOPROL-XL)  50 mg Oral  GENERIC EXTERNAL DATA PROVIDER       ? [DISCONTINUED] montelukast tablet (SINGULAIR)  10 mg Oral  GENERIC EXTERNAL DATA PROVIDER       ? [DISCONTINUED] senna-docusate 8.6-50 mg tablet (PERICOLACE)  1 tablet Oral  GENERIC EXTERNAL DATA PROVIDER       :      ALLERGIES:  Lisinopril; Milk; and Latex    Vitals:  There were no vitals taken for this visit. except as noted below    Vital signs: Reviewed per facility EMR vitals including as follows:                  Most Recent Vitals Date/Time Taken  Temperature: 98.9  F 05/07/2020 05:44 PM  Pulse: 61 per minute 05/07/2020 05:44 PM  Respirations: 18 per minute 05/07/2020 05:44 PM  Blood Pressure: 115 / 62 mmHg 05/07/2020 05:44 PM  O2 Saturation: 95 % 05/07/2020 05:44 PM  Blood Sugar: 207 mg/dL 05/07/2020 07:07 PM  Weight: 142.4 lbs / Routine       BMI: 22.98 05/02/2020 10:48 AM  Height: 5ft 6.0in 04/20/2020 02:43 PM      There is no height or weight on file to calculate BMI.    Physical exam:    General:  Alert  oriented x3 appears in conversation.  Her voice is strong speech is fluent she answers questions appropriately and quickly without psychomotor slowing.  Nurse on duty reports that the patient looks better to him than she did all last week.  Generally stronger and more engaged, the patient agrees with that.      Due to the 2020 Covid 19 pandemic, except as noted above, the patient was visually observed at a 6 foot plus distance.  An observational exam was performed in an effort to keep patient safe from Covid 19 and other communicable diseases.   Labs:  Lab Results   Component Value Date    WBC 8.3 02/11/2020    HGB 10.6 (L) 02/11/2020    HCT 36.7 02/11/2020     (H) 02/11/2020     02/11/2020     Results for orders placed or performed in  visit on 04/29/20   Basic Metabolic Panel   Result Value Ref Range    Sodium 142 136 - 145 mmol/L    Potassium 4.6 3.5 - 5.0 mmol/L    Chloride 109 (H) 98 - 107 mmol/L    CO2 23 22 - 31 mmol/L    Anion Gap, Calculation 10 5 - 18 mmol/L    Glucose 129 (H) 70 - 125 mg/dL    Calcium 9.4 8.5 - 10.5 mg/dL    BUN 29 (H) 8 - 22 mg/dL    Creatinine 0.79 0.60 - 1.10 mg/dL    GFR MDRD Af Amer >60 >60 mL/min/1.73m2    GFR MDRD Non Af Amer >60 >60 mL/min/1.73m2       Magnesium 1.5  No results found for: TSH  No results found for: HGBA1C  [unfilled]  Lab Results   Component Value Date    URVRGEKA19 591 04/29/2020     No results found for: BNP  @BlurttTX@        Invalid input(s): PRINTERVAL      Procedures/Significant Imaging & Testing:   MRI:  IMPRESSION:  HEAD MRI:   1. Chronic right SOCO territory infarct, with a punctate focus of acute ischemia in the area of encephalomalacia and gliosis in the right precentral gyrus.  2. Age-related changes with cerebral volume loss and background chronic microangiopathic ischemic disease.        HEAD MRA:   1. Severe stenosis/near occlusion of the A2 and A3 segments of the right anterior cerebral artery.  2. Suspect 2 mm medially directed aneurysm arising from the right cavernous segment of the internal carotid artery, which can be confirmed with a dedicated CTA of the head.     NECK MRA:  1. Nonflow limiting atherosclerotic plaque of the carotid bifurcations.  2. Irregular atherosclerotic disease in the right subclavian artery, with areas of moderate stenosis.       Assessment/Plan:      ICD-10-CM    1. Fever, unspecified fever cause  R50.9    2. Seizure disorder (H)  G40.909        Seizures  Quentin's paralysis  Chronic left-sided weakness/hemiplegia   Unfortunately patient had another spell despite Keppra prophylaxis before she could follow-up with neurology resulting in this admission.  Assuming neurology's impression of MRI changes due to seizure rather than stroke are accurate, expect  left arm to recover to previous impaired baseline.    Patient is now on higher dose of Keppra 1250 twice daily plus phenytoin 200 twice daily.  She has neurology follow-up in 3 weeks.    I restarted aspirin last week but I do not see it on her list now.  I reviewed the neurology notes from the hospital and they do recommend continuing aspirin 81 mg daily.  I copy the neurologist note below so there is no confusion on the matter.  69F w h/o R SOCO stroke now admitted with focal seizures arising from the chronic encephalomalacia. The MRI showed some scattered DWI restriction which I think more likely to be from the seizure not a new stroke. Still can't rule out stroke entirely given the severely stenosed R SOCO which seems to be atherosclerotic.     The patient has been on VEEG and although we did not technically see seizures, she did have periodic slowing arising from the right hemisphere that sometimes correlated with loss of function of the left UE. These episodes of slowing have been steadily decreasing in frequency with the current AED regimen of keppra 1250 bid and dilantin 190 bid. Her left UE strength and function has improved but is not back to baseline. I think her left UE is weak and spastic at baseline because of the biceps contracture.     -continue current AED regimen.   -continue ASA 81 mg/d  -continue atorvastatin 40.   -Ok to discharge from neurology standpoint.   -Follow up with one of our epileptologists (Dr Shaw or Dr Sagastume) in 3-4 weeks.     Discussed with hospitalist.     Neurology will sign off. Please call with questions.       CVA 2010  Left hemiplegia   It looks like patient's aspirin was never restarted after discontinuation of her Lovenox at her previous TCU stay.  The aspirin has been held for her surgery/fracture and during the postoperative Lovenox.  See above note    Fever   Isolated fever today 99.9.  Still at 99 after 1000 mg of Tylenol.  However it is unassociated with cough  shortness of breath O2 sat drops headache sore throat body aches loss of taste or smell or other COVID type symptoms.  However she has been out on transport to and from the hospital and is at risk.  She did test negative in the hospital.  Other focus of infection not evident from discussion today.  She does have urinary frequency but that is not a new issue.    -Droplet precautions  -Retest for COVID now  -Frequent O2 sat/fever monitoring  -UA UC  -If fevers continue or patient becomes ill may need blood work or other additional work-up such as chest x-rays etc.          acetabular fracture status post ORIF February 2020   This was the original reason for patient's TCU need.  Patient reports dissatisfaction with her therapy at prior TCU resulting in the transfer.  She is encouraged by her early days here.  She reports good pain control.  She would like to return to independent living.  She has a roommate at the apartment in a senior complex where she lives.  PT, OT,  involved.  Anticipate discharge per therapy timing.    Generalized weakness  Residual left-sided weakness following CVA  Quentin's paralysis/acute left arm weakness  Frequent falls  Unsteady gait   PT, OT, services resumed    Anemia   Hemoglobin 10.6 back in February.  Hemoglobin has not been rechecked in the hospital 13.0.  No further treatment or work-up indicated.    Presumed osteoporosis   Outpatient DEXA scan anticipated.  Cholecalciferol      Type 2 diabetes   Nothing new to report here.  Patient has had dramatic improvement.    Her Lantus insulin has been recently discontinued.  She continues on metformin at increased dose of thousand milligrams twice daily.  Her blood sugars are not as good here as reported elsewhere but acceptable.  This needs watching as she has had a couple of readings above 200.  No changes were made today.    Hyperlipidemia   Atorvastatin unchanged  Hypomagnesemia    1.7 in the hospital, still mildly low.   "Taking mag oxide 250 daily.  No changes made today.    CKD 3   Blood work from hospital reviewed, patient is stable    Possible gout   Patient repeats the story to me about how she had recurrent toe pain \"gout\" until her podiatrist worked on her toe and removed \"some extra skin\" and she has not had any bouts since then.  On if she had recurrent ingrowing?    Depression   Patient reports excellent control on current Celexa 10 mg daily    Appreciate help of RN on duty today to help with this phone visit.        Rasta Michael MD         "

## 2021-06-20 NOTE — LETTER
Letter by Rasta Michael MD at      Author: Rasta Michael MD Service: -- Author Type: --    Filed:  Encounter Date: 4/23/2020 Status: (Other)         Patient: Addis Peña   MR Number: [id number]   YOB: 1950   Date of Visit: 4/23/2020      [facility]    Facility:  [facility] [[id number]]    Code Status:  POLST AVAILABLE    Chief Complaint   Patient presents with   ? H & P   :                    Patient Active Problem List   Diagnosis   ? Left hip pain   ? Postoperative pain   ? Physical deconditioning   ? Closed displaced fracture of anterior column of left acetabulum with routine healing, subsequent encounter   ? S/P ORIF (open reduction internal fixation) fracture   ? Falls   ? Essential hypertension   ? Type 2 diabetes mellitus with other neurologic complication, with long-term current use of insulin (H)   ? CVA, old,2010       History:  Ms. Peña is a 69-year-old female with a past medical history of CVA resulting in left hemiplegia, depression, hypertension, insulin-dependent type 2 diabetes complicated by retinopathy, hyperlipidemia, environmental allergies, CKD 3, possible gout, seen today for admission to [facility]--a transfer from [facility], where she had been staying since her recent hospitalization.    [facility] course: February 7 through April 20    While the patient reports dissatisfaction with her physical therapy, and requested transfer, there were some positive outcomes report here.  Patient experienced improved blood sugar control throughout her stay resulting in discontinuation of her Lantus on March 12 and an increase in her metformin from 500 twice daily to 1000 twice daily.  Blood sugars reportedly quite favorable thereafter generally less than 170 as well.    Patient also reports that her pain came under good control with scheduled Tylenol and baclofen without need for ongoing  opiates.    Patient's blood pressure was generally adequately controlled on current medications.    FiberCon was started for her bowels which tend to be on the loose side with improvement.        Hospital course: Patient was admitted to [facility] February 2 through February 7 though she was originally seen in the emergency room at [facility] and transferred to [facility] once fracture was identified.                Patient reports 2 falls the first on January 25 with some left hip pain.  She was seen for left hip pain on the 28th, diagnosed with left acetabular fracture as well as metatarsal fractures 2 through 4 on the left foot.  Case was discussed with orthopedics, Lovenox prophylaxis was prescribed.  However she fell again on February 1 trying to transfer self in her home this time with very severe left hip pain incompatible with ambulation.  She now was diagnosed with displaced dome fracture of the left acetabulum requiring ORIF, repaired by Dr. Dinh.  There were no complications reported.  Patient was treated with DVT prophylaxis, prophylactic antibiotic therapy and deemed safe for discharge by February 7 with pain tolerated by oral pain medications.  Lovenox 40 mg subcutaneously recommended for 28 days.  Oxycodone 5 to 10 mg every 4 hours along with scheduled Tylenol given for pain.  Her aspirin was held at discharge.  There is also recommendation for pursuing osteoporosis work-up including DEXA scanning.  Notably she also received some ceftriaxone in the emergency room at [facility] for abnormal urinalysis.         Subjective/ROS:    -augmented by discussion with facility staff involved in direct care      Patient reports that she is feeling good about the transfer.  She feels like physical therapy here has already improved her ambulation and working more aggressively towards her own goals..  She says she has no concerns and has made the transition simply.    She says she is  eating well her weight has been quite stable.  She is not constipated tends to be on the loose side but that has improved.  She has no dysuria.  She has no fever sweats or chills.  She has had no cough or exposure to known or named illness.  She denies headaches change in vision speaking swallowing chest pain shortness of breath peripheral edema wheezing.  She does report a history of frequent falls since her CVA in January 2010 leaving her with left hemiplegia.  She reports her depression is under good control.  Remainder 13 system ROS negative.    Past Medical History:   Diagnosis Date   ? Allergic rhinitis    ? CKD (chronic kidney disease)    ? Depression    ? Displaced dome fracture of left acetabulum (H)    ? DM2 (diabetes mellitus, type 2) (H)    ? GERD (gastroesophageal reflux disease)    ? Gout    ? HTN (hypertension)    ? Left hemiplegia (H)    ? Migraine    ? 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)    ? Nephrolithiasis    ? Stroke (H)    ? Vitamin B12 deficiency anemia      Past Surgical History:   Procedure Laterality Date   ? COLONOSCOPY W/ BIOPSIES AND POLYPECTOMY  05/09/2019    Procedure: COLONOSCOPY, WITH POLYPECTOMY AND BIOPSY; Surgeon: Na Diehl MD; Location: [facility] OR     ? CYSTOSCOPY W/ URETEROSCOPY Right 02/22/2016    Procedure: COMBINED CYSTOSCOPY, URETEROSCOPY; Surgeon: Madelaine Roland MD; Location: [facility] OR     ? ORIF ACETABULUM FRACTURE Left           Family History   Problem Relation Age of Onset   ? Heart disease Mother    ? Hypertension Mother    ? Coronary artery disease Father    ? Diabetes Sister    ? Hypertension Brother    ? Cancer Sister    :       Social History     Socioeconomic History   ? Marital status:      Spouse name: Not on file   ? Number of children: Not on file   ? Years of education: Not on file   ? Highest education level: Not on file   Occupational History   ? Not on file   Social Needs   ? Financial resource strain:  Not on file   ? Food insecurity     Worry: Not on file     Inability: Not on file   ? Transportation needs     Medical: Not on file     Non-medical: Not on file   Tobacco Use   ? Smoking status: Never Smoker   ? Smokeless tobacco: Never Used   Substance and Sexual Activity   ? Alcohol use: Not Currently   ? Drug use: Not on file   ? Sexual activity: Not on file   Lifestyle   ? Physical activity     Days per week: Not on file     Minutes per session: Not on file   ? Stress: Not on file   Relationships   ? Social connections     Talks on phone: Not on file     Gets together: Not on file     Attends Bahai service: Not on file     Active member of club or organization: Not on file     Attends meetings of clubs or organizations: Not on file     Relationship status: Not on file   ? Intimate partner violence     Fear of current or ex partner: Not on file     Emotionally abused: Not on file     Physically abused: Not on file     Forced sexual activity: Not on file   Other Topics Concern   ? Not on file   Social History Narrative   ? Not on file   :        Current Outpatient Medications on File Prior to Visit   Medication Sig Dispense Refill   ? acetaminophen (TYLENOL) 500 MG tablet Take 1,000 mg by mouth 3 (three) times a day.      ? amLODIPine (NORVASC) 10 MG tablet Take 10 mg by mouth daily.     ? baclofen (LIORESAL) 10 MG tablet Take 10 mg by mouth 3 (three) times a day.     ? calcium polycarbophiL (FIBERCON) 625 mg tablet Take 1,250 mg by mouth daily.     ? citalopram (CELEXA) 10 MG tablet Take 10 mg by mouth at bedtime.     ? cyanocobalamin 1,000 mcg/mL injection Inject 1,000 mcg into the shoulder, thigh, or buttocks every 30 (thirty) days.     ? lactase 4,500 unit Tab Take 4 tablets by mouth as needed.      ? losartan (COZAAR) 25 MG tablet Take 25 mg by mouth daily.     ? metFORMIN (GLUCOPHAGE) 1000 MG tablet Take 1,000 mg by mouth 2 (two) times a day with meals. Take 2 tabs by mouth in AM and 1 tab by mouth at  HS.     ? metoprolol succinate (TOPROL-XL) 50 MG 24 hr tablet Take 50 mg by mouth daily.     ? montelukast (SINGULAIR) 10 mg tablet Take 10 mg by mouth daily.     ? atorvastatin (LIPITOR) 40 MG tablet Take 40 mg by mouth at bedtime.     ? cholecalciferol, vitamin D3, 1,000 unit (25 mcg) tablet Take 1,000 Units by mouth daily.     ? [DISCONTINUED] calcium polycarbophiL (FIBERCON) 625 mg tablet Take 1,250 mg by mouth daily.       No current facility-administered medications on file prior to visit.    :      ALLERGIES:  Lisinopril and Latex    Vitals:  There were no vitals taken for this visit. except as noted below    Vital signs: Reviewed per facility EMR vitals including as follows:                    Most Recent Vitals Date/Time Taken  Temperature: 99  F 04/23/2020 05:19 PM  Pulse: 73 per minute 04/23/2020 05:19 PM  Respirations: 16 per minute 04/23/2020 05:19 PM  Blood Pressure: 125 / 62 mmHg 04/23/2020 05:19 PM  O2 Saturation: 95 % 04/23/2020 05:19 PM  Blood Sugar: 232 mg/dL 04/23/2020 04:51 PM  Weight: 145.2 lbs / Routine       BMI: 23.43 04/23/2020 10:37 AM  Height: 5ft 6.0in 04/20/2020 02:43 PM    There is no height or weight on file to calculate BMI.    Physical exam:    General:  Alert  oriented x3 appears in no apparent distress.  Patient working on the exercycle in physical therapy.  Normocephalic atraumatic.  Sclera clear.  EOMI.  She demonstrates left arm and left leg weakness compared to the right.  She has antigravity strength on the left side.  She has clear speech with strong voice.  There is no facial asymmetry.  She has no lower extremity edema.  Skin is intact in visualized areas.      Due to the 2020 Covid 19 pandemic, except as noted above, the patient was visually observed at a 6 foot plus distance.  An observational exam was performed in an effort to keep patient safe from Covid 19 and other communicable diseases.   Labs:  Lab Results   Component Value Date    WBC 8.3 02/11/2020    HGB 10.6  (L) 02/11/2020    HCT 36.7 02/11/2020     (H) 02/11/2020     02/11/2020     Results for orders placed or performed in visit on 02/11/20   Basic Metabolic Panel   Result Value Ref Range    Sodium 142 136 - 145 mmol/L    Potassium 5.3 (H) 3.5 - 5.0 mmol/L    Chloride 109 (H) 98 - 107 mmol/L    CO2 23 22 - 31 mmol/L    Anion Gap, Calculation 10 5 - 18 mmol/L    Glucose 90 70 - 125 mg/dL    Calcium 9.1 8.5 - 10.5 mg/dL    BUN 33 (H) 8 - 22 mg/dL    Creatinine 0.94 0.60 - 1.10 mg/dL    GFR MDRD Af Amer >60 >60 mL/min/1.73m2    GFR MDRD Non Af Amer 59 (L) >60 mL/min/1.73m2         No results found for: TSH  No results found for: HGBA1C  [unfilled]  No results found for: GSXGGWXD88  No results found for: BNP  [unfilled]        Invalid input(s): PRINTERVAL      Assessment/Plan:      ICD-10-CM    1. Type 2 diabetes mellitus with other neurologic complication, with long-term current use of insulin (H)  E11.49     Z79.4    2. Essential hypertension  I10    3. Left hip pain  M25.552    4. Physical deconditioning  R53.81    5. Closed displaced fracture of anterior column of left acetabulum with routine healing, subsequent encounter  S32.432D    6. S/P ORIF (open reduction internal fixation) fracture  Z98.890     Z87.81    7. CVA, old, ataxia  I69.993        Acetabular fracture status post ORIF February 2020   Patient reports dissatisfaction with her therapy at prior TCU resulting in the transfer.  She is encouraged by her early days here.  She reports good pain control.  She would like to return to independent living.  She has a roommate at the apartment in a senior complex where she lives.  PT, OT,  involved.  Anticipate discharge per therapy timing.    Generalized weakness  Residual left-sided weakness following CVA  Frequent falls  Unsteady gait   PT, OT,  involved.    Anemia   Hemoglobin 10.6 back in February.  It has not been rechecked.  She has good appetite steady weight good  "bowel function without evidence of bleeding.  Expect spontaneous resolution.  Since she is looking so well and asymptomatic this does not need to be checked while she is in the TCU but it certainly could be.  I did not order it today    Presumed osteoporosis   Outpatient DEXA scan anticipated.  Patient had been on both cholecalciferol and calcium at the prior TCU.  Due to medication reduction pandemic recall they have not been restarted yet.    CVA  Left hemiplegia   It looks like patient's aspirin was never restarted after discontinuation of her Lovenox at her previous TCU stay.  The aspirin has been held for her surgery/fracture and during the postoperative Lovenox.  We will restart it today 81 EC daily.  She has never had bleeding complications in her life ulcers etc.  Otherwise we will continue secondary control measures with good blood pressure control.  Continue atorvastatin as current    Type 2 diabetes   Patient has made great strides.  She attributes this to \"watching what I am eating\" which has been easier for her in the facility.  Though she has lost weight she think she is making better choices.  Her Lantus insulin has been discontinued.  She continues on metformin at increased dose of thousand milligrams twice daily.  Her blood sugars are not as good here as reported elsewhere but acceptable.  This needs watching as she has had a couple of readings above 200.  No changes were made today.    Hyperlipidemia   Atorvastatin    CKD 3   Excellent renal function in February.  Asymptomatic.  No additional blood work ordered at this time    Possible gout   Patient repeats the story to me about how she had recurrent toe pain \"gout\" until her podiatrist worked on her toe and removed \"some extra skin\" and she has not had any bouts since then.  On if she had recurrent ingrowing?    Depression   Patient reports excellent control on current Celexa 10 mg daily          Case discussed with  Facility staff         Rasta " Toy Michael MD

## 2021-06-20 NOTE — LETTER
Letter by Octavia Serna CNP at      Author: Octavia Serna CNP Service: -- Author Type: --    Filed:  Encounter Date: 9/4/2020 Status: (Other)         Patient: Addis Peña   MR Number: [id number]   YOB: 1950   Date of Visit: 9/4/2020     [facility]    Facility:   [facility] [[id number]]   Code Status: DNR/DNI and POLST AVAILABLE  PCP: Wegener, Joel D, MD   Phone: [phone]   Fax: [phone]      CHIEF COMPLAINT/REASON FOR VISIT:  Chief Complaint   Patient presents with   ? Discharge Summary       HISTORY COURSE:  Addis is a 69 y.o. female who  has a past medical history of Allergic rhinitis, CKD (chronic kidney disease), Depression, Displaced dome fracture of left acetabulum (H), DM2 (diabetes mellitus, type 2) (H), GERD (gastroesophageal reflux disease), Gout, HTN (hypertension), Left hemiplegia (H), Migraine, 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), Nephrolithiasis, Seizure disorder (H), Stroke (H), and Vitamin B12 deficiency anemia. Addis was recently transferred from [facility]. She was undergoing acute rehab after suffering a hip fracture and undergoing ORIF repair.     Today Addis is being evaluated for a discharge from the long term care unit. Addis is very, very happy to be going home. She shares she has been stable. She feels she has all of the resources necessary to be safe and successful. Addis has been eating and drinking well. Blood pressure, blood sugar have been very well controlled. She denies any seizure activity. She denies any symptoms including fevers/chills, cough or cold symptoms, headaches, vision changes, chest pain/pressure, difficulty breathing, SOB, abdominal pain, nausea, vomiting, diarrhea, increasing or ongoing weakness past baseline, increasing pain.     PHYSICAL EXAM:   /76   Pulse 84   Temp 98.8  F (37.1  C)   Resp 18   SpO2 95%     A limited exam  was performed due to recommendations for care during COVID-19 pandemic. Due to the 2020 COVID-19 pandemic, except as noted above, the patient was visually observed at a 6 foot plus distance.  An observational exam was performed in an effort to keep patient safe from COVID-19 and other communicable diseases.     General appearance: alert, appears stated age and cooperative  HEENT: Head is normocephalic with normal hair distribution. No evidence of trauma. Ears: Without lesions or deformity. No acute purulent discharge. Eyes: Conjunctivae pink with no scleral icterus or erythema. Nose: Normal. Oropharnyx: mmm.  Lungs: respirations without effort.  Extremities: extremities normal, atraumatic, no cyanosis.  Skin: Skin color, texture normal. No rashes or lesions on exposed skin.   Neurologic: Grossly normal   Psych: interacts well with caregivers, exhibits logical thought processes and connections, pleasant.    MEDICATION LIST:  Current Outpatient Medications   Medication Sig   ? acetaminophen (TYLENOL) 500 MG tablet Take 1,000 mg by mouth 3 (three) times a day.    ? amLODIPine (NORVASC) 10 MG tablet Take 10 mg by mouth daily.   ? aspirin 81 MG EC tablet Take 81 mg by mouth daily.   ? atorvastatin (LIPITOR) 40 MG tablet Take 40 mg by mouth at bedtime.   ? baclofen (LIORESAL) 10 MG tablet Take 10 mg by mouth 3 (three) times a day.   ? bisacodyL (DULCOLAX) 5 mg EC tablet Take 5 mg by mouth daily as needed for constipation.   ? cholecalciferol, vitamin D3, 1,000 unit (25 mcg) tablet Take 1,000 Units by mouth daily.   ? citalopram (CELEXA) 10 MG tablet Take 10 mg by mouth at bedtime.   ? cyanocobalamin 1,000 mcg/mL injection Inject 1,000 mcg into the shoulder, thigh, or buttocks every 30 (thirty) days.   ? ibuprofen (ADVIL,MOTRIN) 200 MG tablet Take 200 mg by mouth every 6 (six) hours as needed for pain.   ? insulin glargine (BASAGLAR KWIKPEN) 100 unit/mL (3 mL) pen Inject 35 Units under the skin daily.    ? insulin lispro  (HUMALOG) 100 unit/mL injection Inject under the skin 3 (three) times a day before meals. Per Sliding Scale;  If Blood Sugar is 71 to 150, give 0 Units.  If Blood Sugar is 151 to 200, give 2 Units.  If Blood Sugar is 201 to 250, give 4 Units.  If Blood Sugar is 251 to 300, give 8 Units.  subcutaneous   ? lactase 4,500 unit Tab Take 4 tablets by mouth as needed.    ? levETIRAcetam (KEPPRA) 500 MG tablet Take 1,250 mg by mouth 2 (two) times a day.    ? losartan (COZAAR) 25 MG tablet Take 25 mg by mouth daily.   ? magnesium oxide 250 mg magnesium Tab Take 250 mg by mouth.   ? metFORMIN (GLUCOPHAGE) 1000 MG tablet Take 1,000 mg by mouth 2 (two) times a day with meals. Take 2 tabs by mouth in AM and 1 tab by mouth at HS.   ? metoprolol succinate (TOPROL-XL) 50 MG 24 hr tablet Take 50 mg by mouth daily.   ? montelukast (SINGULAIR) 10 mg tablet Take 10 mg by mouth at bedtime.   ? phenytoin extended (DILANTIN) 200 MG ER capsule Take 200 mg by mouth 2 (two) times a day.    ? senna-docusate (SENNOSIDES-DOCUSATE SODIUM) 8.6-50 mg tablet Take 1 tablet by mouth daily.       DISCHARGE DIAGNOSIS:    ICD-10-CM    1. Physical deconditioning  R53.81    2. Seizure disorder (H)  G40.909    3. Type 2 diabetes mellitus with other neurologic complication, with long-term current use of insulin (H)  E11.49     Z79.4    4. CVA, old,2010  I69.993    5. Essential hypertension  I10      Physical Deconditioning, old CVA  -Continue PT/OT and other therapies as per care plan.  -Encouraged good nutrition and movement habits.   -Discussed care plan and expected course of stay.   -Continue to follow-up per routine schedule or sooner if needed.     Seizure disorder  -Baclofen 10 mg by mouth 3 times daily.  -Keppra 250 mg tablets, take 5 tablets by mouth twice daily.  -Phenytoin 200 mg by mouth twice daily.    DMII  -Blood sugars with meals.   -Glargine 35 units subcutaneously in the morning.  -Insulin lispro per sliding scale 3 times a day with  meals.  -Metformin 1000 mg by mouth two times a day.   -Follow-up as needed.    Hypertension  -Amlodipine 10 mg by mouth daily at bedside.   -Lisinopril 25 mg by mouth daily.   -Toprol XL 50 mg by mouth daily.   -Encouraged cardiac diet, low sodium diet, exercise and stress reduction techniques.   -Emphasized importance of blood pressure control.   -Continue current medications as prescribed.   -Follow up per routine schedule or sooner with any complaints or concerns.    Otherwise continue current care plan for all other chronic medical conditions, as they are stable. Encouraged patient to engage in healthy lifestyle behaviors such as engaging in social activities, exercising (PT/OT), eating well, and following care plan. Follow up for routine check-up, or sooner if needed. Will continue to monitor patient and work with nursing staff collaboratively to work toward positive patient outcomes.    MEDICAL EQUIPMENT NEEDS:  Per previous notes, visits (ulysses).     DISCHARGE PLAN/FACE TO FACE:  I certify that services are/were furnished while this patient was under the care of a physician and that a physician or an allowed non-physician practitioner (NPP), had a face-to-face encounter that meets the physician face-to-face encounter requirements. The encounter was in whole, or in part, related to the primary reason for home health. The patient is confined to his/her home and needs intermittent skilled nursing, physical therapy, speech-language pathology, or the continued need for occupational therapy. A plan of care has been established by a physician and is periodically reviewed by a physician.  Date of Face-to-Face Encounter: 9/4/2020    I certify that, based on my findings, the following services are medically necessary home health services: home health aid for bathing and ADLs, skilled nursing (RN) for medication set-up and teaching, symptoms and disease process monitoring and education, PT for strengthening,  balance, endurance and safety within the home, OT for strengthening, ADL needs, adaptive equipment and safety.    My clinical findings support the need for the above skilled services because: home health aid for bathing and ADLs, skilled nursing (RN) for medication set-up and teaching, symptoms and disease process monitoring and education, PT for strengthening, balance, endurance and safety within the home, OT for strengthening, ADL needs, adaptive equipment and safety.    This patient is homebound because: he is a frail elderly gentleman with multiple comorbidities and recent hospitalizations making it unsafe for him to go out into the community for services.    The patient is, or has been, under my care and I have initiated the establishment of the plan of care. This patient will be followed by a physician who will periodically review the plan of care. Schedule follow up visit with primary care provider within 7 days to reestablish care.    Total unit/floor time of 50 minutes time spent on discharge planning, discharge follow-up discussion and discharge medication review.    Electronically signed by: Octavia Serna CNP

## 2021-06-20 NOTE — LETTER
Letter by Danelle Strickland CNP at      Author: Danelle Strickland CNP Service: -- Author Type: --    Filed:  Encounter Date: 2/10/2020 Status: (Other)         Patient: Addis Peña   MR Number: [id number]   YOB: 1950   Date of Visit: 2/10/2020     [facility]    Facility:   [facility] [[id number]]   Code Status: POLST AVAILABLE      CHIEF COMPLAINT/REASON FOR VISIT:  Chief Complaint   Patient presents with   ? Review Of Multiple Medical Conditions       HISTORY:      HPI: Addis is a 69 y.o. female who was admitted to [facility] from 2/2/20-2/7/20 for left hip ORIF s/p fall at home.  There were not postoperative complications noted in her hospital discharge summary.  It was recommended by her orthopedic provider that she remain on Lovenox for one month postoperatively for DVT prophylaxis.  Addis  has a past medical history of Allergic rhinitis, CKD (chronic kidney disease), Depression, Displaced dome fracture of left acetabulum (H), DM2 (diabetes mellitus, type 2) (H), GERD (gastroesophageal reflux disease), Gout, HTN (hypertension), Left hemiplegia (H), Migraine, 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), Nephrolithiasis, Stroke (H), and Vitamin B12 deficiency anemia.  She is currently at [facility] s/p hospitalization for acute rehabilitation.      Today Ms. Peña is being evaluated for a review of multiple medical conditions.  She denied pain at this time and feels that her postoperative pain has been well-managed with Tylenol PRN and oxycodone PRN for pain management.  She said that she has not been utilizing ice packs but has increased pain after her therapy sessions.  She said that she has mostly been working with therapy on exercises in her bed over the weekend.  Her blood glucose has been well-managed on her current regimen with range 105-119 since TCU admission.  The patient denied lightheadedness,  dizziness, breathing difficulty, chest pain, palpitations, constipation, urinary symptoms, numbness or tingling in extremities, and pain.  Nursing staff denied any new concerns for the patient at this time.  Addis noted that Buck is her primary contact that helps manage her care and they do not have any concerns over the phone at this visit.    Past Medical History:   Diagnosis Date   ? Allergic rhinitis    ? CKD (chronic kidney disease)    ? Depression    ? Displaced dome fracture of left acetabulum (H)    ? DM2 (diabetes mellitus, type 2) (H)    ? GERD (gastroesophageal reflux disease)    ? Gout    ? HTN (hypertension)    ? Left hemiplegia (H)    ? Migraine    ? 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)    ? Nephrolithiasis    ? Stroke (H)    ? Vitamin B12 deficiency anemia              Family History   Problem Relation Age of Onset   ? Heart disease Mother    ? Hypertension Mother    ? Coronary artery disease Father    ? Diabetes Sister    ? Hypertension Brother    ? Cancer Sister      Social History     Socioeconomic History   ? Marital status:      Spouse name: None   ? Number of children: None   ? Years of education: None   ? Highest education level: None   Occupational History   ? None   Social Needs   ? Financial resource strain: None   ? Food insecurity:     Worry: None     Inability: None   ? Transportation needs:     Medical: None     Non-medical: None   Tobacco Use   ? Smoking status: Never Smoker   ? Smokeless tobacco: Never Used   Substance and Sexual Activity   ? Alcohol use: Not Currently   ? Drug use: None   ? Sexual activity: None   Lifestyle   ? Physical activity:     Days per week: None     Minutes per session: None   ? Stress: None   Relationships   ? Social connections:     Talks on phone: None     Gets together: None     Attends Adventist service: None     Active member of club or organization: None     Attends meetings of clubs or organizations: None      Relationship status: None   ? Intimate partner violence:     Fear of current or ex partner: None     Emotionally abused: None     Physically abused: None     Forced sexual activity: None   Other Topics Concern   ? None   Social History Narrative   ? None       REVIEW OF SYSTEM:  Pertinent items are noted in HPI.  A 12 point comprehensive review of systems was negative except as noted.    PHYSICAL EXAM:     General Appearance:    Alert, cooperative, no distress, appears stated age   Head:    Normocephalic, without obvious abnormality, atraumatic   Eyes:    PERRL, conjunctiva/corneas clear, both eyes   Ears:    Normal external ear canals, both ears   Nose:   Nares normal, septum midline, mucosa normal, no drainage    or sinus tenderness   Throat:   Lips, mucosa, and tongue normal; teeth and gums normal   Neck:   Supple, symmetrical, trachea midline, no adenopathy;     thyroid:  no enlargement/tenderness/nodules; no carotid    bruit or JVD   Back:     Symmetric, no curvature, ROM normal, no CVA tenderness   Lungs:     Clear to auscultation bilaterally, respirations unlabored   Chest Wall:    No tenderness or deformity    Heart:    Regular rate and rhythm, S1 and S2 normal, no murmur, rub   or gallop   Abdomen:     Soft, non-tender, bowel sounds active all four quadrants,     no masses, no organomegaly; incision site CDI with glue   Extremities:   Extremities normal, atraumatic, no cyanosis or edema; incision site CDI   Pulses:   2+ and symmetric all extremities   Skin:   Skin color, texture, turgor normal, no rashes or lesions   Neurologic:   Oriented to person, place, time, and situation; exhibits logical thought processes; generalized weakness           LABS:     Check BMP, Mg, and CBC on 2/11/20.    ASSESSMENT:      ICD-10-CM    1. Left hip pain M25.552    2. Postoperative pain G89.18    3. Physical deconditioning R53.81    4. Closed displaced fracture of anterior column of left acetabulum with routine healing,  subsequent encounter S32.432D        PLAN:      Physical deconditioning  -PT/OT as directed  -Discharge from facility per therapy recommendations     S/p left hip ORIF  -Check BMP, Mg, and CBC on 2/11/20  -Continue oxycodone PRN  -START Tylenol 1 g three times a day   -Encouraged patient to utilize cold therapy three times a day and prn  -Encouraged patient to utilize integrative therapies such as distraction and deep breathing for pain management  -Notify provider if patient has new or worsening pain   -Follow-up with orthopedic provider on 2/19/20     Otherwise continue current care plan for all other chronic medical conditions, as they are stable. Encouraged patient to engage in healthy lifestyle behaviors such as engaging in social activities, exercising (PT/OT), eating well, and following care plan. Follow up for routine check-up, or sooner if needed. Will continue to monitor patient and work with nursing staff collaboratively to work toward positive patient outcomes.     Electronically signed by: Danelle Strickland CNP     Total floor/unit time was 60 minutes and 40 minutes was spent in counseling patient on pain management and acute rehabilitation and coordination of care with nursing staff, SW, and therapy services.

## 2021-06-20 NOTE — LETTER
Letter by Clau Gama CNP at      Author: Clau Gama CNP Service: -- Author Type: --    Filed:  Encounter Date: 2/28/2020 Status: (Other)         Patient: Addis Peña   MR Number: [id number]   YOB: 1950   Date of Visit: 2/28/2020     [facility]    Facility:   [facility] [[id number]]   Code Status: POLST AVAILABLE      CHIEF COMPLAINT/REASON FOR VISIT:  Chief Complaint   Patient presents with   ? Problem Visit     incision follow up. hip pain.        HISTORY:      HPI: Addis is a 69 y.o. female who was admitted to [facility] from 2/2/20-2/7/20 for left hip ORIF s/p fall at home.  There were not postoperative complications noted in her hospital discharge summary.  It was recommended by her orthopedic provider that she remain on Lovenox for one month postoperatively for DVT prophylaxis.  Addis  has a past medical history of Allergic rhinitis, CKD (chronic kidney disease), Depression, Displaced dome fracture of left acetabulum (H), DM2 (diabetes mellitus, type 2) (H), GERD (gastroesophageal reflux disease), Gout, HTN (hypertension), Left hemiplegia (H), Migraine, 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), Nephrolithiasis, Stroke (H), and Vitamin B12 deficiency anemia.  She is currently at [facility] s/p hospitalization for acute rehabilitation.      Today Ms. Peña is seen at request of nursing to review her abdomen and incision.  Apparently there were nursing students on the unit today and the nurse instructor had concerns with a lump underneath her incision.  I palpated this and it feels like incisional scar tissue, however there is no pain, redness, drainage from anywhere.  We will continue to monitor this closely for any of the above-mentioned.  She continues to be nonweightbearing to the left lower extremity.  She says her pain is well controlled and she would like her ice discontinued.  No need  to continue to check temperature every shift.  She is denying any other complaints.    Past Medical History:   Diagnosis Date   ? Allergic rhinitis    ? CKD (chronic kidney disease)    ? Depression    ? Displaced dome fracture of left acetabulum (H)    ? DM2 (diabetes mellitus, type 2) (H)    ? GERD (gastroesophageal reflux disease)    ? Gout    ? HTN (hypertension)    ? Left hemiplegia (H)    ? Migraine    ? 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)    ? Nephrolithiasis    ? Stroke (H)    ? Vitamin B12 deficiency anemia              Family History   Problem Relation Age of Onset   ? Heart disease Mother    ? Hypertension Mother    ? Coronary artery disease Father    ? Diabetes Sister    ? Hypertension Brother    ? Cancer Sister      Social History     Socioeconomic History   ? Marital status:      Spouse name: Not on file   ? Number of children: Not on file   ? Years of education: Not on file   ? Highest education level: Not on file   Occupational History   ? Not on file   Social Needs   ? Financial resource strain: Not on file   ? Food insecurity:     Worry: Not on file     Inability: Not on file   ? Transportation needs:     Medical: Not on file     Non-medical: Not on file   Tobacco Use   ? Smoking status: Never Smoker   ? Smokeless tobacco: Never Used   Substance and Sexual Activity   ? Alcohol use: Not Currently   ? Drug use: Not on file   ? Sexual activity: Not on file   Lifestyle   ? Physical activity:     Days per week: Not on file     Minutes per session: Not on file   ? Stress: Not on file   Relationships   ? Social connections:     Talks on phone: Not on file     Gets together: Not on file     Attends Taoism service: Not on file     Active member of club or organization: Not on file     Attends meetings of clubs or organizations: Not on file     Relationship status: Not on file   ? Intimate partner violence:     Fear of current or ex partner: Not on file     Emotionally  abused: Not on file     Physically abused: Not on file     Forced sexual activity: Not on file   Other Topics Concern   ? Not on file   Social History Narrative   ? Not on file       REVIEW OF SYSTEM:  Pertinent items are noted in HPI.  A 12 point comprehensive review of systems was negative except as noted.    PHYSICAL EXAM:     General Appearance:    Alert, cooperative, no distress, appears stated age   Head:    Normocephalic, without obvious abnormality, atraumatic   Eyes:    PERRL, conjunctiva/corneas clear, both eyes   Ears:    Normal external ear canals, both ears   Nose:   Nares normal, septum midline, mucosa normal, no drainage    or sinus tenderness   Throat:   Lips, mucosa, and tongue normal; teeth and gums normal   Neck:   Supple, symmetrical, trachea midline, no adenopathy;     thyroid:  no enlargement/tenderness/nodules; no carotid    bruit or JVD   Back:     Symmetric, no curvature, ROM normal, no CVA tenderness   Lungs:     Clear to auscultation bilaterally, respirations unlabored   Chest Wall:    No tenderness or deformity    Heart:    Regular rate and rhythm, S1 and S2 normal, no murmur, rub   or gallop   Abdomen:     Soft, non-tender, bowel sounds active all four quadrants,     no masses, no organomegaly; incision site CDI with glue   Extremities:   Extremities normal, atraumatic, no cyanosis or edema; incision site CDI   Pulses:   2+ and symmetric all extremities   Skin:   Skin color, texture, turgor normal, no rashes or lesions   Neurologic:   Oriented to person, place, time, and situation; exhibits logical thought processes; generalized weakness           LABS:     Lab Results   Component Value Date    WBC 8.3 02/11/2020    HGB 10.6 (L) 02/11/2020    HCT 36.7 02/11/2020     02/11/2020     02/14/2020    K 4.6 02/14/2020     (H) 02/11/2020    CREATININE 0.76 02/14/2020    BUN 33 (H) 02/11/2020    CO2 23 02/11/2020        ASSESSMENT:      ICD-10-CM    1. S/P ORIF (open reduction  internal fixation) fracture Z98.890     Z87.81    2. Fall, subsequent encounter W19.XXXD    3. Closed displaced fracture of anterior column of left acetabulum with routine healing, subsequent encounter S32.432D    4. Left hip pain M25.552        PLAN:      Physical deconditioning  -PT/OT as directed  -Discharge from facility per therapy recommendations     Postoperative pain  -Continue Tylenol 1 g three times a day   -okay to discontinue scheduled ice to left hip for patient preference.   -Follow-up with orthopedic provider on 3/23/20  -Discontinue temps every shift, check temp per facility protocol and as needed.          Electronically signed by: Clau Gama, CNP

## 2021-06-20 NOTE — LETTER
Letter by Octavia Serna CNP at      Author: Octavia Serna CNP Service: -- Author Type: --    Filed:  Encounter Date: 6/1/2020 Status: (Other)         Patient: Addis Peña   MR Number: [id number]   YOB: 1950   Date of Visit: 6/1/2020     [facility]    Facility:   [facility] [[id number]]   Code Status: POLST AVAILABLE      CHIEF COMPLAINT/REASON FOR VISIT:  Chief Complaint   Patient presents with   ? Problem Visit     Urinary frequency       HISTORY:      HPI: Addis is a 69 y.o. female who  has a past medical history of Allergic rhinitis, CKD (chronic kidney disease), Depression, Displaced dome fracture of left acetabulum (H), DM2 (diabetes mellitus, type 2) (H), GERD (gastroesophageal reflux disease), Gout, HTN (hypertension), Left hemiplegia (H), Migraine, 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), Nephrolithiasis, Seizure disorder (H), Stroke (H), and Vitamin B12 deficiency anemia. Addis was recently transferred from [facility]. She was undergoing acute rehab after suffering a hip fracture and undergoing ORIF repair.     Today Addis is being evaluated by her request for urinary frequency.  She states that she feels like she has a UTI again.  She states that she is not had any pain but has had some ongoing issues with having to go to the bathroom every hour or more frequently.  She states that she does not have any abdominal pain or flank pain.  No fevers or chills.  She states that this feels just like when she had a UTI though and she cannot describe it.  She is very vague about her symptoms.  She is a finished course of antibiotics and had recent UTI.  Will retest urine for test of cure.  Did discuss possibility of overactive bladder.  She denies any symptoms including fevers/chills, cough or cold symptoms, headaches, vision changes, chest pain/pressure, difficulty breathing, SOB, abdominal pain,  nausea, vomiting, diarrhea, increasing or ongoing weakness past baseline, increasing pain.     Past Medical History:   Diagnosis Date   ? Allergic rhinitis    ? CKD (chronic kidney disease)    ? Depression    ? Displaced dome fracture of left acetabulum (H)    ? DM2 (diabetes mellitus, type 2) (H)    ? GERD (gastroesophageal reflux disease)    ? Gout    ? HTN (hypertension)    ? Left hemiplegia (H)    ? Migraine    ? 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)    ? Nephrolithiasis    ? Seizure disorder (H)    ? Stroke (H)    ? Vitamin B12 deficiency anemia              Family History   Problem Relation Age of Onset   ? Heart disease Mother    ? Hypertension Mother    ? Coronary artery disease Father    ? Diabetes Sister    ? Hypertension Brother    ? Cancer Sister      Social History     Socioeconomic History   ? Marital status:      Spouse name: Not on file   ? Number of children: Not on file   ? Years of education: Not on file   ? Highest education level: Not on file   Occupational History   ? Not on file   Social Needs   ? Financial resource strain: Not on file   ? Food insecurity     Worry: Not on file     Inability: Not on file   ? Transportation needs     Medical: Not on file     Non-medical: Not on file   Tobacco Use   ? Smoking status: Never Smoker   ? Smokeless tobacco: Never Used   Substance and Sexual Activity   ? Alcohol use: Not Currently   ? Drug use: Not on file   ? Sexual activity: Not on file   Lifestyle   ? Physical activity     Days per week: Not on file     Minutes per session: Not on file   ? Stress: Not on file   Relationships   ? Social connections     Talks on phone: Not on file     Gets together: Not on file     Attends Bahai service: Not on file     Active member of club or organization: Not on file     Attends meetings of clubs or organizations: Not on file     Relationship status: Not on file   ? Intimate partner violence     Fear of current or ex partner: Not  on file     Emotionally abused: Not on file     Physically abused: Not on file     Forced sexual activity: Not on file   Other Topics Concern   ? Not on file   Social History Narrative   ? Not on file       REVIEW OF SYSTEM:  Per HPI    PHYSICAL EXAM:   /68   Pulse 78   Temp 98.4  F (36.9  C)   Resp 20   Wt 142 lb 12.8 oz (64.8 kg)   SpO2 92%     A limited exam was performed due to recommendations for care during COVID-19 pandemic. Due to the 2020 COVID-19 pandemic, except as noted above, the patient was visually observed at a 6 foot plus distance.  An observational exam was performed in an effort to keep patient safe from COVID-19 and other communicable diseases.     General appearance: alert, appears stated age and cooperative  HEENT: Head is normocephalic with normal hair distribution. No evidence of trauma. Ears: Without lesions or deformity. No acute purulent discharge. Eyes: Conjunctivae pink with no scleral icterus or erythema. Nose: Normal. Oropharnyx: mmm.  Lungs: respirations without effort.  Extremities: extremities normal, atraumatic, no cyanosis.  Skin: Skin color, texture normal. No rashes or lesions on exposed skin.   Neurologic: Grossly normal   Psych: interacts well with caregivers, exhibits logical thought processes and connections, pleasant.      LABS:   None today.     ASSESSMENT:      ICD-10-CM    1. Urinary frequency  R35.0        PLAN:    Urinary Frequency  -UA/UC.   -Encourage fluids.   -Follow up with results of UA/UC.     Otherwise continue current care plan for all other chronic medical conditions, as they are stable. Encouraged patient to engage in healthy lifestyle behaviors such as engaging in social activities, exercising (PT/OT), eating well, and following care plan. Follow up for routine check-up, or sooner if needed. Will continue to monitor patient and work with nursing staff collaboratively to work toward positive patient outcomes.    Electronically signed by: Octavia  Kareem, CNP

## 2021-06-25 ENCOUNTER — PATIENT OUTREACH (OUTPATIENT)
Dept: NURSING | Facility: CLINIC | Age: 71
End: 2021-06-25
Payer: MEDICARE

## 2021-06-25 NOTE — PROGRESS NOTES
Clinic Care Coordination Contact  Community Health Worker Follow Up    Spoke with pt via phone this afternoon.  Goals:   Goals Addressed as of 6/25/2021 at 2:49 PM                 Today    5/25/21      Other-PCA hours (pt-stated)   50%  60%    Added 11/25/20 by Khushbu Moreira BSW     Goal Statement: I would like to get a new PCA in the next two months.   Date Goal set: 11/24/2020  Barriers: unsure how to get another  Strengths: Asking for help by [email or web address]   Revised date to Achieve By: (3/24/2021) 8/24/21  Patient expressed understanding of goal: yes   Action steps to achieve this goal:  1. I will continue working with my [facility] , Rupal, to find me a new Personal Care Assistant (PCA). (ongoing)  2. I will speak with a new home healthcare agency when they call me on Wednesday to see how they could help me at home. (ongoing)  3. I will give the CHW updates at outreach calls.    Updated: 6/25/21              Intervention and Education during outreach: Introduced self to the patient and role of CHW in care coordination. Pt states she is doing very well except she has not been able to find a PCA yet. Pt shares that she hired and then fired a PCA about 2 months ago because she just did not work out. Since then she has been working with   Opal at [facility] and Slime from [facility] or [facility] and they have had nobody apply for the 28 hrs PCA position posted for pt. They have been trying diligently, but there have not been any applicants. She had hoped that as the pandemic slowed due to vaccination implementation, this would provide more PCA workforce but this has not been the case.    Pt reports she will have her annual [facility] assessment on 6/28/21 and they might have some ideas for procuring a PCA. Pt is going to ask the  if they have any leads for PCAs.    CHW asked if she had received PCA resources sent to her by JEFF Diaz. Pt reports she has but she did not  "call out on any of them. \"All it gave me were 14 pages of names, phone numbers and where they were from. It did say if they were a male or female or when they could work. I wanted more information on them before I called.\" And she reports her night caregiver threw them away. CHW asked if she would want me to get her another copy of the PCA resource list and she declined.     CHW asks if pt has any further concerns or need for resources as this time. Pt states she does not. CHW made sure pt has CHW contact information. Pt will contact CHW with questions or updates before next outreach.     CHW Plan: CHW will follow up with pt in one month. CHW will route this pt encounter to JEFF Diaz, to see if she has any other ideas for PCA resources.    Opal Stanford  Community Health Worker  [facility] [facility]  [phone]          "

## 2021-06-30 ENCOUNTER — PATIENT OUTREACH (OUTPATIENT)
Dept: NURSING | Facility: CLINIC | Age: 71
End: 2021-06-30
Payer: MEDICARE

## 2021-06-30 NOTE — PROGRESS NOTES
"Clinic Care Coordination Contact    Follow Up Progress Note      Assessment: [facility] called and talked with Addis for social work outreach. She said she received the resources [facility] sent via [employer] but she shared \"you only sent me names and addresses of pca services\". [facility] asked if she had called any of them and she said \"no\" and [facility] encouraged patient to call the services.   She initially responded that \"all I have is the phone number\" in which cc clarified what else she might need and patient was agreeable the phone number, name and address of the service was what she needed.    Goals addressed this encounter:   Goals Addressed                 This Visit's Progress      Other-PCA hours (pt-stated)   50%     Goal Statement: I would like to get a new PCA in the next two months.   Date Goal set: 11/24/2020  Barriers: unsure how to get another  Strengths: Asking for help by sw'er   Revised date to Achieve By: (3/24/2021) 8/24/21  Patient expressed understanding of goal: yes   Action steps to achieve this goal:  1. I will continue working with my [facility] , Rupal, to find me a new Personal Care Assistant (PCA). (ongoing)  2. I will speak with a new home healthcare agency when they call me on Wednesday to see how they could help me at home. (ongoing)  3. I will give the CHW updates at outreach calls.    Updated: 6/25/21               Intervention/Education provided during outreach: [facility] will send patient resources via mail.          Plan:  CHW will follow up in 2 weeks. [facility] will chart review in 1 month.    CHW will:  CHW Delegation:   1)  Due Date:  07/15/21       Delegation: Please follow up to be sure patient received resources and ensure they feel comfortable calling the agencies. She was agreeable to this on the call.    Vania Clark, [facility] Care Coordination  Tel: [phone]          "

## 2021-06-30 NOTE — LETTER
[facility] COORDINATION  [address]  [facility] [address]    July 1, 2021    Addis Peña  [address]    [place] [address]      Dear Addis,    I am a clinic care coordinator who works with Joel Daniel Wegener, MD at [facility]. I wanted to thank you for spending the time to talk with me.        Here is a smaller list of PCA services for you to contact to find out about availability and any other questions you may have:    [facility] [address], [place] [address]  Phone: [phone]      [facility] [address] [place] [address]  Phone: [phone]    [facility] [address] [place] [address]  Phone: [phone]      [facility] Address: [address] [place] [address]  Phone: [phone]    Please feel free to contact me at [phone] with any questions or concerns. We are focused on providing you with the highest-quality healthcare experience possible and that all starts with you.     Sincerely,     Vania Clark CEZAR   [facility] Care Coordination  Tel: [phone]

## 2021-07-01 ENCOUNTER — TELEPHONE (OUTPATIENT)
Dept: FAMILY MEDICINE | Facility: CLINIC | Age: 71
End: 2021-07-01

## 2021-07-01 DIAGNOSIS — Z91.81 AT HIGH RISK FOR FALLS: ICD-10-CM

## 2021-07-01 DIAGNOSIS — I69.351 FLACCID HEMIPLEGIA OF RIGHT DOMINANT SIDE AS LATE EFFECT OF CEREBRAL INFARCTION (H): ICD-10-CM

## 2021-07-01 DIAGNOSIS — R53.81 PHYSICAL DECONDITIONING: ICD-10-CM

## 2021-07-01 DIAGNOSIS — Z86.73 HISTORY OF STROKE: Primary | ICD-10-CM

## 2021-07-05 ENCOUNTER — MYC MEDICAL ADVICE (OUTPATIENT)
Dept: FAMILY MEDICINE | Facility: CLINIC | Age: 71
End: 2021-07-05

## 2021-07-05 DIAGNOSIS — I69.351 FLACCID HEMIPLEGIA OF RIGHT DOMINANT SIDE AS LATE EFFECT OF CEREBRAL INFARCTION (H): Primary | ICD-10-CM

## 2021-07-05 DIAGNOSIS — Z91.81 AT HIGH RISK FOR INJURY RELATED TO FALL: ICD-10-CM

## 2021-07-05 DIAGNOSIS — M62.81 GENERALIZED MUSCLE WEAKNESS: ICD-10-CM

## 2021-07-05 NOTE — TELEPHONE ENCOUNTER
JW,  Please see below MyChart message and advise.  Looks like regular outpatient PT order placed  Think pt needs homecare order for nursing for in home, right?  Pended order  Thanks,  Evelyn ACEVEDO RN

## 2021-07-08 ENCOUNTER — TELEPHONE (OUTPATIENT)
Dept: FAMILY MEDICINE | Facility: CLINIC | Age: 71
End: 2021-07-08

## 2021-07-08 DIAGNOSIS — M62.81 GENERALIZED MUSCLE WEAKNESS: ICD-10-CM

## 2021-07-08 DIAGNOSIS — G81.01 FLACCID HEMIPLEGIA OF RIGHT DOMINANT SIDE DUE TO INFARCTION OF BRAIN (H): Primary | ICD-10-CM

## 2021-07-08 DIAGNOSIS — Z91.81 AT HIGH RISK FOR INJURY RELATED TO FALL: ICD-10-CM

## 2021-07-08 DIAGNOSIS — I63.9 FLACCID HEMIPLEGIA OF RIGHT DOMINANT SIDE DUE TO INFARCTION OF BRAIN (H): Primary | ICD-10-CM

## 2021-07-08 NOTE — TELEPHONE ENCOUNTER
Left message with HC at number in contacts.  Alecia was not available so person who answered took message.  Amanda Parker RN

## 2021-07-08 NOTE — TELEPHONE ENCOUNTER
Home care has orders for Physical Therapy.  Due to a high volume, they will not be able to start patient until 7/17/21.  Will need a new order if this is OK or clinic will need to send orders to a different home care.  Ora Rios RN  [facility]

## 2021-07-08 NOTE — TELEPHONE ENCOUNTER
JW  Please see below message  Pended new order for home care PT to reflect new start date or did you want to fax order to new home care service?    Thank you,  Mary iRce RN

## 2021-07-09 ENCOUNTER — ALLIED HEALTH/NURSE VISIT (OUTPATIENT)
Dept: NURSING | Facility: CLINIC | Age: 71
End: 2021-07-09
Payer: MEDICARE

## 2021-07-09 DIAGNOSIS — E53.8 VITAMIN B12 DEFICIENCY (NON ANEMIC): Primary | ICD-10-CM

## 2021-07-09 PROCEDURE — 96372 THER/PROPH/DIAG INJ SC/IM: CPT | Performed by: FAMILY MEDICINE

## 2021-07-09 RX ADMIN — CYANOCOBALAMIN 1000 MCG: 1000 INJECTION, SOLUTION INTRAMUSCULAR; SUBCUTANEOUS at 11:06

## 2021-07-09 NOTE — NURSING NOTE
Clinic Administered Medication Documentation    Administrations This Visit     vitamin B-12 (CYANOCOBALAMIN) injection 1,000 mcg     Admin Date  07/09/2021 Action  Given Dose  1,000 mcg Route  Intramuscular Site  Right Deltoid Administered By  BERTHA DUQUE    Ordering Provider: Fabian Madera MD    NDC: 66314-049-69    Lot#:     : [id number]    Patient Supplied?: No                  Injectable Medication Documentation    Patient was given Cyanocobalamin (B-12). Prior to medication administration, verified patients identity using patient s name and date of birth. Please see MAR and medication order for additional information. Patient instructed to remain in clinic for 15 minutes.      Was entire vial of medication used? Yes  Vial/Syringe: Single dose vial  Expiration Date:  01/01/2023  Was this medication supplied by the patient? No     Bertha Duque MA

## 2021-07-12 ENCOUNTER — TELEPHONE (OUTPATIENT)
Dept: FAMILY MEDICINE | Facility: CLINIC | Age: 71
End: 2021-07-12

## 2021-07-12 ENCOUNTER — MEDICAL CORRESPONDENCE (OUTPATIENT)
Dept: HEALTH INFORMATION MANAGEMENT | Facility: CLINIC | Age: 71
End: 2021-07-12

## 2021-07-14 ENCOUNTER — TELEPHONE (OUTPATIENT)
Dept: FAMILY MEDICINE | Facility: CLINIC | Age: 71
End: 2021-07-14

## 2021-07-14 NOTE — TELEPHONE ENCOUNTER
Reason for Call: Request for an order or referral:    Order or referral being requested: PT orders    Date needed: as soon as possible    Has the patient been seen by the PCP for this problem? YES    Additional comments: Camryn HOBBS srequesting PT orders for 2x a week for 3 weeks. Please advise, thank you!    Phone number [phone]    Best Time:  any    Can we leave a detailed message on this number?  YES    Call taken on 7/14/2021 at 11:16 AM by Asmita Gentile

## 2021-07-20 ENCOUNTER — MEDICAL CORRESPONDENCE (OUTPATIENT)
Dept: HEALTH INFORMATION MANAGEMENT | Facility: CLINIC | Age: 71
End: 2021-07-20

## 2021-07-21 NOTE — Clinical Note
[facility]   [address] [place]   [address]  [phone]    January 13, 2017      Addis Peña  [address]    [place] [address]          Dear Ms. Peña,    Your vitamin B12 was very high so I would stop any b12 injections or pills for six months and re-check the levels.     The other labs were good and the a1c was at goal below 7.     Resulted Orders   ALT   Result Value Ref Range    ALT 21 0 - 50 U/L   AST   Result Value Ref Range    AST 12 0 - 45 U/L   Basic metabolic panel   Result Value Ref Range    Sodium 142 133 - 144 mmol/L    Potassium 4.7 3.4 - 5.3 mmol/L    Chloride 109 94 - 109 mmol/L    Carbon Dioxide 24 20 - 32 mmol/L    Anion Gap 9 3 - 14 mmol/L    Glucose 139 (H) 70 - 99 mg/dL      Comment:      Fasting specimen    Urea Nitrogen 28 7 - 30 mg/dL    Creatinine 0.93 0.52 - 1.04 mg/dL    GFR Estimate 61 >60 mL/min/1.7m2      Comment:      Non  GFR Calc    GFR Estimate If Black 73 >60 mL/min/1.7m2      Comment:       GFR Calc    Calcium 9.5 8.5 - 10.1 mg/dL   CBC with platelets differential   Result Value Ref Range    WBC 8.0 4.0 - 11.0 10e9/L    RBC Count 4.14 3.8 - 5.2 10e12/L    Hemoglobin 12.8 11.7 - 15.7 g/dL    Hematocrit 40.4 35.0 - 47.0 %    MCV 98 78 - 100 fl    MCH 30.9 26.5 - 33.0 pg    MCHC 31.7 31.5 - 36.5 g/dL    RDW 12.6 10.0 - 15.0 %    Platelet Count 275 150 - 450 10e9/L    Diff Method Automated Method     % Neutrophils 65.6 %    % Lymphocytes 23.5 %    % Monocytes 5.3 %    % Eosinophils 5.2 %    % Basophils 0.4 %    Absolute Neutrophil 5.3 1.6 - 8.3 10e9/L    Absolute Lymphocytes 1.9 0.8 - 5.3 10e9/L    Absolute Monocytes 0.4 0.0 - 1.3 10e9/L    Absolute Eosinophils 0.4 0.0 - 0.7 10e9/L    Absolute Basophils 0.0 0.0 - 0.2 10e9/L   Hemoglobin A1c   Result Value Ref Range    Hemoglobin A1C 6.9 (H) 4.3 - 6.0 %   Lipid panel reflex to direct LDL   Result Value Ref Range    Cholesterol 148 <200 mg/dL     Triglycerides 109 <150 mg/dL      Comment:      Fasting specimen    HDL Cholesterol 63 >49 mg/dL    LDL Cholesterol Calculated 63 <100 mg/dL      Comment:      Desirable:       <100 mg/dl    Non HDL Cholesterol 85 <130 mg/dL   TSH with free T4 reflex   Result Value Ref Range    TSH 1.73 0.40 - 4.00 mU/L   Uric acid   Result Value Ref Range    Uric Acid 4.5 2.6 - 6.0 mg/dL   Vitamin B12   Result Value Ref Range    Vitamin B12 >6000 (H) 193 - 986 pg/mL   Hepatitis C Screen Reflex to HCV RNA Quant and Genotype   Result Value Ref Range    Hepatitis C Antibody  NR     Nonreactive   Assay performance characteristics have not been established for newborns,   infants, and children         Sincerely,    Joel Wegener, MD/nr   no

## 2021-07-25 ENCOUNTER — HEALTH MAINTENANCE LETTER (OUTPATIENT)
Age: 71
End: 2021-07-25

## 2021-07-26 ENCOUNTER — PATIENT OUTREACH (OUTPATIENT)
Dept: NURSING | Facility: CLINIC | Age: 71
End: 2021-07-26
Payer: MEDICARE

## 2021-07-26 ENCOUNTER — PATIENT OUTREACH (OUTPATIENT)
Dept: CARE COORDINATION | Facility: CLINIC | Age: 71
End: 2021-07-26

## 2021-07-26 NOTE — PROGRESS NOTES
"Clinic Care Coordination Contact  Community Health Worker Follow Up    Pt returned my phone message from this morning. CHW called pt a 2:00pm this afternoon.     Goals:   Goals Addressed as of 7/26/2021 at 2:14 PM                    Today    6/30/21       Other-PCA hours (pt-stated)   60%  50%    Added 11/25/20 by Khushbu Moreira BSW      Goal Statement: I would like to get a new PCA in the next two months.   Date Goal set: 11/24/2020  Barriers: unsure how to get another  Strengths: Asking for help by gustavo'er   Revised date to Achieve By: (3/24/2021) 8/24/21  Patient expressed understanding of goal: yes   Action steps to achieve this goal:  1. I will continue working with my [facility] , Rupal, to find me a new Personal Care Assistant (PCA). (ongoing)  2. I will speak with a new home healthcare agency when they call me on Wednesday to see how they could help me at home. (ongoing)  3. I will give the CHW updates at outreach calls.    7/26/21: Pt has called out to 2/5 PCA organizations    Updated: 6/25/21              Intervention and Education during outreach: Introduced self to the patient and role of CHW in care coordination. Pt reports she is doing very well.  Pt received PCA organization resources from JEFF Diaz, and has called out on 2/5 organizations listed. Those 2 organizations do not have any PCAs available at this time. CHW encouraged pt to call out on the other 3 PCA organizations which she states she will do. \"There are just no PCAs out there right now. Everyone is short [of PCAs]\".    Pt does states that she saw a [employer] RN today. Pt will have a HHA twice weekly for bathing assistance through [facility]. Pt states a PT order has been placed with [facility], but she has not heard when they will begin.     CHW asks if pt has any further concerns or need for resources as this time. Pt states she will be needing another  in a couple of weeks. Opal, from [facility], is " aware. Opal is looking for a PCA and a new  for pt at the present time.  CHW made sure pt has CHW contact information. Pt will contact CHW with questions or updates before next outreach.     Pt Plan: Pt will call out to 3 more of the PCA resources sent to her by JEFF Caro.     CHW Plan: CHW will follow up with pt in one month.    Opal Stanford  Community Health Worker  [facility]  [phone]    ________________________________________________________    Next Outreach:  8/23/21  Planned Outreach Frequency: monthly  Preferred Phone Number: [phone]    Enrollment Date:  11/19/20  Last Care Plan Assessment:  2/10/21

## 2021-07-26 NOTE — PROGRESS NOTES
Clinic Care Coordination Contact  UTC/Voicemail    Left VM on pt's phone this morning.     Clinical Data: Care Coordinator Outreach  Outreach attempted x 1.  Left message on patient's voicemail with call back information and requested return call.  Plan: Care Coordinator will try to reach patient again in 7-10 business days.    Opal Stanford  Community Health Worker  [facility]  [phone]

## 2021-07-29 DIAGNOSIS — Z53.9 DIAGNOSIS NOT YET DEFINED: Primary | ICD-10-CM

## 2021-07-29 PROCEDURE — G0180 MD CERTIFICATION HHA PATIENT: HCPCS | Performed by: FAMILY MEDICINE

## 2021-08-02 ENCOUNTER — PATIENT OUTREACH (OUTPATIENT)
Dept: CARE COORDINATION | Facility: CLINIC | Age: 71
End: 2021-08-02

## 2021-08-02 NOTE — PROGRESS NOTES
Care Coordination Clinician Chart Review  Situation: Patient chart reviewed by care coordinator.       Background: Care Coordination initial assessment and enrollment to Care Coordination was successful.   Patient centered goals were developed with participation from patient.  SW2 CC handed patient off to CHW for continued outreach every 30 days.        Assessment: Per chart review, patient outreach completed by CC CHW on 7/26/21.  Patient is actively working to accomplish goals.  Patient's goals remain appropriate and relevant at this time.   Patient is not yet due for updated Complex Care Plan.  Annual assessment will be due 11/21      Goals        Other-PCA hours (pt-stated)       Goal Statement: I would like to get a new PCA in the next two months.   Date Goal set: 11/24/2020  Barriers: unsure how to get another  Strengths: Asking for help by sw'er   Revised date to Achieve By: (3/24/2021) 8/24/21  Patient expressed understanding of goal: yes   Action steps to achieve this goal:  1. I will continue working with my [facility] , Rupal, to find me a new Personal Care Assistant (PCA). (ongoing)  2. I will speak with a new home healthcare agency when they call me on Wednesday to see how they could help me at home. (ongoing)  3. I will give the CHW updates at outreach calls.    Updated: 6/25/21                  Plan/Recommendations: The patient will continue working with Care Coordination to achieve goals as above.  CHW will involve SW CC as needed or if patient is ready to move to maintenance.  SW CC will continue to monitor progress to goals and CHW outreaches every 6 weeks.   Care Plan updated and mailed to patient: Yes, via [employer].    Vania Clark CEZAR   [facility] Care Coordination  Tel: [phone]

## 2021-08-02 NOTE — LETTER
[facility]  Complex Care Plan  About Me:    Patient Name:  Addis Peña    YOB: 1950  Age:         70 year old   [place] MRN:    [id number] Telephone Information:  Home Phone [phone]   Mobile [phone]       Address:  [address]  [address]  [place] [address] Email address:  [email or web address]      Emergency Contact(s)    Name Relationship Lgl Grd Work Phone Home Phone Mobile Phone   1. SULY ULRICH Significant ot* No  [phone]   2. ROBERT Daughter   [phone]    3. SULY CURRY Significant ot*   [phone]   4. ROBERT HUNT Other   [phone]   5. IONA HUNT* Other   [phone]           Primary language:  English     needed? No   [place] [employer] [facility]:  [phone] op. 1  Other communication barriers:    Preferred Method of Communication:  Mail  Current living arrangement:    Mobility Status/ Medical Equipment:      Health Maintenance  Health Maintenance Reviewed:      My Access Plan  Medical Emergency 911   Primary Clinic Line [facility] [phone]   24 Hour Appointment Line [phone] or  [phone]) (toll-free)   24 Hour Nurse Line [phone] (toll-free)   Preferred Urgent Care     Preferred Hospital     Preferred Pharmacy [id number] - [place], [place] - [address] AT [facility]     Behavioral Health Crisis Line The National Suicide Prevention Lifeline at 1-964.798.1123 or 911             My Care Team Members  Patient Care Team       Relationship Specialty Notifications Start End    Wegener, Joel Daniel Irwin, MD PCP - General Family Practice  5/28/13     Phone: [phone] Fax: [phone] [address] [address]    Madelaine Roland MD MD Urology  2/3/16     Phone: [phone] Fax: [phone]         [address] [place] [address]  [facility] [address]    Wegener, Joel Daniel Irwin, MD Referring Physician Family Practice  2/3/16     Phone: [phone] Fax: [phone] [address] [facility] [address]    Annette Perez, RN Registered Nurse Urology  2/3/16     Phone: [phone] Pager: [phone]        Steve Foster MD Referring Physician Family Practice  1/30/20     Phone: [phone] Fax: [phone]         [address] [facility] [address]    Steve Foster MD Assigned Musculoskeletal Provider   10/23/20     Phone: [phone] Fax: [phone] [address] [facility] [address]    Wegener, Joel Daniel Irwin, MD Assigned PCP   10/25/20     Phone: [phone] Fax: [phone]         [facility] [place] [address]    Rupal    2/9/21     [facility] Elderly Waiver     Phone: [phone]         Desirae Escamilla Other (see comments)   2/26/21     [employer] (wheel chair vendor); ext. [phone]    Phone: [phone]         Opal Stanford Community Health Worker Primary Care - CC Admissions 4/24/21     [phone]    Vania Clark LSW Lead Care Coordinator Primary Care - CC Admissions 5/5/21     Phone: [phone]                 My Care Plans  Self Management and Treatment Plan  Goals and (Comments)  Goals        General     Other-PCA hours (pt-stated)      Notes - Note edited  6/25/2021  2:46 PM by Opal Stanford     Goal Statement: I would like to get a new PCA in the next two months.   Date Goal set: 11/24/2020  Barriers: unsure how to get another  Strengths: Asking for help by gustavo'shanon Galeana date to Achieve By: (3/24/2021) 8/24/21  Patient expressed understanding of goal: yes   Action steps to achieve this goal:  1. I will continue working with my [facility] , Rupal, to find me a new Personal Care Assistant (PCA). (ongoing)  2. I will speak with a new home healthcare agency when they call me on Wednesday to see how they  could help me at home. (ongoing)  3. I will give the CHW updates at outreach calls.    Updated: 6/25/21               Action Plans on File:            Depression          Advance Care Plans/Directives Type:        My Medical and Care Information  Problem List   Patient Active Problem List   Diagnosis     Hypertension goal BP (blood pressure) < 140/90     Type 2 diabetes, HbA1C goal < 8% (H)     Rosacea     Cerebral artery occlusion with cerebral infarction (H)     Mild major depression (H)     GERD (gastroesophageal reflux disease)     Seasonal allergic rhinitis     Advanced directives, counseling/discussion     History of colonic polyps     Major depression in complete remission (H)     B12 deficiency anemia     Health Care Home     Hyperlipidemia LDL goal <70     Gout     Type 2 diabetes with stage 3 chronic kidney disease GFR 30-59 (H)     Altered mental status     MARI (acute kidney injury) (H)     Type 2 diabetes mellitus with stage 3 chronic kidney disease, without long-term current use of insulin (H)     Cervical cancer screening     ACE-inhibitor cough     History of stroke     Hemiplegia affecting right dominant side (H)     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    Care Coordination Start Date: No linked episodes   Frequency of Care Coordination:     Form Last Updated: 08/02/2021

## 2021-08-03 ENCOUNTER — TELEPHONE (OUTPATIENT)
Dept: FAMILY MEDICINE | Facility: CLINIC | Age: 71
End: 2021-08-03

## 2021-08-03 NOTE — TELEPHONE ENCOUNTER
Reason for Call:  Form, our goal is to have forms completed with 72 hours, however, some forms may require a visit or additional information.    Type of letter, form or note:  Home Health Certification dates 7/12/2021-9/9/2021    Who is the form from?: Home care    Where did the form come from: form was faxed in    What clinic location was the form placed at?: [facility]    Where the form was placed: Dr Wegener's Box/Folder    What number is listed as a contact on the form?: [phone]       Additional comments:     Call taken on 8/3/2021 at 9:50 AM by Elisa Alcala

## 2021-08-06 ENCOUNTER — ALLIED HEALTH/NURSE VISIT (OUTPATIENT)
Dept: NURSING | Facility: CLINIC | Age: 71
End: 2021-08-06
Payer: MEDICARE

## 2021-08-06 DIAGNOSIS — E53.8 VITAMIN B12 DEFICIENCY (NON ANEMIC): Primary | ICD-10-CM

## 2021-08-06 PROCEDURE — 96372 THER/PROPH/DIAG INJ SC/IM: CPT | Performed by: FAMILY MEDICINE

## 2021-08-06 RX ADMIN — CYANOCOBALAMIN 1000 MCG: 1000 INJECTION, SOLUTION INTRAMUSCULAR; SUBCUTANEOUS at 10:02

## 2021-08-08 ENCOUNTER — MYC MEDICAL ADVICE (OUTPATIENT)
Dept: FAMILY MEDICINE | Facility: CLINIC | Age: 71
End: 2021-08-08

## 2021-08-08 DIAGNOSIS — E78.5 HYPERLIPIDEMIA LDL GOAL <70: ICD-10-CM

## 2021-08-08 DIAGNOSIS — M10.9 GOUT, UNSPECIFIED CAUSE, UNSPECIFIED CHRONICITY, UNSPECIFIED SITE: ICD-10-CM

## 2021-08-08 DIAGNOSIS — Z79.4 TYPE 2 DIABETES MELLITUS WITH STAGE 3B CHRONIC KIDNEY DISEASE, WITH LONG-TERM CURRENT USE OF INSULIN (H): ICD-10-CM

## 2021-08-08 DIAGNOSIS — I10 HYPERTENSION GOAL BP (BLOOD PRESSURE) < 140/90: ICD-10-CM

## 2021-08-08 DIAGNOSIS — E11.22 TYPE 2 DIABETES MELLITUS WITH STAGE 3B CHRONIC KIDNEY DISEASE, WITH LONG-TERM CURRENT USE OF INSULIN (H): ICD-10-CM

## 2021-08-08 DIAGNOSIS — D51.9 ANEMIA DUE TO VITAMIN B12 DEFICIENCY, UNSPECIFIED B12 DEFICIENCY TYPE: Primary | ICD-10-CM

## 2021-08-08 DIAGNOSIS — N18.32 TYPE 2 DIABETES MELLITUS WITH STAGE 3B CHRONIC KIDNEY DISEASE, WITH LONG-TERM CURRENT USE OF INSULIN (H): ICD-10-CM

## 2021-08-09 ENCOUNTER — TELEPHONE (OUTPATIENT)
Dept: CARE COORDINATION | Facility: CLINIC | Age: 71
End: 2021-08-09

## 2021-08-09 ENCOUNTER — TELEPHONE (OUTPATIENT)
Dept: FAMILY MEDICINE | Facility: CLINIC | Age: 71
End: 2021-08-09

## 2021-08-09 NOTE — TELEPHONE ENCOUNTER
JW,    Please see [facility] message below.  Orders T'd up from done last year.    Thanks,  Nicole Drake RN

## 2021-08-09 NOTE — TELEPHONE ENCOUNTER
Hello RN Triage Team,    Addis left a VM for me on 8/6/21 stating she has a physical scheduled with Dr. Wegener on 11/16/21 at 10:00am. She is wondering if she needs to have lab work done before that appointment. She is also wondering if an order for her physical blood work has been submitted to the lab.     I applaud Addis for being so on top of her schedule!!    Could you please let me know if she needs blood work scheduled ahead of her 11/16/21 visit with Dr. Wegener and if an order had been placed?    I will then relay this message to Addis.    Thanks!    Opal Stanford  Community Health Worker  [facility]  [phone]

## 2021-08-30 ENCOUNTER — PATIENT OUTREACH (OUTPATIENT)
Dept: NURSING | Facility: CLINIC | Age: 71
End: 2021-08-30
Payer: MEDICARE

## 2021-08-30 ENCOUNTER — TELEPHONE (OUTPATIENT)
Dept: CARE COORDINATION | Facility: CLINIC | Age: 71
End: 2021-08-30

## 2021-08-30 NOTE — TELEPHONE ENCOUNTER
[facility] Triage Team,    Addis has an 11/16/21 annual physical scheduled with Dr. Wegener at the [facility]. She would like to have her labs for that appointment drawn at the  Clinic as it is closer to her home. Could you tell me if these routine labs have been ordered for patient?    Thanks! I am calling out to Addis today and would like to confirm with her that these labs have been ordered.    Opal Stanford  Community Health Worker  [facility]  [phone]

## 2021-08-30 NOTE — TELEPHONE ENCOUNTER
Hi Opal-Yes, Dr. Wegener ordered the following labs:  1. Uric acid  2. CBC with platelets  3. Folate  4. Vitamin B-12  5. Urine protein test  6. Comprehensive metabolic panel  7. Hemoglobin A1C  8. Cholesterol panel (Patient should fast 8-10 hours before lab appointment. Sips of water and taking usual morning medication is fine)    Thank you!  CARYN LopezN, RN  [facility]

## 2021-09-03 ENCOUNTER — PATIENT OUTREACH (OUTPATIENT)
Dept: CARE COORDINATION | Facility: CLINIC | Age: 71
End: 2021-09-03

## 2021-09-03 NOTE — PROGRESS NOTES
Clinic Care Coordination Contact    Chart Review    Assessment: CHW contacted patient for 1 month follow up.  Patient has continued to follow the plan of care and assessment is negative for any new needs or concerns.    Enrollment status: Maintenance     Plan: 1 more outreach in 2 months.  Patient will continue to follow the plan of care.    JOSIAS Diaz   [facility] Care Coordination  Tel: [phone]

## 2021-09-10 ENCOUNTER — ALLIED HEALTH/NURSE VISIT (OUTPATIENT)
Dept: FAMILY MEDICINE | Facility: CLINIC | Age: 71
End: 2021-09-10
Payer: MEDICARE

## 2021-09-10 DIAGNOSIS — D51.9 ANEMIA DUE TO VITAMIN B12 DEFICIENCY, UNSPECIFIED B12 DEFICIENCY TYPE: Primary | ICD-10-CM

## 2021-09-10 PROCEDURE — 99207 PR NO CHARGE NURSE ONLY: CPT

## 2021-09-10 PROCEDURE — 96372 THER/PROPH/DIAG INJ SC/IM: CPT | Performed by: FAMILY MEDICINE

## 2021-09-10 RX ADMIN — CYANOCOBALAMIN 1000 MCG: 1000 INJECTION, SOLUTION INTRAMUSCULAR; SUBCUTANEOUS at 10:17

## 2021-09-10 NOTE — NURSING NOTE
Clinic Administered Medication Documentation    Administrations This Visit     vitamin B-12 (CYANOCOBALAMIN) injection 1,000 mcg     Admin Date  09/10/2021 Action  Given Dose  1,000 mcg Route  Intramuscular Site  Left Deltoid Administered By  Kristin Saul    Ordering Provider: Fabian Madera MD    NDC: 06440-194-59    Lot#: [id number]    : [employer]    Patient Supplied?: No                  Injectable Medication Documentation    Patient was given Cyanocobalamin (B-12). Prior to medication administration, verified patients identity using patient s name and date of birth. Please see MAR and medication order for additional information. Patient instructed to remain in clinic for 15 minutes.      Was entire vial of medication used? Yes  Vial/Syringe: Single dose vial  Expiration Date:  11/01/2022  Was this medication supplied by the patient? No     Kristin Saul CMA

## 2021-09-19 ENCOUNTER — HEALTH MAINTENANCE LETTER (OUTPATIENT)
Age: 71
End: 2021-09-19

## 2021-10-08 ENCOUNTER — ALLIED HEALTH/NURSE VISIT (OUTPATIENT)
Dept: FAMILY MEDICINE | Facility: CLINIC | Age: 71
End: 2021-10-08
Payer: MEDICARE

## 2021-10-08 VITALS
SYSTOLIC BLOOD PRESSURE: 120 MMHG | DIASTOLIC BLOOD PRESSURE: 81 MMHG | HEIGHT: 63 IN | TEMPERATURE: 97 F | HEART RATE: 56 BPM | RESPIRATION RATE: 14 BRPM | WEIGHT: 115.4 LBS | OXYGEN SATURATION: 100 % | BODY MASS INDEX: 20.45 KG/M2

## 2021-10-08 DIAGNOSIS — D51.9 ANEMIA DUE TO VITAMIN B12 DEFICIENCY, UNSPECIFIED B12 DEFICIENCY TYPE: Primary | ICD-10-CM

## 2021-10-08 PROCEDURE — 99207 PR NO CHARGE NURSE ONLY: CPT

## 2021-10-08 PROCEDURE — 96372 THER/PROPH/DIAG INJ SC/IM: CPT | Performed by: FAMILY MEDICINE

## 2021-10-08 RX ADMIN — CYANOCOBALAMIN 1000 MCG: 1000 INJECTION, SOLUTION INTRAMUSCULAR; SUBCUTANEOUS at 09:31

## 2021-10-08 ASSESSMENT — MIFFLIN-ST. JEOR: SCORE: 1017.58

## 2021-10-08 NOTE — NURSING NOTE
Clinic Administered Medication Documentation    Administrations This Visit     vitamin B-12 (CYANOCOBALAMIN) injection 1,000 mcg     Admin Date  10/08/2021 Action  Given Dose  1,000 mcg Route  Intramuscular Site   Administered By  Kristin Saul    Ordering Provider: Fabian Madera MD    NDC: 39446-148-85    Lot#: [id number]    : [employer]    Patient Supplied?: No                  Injectable Medication Documentation    Patient was given Cyanocobalamin (B-12). Prior to medication administration, verified patients identity using patient s name and date of birth. Please see MAR and medication order for additional information. Patient instructed to remain in clinic for 15 minutes.      Was entire vial of medication used? Yes  Vial/Syringe: Single dose vial  Expiration Date:  11/2022  Was this medication supplied by the patient? No     Kristin Saul CMA

## 2021-10-22 DIAGNOSIS — M62.838 MUSCLE SPASM: ICD-10-CM

## 2021-10-22 DIAGNOSIS — I10 HYPERTENSION GOAL BP (BLOOD PRESSURE) < 140/90: ICD-10-CM

## 2021-10-22 DIAGNOSIS — N18.32 TYPE 2 DIABETES MELLITUS WITH STAGE 3B CHRONIC KIDNEY DISEASE, WITH LONG-TERM CURRENT USE OF INSULIN (H): ICD-10-CM

## 2021-10-22 DIAGNOSIS — E11.22 TYPE 2 DIABETES MELLITUS WITH STAGE 3B CHRONIC KIDNEY DISEASE, WITH LONG-TERM CURRENT USE OF INSULIN (H): ICD-10-CM

## 2021-10-22 DIAGNOSIS — I63.9 CEREBROVASCULAR ACCIDENT (CVA), UNSPECIFIED MECHANISM (H): ICD-10-CM

## 2021-10-22 DIAGNOSIS — Z79.4 TYPE 2 DIABETES MELLITUS WITH STAGE 3B CHRONIC KIDNEY DISEASE, WITH LONG-TERM CURRENT USE OF INSULIN (H): ICD-10-CM

## 2021-10-25 ENCOUNTER — MYC MEDICAL ADVICE (OUTPATIENT)
Dept: FAMILY MEDICINE | Facility: CLINIC | Age: 71
End: 2021-10-25

## 2021-10-25 DIAGNOSIS — I10 HYPERTENSION GOAL BP (BLOOD PRESSURE) < 140/90: ICD-10-CM

## 2021-10-25 DIAGNOSIS — N18.32 TYPE 2 DIABETES MELLITUS WITH STAGE 3B CHRONIC KIDNEY DISEASE, WITHOUT LONG-TERM CURRENT USE OF INSULIN (H): ICD-10-CM

## 2021-10-25 DIAGNOSIS — E11.22 TYPE 2 DIABETES MELLITUS WITH STAGE 3B CHRONIC KIDNEY DISEASE, WITHOUT LONG-TERM CURRENT USE OF INSULIN (H): ICD-10-CM

## 2021-10-25 DIAGNOSIS — Z79.4 TYPE 2 DIABETES MELLITUS WITH STAGE 3B CHRONIC KIDNEY DISEASE, WITH LONG-TERM CURRENT USE OF INSULIN (H): ICD-10-CM

## 2021-10-25 DIAGNOSIS — E11.22 TYPE 2 DIABETES MELLITUS WITH STAGE 3B CHRONIC KIDNEY DISEASE, WITH LONG-TERM CURRENT USE OF INSULIN (H): ICD-10-CM

## 2021-10-25 DIAGNOSIS — E78.5 HYPERLIPIDEMIA LDL GOAL <70: ICD-10-CM

## 2021-10-25 DIAGNOSIS — I63.9 CEREBROVASCULAR ACCIDENT (CVA), UNSPECIFIED MECHANISM (H): ICD-10-CM

## 2021-10-25 DIAGNOSIS — M62.838 MUSCLE SPASM: ICD-10-CM

## 2021-10-25 DIAGNOSIS — K59.03 DRUG-INDUCED CONSTIPATION: ICD-10-CM

## 2021-10-25 DIAGNOSIS — J30.2 SEASONAL ALLERGIC RHINITIS, UNSPECIFIED TRIGGER: ICD-10-CM

## 2021-10-25 DIAGNOSIS — F34.1 DYSTHYMIA: ICD-10-CM

## 2021-10-25 DIAGNOSIS — N18.32 TYPE 2 DIABETES MELLITUS WITH STAGE 3B CHRONIC KIDNEY DISEASE, WITH LONG-TERM CURRENT USE OF INSULIN (H): ICD-10-CM

## 2021-10-25 RX ORDER — METOPROLOL SUCCINATE 50 MG/1
TABLET, EXTENDED RELEASE ORAL
Qty: 30 TABLET | Refills: 0 | Status: SHIPPED | OUTPATIENT
Start: 2021-10-25 | End: 2021-11-16

## 2021-10-25 RX ORDER — LOSARTAN POTASSIUM 25 MG/1
TABLET ORAL
Qty: 30 TABLET | Refills: 0 | Status: SHIPPED | OUTPATIENT
Start: 2021-10-25 | End: 2021-11-16

## 2021-10-25 RX ORDER — BACLOFEN 10 MG/1
TABLET ORAL
Qty: 90 TABLET | Refills: 0 | Status: SHIPPED | OUTPATIENT
Start: 2021-10-25 | End: 2021-11-16

## 2021-10-25 NOTE — TELEPHONE ENCOUNTER
4 meds:    Medication is being filled for 1 time refill only due to:  Patient needs to be seen because due for phyiscal/diabetes follow up.     Nicole Drake RN

## 2021-10-29 ENCOUNTER — PATIENT OUTREACH (OUTPATIENT)
Dept: NURSING | Facility: CLINIC | Age: 71
End: 2021-10-29
Payer: MEDICARE

## 2021-10-29 RX ORDER — AMLODIPINE BESYLATE 10 MG/1
10 TABLET ORAL DAILY
Qty: 90 TABLET | Refills: 3 | Status: CANCELLED | OUTPATIENT
Start: 2021-10-29

## 2021-10-29 RX ORDER — ATORVASTATIN CALCIUM 40 MG/1
40 TABLET, FILM COATED ORAL DAILY
Qty: 90 TABLET | Refills: 3 | Status: CANCELLED | OUTPATIENT
Start: 2021-10-29

## 2021-10-29 RX ORDER — BISACODYL 5 MG/1
10 TABLET, DELAYED RELEASE ORAL DAILY PRN
Qty: 180 TABLET | Refills: 3 | Status: CANCELLED | OUTPATIENT
Start: 2021-10-29

## 2021-10-29 RX ORDER — BACLOFEN 10 MG/1
TABLET ORAL
Qty: 90 TABLET | Refills: 0 | Status: CANCELLED | OUTPATIENT
Start: 2021-10-29

## 2021-10-29 RX ORDER — METOPROLOL SUCCINATE 50 MG/1
50 TABLET, EXTENDED RELEASE ORAL DAILY
Qty: 90 TABLET | Refills: 3 | Status: CANCELLED | OUTPATIENT
Start: 2021-10-29

## 2021-10-29 RX ORDER — MONTELUKAST SODIUM 10 MG/1
10 TABLET ORAL DAILY
Qty: 90 TABLET | Refills: 3 | Status: CANCELLED | OUTPATIENT
Start: 2021-10-29

## 2021-10-29 RX ORDER — CITALOPRAM HYDROBROMIDE 10 MG/1
10 TABLET ORAL DAILY
Qty: 90 TABLET | Refills: 3 | Status: CANCELLED | OUTPATIENT
Start: 2021-10-29

## 2021-10-29 RX ORDER — LOSARTAN POTASSIUM 25 MG/1
25 TABLET ORAL DAILY
Qty: 30 TABLET | Refills: 0 | Status: CANCELLED | OUTPATIENT
Start: 2021-10-29

## 2021-10-29 NOTE — PROGRESS NOTES
Clinic Care Coordination Contact    Community Health Worker Follow Up    Spoke with pt this afternoon.    Care Gaps: Discussed with pt this date. Pt has annual blood work scheduled 11/10/21 followed by her annual medicate wellness exam with Dr. Wegener on 11/15/21. Pt has received her flu shot on 1020/21. Pt is aware that she is due for her annual eye exam which pt states she will schedule soon.     Health Maintenance Due   Topic Date Due     A1C  07/15/2021     BMP  10/15/2021     PHQ-9  10/20/2021     MEDICARE ANNUAL WELLNESS VISIT  11/20/2021     LIPID  11/20/2021     MICROALBUMIN  11/20/2021     CBC  11/20/2021     EYE EXAM  11/27/2021     FALL RISK ASSESSMENT  11/27/2021     Intervention and Education during outreach: Pt reports she is doing really well. Pt states her PCA is working out very well. Care Gaps discussed with pt per above.     Patient has completed Maintenance phase and has no other ongoing needs from CC. Graduation has been discussed with patient and she is in agreement with graduation. Pt understands that she can contact PCP to re-enroll in CC at anytime in the future.    CHW Plan: CHW will route this encounter to JEFF Caro, for her consideration to graduate pt from .    Opal Stanford  Community Health Worker  [facility], [place] [facility]  [phone]

## 2021-11-01 ENCOUNTER — MYC MEDICAL ADVICE (OUTPATIENT)
Dept: FAMILY MEDICINE | Facility: CLINIC | Age: 71
End: 2021-11-01

## 2021-11-01 DIAGNOSIS — J30.2 SEASONAL ALLERGIC RHINITIS, UNSPECIFIED TRIGGER: ICD-10-CM

## 2021-11-01 DIAGNOSIS — F34.1 DYSTHYMIA: ICD-10-CM

## 2021-11-01 DIAGNOSIS — E78.5 HYPERLIPIDEMIA LDL GOAL <70: ICD-10-CM

## 2021-11-01 DIAGNOSIS — I10 HYPERTENSION GOAL BP (BLOOD PRESSURE) < 140/90: ICD-10-CM

## 2021-11-02 RX ORDER — CITALOPRAM HYDROBROMIDE 10 MG/1
10 TABLET ORAL DAILY
Qty: 30 TABLET | Refills: 0 | Status: SHIPPED | OUTPATIENT
Start: 2021-11-02 | End: 2021-11-16

## 2021-11-02 RX ORDER — AMLODIPINE BESYLATE 10 MG/1
10 TABLET ORAL DAILY
Qty: 30 TABLET | Refills: 0 | Status: SHIPPED | OUTPATIENT
Start: 2021-11-02 | End: 2021-11-16

## 2021-11-02 RX ORDER — MONTELUKAST SODIUM 10 MG/1
10 TABLET ORAL DAILY
Qty: 30 TABLET | Refills: 0 | Status: SHIPPED | OUTPATIENT
Start: 2021-11-02 | End: 2021-11-16

## 2021-11-02 RX ORDER — ATORVASTATIN CALCIUM 40 MG/1
40 TABLET, FILM COATED ORAL DAILY
Qty: 30 TABLET | Refills: 0 | Status: SHIPPED | OUTPATIENT
Start: 2021-11-02 | End: 2021-11-16

## 2021-11-05 ENCOUNTER — ALLIED HEALTH/NURSE VISIT (OUTPATIENT)
Dept: FAMILY MEDICINE | Facility: CLINIC | Age: 71
End: 2021-11-05
Payer: MEDICARE

## 2021-11-05 DIAGNOSIS — D51.9 ANEMIA DUE TO VITAMIN B12 DEFICIENCY, UNSPECIFIED B12 DEFICIENCY TYPE: Primary | ICD-10-CM

## 2021-11-05 PROCEDURE — 99207 PR NO CHARGE NURSE ONLY: CPT

## 2021-11-05 PROCEDURE — 96372 THER/PROPH/DIAG INJ SC/IM: CPT | Performed by: FAMILY MEDICINE

## 2021-11-05 RX ADMIN — CYANOCOBALAMIN 1000 MCG: 1000 INJECTION, SOLUTION INTRAMUSCULAR; SUBCUTANEOUS at 10:27

## 2021-11-05 NOTE — NURSING NOTE
Clinic Administered Medication Documentation    Administrations This Visit     vitamin B-12 (CYANOCOBALAMIN) injection 1,000 mcg     Admin Date  11/05/2021 Action  Given Dose  1,000 mcg Route  Intramuscular Site   Administered By  Kristin Saul    Ordering Provider: Fabian Madera MD    NDC: 53852-619-43    Lot#: [id number]    : Head Held High    Patient Supplied?: No                  Injectable Medication Documentation    Patient was given Cyanocobalamin (B-12). Prior to medication administration, verified patients identity using patient s name and date of birth. Please see MAR and medication order for additional information. Patient instructed to remain in clinic for 15 minutes.      Was entire vial of medication used? Yes  Vial/Syringe: Single dose vial  Expiration Date:  11/2022  Was this medication supplied by the patient? No     Kristin Saul CMA

## 2021-11-09 ENCOUNTER — PATIENT OUTREACH (OUTPATIENT)
Dept: CARE COORDINATION | Facility: CLINIC | Age: 71
End: 2021-11-09
Payer: MEDICARE

## 2021-11-09 NOTE — PROGRESS NOTES
Clinic Care Coordination Contact    Assessment: Care Coordinator contacted patient for 2 month follow up.  Patient has continued to follow the plan of care and assessment is negative for any new needs or concerns.    Enrollment status: Graduated.      Plan: No further outreaches at this time.  Patient will continue to follow the plan of care.  If new needs arise a new Care Coordination referral may be placed.  FYI to PCP    Vania Clark [facility] Care Coordination  Tel: [phone]

## 2021-11-12 ENCOUNTER — LAB (OUTPATIENT)
Dept: LAB | Facility: CLINIC | Age: 71
End: 2021-11-12
Payer: MEDICARE

## 2021-11-12 DIAGNOSIS — D51.9 ANEMIA DUE TO VITAMIN B12 DEFICIENCY, UNSPECIFIED B12 DEFICIENCY TYPE: ICD-10-CM

## 2021-11-12 DIAGNOSIS — E78.5 HYPERLIPIDEMIA LDL GOAL <70: ICD-10-CM

## 2021-11-12 DIAGNOSIS — I10 HYPERTENSION GOAL BP (BLOOD PRESSURE) < 140/90: ICD-10-CM

## 2021-11-12 DIAGNOSIS — E11.22 TYPE 2 DIABETES MELLITUS WITH STAGE 3B CHRONIC KIDNEY DISEASE, WITH LONG-TERM CURRENT USE OF INSULIN (H): ICD-10-CM

## 2021-11-12 DIAGNOSIS — M10.9 GOUT, UNSPECIFIED CAUSE, UNSPECIFIED CHRONICITY, UNSPECIFIED SITE: ICD-10-CM

## 2021-11-12 DIAGNOSIS — Z79.4 TYPE 2 DIABETES MELLITUS WITH STAGE 3B CHRONIC KIDNEY DISEASE, WITH LONG-TERM CURRENT USE OF INSULIN (H): ICD-10-CM

## 2021-11-12 DIAGNOSIS — N18.32 TYPE 2 DIABETES MELLITUS WITH STAGE 3B CHRONIC KIDNEY DISEASE, WITH LONG-TERM CURRENT USE OF INSULIN (H): ICD-10-CM

## 2021-11-12 LAB
ALBUMIN SERPL-MCNC: 3.5 G/DL (ref 3.4–5)
ALP SERPL-CCNC: 131 U/L (ref 40–150)
ALT SERPL W P-5'-P-CCNC: 24 U/L (ref 0–50)
ANION GAP SERPL CALCULATED.3IONS-SCNC: 4 MMOL/L (ref 3–14)
AST SERPL W P-5'-P-CCNC: 18 U/L (ref 0–45)
BILIRUB SERPL-MCNC: 0.2 MG/DL (ref 0.2–1.3)
BUN SERPL-MCNC: 24 MG/DL (ref 7–30)
CALCIUM SERPL-MCNC: 9.3 MG/DL (ref 8.5–10.1)
CHLORIDE BLD-SCNC: 106 MMOL/L (ref 94–109)
CHOLEST SERPL-MCNC: 176 MG/DL
CO2 SERPL-SCNC: 28 MMOL/L (ref 20–32)
CREAT SERPL-MCNC: 0.68 MG/DL (ref 0.52–1.04)
ERYTHROCYTE [DISTWIDTH] IN BLOOD BY AUTOMATED COUNT: 11.6 % (ref 10–15)
FASTING STATUS PATIENT QL REPORTED: YES
FOLATE SERPL-MCNC: 33 NG/ML
GFR SERPL CREATININE-BSD FRML MDRD: 89 ML/MIN/1.73M2
GLUCOSE BLD-MCNC: 128 MG/DL (ref 70–99)
HBA1C MFR BLD: 6.4 % (ref 0–5.6)
HCT VFR BLD AUTO: 42.6 % (ref 35–47)
HDLC SERPL-MCNC: 88 MG/DL
HGB BLD-MCNC: 14.3 G/DL (ref 11.7–15.7)
LDLC SERPL CALC-MCNC: 65 MG/DL
MCH RBC QN AUTO: 32.6 PG (ref 26.5–33)
MCHC RBC AUTO-ENTMCNC: 33.6 G/DL (ref 31.5–36.5)
MCV RBC AUTO: 97 FL (ref 78–100)
NONHDLC SERPL-MCNC: 88 MG/DL
PLATELET # BLD AUTO: 278 10E3/UL (ref 150–450)
POTASSIUM BLD-SCNC: 4.5 MMOL/L (ref 3.4–5.3)
PROT SERPL-MCNC: 7.1 G/DL (ref 6.8–8.8)
RBC # BLD AUTO: 4.38 10E6/UL (ref 3.8–5.2)
SODIUM SERPL-SCNC: 138 MMOL/L (ref 133–144)
TRIGL SERPL-MCNC: 113 MG/DL
URATE SERPL-MCNC: 3.5 MG/DL (ref 2.6–6)
VIT B12 SERPL-MCNC: 625 PG/ML (ref 193–986)
WBC # BLD AUTO: 9.1 10E3/UL (ref 4–11)

## 2021-11-12 PROCEDURE — 80053 COMPREHEN METABOLIC PANEL: CPT

## 2021-11-12 PROCEDURE — 36415 COLL VENOUS BLD VENIPUNCTURE: CPT

## 2021-11-12 PROCEDURE — 85027 COMPLETE CBC AUTOMATED: CPT

## 2021-11-12 PROCEDURE — 82607 VITAMIN B-12: CPT

## 2021-11-12 PROCEDURE — 84550 ASSAY OF BLOOD/URIC ACID: CPT

## 2021-11-12 PROCEDURE — 80061 LIPID PANEL: CPT

## 2021-11-12 PROCEDURE — 82746 ASSAY OF FOLIC ACID SERUM: CPT

## 2021-11-12 PROCEDURE — 83036 HEMOGLOBIN GLYCOSYLATED A1C: CPT

## 2021-11-12 PROCEDURE — 84100 ASSAY OF PHOSPHORUS: CPT

## 2021-11-12 PROCEDURE — 83970 ASSAY OF PARATHORMONE: CPT

## 2021-11-12 ASSESSMENT — ENCOUNTER SYMPTOMS
HEMATOCHEZIA: 0
JOINT SWELLING: 0
HEMATURIA: 0
ARTHRALGIAS: 0
COUGH: 0
DYSURIA: 0
FREQUENCY: 1
HEARTBURN: 0
CONSTIPATION: 0
FEVER: 0
HEADACHES: 0
PARESTHESIAS: 0
SHORTNESS OF BREATH: 0
CHILLS: 0
ABDOMINAL PAIN: 0
NERVOUS/ANXIOUS: 0
EYE PAIN: 0
BREAST MASS: 0
MYALGIAS: 0
DIZZINESS: 0
DIARRHEA: 0
WEAKNESS: 0
SORE THROAT: 0
PALPITATIONS: 0
NAUSEA: 0

## 2021-11-12 ASSESSMENT — ACTIVITIES OF DAILY LIVING (ADL)
CURRENT_FUNCTION: HOUSEWORK REQUIRES ASSISTANCE
CURRENT_FUNCTION: MEDICATION ADMINISTRATION REQUIRES ASSISTANCE
CURRENT_FUNCTION: PREPARING MEALS REQUIRES ASSISTANCE
CURRENT_FUNCTION: BATHING REQUIRES ASSISTANCE
CURRENT_FUNCTION: SHOPPING REQUIRES ASSISTANCE
CURRENT_FUNCTION: TRANSPORTATION REQUIRES ASSISTANCE

## 2021-11-15 ASSESSMENT — ACTIVITIES OF DAILY LIVING (ADL)
CURRENT_FUNCTION: BATHING REQUIRES ASSISTANCE
CURRENT_FUNCTION: TRANSPORTATION REQUIRES ASSISTANCE
CURRENT_FUNCTION: HOUSEWORK REQUIRES ASSISTANCE
CURRENT_FUNCTION: PREPARING MEALS REQUIRES ASSISTANCE
CURRENT_FUNCTION: MEDICATION ADMINISTRATION REQUIRES ASSISTANCE
CURRENT_FUNCTION: SHOPPING REQUIRES ASSISTANCE

## 2021-11-15 ASSESSMENT — ENCOUNTER SYMPTOMS
PARESTHESIAS: 0
MYALGIAS: 0
HEADACHES: 0
DIZZINESS: 0
PALPITATIONS: 0
COUGH: 0
CONSTIPATION: 0
SHORTNESS OF BREATH: 0
EYE PAIN: 0
BREAST MASS: 0
HEMATOCHEZIA: 0
ARTHRALGIAS: 0
DIARRHEA: 0
HEMATURIA: 0
ABDOMINAL PAIN: 0
JOINT SWELLING: 0
FREQUENCY: 1
CHILLS: 0
SORE THROAT: 0
NAUSEA: 0
FEVER: 0
HEARTBURN: 0
NERVOUS/ANXIOUS: 0
WEAKNESS: 0
DYSURIA: 0

## 2021-11-15 NOTE — PATIENT INSTRUCTIONS
Patient Education   Personalized Prevention Plan  You are due for the preventive services outlined below.  Your care team is available to assist you in scheduling these services.  If you have already completed any of these items, please share that information with your care team to update in your medical record.  Health Maintenance Due   Topic Date Due     Parathyroid Lab  Never done     Phosphorous Lab  Never done     Kidney Microalbumin Urine Test  05/20/2021     Depression Assessment  10/20/2021     Annual Wellness Visit  11/20/2021     Eye Exam  11/27/2021     FALL RISK ASSESSMENT  11/27/2021

## 2021-11-15 NOTE — PROGRESS NOTES
"SUBJECTIVE:   Addis Peña is a 71 year old female who presents for Preventive Visit.    Patient has been advised of split billing requirements and indicates understanding: Yes   Are you in the first 12 months of your Medicare coverage?  No    Healthy Habits:     In general, how would you rate your overall health?  Good    Frequency of exercise:  2-3 days/week    Duration of exercise:  15-30 minutes    Do you usually eat at least 4 servings of fruit and vegetables a day, include whole grains    & fiber and avoid regularly eating high fat or \"junk\" foods?  No    Taking medications regularly:  Yes    Medication side effects:  None    Ability to successfully perform activities of daily living:  Transportation requires assistance, shopping requires assistance, preparing meals requires assistance, housework requires assistance, bathing requires assistance and medication administration requires assistance    Home Safety:  No safety concerns identified    Hearing Impairment:  Difficulty following a conversation in a noisy restaurant or crowded room, need to ask people to speak up or repeat themselves and difficulty understanding soft or whispered speech    In the past 6 months, have you been bothered by leaking of urine? Yes    In general, how would you rate your overall mental or emotional health?  Good      PHQ-2 Total Score: 0    Additional concerns today:  No    Identified Health Risks:  Answers for HPI/ROS submitted by the patient on 11/16/2021  If you checked off any problems, how difficult have these problems made it for you to do your work, take care of things at home, or get along with other people?: Not difficult at all  PHQ9 TOTAL SCORE: 0  RICOC 7 TOTAL SCORE: 0      Do you feel safe in your environment? Yes    Have you ever done Advance Care Planning? (For example, a Health Directive, POLST, or a discussion with a medical provider or your loved ones about your wishes): Yes, advance care planning is on " file.       Fall risk  Fallen 2 or more times in the past year?: No  Any fall with injury in the past year?: No    Cognitive Screening   1) Repeat 3 items (Leader, Season, Table)    2) Clock draw: ABNORMAL   3) 3 item recall: Recalls 3 objects  Results: 3 items recalled: COGNITIVE IMPAIRMENT LESS LIKELY    Mini-CogTM Copyright TOBY Rodriguez. Licensed by the author for use in [facility]; reprinted with permission (carina@[facility]). All rights reserved.      Do you have sleep apnea, excessive snoring or daytime drowsiness?: no    Reviewed and updated as needed this visit by clinical staff                Reviewed and updated as needed this visit by Provider               Social History     Tobacco Use     Smoking status: Never Smoker     Smokeless tobacco: Never Used   Substance Use Topics     Alcohol use: No     If you drink alcohol do you typically have >3 drinks per day or >7 drinks per week? No    No flowsheet data found.            Current providers sharing in care for this patient include:   Patient Care Team:  Wegener, Joel Daniel Irwin, MD as PCP - General (Family Practice)  Madelaine Roland MD as MD (Urology)  Wegener, Joel Daniel Irwin, MD as Referring Physician (Family Practice)  Annette Perez, TYLER as Registered Nurse (Urology)  Steve Foster MD as Referring Physician (Family Practice)  Rupal as   Desirae Escamilla as Other (see comments)  Wegener, Joel Daniel Irwin, MD as Assigned PCP    The following health maintenance items are reviewed in Epic and correct as of today:  Health Maintenance Due   Topic Date Due     PARATHYROID  Never done     PHOSPHORUS  Never done     MICROALBUMIN  05/20/2021     PHQ-9  10/20/2021     EYE EXAM  11/27/2021     FALL RISK ASSESSMENT  11/27/2021               Review of Systems   Constitutional: Negative for chills and fever.   HENT: Negative for congestion, ear pain, hearing loss and sore throat.    Eyes: Negative for pain and visual disturbance.  "  Respiratory: Negative for cough and shortness of breath.    Cardiovascular: Negative for chest pain, palpitations and peripheral edema.   Gastrointestinal: Negative for abdominal pain, constipation, diarrhea, heartburn, hematochezia and nausea.   Breasts:  Negative for tenderness, breast mass and discharge.   Genitourinary: Positive for frequency. Negative for dysuria, genital sores, hematuria, pelvic pain, urgency, vaginal bleeding and vaginal discharge.   Musculoskeletal: Negative for arthralgias, joint swelling and myalgias.   Skin: Negative for rash.   Neurological: Negative for dizziness, weakness, headaches and paresthesias.   Psychiatric/Behavioral: Negative for mood changes. The patient is not nervous/anxious.          OBJECTIVE:   There were no vitals taken for this visit. Estimated body mass index is 20.44 kg/m  as calculated from the following:    Height as of 10/8/21: 1.6 m (5' 3\").    Weight as of 10/8/21: 52.3 kg (115 lb 6.4 oz).  Physical Exam  GENERAL: healthy, alert and no distress  EYES: Eyes grossly normal to inspection, PERRL and conjunctivae and sclerae normal  HENT: ear canals and TM's normal, nose and mouth without ulcers or lesions  NECK: no adenopathy, no asymmetry, masses, or scars and thyroid normal to palpation  RESP: lungs clear to auscultation - no rales, rhonchi or wheezes  CV: regular rate and rhythm, normal S1 S2, no S3 or S4, no murmur, click or rub, no peripheral edema and peripheral pulses strong  ABDOMEN: soft, nontender, no hepatosplenomegaly, no masses and bowel sounds normal  MS: no gross musculoskeletal defects noted, no edema  SKIN: no suspicious lesions or rashes  PSYCH: mentation appears normal, affect normal/bright        ASSESSMENT / PLAN:       ICD-10-CM    1. Encounter for Medicare annual wellness exam  Z00.00 Albumin Random Urine Quantitative with Creat Ratio   2. Albuminuria  R80.9 Albumin Random Urine Quantitative with Creat Ratio   3. Type 2 diabetes mellitus " "with stage 3b chronic kidney disease, with long-term current use of insulin (H)  E11.22 Adult Eye Referral    N18.32 Albumin Random Urine Quantitative with Creat Ratio    Z79.4 Parathyroid Hormone Intact     Phosphorus     Albumin Random Urine Quantitative with Creat Ratio     metFORMIN (GLUCOPHAGE) 1000 MG tablet     CANCELED: Albumin Random Urine Quantitative with Creat Ratio   4. Hypertension goal BP (blood pressure) < 140/90  I10 amLODIPine (NORVASC) 10 MG tablet     losartan (COZAAR) 25 MG tablet     metoprolol succinate ER (TOPROL-XL) 50 MG 24 hr tablet   5. Hyperlipidemia LDL goal <70  E78.5 atorvastatin (LIPITOR) 40 MG tablet   6. Muscle spasm  M62.838 baclofen (LIORESAL) 10 MG tablet   7. Drug-induced constipation  K59.03 bisacodyl (DULCOLAX) 5 MG EC tablet   8. Type 2 diabetes mellitus with stage 3b chronic kidney disease, without long-term current use of insulin (H)  E11.22 blood glucose (IRENE CONTOUR) test strip    N18.32    9. Dysthymia  F34.1 citalopram (CELEXA) 10 MG tablet   10. Seasonal allergic rhinitis, unspecified trigger  J30.2 montelukast (SINGULAIR) 10 MG tablet   11. Hypovitaminosis D  E55.9 Vitamin D3 (VITAMIN D3) 25 mcg (1000 units) tablet   12. History of stroke  Z86.73 folic acid (FOLVITE) 1 MG tablet     aspirin (ASA) 81 MG EC tablet       Patient has been advised of split billing requirements and indicates understanding: Yes  COUNSELING:  Reviewed preventive health counseling, as reflected in patient instructions       Healthy diet/nutrition       Colon cancer screening       breast cancer screening    Estimated body mass index is 20.44 kg/m  as calculated from the following:    Height as of 10/8/21: 1.6 m (5' 3\").    Weight as of 10/8/21: 52.3 kg (115 lb 6.4 oz).        She reports that she has never smoked. She has never used smokeless tobacco.      Appropriate preventive services were discussed with this patient, including applicable screening as appropriate for cardiovascular " disease, diabetes, osteopenia/osteoporosis, and glaucoma.  As appropriate for age/gender, discussed screening for colorectal cancer, prostate cancer, breast cancer, and cervical cancer. Checklist reviewing preventive services available has been given to the patient.    Reviewed patients plan of care and provided an AVS. The Basic Care Plan (routine screening as documented in Health Maintenance) for Adids meets the Care Plan requirement. This Care Plan has been established and reviewed with the Patient and spouse.    Counseling Resources:  ATP IV Guidelines  Pooled Cohorts Equation Calculator  Breast Cancer Risk Calculator  Breast Cancer: Medication to Reduce Risk  FRAX Risk Assessment  ICSI Preventive Guidelines  Dietary Guidelines for Americans, 2010  USDA's MyPlate  ASA Prophylaxis  Lung CA Screening    Joel Daniel Wegener, MD  [facility]

## 2021-11-16 ENCOUNTER — OFFICE VISIT (OUTPATIENT)
Dept: FAMILY MEDICINE | Facility: CLINIC | Age: 71
End: 2021-11-16
Payer: MEDICARE

## 2021-11-16 VITALS
HEART RATE: 71 BPM | DIASTOLIC BLOOD PRESSURE: 84 MMHG | TEMPERATURE: 98.2 F | OXYGEN SATURATION: 96 % | SYSTOLIC BLOOD PRESSURE: 139 MMHG | RESPIRATION RATE: 20 BRPM

## 2021-11-16 DIAGNOSIS — F34.1 DYSTHYMIA: ICD-10-CM

## 2021-11-16 DIAGNOSIS — I10 HYPERTENSION GOAL BP (BLOOD PRESSURE) < 140/90: ICD-10-CM

## 2021-11-16 DIAGNOSIS — K59.03 DRUG-INDUCED CONSTIPATION: ICD-10-CM

## 2021-11-16 DIAGNOSIS — Z86.73 HISTORY OF STROKE: ICD-10-CM

## 2021-11-16 DIAGNOSIS — N18.32 TYPE 2 DIABETES MELLITUS WITH STAGE 3B CHRONIC KIDNEY DISEASE, WITH LONG-TERM CURRENT USE OF INSULIN (H): ICD-10-CM

## 2021-11-16 DIAGNOSIS — Z79.4 TYPE 2 DIABETES MELLITUS WITH STAGE 3B CHRONIC KIDNEY DISEASE, WITH LONG-TERM CURRENT USE OF INSULIN (H): ICD-10-CM

## 2021-11-16 DIAGNOSIS — J30.2 SEASONAL ALLERGIC RHINITIS, UNSPECIFIED TRIGGER: ICD-10-CM

## 2021-11-16 DIAGNOSIS — E11.22 TYPE 2 DIABETES MELLITUS WITH STAGE 3B CHRONIC KIDNEY DISEASE, WITHOUT LONG-TERM CURRENT USE OF INSULIN (H): ICD-10-CM

## 2021-11-16 DIAGNOSIS — E55.9 HYPOVITAMINOSIS D: ICD-10-CM

## 2021-11-16 DIAGNOSIS — N18.32 TYPE 2 DIABETES MELLITUS WITH STAGE 3B CHRONIC KIDNEY DISEASE, WITHOUT LONG-TERM CURRENT USE OF INSULIN (H): ICD-10-CM

## 2021-11-16 DIAGNOSIS — Z00.00 ENCOUNTER FOR MEDICARE ANNUAL WELLNESS EXAM: Primary | ICD-10-CM

## 2021-11-16 DIAGNOSIS — M62.838 MUSCLE SPASM: ICD-10-CM

## 2021-11-16 DIAGNOSIS — R80.9 ALBUMINURIA: ICD-10-CM

## 2021-11-16 DIAGNOSIS — E78.5 HYPERLIPIDEMIA LDL GOAL <70: ICD-10-CM

## 2021-11-16 DIAGNOSIS — E11.22 TYPE 2 DIABETES MELLITUS WITH STAGE 3B CHRONIC KIDNEY DISEASE, WITH LONG-TERM CURRENT USE OF INSULIN (H): ICD-10-CM

## 2021-11-16 LAB
PHOSPHATE SERPL-MCNC: 3.9 MG/DL (ref 2.5–4.5)
PTH-INTACT SERPL-MCNC: 93 PG/ML (ref 18–80)

## 2021-11-16 PROCEDURE — G0439 PPPS, SUBSEQ VISIT: HCPCS | Performed by: FAMILY MEDICINE

## 2021-11-16 RX ORDER — CITALOPRAM HYDROBROMIDE 10 MG/1
10 TABLET ORAL DAILY
Qty: 90 TABLET | Refills: 3 | Status: SHIPPED | OUTPATIENT
Start: 2021-11-16 | End: 2021-12-20

## 2021-11-16 RX ORDER — FOLIC ACID 1 MG/1
1 TABLET ORAL DAILY
Qty: 90 TABLET | Refills: 3 | Status: SHIPPED | OUTPATIENT
Start: 2021-11-16 | End: 2021-12-20

## 2021-11-16 RX ORDER — LOSARTAN POTASSIUM 25 MG/1
25 TABLET ORAL DAILY
Qty: 90 TABLET | Refills: 3 | Status: SHIPPED | OUTPATIENT
Start: 2021-11-16 | End: 2022-02-11

## 2021-11-16 RX ORDER — BISACODYL 5 MG/1
10 TABLET, DELAYED RELEASE ORAL DAILY PRN
Qty: 180 TABLET | Refills: 3 | Status: SHIPPED | OUTPATIENT
Start: 2021-11-16

## 2021-11-16 RX ORDER — ATORVASTATIN CALCIUM 40 MG/1
40 TABLET, FILM COATED ORAL DAILY
Qty: 90 TABLET | Refills: 3 | Status: SHIPPED | OUTPATIENT
Start: 2021-11-16 | End: 2022-11-15

## 2021-11-16 RX ORDER — VITAMIN B COMPLEX
25 TABLET ORAL DAILY
Qty: 90 TABLET | Refills: 3 | Status: SHIPPED | OUTPATIENT
Start: 2021-11-16

## 2021-11-16 RX ORDER — BACLOFEN 10 MG/1
TABLET ORAL
Qty: 90 TABLET | Refills: 3 | Status: SHIPPED | OUTPATIENT
Start: 2021-11-16 | End: 2021-11-29

## 2021-11-16 RX ORDER — METOPROLOL SUCCINATE 50 MG/1
50 TABLET, EXTENDED RELEASE ORAL DAILY
Qty: 90 TABLET | Refills: 3 | Status: SHIPPED | OUTPATIENT
Start: 2021-11-16 | End: 2022-01-02 | Stop reason: DRUGHIGH

## 2021-11-16 RX ORDER — MONTELUKAST SODIUM 10 MG/1
10 TABLET ORAL DAILY
Qty: 90 TABLET | Refills: 3 | Status: SHIPPED | OUTPATIENT
Start: 2021-11-16 | End: 2023-02-13

## 2021-11-16 RX ORDER — AMLODIPINE BESYLATE 10 MG/1
10 TABLET ORAL DAILY
Qty: 90 TABLET | Refills: 3 | Status: SHIPPED | OUTPATIENT
Start: 2021-11-16 | End: 2022-11-15

## 2021-11-16 ASSESSMENT — ANXIETY QUESTIONNAIRES
5. BEING SO RESTLESS THAT IT IS HARD TO SIT STILL: NOT AT ALL
2. NOT BEING ABLE TO STOP OR CONTROL WORRYING: NOT AT ALL
7. FEELING AFRAID AS IF SOMETHING AWFUL MIGHT HAPPEN: NOT AT ALL
GAD7 TOTAL SCORE: 0
GAD7 TOTAL SCORE: 0
3. WORRYING TOO MUCH ABOUT DIFFERENT THINGS: NOT AT ALL
GAD7 TOTAL SCORE: 0
1. FEELING NERVOUS, ANXIOUS, OR ON EDGE: NOT AT ALL
7. FEELING AFRAID AS IF SOMETHING AWFUL MIGHT HAPPEN: NOT AT ALL
6. BECOMING EASILY ANNOYED OR IRRITABLE: NOT AT ALL
4. TROUBLE RELAXING: NOT AT ALL
8. IF YOU CHECKED OFF ANY PROBLEMS, HOW DIFFICULT HAVE THESE MADE IT FOR YOU TO DO YOUR WORK, TAKE CARE OF THINGS AT HOME, OR GET ALONG WITH OTHER PEOPLE?: NOT DIFFICULT AT ALL

## 2021-11-16 ASSESSMENT — PATIENT HEALTH QUESTIONNAIRE - PHQ9
10. IF YOU CHECKED OFF ANY PROBLEMS, HOW DIFFICULT HAVE THESE PROBLEMS MADE IT FOR YOU TO DO YOUR WORK, TAKE CARE OF THINGS AT HOME, OR GET ALONG WITH OTHER PEOPLE: NOT DIFFICULT AT ALL
SUM OF ALL RESPONSES TO PHQ QUESTIONS 1-9: 0
SUM OF ALL RESPONSES TO PHQ QUESTIONS 1-9: 0

## 2021-11-17 ENCOUNTER — MYC MEDICAL ADVICE (OUTPATIENT)
Dept: FAMILY MEDICINE | Facility: CLINIC | Age: 71
End: 2021-11-17
Payer: MEDICARE

## 2021-11-17 ASSESSMENT — PATIENT HEALTH QUESTIONNAIRE - PHQ9: SUM OF ALL RESPONSES TO PHQ QUESTIONS 1-9: 0

## 2021-11-17 ASSESSMENT — ANXIETY QUESTIONNAIRES: GAD7 TOTAL SCORE: 0

## 2021-11-17 NOTE — TELEPHONE ENCOUNTER
TC,    Please see [email or web address] message.  Can you please mail copy of her AVS and current med list?    Thank you,  Nicole Drake RN

## 2021-11-28 ENCOUNTER — MYC MEDICAL ADVICE (OUTPATIENT)
Dept: FAMILY MEDICINE | Facility: CLINIC | Age: 71
End: 2021-11-28
Payer: MEDICARE

## 2021-11-28 DIAGNOSIS — M62.838 MUSCLE SPASM: ICD-10-CM

## 2021-11-29 RX ORDER — BACLOFEN 10 MG/1
TABLET ORAL
Qty: 270 TABLET | Refills: 3 | Status: SHIPPED | OUTPATIENT
Start: 2021-11-29 | End: 2022-02-11

## 2021-12-01 ENCOUNTER — TELEPHONE (OUTPATIENT)
Dept: FAMILY MEDICINE | Facility: CLINIC | Age: 71
End: 2021-12-01
Payer: MEDICARE

## 2021-12-01 NOTE — TELEPHONE ENCOUNTER
Reason for Call:  Form, our goal is to have forms completed with 72 hours, however, some forms may require a visit or additional information.    Type of letter, form or note:  diabetic supplies    Who is the form from?: [employer]  (if other please explain)    Where did the form come from: form was faxed in    What clinic location was the form placed at?: [facility]    Where the form was placed: [facility] Box/Folder    What number is listed as a contact on the form?: fax [phone]       Additional comments:     Call taken on 12/1/2021 at 7:51 AM by Garcia Jang

## 2021-12-02 ENCOUNTER — MEDICAL CORRESPONDENCE (OUTPATIENT)
Dept: HEALTH INFORMATION MANAGEMENT | Facility: CLINIC | Age: 71
End: 2021-12-02
Payer: MEDICARE

## 2021-12-02 NOTE — TELEPHONE ENCOUNTER
[phone] & copy  Sent to scan.    Nicole RAMOS

## 2021-12-06 ENCOUNTER — TELEPHONE (OUTPATIENT)
Dept: FAMILY MEDICINE | Facility: CLINIC | Age: 71
End: 2021-12-06
Payer: MEDICARE

## 2021-12-06 NOTE — TELEPHONE ENCOUNTER
"Reason for Call:  Form, our goal is to have forms completed with 72 hours, however, some forms may require a visit or additional information.    Type of letter, form or note:  medical     Form was  Previously received and faxed.  This fax states \"Missing testing time #5\".  Signed and faxed  Again.      Who is the form from?: [facility] medicare billing (if other please explain)    Where did the form come from: form was faxed in    What clinic location was the form placed at?: [facility]    Where the form was placed: dr wegener's Box/Folder    What number is listed as a contact on the form?: [phone]       Additional comments:     Call taken on 12/6/2021 at 2:28 PM by Elisa Alcala        "

## 2021-12-07 ENCOUNTER — MEDICAL CORRESPONDENCE (OUTPATIENT)
Dept: HEALTH INFORMATION MANAGEMENT | Facility: CLINIC | Age: 71
End: 2021-12-07
Payer: MEDICARE

## 2021-12-07 NOTE — TELEPHONE ENCOUNTER
[phone] & copy sent to scan.    Nicole RAMOS

## 2021-12-08 ENCOUNTER — OFFICE VISIT (OUTPATIENT)
Dept: OPHTHALMOLOGY | Facility: CLINIC | Age: 71
End: 2021-12-08
Attending: OPHTHALMOLOGY
Payer: MEDICARE

## 2021-12-08 DIAGNOSIS — H35.372 EPIRETINAL MEMBRANE (ERM) OF LEFT EYE: ICD-10-CM

## 2021-12-08 DIAGNOSIS — H33.321 RETINAL HOLE, RIGHT: ICD-10-CM

## 2021-12-08 DIAGNOSIS — E11.3293 TYPE 2 DIABETES MELLITUS WITH BOTH EYES AFFECTED BY MILD NONPROLIFERATIVE RETINOPATHY WITHOUT MACULAR EDEMA, WITHOUT LONG-TERM CURRENT USE OF INSULIN (H): Primary | ICD-10-CM

## 2021-12-08 DIAGNOSIS — H52.03 HYPEROPIA OF BOTH EYES: ICD-10-CM

## 2021-12-08 DIAGNOSIS — H25.813 COMBINED FORM OF AGE-RELATED CATARACT, BOTH EYES: ICD-10-CM

## 2021-12-08 DIAGNOSIS — H40.003 GLAUCOMA SUSPECT, BOTH EYES: ICD-10-CM

## 2021-12-08 PROCEDURE — 92014 COMPRE OPH EXAM EST PT 1/>: CPT | Performed by: OPHTHALMOLOGY

## 2021-12-08 PROCEDURE — G0463 HOSPITAL OUTPT CLINIC VISIT: HCPCS

## 2021-12-08 ASSESSMENT — VISUAL ACUITY
OD_SC+: -1
METHOD: SNELLEN - LINEAR
OS_SC+: -2
OS_SC: 20/50
METHOD_MR: PT. DEFERS
OD_SC: 20/50
OD_PH_SC: 20/40

## 2021-12-08 ASSESSMENT — EXTERNAL EXAM - LEFT EYE: OS_EXAM: NORMAL

## 2021-12-08 ASSESSMENT — EXTERNAL EXAM - RIGHT EYE: OD_EXAM: NORMAL

## 2021-12-08 ASSESSMENT — TONOMETRY
OD_IOP_MMHG: 11
OS_IOP_MMHG: 11
IOP_METHOD: TONOPEN

## 2021-12-08 ASSESSMENT — CUP TO DISC RATIO
OS_RATIO: 0.4
OD_RATIO: 0.6

## 2021-12-08 ASSESSMENT — CONF VISUAL FIELD
OD_NORMAL: 1
OS_NORMAL: 1

## 2021-12-08 ASSESSMENT — SLIT LAMP EXAM - LIDS
COMMENTS: NORMAL
COMMENTS: NORMAL

## 2021-12-08 NOTE — PROGRESS NOTES
HPI     Pt. States that she is doing well. No change in VA BE. No change in VA BE. No flashes or floaters BE. No pain or dryness BE. Blood sugar well controlled.   Lab Results       Component                Value               Date                       A1C                      6.4                 11/12/2021                 A1C                      6.2                 04/15/2021                 A1C                      6.3                 11/20/2020                 A1C                      6.6                 05/04/2020                 A1C                      8.4                 02/02/2020                 A1C                      7.6                 10/04/2019            Shayna Shankar COT 8:26 AM December 8, 2021       Last edited by Shayna Shankar on 12/8/2021  8:26 AM. (History)          History of Present Illness:   Ms. Peña is a 71 year old female with T2DM and stage 3 CKD here for diabetic eye exam. She feels her vision remains good and stable in both eyes. No pain, redness, discharge. No new flashes/floaters.      Additional Ocular History:   Hyperopia and presbyopia  Stick to the left eye as a child    Relevant Past Medical/Family/Social History:  CVA 1/25/2020   DM2  HTN  HLD    Lives with a roommate at home.  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  No ETOH.  No tobacco.  No drugs.      Assessment:    (E11.8673) Type 2 diabetes mellitus with both eyes affected by mild nonproliferative retinopathy without macular edema, without long-term current use of insulin (H)  (primary encounter diagnosis)  Comment: On metformin. Last A1c 6.4 11/2021. Mild NPDR, appears stable from description last year.  Plan: Discussed the importance of tight blood glucose control in the prevention of diabetic retinopathy. Recommend yearly dilated eye exam.    (H33.321) Retinal hole, right  Comment: Identified on prior exam, asymptomatic. Chronic-appearing with pigmented rim. No SRF or RD.  Plan:  Discussed signs/sx of RT/RD and the patient knows to call immediately if they develop these symptoms.     (H35.372) Epiretinal membrane (ERM) of left eye  Comment: Mild  Plan: Monitor    (H25.813) Combined form of age-related cataract, both eyes  Comment: Likely visually significant with BCVA of 20/30 at last visit, but declined refraction today, and her visual function meets her needs.  Plan: Ok to monitor for now.    (H40.003) Glaucoma suspect, both eyes  Comment: Cup to disc asymmetry with slightly larger cup right eye. Normal IOPs at 11/11.  Plan: Nerve OCT next visit.    (H52.03) Hyperopia of both eyes  Comment: Functions to meet her needs without Rx  Plan: Monitor    Return in about 1 year (around 12/8/2022) for diabetic eye exam with macula and nerve OCT OU, or sooner as needed.      Teaching statement:  Complete documentation of historical and exam elements from today's encounter can be found in the full encounter summary report (not reduplicated in this progress note). I personally obtained the chief complaint(s) and history of present illness.  I confirmed and edited as necessary the review of systems, past medical/surgical history, family history, social history, and examination findings as documented by others; and I examined the patient myself. I personally reviewed the relevant tests, images, and reports as documented above.     I formulated and edited as necessary the assessment and plan and discussed the findings and management plan with the patient and family.    Cornelia Pickering MD  Comprehensive Ophthalmology & Ocular Pathology  Department of Ophthalmology and Visual Neurosciences  [email or web address]  Pager [phone]

## 2021-12-08 NOTE — NURSING NOTE
Chief Complaints and History of Present Illnesses   Patient presents with     Diabetic Eye Exam     Chief Complaint(s) and History of Present Illness(es)     Diabetic Eye Exam               Comments     Pt. States that she is doing well. No change in VA BE. No change in VA BE. No flashes or floaters BE. No pain or dryness BE. Blood sugar well controlled.   Lab Results       Component                Value               Date                       A1C                      6.4                 11/12/2021                 A1C                      6.2                 04/15/2021                 A1C                      6.3                 11/20/2020                 A1C                      6.6                 05/04/2020                 A1C                      8.4                 02/02/2020                 A1C                      7.6                 10/04/2019            Shayna Shankar COT 8:26 AM December 8, 2021

## 2021-12-09 ENCOUNTER — MYC MEDICAL ADVICE (OUTPATIENT)
Dept: FAMILY MEDICINE | Facility: CLINIC | Age: 71
End: 2021-12-09
Payer: MEDICARE

## 2021-12-09 DIAGNOSIS — Z12.31 ENCOUNTER FOR SCREENING MAMMOGRAM FOR BREAST CANCER: Primary | ICD-10-CM

## 2021-12-10 ENCOUNTER — ALLIED HEALTH/NURSE VISIT (OUTPATIENT)
Dept: FAMILY MEDICINE | Facility: CLINIC | Age: 71
End: 2021-12-10
Payer: MEDICARE

## 2021-12-10 DIAGNOSIS — E53.8 VITAMIN B12 DEFICIENCY (NON ANEMIC): Primary | ICD-10-CM

## 2021-12-10 PROCEDURE — 96372 THER/PROPH/DIAG INJ SC/IM: CPT | Performed by: FAMILY MEDICINE

## 2021-12-10 PROCEDURE — 99207 PR NO CHARGE NURSE ONLY: CPT

## 2021-12-10 RX ADMIN — CYANOCOBALAMIN 1000 MCG: 1000 INJECTION, SOLUTION INTRAMUSCULAR; SUBCUTANEOUS at 08:55

## 2021-12-10 NOTE — NURSING NOTE
Clinic Administered Medication Documentation          Injectable Medication Documentation    Patient was given Cyanocobalamin (B-12). Prior to medication administration, verified patients identity using patient s name and date of birth. Please see MAR and medication order for additional information. Patient instructed to remain in clinic for 15 minutes.      Was entire vial of medication used? Yes  Vial/Syringe: Single dose vial  Expiration Date:    Was this medication supplied by the patient? No     Rupal Duque MA

## 2021-12-10 NOTE — PROGRESS NOTES
{PROVIDER CHARTING PREFERENCE:968397}    Dave Mancini is a 71 year old who presents for the following health issues {ACCOMPANIED BY STATEMENT (Optional):433402}    HPI     {SUPERLIST (Optional):509269}  {additonal problems for provider to add (Optional):839815}    Review of Systems   {ROS COMP (Optional):546274}      Objective    There were no vitals taken for this visit.  There is no height or weight on file to calculate BMI.  Physical Exam   {Exam List (Optional):692248}    {Diagnostic Test Results (Optional):534943}    {AMBULATORY ATTESTATION (Optional):534498}

## 2021-12-11 NOTE — TELEPHONE ENCOUNTER
JW,  Please see below.  Per HM Mammo is due 12/2022 but looks like she has been getting them annually, last 12/2020.  Did you want her to get one this year or next.  Please advise.  Thanks,  Amanda Parker RN

## 2021-12-13 ENCOUNTER — MYC MEDICAL ADVICE (OUTPATIENT)
Dept: FAMILY MEDICINE | Facility: CLINIC | Age: 71
End: 2021-12-13
Payer: MEDICARE

## 2021-12-15 ENCOUNTER — TELEPHONE (OUTPATIENT)
Dept: GERIATRICS | Facility: CLINIC | Age: 71
End: 2021-12-15
Payer: MEDICARE

## 2021-12-15 RX ORDER — INSULIN GLARGINE 100 [IU]/ML
5 INJECTION, SOLUTION SUBCUTANEOUS AT BEDTIME
COMMUNITY
End: 2022-01-07

## 2021-12-15 NOTE — TELEPHONE ENCOUNTER
[facility] Nurse Triage Telephone Note    Provider: ILIANA Bower  Facility: [facility] Facility Type:  TCU    Caller: Nilson   Call Back Number: [phone]    Allergies:    Allergies   Allergen Reactions     Lac Bovis      Lisinopril Cough     Milk Products GI Disturbance     Latex Rash        Reason for call: New admission to the TCU and has an order for Lantus but no amount to administer.     Verbal Order/Direction given by Provider: According to the discharge summary the pt is to have Lantus 5units at bedtime.     Provider giving Order:  ILIANA Bower    Verbal Order given to: Anitra Hurst RN

## 2021-12-17 ENCOUNTER — TRANSITIONAL CARE UNIT VISIT (OUTPATIENT)
Dept: GERIATRICS | Facility: CLINIC | Age: 71
End: 2021-12-17
Payer: MEDICARE

## 2021-12-17 VITALS
SYSTOLIC BLOOD PRESSURE: 139 MMHG | OXYGEN SATURATION: 94 % | HEART RATE: 84 BPM | HEIGHT: 63 IN | BODY MASS INDEX: 23.11 KG/M2 | WEIGHT: 130.4 LBS | RESPIRATION RATE: 18 BRPM | TEMPERATURE: 97.7 F | DIASTOLIC BLOOD PRESSURE: 70 MMHG

## 2021-12-17 DIAGNOSIS — I63.521 CEREBROVASCULAR ACCIDENT (CVA) DUE TO OCCLUSION OF RIGHT ANTERIOR CEREBRAL ARTERY (H): Primary | ICD-10-CM

## 2021-12-17 DIAGNOSIS — D51.9 ANEMIA DUE TO VITAMIN B12 DEFICIENCY, UNSPECIFIED B12 DEFICIENCY TYPE: ICD-10-CM

## 2021-12-17 DIAGNOSIS — I69.354 SPASTIC HEMIPLEGIA OF LEFT NONDOMINANT SIDE AS LATE EFFECT OF CEREBRAL INFARCTION (H): ICD-10-CM

## 2021-12-17 DIAGNOSIS — G40.909 SEIZURE DISORDER (H): ICD-10-CM

## 2021-12-17 DIAGNOSIS — E11.49 TYPE 2 DIABETES MELLITUS WITH OTHER NEUROLOGIC COMPLICATION, WITH LONG-TERM CURRENT USE OF INSULIN (H): ICD-10-CM

## 2021-12-17 DIAGNOSIS — Z79.4 TYPE 2 DIABETES MELLITUS WITH OTHER NEUROLOGIC COMPLICATION, WITH LONG-TERM CURRENT USE OF INSULIN (H): ICD-10-CM

## 2021-12-17 DIAGNOSIS — I10 ESSENTIAL HYPERTENSION: ICD-10-CM

## 2021-12-17 PROCEDURE — 99305 1ST NF CARE MODERATE MDM 35: CPT | Performed by: NURSE PRACTITIONER

## 2021-12-17 RX ORDER — SENNOSIDES A AND B 8.6 MG/1
1 TABLET, FILM COATED ORAL DAILY
COMMUNITY
End: 2022-02-19

## 2021-12-17 RX ORDER — CLOPIDOGREL BISULFATE 75 MG/1
75 TABLET ORAL DAILY
COMMUNITY
End: 2022-03-09

## 2021-12-17 ASSESSMENT — MIFFLIN-ST. JEOR: SCORE: 1080.62

## 2021-12-17 NOTE — LETTER
2021        RE: Addis Peña  [address]  [place] [address]        [facility]    Name:   Addis Peña  :   1950  MRN:    [id number]     Facility:   [facility]) [[id number]]   Room: [place] [address]  Code Status: DNR and POLST AVAILABLE -     DOS:  2021  Previous visit:  N/A    PCP:  Joel Daniel Wegener, MD     CHIEF COMPLAINT / REASON FOR VISIT:  Chief Complaint   Patient presents with     Hospital F/U     NEW ADMISSION: Cerebrovascular accident      [facility] from 2021 until 2021 (recurrent CVA)      HPI: Addis is a 71 year old female with a past medical history significant for stroke with residual left spastic hemiplegia who stated she went to bed at approximately 8 PM the night before and woke up at 4:30 AM with increased left-sided weakness.  She stated that she felt well when she had gone to bed.  Typically, she uses a wheelchair for ambulation but can get up on her own and has enough strength in the left arm to get dressed and perform ADLs.  She stated that she could do neither that morning.  She lives with a roommate who helped her and called 911.  She otherwise had no change in her speech or swallowing ability.  She denied any other new focal neurological deficits.  She takes a daily baby aspirin and has been compliant.    Ischemic stroke: Seen as an extended stroke in the ED has a last known normal 8 PM the night prior.  Discussed with neurology.  Admitted and monitored on telemetry.  To continue  mg daily for 3 weeks and add Plavix (continuing Plavix alone after 3 weeks).  Vascular consulted given her left ICA stenosis.  They would like to follow-up with her in clinic in 6 to 8 weeks.  PT saw patient and recommended TCU upon discharge.  She would need an outpatient event monitor.    Seizure disorder: Phenytoin level subtherapeutic and levetiracetam level supratherapeutic.  Phenytoin  discontinued, and levetiracetam decreased to 1000 mg twice daily.  Level was to be repeated on 12/15 before Keppra dose.    Hypertension: Initially allowed for permissive hypertension.  Amlodipine and metoprolol were resumed while inpatient.  Also resumed losartan on discharge.    Diabetes mellitus type 2: Metformin was held while inpatient and resumed on discharge.  Glargine 5 units at bedtime was added.  She stated she was unaware of this.    PROCEDURES/SIGNIFICANT IMAGING AND TESTING  CTA head and neck with IV contrast; CT brain perfusion (12/11/2021):  Head CT: No acute intracranial hemorrhage.  No CT evidence of acute infarct (aspect score 10).  Right SOCO territory large chronic infarct.  Small chronic infarcts.  Age-related changes.  Head CTA: No arterial large vessel occlusion.  Multiple stenoses and occlusions.  Neck CTA: Right carotid bifurcation moderate stenosis.  Right proximal carotid bulb 50% stenosis.  Right proximal cervical ICA 50% stenosis.  Left distal common carotid artery moderate stenosis.  Left carotid bifurcation moderate to severe stenosis.  Left proximal external carotid artery severe stenosis.  Left carotid bulb and proximal cervical ICA demonstrates 70 to 90% stenosis.  Left distal cervical ICA 50 to 70% stenosis.  No dissection.  Right vertebral artery origin severe stenosis.  Left distal V1 segment moderate stenosis.  CT perfusion: No acute perfusion abnormality.    MR brain without IV contrast (12/11/2021):  Small area of acute or subacute ischemia involving the posterior limb of the left internal capsule.  Foci of diffusion and FLAIR hyperintense signal in the right and left mid cerebral peduncles without definite diffusion restriction are nonspecific and may reflect sequela of chronic infarcts, alternatively demyelinating disease.  Multiple chronic infarcts as described previously.      CURRENT/RECENT TCU ISSUES    Disposition: She tells me she was not on insulin at home.  She is on  sliding scale here, receiving 2 to 4 units.  I suggested we could try increasing her Metformin from 1000 mg twice daily to 1250 mg twice daily in an effort to discharge her without the need for insulin.  According to the patient, her last A1c was 6.4.      Blood pressures are little high.  When we have enough data, we will make adjustments in her antihypertensives.    In physical therapy, she is working on small steps, balance, and flexibility.    ROS: No headaches or chest pains, coughing or congestion, nausea or vomiting, dizziness or dyspnea, dysuria, constipation or diarrhea, difficulty chewing or swallowing, integumentary issues, or problems with appetite or sleep.      Past Medical History:   Diagnosis Date     Allergic rhinitis      Allergic rhinitis      CKD (chronic kidney disease)      Depression      Depression      Displaced dome fracture of left acetabulum (H)      DM2 (diabetes mellitus, type 2) (H)      GERD (gastroesophageal reflux disease)      GERD (gastroesophageal reflux disease)      Gout      HTN (hypertension)      Hyperlipidaemia LDL goal <100      Hypertension goal BP (blood pressure) < 140/90      Left hemiplegia (H) 1/2010    sees PMR physician     Left hemiplegia (H)      Migraine      Migraine      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)      Nephrolithiasis      Nephrolithiasis      Seizure disorder (H)      Spastic hemiplegia of left nondominant side as late effect of cerebral infarction (H) 12/20/2021     Stroke (H) 1/2010    due to uncontrolled hypertension     Stroke (H)      Type 2 diabetes, HbA1C goal < 8% (H)      Vitamin B12 deficiency anemia               Family History   Problem Relation Age of Onset     Hypertension Mother      Heart Disease Mother      C.A.D. Father      Coronary Artery Disease Father      Diabetes Sister      Hypertension Brother      Cancer Sister      Diabetes  Sister      Hypertension Brother      Cancer Sister      Social History     Socioeconomic History     Marital status:      Spouse name: None     Number of children: None     Years of education: None     Highest education level: None   Occupational History     None   Tobacco Use     Smoking status: Never Smoker     Smokeless tobacco: Never Used   Vaping Use     Vaping Use: Never used   Substance and Sexual Activity     Alcohol use: No     Drug use: No     Sexual activity: Not Currently     Partners: Male   Other Topics Concern     Parent/sibling w/ CABG, MI or angioplasty before 65F 55M? No   Social History Narrative     None     Social Determinants of Health     Financial Resource Strain: Not on file   Food Insecurity: Not on file   Transportation Needs: Not on file   Physical Activity: Not on file   Stress: Not on file   Social Connections: Not on file   Intimate Partner Violence: Not on file   Housing Stability: Not on file       MEDICATIONS: Reviewed from the MAR, physician orders, and/or earlier progress notes.  Post Discharge Medication Reconciliation Status: discharge medications reconciled and changed, per note/orders.  Updated by me today (12/17/2021) with an increase in metformin reflected below.  Current Outpatient Medications   Medication Sig     acetaminophen (TYLENOL) 500 MG tablet Take 500-1,000 mg by mouth every 6 hours as needed      amLODIPine (NORVASC) 10 MG tablet Take 1 tablet (10 mg) by mouth daily     aspirin (ASA) 81 MG EC tablet Take 1 tablet (81 mg) by mouth daily     atorvastatin (LIPITOR) 40 MG tablet Take 1 tablet (40 mg) by mouth daily     baclofen (LIORESAL) 10 MG tablet TAKE 1 TABLET(10 MG) BY MOUTH THREE TIMES DAILY AS NEEDED FOR MUSCLE SPASMS     bisacodyl (DULCOLAX) 5 MG EC tablet Take 2 tablets (10 mg) by mouth daily as needed for constipation     blood glucose (IRENE CONTOUR) test strip 1 strip by In Vitro route daily Use to test blood sugars as needed     clopidogrel  "(PLAVIX) 75 MG tablet Take 75 mg by mouth daily     insulin glargine (LANTUS VIAL) 100 UNIT/ML vial Inject 5 Units Subcutaneous At Bedtime     Lactase 4500 units TABS Take 13,500 Units by mouth daily as needed     levETIRAcetam (KEPPRA) 500 MG tablet Take 1,000 mg by mouth 2 times daily Take 1250 mg twice daily     losartan (COZAAR) 25 MG tablet Take 1 tablet (25 mg) by mouth daily     metFORMIN (GLUCOPHAGE) 1000 MG tablet TAKE 1 TABLET(1000 MG) BY MOUTH TWICE DAILY WITH MEALS (Patient taking differently: Take 1,250 mg by mouth TAKE 1 TABLET(1250 MG) BY MOUTH TWICE DAILY WITH MEALS)     metoprolol succinate ER (TOPROL-XL) 50 MG 24 hr tablet Take 1 tablet (50 mg) by mouth daily     montelukast (SINGULAIR) 10 MG tablet Take 1 tablet (10 mg) by mouth daily     senna (SENOKOT) 8.6 MG tablet Take 1 tablet by mouth daily     Vitamin D3 (VITAMIN D3) 25 mcg (1000 units) tablet Take 1 tablet (25 mcg) by mouth daily     citalopram (CELEXA) 10 MG tablet Take 1 tablet (10 mg) by mouth daily (Patient not taking: Reported on 12/17/2021)     folic acid (FOLVITE) 1 MG tablet Take 1 tablet (1 mg) by mouth daily (Patient not taking: Reported on 12/17/2021)     Lactase 250 MG CAPS Take 1 chew tab by mouth as needed (with dairy)  (Patient not taking: Reported on 12/17/2021)     phenytoin sodium extended (DILANTIN) 200 MG capsule Take 100 mg by mouth 2 times daily  (Patient not taking: Reported on 12/17/2021)     Current Facility-Administered Medications   Medication     vitamin B-12 (CYANOCOBALAMIN) injection 1,000 mcg     ALLERGIES:   Allergies   Allergen Reactions     Lac Bovis      Lisinopril Cough     Milk Products GI Disturbance     Latex Rash     DIET: Diabetic, regular texture, thin liquids.    Vitals:    12/17/21 1400   BP: 139/70   Pulse: 84   Resp: 18   Temp: 97.7  F (36.5  C)   SpO2: 94%   Weight: 59.1 kg (130 lb 6.4 oz)   Height: 1.6 m (5' 3\")     Body mass index is 23.1 kg/m .    EXAMINATION:   General: Pleasant, alert, " and conversant middle-aged female, sitting in a recliner, in no apparent distress.  Mildly dysarthric.  Head: Normocephalic and atraumatic.   Eyes: PERRLA, sclerae clear.   ENT: Moist oral mucosa.  She has her own teeth in excellent repair.  No rhinorrhea or nasal discharge.  Hearing is unimpaired.  Cardiovascular: Regular rate and rhythm.   Respiratory: Lungs clear to auscultation bilaterally.   Abdomen: Nondistended.   Musculoskeletal/Extremities: Age-related degenerative joint disease.  Wearing left AFO.  Neurologic: Mild contractures of the left upper extremity.  Contracture of the thumb reveals enlarged CMC joint.  She cannot make a fist.  Integument: No rashes, clinically significant lesions, or skin breakdown.   Cognitive/Psychiatric: Alert and oriented with euthymic affect.    DIAGNOSTICS:   Recent Results (from the past 240 hour(s))   COVID-19 Virus (Coronavirus) by PCR Nasopharyngeal    Collection Time: 12/19/21 10:00 AM    Specimen: Nasopharyngeal; Swab   Result Value Ref Range    SARS CoV2 PCR Negative Negative       ASSESSMENT/Plan:      ICD-10-CM    1. Cerebrovascular accident (CVA) due to occlusion of right anterior cerebral artery (H)  I63.521    2. Spastic hemiplegia of left nondominant side as late effect of cerebral infarction (H)  I69.354    3. Seizure disorder (H)  G40.909    4. Type 2 diabetes mellitus with other neurologic complication, with long-term current use of insulin (H)  E11.49     Z79.4    5. Essential hypertension  I10    6. Anemia due to vitamin B12 deficiency, unspecified B12 deficiency type  D51.9        CHANGES:    Increase metformin from 1000 mg twice daily to 1250 mg twice daily.    CARE PLAN:    The care plan, medications, vital signs, orders, and nursing notes have been reviewed, and all orders signed. Changes to care plan, if any, as noted. Otherwise, continue current plan of care.    The above has been created using voice recognition software. Please be aware that this may  unintentionally  produce inaccuracies and/or nonsensical sentences.      Electronically signed by: KEILA Mcmahon CNP        Sincerely,        KEILA Mcmahon CNP

## 2021-12-19 ENCOUNTER — LAB REQUISITION (OUTPATIENT)
Dept: LAB | Facility: CLINIC | Age: 71
End: 2021-12-19
Payer: MEDICARE

## 2021-12-19 PROCEDURE — U0005 INFEC AGEN DETEC AMPLI PROBE: HCPCS | Performed by: INTERNAL MEDICINE

## 2021-12-20 PROBLEM — I69.354 SPASTIC HEMIPLEGIA OF LEFT NONDOMINANT SIDE AS LATE EFFECT OF CEREBRAL INFARCTION (H): Status: ACTIVE | Noted: 2021-12-20

## 2021-12-20 LAB — SARS-COV-2 RNA RESP QL NAA+PROBE: NEGATIVE

## 2021-12-21 ENCOUNTER — TRANSITIONAL CARE UNIT VISIT (OUTPATIENT)
Dept: GERIATRICS | Facility: CLINIC | Age: 71
End: 2021-12-21
Payer: MEDICARE

## 2021-12-21 VITALS
BODY MASS INDEX: 24.1 KG/M2 | TEMPERATURE: 98.6 F | WEIGHT: 136 LBS | DIASTOLIC BLOOD PRESSURE: 76 MMHG | OXYGEN SATURATION: 95 % | RESPIRATION RATE: 16 BRPM | SYSTOLIC BLOOD PRESSURE: 132 MMHG | HEIGHT: 63 IN | HEART RATE: 79 BPM

## 2021-12-21 DIAGNOSIS — Z79.4 TYPE 2 DIABETES MELLITUS WITH OTHER NEUROLOGIC COMPLICATION, WITH LONG-TERM CURRENT USE OF INSULIN (H): ICD-10-CM

## 2021-12-21 DIAGNOSIS — E11.49 TYPE 2 DIABETES MELLITUS WITH OTHER NEUROLOGIC COMPLICATION, WITH LONG-TERM CURRENT USE OF INSULIN (H): ICD-10-CM

## 2021-12-21 DIAGNOSIS — D51.9 ANEMIA DUE TO VITAMIN B12 DEFICIENCY, UNSPECIFIED B12 DEFICIENCY TYPE: ICD-10-CM

## 2021-12-21 DIAGNOSIS — I69.354 SPASTIC HEMIPLEGIA OF LEFT NONDOMINANT SIDE AS LATE EFFECT OF CEREBRAL INFARCTION (H): ICD-10-CM

## 2021-12-21 DIAGNOSIS — G40.909 SEIZURE DISORDER (H): ICD-10-CM

## 2021-12-21 DIAGNOSIS — I63.521 CEREBROVASCULAR ACCIDENT (CVA) DUE TO OCCLUSION OF RIGHT ANTERIOR CEREBRAL ARTERY (H): Primary | ICD-10-CM

## 2021-12-21 DIAGNOSIS — I10 ESSENTIAL HYPERTENSION: ICD-10-CM

## 2021-12-21 PROCEDURE — 99309 SBSQ NF CARE MODERATE MDM 30: CPT | Performed by: NURSE PRACTITIONER

## 2021-12-21 ASSESSMENT — MIFFLIN-ST. JEOR: SCORE: 1106.02

## 2021-12-21 NOTE — PROGRESS NOTES
[facility]    Name:   Addis Peña  :   1950  MRN:    [id number]     Facility:   [facility] ([facility]) [[id number]]   Room: [place] [address]  Code Status: DNR and POLST AVAILABLE -     DOS:  2021  Previous visit:  N/A    PCP:  Joel Daniel Wegener, MD     CHIEF COMPLAINT / REASON FOR VISIT:  Chief Complaint   Patient presents with     Hospital F/U     NEW ADMISSION: Cerebrovascular accident      [facility] from 2021 until 2021 (recurrent CVA)      HPI: Addis is a 71 year old female with a past medical history significant for stroke with residual left spastic hemiplegia who stated she went to bed at approximately 8 PM the night before and woke up at 4:30 AM with increased left-sided weakness.  She stated that she felt well when she had gone to bed.  Typically, she uses a wheelchair for ambulation but can get up on her own and has enough strength in the left arm to get dressed and perform ADLs.  She stated that she could do neither that morning.  She lives with a roommate who helped her and called 911.  She otherwise had no change in her speech or swallowing ability.  She denied any other new focal neurological deficits.  She takes a daily baby aspirin and has been compliant.    Ischemic stroke: Seen as an extended stroke in the ED has a last known normal 8 PM the night prior.  Discussed with neurology.  Admitted and monitored on telemetry.  To continue  mg daily for 3 weeks and add Plavix (continuing Plavix alone after 3 weeks).  Vascular consulted given her left ICA stenosis.  They would like to follow-up with her in clinic in 6 to 8 weeks.  PT saw patient and recommended TCU upon discharge.  She would need an outpatient event monitor.    Seizure disorder: Phenytoin level subtherapeutic and levetiracetam level supratherapeutic.  Phenytoin discontinued, and levetiracetam decreased to 1000 mg twice daily.  Level was to be repeated on 12/15 before Keppra  dose.    Hypertension: Initially allowed for permissive hypertension.  Amlodipine and metoprolol were resumed while inpatient.  Also resumed losartan on discharge.    Diabetes mellitus type 2: Metformin was held while inpatient and resumed on discharge.  Glargine 5 units at bedtime was added.  She stated she was unaware of this.    PROCEDURES/SIGNIFICANT IMAGING AND TESTING  CTA head and neck with IV contrast; CT brain perfusion (12/11/2021):  Head CT: No acute intracranial hemorrhage.  No CT evidence of acute infarct (aspect score 10).  Right SOCO territory large chronic infarct.  Small chronic infarcts.  Age-related changes.  Head CTA: No arterial large vessel occlusion.  Multiple stenoses and occlusions.  Neck CTA: Right carotid bifurcation moderate stenosis.  Right proximal carotid bulb 50% stenosis.  Right proximal cervical ICA 50% stenosis.  Left distal common carotid artery moderate stenosis.  Left carotid bifurcation moderate to severe stenosis.  Left proximal external carotid artery severe stenosis.  Left carotid bulb and proximal cervical ICA demonstrates 70 to 90% stenosis.  Left distal cervical ICA 50 to 70% stenosis.  No dissection.  Right vertebral artery origin severe stenosis.  Left distal V1 segment moderate stenosis.  CT perfusion: No acute perfusion abnormality.    MR brain without IV contrast (12/11/2021):  Small area of acute or subacute ischemia involving the posterior limb of the left internal capsule.  Foci of diffusion and FLAIR hyperintense signal in the right and left mid cerebral peduncles without definite diffusion restriction are nonspecific and may reflect sequela of chronic infarcts, alternatively demyelinating disease.  Multiple chronic infarcts as described previously.      CURRENT/RECENT TCU ISSUES    Disposition: She tells me she was not on insulin at home.  She is on sliding scale here, receiving 2 to 4 units.  I suggested we could try increasing her Metformin from 1000 mg twice  daily to 1250 mg twice daily in an effort to discharge her without the need for insulin.  According to the patient, her last A1c was 6.4.      Blood pressures are little high.  When we have enough data, we will make adjustments in her antihypertensives.    In physical therapy, she is working on small steps, balance, and flexibility.    ROS: No headaches or chest pains, coughing or congestion, nausea or vomiting, dizziness or dyspnea, dysuria, constipation or diarrhea, difficulty chewing or swallowing, integumentary issues, or problems with appetite or sleep.      Past Medical History:   Diagnosis Date     Allergic rhinitis      Allergic rhinitis      CKD (chronic kidney disease)      Depression      Depression      Displaced dome fracture of left acetabulum (H)      DM2 (diabetes mellitus, type 2) (H)      GERD (gastroesophageal reflux disease)      GERD (gastroesophageal reflux disease)      Gout      HTN (hypertension)      Hyperlipidaemia LDL goal <100      Hypertension goal BP (blood pressure) < 140/90      Left hemiplegia (H) 1/2010    sees PMR physician     Left hemiplegia (H)      Migraine      Migraine      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)      Nephrolithiasis      Nephrolithiasis      Seizure disorder (H)      Spastic hemiplegia of left nondominant side as late effect of cerebral infarction (H) 12/20/2021     Stroke (H) 1/2010    due to uncontrolled hypertension     Stroke (H)      Type 2 diabetes, HbA1C goal < 8% (H)      Vitamin B12 deficiency anemia               Family History   Problem Relation Age of Onset     Hypertension Mother      Heart Disease Mother      C.A.D. Father      Coronary Artery Disease Father      Diabetes Sister      Hypertension Brother      Cancer Sister      Diabetes Sister      Hypertension Brother      Cancer Sister      Social History     Socioeconomic History     Marital status:       Spouse name: None     Number of children: None     Years of education: None     Highest education level: None   Occupational History     None   Tobacco Use     Smoking status: Never Smoker     Smokeless tobacco: Never Used   Vaping Use     Vaping Use: Never used   Substance and Sexual Activity     Alcohol use: No     Drug use: No     Sexual activity: Not Currently     Partners: Male   Other Topics Concern     Parent/sibling w/ CABG, MI or angioplasty before 65F 55M? No   Social History Narrative     None     Social Determinants of Health     Financial Resource Strain: Not on file   Food Insecurity: Not on file   Transportation Needs: Not on file   Physical Activity: Not on file   Stress: Not on file   Social Connections: Not on file   Intimate Partner Violence: Not on file   Housing Stability: Not on file       MEDICATIONS: Reviewed from the MAR, physician orders, and/or earlier progress notes.  Post Discharge Medication Reconciliation Status: discharge medications reconciled and changed, per note/orders.  Updated by me today (12/17/2021) with an increase in metformin reflected below.  Current Outpatient Medications   Medication Sig     acetaminophen (TYLENOL) 500 MG tablet Take 500-1,000 mg by mouth every 6 hours as needed      amLODIPine (NORVASC) 10 MG tablet Take 1 tablet (10 mg) by mouth daily     aspirin (ASA) 81 MG EC tablet Take 1 tablet (81 mg) by mouth daily     atorvastatin (LIPITOR) 40 MG tablet Take 1 tablet (40 mg) by mouth daily     baclofen (LIORESAL) 10 MG tablet TAKE 1 TABLET(10 MG) BY MOUTH THREE TIMES DAILY AS NEEDED FOR MUSCLE SPASMS     bisacodyl (DULCOLAX) 5 MG EC tablet Take 2 tablets (10 mg) by mouth daily as needed for constipation     blood glucose (IRENE CONTOUR) test strip 1 strip by In Vitro route daily Use to test blood sugars as needed     clopidogrel (PLAVIX) 75 MG tablet Take 75 mg by mouth daily     insulin glargine (LANTUS VIAL) 100 UNIT/ML vial Inject 5 Units  "Subcutaneous At Bedtime     Lactase 4500 units TABS Take 13,500 Units by mouth daily as needed     levETIRAcetam (KEPPRA) 500 MG tablet Take 1,000 mg by mouth 2 times daily Take 1250 mg twice daily     losartan (COZAAR) 25 MG tablet Take 1 tablet (25 mg) by mouth daily     metFORMIN (GLUCOPHAGE) 1000 MG tablet TAKE 1 TABLET(1000 MG) BY MOUTH TWICE DAILY WITH MEALS (Patient taking differently: Take 1,250 mg by mouth TAKE 1 TABLET(1250 MG) BY MOUTH TWICE DAILY WITH MEALS)     metoprolol succinate ER (TOPROL-XL) 50 MG 24 hr tablet Take 1 tablet (50 mg) by mouth daily     montelukast (SINGULAIR) 10 MG tablet Take 1 tablet (10 mg) by mouth daily     senna (SENOKOT) 8.6 MG tablet Take 1 tablet by mouth daily     Vitamin D3 (VITAMIN D3) 25 mcg (1000 units) tablet Take 1 tablet (25 mcg) by mouth daily     citalopram (CELEXA) 10 MG tablet Take 1 tablet (10 mg) by mouth daily (Patient not taking: Reported on 12/17/2021)     folic acid (FOLVITE) 1 MG tablet Take 1 tablet (1 mg) by mouth daily (Patient not taking: Reported on 12/17/2021)     Lactase 250 MG CAPS Take 1 chew tab by mouth as needed (with dairy)  (Patient not taking: Reported on 12/17/2021)     phenytoin sodium extended (DILANTIN) 200 MG capsule Take 100 mg by mouth 2 times daily  (Patient not taking: Reported on 12/17/2021)     Current Facility-Administered Medications   Medication     vitamin B-12 (CYANOCOBALAMIN) injection 1,000 mcg     ALLERGIES:   Allergies   Allergen Reactions     Lac Bovis      Lisinopril Cough     Milk Products GI Disturbance     Latex Rash     DIET: Diabetic, regular texture, thin liquids.    Vitals:    12/17/21 1400   BP: 139/70   Pulse: 84   Resp: 18   Temp: 97.7  F (36.5  C)   SpO2: 94%   Weight: 59.1 kg (130 lb 6.4 oz)   Height: 1.6 m (5' 3\")     Body mass index is 23.1 kg/m .    EXAMINATION:   General: Pleasant, alert, and conversant middle-aged female, sitting in a recliner, in no apparent distress.  Mildly dysarthric.  Head: " Normocephalic and atraumatic.   Eyes: PERRLA, sclerae clear.   ENT: Moist oral mucosa.  She has her own teeth in excellent repair.  No rhinorrhea or nasal discharge.  Hearing is unimpaired.  Cardiovascular: Regular rate and rhythm.   Respiratory: Lungs clear to auscultation bilaterally.   Abdomen: Nondistended.   Musculoskeletal/Extremities: Age-related degenerative joint disease.  Wearing left AFO.  Neurologic: Mild contractures of the left upper extremity.  Contracture of the thumb reveals enlarged CMC joint.  She cannot make a fist.  Integument: No rashes, clinically significant lesions, or skin breakdown.   Cognitive/Psychiatric: Alert and oriented with euthymic affect.    DIAGNOSTICS:   Recent Results (from the past 240 hour(s))   COVID-19 Virus (Coronavirus) by PCR Nasopharyngeal    Collection Time: 12/19/21 10:00 AM    Specimen: Nasopharyngeal; Swab   Result Value Ref Range    SARS CoV2 PCR Negative Negative       ASSESSMENT/Plan:      ICD-10-CM    1. Cerebrovascular accident (CVA) due to occlusion of right anterior cerebral artery (H)  I63.521    2. Spastic hemiplegia of left nondominant side as late effect of cerebral infarction (H)  I69.354    3. Seizure disorder (H)  G40.909    4. Type 2 diabetes mellitus with other neurologic complication, with long-term current use of insulin (H)  E11.49     Z79.4    5. Essential hypertension  I10    6. Anemia due to vitamin B12 deficiency, unspecified B12 deficiency type  D51.9        CHANGES:    Increase metformin from 1000 mg twice daily to 1250 mg twice daily.    CARE PLAN:    The care plan, medications, vital signs, orders, and nursing notes have been reviewed, and all orders signed. Changes to care plan, if any, as noted. Otherwise, continue current plan of care.    The above has been created using voice recognition software. Please be aware that this may unintentionally  produce inaccuracies and/or nonsensical sentences.      Electronically signed by: Kleber SANCHEZ  KEILA Stratton CNP

## 2021-12-21 NOTE — LETTER
2021        RE: Addis Peña  [address]  [facility] [address]        [facility]    Name:   Addis Peña  :   1950  MRN:    [id number]     Facility:   [facility]) [[id number]]   Room: [place] [address]  Code Status: DNR and POLST AVAILABLE -     DOS:  2021  Previous visit:  2021    PCP:  Joel Daniel Wegener, MD     CHIEF COMPLAINT / REASON FOR VISIT:  Chief Complaint   Patient presents with     Clinic Care Coordination - Follow-up     Cerebrovascular accident      [facility] from 2021 until 2021 (recurrent CVA)      HPI: Addis is a 71 year old female with a past medical history significant for stroke with residual left spastic hemiplegia who stated she went to bed at approximately 8 PM the night before and woke up at 4:30 AM with increased left-sided weakness.  She stated that she felt well when she had gone to bed.  Typically, she uses a wheelchair for ambulation but can get up on her own and has enough strength in the left arm to get dressed and perform ADLs.  She stated that she could do neither that morning.  She lives with a roommate who helped her and called 911.  She otherwise had no change in her speech or swallowing ability.  She denied any other new focal neurological deficits.  She takes a daily baby aspirin and has been compliant.    Ischemic stroke: Seen as an extended stroke in the ED has a last known normal 8 PM the night prior.  Discussed with neurology.  Admitted and monitored on telemetry.  To continue  mg daily for 3 weeks and add Plavix (continuing Plavix alone after 3 weeks).  Vascular consulted given her left ICA stenosis.  They would like to follow-up with her in clinic in 6 to 8 weeks.  PT saw patient and recommended TCU upon discharge.  She would need an outpatient event monitor.    Seizure disorder: Phenytoin level subtherapeutic and levetiracetam level supratherapeutic.   Phenytoin discontinued, and levetiracetam decreased to 1000 mg twice daily.  Level was to be repeated on 12/15 before Keppra dose.    Hypertension: Initially allowed for permissive hypertension.  Amlodipine and metoprolol were resumed while inpatient.  Also resumed losartan on discharge.    Diabetes mellitus type 2: Metformin was held while inpatient and resumed on discharge.  Glargine 5 units at bedtime was added.  She stated she was unaware of this.    PROCEDURES/SIGNIFICANT IMAGING AND TESTING  CTA head and neck with IV contrast; CT brain perfusion (12/11/2021):  Head CT: No acute intracranial hemorrhage.  No CT evidence of acute infarct (aspect score 10).  Right SOCO territory large chronic infarct.  Small chronic infarcts.  Age-related changes.  Head CTA: No arterial large vessel occlusion.  Multiple stenoses and occlusions.  Neck CTA: Right carotid bifurcation moderate stenosis.  Right proximal carotid bulb 50% stenosis.  Right proximal cervical ICA 50% stenosis.  Left distal common carotid artery moderate stenosis.  Left carotid bifurcation moderate to severe stenosis.  Left proximal external carotid artery severe stenosis.  Left carotid bulb and proximal cervical ICA demonstrates 70 to 90% stenosis.  Left distal cervical ICA 50 to 70% stenosis.  No dissection.  Right vertebral artery origin severe stenosis.  Left distal V1 segment moderate stenosis.  CT perfusion: No acute perfusion abnormality.    MR brain without IV contrast (12/11/2021):  Small area of acute or subacute ischemia involving the posterior limb of the left internal capsule.  Foci of diffusion and FLAIR hyperintense signal in the right and left mid cerebral peduncles without definite diffusion restriction are nonspecific and may reflect sequela of chronic infarcts, alternatively demyelinating disease.  Multiple chronic infarcts as described previously.      CURRENT/RECENT TCU ISSUES    Disposition: She is not very happy this morning.  She  received a sponge bath and silver shower.  In physical therapy, she is working on small steps, balance, and flexibility.    Diabetes mellitus type 2: She was not on insulin at home.  She is on sliding scale here, receiving 2 to 4 units.  I suggested we could try increasing her Metformin from 1000 mg twice daily to 1250 mg twice daily in an effort to discharge her without the need for insulin.  According to the patient, her last A1c was 6.4.  TODAY, she feels her blood glucose levels are looking better.    Hypertension: Blood pressures are fluctuating, typically in the 130s systolic, but the occasionally reach to 140s.  Diastolics are typically in the 70s.  She is receiving metoprolol succinate 50 mg daily, amlodipine 10 mg daily, and losartan 25 mg daily.  We'll continue to monitor.    Spastic hemiplegia: She has occasional spasms in her left arm.  Baclofen is working well for current dose.    ROS: No headaches or chest pains, coughing or congestion, nausea or vomiting, dizziness or dyspnea, dysuria, constipation or diarrhea, difficulty chewing or swallowing, integumentary issues, or problems with appetite or sleep.      Past Medical History:   Diagnosis Date     Allergic rhinitis      Allergic rhinitis      CKD (chronic kidney disease)      Depression      Depression      Displaced dome fracture of left acetabulum (H)      DM2 (diabetes mellitus, type 2) (H)      GERD (gastroesophageal reflux disease)      GERD (gastroesophageal reflux disease)      Gout      HTN (hypertension)      Hyperlipidaemia LDL goal <100      Hypertension goal BP (blood pressure) < 140/90      Left hemiplegia (H) 1/2010    sees PMR physician     Left hemiplegia (H)      Migraine      Migraine      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)      Nephrolithiasis      Nephrolithiasis      Seizure disorder (H)      Spastic hemiplegia of left nondominant side  as late effect of cerebral infarction (H) 12/20/2021     Stroke (H) 1/2010    due to uncontrolled hypertension     Stroke (H)      Type 2 diabetes, HbA1C goal < 8% (H)      Vitamin B12 deficiency anemia               Family History   Problem Relation Age of Onset     Hypertension Mother      Heart Disease Mother      C.A.D. Father      Coronary Artery Disease Father      Diabetes Sister      Hypertension Brother      Cancer Sister      Diabetes Sister      Hypertension Brother      Cancer Sister      Social History     Socioeconomic History     Marital status:      Spouse name: None     Number of children: None     Years of education: None     Highest education level: None   Occupational History     None   Tobacco Use     Smoking status: Never Smoker     Smokeless tobacco: Never Used   Vaping Use     Vaping Use: Never used   Substance and Sexual Activity     Alcohol use: No     Drug use: No     Sexual activity: Not Currently     Partners: Male   Other Topics Concern     Parent/sibling w/ CABG, MI or angioplasty before 65F 55M? No   Social History Narrative     None     Social Determinants of Health     Financial Resource Strain: Not on file   Food Insecurity: Not on file   Transportation Needs: Not on file   Physical Activity: Not on file   Stress: Not on file   Social Connections: Not on file   Intimate Partner Violence: Not on file   Housing Stability: Not on file       MEDICATIONS: Reviewed from the MAR, physician orders, and/or earlier progress notes.  Post Discharge Medication Reconciliation Status: medication reconcilation previously completed during another office visit.    Current Outpatient Medications   Medication Sig     acetaminophen (TYLENOL) 500 MG tablet Take 500-1,000 mg by mouth every 6 hours as needed      amLODIPine (NORVASC) 10 MG tablet Take 1 tablet (10 mg) by mouth daily     aspirin (ASA) 81 MG EC tablet Take 1 tablet (81 mg) by mouth daily     atorvastatin (LIPITOR) 40 MG tablet Take  "1 tablet (40 mg) by mouth daily     baclofen (LIORESAL) 10 MG tablet TAKE 1 TABLET(10 MG) BY MOUTH THREE TIMES DAILY AS NEEDED FOR MUSCLE SPASMS     bisacodyl (DULCOLAX) 5 MG EC tablet Take 2 tablets (10 mg) by mouth daily as needed for constipation     blood glucose (IRENE CONTOUR) test strip 1 strip by In Vitro route daily Use to test blood sugars as needed     clopidogrel (PLAVIX) 75 MG tablet Take 75 mg by mouth daily     insulin glargine (LANTUS VIAL) 100 UNIT/ML vial Inject 5 Units Subcutaneous At Bedtime     Lactase 4500 units TABS Take 13,500 Units by mouth daily as needed     levETIRAcetam (KEPPRA) 500 MG tablet Take 1,000 mg by mouth 2 times daily Take 1250 mg twice daily     losartan (COZAAR) 25 MG tablet Take 1 tablet (25 mg) by mouth daily     metFORMIN (GLUCOPHAGE) 1000 MG tablet TAKE 1 TABLET(1000 MG) BY MOUTH TWICE DAILY WITH MEALS (Patient taking differently: Take 1,250 mg by mouth TAKE 1 TABLET(1250 MG) BY MOUTH TWICE DAILY WITH MEALS)     metoprolol succinate ER (TOPROL-XL) 50 MG 24 hr tablet Take 1 tablet (50 mg) by mouth daily     montelukast (SINGULAIR) 10 MG tablet Take 1 tablet (10 mg) by mouth daily     senna (SENOKOT) 8.6 MG tablet Take 1 tablet by mouth daily     Vitamin D3 (VITAMIN D3) 25 mcg (1000 units) tablet Take 1 tablet (25 mcg) by mouth daily     Current Facility-Administered Medications   Medication     vitamin B-12 (CYANOCOBALAMIN) injection 1,000 mcg     ALLERGIES:   Allergies   Allergen Reactions     Lac Bovis      Lisinopril Cough     Milk Products GI Disturbance     Latex Rash     DIET: Diabetic, regular texture, thin liquids.    Vitals:    12/21/21 1219   BP: 132/76   Pulse: 79   Resp: 16   Temp: 98.6  F (37  C)   SpO2: 95%   Weight: 61.7 kg (136 lb)   Height: 1.6 m (5' 3\")     Body mass index is 24.09 kg/m .    EXAMINATION:   General: Pleasant, alert, and conversant middle-aged female, lying in bed, in no apparent distress.  Mildly dysarthric, although her speech seems to " be clearer today.  Head: Normocephalic and atraumatic.   Eyes: PERRLA, sclerae clear.   ENT: Moist oral mucosa.  She has her own teeth in excellent repair.  Gingiva not swollen from phenytoin.  No rhinorrhea or nasal discharge.  Hearing is unimpaired.  Cardiovascular: Regular rate and rhythm.   Respiratory: Lungs clear to auscultation bilaterally.   Abdomen: Nondistended.   Musculoskeletal/Extremities: Age-related degenerative joint disease.  Not wearing left AFO when in bed.  Neurologic: Mild contractures of the left upper extremity.  Contracture of the left thumb reveals enlarged CMC joint.  She cannot make a fist.  Integument: No rashes, clinically significant lesions, or skin breakdown.   Cognitive/Psychiatric: Alert and oriented with euthymic affect.    DIAGNOSTICS:   Recent Results (from the past 240 hour(s))   COVID-19 Virus (Coronavirus) by PCR Nasopharyngeal    Collection Time: 12/19/21 10:00 AM    Specimen: Nasopharyngeal; Swab   Result Value Ref Range    SARS CoV2 PCR Negative Negative       ASSESSMENT/Plan:      ICD-10-CM    1. Cerebrovascular accident (CVA) due to occlusion of right anterior cerebral artery (H)  I63.521    2. Spastic hemiplegia of left nondominant side as late effect of cerebral infarction (H)  I69.354    3. Seizure disorder (H)  G40.909    4. Type 2 diabetes mellitus with other neurologic complication, with long-term current use of insulin (H)  E11.49     Z79.4    5. Essential hypertension  I10    6. Anemia due to vitamin B12 deficiency, unspecified B12 deficiency type  D51.9        CHANGES:    Increase metformin from 1000 mg twice daily to 1250 mg twice daily.    CARE PLAN:    The care plan, medications, vital signs, orders, and nursing notes have been reviewed, and all orders signed. Changes to care plan, if any, as noted. Otherwise, continue current plan of care.    The above has been created using voice recognition software. Please be aware that this may unintentionally  produce  inaccuracies and/or nonsensical sentences.      Electronically signed by: KEILA cMmahon CNP        Sincerely,        KEILA Mcmahon CNP

## 2021-12-24 ENCOUNTER — TRANSITIONAL CARE UNIT VISIT (OUTPATIENT)
Dept: GERIATRICS | Facility: CLINIC | Age: 71
End: 2021-12-24
Payer: MEDICARE

## 2021-12-24 VITALS
WEIGHT: 137 LBS | TEMPERATURE: 97.6 F | DIASTOLIC BLOOD PRESSURE: 78 MMHG | OXYGEN SATURATION: 96 % | SYSTOLIC BLOOD PRESSURE: 152 MMHG | BODY MASS INDEX: 24.27 KG/M2 | HEART RATE: 73 BPM | HEIGHT: 63 IN | RESPIRATION RATE: 18 BRPM

## 2021-12-24 DIAGNOSIS — D51.9 ANEMIA DUE TO VITAMIN B12 DEFICIENCY, UNSPECIFIED B12 DEFICIENCY TYPE: ICD-10-CM

## 2021-12-24 DIAGNOSIS — Z79.4 TYPE 2 DIABETES MELLITUS WITH OTHER NEUROLOGIC COMPLICATION, WITH LONG-TERM CURRENT USE OF INSULIN (H): ICD-10-CM

## 2021-12-24 DIAGNOSIS — G40.909 SEIZURE DISORDER (H): ICD-10-CM

## 2021-12-24 DIAGNOSIS — I69.354 SPASTIC HEMIPLEGIA OF LEFT NONDOMINANT SIDE AS LATE EFFECT OF CEREBRAL INFARCTION (H): ICD-10-CM

## 2021-12-24 DIAGNOSIS — E11.49 TYPE 2 DIABETES MELLITUS WITH OTHER NEUROLOGIC COMPLICATION, WITH LONG-TERM CURRENT USE OF INSULIN (H): ICD-10-CM

## 2021-12-24 DIAGNOSIS — I10 ESSENTIAL HYPERTENSION: ICD-10-CM

## 2021-12-24 DIAGNOSIS — I63.521 CEREBROVASCULAR ACCIDENT (CVA) DUE TO OCCLUSION OF RIGHT ANTERIOR CEREBRAL ARTERY (H): Primary | ICD-10-CM

## 2021-12-24 PROCEDURE — 99309 SBSQ NF CARE MODERATE MDM 30: CPT | Performed by: NURSE PRACTITIONER

## 2021-12-24 ASSESSMENT — MIFFLIN-ST. JEOR: SCORE: 1110.56

## 2021-12-24 NOTE — LETTER
2021        RE: Addis Peña  [address]  [facility] [address]        [facility]    Name:   Addis Peña  :   1950  MRN:    [id number]     Facility:   [facility]) [[id number]]   Room: [place] [address]  Code Status: DNR and POLST AVAILABLE -     DOS:  2021  Previous visit:  2021    PCP:  Joel Daniel Wegener, MD     CHIEF COMPLAINT / REASON FOR VISIT:  Chief Complaint   Patient presents with     Clinic Care Coordination - Follow-up     Cerebrovascular accident      [facility] from 2021 until 2021 (recurrent CVA)      HPI: Addis is a 71 year old female with a past medical history significant for stroke with residual left spastic hemiplegia who stated she went to bed at approximately 8 PM the night before and woke up at 4:30 AM with increased left-sided weakness.  She stated that she felt well when she had gone to bed.  Typically, she uses a wheelchair for ambulation but can get up on her own and has enough strength in the left arm to get dressed and perform ADLs.  She stated that she could do neither that morning.  She lives with a roommate who helped her and called 911.  She otherwise had no change in her speech or swallowing ability.  She denied any other new focal neurological deficits.  She takes a daily baby aspirin and has been compliant.    Ischemic stroke: Seen as an extended stroke in the ED has a last known normal 8 PM the night prior.  Discussed with neurology.  Admitted and monitored on telemetry.  To continue  mg daily for 3 weeks and add Plavix (continuing Plavix alone after 3 weeks).  Vascular consulted given her left ICA stenosis.  They would like to follow-up with her in clinic in 6 to 8 weeks.  PT saw patient and recommended TCU upon discharge.  She would need an outpatient event monitor.    Seizure disorder: Phenytoin level subtherapeutic and levetiracetam level supratherapeutic.   Phenytoin discontinued, and levetiracetam decreased to 1000 mg twice daily.  Level was to be repeated on 12/15 before Keppra dose.    Hypertension: Initially allowed for permissive hypertension.  Amlodipine and metoprolol were resumed while inpatient.  Also resumed losartan on discharge.    Diabetes mellitus type 2: Metformin was held while inpatient and resumed on discharge.  Glargine 5 units at bedtime was added.  She stated she was unaware of this.    PROCEDURES/SIGNIFICANT IMAGING AND TESTING  CTA head and neck with IV contrast; CT brain perfusion (12/11/2021):  Head CT: No acute intracranial hemorrhage.  No CT evidence of acute infarct (aspect score 10).  Right SOCO territory large chronic infarct.  Small chronic infarcts.  Age-related changes.  Head CTA: No arterial large vessel occlusion.  Multiple stenoses and occlusions.  Neck CTA: Right carotid bifurcation moderate stenosis.  Right proximal carotid bulb 50% stenosis.  Right proximal cervical ICA 50% stenosis.  Left distal common carotid artery moderate stenosis.  Left carotid bifurcation moderate to severe stenosis.  Left proximal external carotid artery severe stenosis.  Left carotid bulb and proximal cervical ICA demonstrates 70 to 90% stenosis.  Left distal cervical ICA 50 to 70% stenosis.  No dissection.  Right vertebral artery origin severe stenosis.  Left distal V1 segment moderate stenosis.  CT perfusion: No acute perfusion abnormality.    MR brain without IV contrast (12/11/2021):  Small area of acute or subacute ischemia involving the posterior limb of the left internal capsule.  Foci of diffusion and FLAIR hyperintense signal in the right and left mid cerebral peduncles without definite diffusion restriction are nonspecific and may reflect sequela of chronic infarcts, alternatively demyelinating disease.  Multiple chronic infarcts as described previously.      CURRENT/RECENT TCU ISSUES    Disposition: She is worried about the possibility of  another stroke.  Today so far cannot promise she won't have another, but she describes a discussion about possible of endarterectomy procedure to bilateral carotids.  In physical therapy, she is working on small steps, balance, and flexibility.    Diabetes mellitus type 2: She was not on insulin at home.  She is on sliding scale here, receiving 2 to 4 units.  I suggested we could try increasing her Metformin from 1000 mg twice daily to 1250 mg twice daily in an effort to discharge her without the need for insulin.  According to the patient, her last A1c was 6.4.  Blood glucose levels are looking better.  In fact, they run between 95 and 215.    Hypertension: Blood pressures are fluctuating, typically in the 130s systolic, but the occasionally reach to 140s (with one up to 164).  Diastolics are typically in the 70s.  She is receiving metoprolol succinate 50 mg daily, amlodipine 10 mg daily, and losartan 25 mg daily.  We'll continue to monitor.    Spastic hemiplegia: She has occasional spasms in her left arm.  Baclofen is working well at current dose.    ROS: No headaches or chest pains, coughing or congestion, nausea or vomiting, dizziness or dyspnea, dysuria, constipation or diarrhea, difficulty chewing or swallowing, integumentary issues, or problems with appetite or sleep.      Past Medical History:   Diagnosis Date     Allergic rhinitis      Allergic rhinitis      CKD (chronic kidney disease)      Depression      Depression      Displaced dome fracture of left acetabulum (H)      DM2 (diabetes mellitus, type 2) (H)      GERD (gastroesophageal reflux disease)      GERD (gastroesophageal reflux disease)      Gout      HTN (hypertension)      Hyperlipidaemia LDL goal <100      Hypertension goal BP (blood pressure) < 140/90      Left hemiplegia (H) 1/2010    sees PMR physician     Left hemiplegia (H)      Migraine      Migraine      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     Mild  nonproliferative diabetic retinopathy of both eyes (H)      Nephrolithiasis      Nephrolithiasis      Seizure disorder (H)      Spastic hemiplegia of left nondominant side as late effect of cerebral infarction (H) 12/20/2021     Stroke (H) 1/2010    due to uncontrolled hypertension     Stroke (H)      Type 2 diabetes, HbA1C goal < 8% (H)      Vitamin B12 deficiency anemia               Family History   Problem Relation Age of Onset     Hypertension Mother      Heart Disease Mother      C.A.D. Father      Coronary Artery Disease Father      Diabetes Sister      Hypertension Brother      Cancer Sister      Diabetes Sister      Hypertension Brother      Cancer Sister      Social History     Socioeconomic History     Marital status:      Spouse name: None     Number of children: None     Years of education: None     Highest education level: None   Occupational History     None   Tobacco Use     Smoking status: Never Smoker     Smokeless tobacco: Never Used   Vaping Use     Vaping Use: Never used   Substance and Sexual Activity     Alcohol use: No     Drug use: No     Sexual activity: Not Currently     Partners: Male   Other Topics Concern     Parent/sibling w/ CABG, MI or angioplasty before 65F 55M? No   Social History Narrative     None     Social Determinants of Health     Financial Resource Strain: Not on file   Food Insecurity: Not on file   Transportation Needs: Not on file   Physical Activity: Not on file   Stress: Not on file   Social Connections: Not on file   Intimate Partner Violence: Not on file   Housing Stability: Not on file       MEDICATIONS: Reviewed from the MAR, physician orders, and/or earlier progress notes.  Post Discharge Medication Reconciliation Status: medication reconcilation previously completed during another office visit.    Current Outpatient Medications   Medication Sig     acetaminophen (TYLENOL) 500 MG tablet Take 500-1,000 mg by mouth every 6 hours as needed      amLODIPine  "(NORVASC) 10 MG tablet Take 1 tablet (10 mg) by mouth daily     aspirin (ASA) 81 MG EC tablet Take 1 tablet (81 mg) by mouth daily     atorvastatin (LIPITOR) 40 MG tablet Take 1 tablet (40 mg) by mouth daily     baclofen (LIORESAL) 10 MG tablet TAKE 1 TABLET(10 MG) BY MOUTH THREE TIMES DAILY AS NEEDED FOR MUSCLE SPASMS     bisacodyl (DULCOLAX) 5 MG EC tablet Take 2 tablets (10 mg) by mouth daily as needed for constipation     blood glucose (IRENE CONTOUR) test strip 1 strip by In Vitro route daily Use to test blood sugars as needed     clopidogrel (PLAVIX) 75 MG tablet Take 75 mg by mouth daily     insulin glargine (LANTUS VIAL) 100 UNIT/ML vial Inject 5 Units Subcutaneous At Bedtime     Lactase 4500 units TABS Take 13,500 Units by mouth daily as needed     levETIRAcetam (KEPPRA) 500 MG tablet Take 1,000 mg by mouth 2 times daily Take 1250 mg twice daily     losartan (COZAAR) 25 MG tablet Take 1 tablet (25 mg) by mouth daily     metFORMIN (GLUCOPHAGE) 1000 MG tablet TAKE 1 TABLET(1000 MG) BY MOUTH TWICE DAILY WITH MEALS (Patient taking differently: Take 1,250 mg by mouth TAKE 1 TABLET(1250 MG) BY MOUTH TWICE DAILY WITH MEALS)     metoprolol succinate ER (TOPROL-XL) 50 MG 24 hr tablet Take 1 tablet (50 mg) by mouth daily     montelukast (SINGULAIR) 10 MG tablet Take 1 tablet (10 mg) by mouth daily     senna (SENOKOT) 8.6 MG tablet Take 1 tablet by mouth daily     Vitamin D3 (VITAMIN D3) 25 mcg (1000 units) tablet Take 1 tablet (25 mcg) by mouth daily     Current Facility-Administered Medications   Medication     vitamin B-12 (CYANOCOBALAMIN) injection 1,000 mcg     ALLERGIES:   Allergies   Allergen Reactions     Lac Bovis      Lisinopril Cough     Milk Products GI Disturbance     Latex Rash     DIET: Diabetic, regular texture, thin liquids.    Vitals:    12/24/21 1039   BP: (!) 152/78   Pulse: 73   Resp: 18   Temp: 97.6  F (36.4  C)   SpO2: 96%   Weight: 62.1 kg (137 lb)   Height: 1.6 m (5' 3\")     Body mass index is " 24.27 kg/m .    EXAMINATION:   General: Pleasant, alert, and conversant middle-aged female, lying in bed, in no apparent distress.  Mildly dysarthric.  Head: Normocephalic and atraumatic.   Eyes: PERRLA, sclerae clear.   ENT: Moist oral mucosa.  She has her own teeth in excellent repair.  Gingiva not swollen from phenytoin.  No rhinorrhea or nasal discharge.  Hearing is unimpaired.  Cardiovascular: Regular rate and rhythm.   Respiratory: Lungs clear to auscultation bilaterally.   Abdomen: Nondistended.   Musculoskeletal/Extremities: Age-related degenerative joint disease.  Not wearing left AFO when in bed.  Neurologic: Mild contractures of the left upper extremity.  Contracture of the left thumb reveals enlarged CMC joint.  She cannot make a fist.  Integument: No rashes, clinically significant lesions, or skin breakdown.   Cognitive/Psychiatric: Alert and oriented with euthymic affect.    DIAGNOSTICS:   Recent Results (from the past 240 hour(s))   COVID-19 Virus (Coronavirus) by PCR Nasopharyngeal    Collection Time: 12/19/21 10:00 AM    Specimen: Nasopharyngeal; Swab   Result Value Ref Range    SARS CoV2 PCR Negative Negative       ASSESSMENT/Plan:      ICD-10-CM    1. Cerebrovascular accident (CVA) due to occlusion of right anterior cerebral artery (H)  I63.521    2. Spastic hemiplegia of left nondominant side as late effect of cerebral infarction (H)  I69.354    3. Type 2 diabetes mellitus with other neurologic complication, with long-term current use of insulin (H)  E11.49     Z79.4    4. Essential hypertension  I10    5. Seizure disorder (H)  G40.909    6. Anemia due to vitamin B12 deficiency, unspecified B12 deficiency type  D51.9        CHANGES:    None.    CARE PLAN:    The care plan, medications, vital signs, orders, and nursing notes have been reviewed, and all orders signed. Changes to care plan, if any, as noted. Otherwise, continue current plan of care.    The above has been created using voice  recognition software. Please be aware that this may unintentionally  produce inaccuracies and/or nonsensical sentences.      Electronically signed by: KEILA Mcmahon CNP        Sincerely,        KEILA Mcmahon CNP

## 2021-12-26 ENCOUNTER — LAB REQUISITION (OUTPATIENT)
Dept: LAB | Facility: CLINIC | Age: 71
End: 2021-12-26

## 2021-12-26 DIAGNOSIS — Z20.822 CONTACT WITH AND (SUSPECTED) EXPOSURE TO COVID-19: ICD-10-CM

## 2021-12-26 LAB — SARS-COV-2 RNA RESP QL NAA+PROBE: NEGATIVE

## 2021-12-26 PROCEDURE — U0005 INFEC AGEN DETEC AMPLI PROBE: HCPCS | Performed by: INTERNAL MEDICINE

## 2021-12-26 NOTE — PROGRESS NOTES
[facility]    Name:   Addis Peña  :   1950  MRN:    [id number]     Facility:   [facility] ([facility]) [[id number]]   Room: [place] [address]  Code Status: DNR and POLST AVAILABLE -     DOS:  2021  Previous visit:  2021    PCP:  Joel Daniel Wegener, MD     CHIEF COMPLAINT / REASON FOR VISIT:  Chief Complaint   Patient presents with     Clinic Care Coordination - Follow-up     Cerebrovascular accident      [facility] from 2021 until 2021 (recurrent CVA)      HPI: Addis is a 71 year old female with a past medical history significant for stroke with residual left spastic hemiplegia who stated she went to bed at approximately 8 PM the night before and woke up at 4:30 AM with increased left-sided weakness.  She stated that she felt well when she had gone to bed.  Typically, she uses a wheelchair for ambulation but can get up on her own and has enough strength in the left arm to get dressed and perform ADLs.  She stated that she could do neither that morning.  She lives with a roommate who helped her and called 911.  She otherwise had no change in her speech or swallowing ability.  She denied any other new focal neurological deficits.  She takes a daily baby aspirin and has been compliant.    Ischemic stroke: Seen as an extended stroke in the ED has a last known normal 8 PM the night prior.  Discussed with neurology.  Admitted and monitored on telemetry.  To continue  mg daily for 3 weeks and add Plavix (continuing Plavix alone after 3 weeks).  Vascular consulted given her left ICA stenosis.  They would like to follow-up with her in clinic in 6 to 8 weeks.  PT saw patient and recommended TCU upon discharge.  She would need an outpatient event monitor.    Seizure disorder: Phenytoin level subtherapeutic and levetiracetam level supratherapeutic.  Phenytoin discontinued, and levetiracetam decreased to 1000 mg twice daily.  Level was to be repeated on  12/15 before Keppra dose.    Hypertension: Initially allowed for permissive hypertension.  Amlodipine and metoprolol were resumed while inpatient.  Also resumed losartan on discharge.    Diabetes mellitus type 2: Metformin was held while inpatient and resumed on discharge.  Glargine 5 units at bedtime was added.  She stated she was unaware of this.    PROCEDURES/SIGNIFICANT IMAGING AND TESTING  CTA head and neck with IV contrast; CT brain perfusion (12/11/2021):  Head CT: No acute intracranial hemorrhage.  No CT evidence of acute infarct (aspect score 10).  Right SOCO territory large chronic infarct.  Small chronic infarcts.  Age-related changes.  Head CTA: No arterial large vessel occlusion.  Multiple stenoses and occlusions.  Neck CTA: Right carotid bifurcation moderate stenosis.  Right proximal carotid bulb 50% stenosis.  Right proximal cervical ICA 50% stenosis.  Left distal common carotid artery moderate stenosis.  Left carotid bifurcation moderate to severe stenosis.  Left proximal external carotid artery severe stenosis.  Left carotid bulb and proximal cervical ICA demonstrates 70 to 90% stenosis.  Left distal cervical ICA 50 to 70% stenosis.  No dissection.  Right vertebral artery origin severe stenosis.  Left distal V1 segment moderate stenosis.  CT perfusion: No acute perfusion abnormality.    MR brain without IV contrast (12/11/2021):  Small area of acute or subacute ischemia involving the posterior limb of the left internal capsule.  Foci of diffusion and FLAIR hyperintense signal in the right and left mid cerebral peduncles without definite diffusion restriction are nonspecific and may reflect sequela of chronic infarcts, alternatively demyelinating disease.  Multiple chronic infarcts as described previously.      CURRENT/RECENT TCU ISSUES    Disposition: She is not very happy this morning.  She received a sponge bath and silver shower.  In physical therapy, she is working on small steps, balance, and  flexibility.    Diabetes mellitus type 2: She was not on insulin at home.  She is on sliding scale here, receiving 2 to 4 units.  I suggested we could try increasing her Metformin from 1000 mg twice daily to 1250 mg twice daily in an effort to discharge her without the need for insulin.  According to the patient, her last A1c was 6.4.  TODAY, she feels her blood glucose levels are looking better.    Hypertension: Blood pressures are fluctuating, typically in the 130s systolic, but the occasionally reach to 140s.  Diastolics are typically in the 70s.  She is receiving metoprolol succinate 50 mg daily, amlodipine 10 mg daily, and losartan 25 mg daily.  We'll continue to monitor.    Spastic hemiplegia: She has occasional spasms in her left arm.  Baclofen is working well for current dose.    ROS: No headaches or chest pains, coughing or congestion, nausea or vomiting, dizziness or dyspnea, dysuria, constipation or diarrhea, difficulty chewing or swallowing, integumentary issues, or problems with appetite or sleep.      Past Medical History:   Diagnosis Date     Allergic rhinitis      Allergic rhinitis      CKD (chronic kidney disease)      Depression      Depression      Displaced dome fracture of left acetabulum (H)      DM2 (diabetes mellitus, type 2) (H)      GERD (gastroesophageal reflux disease)      GERD (gastroesophageal reflux disease)      Gout      HTN (hypertension)      Hyperlipidaemia LDL goal <100      Hypertension goal BP (blood pressure) < 140/90      Left hemiplegia (H) 1/2010    sees PMR physician     Left hemiplegia (H)      Migraine      Migraine      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)      Nephrolithiasis      Nephrolithiasis      Seizure disorder (H)      Spastic hemiplegia of left nondominant side as late effect of cerebral infarction (H) 12/20/2021     Stroke (H) 1/2010    due to uncontrolled  hypertension     Stroke (H)      Type 2 diabetes, HbA1C goal < 8% (H)      Vitamin B12 deficiency anemia               Family History   Problem Relation Age of Onset     Hypertension Mother      Heart Disease Mother      C.A.D. Father      Coronary Artery Disease Father      Diabetes Sister      Hypertension Brother      Cancer Sister      Diabetes Sister      Hypertension Brother      Cancer Sister      Social History     Socioeconomic History     Marital status:      Spouse name: None     Number of children: None     Years of education: None     Highest education level: None   Occupational History     None   Tobacco Use     Smoking status: Never Smoker     Smokeless tobacco: Never Used   Vaping Use     Vaping Use: Never used   Substance and Sexual Activity     Alcohol use: No     Drug use: No     Sexual activity: Not Currently     Partners: Male   Other Topics Concern     Parent/sibling w/ CABG, MI or angioplasty before 65F 55M? No   Social History Narrative     None     Social Determinants of Health     Financial Resource Strain: Not on file   Food Insecurity: Not on file   Transportation Needs: Not on file   Physical Activity: Not on file   Stress: Not on file   Social Connections: Not on file   Intimate Partner Violence: Not on file   Housing Stability: Not on file       MEDICATIONS: Reviewed from the MAR, physician orders, and/or earlier progress notes.  Post Discharge Medication Reconciliation Status: medication reconcilation previously completed during another office visit.    Current Outpatient Medications   Medication Sig     acetaminophen (TYLENOL) 500 MG tablet Take 500-1,000 mg by mouth every 6 hours as needed      amLODIPine (NORVASC) 10 MG tablet Take 1 tablet (10 mg) by mouth daily     aspirin (ASA) 81 MG EC tablet Take 1 tablet (81 mg) by mouth daily     atorvastatin (LIPITOR) 40 MG tablet Take 1 tablet (40 mg) by mouth daily     baclofen (LIORESAL) 10 MG tablet TAKE 1 TABLET(10 MG) BY MOUTH  "THREE TIMES DAILY AS NEEDED FOR MUSCLE SPASMS     bisacodyl (DULCOLAX) 5 MG EC tablet Take 2 tablets (10 mg) by mouth daily as needed for constipation     blood glucose (IRENE CONTOUR) test strip 1 strip by In Vitro route daily Use to test blood sugars as needed     clopidogrel (PLAVIX) 75 MG tablet Take 75 mg by mouth daily     insulin glargine (LANTUS VIAL) 100 UNIT/ML vial Inject 5 Units Subcutaneous At Bedtime     Lactase 4500 units TABS Take 13,500 Units by mouth daily as needed     levETIRAcetam (KEPPRA) 500 MG tablet Take 1,000 mg by mouth 2 times daily Take 1250 mg twice daily     losartan (COZAAR) 25 MG tablet Take 1 tablet (25 mg) by mouth daily     metFORMIN (GLUCOPHAGE) 1000 MG tablet TAKE 1 TABLET(1000 MG) BY MOUTH TWICE DAILY WITH MEALS (Patient taking differently: Take 1,250 mg by mouth TAKE 1 TABLET(1250 MG) BY MOUTH TWICE DAILY WITH MEALS)     metoprolol succinate ER (TOPROL-XL) 50 MG 24 hr tablet Take 1 tablet (50 mg) by mouth daily     montelukast (SINGULAIR) 10 MG tablet Take 1 tablet (10 mg) by mouth daily     senna (SENOKOT) 8.6 MG tablet Take 1 tablet by mouth daily     Vitamin D3 (VITAMIN D3) 25 mcg (1000 units) tablet Take 1 tablet (25 mcg) by mouth daily     Current Facility-Administered Medications   Medication     vitamin B-12 (CYANOCOBALAMIN) injection 1,000 mcg     ALLERGIES:   Allergies   Allergen Reactions     Lac Bovis      Lisinopril Cough     Milk Products GI Disturbance     Latex Rash     DIET: Diabetic, regular texture, thin liquids.    Vitals:    12/21/21 1219   BP: 132/76   Pulse: 79   Resp: 16   Temp: 98.6  F (37  C)   SpO2: 95%   Weight: 61.7 kg (136 lb)   Height: 1.6 m (5' 3\")     Body mass index is 24.09 kg/m .    EXAMINATION:   General: Pleasant, alert, and conversant middle-aged female, lying in bed, in no apparent distress.  Mildly dysarthric, although her speech seems to be clearer today.  Head: Normocephalic and atraumatic.   Eyes: PERRLA, sclerae clear.   ENT: Moist " oral mucosa.  She has her own teeth in excellent repair.  Gingiva not swollen from phenytoin.  No rhinorrhea or nasal discharge.  Hearing is unimpaired.  Cardiovascular: Regular rate and rhythm.   Respiratory: Lungs clear to auscultation bilaterally.   Abdomen: Nondistended.   Musculoskeletal/Extremities: Age-related degenerative joint disease.  Not wearing left AFO when in bed.  Neurologic: Mild contractures of the left upper extremity.  Contracture of the left thumb reveals enlarged CMC joint.  She cannot make a fist.  Integument: No rashes, clinically significant lesions, or skin breakdown.   Cognitive/Psychiatric: Alert and oriented with euthymic affect.    DIAGNOSTICS:   Recent Results (from the past 240 hour(s))   COVID-19 Virus (Coronavirus) by PCR Nasopharyngeal    Collection Time: 12/19/21 10:00 AM    Specimen: Nasopharyngeal; Swab   Result Value Ref Range    SARS CoV2 PCR Negative Negative       ASSESSMENT/Plan:      ICD-10-CM    1. Cerebrovascular accident (CVA) due to occlusion of right anterior cerebral artery (H)  I63.521    2. Spastic hemiplegia of left nondominant side as late effect of cerebral infarction (H)  I69.354    3. Seizure disorder (H)  G40.909    4. Type 2 diabetes mellitus with other neurologic complication, with long-term current use of insulin (H)  E11.49     Z79.4    5. Essential hypertension  I10    6. Anemia due to vitamin B12 deficiency, unspecified B12 deficiency type  D51.9        CHANGES:    Increase metformin from 1000 mg twice daily to 1250 mg twice daily.    CARE PLAN:    The care plan, medications, vital signs, orders, and nursing notes have been reviewed, and all orders signed. Changes to care plan, if any, as noted. Otherwise, continue current plan of care.    The above has been created using voice recognition software. Please be aware that this may unintentionally  produce inaccuracies and/or nonsensical sentences.      Electronically signed by: KEILA Mcmahon  CNP

## 2021-12-26 NOTE — PROGRESS NOTES
[facility]    Name:   Addis Peña  :   1950  MRN:    [id number]     Facility:   [facility] ([facility]) [[id number]]   Room: [place] [address]  Code Status: DNR and POLST AVAILABLE -     DOS:  2021  Previous visit:  2021    PCP:  Joel Daniel Wegener, MD     CHIEF COMPLAINT / REASON FOR VISIT:  Chief Complaint   Patient presents with     Clinic Care Coordination - Follow-up     Cerebrovascular accident      [facility] from 2021 until 2021 (recurrent CVA)      HPI: Addis is a 71 year old female with a past medical history significant for stroke with residual left spastic hemiplegia who stated she went to bed at approximately 8 PM the night before and woke up at 4:30 AM with increased left-sided weakness.  She stated that she felt well when she had gone to bed.  Typically, she uses a wheelchair for ambulation but can get up on her own and has enough strength in the left arm to get dressed and perform ADLs.  She stated that she could do neither that morning.  She lives with a roommate who helped her and called 911.  She otherwise had no change in her speech or swallowing ability.  She denied any other new focal neurological deficits.  She takes a daily baby aspirin and has been compliant.    Ischemic stroke: Seen as an extended stroke in the ED has a last known normal 8 PM the night prior.  Discussed with neurology.  Admitted and monitored on telemetry.  To continue  mg daily for 3 weeks and add Plavix (continuing Plavix alone after 3 weeks).  Vascular consulted given her left ICA stenosis.  They would like to follow-up with her in clinic in 6 to 8 weeks.  PT saw patient and recommended TCU upon discharge.  She would need an outpatient event monitor.    Seizure disorder: Phenytoin level subtherapeutic and levetiracetam level supratherapeutic.  Phenytoin discontinued, and levetiracetam decreased to 1000 mg twice daily.  Level was to be repeated on  12/15 before Keppra dose.    Hypertension: Initially allowed for permissive hypertension.  Amlodipine and metoprolol were resumed while inpatient.  Also resumed losartan on discharge.    Diabetes mellitus type 2: Metformin was held while inpatient and resumed on discharge.  Glargine 5 units at bedtime was added.  She stated she was unaware of this.    PROCEDURES/SIGNIFICANT IMAGING AND TESTING  CTA head and neck with IV contrast; CT brain perfusion (12/11/2021):  Head CT: No acute intracranial hemorrhage.  No CT evidence of acute infarct (aspect score 10).  Right SOCO territory large chronic infarct.  Small chronic infarcts.  Age-related changes.  Head CTA: No arterial large vessel occlusion.  Multiple stenoses and occlusions.  Neck CTA: Right carotid bifurcation moderate stenosis.  Right proximal carotid bulb 50% stenosis.  Right proximal cervical ICA 50% stenosis.  Left distal common carotid artery moderate stenosis.  Left carotid bifurcation moderate to severe stenosis.  Left proximal external carotid artery severe stenosis.  Left carotid bulb and proximal cervical ICA demonstrates 70 to 90% stenosis.  Left distal cervical ICA 50 to 70% stenosis.  No dissection.  Right vertebral artery origin severe stenosis.  Left distal V1 segment moderate stenosis.  CT perfusion: No acute perfusion abnormality.    MR brain without IV contrast (12/11/2021):  Small area of acute or subacute ischemia involving the posterior limb of the left internal capsule.  Foci of diffusion and FLAIR hyperintense signal in the right and left mid cerebral peduncles without definite diffusion restriction are nonspecific and may reflect sequela of chronic infarcts, alternatively demyelinating disease.  Multiple chronic infarcts as described previously.      CURRENT/RECENT TCU ISSUES    Disposition: She is worried about the possibility of another stroke.  Today so far cannot promise she won't have another, but she describes a discussion about  possible of endarterectomy procedure to bilateral carotids.  In physical therapy, she is working on small steps, balance, and flexibility.    Diabetes mellitus type 2: She was not on insulin at home.  She is on sliding scale here, receiving 2 to 4 units.  I suggested we could try increasing her Metformin from 1000 mg twice daily to 1250 mg twice daily in an effort to discharge her without the need for insulin.  According to the patient, her last A1c was 6.4.  Blood glucose levels are looking better.  In fact, they run between 95 and 215.    Hypertension: Blood pressures are fluctuating, typically in the 130s systolic, but the occasionally reach to 140s (with one up to 164).  Diastolics are typically in the 70s.  She is receiving metoprolol succinate 50 mg daily, amlodipine 10 mg daily, and losartan 25 mg daily.  We'll continue to monitor.    Spastic hemiplegia: She has occasional spasms in her left arm.  Baclofen is working well at current dose.    ROS: No headaches or chest pains, coughing or congestion, nausea or vomiting, dizziness or dyspnea, dysuria, constipation or diarrhea, difficulty chewing or swallowing, integumentary issues, or problems with appetite or sleep.      Past Medical History:   Diagnosis Date     Allergic rhinitis      Allergic rhinitis      CKD (chronic kidney disease)      Depression      Depression      Displaced dome fracture of left acetabulum (H)      DM2 (diabetes mellitus, type 2) (H)      GERD (gastroesophageal reflux disease)      GERD (gastroesophageal reflux disease)      Gout      HTN (hypertension)      Hyperlipidaemia LDL goal <100      Hypertension goal BP (blood pressure) < 140/90      Left hemiplegia (H) 1/2010    sees PMR physician     Left hemiplegia (H)      Migraine      Migraine      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)      Nephrolithiasis      Nephrolithiasis      Seizure  disorder (H)      Spastic hemiplegia of left nondominant side as late effect of cerebral infarction (H) 12/20/2021     Stroke (H) 1/2010    due to uncontrolled hypertension     Stroke (H)      Type 2 diabetes, HbA1C goal < 8% (H)      Vitamin B12 deficiency anemia               Family History   Problem Relation Age of Onset     Hypertension Mother      Heart Disease Mother      C.A.D. Father      Coronary Artery Disease Father      Diabetes Sister      Hypertension Brother      Cancer Sister      Diabetes Sister      Hypertension Brother      Cancer Sister      Social History     Socioeconomic History     Marital status:      Spouse name: None     Number of children: None     Years of education: None     Highest education level: None   Occupational History     None   Tobacco Use     Smoking status: Never Smoker     Smokeless tobacco: Never Used   Vaping Use     Vaping Use: Never used   Substance and Sexual Activity     Alcohol use: No     Drug use: No     Sexual activity: Not Currently     Partners: Male   Other Topics Concern     Parent/sibling w/ CABG, MI or angioplasty before 65F 55M? No   Social History Narrative     None     Social Determinants of Health     Financial Resource Strain: Not on file   Food Insecurity: Not on file   Transportation Needs: Not on file   Physical Activity: Not on file   Stress: Not on file   Social Connections: Not on file   Intimate Partner Violence: Not on file   Housing Stability: Not on file       MEDICATIONS: Reviewed from the MAR, physician orders, and/or earlier progress notes.  Post Discharge Medication Reconciliation Status: medication reconcilation previously completed during another office visit.    Current Outpatient Medications   Medication Sig     acetaminophen (TYLENOL) 500 MG tablet Take 500-1,000 mg by mouth every 6 hours as needed      amLODIPine (NORVASC) 10 MG tablet Take 1 tablet (10 mg) by mouth daily     aspirin (ASA) 81 MG EC tablet Take 1 tablet (81  "mg) by mouth daily     atorvastatin (LIPITOR) 40 MG tablet Take 1 tablet (40 mg) by mouth daily     baclofen (LIORESAL) 10 MG tablet TAKE 1 TABLET(10 MG) BY MOUTH THREE TIMES DAILY AS NEEDED FOR MUSCLE SPASMS     bisacodyl (DULCOLAX) 5 MG EC tablet Take 2 tablets (10 mg) by mouth daily as needed for constipation     blood glucose (IRENE CONTOUR) test strip 1 strip by In Vitro route daily Use to test blood sugars as needed     clopidogrel (PLAVIX) 75 MG tablet Take 75 mg by mouth daily     insulin glargine (LANTUS VIAL) 100 UNIT/ML vial Inject 5 Units Subcutaneous At Bedtime     Lactase 4500 units TABS Take 13,500 Units by mouth daily as needed     levETIRAcetam (KEPPRA) 500 MG tablet Take 1,000 mg by mouth 2 times daily Take 1250 mg twice daily     losartan (COZAAR) 25 MG tablet Take 1 tablet (25 mg) by mouth daily     metFORMIN (GLUCOPHAGE) 1000 MG tablet TAKE 1 TABLET(1000 MG) BY MOUTH TWICE DAILY WITH MEALS (Patient taking differently: Take 1,250 mg by mouth TAKE 1 TABLET(1250 MG) BY MOUTH TWICE DAILY WITH MEALS)     metoprolol succinate ER (TOPROL-XL) 50 MG 24 hr tablet Take 1 tablet (50 mg) by mouth daily     montelukast (SINGULAIR) 10 MG tablet Take 1 tablet (10 mg) by mouth daily     senna (SENOKOT) 8.6 MG tablet Take 1 tablet by mouth daily     Vitamin D3 (VITAMIN D3) 25 mcg (1000 units) tablet Take 1 tablet (25 mcg) by mouth daily     Current Facility-Administered Medications   Medication     vitamin B-12 (CYANOCOBALAMIN) injection 1,000 mcg     ALLERGIES:   Allergies   Allergen Reactions     Lac Bovis      Lisinopril Cough     Milk Products GI Disturbance     Latex Rash     DIET: Diabetic, regular texture, thin liquids.    Vitals:    12/24/21 1039   BP: (!) 152/78   Pulse: 73   Resp: 18   Temp: 97.6  F (36.4  C)   SpO2: 96%   Weight: 62.1 kg (137 lb)   Height: 1.6 m (5' 3\")     Body mass index is 24.27 kg/m .    EXAMINATION:   General: Pleasant, alert, and conversant middle-aged female, lying in bed, in no " apparent distress.  Mildly dysarthric.  Head: Normocephalic and atraumatic.   Eyes: PERRLA, sclerae clear.   ENT: Moist oral mucosa.  She has her own teeth in excellent repair.  Gingiva not swollen from phenytoin.  No rhinorrhea or nasal discharge.  Hearing is unimpaired.  Cardiovascular: Regular rate and rhythm.   Respiratory: Lungs clear to auscultation bilaterally.   Abdomen: Nondistended.   Musculoskeletal/Extremities: Age-related degenerative joint disease.  Not wearing left AFO when in bed.  Neurologic: Mild contractures of the left upper extremity.  Contracture of the left thumb reveals enlarged CMC joint.  She cannot make a fist.  Integument: No rashes, clinically significant lesions, or skin breakdown.   Cognitive/Psychiatric: Alert and oriented with euthymic affect.    DIAGNOSTICS:   Recent Results (from the past 240 hour(s))   COVID-19 Virus (Coronavirus) by PCR Nasopharyngeal    Collection Time: 12/19/21 10:00 AM    Specimen: Nasopharyngeal; Swab   Result Value Ref Range    SARS CoV2 PCR Negative Negative       ASSESSMENT/Plan:      ICD-10-CM    1. Cerebrovascular accident (CVA) due to occlusion of right anterior cerebral artery (H)  I63.521    2. Spastic hemiplegia of left nondominant side as late effect of cerebral infarction (H)  I69.354    3. Type 2 diabetes mellitus with other neurologic complication, with long-term current use of insulin (H)  E11.49     Z79.4    4. Essential hypertension  I10    5. Seizure disorder (H)  G40.909    6. Anemia due to vitamin B12 deficiency, unspecified B12 deficiency type  D51.9        CHANGES:    None.    CARE PLAN:    The care plan, medications, vital signs, orders, and nursing notes have been reviewed, and all orders signed. Changes to care plan, if any, as noted. Otherwise, continue current plan of care.    The above has been created using voice recognition software. Please be aware that this may unintentionally  produce inaccuracies and/or nonsensical  sentences.      Electronically signed by: KEILA Mcmahon CNP

## 2021-12-28 ENCOUNTER — TRANSITIONAL CARE UNIT VISIT (OUTPATIENT)
Dept: GERIATRICS | Facility: CLINIC | Age: 71
End: 2021-12-28
Payer: MEDICARE

## 2021-12-28 VITALS
OXYGEN SATURATION: 95 % | SYSTOLIC BLOOD PRESSURE: 166 MMHG | BODY MASS INDEX: 24.45 KG/M2 | DIASTOLIC BLOOD PRESSURE: 81 MMHG | WEIGHT: 138 LBS | HEIGHT: 63 IN | HEART RATE: 90 BPM | RESPIRATION RATE: 18 BRPM | TEMPERATURE: 97.1 F

## 2021-12-28 DIAGNOSIS — D51.9 ANEMIA DUE TO VITAMIN B12 DEFICIENCY, UNSPECIFIED B12 DEFICIENCY TYPE: ICD-10-CM

## 2021-12-28 DIAGNOSIS — I69.354 SPASTIC HEMIPLEGIA OF LEFT NONDOMINANT SIDE AS LATE EFFECT OF CEREBRAL INFARCTION (H): ICD-10-CM

## 2021-12-28 DIAGNOSIS — I63.521 CEREBROVASCULAR ACCIDENT (CVA) DUE TO OCCLUSION OF RIGHT ANTERIOR CEREBRAL ARTERY (H): Primary | ICD-10-CM

## 2021-12-28 DIAGNOSIS — I10 ESSENTIAL HYPERTENSION: ICD-10-CM

## 2021-12-28 DIAGNOSIS — G40.909 SEIZURE DISORDER (H): ICD-10-CM

## 2021-12-28 DIAGNOSIS — E11.49 TYPE 2 DIABETES MELLITUS WITH OTHER NEUROLOGIC COMPLICATION, WITH LONG-TERM CURRENT USE OF INSULIN (H): ICD-10-CM

## 2021-12-28 DIAGNOSIS — Z79.4 TYPE 2 DIABETES MELLITUS WITH OTHER NEUROLOGIC COMPLICATION, WITH LONG-TERM CURRENT USE OF INSULIN (H): ICD-10-CM

## 2021-12-28 PROCEDURE — 99309 SBSQ NF CARE MODERATE MDM 30: CPT | Performed by: NURSE PRACTITIONER

## 2021-12-28 ASSESSMENT — MIFFLIN-ST. JEOR: SCORE: 1115.09

## 2021-12-28 NOTE — LETTER
2021        RE: Addis Peña  [address]  [facility] [address]        [facility]    Name:   Addis Peña  :   1950  MRN:    [id number]     Facility:   [facility]) [[id number]]   Room: [place] [address]  Code Status: DNR and POLST AVAILABLE -     DOS:  2021  Previous visit:  2021    PCP:  Joel Daniel Wegener, MD     CHIEF COMPLAINT / REASON FOR VISIT:  Chief Complaint   Patient presents with     Clinic Care Coordination - Follow-up     Cerebrovascular accident      [facility] from 2021 until 2021 (recurrent CVA)      HPI: Addis is a 71 year old female with a past medical history significant for stroke with residual left spastic hemiplegia who stated she went to bed at approximately 8 PM the night before and woke up at 4:30 AM with increased left-sided weakness.  She stated that she felt well when she had gone to bed.  Typically, she uses a wheelchair for ambulation but can get up on her own and has enough strength in the left arm to get dressed and perform ADLs.  She stated that she could do neither that morning.  She lives with a roommate who helped her and called 911.  She otherwise had no change in her speech or swallowing ability.  She denied any other new focal neurological deficits.  She takes a daily baby aspirin and has been compliant.    Ischemic stroke: Seen as an extended stroke in the ED has a last known normal 8 PM the night prior.  Discussed with neurology.  Admitted and monitored on telemetry.  To continue  mg daily for 3 weeks and add Plavix (continuing Plavix alone after 3 weeks).  Vascular consulted given her left ICA stenosis.  They would like to follow-up with her in clinic in 6 to 8 weeks.  PT saw patient and recommended TCU upon discharge.  She would need an outpatient event monitor.    Seizure disorder: Phenytoin level subtherapeutic and levetiracetam level supratherapeutic.   Phenytoin discontinued, and levetiracetam decreased to 1000 mg twice daily.  Level was to be repeated on 12/15 before Keppra dose.    Hypertension: Initially allowed for permissive hypertension.  Amlodipine and metoprolol were resumed while inpatient.  Also resumed losartan on discharge.    Diabetes mellitus type 2: Metformin was held while inpatient and resumed on discharge.  Glargine 5 units at bedtime was added.  She stated she was unaware of this.    PROCEDURES/SIGNIFICANT IMAGING AND TESTING  CTA head and neck with IV contrast; CT brain perfusion (12/11/2021):  Head CT: No acute intracranial hemorrhage.  No CT evidence of acute infarct (aspect score 10).  Right SOCO territory large chronic infarct.  Small chronic infarcts.  Age-related changes.  Head CTA: No arterial large vessel occlusion.  Multiple stenoses and occlusions.  Neck CTA: Right carotid bifurcation moderate stenosis.  Right proximal carotid bulb 50% stenosis.  Right proximal cervical ICA 50% stenosis.  Left distal common carotid artery moderate stenosis.  Left carotid bifurcation moderate to severe stenosis.  Left proximal external carotid artery severe stenosis.  Left carotid bulb and proximal cervical ICA demonstrates 70 to 90% stenosis.  Left distal cervical ICA 50 to 70% stenosis.  No dissection.  Right vertebral artery origin severe stenosis.  Left distal V1 segment moderate stenosis.  CT perfusion: No acute perfusion abnormality.    MR brain without IV contrast (12/11/2021):  Small area of acute or subacute ischemia involving the posterior limb of the left internal capsule.  Foci of diffusion and FLAIR hyperintense signal in the right and left mid cerebral peduncles without definite diffusion restriction are nonspecific and may reflect sequela of chronic infarcts, alternatively demyelinating disease.  Multiple chronic infarcts as described previously.      CURRENT/RECENT TCU ISSUES    Disposition: She feels she is doing better.  Nonetheless,  she is worried about the possibility of another stroke.  Her neurologist cannot promise she won't have another, but she describes a discussion about possible of endarterectomy procedure to bilateral carotids.  Her next appointment is in February.  In physical therapy, she is working on small steps, balance, and flexibility.    Diabetes mellitus type 2: She was not on insulin at home.  She is on sliding scale here, receiving 2 to 4 units.  I suggested we could try increasing her Metformin from 1000 mg twice daily to 1250 mg twice daily in an effort to discharge her without the need for insulin.  According to the patient, her last A1c was 6.4.  Blood glucose levels are looking better.  In fact, they run between 95 and 215.    Hypertension: Blood pressures are fluctuating, typically in the 130s systolic, but the occasionally much higher (160s).  Diastolics are typically in the 70s.  She is receiving metoprolol succinate 50 mg daily, amlodipine 10 mg daily, and losartan 25 mg daily.  With pulse elevated as well, we will discuss increasing her metoprolol succinate.    Spastic hemiplegia: She has occasional spasms in her left arm.  Baclofen is working well at current dose.    ROS: No headaches or chest pains, coughing or congestion, nausea or vomiting, dizziness or dyspnea, dysuria, constipation or diarrhea, difficulty chewing or swallowing, integumentary issues, or problems with appetite or sleep.      Past Medical History:   Diagnosis Date     Allergic rhinitis      Allergic rhinitis      CKD (chronic kidney disease)      Depression      Depression      Displaced dome fracture of left acetabulum (H)      DM2 (diabetes mellitus, type 2) (H)      GERD (gastroesophageal reflux disease)      GERD (gastroesophageal reflux disease)      Gout      HTN (hypertension)      Hyperlipidaemia LDL goal <100      Hypertension goal BP (blood pressure) < 140/90      Left hemiplegia (H) 1/2010    sees PMR physician     Left hemiplegia  (H)      Migraine      Migraine      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)      Nephrolithiasis      Nephrolithiasis      Seizure disorder (H)      Spastic hemiplegia of left nondominant side as late effect of cerebral infarction (H) 12/20/2021     Stroke (H) 1/2010    due to uncontrolled hypertension     Stroke (H)      Type 2 diabetes, HbA1C goal < 8% (H)      Vitamin B12 deficiency anemia               Family History   Problem Relation Age of Onset     Hypertension Mother      Heart Disease Mother      C.A.D. Father      Coronary Artery Disease Father      Diabetes Sister      Hypertension Brother      Cancer Sister      Diabetes Sister      Hypertension Brother      Cancer Sister      Social History     Socioeconomic History     Marital status:      Spouse name: None     Number of children: None     Years of education: None     Highest education level: None   Occupational History     None   Tobacco Use     Smoking status: Never Smoker     Smokeless tobacco: Never Used   Vaping Use     Vaping Use: Never used   Substance and Sexual Activity     Alcohol use: No     Drug use: No     Sexual activity: Not Currently     Partners: Male   Other Topics Concern     Parent/sibling w/ CABG, MI or angioplasty before 65F 55M? No   Social History Narrative     None     Social Determinants of Health     Financial Resource Strain: Not on file   Food Insecurity: Not on file   Transportation Needs: Not on file   Physical Activity: Not on file   Stress: Not on file   Social Connections: Not on file   Intimate Partner Violence: Not on file   Housing Stability: Not on file       MEDICATIONS: Reviewed from the MAR, physician orders, and/or earlier progress notes.  Post Discharge Medication Reconciliation Status: medication reconcilation previously completed during another office visit.    Current Outpatient Medications   Medication Sig      acetaminophen (TYLENOL) 500 MG tablet Take 500-1,000 mg by mouth every 6 hours as needed      amLODIPine (NORVASC) 10 MG tablet Take 1 tablet (10 mg) by mouth daily     aspirin (ASA) 81 MG EC tablet Take 1 tablet (81 mg) by mouth daily     atorvastatin (LIPITOR) 40 MG tablet Take 1 tablet (40 mg) by mouth daily     baclofen (LIORESAL) 10 MG tablet TAKE 1 TABLET(10 MG) BY MOUTH THREE TIMES DAILY AS NEEDED FOR MUSCLE SPASMS     bisacodyl (DULCOLAX) 5 MG EC tablet Take 2 tablets (10 mg) by mouth daily as needed for constipation     blood glucose (IRENE CONTOUR) test strip 1 strip by In Vitro route daily Use to test blood sugars as needed     clopidogrel (PLAVIX) 75 MG tablet Take 75 mg by mouth daily     insulin glargine (LANTUS VIAL) 100 UNIT/ML vial Inject 5 Units Subcutaneous At Bedtime     Lactase 4500 units TABS Take 13,500 Units by mouth daily as needed     levETIRAcetam (KEPPRA) 500 MG tablet Take 1,000 mg by mouth 2 times daily Take 1250 mg twice daily     losartan (COZAAR) 25 MG tablet Take 1 tablet (25 mg) by mouth daily     metFORMIN (GLUCOPHAGE) 1000 MG tablet TAKE 1 TABLET(1000 MG) BY MOUTH TWICE DAILY WITH MEALS (Patient taking differently: Take 1,250 mg by mouth TAKE 1 TABLET(1250 MG) BY MOUTH TWICE DAILY WITH MEALS)     metoprolol succinate ER (TOPROL-XL) 50 MG 24 hr tablet Take 1 tablet (50 mg) by mouth daily     montelukast (SINGULAIR) 10 MG tablet Take 1 tablet (10 mg) by mouth daily     senna (SENOKOT) 8.6 MG tablet Take 1 tablet by mouth daily     Vitamin D3 (VITAMIN D3) 25 mcg (1000 units) tablet Take 1 tablet (25 mcg) by mouth daily     Current Facility-Administered Medications   Medication     vitamin B-12 (CYANOCOBALAMIN) injection 1,000 mcg     ALLERGIES:   Allergies   Allergen Reactions     Lac Bovis      Lisinopril Cough     Milk Products GI Disturbance     Latex Rash     DIET: Diabetic, regular texture, thin liquids.    Vitals:    12/28/21 1313   BP: (!) 166/81   Pulse: 90   Resp: 18  "  Temp: 97.1  F (36.2  C)   SpO2: 95%   Weight: 62.6 kg (138 lb)   Height: 1.6 m (5' 3\")     Body mass index is 24.45 kg/m .    EXAMINATION:   General: Pleasant, alert, and conversant middle-aged female, lying in bed, in no apparent distress.  Mildly dysarthric.  Head: Normocephalic and atraumatic.   Eyes: PERRLA, sclerae clear.   ENT: Moist oral mucosa.  She has her own teeth in excellent repair.  No gingival hypertrophy from phenytoin.  No rhinorrhea or nasal discharge.  Hearing is unimpaired.  Cardiovascular: Regular rate and rhythm.   Respiratory: Lungs clear to auscultation bilaterally.   Abdomen: Nondistended.   Musculoskeletal/Extremities: Age-related degenerative joint disease.  Not wearing left AFO when in bed.  Neurologic: Mild contractures of the left upper extremity.  Contracture of the left thumb reveals enlarged CMC joint.  She cannot make a fist.  Integument: No rashes, clinically significant lesions, or skin breakdown.   Cognitive/Psychiatric: Alert and oriented with euthymic affect.    DIAGNOSTICS:   Recent Results (from the past 240 hour(s))   COVID-19 Virus (Coronavirus) by PCR Nasopharyngeal    Collection Time: 12/26/21 11:49 AM    Specimen: Nasopharyngeal; Swab   Result Value Ref Range    SARS CoV2 PCR Negative Negative       ASSESSMENT/Plan:      ICD-10-CM    1. Cerebrovascular accident (CVA) due to occlusion of right anterior cerebral artery (H)  I63.521    2. Spastic hemiplegia of left nondominant side as late effect of cerebral infarction (H)  I69.354    3. Type 2 diabetes mellitus with other neurologic complication, with long-term current use of insulin (H)  E11.49     Z79.4    4. Essential hypertension  I10    5. Seizure disorder (H)  G40.909    6. Anemia due to vitamin B12 deficiency, unspecified B12 deficiency type  D51.9        CHANGES:    None.    CARE PLAN:    The care plan, medications, vital signs, orders, and nursing notes have been reviewed, and all orders signed. Changes to care " plan, if any, as noted. Otherwise, continue current plan of care.    The above has been created using voice recognition software. Please be aware that this may unintentionally  produce inaccuracies and/or nonsensical sentences.      Electronically signed by: KEILA Mcmahon CNP        Sincerely,        KEILA Mcmahon CNP

## 2021-12-29 ENCOUNTER — TELEPHONE (OUTPATIENT)
Dept: FAMILY MEDICINE | Facility: CLINIC | Age: 71
End: 2021-12-29
Payer: MEDICARE

## 2021-12-29 NOTE — TELEPHONE ENCOUNTER
Reason for Call:  Form, our goal is to have forms completed with 72 hours, however, some forms may require a visit or additional information.    Type of letter, form or note:    Repair/Non-routine Service DME  Brake extension for whl chair    Who is the form from?:   [employer]    Where did the form come from: form was faxed in    What clinic location was the form placed at?:   [facility]    Where the form was placed:   Dr. Wegener's desk    What number is listed as a contact on the form?: Fax: [phone]       Additional comments: none    Call taken on 12/29/2021 at 11:39 AM by Ness Vaughn

## 2021-12-30 PROBLEM — I63.521: Status: ACTIVE | Noted: 2021-12-30

## 2021-12-30 PROBLEM — E11.49 TYPE 2 DIABETES MELLITUS WITH OTHER NEUROLOGIC COMPLICATION, WITH LONG-TERM CURRENT USE OF INSULIN (H): Status: ACTIVE | Noted: 2021-12-30

## 2021-12-30 PROBLEM — Z79.4 TYPE 2 DIABETES MELLITUS WITH OTHER NEUROLOGIC COMPLICATION, WITH LONG-TERM CURRENT USE OF INSULIN (H): Status: ACTIVE | Noted: 2021-12-30

## 2021-12-30 PROBLEM — I10 ESSENTIAL HYPERTENSION: Status: ACTIVE | Noted: 2021-12-30

## 2021-12-31 ENCOUNTER — TRANSITIONAL CARE UNIT VISIT (OUTPATIENT)
Dept: GERIATRICS | Facility: CLINIC | Age: 71
End: 2021-12-31
Payer: MEDICARE

## 2021-12-31 VITALS
OXYGEN SATURATION: 95 % | DIASTOLIC BLOOD PRESSURE: 69 MMHG | TEMPERATURE: 99 F | SYSTOLIC BLOOD PRESSURE: 108 MMHG | WEIGHT: 139.5 LBS | RESPIRATION RATE: 16 BRPM | HEART RATE: 82 BPM | BODY MASS INDEX: 24.72 KG/M2 | HEIGHT: 63 IN

## 2021-12-31 DIAGNOSIS — E11.49 TYPE 2 DIABETES MELLITUS WITH OTHER NEUROLOGIC COMPLICATION, WITH LONG-TERM CURRENT USE OF INSULIN (H): ICD-10-CM

## 2021-12-31 DIAGNOSIS — I69.354 SPASTIC HEMIPLEGIA OF LEFT NONDOMINANT SIDE AS LATE EFFECT OF CEREBRAL INFARCTION (H): ICD-10-CM

## 2021-12-31 DIAGNOSIS — I63.521 CEREBROVASCULAR ACCIDENT (CVA) DUE TO OCCLUSION OF RIGHT ANTERIOR CEREBRAL ARTERY (H): Primary | ICD-10-CM

## 2021-12-31 DIAGNOSIS — D51.9 ANEMIA DUE TO VITAMIN B12 DEFICIENCY, UNSPECIFIED B12 DEFICIENCY TYPE: ICD-10-CM

## 2021-12-31 DIAGNOSIS — I10 ESSENTIAL HYPERTENSION: ICD-10-CM

## 2021-12-31 DIAGNOSIS — Z79.4 TYPE 2 DIABETES MELLITUS WITH OTHER NEUROLOGIC COMPLICATION, WITH LONG-TERM CURRENT USE OF INSULIN (H): ICD-10-CM

## 2021-12-31 DIAGNOSIS — G40.909 SEIZURE DISORDER (H): ICD-10-CM

## 2021-12-31 PROCEDURE — 99309 SBSQ NF CARE MODERATE MDM 30: CPT | Performed by: NURSE PRACTITIONER

## 2021-12-31 ASSESSMENT — MIFFLIN-ST. JEOR: SCORE: 1121.9

## 2021-12-31 NOTE — LETTER
2021        RE: Addis Peña  [address]  [facility] [address]        [facility]    Name:   Addis Peña  :   1950  MRN:    [id number]     Facility:   [facility]) [[id number]]   Room: [place] [address]  Code Status: DNR and POLST AVAILABLE -     DOS:  2021  Previous visit:  2021    PCP:  Joel Daniel Wegener, MD     CHIEF COMPLAINT / REASON FOR VISIT:  Chief Complaint   Patient presents with     Clinic Care Coordination - Follow-up     Cerebrovascular accident      [facility] from 2021 until 2021 (recurrent CVA)      HPI: Addis is a 71 year old female with a past medical history significant for stroke with residual left spastic hemiplegia who stated she went to bed at approximately 8 PM the night before and woke up at 4:30 AM with increased left-sided weakness.  She stated that she felt well when she had gone to bed.  Typically, she uses a wheelchair for ambulation but can get up on her own and has enough strength in the left arm to get dressed and perform ADLs.  She stated that she could do neither that morning.  She lives with a roommate who helped her and called 911.  She otherwise had no change in her speech or swallowing ability.  She denied any other new focal neurological deficits.  She takes a daily baby aspirin and has been compliant.    Ischemic stroke: Seen as an extended stroke in the ED has a last known normal 8 PM the night prior.  Discussed with neurology.  Admitted and monitored on telemetry.  To continue  mg daily for 3 weeks and add Plavix (continuing Plavix alone after 3 weeks).  Vascular consulted given her left ICA stenosis.  They would like to follow-up with her in clinic in 6 to 8 weeks.  PT saw patient and recommended TCU upon discharge.  She would need an outpatient event monitor.    Seizure disorder: Phenytoin level subtherapeutic and levetiracetam level supratherapeutic.   Phenytoin discontinued, and levetiracetam decreased to 1000 mg twice daily.  Level was to be repeated on 12/15 before Keppra dose.    Hypertension: Initially allowed for permissive hypertension.  Amlodipine and metoprolol were resumed while inpatient.  Also resumed losartan on discharge.    Diabetes mellitus type 2: Metformin was held while inpatient and resumed on discharge.  Glargine 5 units at bedtime was added.  She stated she was unaware of this.    PROCEDURES/SIGNIFICANT IMAGING AND TESTING  CTA head and neck with IV contrast; CT brain perfusion (12/11/2021):  Head CT: No acute intracranial hemorrhage.  No CT evidence of acute infarct (aspect score 10).  Right SOCO territory large chronic infarct.  Small chronic infarcts.  Age-related changes.  Head CTA: No arterial large vessel occlusion.  Multiple stenoses and occlusions.  Neck CTA: Right carotid bifurcation moderate stenosis.  Right proximal carotid bulb 50% stenosis.  Right proximal cervical ICA 50% stenosis.  Left distal common carotid artery moderate stenosis.  Left carotid bifurcation moderate to severe stenosis.  Left proximal external carotid artery severe stenosis.  Left carotid bulb and proximal cervical ICA demonstrates 70 to 90% stenosis.  Left distal cervical ICA 50 to 70% stenosis.  No dissection.  Right vertebral artery origin severe stenosis.  Left distal V1 segment moderate stenosis.  CT perfusion: No acute perfusion abnormality.    MR brain without IV contrast (12/11/2021):  Small area of acute or subacute ischemia involving the posterior limb of the left internal capsule.  Foci of diffusion and FLAIR hyperintense signal in the right and left mid cerebral peduncles without definite diffusion restriction are nonspecific and may reflect sequela of chronic infarcts, alternatively demyelinating disease.  Multiple chronic infarcts as described previously.      CURRENT/RECENT TCU ISSUES    Disposition: Her left hand was painful during the night.   She states she sleeps a couple hours and then wakes.      She is worried about the possibility of another stroke.  Her neurologist cannot promise she won't have another, but she describes a discussion about possible of endarterectomy procedure to bilateral carotids.  Her next appointment is in February.  In physical therapy, she is working on small steps, balance, and flexibility.    Diabetes mellitus type 2: She was not on insulin at home.  She is on sliding scale here, receiving 2 to 4 units.  I suggested we could try increasing her Metformin from 1000 mg twice daily to 1250 mg twice daily in an effort to discharge her without the need for insulin.  According to the patient, her last A1c was 6.4.  Blood glucose levels are looking better.  In fact, they run between 95 and 215.    Hypertension: Blood pressures are fluctuating, typically in the 130s systolic, but the occasionally much higher (160s).  Diastolics are typically in the 70s.  She is receiving metoprolol succinate 50 mg daily, amlodipine 10 mg daily, and losartan 25 mg daily.  With an elevated pulse, we discussed increasing her metoprolol succinate, and we will do that today, going from 50 mg to 100 mg.      Spastic hemiplegia: She has occasional spasms in her left arm.  Baclofen is working well at current dose.    ROS: No headaches or chest pains, coughing or congestion, nausea or vomiting, dizziness or dyspnea, dysuria, constipation or diarrhea, difficulty chewing or swallowing, integumentary issues, or problems with appetite or sleep.      Past Medical History:   Diagnosis Date     Allergic rhinitis      Allergic rhinitis      CKD (chronic kidney disease)      Depression      Depression      Displaced dome fracture of left acetabulum (H)      DM2 (diabetes mellitus, type 2) (H)      GERD (gastroesophageal reflux disease)      GERD (gastroesophageal reflux disease)      Gout      HTN (hypertension)      Hyperlipidaemia LDL goal <100      Hypertension  goal BP (blood pressure) < 140/90      Left hemiplegia (H) 1/2010    sees PMR physician     Left hemiplegia (H)      Migraine      Migraine      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)      Nephrolithiasis      Nephrolithiasis      Seizure disorder (H)      Spastic hemiplegia of left nondominant side as late effect of cerebral infarction (H) 12/20/2021     Stroke (H) 1/2010    due to uncontrolled hypertension     Stroke (H)      Type 2 diabetes, HbA1C goal < 8% (H)      Vitamin B12 deficiency anemia               Family History   Problem Relation Age of Onset     Hypertension Mother      Heart Disease Mother      C.A.D. Father      Coronary Artery Disease Father      Diabetes Sister      Hypertension Brother      Cancer Sister      Diabetes Sister      Hypertension Brother      Cancer Sister      Social History     Socioeconomic History     Marital status:      Spouse name: None     Number of children: None     Years of education: None     Highest education level: None   Occupational History     None   Tobacco Use     Smoking status: Never Smoker     Smokeless tobacco: Never Used   Vaping Use     Vaping Use: Never used   Substance and Sexual Activity     Alcohol use: No     Drug use: No     Sexual activity: Not Currently     Partners: Male   Other Topics Concern     Parent/sibling w/ CABG, MI or angioplasty before 65F 55M? No   Social History Narrative     None     Social Determinants of Health     Financial Resource Strain: Not on file   Food Insecurity: Not on file   Transportation Needs: Not on file   Physical Activity: Not on file   Stress: Not on file   Social Connections: Not on file   Intimate Partner Violence: Not on file   Housing Stability: Not on file       MEDICATIONS: Reviewed from the MAR, physician orders, and/or earlier progress notes.  Post Discharge Medication Reconciliation Status: medication  reconcilation previously completed during another office visit.    Current Outpatient Medications   Medication Sig     acetaminophen (TYLENOL) 500 MG tablet Take 500-1,000 mg by mouth every 6 hours as needed      amLODIPine (NORVASC) 10 MG tablet Take 1 tablet (10 mg) by mouth daily     aspirin (ASA) 81 MG EC tablet Take 1 tablet (81 mg) by mouth daily     atorvastatin (LIPITOR) 40 MG tablet Take 1 tablet (40 mg) by mouth daily     baclofen (LIORESAL) 10 MG tablet TAKE 1 TABLET(10 MG) BY MOUTH THREE TIMES DAILY AS NEEDED FOR MUSCLE SPASMS     bisacodyl (DULCOLAX) 5 MG EC tablet Take 2 tablets (10 mg) by mouth daily as needed for constipation     blood glucose (IRENE CONTOUR) test strip 1 strip by In Vitro route daily Use to test blood sugars as needed     clopidogrel (PLAVIX) 75 MG tablet Take 75 mg by mouth daily     insulin glargine (LANTUS VIAL) 100 UNIT/ML vial Inject 5 Units Subcutaneous At Bedtime     Lactase 4500 units TABS Take 13,500 Units by mouth daily as needed     levETIRAcetam (KEPPRA) 500 MG tablet Take 1,000 mg by mouth 2 times daily Take 1250 mg twice daily     losartan (COZAAR) 25 MG tablet Take 1 tablet (25 mg) by mouth daily     metFORMIN (GLUCOPHAGE) 1000 MG tablet TAKE 1 TABLET(1000 MG) BY MOUTH TWICE DAILY WITH MEALS (Patient taking differently: Take 1,250 mg by mouth TAKE 1 TABLET(1250 MG) BY MOUTH TWICE DAILY WITH MEALS)     metoprolol succinate ER (TOPROL-XL) 50 MG 24 hr tablet Take 1 tablet (50 mg) by mouth daily     montelukast (SINGULAIR) 10 MG tablet Take 1 tablet (10 mg) by mouth daily     senna (SENOKOT) 8.6 MG tablet Take 1 tablet by mouth daily     Vitamin D3 (VITAMIN D3) 25 mcg (1000 units) tablet Take 1 tablet (25 mcg) by mouth daily     Current Facility-Administered Medications   Medication     vitamin B-12 (CYANOCOBALAMIN) injection 1,000 mcg     ALLERGIES:   Allergies   Allergen Reactions     Lac Bovis      Lisinopril Cough     Milk Products GI Disturbance     Latex Rash  "    DIET: Diabetic, regular texture, thin liquids.    Vitals:    12/31/21 1356   BP: 108/69   Pulse: 82   Resp: 16   Temp: 99  F (37.2  C)   SpO2: 95%   Weight: 63.3 kg (139 lb 8 oz)   Height: 1.6 m (5' 3\")     Body mass index is 24.71 kg/m .    EXAMINATION:   General: Pleasant, alert, and conversant middle-aged female, lying in bed, in no apparent distress.  Mildly dysarthric.  Head: Normocephalic and atraumatic.   Eyes: PERRLA, sclerae clear.   ENT: Moist oral mucosa.  She has her own teeth in excellent repair.  No gingival hypertrophy from phenytoin.  No rhinorrhea or nasal discharge.  Hearing is unimpaired.  Cardiovascular: Regular rate and rhythm.   Respiratory: Lungs clear to auscultation bilaterally.   Abdomen: Nondistended.   Musculoskeletal/Extremities: Age-related degenerative joint disease.  Not wearing left AFO when in bed.  Neurologic: Mild contractures of the left upper extremity.  Contracture of the left thumb reveals enlarged CMC joint.  She cannot make a fist.  She has not lost sensation, and there is no numbness or tingling.  Integument: No rashes, clinically significant lesions, or skin breakdown.   Cognitive/Psychiatric: Alert and oriented with euthymic affect.    DIAGNOSTICS:   Recent Results (from the past 240 hour(s))   COVID-19 Virus (Coronavirus) by PCR Nasopharyngeal    Collection Time: 12/26/21 11:49 AM    Specimen: Nasopharyngeal; Swab   Result Value Ref Range    SARS CoV2 PCR Negative Negative       ASSESSMENT/Plan:      ICD-10-CM    1. Cerebrovascular accident (CVA) due to occlusion of right anterior cerebral artery (H)  I63.521    2. Spastic hemiplegia of left nondominant side as late effect of cerebral infarction (H)  I69.354    3. Type 2 diabetes mellitus with other neurologic complication, with long-term current use of insulin (H)  E11.49     Z79.4    4. Essential hypertension  I10    5. Seizure disorder (H)  G40.909    6. Anemia due to vitamin B12 deficiency, unspecified B12 " deficiency type  D51.9        CHANGES:    Increase metoprolol succinate from 50 mg to 100 mg daily.    CARE PLAN:    The care plan, medications, vital signs, orders, and nursing notes have been reviewed, and all orders signed. Changes to care plan, if any, as noted. Otherwise, continue current plan of care.    The above has been created using voice recognition software. Please be aware that this may unintentionally  produce inaccuracies and/or nonsensical sentences.      Electronically signed by: KEILA Mcmahon CNP        Sincerely,        KEILA Mcmahon CNP

## 2021-12-31 NOTE — LETTER
12/31/2021        RE: Addis Peña  [address]  [place] [address]        No notes on file      Sincerely,        Kleber Stratton, KEILA CNP

## 2021-12-31 NOTE — PROGRESS NOTES
[facility]    Name:   Addis Peña  :   1950  MRN:    [id number]     Facility:   [facility] ([facility]) [[id number]]   Room: [place] [address]  Code Status: DNR and POLST AVAILABLE -     DOS:  2021  Previous visit:  2021    PCP:  Joel Daniel Wegener, MD     CHIEF COMPLAINT / REASON FOR VISIT:  Chief Complaint   Patient presents with     Clinic Care Coordination - Follow-up     Cerebrovascular accident      [facility] from 2021 until 2021 (recurrent CVA)      HPI: Addis is a 71 year old female with a past medical history significant for stroke with residual left spastic hemiplegia who stated she went to bed at approximately 8 PM the night before and woke up at 4:30 AM with increased left-sided weakness.  She stated that she felt well when she had gone to bed.  Typically, she uses a wheelchair for ambulation but can get up on her own and has enough strength in the left arm to get dressed and perform ADLs.  She stated that she could do neither that morning.  She lives with a roommate who helped her and called 911.  She otherwise had no change in her speech or swallowing ability.  She denied any other new focal neurological deficits.  She takes a daily baby aspirin and has been compliant.    Ischemic stroke: Seen as an extended stroke in the ED has a last known normal 8 PM the night prior.  Discussed with neurology.  Admitted and monitored on telemetry.  To continue  mg daily for 3 weeks and add Plavix (continuing Plavix alone after 3 weeks).  Vascular consulted given her left ICA stenosis.  They would like to follow-up with her in clinic in 6 to 8 weeks.  PT saw patient and recommended TCU upon discharge.  She would need an outpatient event monitor.    Seizure disorder: Phenytoin level subtherapeutic and levetiracetam level supratherapeutic.  Phenytoin discontinued, and levetiracetam decreased to 1000 mg twice daily.  Level was to be repeated on  12/15 before Keppra dose.    Hypertension: Initially allowed for permissive hypertension.  Amlodipine and metoprolol were resumed while inpatient.  Also resumed losartan on discharge.    Diabetes mellitus type 2: Metformin was held while inpatient and resumed on discharge.  Glargine 5 units at bedtime was added.  She stated she was unaware of this.    PROCEDURES/SIGNIFICANT IMAGING AND TESTING  CTA head and neck with IV contrast; CT brain perfusion (12/11/2021):  Head CT: No acute intracranial hemorrhage.  No CT evidence of acute infarct (aspect score 10).  Right SOCO territory large chronic infarct.  Small chronic infarcts.  Age-related changes.  Head CTA: No arterial large vessel occlusion.  Multiple stenoses and occlusions.  Neck CTA: Right carotid bifurcation moderate stenosis.  Right proximal carotid bulb 50% stenosis.  Right proximal cervical ICA 50% stenosis.  Left distal common carotid artery moderate stenosis.  Left carotid bifurcation moderate to severe stenosis.  Left proximal external carotid artery severe stenosis.  Left carotid bulb and proximal cervical ICA demonstrates 70 to 90% stenosis.  Left distal cervical ICA 50 to 70% stenosis.  No dissection.  Right vertebral artery origin severe stenosis.  Left distal V1 segment moderate stenosis.  CT perfusion: No acute perfusion abnormality.    MR brain without IV contrast (12/11/2021):  Small area of acute or subacute ischemia involving the posterior limb of the left internal capsule.  Foci of diffusion and FLAIR hyperintense signal in the right and left mid cerebral peduncles without definite diffusion restriction are nonspecific and may reflect sequela of chronic infarcts, alternatively demyelinating disease.  Multiple chronic infarcts as described previously.      CURRENT/RECENT TCU ISSUES    Disposition: She feels she is doing better.  Nonetheless, she is worried about the possibility of another stroke.  Her neurologist cannot promise she won't have  another, but she describes a discussion about possible of endarterectomy procedure to bilateral carotids.  Her next appointment is in February.  In physical therapy, she is working on small steps, balance, and flexibility.    Diabetes mellitus type 2: She was not on insulin at home.  She is on sliding scale here, receiving 2 to 4 units.  I suggested we could try increasing her Metformin from 1000 mg twice daily to 1250 mg twice daily in an effort to discharge her without the need for insulin.  According to the patient, her last A1c was 6.4.  Blood glucose levels are looking better.  In fact, they run between 95 and 215.    Hypertension: Blood pressures are fluctuating, typically in the 130s systolic, but the occasionally much higher (160s).  Diastolics are typically in the 70s.  She is receiving metoprolol succinate 50 mg daily, amlodipine 10 mg daily, and losartan 25 mg daily.  With pulse elevated as well, we will discuss increasing her metoprolol succinate.    Spastic hemiplegia: She has occasional spasms in her left arm.  Baclofen is working well at current dose.    ROS: No headaches or chest pains, coughing or congestion, nausea or vomiting, dizziness or dyspnea, dysuria, constipation or diarrhea, difficulty chewing or swallowing, integumentary issues, or problems with appetite or sleep.      Past Medical History:   Diagnosis Date     Allergic rhinitis      Allergic rhinitis      CKD (chronic kidney disease)      Depression      Depression      Displaced dome fracture of left acetabulum (H)      DM2 (diabetes mellitus, type 2) (H)      GERD (gastroesophageal reflux disease)      GERD (gastroesophageal reflux disease)      Gout      HTN (hypertension)      Hyperlipidaemia LDL goal <100      Hypertension goal BP (blood pressure) < 140/90      Left hemiplegia (H) 1/2010    sees PMR physician     Left hemiplegia (H)      Migraine      Migraine      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)      Nephrolithiasis      Nephrolithiasis      Seizure disorder (H)      Spastic hemiplegia of left nondominant side as late effect of cerebral infarction (H) 12/20/2021     Stroke (H) 1/2010    due to uncontrolled hypertension     Stroke (H)      Type 2 diabetes, HbA1C goal < 8% (H)      Vitamin B12 deficiency anemia               Family History   Problem Relation Age of Onset     Hypertension Mother      Heart Disease Mother      C.A.D. Father      Coronary Artery Disease Father      Diabetes Sister      Hypertension Brother      Cancer Sister      Diabetes Sister      Hypertension Brother      Cancer Sister      Social History     Socioeconomic History     Marital status:      Spouse name: None     Number of children: None     Years of education: None     Highest education level: None   Occupational History     None   Tobacco Use     Smoking status: Never Smoker     Smokeless tobacco: Never Used   Vaping Use     Vaping Use: Never used   Substance and Sexual Activity     Alcohol use: No     Drug use: No     Sexual activity: Not Currently     Partners: Male   Other Topics Concern     Parent/sibling w/ CABG, MI or angioplasty before 65F 55M? No   Social History Narrative     None     Social Determinants of Health     Financial Resource Strain: Not on file   Food Insecurity: Not on file   Transportation Needs: Not on file   Physical Activity: Not on file   Stress: Not on file   Social Connections: Not on file   Intimate Partner Violence: Not on file   Housing Stability: Not on file       MEDICATIONS: Reviewed from the MAR, physician orders, and/or earlier progress notes.  Post Discharge Medication Reconciliation Status: medication reconcilation previously completed during another office visit.    Current Outpatient Medications   Medication Sig     acetaminophen (TYLENOL) 500 MG tablet Take 500-1,000 mg by mouth every 6 hours as needed       "amLODIPine (NORVASC) 10 MG tablet Take 1 tablet (10 mg) by mouth daily     aspirin (ASA) 81 MG EC tablet Take 1 tablet (81 mg) by mouth daily     atorvastatin (LIPITOR) 40 MG tablet Take 1 tablet (40 mg) by mouth daily     baclofen (LIORESAL) 10 MG tablet TAKE 1 TABLET(10 MG) BY MOUTH THREE TIMES DAILY AS NEEDED FOR MUSCLE SPASMS     bisacodyl (DULCOLAX) 5 MG EC tablet Take 2 tablets (10 mg) by mouth daily as needed for constipation     blood glucose (IRENE CONTOUR) test strip 1 strip by In Vitro route daily Use to test blood sugars as needed     clopidogrel (PLAVIX) 75 MG tablet Take 75 mg by mouth daily     insulin glargine (LANTUS VIAL) 100 UNIT/ML vial Inject 5 Units Subcutaneous At Bedtime     Lactase 4500 units TABS Take 13,500 Units by mouth daily as needed     levETIRAcetam (KEPPRA) 500 MG tablet Take 1,000 mg by mouth 2 times daily Take 1250 mg twice daily     losartan (COZAAR) 25 MG tablet Take 1 tablet (25 mg) by mouth daily     metFORMIN (GLUCOPHAGE) 1000 MG tablet TAKE 1 TABLET(1000 MG) BY MOUTH TWICE DAILY WITH MEALS (Patient taking differently: Take 1,250 mg by mouth TAKE 1 TABLET(1250 MG) BY MOUTH TWICE DAILY WITH MEALS)     metoprolol succinate ER (TOPROL-XL) 50 MG 24 hr tablet Take 1 tablet (50 mg) by mouth daily     montelukast (SINGULAIR) 10 MG tablet Take 1 tablet (10 mg) by mouth daily     senna (SENOKOT) 8.6 MG tablet Take 1 tablet by mouth daily     Vitamin D3 (VITAMIN D3) 25 mcg (1000 units) tablet Take 1 tablet (25 mcg) by mouth daily     Current Facility-Administered Medications   Medication     vitamin B-12 (CYANOCOBALAMIN) injection 1,000 mcg     ALLERGIES:   Allergies   Allergen Reactions     Lac Bovis      Lisinopril Cough     Milk Products GI Disturbance     Latex Rash     DIET: Diabetic, regular texture, thin liquids.    Vitals:    12/28/21 1313   BP: (!) 166/81   Pulse: 90   Resp: 18   Temp: 97.1  F (36.2  C)   SpO2: 95%   Weight: 62.6 kg (138 lb)   Height: 1.6 m (5' 3\")     Body " mass index is 24.45 kg/m .    EXAMINATION:   General: Pleasant, alert, and conversant middle-aged female, lying in bed, in no apparent distress.  Mildly dysarthric.  Head: Normocephalic and atraumatic.   Eyes: PERRLA, sclerae clear.   ENT: Moist oral mucosa.  She has her own teeth in excellent repair.  No gingival hypertrophy from phenytoin.  No rhinorrhea or nasal discharge.  Hearing is unimpaired.  Cardiovascular: Regular rate and rhythm.   Respiratory: Lungs clear to auscultation bilaterally.   Abdomen: Nondistended.   Musculoskeletal/Extremities: Age-related degenerative joint disease.  Not wearing left AFO when in bed.  Neurologic: Mild contractures of the left upper extremity.  Contracture of the left thumb reveals enlarged CMC joint.  She cannot make a fist.  Integument: No rashes, clinically significant lesions, or skin breakdown.   Cognitive/Psychiatric: Alert and oriented with euthymic affect.    DIAGNOSTICS:   Recent Results (from the past 240 hour(s))   COVID-19 Virus (Coronavirus) by PCR Nasopharyngeal    Collection Time: 12/26/21 11:49 AM    Specimen: Nasopharyngeal; Swab   Result Value Ref Range    SARS CoV2 PCR Negative Negative       ASSESSMENT/Plan:      ICD-10-CM    1. Cerebrovascular accident (CVA) due to occlusion of right anterior cerebral artery (H)  I63.521    2. Spastic hemiplegia of left nondominant side as late effect of cerebral infarction (H)  I69.354    3. Type 2 diabetes mellitus with other neurologic complication, with long-term current use of insulin (H)  E11.49     Z79.4    4. Essential hypertension  I10    5. Seizure disorder (H)  G40.909    6. Anemia due to vitamin B12 deficiency, unspecified B12 deficiency type  D51.9        CHANGES:    None.    CARE PLAN:    The care plan, medications, vital signs, orders, and nursing notes have been reviewed, and all orders signed. Changes to care plan, if any, as noted. Otherwise, continue current plan of care.    The above has been created  using voice recognition software. Please be aware that this may unintentionally  produce inaccuracies and/or nonsensical sentences.      Electronically signed by: KEILA Mcmahon CNP

## 2022-01-02 RX ORDER — METOPROLOL SUCCINATE 100 MG/1
100 TABLET, EXTENDED RELEASE ORAL DAILY
COMMUNITY
Start: 2021-12-31 | End: 2022-11-15

## 2022-01-02 NOTE — PROGRESS NOTES
[facility]    Name:   Addis Peña  :   1950  MRN:    [id number]     Facility:   [facility] ([facility]) [[id number]]   Room: [place] [address]  Code Status: DNR and POLST AVAILABLE -     DOS:  2021  Previous visit:  2021    PCP:  Joel Daniel Wegener, MD     CHIEF COMPLAINT / REASON FOR VISIT:  Chief Complaint   Patient presents with     Clinic Care Coordination - Follow-up     Cerebrovascular accident      [facility] from 2021 until 2021 (recurrent CVA)      HPI: Addis is a 71 year old female with a past medical history significant for stroke with residual left spastic hemiplegia who stated she went to bed at approximately 8 PM the night before and woke up at 4:30 AM with increased left-sided weakness.  She stated that she felt well when she had gone to bed.  Typically, she uses a wheelchair for ambulation but can get up on her own and has enough strength in the left arm to get dressed and perform ADLs.  She stated that she could do neither that morning.  She lives with a roommate who helped her and called 911.  She otherwise had no change in her speech or swallowing ability.  She denied any other new focal neurological deficits.  She takes a daily baby aspirin and has been compliant.    Ischemic stroke: Seen as an extended stroke in the ED has a last known normal 8 PM the night prior.  Discussed with neurology.  Admitted and monitored on telemetry.  To continue  mg daily for 3 weeks and add Plavix (continuing Plavix alone after 3 weeks).  Vascular consulted given her left ICA stenosis.  They would like to follow-up with her in clinic in 6 to 8 weeks.  PT saw patient and recommended TCU upon discharge.  She would need an outpatient event monitor.    Seizure disorder: Phenytoin level subtherapeutic and levetiracetam level supratherapeutic.  Phenytoin discontinued, and levetiracetam decreased to 1000 mg twice daily.  Level was to be repeated on  12/15 before Keppra dose.    Hypertension: Initially allowed for permissive hypertension.  Amlodipine and metoprolol were resumed while inpatient.  Also resumed losartan on discharge.    Diabetes mellitus type 2: Metformin was held while inpatient and resumed on discharge.  Glargine 5 units at bedtime was added.  She stated she was unaware of this.    PROCEDURES/SIGNIFICANT IMAGING AND TESTING  CTA head and neck with IV contrast; CT brain perfusion (12/11/2021):  Head CT: No acute intracranial hemorrhage.  No CT evidence of acute infarct (aspect score 10).  Right SOCO territory large chronic infarct.  Small chronic infarcts.  Age-related changes.  Head CTA: No arterial large vessel occlusion.  Multiple stenoses and occlusions.  Neck CTA: Right carotid bifurcation moderate stenosis.  Right proximal carotid bulb 50% stenosis.  Right proximal cervical ICA 50% stenosis.  Left distal common carotid artery moderate stenosis.  Left carotid bifurcation moderate to severe stenosis.  Left proximal external carotid artery severe stenosis.  Left carotid bulb and proximal cervical ICA demonstrates 70 to 90% stenosis.  Left distal cervical ICA 50 to 70% stenosis.  No dissection.  Right vertebral artery origin severe stenosis.  Left distal V1 segment moderate stenosis.  CT perfusion: No acute perfusion abnormality.    MR brain without IV contrast (12/11/2021):  Small area of acute or subacute ischemia involving the posterior limb of the left internal capsule.  Foci of diffusion and FLAIR hyperintense signal in the right and left mid cerebral peduncles without definite diffusion restriction are nonspecific and may reflect sequela of chronic infarcts, alternatively demyelinating disease.  Multiple chronic infarcts as described previously.      CURRENT/RECENT TCU ISSUES    Disposition: Her left hand was painful during the night.  She states she sleeps a couple hours and then wakes.      She is worried about the possibility of another  stroke.  Her neurologist cannot promise she won't have another, but she describes a discussion about possible of endarterectomy procedure to bilateral carotids.  Her next appointment is in February.  In physical therapy, she is working on small steps, balance, and flexibility.    Diabetes mellitus type 2: She was not on insulin at home.  She is on sliding scale here, receiving 2 to 4 units.  I suggested we could try increasing her Metformin from 1000 mg twice daily to 1250 mg twice daily in an effort to discharge her without the need for insulin.  According to the patient, her last A1c was 6.4.  Blood glucose levels are looking better.  In fact, they run between 95 and 215.    Hypertension: Blood pressures are fluctuating, typically in the 130s systolic, but the occasionally much higher (160s).  Diastolics are typically in the 70s.  She is receiving metoprolol succinate 50 mg daily, amlodipine 10 mg daily, and losartan 25 mg daily.  With an elevated pulse, we discussed increasing her metoprolol succinate, and we will do that today, going from 50 mg to 100 mg.      Spastic hemiplegia: She has occasional spasms in her left arm.  Baclofen is working well at current dose.    ROS: No headaches or chest pains, coughing or congestion, nausea or vomiting, dizziness or dyspnea, dysuria, constipation or diarrhea, difficulty chewing or swallowing, integumentary issues, or problems with appetite or sleep.      Past Medical History:   Diagnosis Date     Allergic rhinitis      Allergic rhinitis      CKD (chronic kidney disease)      Depression      Depression      Displaced dome fracture of left acetabulum (H)      DM2 (diabetes mellitus, type 2) (H)      GERD (gastroesophageal reflux disease)      GERD (gastroesophageal reflux disease)      Gout      HTN (hypertension)      Hyperlipidaemia LDL goal <100      Hypertension goal BP (blood pressure) < 140/90      Left hemiplegia (H) 1/2010    sees PMR physician     Left hemiplegia  (H)      Migraine      Migraine      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)      Nephrolithiasis      Nephrolithiasis      Seizure disorder (H)      Spastic hemiplegia of left nondominant side as late effect of cerebral infarction (H) 12/20/2021     Stroke (H) 1/2010    due to uncontrolled hypertension     Stroke (H)      Type 2 diabetes, HbA1C goal < 8% (H)      Vitamin B12 deficiency anemia               Family History   Problem Relation Age of Onset     Hypertension Mother      Heart Disease Mother      C.A.D. Father      Coronary Artery Disease Father      Diabetes Sister      Hypertension Brother      Cancer Sister      Diabetes Sister      Hypertension Brother      Cancer Sister      Social History     Socioeconomic History     Marital status:      Spouse name: None     Number of children: None     Years of education: None     Highest education level: None   Occupational History     None   Tobacco Use     Smoking status: Never Smoker     Smokeless tobacco: Never Used   Vaping Use     Vaping Use: Never used   Substance and Sexual Activity     Alcohol use: No     Drug use: No     Sexual activity: Not Currently     Partners: Male   Other Topics Concern     Parent/sibling w/ CABG, MI or angioplasty before 65F 55M? No   Social History Narrative     None     Social Determinants of Health     Financial Resource Strain: Not on file   Food Insecurity: Not on file   Transportation Needs: Not on file   Physical Activity: Not on file   Stress: Not on file   Social Connections: Not on file   Intimate Partner Violence: Not on file   Housing Stability: Not on file       MEDICATIONS: Reviewed from the MAR, physician orders, and/or earlier progress notes.  Post Discharge Medication Reconciliation Status: medication reconcilation previously completed during another office visit.    Current Outpatient Medications   Medication Sig      acetaminophen (TYLENOL) 500 MG tablet Take 500-1,000 mg by mouth every 6 hours as needed      amLODIPine (NORVASC) 10 MG tablet Take 1 tablet (10 mg) by mouth daily     aspirin (ASA) 81 MG EC tablet Take 1 tablet (81 mg) by mouth daily     atorvastatin (LIPITOR) 40 MG tablet Take 1 tablet (40 mg) by mouth daily     baclofen (LIORESAL) 10 MG tablet TAKE 1 TABLET(10 MG) BY MOUTH THREE TIMES DAILY AS NEEDED FOR MUSCLE SPASMS     bisacodyl (DULCOLAX) 5 MG EC tablet Take 2 tablets (10 mg) by mouth daily as needed for constipation     blood glucose (IRENE CONTOUR) test strip 1 strip by In Vitro route daily Use to test blood sugars as needed     clopidogrel (PLAVIX) 75 MG tablet Take 75 mg by mouth daily     insulin glargine (LANTUS VIAL) 100 UNIT/ML vial Inject 5 Units Subcutaneous At Bedtime     Lactase 4500 units TABS Take 13,500 Units by mouth daily as needed     levETIRAcetam (KEPPRA) 500 MG tablet Take 1,000 mg by mouth 2 times daily Take 1250 mg twice daily     losartan (COZAAR) 25 MG tablet Take 1 tablet (25 mg) by mouth daily     metFORMIN (GLUCOPHAGE) 1000 MG tablet TAKE 1 TABLET(1000 MG) BY MOUTH TWICE DAILY WITH MEALS (Patient taking differently: Take 1,250 mg by mouth TAKE 1 TABLET(1250 MG) BY MOUTH TWICE DAILY WITH MEALS)     metoprolol succinate ER (TOPROL-XL) 50 MG 24 hr tablet Take 1 tablet (50 mg) by mouth daily     montelukast (SINGULAIR) 10 MG tablet Take 1 tablet (10 mg) by mouth daily     senna (SENOKOT) 8.6 MG tablet Take 1 tablet by mouth daily     Vitamin D3 (VITAMIN D3) 25 mcg (1000 units) tablet Take 1 tablet (25 mcg) by mouth daily     Current Facility-Administered Medications   Medication     vitamin B-12 (CYANOCOBALAMIN) injection 1,000 mcg     ALLERGIES:   Allergies   Allergen Reactions     Lac Bovis      Lisinopril Cough     Milk Products GI Disturbance     Latex Rash     DIET: Diabetic, regular texture, thin liquids.    Vitals:    12/31/21 1356   BP: 108/69   Pulse: 82   Resp: 16  "  Temp: 99  F (37.2  C)   SpO2: 95%   Weight: 63.3 kg (139 lb 8 oz)   Height: 1.6 m (5' 3\")     Body mass index is 24.71 kg/m .    EXAMINATION:   General: Pleasant, alert, and conversant middle-aged female, lying in bed, in no apparent distress.  Mildly dysarthric.  Head: Normocephalic and atraumatic.   Eyes: PERRLA, sclerae clear.   ENT: Moist oral mucosa.  She has her own teeth in excellent repair.  No gingival hypertrophy from phenytoin.  No rhinorrhea or nasal discharge.  Hearing is unimpaired.  Cardiovascular: Regular rate and rhythm.   Respiratory: Lungs clear to auscultation bilaterally.   Abdomen: Nondistended.   Musculoskeletal/Extremities: Age-related degenerative joint disease.  Not wearing left AFO when in bed.  Neurologic: Mild contractures of the left upper extremity.  Contracture of the left thumb reveals enlarged CMC joint.  She cannot make a fist.  She has not lost sensation, and there is no numbness or tingling.  Integument: No rashes, clinically significant lesions, or skin breakdown.   Cognitive/Psychiatric: Alert and oriented with euthymic affect.    DIAGNOSTICS:   Recent Results (from the past 240 hour(s))   COVID-19 Virus (Coronavirus) by PCR Nasopharyngeal    Collection Time: 12/26/21 11:49 AM    Specimen: Nasopharyngeal; Swab   Result Value Ref Range    SARS CoV2 PCR Negative Negative       ASSESSMENT/Plan:      ICD-10-CM    1. Cerebrovascular accident (CVA) due to occlusion of right anterior cerebral artery (H)  I63.521    2. Spastic hemiplegia of left nondominant side as late effect of cerebral infarction (H)  I69.354    3. Type 2 diabetes mellitus with other neurologic complication, with long-term current use of insulin (H)  E11.49     Z79.4    4. Essential hypertension  I10    5. Seizure disorder (H)  G40.909    6. Anemia due to vitamin B12 deficiency, unspecified B12 deficiency type  D51.9        CHANGES:    Increase metoprolol succinate from 50 mg to 100 mg daily.    CARE PLAN:    The " care plan, medications, vital signs, orders, and nursing notes have been reviewed, and all orders signed. Changes to care plan, if any, as noted. Otherwise, continue current plan of care.    The above has been created using voice recognition software. Please be aware that this may unintentionally  produce inaccuracies and/or nonsensical sentences.      Electronically signed by: KEILA Mcmahon CNP

## 2022-01-03 ENCOUNTER — MEDICAL CORRESPONDENCE (OUTPATIENT)
Dept: HEALTH INFORMATION MANAGEMENT | Facility: CLINIC | Age: 72
End: 2022-01-03
Payer: MEDICARE

## 2022-01-03 NOTE — TELEPHONE ENCOUNTER
Order signed, Faxed, and sent for scanning  Dorota [facility] Unit Coordinator

## 2022-01-05 ENCOUNTER — TRANSITIONAL CARE UNIT VISIT (OUTPATIENT)
Dept: GERIATRICS | Facility: CLINIC | Age: 72
End: 2022-01-05
Payer: MEDICARE

## 2022-01-05 VITALS
BODY MASS INDEX: 22.05 KG/M2 | TEMPERATURE: 97.2 F | HEIGHT: 66 IN | HEART RATE: 84 BPM | OXYGEN SATURATION: 95 % | SYSTOLIC BLOOD PRESSURE: 142 MMHG | WEIGHT: 137.2 LBS | DIASTOLIC BLOOD PRESSURE: 88 MMHG | RESPIRATION RATE: 16 BRPM

## 2022-01-05 DIAGNOSIS — G40.909 SEIZURE DISORDER (H): ICD-10-CM

## 2022-01-05 DIAGNOSIS — F33.0 MAJOR DEPRESSIVE DISORDER, RECURRENT EPISODE, MILD (H): ICD-10-CM

## 2022-01-05 DIAGNOSIS — Z79.4 TYPE 2 DIABETES MELLITUS WITH STAGE 3B CHRONIC KIDNEY DISEASE, WITH LONG-TERM CURRENT USE OF INSULIN (H): Primary | ICD-10-CM

## 2022-01-05 DIAGNOSIS — I10 ESSENTIAL HYPERTENSION: ICD-10-CM

## 2022-01-05 DIAGNOSIS — R53.81 PHYSICAL DECONDITIONING: ICD-10-CM

## 2022-01-05 DIAGNOSIS — E11.22 TYPE 2 DIABETES MELLITUS WITH STAGE 3B CHRONIC KIDNEY DISEASE, WITH LONG-TERM CURRENT USE OF INSULIN (H): Primary | ICD-10-CM

## 2022-01-05 DIAGNOSIS — N18.32 TYPE 2 DIABETES MELLITUS WITH STAGE 3B CHRONIC KIDNEY DISEASE, WITH LONG-TERM CURRENT USE OF INSULIN (H): Primary | ICD-10-CM

## 2022-01-05 DIAGNOSIS — Z86.73 RECENT CEREBROVASCULAR ACCIDENT (CVA): ICD-10-CM

## 2022-01-05 DIAGNOSIS — I65.22 STENOSIS OF LEFT CAROTID ARTERY: ICD-10-CM

## 2022-01-05 DIAGNOSIS — I69.354 SPASTIC HEMIPLEGIA OF LEFT NONDOMINANT SIDE AS LATE EFFECT OF CEREBRAL INFARCTION (H): ICD-10-CM

## 2022-01-05 PROCEDURE — 99309 SBSQ NF CARE MODERATE MDM 30: CPT | Performed by: INTERNAL MEDICINE

## 2022-01-05 ASSESSMENT — MIFFLIN-ST. JEOR: SCORE: 1159.09

## 2022-01-05 NOTE — LETTER
1/5/2022        RE: Addis Peña  [address]  [place] [address]        [facility]  TCU Episodic Visit   January 5, 2022      Chief Complaint   Patient presents with     Nursing Home Acute     DM II with hypoglycemia, seizure disorder, recent CVA, HTN, physical deconditioning        HPI:    Addis Peña is a 71 year old  (1950), who is being seen today for an [facility] care [facility] where she is doing rehab after a hospitalization for a CVA in setting of history of prior CVA. She was started on DAPT therapy as well as insulin and levetiracetam was decreased. At the TCU, course has been notable for increase in metformin and metoprolol.     Today, Ms. Peña is seen in her room laying in bed. She is an excellent historian. We reviewed her medications and recent medication changes as well as her blood sugars and blood pressures. She's had some blood sugars in the 70s. Discussed stopping the insulin and going back to home dose of metformin - she is OK with this. Also reviewed weights which are 130 --> 138 lbs since TCU admission. She does not feel that she has any water weight, no edema. She isn't sure her usual weight - doesn't have a scale at home. Epic has weights ranging from 140 lb to 115 lb in the past year. No chest pain or dyspnea. No abdominal pain, diarrhea or constipation. The left sided weakness is improving. She not heard of a discharge date as of yet.  No acute concerns today per discussion with nursing. She is working with therapies.     ALLERGIES: Lac bovis, Lisinopril, Milk products, and Latex    Past Medical, Surgical, Family and Social History reviewed and updated in EPIC.    MEDICATIONS  Current Outpatient Medications   Medication Sig Dispense Refill     acetaminophen (TYLENOL) 500 MG tablet Take 500-1,000 mg by mouth every 6 hours as needed        amLODIPine (NORVASC) 10 MG tablet Take 1 tablet (10 mg) by mouth daily 90  tablet 3     atorvastatin (LIPITOR) 40 MG tablet Take 1 tablet (40 mg) by mouth daily 90 tablet 3     bisacodyl (DULCOLAX) 5 MG EC tablet Take 2 tablets (10 mg) by mouth daily as needed for constipation 180 tablet 3     clopidogrel (PLAVIX) 75 MG tablet Take 75 mg by mouth daily       insulin glargine (LANTUS VIAL) 100 UNIT/ML vial Inject 5 Units Subcutaneous At Bedtime       Lactase 4500 units TABS Take 13,500 Units by mouth daily as needed       levETIRAcetam (KEPPRA) 500 MG tablet Take 1,000 mg by mouth 2 times daily Take 1250 mg twice daily       losartan (COZAAR) 25 MG tablet Take 1 tablet (25 mg) by mouth daily 90 tablet 3     metFORMIN (GLUCOPHAGE) 1000 MG tablet TAKE 1 TABLET(1000 MG) BY MOUTH TWICE DAILY WITH MEALS (Patient taking differently: Take 1,250 mg by mouth TAKE 1 TABLET(1250 MG) BY MOUTH TWICE DAILY WITH MEALS) 180 tablet 3     metoprolol succinate ER (TOPROL-XL) 100 MG 24 hr tablet Take 100 mg by mouth daily       montelukast (SINGULAIR) 10 MG tablet Take 1 tablet (10 mg) by mouth daily 90 tablet 3     senna (SENOKOT) 8.6 MG tablet Take 1 tablet by mouth daily       Vitamin D3 (VITAMIN D3) 25 mcg (1000 units) tablet Take 1 tablet (25 mcg) by mouth daily 90 tablet 3     baclofen (LIORESAL) 10 MG tablet TAKE 1 TABLET(10 MG) BY MOUTH THREE TIMES DAILY AS NEEDED FOR MUSCLE SPASMS (Patient taking differently: Take 10 mg by mouth 3 times daily ) 270 tablet 3     blood glucose (IRENE CONTOUR) test strip 1 strip by In Vitro route daily Use to test blood sugars as needed 200 strip 3     Medications reviewed:  Medications reconciled to facility chart and changes were made to reflect current medications as identified as above med list. Below are the changes that were made:   Medications stopped since last EPIC medication reconciliation:   Medications Discontinued During This Encounter   Medication Reason     aspirin (ASA) 81 MG EC tablet      insulin glargine (LANTUS VIAL) 100 UNIT/ML vial      Medications  "started since last EPIC medication reconciliation:  No orders of the defined types were placed in this encounter.      REVIEW OF SYSTEMS:  4 point ROS neg other than the symptoms noted above in the HPI.    PHYSICAL EXAM:  BP (!) 142/88   Pulse 84   Temp 97.2  F (36.2  C)   Resp 16   Ht 1.676 m (5' 6\")   Wt 62.2 kg (137 lb 3.2 oz)   SpO2 95%   BMI 22.14 kg/m    Gen: laying in bed, alert, cooperative and in no acute distress  Card: RRR, S1, S2, no murmurs  Resp: lungs clear to auscultation bilaterally, no crackles or wheezes anteriorly and laterally   Ext: no LE edema  Neuro: CX II-XII grossly in tact  Psych: alert and oriented x3; normal affect    LABS & IMAGING  Recent Labs and Imaging:  Reviewed as per Epic    ASSESSMENT/PLAN:    DM, Type II  Hgb A1c 6.4. New on glargine during hospitalization and metformin increased at TCU. Sugars 70s-150s, most <150 at TCU  -- discontinue glargine  -- decrease metformin from 1250 mg BID to 1000 mg BID  -- follow sugars    Seizure Disorder  Prior CVA. PTA on levetiracetam 1250 BID, decreased to 1000 BID during hospitalization. Repeat levetiracetam level was recommended at hospital discharge.   -- levetiracetam level 1/11/22 - to be drawn BEFORE morning dose    HTN  SBPs 110s-150s, most 120s-140s. 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 Completed ASA, now on clopidogrel monotherapy.   -- atorvastatin 40 mg daily, clopidogrel 75 mg daily   -- baclofen 10 mg TID     Left ICA Stenosis  Needs vascular follow up   -- atorvastatin 40 mg daily, clopidogrel 75 mg daily   -- follow up with vascular surgery as scheduled     Physical Deconditioning  In setting of hospitalization and underlying medical conditions  -- ongoing PT/OT    Electronically signed " by  Ioana Dc MD                          Sincerely,        Ioana Dc MD

## 2022-01-05 NOTE — PROGRESS NOTES
[facility] GERIATRICS  TCU Episodic Visit   January 5, 2022      Chief Complaint   Patient presents with     Nursing Home Acute     DM II with hypoglycemia, seizure disorder, recent CVA, HTN, physical deconditioning        HPI:    Addis Peña is a 71 year old  (1950), who is being seen today for an [facility]odic care [facility] where [facility] is doing rehab after a hospitalization for a CVA in setting of history of prior CVA. She was started on DAPT therapy as well as insulin and levetiracetam was decreased. At the TCU, course has been notable for increase in metformin and metoprolol.     Today, Ms. Peña is seen in her room laying in bed. She is an excellent historian. We reviewed her medications and recent medication changes as well as her blood sugars and blood pressures. She's had some blood sugars in the 70s. Discussed stopping the insulin and going back to home dose of metformin - she is OK with this. Also reviewed weights which are 130 --> 138 lbs since TCU admission. She does not feel that she has any water weight, no edema. She isn't sure her usual weight - doesn't have a scale at home. Epic has weights ranging from 140 lb to 115 lb in the past year. No chest pain or dyspnea. No abdominal pain, diarrhea or constipation. The left sided weakness is improving. She not heard of a discharge date as of yet.  No acute concerns today per discussion with nursing. She is working with therapies.     ALLERGIES: Lac bovis, Lisinopril, Milk products, and Latex    Past Medical, Surgical, Family and Social History reviewed and updated in EPIC.    MEDICATIONS  Current Outpatient Medications   Medication Sig Dispense Refill     acetaminophen (TYLENOL) 500 MG tablet Take 500-1,000 mg by mouth every 6 hours as needed        amLODIPine (NORVASC) 10 MG tablet Take 1 tablet (10 mg) by mouth daily 90 tablet 3     atorvastatin (LIPITOR) 40 MG tablet Take 1 tablet (40 mg) by mouth daily 90 tablet 3      bisacodyl (DULCOLAX) 5 MG EC tablet Take 2 tablets (10 mg) by mouth daily as needed for constipation 180 tablet 3     clopidogrel (PLAVIX) 75 MG tablet Take 75 mg by mouth daily       insulin glargine (LANTUS VIAL) 100 UNIT/ML vial Inject 5 Units Subcutaneous At Bedtime       Lactase 4500 units TABS Take 13,500 Units by mouth daily as needed       levETIRAcetam (KEPPRA) 500 MG tablet Take 1,000 mg by mouth 2 times daily Take 1250 mg twice daily       losartan (COZAAR) 25 MG tablet Take 1 tablet (25 mg) by mouth daily 90 tablet 3     metFORMIN (GLUCOPHAGE) 1000 MG tablet TAKE 1 TABLET(1000 MG) BY MOUTH TWICE DAILY WITH MEALS (Patient taking differently: Take 1,250 mg by mouth TAKE 1 TABLET(1250 MG) BY MOUTH TWICE DAILY WITH MEALS) 180 tablet 3     metoprolol succinate ER (TOPROL-XL) 100 MG 24 hr tablet Take 100 mg by mouth daily       montelukast (SINGULAIR) 10 MG tablet Take 1 tablet (10 mg) by mouth daily 90 tablet 3     senna (SENOKOT) 8.6 MG tablet Take 1 tablet by mouth daily       Vitamin D3 (VITAMIN D3) 25 mcg (1000 units) tablet Take 1 tablet (25 mcg) by mouth daily 90 tablet 3     baclofen (LIORESAL) 10 MG tablet TAKE 1 TABLET(10 MG) BY MOUTH THREE TIMES DAILY AS NEEDED FOR MUSCLE SPASMS (Patient taking differently: Take 10 mg by mouth 3 times daily ) 270 tablet 3     blood glucose (IRENE CONTOUR) test strip 1 strip by In Vitro route daily Use to test blood sugars as needed 200 strip 3     Medications reviewed:  Medications reconciled to facility chart and changes were made to reflect current medications as identified as above med list. Below are the changes that were made:   Medications stopped since last EPIC medication reconciliation:   Medications Discontinued During This Encounter   Medication Reason     aspirin (ASA) 81 MG EC tablet      insulin glargine (LANTUS VIAL) 100 UNIT/ML vial      Medications started since last [facility] medication reconciliation:  No orders of the defined types were placed in this  "encounter.      REVIEW OF SYSTEMS:  4 point ROS neg other than the symptoms noted above in the HPI.    PHYSICAL EXAM:  BP (!) 142/88   Pulse 84   Temp 97.2  F (36.2  C)   Resp 16   Ht 1.676 m (5' 6\")   Wt 62.2 kg (137 lb 3.2 oz)   SpO2 95%   BMI 22.14 kg/m    Gen: laying in bed, alert, cooperative and in no acute distress  Card: RRR, S1, S2, no murmurs  Resp: lungs clear to auscultation bilaterally, no crackles or wheezes anteriorly and laterally   Ext: no LE edema  Neuro: CX II-XII grossly in tact  Psych: alert and oriented x3; normal affect    LABS & IMAGING  Recent Labs and Imaging:  Reviewed as per Epic    ASSESSMENT/PLAN:    DM, Type II  Hgb A1c 6.4. New on glargine during hospitalization and metformin increased at TCU. Sugars 70s-150s, most <150 at TCU  -- discontinue glargine  -- decrease metformin from 1250 mg BID to 1000 mg BID  -- follow sugars    Seizure Disorder  Prior CVA. PTA on levetiracetam 1250 BID, decreased to 1000 BID during hospitalization. Repeat levetiracetam level was recommended at hospital discharge.   -- levetiracetam level 1/11/22 - to be drawn BEFORE morning dose    HTN  SBPs 110s-150s, most 120s-140s. 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 Completed ASA, now on clopidogrel monotherapy.   -- atorvastatin 40 mg daily, clopidogrel 75 mg daily   -- baclofen 10 mg TID     Left ICA Stenosis  Needs vascular follow up   -- atorvastatin 40 mg daily, clopidogrel 75 mg daily   -- follow up with vascular surgery as scheduled     Mild Major Depression  Noted in her history. She is not on any medications for this. Mood and spirits were good today. Her L side is getting stronger, she's had visitors and goals remain restorative and to get home.   -- supportive " cares     CKD, Stage III  Baseline Cr in normal range. Cr 0.7 on 12/11 at [facility]  -- avoid nephrotoxic meds  -- periodic BMP      Physical Deconditioning  In setting of hospitalization and underlying medical conditions  -- ongoing PT/OT    Electronically signed by  Ioana Dc MD

## 2022-01-08 PROBLEM — F33.0 MAJOR DEPRESSIVE DISORDER, RECURRENT EPISODE, MILD (H): Status: ACTIVE | Noted: 2022-01-08

## 2022-01-10 ENCOUNTER — LAB REQUISITION (OUTPATIENT)
Dept: LAB | Facility: CLINIC | Age: 72
End: 2022-01-10
Payer: MEDICARE

## 2022-01-10 DIAGNOSIS — I69.398 OTHER SEQUELAE OF CEREBRAL INFARCTION: ICD-10-CM

## 2022-01-10 DIAGNOSIS — E11.9 TYPE 2 DIABETES MELLITUS WITHOUT COMPLICATIONS (H): ICD-10-CM

## 2022-01-10 DIAGNOSIS — I69.354 HEMIPLEGIA AND HEMIPARESIS FOLLOWING CEREBRAL INFARCTION AFFECTING LEFT NON-DOMINANT SIDE (H): ICD-10-CM

## 2022-01-10 DIAGNOSIS — G40.89 OTHER SEIZURES (H): ICD-10-CM

## 2022-01-10 DIAGNOSIS — I10 ESSENTIAL (PRIMARY) HYPERTENSION: ICD-10-CM

## 2022-01-10 PROCEDURE — U0005 INFEC AGEN DETEC AMPLI PROBE: HCPCS | Performed by: INTERNAL MEDICINE

## 2022-01-11 ENCOUNTER — LAB REQUISITION (OUTPATIENT)
Dept: LAB | Facility: CLINIC | Age: 72
End: 2022-01-11

## 2022-01-11 ENCOUNTER — TRANSITIONAL CARE UNIT VISIT (OUTPATIENT)
Dept: GERIATRICS | Facility: CLINIC | Age: 72
End: 2022-01-11
Payer: MEDICARE

## 2022-01-11 VITALS
WEIGHT: 135 LBS | HEIGHT: 66 IN | BODY MASS INDEX: 21.69 KG/M2 | TEMPERATURE: 97.7 F | HEART RATE: 71 BPM | OXYGEN SATURATION: 96 % | DIASTOLIC BLOOD PRESSURE: 73 MMHG | SYSTOLIC BLOOD PRESSURE: 174 MMHG | RESPIRATION RATE: 16 BRPM

## 2022-01-11 DIAGNOSIS — I63.521 CEREBROVASCULAR ACCIDENT (CVA) DUE TO OCCLUSION OF RIGHT ANTERIOR CEREBRAL ARTERY (H): Primary | ICD-10-CM

## 2022-01-11 DIAGNOSIS — D51.9 ANEMIA DUE TO VITAMIN B12 DEFICIENCY, UNSPECIFIED B12 DEFICIENCY TYPE: ICD-10-CM

## 2022-01-11 DIAGNOSIS — E11.22 TYPE 2 DIABETES MELLITUS WITH STAGE 3B CHRONIC KIDNEY DISEASE, WITH LONG-TERM CURRENT USE OF INSULIN (H): ICD-10-CM

## 2022-01-11 DIAGNOSIS — I69.354 SPASTIC HEMIPLEGIA OF LEFT NONDOMINANT SIDE AS LATE EFFECT OF CEREBRAL INFARCTION (H): ICD-10-CM

## 2022-01-11 DIAGNOSIS — I10 ESSENTIAL HYPERTENSION: ICD-10-CM

## 2022-01-11 DIAGNOSIS — Z79.4 TYPE 2 DIABETES MELLITUS WITH STAGE 3B CHRONIC KIDNEY DISEASE, WITH LONG-TERM CURRENT USE OF INSULIN (H): ICD-10-CM

## 2022-01-11 DIAGNOSIS — N18.32 TYPE 2 DIABETES MELLITUS WITH STAGE 3B CHRONIC KIDNEY DISEASE, WITH LONG-TERM CURRENT USE OF INSULIN (H): ICD-10-CM

## 2022-01-11 DIAGNOSIS — I69.354 HEMIPLEGIA AND HEMIPARESIS FOLLOWING CEREBRAL INFARCTION AFFECTING LEFT NON-DOMINANT SIDE (H): ICD-10-CM

## 2022-01-11 DIAGNOSIS — U07.1 COVID-19: ICD-10-CM

## 2022-01-11 DIAGNOSIS — G40.909 SEIZURE DISORDER (H): ICD-10-CM

## 2022-01-11 LAB — LEVETIRACETAM (KEPPRA): 41.2 UG/ML (ref 6–46)

## 2022-01-11 PROCEDURE — 80177 DRUG SCRN QUAN LEVETIRACETAM: CPT

## 2022-01-11 PROCEDURE — 99309 SBSQ NF CARE MODERATE MDM 30: CPT | Performed by: NURSE PRACTITIONER

## 2022-01-11 PROCEDURE — P9604 ONE-WAY ALLOW PRORATED TRIP: HCPCS

## 2022-01-11 PROCEDURE — 36415 COLL VENOUS BLD VENIPUNCTURE: CPT

## 2022-01-11 ASSESSMENT — MIFFLIN-ST. JEOR: SCORE: 1149.11

## 2022-01-11 NOTE — LETTER
2022        RE: Addis Peña  [address]  [place] [address]        [facility]    Name:   Addis Peña  :   1950  MRN:    [id number]     Facility:   [facility]) [[id number]]   Room: [place] [address]  Code Status: DNR and POLST AVAILABLE -     DOS: 2022  Previous visit:  2021 and subsequently seen by Dr. Ioana Dc on     PCP:  Joel Daniel Wegener, MD     CHIEF COMPLAINT / REASON FOR VISIT:  Chief Complaint   Patient presents with     Clinic Care Coordination - Follow-up     Cerebrovascular accident      [facility] from 2021 until 2021 (recurrent CVA)      HPI: Addis is a 71 year old female with a past medical history significant for stroke with residual left spastic hemiplegia who stated she went to bed at approximately 8 PM the night before and woke up at 4:30 AM with increased left-sided weakness.  She stated that she felt well when she had gone to bed.  Typically, she uses a wheelchair for ambulation but can get up on her own and has enough strength in the left arm to get dressed and perform ADLs.  She stated that she could do neither that morning.  She lives with a roommate who helped her and called 911.  She otherwise had no change in her speech or swallowing ability.  She denied any other new focal neurological deficits.  She takes a daily baby aspirin and has been compliant.    Ischemic stroke: Seen as an extended stroke in the ED has a last known normal 8 PM the night prior.  Discussed with neurology.  Admitted and monitored on telemetry.  To continue  mg daily for 3 weeks and add Plavix (continuing Plavix alone after 3 weeks).  Vascular consulted given her left ICA stenosis.  They would like to follow-up with her in clinic in 6 to 8 weeks.  PT saw patient and recommended TCU upon discharge.  She would need an outpatient event monitor.    Seizure disorder: Phenytoin level subtherapeutic and  levetiracetam level supratherapeutic.  Phenytoin discontinued, and levetiracetam decreased to 1000 mg twice daily.  Level was to be repeated on 12/15 before Keppra dose.    Hypertension: Initially allowed for permissive hypertension.  Amlodipine and metoprolol were resumed while inpatient.  Also resumed losartan on discharge.    Diabetes mellitus type 2: Metformin was held while inpatient and resumed on discharge.  Glargine 5 units at bedtime was added.  She stated she was unaware of this.    PROCEDURES/SIGNIFICANT IMAGING AND TESTING  CTA head and neck with IV contrast; CT brain perfusion (12/11/2021):  Head CT: No acute intracranial hemorrhage.  No CT evidence of acute infarct (aspect score 10).  Right SOCO territory large chronic infarct.  Small chronic infarcts.  Age-related changes.  Head CTA: No arterial large vessel occlusion.  Multiple stenoses and occlusions.  Neck CTA: Right carotid bifurcation moderate stenosis.  Right proximal carotid bulb 50% stenosis.  Right proximal cervical ICA 50% stenosis.  Left distal common carotid artery moderate stenosis.  Left carotid bifurcation moderate to severe stenosis.  Left proximal external carotid artery severe stenosis.  Left carotid bulb and proximal cervical ICA demonstrates 70 to 90% stenosis.  Left distal cervical ICA 50 to 70% stenosis.  No dissection.  Right vertebral artery origin severe stenosis.  Left distal V1 segment moderate stenosis.  CT perfusion: No acute perfusion abnormality.    MR brain without IV contrast (12/11/2021):  Small area of acute or subacute ischemia involving the posterior limb of the left internal capsule.  Foci of diffusion and FLAIR hyperintense signal in the right and left mid cerebral peduncles without definite diffusion restriction are nonspecific and may reflect sequela of chronic infarcts, alternatively demyelinating disease.  Multiple chronic infarcts as described previously.      CURRENT/RECENT TCU ISSUES    Disposition: I find  her lying in bed this morning.  She tells me that physical therapy is going well, and she is getting positive feedback.    She has expressed worry about the possibility of another stroke.  Her neurologist cannot promise she won't have another, but she describes a discussion about possible endarterectomy procedure to bilateral carotids, because that is where he suspects they are coming from.  Her next appointment is in February.  In physical therapy, she is working on small steps, balance, and flexibility.    Diabetes mellitus type 2: She was not on insulin at home.  She has been on sliding scale here, receiving 2 to 4 units.  I suggested we could try increasing her metformin from 1000 mg twice daily to 1250 mg twice daily in an effort to discharge her without the need for insulin.  According to the patient, her last A1c was 6.4.  Dr. Dc brought the dosing back down to 1000 mg twice daily.  Blood glucose levels still look good, and I suspect she will discharge without the need for insulin.  Currently, almost all blood glucose levels are >100 and <150.    Hypertension: Blood pressures are fluctuating, typically in the 130s systolic, but the occasionally much higher (160s).  Diastolics are typically in the 70s.  She is receiving metoprolol succinate 50 mg daily, amlodipine 10 mg daily, and losartan 25 mg daily.  Blood pressures may still be slightly high.  There are some questions about possibly increasing the dose to 100 mg.    Spastic hemiplegia: She has occasional spasms in her left arm.  Baclofen is working well at current dose.    ROS: No headaches or chest pains, coughing or congestion, nausea or vomiting, dizziness or dyspnea, dysuria, constipation or diarrhea, difficulty chewing or swallowing, integumentary issues, or problems with appetite or sleep.      Past Medical History:   Diagnosis Date     Allergic rhinitis      Allergic rhinitis      CKD (chronic kidney disease)      Depression      Depression       Displaced dome fracture of left acetabulum (H)      DM2 (diabetes mellitus, type 2) (H)      GERD (gastroesophageal reflux disease)      GERD (gastroesophageal reflux disease)      Gout      HTN (hypertension)      Hyperlipidaemia LDL goal <100      Hypertension goal BP (blood pressure) < 140/90      Left hemiplegia (H) 1/2010    sees PMR physician     Left hemiplegia (H)      Migraine      Migraine      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)      Nephrolithiasis      Nephrolithiasis      Seizure disorder (H)      Spastic hemiplegia of left nondominant side as late effect of cerebral infarction (H) 12/20/2021     Stroke (H) 1/2010    due to uncontrolled hypertension     Stroke (H)      Type 2 diabetes, HbA1C goal < 8% (H)      Vitamin B12 deficiency anemia               Family History   Problem Relation Age of Onset     Hypertension Mother      Heart Disease Mother      C.A.D. Father      Coronary Artery Disease Father      Diabetes Sister      Hypertension Brother      Cancer Sister      Diabetes Sister      Hypertension Brother      Cancer Sister      Social History     Socioeconomic History     Marital status:      Spouse name: None     Number of children: None     Years of education: None     Highest education level: None   Occupational History     None   Tobacco Use     Smoking status: Never Smoker     Smokeless tobacco: Never Used   Vaping Use     Vaping Use: Never used   Substance and Sexual Activity     Alcohol use: No     Drug use: No     Sexual activity: Not Currently     Partners: Male   Other Topics Concern     Parent/sibling w/ CABG, MI or angioplasty before 65F 55M? No   Social History Narrative     None     Social Determinants of Health     Financial Resource Strain: Not on file   Food Insecurity: Not on file   Transportation Needs: Not on file   Physical Activity: Not on file   Stress: Not on file    Social Connections: Not on file   Intimate Partner Violence: Not on file   Housing Stability: Not on file       MEDICATIONS: Reviewed from the MAR, physician orders, and/or earlier progress notes.  Post Discharge Medication Reconciliation Status: medication reconcilation previously completed during another office visit.    Current Outpatient Medications   Medication Sig     acetaminophen (TYLENOL) 500 MG tablet Take 500-1,000 mg by mouth every 6 hours as needed      amLODIPine (NORVASC) 10 MG tablet Take 1 tablet (10 mg) by mouth daily     atorvastatin (LIPITOR) 40 MG tablet Take 1 tablet (40 mg) by mouth daily     baclofen (LIORESAL) 10 MG tablet TAKE 1 TABLET(10 MG) BY MOUTH THREE TIMES DAILY AS NEEDED FOR MUSCLE SPASMS (Patient taking differently: Take 10 mg by mouth 3 times daily )     bisacodyl (DULCOLAX) 5 MG EC tablet Take 2 tablets (10 mg) by mouth daily as needed for constipation     blood glucose (IRENE CONTOUR) test strip 1 strip by In Vitro route daily Use to test blood sugars as needed     clopidogrel (PLAVIX) 75 MG tablet Take 75 mg by mouth daily     Lactase 4500 units TABS Take 13,500 Units by mouth daily as needed     levETIRAcetam (KEPPRA) 500 MG tablet Take 1,000 mg by mouth 2 times daily Take 1250 mg twice daily     losartan (COZAAR) 25 MG tablet Take 1 tablet (25 mg) by mouth daily     metFORMIN (GLUCOPHAGE) 1000 MG tablet TAKE 1 TABLET(1000 MG) BY MOUTH TWICE DAILY WITH MEALS     metoprolol succinate ER (TOPROL-XL) 100 MG 24 hr tablet Take 100 mg by mouth daily     montelukast (SINGULAIR) 10 MG tablet Take 1 tablet (10 mg) by mouth daily     senna (SENOKOT) 8.6 MG tablet Take 1 tablet by mouth daily     Vitamin D3 (VITAMIN D3) 25 mcg (1000 units) tablet Take 1 tablet (25 mcg) by mouth daily     Current Facility-Administered Medications   Medication     vitamin B-12 (CYANOCOBALAMIN) injection 1,000 mcg     ALLERGIES:   Allergies   Allergen Reactions     Lac Bovis      Lisinopril Cough     Milk  "Products GI Disturbance     Latex Rash     DIET: Diabetic, regular texture, thin liquids.    Vitals:    01/11/22 1407   BP: (!) 174/73   Pulse: 71   Resp: 16   Temp: 97.7  F (36.5  C)   SpO2: 96%   Weight: 61.2 kg (135 lb)   Height: 1.676 m (5' 6\")     Body mass index is 21.79 kg/m .    EXAMINATION:   General: Pleasant, alert, and conversant middle-aged female, lying in bed, in no apparent distress.  Mildly dysarthric.  Head: Normocephalic and atraumatic.   Eyes: PERRLA, sclerae clear.   ENT: Moist oral mucosa.  She has her own teeth in excellent repair.  No gingival hypertrophy from phenytoin.  No rhinorrhea or nasal discharge.  Hearing is unimpaired.  Cardiovascular: Regular rate and rhythm.   Respiratory: Lungs clear to auscultation bilaterally.   Abdomen: Nondistended.   Musculoskeletal/Extremities: Age-related degenerative joint disease.  Not wearing left AFO when in bed.  Neurologic: Mild contractures of the left upper extremity.  Contracture of the left thumb reveals enlarged CMC joint.  She cannot make a fist.  She has not lost sensation, and there is no numbness or tingling.  Integument: No rashes, clinically significant lesions, or skin breakdown.   Cognitive/Psychiatric: Alert and oriented with euthymic affect.    DIAGNOSTICS:   Recent Results (from the past 240 hour(s))   COVID-19 Virus (Coronavirus) by PCR Nose    Collection Time: 01/10/22  9:40 AM    Specimen: Nose; Swab   Result Value Ref Range    SARS CoV2 PCR Negative Negative, Testing sent to reference lab. Results will be returned via unsolicited result   Keppra (Levetiracetam) Level    Collection Time: 01/11/22  8:02 AM   Result Value Ref Range    Levetiracetam 41.2 6.0 - 46.0 ug/mL       ASSESSMENT/Plan:      ICD-10-CM    1. Cerebrovascular accident (CVA) due to occlusion of right anterior cerebral artery (H)  I63.521    2. Spastic hemiplegia of left nondominant side as late effect of cerebral infarction (H)  I69.354    3. Type 2 diabetes " mellitus with other neurologic complication, with long-term current use of insulin (H)  E11.49     Z79.4    4. Essential hypertension  I10    5. Seizure disorder (H)  G40.909    6. Anemia due to vitamin B12 deficiency, unspecified B12 deficiency type  D51.9        CHANGES:    None.    CARE PLAN:    The care plan, medications, vital signs, orders, and nursing notes have been reviewed, and all orders signed. Changes to care plan, if any, as noted. Otherwise, continue current plan of care.    The above has been created using voice recognition software. Please be aware that this may unintentionally  produce inaccuracies and/or nonsensical sentences.      Electronically signed by: KEILA Mcmahon CNP        Sincerely,        KEILA Mcmahon CNP

## 2022-01-12 LAB — SARS-COV-2 RNA RESP QL NAA+PROBE: NEGATIVE

## 2022-01-13 PROCEDURE — U0003 INFECTIOUS AGENT DETECTION BY NUCLEIC ACID (DNA OR RNA); SEVERE ACUTE RESPIRATORY SYNDROME CORONAVIRUS 2 (SARS-COV-2) (CORONAVIRUS DISEASE [COVID-19]), AMPLIFIED PROBE TECHNIQUE, MAKING USE OF HIGH THROUGHPUT TECHNOLOGIES AS DESCRIBED BY CMS-2020-01-R: HCPCS | Performed by: OBSTETRICS & GYNECOLOGY

## 2022-01-14 ENCOUNTER — LAB REQUISITION (OUTPATIENT)
Dept: LAB | Facility: CLINIC | Age: 72
End: 2022-01-14

## 2022-01-14 DIAGNOSIS — Z20.822 CONTACT WITH AND (SUSPECTED) EXPOSURE TO COVID-19: ICD-10-CM

## 2022-01-14 LAB — SARS-COV-2 RNA RESP QL NAA+PROBE: NEGATIVE

## 2022-01-14 NOTE — PROGRESS NOTES
[facility]    Name:   Addis Peña  :   1950  MRN:    [id number]     Facility:   [facility]) [[id number]]   Room: [place] [address]  Code Status: DNR and POLST AVAILABLE -     DOS: 2022  Previous visit:  2021 and subsequently seen by Dr. Ioana Dc on     PCP:  Joel Daniel Wegener, MD     CHIEF COMPLAINT / REASON FOR VISIT:  Chief Complaint   Patient presents with     Clinic Care Coordination - Follow-up     Cerebrovascular accident      [facility] from 2021 until 2021 (recurrent CVA)      HPI: Addis is a 71 year old female with a past medical history significant for stroke with residual left spastic hemiplegia who stated she went to bed at approximately 8 PM the night before and woke up at 4:30 AM with increased left-sided weakness.  She stated that she felt well when she had gone to bed.  Typically, she uses a wheelchair for ambulation but can get up on her own and has enough strength in the left arm to get dressed and perform ADLs.  She stated that she could do neither that morning.  She lives with a roommate who helped her and called 911.  She otherwise had no change in her speech or swallowing ability.  She denied any other new focal neurological deficits.  She takes a daily baby aspirin and has been compliant.    Ischemic stroke: Seen as an extended stroke in the ED has a last known normal 8 PM the night prior.  Discussed with neurology.  Admitted and monitored on telemetry.  To continue  mg daily for 3 weeks and add Plavix (continuing Plavix alone after 3 weeks).  Vascular consulted given her left ICA stenosis.  They would like to follow-up with her in clinic in 6 to 8 weeks.  PT saw patient and recommended TCU upon discharge.  She would need an outpatient event monitor.    Seizure disorder: Phenytoin level subtherapeutic and levetiracetam level supratherapeutic.  Phenytoin discontinued, and levetiracetam decreased to 1000 mg  twice daily.  Level was to be repeated on 12/15 before Keppra dose.    Hypertension: Initially allowed for permissive hypertension.  Amlodipine and metoprolol were resumed while inpatient.  Also resumed losartan on discharge.    Diabetes mellitus type 2: Metformin was held while inpatient and resumed on discharge.  Glargine 5 units at bedtime was added.  She stated she was unaware of this.    PROCEDURES/SIGNIFICANT IMAGING AND TESTING  CTA head and neck with IV contrast; CT brain perfusion (12/11/2021):  Head CT: No acute intracranial hemorrhage.  No CT evidence of acute infarct (aspect score 10).  Right SOCO territory large chronic infarct.  Small chronic infarcts.  Age-related changes.  Head CTA: No arterial large vessel occlusion.  Multiple stenoses and occlusions.  Neck CTA: Right carotid bifurcation moderate stenosis.  Right proximal carotid bulb 50% stenosis.  Right proximal cervical ICA 50% stenosis.  Left distal common carotid artery moderate stenosis.  Left carotid bifurcation moderate to severe stenosis.  Left proximal external carotid artery severe stenosis.  Left carotid bulb and proximal cervical ICA demonstrates 70 to 90% stenosis.  Left distal cervical ICA 50 to 70% stenosis.  No dissection.  Right vertebral artery origin severe stenosis.  Left distal V1 segment moderate stenosis.  CT perfusion: No acute perfusion abnormality.    MR brain without IV contrast (12/11/2021):  Small area of acute or subacute ischemia involving the posterior limb of the left internal capsule.  Foci of diffusion and FLAIR hyperintense signal in the right and left mid cerebral peduncles without definite diffusion restriction are nonspecific and may reflect sequela of chronic infarcts, alternatively demyelinating disease.  Multiple chronic infarcts as described previously.      CURRENT/RECENT TCU ISSUES    Disposition: I find her lying in bed this morning.  She tells me that physical therapy is going well, and she is getting  positive feedback.    She has expressed worry about the possibility of another stroke.  Her neurologist cannot promise she won't have another, but she describes a discussion about possible endarterectomy procedure to bilateral carotids, because that is where he suspects they are coming from.  Her next appointment is in February.  In physical therapy, she is working on small steps, balance, and flexibility.    Diabetes mellitus type 2: She was not on insulin at home.  She has been on sliding scale here, receiving 2 to 4 units.  I suggested we could try increasing her metformin from 1000 mg twice daily to 1250 mg twice daily in an effort to discharge her without the need for insulin.  According to the patient, her last A1c was 6.4.  Dr. Dc brought the dosing back down to 1000 mg twice daily.  Blood glucose levels still look good, and I suspect she will discharge without the need for insulin.  Currently, almost all blood glucose levels are >100 and <150.    Hypertension: Blood pressures are fluctuating, typically in the 130s systolic, but the occasionally much higher (160s).  Diastolics are typically in the 70s.  She is receiving metoprolol succinate 50 mg daily, amlodipine 10 mg daily, and losartan 25 mg daily.  Blood pressures may still be slightly high.  There are some questions about possibly increasing the dose to 100 mg.    Spastic hemiplegia: She has occasional spasms in her left arm.  Baclofen is working well at current dose.    ROS: No headaches or chest pains, coughing or congestion, nausea or vomiting, dizziness or dyspnea, dysuria, constipation or diarrhea, difficulty chewing or swallowing, integumentary issues, or problems with appetite or sleep.      Past Medical History:   Diagnosis Date     Allergic rhinitis      Allergic rhinitis      CKD (chronic kidney disease)      Depression      Depression      Displaced dome fracture of left acetabulum (H)      DM2 (diabetes mellitus, type 2) (H)      GERD  (gastroesophageal reflux disease)      GERD (gastroesophageal reflux disease)      Gout      HTN (hypertension)      Hyperlipidaemia LDL goal <100      Hypertension goal BP (blood pressure) < 140/90      Left hemiplegia (H) 1/2010    sees PMR physician     Left hemiplegia (H)      Migraine      Migraine      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)      Nephrolithiasis      Nephrolithiasis      Seizure disorder (H)      Spastic hemiplegia of left nondominant side as late effect of cerebral infarction (H) 12/20/2021     Stroke (H) 1/2010    due to uncontrolled hypertension     Stroke (H)      Type 2 diabetes, HbA1C goal < 8% (H)      Vitamin B12 deficiency anemia               Family History   Problem Relation Age of Onset     Hypertension Mother      Heart Disease Mother      C.A.D. Father      Coronary Artery Disease Father      Diabetes Sister      Hypertension Brother      Cancer Sister      Diabetes Sister      Hypertension Brother      Cancer Sister      Social History     Socioeconomic History     Marital status:      Spouse name: None     Number of children: None     Years of education: None     Highest education level: None   Occupational History     None   Tobacco Use     Smoking status: Never Smoker     Smokeless tobacco: Never Used   Vaping Use     Vaping Use: Never used   Substance and Sexual Activity     Alcohol use: No     Drug use: No     Sexual activity: Not Currently     Partners: Male   Other Topics Concern     Parent/sibling w/ CABG, MI or angioplasty before 65F 55M? No   Social History Narrative     None     Social Determinants of Health     Financial Resource Strain: Not on file   Food Insecurity: Not on file   Transportation Needs: Not on file   Physical Activity: Not on file   Stress: Not on file   Social Connections: Not on file   Intimate Partner Violence: Not on file   Housing Stability: Not on  file       MEDICATIONS: Reviewed from the MAR, physician orders, and/or earlier progress notes.  Post Discharge Medication Reconciliation Status: medication reconcilation previously completed during another office visit.    Current Outpatient Medications   Medication Sig     acetaminophen (TYLENOL) 500 MG tablet Take 500-1,000 mg by mouth every 6 hours as needed      amLODIPine (NORVASC) 10 MG tablet Take 1 tablet (10 mg) by mouth daily     atorvastatin (LIPITOR) 40 MG tablet Take 1 tablet (40 mg) by mouth daily     baclofen (LIORESAL) 10 MG tablet TAKE 1 TABLET(10 MG) BY MOUTH THREE TIMES DAILY AS NEEDED FOR MUSCLE SPASMS (Patient taking differently: Take 10 mg by mouth 3 times daily )     bisacodyl (DULCOLAX) 5 MG EC tablet Take 2 tablets (10 mg) by mouth daily as needed for constipation     blood glucose (IRENE CONTOUR) test strip 1 strip by In Vitro route daily Use to test blood sugars as needed     clopidogrel (PLAVIX) 75 MG tablet Take 75 mg by mouth daily     Lactase 4500 units TABS Take 13,500 Units by mouth daily as needed     levETIRAcetam (KEPPRA) 500 MG tablet Take 1,000 mg by mouth 2 times daily Take 1250 mg twice daily     losartan (COZAAR) 25 MG tablet Take 1 tablet (25 mg) by mouth daily     metFORMIN (GLUCOPHAGE) 1000 MG tablet TAKE 1 TABLET(1000 MG) BY MOUTH TWICE DAILY WITH MEALS     metoprolol succinate ER (TOPROL-XL) 100 MG 24 hr tablet Take 100 mg by mouth daily     montelukast (SINGULAIR) 10 MG tablet Take 1 tablet (10 mg) by mouth daily     senna (SENOKOT) 8.6 MG tablet Take 1 tablet by mouth daily     Vitamin D3 (VITAMIN D3) 25 mcg (1000 units) tablet Take 1 tablet (25 mcg) by mouth daily     Current Facility-Administered Medications   Medication     vitamin B-12 (CYANOCOBALAMIN) injection 1,000 mcg     ALLERGIES:   Allergies   Allergen Reactions     Lac Bovis      Lisinopril Cough     Milk Products GI Disturbance     Latex Rash     DIET: Diabetic, regular texture, thin liquids.    Vitals:  "   01/11/22 1407   BP: (!) 174/73   Pulse: 71   Resp: 16   Temp: 97.7  F (36.5  C)   SpO2: 96%   Weight: 61.2 kg (135 lb)   Height: 1.676 m (5' 6\")     Body mass index is 21.79 kg/m .    EXAMINATION:   General: Pleasant, alert, and conversant middle-aged female, lying in bed, in no apparent distress.  Mildly dysarthric.  Head: Normocephalic and atraumatic.   Eyes: PERRLA, sclerae clear.   ENT: Moist oral mucosa.  She has her own teeth in excellent repair.  No gingival hypertrophy from phenytoin.  No rhinorrhea or nasal discharge.  Hearing is unimpaired.  Cardiovascular: Regular rate and rhythm.   Respiratory: Lungs clear to auscultation bilaterally.   Abdomen: Nondistended.   Musculoskeletal/Extremities: Age-related degenerative joint disease.  Not wearing left AFO when in bed.  Neurologic: Mild contractures of the left upper extremity.  Contracture of the left thumb reveals enlarged CMC joint.  She cannot make a fist.  She has not lost sensation, and there is no numbness or tingling.  Integument: No rashes, clinically significant lesions, or skin breakdown.   Cognitive/Psychiatric: Alert and oriented with euthymic affect.    DIAGNOSTICS:   Recent Results (from the past 240 hour(s))   COVID-19 Virus (Coronavirus) by PCR Nose    Collection Time: 01/10/22  9:40 AM    Specimen: Nose; Swab   Result Value Ref Range    SARS CoV2 PCR Negative Negative, Testing sent to reference lab. Results will be returned via unsolicited result   Keppra (Levetiracetam) Level    Collection Time: 01/11/22  8:02 AM   Result Value Ref Range    Levetiracetam 41.2 6.0 - 46.0 ug/mL       ASSESSMENT/Plan:      ICD-10-CM    1. Cerebrovascular accident (CVA) due to occlusion of right anterior cerebral artery (H)  I63.521    2. Spastic hemiplegia of left nondominant side as late effect of cerebral infarction (H)  I69.354    3. Type 2 diabetes mellitus with other neurologic complication, with long-term current use of insulin (H)  E11.49     Z79.4  "   4. Essential hypertension  I10    5. Seizure disorder (H)  G40.909    6. Anemia due to vitamin B12 deficiency, unspecified B12 deficiency type  D51.9        CHANGES:    None.    CARE PLAN:    The care plan, medications, vital signs, orders, and nursing notes have been reviewed, and all orders signed. Changes to care plan, if any, as noted. Otherwise, continue current plan of care.    The above has been created using voice recognition software. Please be aware that this may unintentionally  produce inaccuracies and/or nonsensical sentences.      Electronically signed by: KEILA Mcmahon CNP

## 2022-01-18 ENCOUNTER — TRANSITIONAL CARE UNIT VISIT (OUTPATIENT)
Dept: GERIATRICS | Facility: CLINIC | Age: 72
End: 2022-01-18
Payer: MEDICARE

## 2022-01-18 VITALS
WEIGHT: 136.8 LBS | TEMPERATURE: 98.4 F | BODY MASS INDEX: 21.98 KG/M2 | RESPIRATION RATE: 16 BRPM | HEIGHT: 66 IN | DIASTOLIC BLOOD PRESSURE: 75 MMHG | SYSTOLIC BLOOD PRESSURE: 140 MMHG | OXYGEN SATURATION: 97 % | HEART RATE: 75 BPM

## 2022-01-18 DIAGNOSIS — I10 ESSENTIAL HYPERTENSION: ICD-10-CM

## 2022-01-18 DIAGNOSIS — I69.354 SPASTIC HEMIPLEGIA OF LEFT NONDOMINANT SIDE AS LATE EFFECT OF CEREBRAL INFARCTION (H): ICD-10-CM

## 2022-01-18 DIAGNOSIS — N18.32 TYPE 2 DIABETES MELLITUS WITH STAGE 3B CHRONIC KIDNEY DISEASE, WITH LONG-TERM CURRENT USE OF INSULIN (H): ICD-10-CM

## 2022-01-18 DIAGNOSIS — D51.9 ANEMIA DUE TO VITAMIN B12 DEFICIENCY, UNSPECIFIED B12 DEFICIENCY TYPE: ICD-10-CM

## 2022-01-18 DIAGNOSIS — G40.909 SEIZURE DISORDER (H): ICD-10-CM

## 2022-01-18 DIAGNOSIS — E11.22 TYPE 2 DIABETES MELLITUS WITH STAGE 3B CHRONIC KIDNEY DISEASE, WITH LONG-TERM CURRENT USE OF INSULIN (H): ICD-10-CM

## 2022-01-18 DIAGNOSIS — Z79.4 TYPE 2 DIABETES MELLITUS WITH STAGE 3B CHRONIC KIDNEY DISEASE, WITH LONG-TERM CURRENT USE OF INSULIN (H): ICD-10-CM

## 2022-01-18 DIAGNOSIS — I63.521 CEREBROVASCULAR ACCIDENT (CVA) DUE TO OCCLUSION OF RIGHT ANTERIOR CEREBRAL ARTERY (H): Primary | ICD-10-CM

## 2022-01-18 PROCEDURE — 99309 SBSQ NF CARE MODERATE MDM 30: CPT | Performed by: NURSE PRACTITIONER

## 2022-01-18 ASSESSMENT — MIFFLIN-ST. JEOR: SCORE: 1157.27
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
## 2022-01-21 ENCOUNTER — TRANSITIONAL CARE UNIT VISIT (OUTPATIENT)
Dept: GERIATRICS | Facility: CLINIC | Age: 72
End: 2022-01-21
Payer: MEDICARE

## 2022-01-21 VITALS
HEART RATE: 73 BPM | RESPIRATION RATE: 18 BRPM | TEMPERATURE: 98.3 F | OXYGEN SATURATION: 95 % | SYSTOLIC BLOOD PRESSURE: 118 MMHG | HEIGHT: 66 IN | DIASTOLIC BLOOD PRESSURE: 70 MMHG | BODY MASS INDEX: 21.86 KG/M2 | WEIGHT: 136 LBS

## 2022-01-21 DIAGNOSIS — Z79.4 TYPE 2 DIABETES MELLITUS WITH STAGE 3B CHRONIC KIDNEY DISEASE, WITH LONG-TERM CURRENT USE OF INSULIN (H): ICD-10-CM

## 2022-01-21 DIAGNOSIS — G40.909 SEIZURE DISORDER (H): ICD-10-CM

## 2022-01-21 DIAGNOSIS — I10 ESSENTIAL HYPERTENSION: ICD-10-CM

## 2022-01-21 DIAGNOSIS — I63.521 CEREBROVASCULAR ACCIDENT (CVA) DUE TO OCCLUSION OF RIGHT ANTERIOR CEREBRAL ARTERY (H): Primary | ICD-10-CM

## 2022-01-21 DIAGNOSIS — I69.354 SPASTIC HEMIPLEGIA OF LEFT NONDOMINANT SIDE AS LATE EFFECT OF CEREBRAL INFARCTION (H): ICD-10-CM

## 2022-01-21 DIAGNOSIS — N18.32 TYPE 2 DIABETES MELLITUS WITH STAGE 3B CHRONIC KIDNEY DISEASE, WITH LONG-TERM CURRENT USE OF INSULIN (H): ICD-10-CM

## 2022-01-21 DIAGNOSIS — D51.9 ANEMIA DUE TO VITAMIN B12 DEFICIENCY, UNSPECIFIED B12 DEFICIENCY TYPE: ICD-10-CM

## 2022-01-21 DIAGNOSIS — E11.22 TYPE 2 DIABETES MELLITUS WITH STAGE 3B CHRONIC KIDNEY DISEASE, WITH LONG-TERM CURRENT USE OF INSULIN (H): ICD-10-CM

## 2022-01-21 PROCEDURE — 99309 SBSQ NF CARE MODERATE MDM 30: CPT | Performed by: NURSE PRACTITIONER

## 2022-01-21 ASSESSMENT — MIFFLIN-ST. JEOR: SCORE: 1153.64

## 2022-01-21 NOTE — PROGRESS NOTES
[facility]    Name:   Addis Peña  :   1950  MRN:    [id number]     Facility:   [facility] ([facility]) [[id number]]   Room: [place] [address]  Code Status: DNR and POLST AVAILABLE -     DOS: 2022  Previous visit:  2022    PCP:  Joel Daniel Wegener, MD     CHIEF COMPLAINT / REASON FOR VISIT:  Chief Complaint   Patient presents with     Clinic Care Coordination - Follow-up     Cerebrovascular accident      [facility] from 2021 until 2021 (recurrent CVA)      HPI: Addis is a 71 year old female with a past medical history significant for stroke with residual left spastic hemiplegia who stated she went to bed at approximately 8 PM the night before and woke up at 4:30 AM with increased left-sided weakness.  She stated that she felt well when she had gone to bed.  Typically, she uses a wheelchair for ambulation but can get up on her own and has enough strength in the left arm to get dressed and perform ADLs.  She stated that she could do neither that morning.  She lives with a roommate who helped her and called 911.  She otherwise had no change in her speech or swallowing ability.  She denied any other new focal neurological deficits.  She takes a daily baby aspirin and has been compliant.    Ischemic stroke: Seen as an extended stroke in the ED has a last known normal 8 PM the night prior.  Discussed with neurology.  Admitted and monitored on telemetry.  To continue  mg daily for 3 weeks and add Plavix (continuing Plavix alone after 3 weeks).  Vascular consulted given her left ICA stenosis.  They would like to follow-up with her in clinic in 6 to 8 weeks.  PT saw patient and recommended TCU upon discharge.  She would need an outpatient event monitor.    Seizure disorder: Phenytoin level subtherapeutic and levetiracetam level supratherapeutic.  Phenytoin discontinued, and levetiracetam decreased to 1000 mg twice daily.  Level was to be repeated on  12/15 before Keppra dose.    Hypertension: Initially allowed for permissive hypertension.  Amlodipine and metoprolol were resumed while inpatient.  Also resumed losartan on discharge.    Diabetes mellitus type 2: Metformin was held while inpatient and resumed on discharge.  Glargine 5 units at bedtime was added.  She stated she was unaware of this.    PROCEDURES/SIGNIFICANT IMAGING AND TESTING  CTA head and neck with IV contrast; CT brain perfusion (12/11/2021):  Head CT: No acute intracranial hemorrhage.  No CT evidence of acute infarct (aspect score 10).  Right SOCO territory large chronic infarct.  Small chronic infarcts.  Age-related changes.  Head CTA: No arterial large vessel occlusion.  Multiple stenoses and occlusions.  Neck CTA: Right carotid bifurcation moderate stenosis.  Right proximal carotid bulb 50% stenosis.  Right proximal cervical ICA 50% stenosis.  Left distal common carotid artery moderate stenosis.  Left carotid bifurcation moderate to severe stenosis.  Left proximal external carotid artery severe stenosis.  Left carotid bulb and proximal cervical ICA demonstrates 70 to 90% stenosis.  Left distal cervical ICA 50 to 70% stenosis.  No dissection.  Right vertebral artery origin severe stenosis.  Left distal V1 segment moderate stenosis.  CT perfusion: No acute perfusion abnormality.    MR brain without IV contrast (12/11/2021):  Small area of acute or subacute ischemia involving the posterior limb of the left internal capsule.  Foci of diffusion and FLAIR hyperintense signal in the right and left mid cerebral peduncles without definite diffusion restriction are nonspecific and may reflect sequela of chronic infarcts, alternatively demyelinating disease.  Multiple chronic infarcts as described previously.      CURRENT/RECENT TCU ISSUES    Disposition: I find her lying in bed this morning, and she is a bit angry having with staff.  She is aware of the staffing situation, but she tells me how she was  left on the bedpan last night, and no one came to remove it.  She says she put on her call light at 7 AM this morning to go on the bedpan, and the nurse came in and shut the light off.  I offered to put her on the bedpan.  She calmed down quickly and thanked me.  I have home, she has been using a PureWick during the night for about 6 months.    She has expressed worry about the possibility of another stroke.  Her neurologist cannot promise she won't have another, but she describes a discussion about possible endarterectomy procedure to bilateral carotids, because that is where he suspects they are coming from.  Her next appointment is in February.  In physical therapy, she is working on small steps, balance, and flexibility.    Diabetes mellitus type 2: She was not on insulin at home.  She has been on sliding scale here, receiving 2 to 4 units.  I suggested we could try increasing her metformin from 1000 mg twice daily to 1250 mg twice daily in an effort to discharge her without the need for insulin.  According to the patient, her last A1c was 6.4.  Dr. Dc brought the dosing back down to 1000 mg twice daily.  Blood glucose levels still look good, and I suspect she will discharge without the need for insulin.  Currently, almost all blood glucose levels are >100 and <150.    Hypertension: Blood pressures are fluctuating, typically in the 130s systolic, but the occasionally much higher (160s).  Diastolics are typically in the 70s.  She is receiving metoprolol succinate 50 mg daily, amlodipine 10 mg daily, and losartan 25 mg daily.  Blood pressures are still somewhat high.  We will increase metoprolol succinate to 100 mg daily.  I also suggested moving her losartan from the morning to bedtime.    Spastic hemiplegia: She has occasional spasms in her left arm.  Baclofen is working well at current dose.    ROS: No headaches or chest pains, coughing or congestion, nausea or vomiting, dizziness or dyspnea, dysuria,  constipation or diarrhea, difficulty chewing or swallowing, integumentary issues, or problems with appetite or sleep.      Past Medical History:   Diagnosis Date     Allergic rhinitis      Allergic rhinitis      CKD (chronic kidney disease)      Depression      Depression      Displaced dome fracture of left acetabulum (H)      DM2 (diabetes mellitus, type 2) (H)      GERD (gastroesophageal reflux disease)      GERD (gastroesophageal reflux disease)      Gout      HTN (hypertension)      Hyperlipidaemia LDL goal <100      Hypertension goal BP (blood pressure) < 140/90      Left hemiplegia (H) 1/2010    sees PMR physician     Left hemiplegia (H)      Migraine      Migraine      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)      Nephrolithiasis      Nephrolithiasis      Seizure disorder (H)      Spastic hemiplegia of left nondominant side as late effect of cerebral infarction (H) 12/20/2021     Stroke (H) 1/2010    due to uncontrolled hypertension     Stroke (H)      Type 2 diabetes, HbA1C goal < 8% (H)      Vitamin B12 deficiency anemia               Family History   Problem Relation Age of Onset     Hypertension Mother      Heart Disease Mother      C.A.D. Father      Coronary Artery Disease Father      Diabetes Sister      Hypertension Brother      Cancer Sister      Diabetes Sister      Hypertension Brother      Cancer Sister      Social History     Socioeconomic History     Marital status:      Spouse name: None     Number of children: None     Years of education: None     Highest education level: None   Occupational History     None   Tobacco Use     Smoking status: Never Smoker     Smokeless tobacco: Never Used   Vaping Use     Vaping Use: Never used   Substance and Sexual Activity     Alcohol use: No     Drug use: No     Sexual activity: Not Currently     Partners: Male   Other Topics Concern     Parent/sibling w/  CABG, MI or angioplasty before 65F 55M? No   Social History Narrative     None     Social Determinants of Health     Financial Resource Strain: Not on file   Food Insecurity: Not on file   Transportation Needs: Not on file   Physical Activity: Not on file   Stress: Not on file   Social Connections: Not on file   Intimate Partner Violence: Not on file   Housing Stability: Not on file       MEDICATIONS: Reviewed from the MAR, physician orders, and/or earlier progress notes.  Post Discharge Medication Reconciliation Status: medication reconcilation previously completed during another office visit.  Updated by me today (01/18/2021) with an increase in metoprolol succinate and time change of losartan from a.m. to bedtime reflected below  Current Outpatient Medications   Medication Sig     acetaminophen (TYLENOL) 500 MG tablet Take 500-1,000 mg by mouth every 6 hours as needed      amLODIPine (NORVASC) 10 MG tablet Take 1 tablet (10 mg) by mouth daily     atorvastatin (LIPITOR) 40 MG tablet Take 1 tablet (40 mg) by mouth daily     baclofen (LIORESAL) 10 MG tablet TAKE 1 TABLET(10 MG) BY MOUTH THREE TIMES DAILY AS NEEDED FOR MUSCLE SPASMS (Patient taking differently: Take 10 mg by mouth 3 times daily )     bisacodyl (DULCOLAX) 5 MG EC tablet Take 2 tablets (10 mg) by mouth daily as needed for constipation     blood glucose (IRENE CONTOUR) test strip 1 strip by In Vitro route daily Use to test blood sugars as needed     clopidogrel (PLAVIX) 75 MG tablet Take 75 mg by mouth daily     Lactase 4500 units TABS Take 13,500 Units by mouth daily as needed     levETIRAcetam (KEPPRA) 500 MG tablet Take 1,000 mg by mouth 2 times daily Take 1250 mg twice daily     losartan (COZAAR) 25 MG tablet Take 1 tablet (25 mg) by mouth daily (Patient taking differently: Take 25 mg by mouth At Bedtime )     metFORMIN (GLUCOPHAGE) 1000 MG tablet TAKE 1 TABLET(1000 MG) BY MOUTH TWICE DAILY WITH MEALS     metoprolol succinate ER (TOPROL-XL) 100 MG  "24 hr tablet Take 100 mg by mouth daily     montelukast (SINGULAIR) 10 MG tablet Take 1 tablet (10 mg) by mouth daily     senna (SENOKOT) 8.6 MG tablet Take 1 tablet by mouth daily     Vitamin D3 (VITAMIN D3) 25 mcg (1000 units) tablet Take 1 tablet (25 mcg) by mouth daily     Current Facility-Administered Medications   Medication     vitamin B-12 (CYANOCOBALAMIN) injection 1,000 mcg     ALLERGIES:   Allergies   Allergen Reactions     Lac Bovis      Lisinopril Cough     Milk Products GI Disturbance     Latex Rash     DIET: Diabetic, regular texture, thin liquids.    Vitals:    01/18/22 1300   BP: (!) 140/75   Pulse: 75   Resp: 16   Temp: 98.4  F (36.9  C)   SpO2: 97%   Weight: 62.1 kg (136 lb 12.8 oz)   Height: 1.676 m (5' 6\")     Body mass index is 22.08 kg/m .    EXAMINATION:   General: Pleasant, alert, and conversant middle-aged female, lying in bed, in no apparent distress.  Mildly dysarthric.  Head: Normocephalic and atraumatic.   Eyes: PERRLA, sclerae clear.   ENT: Moist oral mucosa.  She has her own teeth in excellent repair.  No gingival hypertrophy from phenytoin.  No rhinorrhea or nasal discharge.  Hearing is unimpaired.  Cardiovascular: Regular rate and rhythm.   Respiratory: Lungs clear to auscultation bilaterally.   Abdomen: Nondistended.   Musculoskeletal/Extremities: Age-related degenerative joint disease.  Not wearing left AFO when in bed.  Neurologic: Mild contractures of the left upper extremity.  Contracture of the left thumb reveals enlarged CMC joint.  She cannot make a fist.  She has not lost sensation, and there is no numbness or tingling.  Integument: No rashes, clinically significant lesions, or skin breakdown.   Cognitive/Psychiatric: Alert and oriented with euthymic affect.    DIAGNOSTICS:   Recent Results (from the past 240 hour(s))   Keppra (Levetiracetam) Level    Collection Time: 01/11/22  8:02 AM   Result Value Ref Range    Levetiracetam 41.2 6.0 - 46.0 ug/mL   COVID-19 Virus " (Coronavirus) by PCR Nasopharyngeal    Collection Time: 01/13/22  7:51 PM    Specimen: Nasopharyngeal; Swab   Result Value Ref Range    SARS CoV2 PCR Negative Negative     Last Comprehensive Metabolic Panel:  Sodium   Date Value Ref Range Status   11/12/2021 138 133 - 144 mmol/L Final   04/15/2021 140 133 - 144 mmol/L Final     Potassium   Date Value Ref Range Status   11/12/2021 4.5 3.4 - 5.3 mmol/L Final   04/15/2021 4.6 3.4 - 5.3 mmol/L Final     Chloride   Date Value Ref Range Status   11/12/2021 106 94 - 109 mmol/L Final   04/15/2021 106 94 - 109 mmol/L Final     Carbon Dioxide   Date Value Ref Range Status   04/15/2021 25 20 - 32 mmol/L Final     Carbon Dioxide (CO2)   Date Value Ref Range Status   11/12/2021 28 20 - 32 mmol/L Final     Anion Gap   Date Value Ref Range Status   11/12/2021 4 3 - 14 mmol/L Final   04/15/2021 9 3 - 14 mmol/L Final     Glucose   Date Value Ref Range Status   11/12/2021 128 (H) 70 - 99 mg/dL Final   04/15/2021 150 (H) 70 - 99 mg/dL Final     Comment:     Fasting specimen     Urea Nitrogen   Date Value Ref Range Status   11/12/2021 24 7 - 30 mg/dL Final   04/15/2021 24 7 - 30 mg/dL Final     Creatinine   Date Value Ref Range Status   11/12/2021 0.68 0.52 - 1.04 mg/dL Final   04/15/2021 0.72 0.52 - 1.04 mg/dL Final     GFR Estimate   Date Value Ref Range Status   11/12/2021 89 >60 mL/min/1.73m2 Final     Comment:     As of July 11, 2021, eGFR is calculated by the CKD-EPI creatinine equation, without race adjustment. eGFR can be influenced by muscle mass, exercise, and diet. The reported eGFR is an estimation only and is only applicable if the renal function is stable.   04/15/2021 86 >60 mL/min/[1.73_m2] Final     Comment:     Non  GFR Calc  Starting 12/18/2018, serum creatinine based estimated GFR (eGFR) will be   calculated using the Chronic Kidney Disease Epidemiology Collaboration   (CKD-EPI) equation.       Calcium   Date Value Ref Range Status   11/12/2021 9.3  8.5 - 10.1 mg/dL Final   04/15/2021 9.2 8.5 - 10.1 mg/dL Final     Bilirubin Total   Date Value Ref Range Status   11/12/2021 0.2 0.2 - 1.3 mg/dL Final   04/15/2021 0.2 0.2 - 1.3 mg/dL Final     Alkaline Phosphatase   Date Value Ref Range Status   11/12/2021 131 40 - 150 U/L Final   04/15/2021 159 (H) 40 - 150 U/L Final     ALT   Date Value Ref Range Status   11/12/2021 24 0 - 50 U/L Final   04/15/2021 23 0 - 50 U/L Final     AST   Date Value Ref Range Status   11/12/2021 18 0 - 45 U/L Final   04/15/2021 18 0 - 45 U/L Final     Lab Results   Component Value Date    WBC 9.1 11/12/2021    WBC 7.7 11/20/2020     Lab Results   Component Value Date    RBC 4.38 11/12/2021    RBC 4.05 11/20/2020     Lab Results   Component Value Date    HGB 14.3 11/12/2021    HGB 13.2 11/20/2020     Lab Results   Component Value Date    HCT 42.6 11/12/2021    HCT 40.9 11/20/2020     Lab Results   Component Value Date    MCV 97 11/12/2021     11/20/2020     Lab Results   Component Value Date    MCH 32.6 11/12/2021    MCH 32.6 11/20/2020     Lab Results   Component Value Date    MCHC 33.6 11/12/2021    MCHC 32.3 11/20/2020     Lab Results   Component Value Date    RDW 11.6 11/12/2021    RDW 11.8 11/20/2020     Lab Results   Component Value Date     11/12/2021     11/20/2020       ASSESSMENT/Plan:      ICD-10-CM    1. Cerebrovascular accident (CVA) due to occlusion of right anterior cerebral artery (H)  I63.521    2. Spastic hemiplegia of left nondominant side as late effect of cerebral infarction (H)  I69.354    3. Type 2 diabetes mellitus with stage 3b chronic kidney disease, with long-term current use of insulin (H)  E11.22     N18.32     Z79.4    4. Essential hypertension  I10    5. Seizure disorder (H)  G40.909    6. Anemia due to vitamin B12 deficiency, unspecified B12 deficiency type  D51.9        CHANGES:    1.  Increase metoprolol succinate from 50 mg to 100 mg daily.  2.  Change losartan from morning to  bedtime.    CARE PLAN:    The care plan, medications, vital signs, orders, and nursing notes have been reviewed, and all orders signed. Changes to care plan, if any, as noted. Otherwise, continue current plan of care.    The above has been created using voice recognition software. Please be aware that this may unintentionally  produce inaccuracies and/or nonsensical sentences.      Electronically signed by: KEILA Mcmahon CNP
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
## 2022-01-24 NOTE — PROGRESS NOTES
[facility]    Name:   Addis Peña  :   1950  MRN:    [id number]     Facility:   [facility] ([facility]) [[id number]]   Room: [place] [address]  Code Status: DNR and POLST AVAILABLE -     DOS: 2022  Previous visit:  2022    PCP:  Joel Daniel Wegener, MD     CHIEF COMPLAINT / REASON FOR VISIT:  Chief Complaint   Patient presents with     Clinic Care Coordination - Follow-up     Cerebrovascular accident      [facility] from 2021 until 2021 (recurrent CVA)      HPI: Addis is a 71 year old female with a past medical history significant for stroke with residual left spastic hemiplegia who stated she went to bed at approximately 8 PM the night before and woke up at 4:30 AM with increased left-sided weakness.  She stated that she felt well when she had gone to bed.  Typically, she uses a wheelchair for ambulation but can get up on her own and has enough strength in the left arm to get dressed and perform ADLs.  She stated that she could do neither that morning.  She lives with a roommate who helped her and called 911.  She otherwise had no change in her speech or swallowing ability.  She denied any other new focal neurological deficits.  She takes a daily baby aspirin and has been compliant.    Ischemic stroke: Seen as an extended stroke in the ED has a last known normal 8 PM the night prior.  Discussed with neurology.  Admitted and monitored on telemetry.  To continue  mg daily for 3 weeks and add Plavix (continuing Plavix alone after 3 weeks).  Vascular consulted given her left ICA stenosis.  They would like to follow-up with her in clinic in 6 to 8 weeks.  PT saw patient and recommended TCU upon discharge.  She would need an outpatient event monitor.    Seizure disorder: Phenytoin level subtherapeutic and levetiracetam level supratherapeutic.  Phenytoin discontinued, and levetiracetam decreased to 1000 mg twice daily.  Level was to be repeated on  12/15 before Keppra dose.    Hypertension: Initially allowed for permissive hypertension.  Amlodipine and metoprolol were resumed while inpatient.  Also resumed losartan on discharge.    Diabetes mellitus type 2: Metformin was held while inpatient and resumed on discharge.  Glargine 5 units at bedtime was added.  She stated she was unaware of this.    PROCEDURES/SIGNIFICANT IMAGING AND TESTING  CTA head and neck with IV contrast; CT brain perfusion (12/11/2021):  Head CT: No acute intracranial hemorrhage.  No CT evidence of acute infarct (aspect score 10).  Right SOCO territory large chronic infarct.  Small chronic infarcts.  Age-related changes.  Head CTA: No arterial large vessel occlusion.  Multiple stenoses and occlusions.  Neck CTA: Right carotid bifurcation moderate stenosis.  Right proximal carotid bulb 50% stenosis.  Right proximal cervical ICA 50% stenosis.  Left distal common carotid artery moderate stenosis.  Left carotid bifurcation moderate to severe stenosis.  Left proximal external carotid artery severe stenosis.  Left carotid bulb and proximal cervical ICA demonstrates 70 to 90% stenosis.  Left distal cervical ICA 50 to 70% stenosis.  No dissection.  Right vertebral artery origin severe stenosis.  Left distal V1 segment moderate stenosis.  CT perfusion: No acute perfusion abnormality.    MR brain without IV contrast (12/11/2021):  Small area of acute or subacute ischemia involving the posterior limb of the left internal capsule.  Foci of diffusion and FLAIR hyperintense signal in the right and left mid cerebral peduncles without definite diffusion restriction are nonspecific and may reflect sequela of chronic infarcts, alternatively demyelinating disease.  Multiple chronic infarcts as described previously.      CURRENT/RECENT TCU ISSUES    Disposition: I find her lying in bed this morning, and she is a bit angry having with staff.  She is aware of the staffing situation, but she tells me how she was  left on the bedpan last night, and no one came to remove it.  She says she put on her call light at 7 AM this morning to go on the bedpan, and the nurse came in and shut the light off.  I offered to put her on the bedpan.  She calmed down quickly and thanked me.  I have home, she has been using a PureWick during the night for about 6 months.    She has expressed worry about the possibility of another stroke.  Her neurologist cannot promise she won't have another, but she describes a discussion about possible endarterectomy procedure to bilateral carotids, because that is where he suspects they are coming from.  Her next appointment is in February.  In physical therapy, she is working on small steps, balance, and flexibility.    Diabetes mellitus type 2: She was not on insulin at home.  She has been on sliding scale here, receiving 2 to 4 units.  I suggested we could try increasing her metformin from 1000 mg twice daily to 1250 mg twice daily in an effort to discharge her without the need for insulin.  According to the patient, her last A1c was 6.4.  Dr. Dc brought the dosing back down to 1000 mg twice daily.  Blood glucose levels still look good, and I suspect she will discharge without the need for insulin.  Currently, almost all blood glucose levels are >100 and <150.    Hypertension: Blood pressures are fluctuating, typically in the 130s systolic, but the occasionally much higher (160s).  Diastolics are typically in the 70s.  She is receiving metoprolol succinate 50 mg daily, amlodipine 10 mg daily, and losartan 25 mg daily.  Blood pressures are still somewhat high.  We increased metoprolol succinate to 100 mg daily.  I also suggested moving her losartan from the morning to bedtime.  Still elevated, we should consider increasing the losartan as well when more data is available.    Spastic hemiplegia: She has occasional spasms in her left arm.  Baclofen is working well at current dose.    ROS: No headaches or  chest pains, coughing or congestion, nausea or vomiting, dizziness or dyspnea, dysuria, constipation or diarrhea, difficulty chewing or swallowing, integumentary issues, or problems with appetite or sleep.      Past Medical History:   Diagnosis Date     Allergic rhinitis      Allergic rhinitis      CKD (chronic kidney disease)      Depression      Depression      Displaced dome fracture of left acetabulum (H)      DM2 (diabetes mellitus, type 2) (H)      GERD (gastroesophageal reflux disease)      GERD (gastroesophageal reflux disease)      Gout      HTN (hypertension)      Hyperlipidaemia LDL goal <100      Hypertension goal BP (blood pressure) < 140/90      Left hemiplegia (H) 1/2010    sees PMR physician     Left hemiplegia (H)      Migraine      Migraine      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)      Nephrolithiasis      Nephrolithiasis      Seizure disorder (H)      Spastic hemiplegia of left nondominant side as late effect of cerebral infarction (H) 12/20/2021     Stroke (H) 1/2010    due to uncontrolled hypertension     Stroke (H)      Type 2 diabetes, HbA1C goal < 8% (H)      Vitamin B12 deficiency anemia               Family History   Problem Relation Age of Onset     Hypertension Mother      Heart Disease Mother      C.A.D. Father      Coronary Artery Disease Father      Diabetes Sister      Hypertension Brother      Cancer Sister      Diabetes Sister      Hypertension Brother      Cancer Sister      Social History     Socioeconomic History     Marital status:      Spouse name: None     Number of children: None     Years of education: None     Highest education level: None   Occupational History     None   Tobacco Use     Smoking status: Never Smoker     Smokeless tobacco: Never Used   Vaping Use     Vaping Use: Never used   Substance and Sexual Activity     Alcohol use: No     Drug use: No     Sexual  activity: Not Currently     Partners: Male   Other Topics Concern     Parent/sibling w/ CABG, MI or angioplasty before 65F 55M? No   Social History Narrative     None     Social Determinants of Health     Financial Resource Strain: Not on file   Food Insecurity: Not on file   Transportation Needs: Not on file   Physical Activity: Not on file   Stress: Not on file   Social Connections: Not on file   Intimate Partner Violence: Not on file   Housing Stability: Not on file       MEDICATIONS: Reviewed from the MAR, physician orders, and/or earlier progress notes.  Post Discharge Medication Reconciliation Status: medication reconcilation previously completed during another office visit.    Current Outpatient Medications   Medication Sig     acetaminophen (TYLENOL) 500 MG tablet Take 500-1,000 mg by mouth every 6 hours as needed      amLODIPine (NORVASC) 10 MG tablet Take 1 tablet (10 mg) by mouth daily     atorvastatin (LIPITOR) 40 MG tablet Take 1 tablet (40 mg) by mouth daily     baclofen (LIORESAL) 10 MG tablet TAKE 1 TABLET(10 MG) BY MOUTH THREE TIMES DAILY AS NEEDED FOR MUSCLE SPASMS (Patient taking differently: Take 10 mg by mouth 3 times daily )     bisacodyl (DULCOLAX) 5 MG EC tablet Take 2 tablets (10 mg) by mouth daily as needed for constipation     blood glucose (IRENE CONTOUR) test strip 1 strip by In Vitro route daily Use to test blood sugars as needed     clopidogrel (PLAVIX) 75 MG tablet Take 75 mg by mouth daily     Lactase 4500 units TABS Take 13,500 Units by mouth daily as needed     levETIRAcetam (KEPPRA) 500 MG tablet Take 1,000 mg by mouth 2 times daily Take 1250 mg twice daily     losartan (COZAAR) 25 MG tablet Take 1 tablet (25 mg) by mouth daily (Patient taking differently: Take 25 mg by mouth At Bedtime )     metFORMIN (GLUCOPHAGE) 1000 MG tablet TAKE 1 TABLET(1000 MG) BY MOUTH TWICE DAILY WITH MEALS     metoprolol succinate ER (TOPROL-XL) 100 MG 24 hr tablet Take 100 mg by mouth daily      "montelukast (SINGULAIR) 10 MG tablet Take 1 tablet (10 mg) by mouth daily     senna (SENOKOT) 8.6 MG tablet Take 1 tablet by mouth daily     Vitamin D3 (VITAMIN D3) 25 mcg (1000 units) tablet Take 1 tablet (25 mcg) by mouth daily     Current Facility-Administered Medications   Medication     vitamin B-12 (CYANOCOBALAMIN) injection 1,000 mcg     ALLERGIES:   Allergies   Allergen Reactions     Lac Bovis      Lisinopril Cough     Milk Products GI Disturbance     Latex Rash     DIET: Diabetic, regular texture, thin liquids.    Vitals:    01/21/22 0850   BP: 118/70   Pulse: 73   Resp: 18   Temp: 98.3  F (36.8  C)   SpO2: 95%   Weight: 61.7 kg (136 lb)   Height: 1.676 m (5' 6\")     Body mass index is 21.95 kg/m .    EXAMINATION:   General: Pleasant, alert, and conversant middle-aged female, working with physical therapy on leg lifts, in no apparent distress.  I am told she is very motivated.  She is also observed to pivoting from her bed to the wheelchair quite well and with just a little assistance.  Head: Normocephalic and atraumatic.   Eyes: PERRLA, sclerae clear.   ENT: Moist oral mucosa.  She has her own teeth in excellent repair.  No gingival hypertrophy from phenytoin.  No rhinorrhea or nasal discharge.  Hearing is unimpaired.  Cardiovascular: Regular rate and rhythm with a 2/6 SHARON at the LSB.   Respiratory: Lungs clear to auscultation bilaterally.   Abdomen: Nondistended.   Musculoskeletal/Extremities: Age-related degenerative joint disease.  Mild contractures of the left hand and elbow.  Wearing AFO.  Neurologic: Mild contractures of the left upper extremity.  Contracture of the left thumb reveals enlarged CMC joint.  She cannot make a fist.  She has not lost sensation, and there is no numbness or tingling.  Integument: No rashes, clinically significant lesions, or skin breakdown.   Cognitive/Psychiatric: Alert and oriented with euthymic affect.    DIAGNOSTICS:   No results found for this or any previous visit " (from the past 240 hour(s)).  Last Comprehensive Metabolic Panel:  Sodium   Date Value Ref Range Status   11/12/2021 138 133 - 144 mmol/L Final   04/15/2021 140 133 - 144 mmol/L Final     Potassium   Date Value Ref Range Status   11/12/2021 4.5 3.4 - 5.3 mmol/L Final   04/15/2021 4.6 3.4 - 5.3 mmol/L Final     Chloride   Date Value Ref Range Status   11/12/2021 106 94 - 109 mmol/L Final   04/15/2021 106 94 - 109 mmol/L Final     Carbon Dioxide   Date Value Ref Range Status   04/15/2021 25 20 - 32 mmol/L Final     Carbon Dioxide (CO2)   Date Value Ref Range Status   11/12/2021 28 20 - 32 mmol/L Final     Anion Gap   Date Value Ref Range Status   11/12/2021 4 3 - 14 mmol/L Final   04/15/2021 9 3 - 14 mmol/L Final     Glucose   Date Value Ref Range Status   11/12/2021 128 (H) 70 - 99 mg/dL Final   04/15/2021 150 (H) 70 - 99 mg/dL Final     Comment:     Fasting specimen     Urea Nitrogen   Date Value Ref Range Status   11/12/2021 24 7 - 30 mg/dL Final   04/15/2021 24 7 - 30 mg/dL Final     Creatinine   Date Value Ref Range Status   11/12/2021 0.68 0.52 - 1.04 mg/dL Final   04/15/2021 0.72 0.52 - 1.04 mg/dL Final     GFR Estimate   Date Value Ref Range Status   11/12/2021 89 >60 mL/min/1.73m2 Final     Comment:     As of July 11, 2021, eGFR is calculated by the CKD-EPI creatinine equation, without race adjustment. eGFR can be influenced by muscle mass, exercise, and diet. The reported eGFR is an estimation only and is only applicable if the renal function is stable.   04/15/2021 86 >60 mL/min/[1.73_m2] Final     Comment:     Non  GFR Calc  Starting 12/18/2018, serum creatinine based estimated GFR (eGFR) will be   calculated using the Chronic Kidney Disease Epidemiology Collaboration   (CKD-EPI) equation.       Calcium   Date Value Ref Range Status   11/12/2021 9.3 8.5 - 10.1 mg/dL Final   04/15/2021 9.2 8.5 - 10.1 mg/dL Final     Bilirubin Total   Date Value Ref Range Status   11/12/2021 0.2 0.2 - 1.3  mg/dL Final   04/15/2021 0.2 0.2 - 1.3 mg/dL Final     Alkaline Phosphatase   Date Value Ref Range Status   11/12/2021 131 40 - 150 U/L Final   04/15/2021 159 (H) 40 - 150 U/L Final     ALT   Date Value Ref Range Status   11/12/2021 24 0 - 50 U/L Final   04/15/2021 23 0 - 50 U/L Final     AST   Date Value Ref Range Status   11/12/2021 18 0 - 45 U/L Final   04/15/2021 18 0 - 45 U/L Final     Lab Results   Component Value Date    WBC 9.1 11/12/2021    WBC 7.7 11/20/2020     Lab Results   Component Value Date    RBC 4.38 11/12/2021    RBC 4.05 11/20/2020     Lab Results   Component Value Date    HGB 14.3 11/12/2021    HGB 13.2 11/20/2020     Lab Results   Component Value Date    HCT 42.6 11/12/2021    HCT 40.9 11/20/2020     Lab Results   Component Value Date    MCV 97 11/12/2021     11/20/2020     Lab Results   Component Value Date    MCH 32.6 11/12/2021    MCH 32.6 11/20/2020     Lab Results   Component Value Date    MCHC 33.6 11/12/2021    MCHC 32.3 11/20/2020     Lab Results   Component Value Date    RDW 11.6 11/12/2021    RDW 11.8 11/20/2020     Lab Results   Component Value Date     11/12/2021     11/20/2020       ASSESSMENT/Plan:      ICD-10-CM    1. Cerebrovascular accident (CVA) due to occlusion of right anterior cerebral artery (H)  I63.521    2. Spastic hemiplegia of left nondominant side as late effect of cerebral infarction (H)  I69.354    3. Type 2 diabetes mellitus with stage 3b chronic kidney disease, with long-term current use of insulin (H)  E11.22     N18.32     Z79.4    4. Essential hypertension  I10    5. Seizure disorder (H)  G40.909    6. Anemia due to vitamin B12 deficiency, unspecified B12 deficiency type  D51.9        CHANGES:    None.    CARE PLAN:    The care plan, medications, vital signs, orders, and nursing notes have been reviewed, and all orders signed. Changes to care plan, if any, as noted. Otherwise, continue current plan of care.    The above has been created  using voice recognition software. Please be aware that this may unintentionally  produce inaccuracies and/or nonsensical sentences.      Electronically signed by: KEILA Mcmahon CNP

## 2022-01-25 ENCOUNTER — TRANSITIONAL CARE UNIT VISIT (OUTPATIENT)
Dept: GERIATRICS | Facility: CLINIC | Age: 72
End: 2022-01-25
Payer: MEDICARE

## 2022-01-25 DIAGNOSIS — N18.32 TYPE 2 DIABETES MELLITUS WITH STAGE 3B CHRONIC KIDNEY DISEASE, WITH LONG-TERM CURRENT USE OF INSULIN (H): ICD-10-CM

## 2022-01-25 DIAGNOSIS — Z79.4 TYPE 2 DIABETES MELLITUS WITH STAGE 3B CHRONIC KIDNEY DISEASE, WITH LONG-TERM CURRENT USE OF INSULIN (H): ICD-10-CM

## 2022-01-25 DIAGNOSIS — I69.354 SPASTIC HEMIPLEGIA OF LEFT NONDOMINANT SIDE AS LATE EFFECT OF CEREBRAL INFARCTION (H): ICD-10-CM

## 2022-01-25 DIAGNOSIS — E11.22 TYPE 2 DIABETES MELLITUS WITH STAGE 3B CHRONIC KIDNEY DISEASE, WITH LONG-TERM CURRENT USE OF INSULIN (H): ICD-10-CM

## 2022-01-25 DIAGNOSIS — D51.9 ANEMIA DUE TO VITAMIN B12 DEFICIENCY, UNSPECIFIED B12 DEFICIENCY TYPE: ICD-10-CM

## 2022-01-25 DIAGNOSIS — I63.521 CEREBROVASCULAR ACCIDENT (CVA) DUE TO OCCLUSION OF RIGHT ANTERIOR CEREBRAL ARTERY (H): Primary | ICD-10-CM

## 2022-01-25 DIAGNOSIS — I10 ESSENTIAL HYPERTENSION: ICD-10-CM

## 2022-01-25 PROCEDURE — 99309 SBSQ NF CARE MODERATE MDM 30: CPT | Performed by: NURSE PRACTITIONER

## 2022-01-25 ASSESSMENT — MIFFLIN-ST. JEOR: SCORE: 1140.03

## 2022-01-25 NOTE — LETTER
2022        RE: Addis Peña  [address]  [facility] [address]        [facility]    Name:   Addis Peña  :   1950  MRN:    [id number]     Facility:   [facility]) [[id number]]   Room: [place] [address]  Code Status: DNR and POLST AVAILABLE -     DOS: 2022  Previous visit:  2022    PCP:  Joel Daniel Wegener, MD     CHIEF COMPLAINT / REASON FOR VISIT:  Chief Complaint   Patient presents with     Clinic Care Coordination - Follow-up     Cerebrovascular accident      [facility] from 2021 until 2021 (recurrent CVA)      HPI: Addis is a 71 year old female with a past medical history significant for stroke with residual left spastic hemiplegia who stated she went to bed at approximately 8 PM the night before and woke up at 4:30 AM with increased left-sided weakness.  She stated that she felt well when she had gone to bed.  Typically, she uses a wheelchair for ambulation but can get up on her own and has enough strength in the left arm to get dressed and perform ADLs.  She stated that she could do neither that morning.  She lives with a roommate who helped her and called 911.  She otherwise had no change in her speech or swallowing ability.  She denied any other new focal neurological deficits.  She takes a daily baby aspirin and has been compliant.    Ischemic stroke: Seen as an extended stroke in the ED has a last known normal 8 PM the night prior.  Discussed with neurology.  Admitted and monitored on telemetry.  To continue  mg daily for 3 weeks and add Plavix (continuing Plavix alone after 3 weeks).  Vascular consulted given her left ICA stenosis.  They would like to follow-up with her in clinic in 6 to 8 weeks.  PT saw patient and recommended TCU upon discharge.  She would need an outpatient event monitor.    Seizure disorder: Phenytoin level subtherapeutic and levetiracetam level supratherapeutic.   Phenytoin discontinued, and levetiracetam decreased to 1000 mg twice daily.  Level was to be repeated on 12/15 before Keppra dose.    Hypertension: Initially allowed for permissive hypertension.  Amlodipine and metoprolol were resumed while inpatient.  Also resumed losartan on discharge.    Diabetes mellitus type 2: Metformin was held while inpatient and resumed on discharge.  Glargine 5 units at bedtime was added.  She stated she was unaware of this.    PROCEDURES/SIGNIFICANT IMAGING AND TESTING  CTA head and neck with IV contrast; CT brain perfusion (12/11/2021):  Head CT: No acute intracranial hemorrhage.  No CT evidence of acute infarct (aspect score 10).  Right SOCO territory large chronic infarct.  Small chronic infarcts.  Age-related changes.  Head CTA: No arterial large vessel occlusion.  Multiple stenoses and occlusions.  Neck CTA: Right carotid bifurcation moderate stenosis.  Right proximal carotid bulb 50% stenosis.  Right proximal cervical ICA 50% stenosis.  Left distal common carotid artery moderate stenosis.  Left carotid bifurcation moderate to severe stenosis.  Left proximal external carotid artery severe stenosis.  Left carotid bulb and proximal cervical ICA demonstrates 70 to 90% stenosis.  Left distal cervical ICA 50 to 70% stenosis.  No dissection.  Right vertebral artery origin severe stenosis.  Left distal V1 segment moderate stenosis.  CT perfusion: No acute perfusion abnormality.    MR brain without IV contrast (12/11/2021):  Small area of acute or subacute ischemia involving the posterior limb of the left internal capsule.  Foci of diffusion and FLAIR hyperintense signal in the right and left mid cerebral peduncles without definite diffusion restriction are nonspecific and may reflect sequela of chronic infarcts, alternatively demyelinating disease.  Multiple chronic infarcts as described previously.      CURRENT/RECENT TCU ISSUES    Disposition: Doing okay today, she is in a fairly good  mood.    She has expressed worry about the possibility of another stroke.  Her neurologist cannot promise she won't have another, but she describes a discussion about possible endarterectomy procedure to bilateral carotids, because that is where he suspects they are coming from.  Her next appointment is in February.  In physical therapy, she is working on small steps, balance, and flexibility.    Diabetes mellitus type 2: She was not on insulin at home.  She has been on sliding scale here, receiving 2 to 4 units.  I suggested we could try increasing her metformin from 1000 mg twice daily to 1250 mg twice daily in an effort to discharge her without the need for insulin.  According to the patient, her last A1c was 6.4.  Dr. Dc brought the dosing back down to 1000 mg twice daily.  Blood glucose levels still look good, and I suspect she will discharge without the need for insulin.  Currently, almost all blood glucose levels are >100 and <150.    Hypertension: Blood pressures are fluctuating, typically in the 130s systolic, but the occasionally much higher (160s).  Diastolics are typically in the 70s.  She is receiving metoprolol succinate 50 mg daily, amlodipine 10 mg daily, and losartan 25 mg daily.  Blood pressures are still somewhat high.  We increased metoprolol succinate to 100 mg daily.  I also suggested moving her losartan from the morning to bedtime.  Still elevated, we will increase her losartan from 25 mg to 50 mg at bedtime.  I suspect we will eventually work our way up to 100 mg.  We will be checking a BMP on 01/31.    Nocturia and urinary incontinence:  At home, she has been using a PureWick during the night for about 6 months.    Spastic hemiplegia: Generally well controlled.  She has occasional spasms in her left arm.  Baclofen is working well at current dose.    ROS: No headaches or chest pains, coughing or congestion, nausea or vomiting, dizziness or dyspnea, dysuria, constipation or diarrhea,  difficulty chewing or swallowing, integumentary issues, or problems with appetite or sleep.      Past Medical History:   Diagnosis Date     Allergic rhinitis      Allergic rhinitis      CKD (chronic kidney disease)      Depression      Depression      Displaced dome fracture of left acetabulum (H)      DM2 (diabetes mellitus, type 2) (H)      GERD (gastroesophageal reflux disease)      GERD (gastroesophageal reflux disease)      Gout      HTN (hypertension)      Hyperlipidaemia LDL goal <100      Hypertension goal BP (blood pressure) < 140/90      Left hemiplegia (H) 1/2010    sees PMR physician     Left hemiplegia (H)      Migraine      Migraine      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)      Nephrolithiasis      Nephrolithiasis      Seizure disorder (H)      Spastic hemiplegia of left nondominant side as late effect of cerebral infarction (H) 12/20/2021     Stroke (H) 1/2010    due to uncontrolled hypertension     Stroke (H)      Type 2 diabetes, HbA1C goal < 8% (H)      Vitamin B12 deficiency anemia               Family History   Problem Relation Age of Onset     Hypertension Mother      Heart Disease Mother      C.A.D. Father      Coronary Artery Disease Father      Diabetes Sister      Hypertension Brother      Cancer Sister      Diabetes Sister      Hypertension Brother      Cancer Sister      Social History     Socioeconomic History     Marital status:      Spouse name: None     Number of children: None     Years of education: None     Highest education level: None   Occupational History     None   Tobacco Use     Smoking status: Never Smoker     Smokeless tobacco: Never Used   Vaping Use     Vaping Use: Never used   Substance and Sexual Activity     Alcohol use: No     Drug use: No     Sexual activity: Not Currently     Partners: Male   Other Topics Concern     Parent/sibling w/ CABG, MI or angioplasty before  65F 55M? No   Social History Narrative     None     Social Determinants of Health     Financial Resource Strain: Not on file   Food Insecurity: Not on file   Transportation Needs: Not on file   Physical Activity: Not on file   Stress: Not on file   Social Connections: Not on file   Intimate Partner Violence: Not on file   Housing Stability: Not on file       MEDICATIONS: Reviewed from the MAR, physician orders, and/or earlier progress notes.  Post Discharge Medication Reconciliation Status: medication reconcilation previously completed during another office visit.  Updated by me today (01/25/2022) with an increase in losartan reflected below.  Current Outpatient Medications   Medication Sig     acetaminophen (TYLENOL) 500 MG tablet Take 500-1,000 mg by mouth every 6 hours as needed      amLODIPine (NORVASC) 10 MG tablet Take 1 tablet (10 mg) by mouth daily     atorvastatin (LIPITOR) 40 MG tablet Take 1 tablet (40 mg) by mouth daily     baclofen (LIORESAL) 10 MG tablet TAKE 1 TABLET(10 MG) BY MOUTH THREE TIMES DAILY AS NEEDED FOR MUSCLE SPASMS (Patient taking differently: Take 10 mg by mouth 3 times daily )     bisacodyl (DULCOLAX) 5 MG EC tablet Take 2 tablets (10 mg) by mouth daily as needed for constipation     blood glucose (IRENE CONTOUR) test strip 1 strip by In Vitro route daily Use to test blood sugars as needed     clopidogrel (PLAVIX) 75 MG tablet Take 75 mg by mouth daily     Lactase 4500 units TABS Take 13,500 Units by mouth daily as needed     levETIRAcetam (KEPPRA) 500 MG tablet Take 1,000 mg by mouth 2 times daily Take 1250 mg twice daily     losartan (COZAAR) 25 MG tablet Take 1 tablet (25 mg) by mouth daily (Patient taking differently: Take 50 mg by mouth At Bedtime )     metFORMIN (GLUCOPHAGE) 1000 MG tablet TAKE 1 TABLET(1000 MG) BY MOUTH TWICE DAILY WITH MEALS     metoprolol succinate ER (TOPROL-XL) 100 MG 24 hr tablet Take 100 mg by mouth daily     montelukast (SINGULAIR) 10 MG tablet Take 1  "tablet (10 mg) by mouth daily     senna (SENOKOT) 8.6 MG tablet Take 1 tablet by mouth daily     Vitamin D3 (VITAMIN D3) 25 mcg (1000 units) tablet Take 1 tablet (25 mcg) by mouth daily     Current Facility-Administered Medications   Medication     vitamin B-12 (CYANOCOBALAMIN) injection 1,000 mcg     ALLERGIES:   Allergies   Allergen Reactions     Lac Bovis      Lisinopril Cough     Milk Products GI Disturbance     Latex Rash     DIET: Diabetic, regular texture, thin liquids.    Vitals:    01/25/22 1056   BP: 117/59   Pulse: (!) 18   Resp: 17   Temp: 98.4  F (36.9  C)   SpO2: 97%   Weight: 60.3 kg (133 lb)   Height: 1.676 m (5' 6\")     Body mass index is 21.47 kg/m .    EXAMINATION:   General: Pleasant, alert, and conversant middle-aged female, working with physical therapy on leg lifts, in no apparent distress.  I am told she is very motivated.  She is also observed to pivoting from her bed to the wheelchair quite well and with just a little assistance.  Head: Normocephalic and atraumatic.   Eyes: PERRLA, sclerae clear.   ENT: Moist oral mucosa.  She has her own teeth in excellent repair.  No gingival hypertrophy from phenytoin.  No rhinorrhea or nasal discharge.  Hearing is unimpaired.  Cardiovascular: Regular rate and rhythm with a 2/6 SHARON at the LSB.   Respiratory: Lungs clear to auscultation bilaterally.   Abdomen: Nondistended.   Musculoskeletal/Extremities: Age-related degenerative joint disease.  Mild contractures of the left hand and elbow.  Wearing AFO.  Neurologic: Mild contractures of the left upper extremity.  Contracture of the left thumb reveals enlarged CMC joint.  She cannot make a fist.  She has not lost sensation, and there is no numbness or tingling.  Integument: No rashes, clinically significant lesions, or skin breakdown.   Cognitive/Psychiatric: Alert and oriented with euthymic affect.    DIAGNOSTICS:   No results found for this or any previous visit (from the past 240 hour(s)).  Last " Comprehensive Metabolic Panel:  Sodium   Date Value Ref Range Status   11/12/2021 138 133 - 144 mmol/L Final   04/15/2021 140 133 - 144 mmol/L Final     Potassium   Date Value Ref Range Status   11/12/2021 4.5 3.4 - 5.3 mmol/L Final   04/15/2021 4.6 3.4 - 5.3 mmol/L Final     Chloride   Date Value Ref Range Status   11/12/2021 106 94 - 109 mmol/L Final   04/15/2021 106 94 - 109 mmol/L Final     Carbon Dioxide   Date Value Ref Range Status   04/15/2021 25 20 - 32 mmol/L Final     Carbon Dioxide (CO2)   Date Value Ref Range Status   11/12/2021 28 20 - 32 mmol/L Final     Anion Gap   Date Value Ref Range Status   11/12/2021 4 3 - 14 mmol/L Final   04/15/2021 9 3 - 14 mmol/L Final     Glucose   Date Value Ref Range Status   11/12/2021 128 (H) 70 - 99 mg/dL Final   04/15/2021 150 (H) 70 - 99 mg/dL Final     Comment:     Fasting specimen     Urea Nitrogen   Date Value Ref Range Status   11/12/2021 24 7 - 30 mg/dL Final   04/15/2021 24 7 - 30 mg/dL Final     Creatinine   Date Value Ref Range Status   11/12/2021 0.68 0.52 - 1.04 mg/dL Final   04/15/2021 0.72 0.52 - 1.04 mg/dL Final     GFR Estimate   Date Value Ref Range Status   11/12/2021 89 >60 mL/min/1.73m2 Final     Comment:     As of July 11, 2021, eGFR is calculated by the CKD-EPI creatinine equation, without race adjustment. eGFR can be influenced by muscle mass, exercise, and diet. The reported eGFR is an estimation only and is only applicable if the renal function is stable.   04/15/2021 86 >60 mL/min/[1.73_m2] Final     Comment:     Non  GFR Calc  Starting 12/18/2018, serum creatinine based estimated GFR (eGFR) will be   calculated using the Chronic Kidney Disease Epidemiology Collaboration   (CKD-EPI) equation.       Calcium   Date Value Ref Range Status   11/12/2021 9.3 8.5 - 10.1 mg/dL Final   04/15/2021 9.2 8.5 - 10.1 mg/dL Final     Bilirubin Total   Date Value Ref Range Status   11/12/2021 0.2 0.2 - 1.3 mg/dL Final   04/15/2021 0.2 0.2 - 1.3  mg/dL Final     Alkaline Phosphatase   Date Value Ref Range Status   11/12/2021 131 40 - 150 U/L Final   04/15/2021 159 (H) 40 - 150 U/L Final     ALT   Date Value Ref Range Status   11/12/2021 24 0 - 50 U/L Final   04/15/2021 23 0 - 50 U/L Final     AST   Date Value Ref Range Status   11/12/2021 18 0 - 45 U/L Final   04/15/2021 18 0 - 45 U/L Final     Lab Results   Component Value Date    WBC 9.1 11/12/2021    WBC 7.7 11/20/2020     Lab Results   Component Value Date    RBC 4.38 11/12/2021    RBC 4.05 11/20/2020     Lab Results   Component Value Date    HGB 14.3 11/12/2021    HGB 13.2 11/20/2020     Lab Results   Component Value Date    HCT 42.6 11/12/2021    HCT 40.9 11/20/2020     Lab Results   Component Value Date    MCV 97 11/12/2021     11/20/2020     Lab Results   Component Value Date    MCH 32.6 11/12/2021    MCH 32.6 11/20/2020     Lab Results   Component Value Date    MCHC 33.6 11/12/2021    MCHC 32.3 11/20/2020     Lab Results   Component Value Date    RDW 11.6 11/12/2021    RDW 11.8 11/20/2020     Lab Results   Component Value Date     11/12/2021     11/20/2020       ASSESSMENT/Plan:      ICD-10-CM    1. Cerebrovascular accident (CVA) due to occlusion of right anterior cerebral artery (H)  I63.521    2. Spastic hemiplegia of left nondominant side as late effect of cerebral infarction (H)  I69.354    3. Type 2 diabetes mellitus with stage 3b chronic kidney disease, with long-term current use of insulin (H)  E11.22     N18.32     Z79.4    4. Essential hypertension  I10    5. Seizure disorder (H)  G40.909    6. Anemia due to vitamin B12 deficiency, unspecified B12 deficiency type  D51.9        CHANGES:    Increase losartan from 25 mg at bedtime to 50 mg at bedtime.    CARE PLAN:    The care plan, medications, vital signs, orders, and nursing notes have been reviewed, and all orders signed. Changes to care plan, if any, as noted. Otherwise, continue current plan of care.    The above has  been created using voice recognition software. Please be aware that this may unintentionally  produce inaccuracies and/or nonsensical sentences.      Electronically signed by: KEILA Mcmahon CNP        Sincerely,        KEILA Mcmahon CNP

## 2022-01-26 VITALS
TEMPERATURE: 98.4 F | OXYGEN SATURATION: 97 % | HEART RATE: 18 BPM | BODY MASS INDEX: 21.38 KG/M2 | SYSTOLIC BLOOD PRESSURE: 117 MMHG | RESPIRATION RATE: 17 BRPM | DIASTOLIC BLOOD PRESSURE: 59 MMHG | HEIGHT: 66 IN | WEIGHT: 133 LBS

## 2022-01-26 NOTE — PROGRESS NOTES
[facility]    Name:   Addis Peña  :   1950  MRN:    [id number]     Facility:   [facility] ([facility]) [[id number]]   Room: [place] [address]  Code Status: DNR and POLST AVAILABLE -     DOS: 2022  Previous visit:  2022    PCP:  Joel Daniel Wegener, MD     CHIEF COMPLAINT / REASON FOR VISIT:  Chief Complaint   Patient presents with     Clinic Care Coordination - Follow-up     Cerebrovascular accident      [facility] from 2021 until 2021 (recurrent CVA)      HPI: Addis is a 71 year old female with a past medical history significant for stroke with residual left spastic hemiplegia who stated she went to bed at approximately 8 PM the night before and woke up at 4:30 AM with increased left-sided weakness.  She stated that she felt well when she had gone to bed.  Typically, she uses a wheelchair for ambulation but can get up on her own and has enough strength in the left arm to get dressed and perform ADLs.  She stated that she could do neither that morning.  She lives with a roommate who helped her and called 911.  She otherwise had no change in her speech or swallowing ability.  She denied any other new focal neurological deficits.  She takes a daily baby aspirin and has been compliant.    Ischemic stroke: Seen as an extended stroke in the ED has a last known normal 8 PM the night prior.  Discussed with neurology.  Admitted and monitored on telemetry.  To continue  mg daily for 3 weeks and add Plavix (continuing Plavix alone after 3 weeks).  Vascular consulted given her left ICA stenosis.  They would like to follow-up with her in clinic in 6 to 8 weeks.  PT saw patient and recommended TCU upon discharge.  She would need an outpatient event monitor.    Seizure disorder: Phenytoin level subtherapeutic and levetiracetam level supratherapeutic.  Phenytoin discontinued, and levetiracetam decreased to 1000 mg twice daily.  Level was to be repeated on  12/15 before Keppra dose.    Hypertension: Initially allowed for permissive hypertension.  Amlodipine and metoprolol were resumed while inpatient.  Also resumed losartan on discharge.    Diabetes mellitus type 2: Metformin was held while inpatient and resumed on discharge.  Glargine 5 units at bedtime was added.  She stated she was unaware of this.    PROCEDURES/SIGNIFICANT IMAGING AND TESTING  CTA head and neck with IV contrast; CT brain perfusion (12/11/2021):  Head CT: No acute intracranial hemorrhage.  No CT evidence of acute infarct (aspect score 10).  Right SOCO territory large chronic infarct.  Small chronic infarcts.  Age-related changes.  Head CTA: No arterial large vessel occlusion.  Multiple stenoses and occlusions.  Neck CTA: Right carotid bifurcation moderate stenosis.  Right proximal carotid bulb 50% stenosis.  Right proximal cervical ICA 50% stenosis.  Left distal common carotid artery moderate stenosis.  Left carotid bifurcation moderate to severe stenosis.  Left proximal external carotid artery severe stenosis.  Left carotid bulb and proximal cervical ICA demonstrates 70 to 90% stenosis.  Left distal cervical ICA 50 to 70% stenosis.  No dissection.  Right vertebral artery origin severe stenosis.  Left distal V1 segment moderate stenosis.  CT perfusion: No acute perfusion abnormality.    MR brain without IV contrast (12/11/2021):  Small area of acute or subacute ischemia involving the posterior limb of the left internal capsule.  Foci of diffusion and FLAIR hyperintense signal in the right and left mid cerebral peduncles without definite diffusion restriction are nonspecific and may reflect sequela of chronic infarcts, alternatively demyelinating disease.  Multiple chronic infarcts as described previously.      CURRENT/RECENT TCU ISSUES    Disposition: Doing okay today, she is in a fairly good mood.    She has expressed worry about the possibility of another stroke.  Her neurologist cannot promise she  won't have another, but she describes a discussion about possible endarterectomy procedure to bilateral carotids, because that is where he suspects they are coming from.  Her next appointment is in February.  In physical therapy, she is working on small steps, balance, and flexibility.    Diabetes mellitus type 2: She was not on insulin at home.  She has been on sliding scale here, receiving 2 to 4 units.  I suggested we could try increasing her metformin from 1000 mg twice daily to 1250 mg twice daily in an effort to discharge her without the need for insulin.  According to the patient, her last A1c was 6.4.  Dr. Dc brought the dosing back down to 1000 mg twice daily.  Blood glucose levels still look good, and I suspect she will discharge without the need for insulin.  Currently, almost all blood glucose levels are >100 and <150.    Hypertension: Blood pressures are fluctuating, typically in the 130s systolic, but the occasionally much higher (160s).  Diastolics are typically in the 70s.  She is receiving metoprolol succinate 50 mg daily, amlodipine 10 mg daily, and losartan 25 mg daily.  Blood pressures are still somewhat high.  We increased metoprolol succinate to 100 mg daily.  I also suggested moving her losartan from the morning to bedtime.  Still elevated, we will increase her losartan from 25 mg to 50 mg at bedtime.  I suspect we will eventually work our way up to 100 mg.  We will be checking a BMP on 01/31.    Nocturia and urinary incontinence:  At home, she has been using a PureWick during the night for about 6 months.    Spastic hemiplegia: Generally well controlled.  She has occasional spasms in her left arm.  Baclofen is working well at current dose.    ROS: No headaches or chest pains, coughing or congestion, nausea or vomiting, dizziness or dyspnea, dysuria, constipation or diarrhea, difficulty chewing or swallowing, integumentary issues, or problems with appetite or sleep.      Past Medical History:    Diagnosis Date     Allergic rhinitis      Allergic rhinitis      CKD (chronic kidney disease)      Depression      Depression      Displaced dome fracture of left acetabulum (H)      DM2 (diabetes mellitus, type 2) (H)      GERD (gastroesophageal reflux disease)      GERD (gastroesophageal reflux disease)      Gout      HTN (hypertension)      Hyperlipidaemia LDL goal <100      Hypertension goal BP (blood pressure) < 140/90      Left hemiplegia (H) 1/2010    sees PMR physician     Left hemiplegia (H)      Migraine      Migraine      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)      Nephrolithiasis      Nephrolithiasis      Seizure disorder (H)      Spastic hemiplegia of left nondominant side as late effect of cerebral infarction (H) 12/20/2021     Stroke (H) 1/2010    due to uncontrolled hypertension     Stroke (H)      Type 2 diabetes, HbA1C goal < 8% (H)      Vitamin B12 deficiency anemia               Family History   Problem Relation Age of Onset     Hypertension Mother      Heart Disease Mother      C.A.D. Father      Coronary Artery Disease Father      Diabetes Sister      Hypertension Brother      Cancer Sister      Diabetes Sister      Hypertension Brother      Cancer Sister      Social History     Socioeconomic History     Marital status:      Spouse name: None     Number of children: None     Years of education: None     Highest education level: None   Occupational History     None   Tobacco Use     Smoking status: Never Smoker     Smokeless tobacco: Never Used   Vaping Use     Vaping Use: Never used   Substance and Sexual Activity     Alcohol use: No     Drug use: No     Sexual activity: Not Currently     Partners: Male   Other Topics Concern     Parent/sibling w/ CABG, MI or angioplasty before 65F 55M? No   Social History Narrative     None     Social Determinants of Health     Financial Resource Strain: Not  on file   Food Insecurity: Not on file   Transportation Needs: Not on file   Physical Activity: Not on file   Stress: Not on file   Social Connections: Not on file   Intimate Partner Violence: Not on file   Housing Stability: Not on file       MEDICATIONS: Reviewed from the MAR, physician orders, and/or earlier progress notes.  Post Discharge Medication Reconciliation Status: medication reconcilation previously completed during another office visit.  Updated by me today (01/25/2022) with an increase in losartan reflected below.  Current Outpatient Medications   Medication Sig     acetaminophen (TYLENOL) 500 MG tablet Take 500-1,000 mg by mouth every 6 hours as needed      amLODIPine (NORVASC) 10 MG tablet Take 1 tablet (10 mg) by mouth daily     atorvastatin (LIPITOR) 40 MG tablet Take 1 tablet (40 mg) by mouth daily     baclofen (LIORESAL) 10 MG tablet TAKE 1 TABLET(10 MG) BY MOUTH THREE TIMES DAILY AS NEEDED FOR MUSCLE SPASMS (Patient taking differently: Take 10 mg by mouth 3 times daily )     bisacodyl (DULCOLAX) 5 MG EC tablet Take 2 tablets (10 mg) by mouth daily as needed for constipation     blood glucose (IRENE CONTOUR) test strip 1 strip by In Vitro route daily Use to test blood sugars as needed     clopidogrel (PLAVIX) 75 MG tablet Take 75 mg by mouth daily     Lactase 4500 units TABS Take 13,500 Units by mouth daily as needed     levETIRAcetam (KEPPRA) 500 MG tablet Take 1,000 mg by mouth 2 times daily Take 1250 mg twice daily     losartan (COZAAR) 25 MG tablet Take 1 tablet (25 mg) by mouth daily (Patient taking differently: Take 50 mg by mouth At Bedtime )     metFORMIN (GLUCOPHAGE) 1000 MG tablet TAKE 1 TABLET(1000 MG) BY MOUTH TWICE DAILY WITH MEALS     metoprolol succinate ER (TOPROL-XL) 100 MG 24 hr tablet Take 100 mg by mouth daily     montelukast (SINGULAIR) 10 MG tablet Take 1 tablet (10 mg) by mouth daily     senna (SENOKOT) 8.6 MG tablet Take 1 tablet by mouth daily     Vitamin D3 (VITAMIN D3)  "25 mcg (1000 units) tablet Take 1 tablet (25 mcg) by mouth daily     Current Facility-Administered Medications   Medication     vitamin B-12 (CYANOCOBALAMIN) injection 1,000 mcg     ALLERGIES:   Allergies   Allergen Reactions     Lac Bovis      Lisinopril Cough     Milk Products GI Disturbance     Latex Rash     DIET: Diabetic, regular texture, thin liquids.    Vitals:    01/25/22 1056   BP: 117/59   Pulse: (!) 18   Resp: 17   Temp: 98.4  F (36.9  C)   SpO2: 97%   Weight: 60.3 kg (133 lb)   Height: 1.676 m (5' 6\")     Body mass index is 21.47 kg/m .    EXAMINATION:   General: Pleasant, alert, and conversant middle-aged female, working with physical therapy on leg lifts, in no apparent distress.  I am told she is very motivated.  She is also observed to pivoting from her bed to the wheelchair quite well and with just a little assistance.  Head: Normocephalic and atraumatic.   Eyes: PERRLA, sclerae clear.   ENT: Moist oral mucosa.  She has her own teeth in excellent repair.  No gingival hypertrophy from phenytoin.  No rhinorrhea or nasal discharge.  Hearing is unimpaired.  Cardiovascular: Regular rate and rhythm with a 2/6 SHARON at the LSB.   Respiratory: Lungs clear to auscultation bilaterally.   Abdomen: Nondistended.   Musculoskeletal/Extremities: Age-related degenerative joint disease.  Mild contractures of the left hand and elbow.  Wearing AFO.  Neurologic: Mild contractures of the left upper extremity.  Contracture of the left thumb reveals enlarged CMC joint.  She cannot make a fist.  She has not lost sensation, and there is no numbness or tingling.  Integument: No rashes, clinically significant lesions, or skin breakdown.   Cognitive/Psychiatric: Alert and oriented with euthymic affect.    DIAGNOSTICS:   No results found for this or any previous visit (from the past 240 hour(s)).  Last Comprehensive Metabolic Panel:  Sodium   Date Value Ref Range Status   11/12/2021 138 133 - 144 mmol/L Final   04/15/2021 140 " 133 - 144 mmol/L Final     Potassium   Date Value Ref Range Status   11/12/2021 4.5 3.4 - 5.3 mmol/L Final   04/15/2021 4.6 3.4 - 5.3 mmol/L Final     Chloride   Date Value Ref Range Status   11/12/2021 106 94 - 109 mmol/L Final   04/15/2021 106 94 - 109 mmol/L Final     Carbon Dioxide   Date Value Ref Range Status   04/15/2021 25 20 - 32 mmol/L Final     Carbon Dioxide (CO2)   Date Value Ref Range Status   11/12/2021 28 20 - 32 mmol/L Final     Anion Gap   Date Value Ref Range Status   11/12/2021 4 3 - 14 mmol/L Final   04/15/2021 9 3 - 14 mmol/L Final     Glucose   Date Value Ref Range Status   11/12/2021 128 (H) 70 - 99 mg/dL Final   04/15/2021 150 (H) 70 - 99 mg/dL Final     Comment:     Fasting specimen     Urea Nitrogen   Date Value Ref Range Status   11/12/2021 24 7 - 30 mg/dL Final   04/15/2021 24 7 - 30 mg/dL Final     Creatinine   Date Value Ref Range Status   11/12/2021 0.68 0.52 - 1.04 mg/dL Final   04/15/2021 0.72 0.52 - 1.04 mg/dL Final     GFR Estimate   Date Value Ref Range Status   11/12/2021 89 >60 mL/min/1.73m2 Final     Comment:     As of July 11, 2021, eGFR is calculated by the CKD-EPI creatinine equation, without race adjustment. eGFR can be influenced by muscle mass, exercise, and diet. The reported eGFR is an estimation only and is only applicable if the renal function is stable.   04/15/2021 86 >60 mL/min/[1.73_m2] Final     Comment:     Non  GFR Calc  Starting 12/18/2018, serum creatinine based estimated GFR (eGFR) will be   calculated using the Chronic Kidney Disease Epidemiology Collaboration   (CKD-EPI) equation.       Calcium   Date Value Ref Range Status   11/12/2021 9.3 8.5 - 10.1 mg/dL Final   04/15/2021 9.2 8.5 - 10.1 mg/dL Final     Bilirubin Total   Date Value Ref Range Status   11/12/2021 0.2 0.2 - 1.3 mg/dL Final   04/15/2021 0.2 0.2 - 1.3 mg/dL Final     Alkaline Phosphatase   Date Value Ref Range Status   11/12/2021 131 40 - 150 U/L Final   04/15/2021 159 (H)  40 - 150 U/L Final     ALT   Date Value Ref Range Status   11/12/2021 24 0 - 50 U/L Final   04/15/2021 23 0 - 50 U/L Final     AST   Date Value Ref Range Status   11/12/2021 18 0 - 45 U/L Final   04/15/2021 18 0 - 45 U/L Final     Lab Results   Component Value Date    WBC 9.1 11/12/2021    WBC 7.7 11/20/2020     Lab Results   Component Value Date    RBC 4.38 11/12/2021    RBC 4.05 11/20/2020     Lab Results   Component Value Date    HGB 14.3 11/12/2021    HGB 13.2 11/20/2020     Lab Results   Component Value Date    HCT 42.6 11/12/2021    HCT 40.9 11/20/2020     Lab Results   Component Value Date    MCV 97 11/12/2021     11/20/2020     Lab Results   Component Value Date    MCH 32.6 11/12/2021    MCH 32.6 11/20/2020     Lab Results   Component Value Date    MCHC 33.6 11/12/2021    MCHC 32.3 11/20/2020     Lab Results   Component Value Date    RDW 11.6 11/12/2021    RDW 11.8 11/20/2020     Lab Results   Component Value Date     11/12/2021     11/20/2020       ASSESSMENT/Plan:      ICD-10-CM    1. Cerebrovascular accident (CVA) due to occlusion of right anterior cerebral artery (H)  I63.521    2. Spastic hemiplegia of left nondominant side as late effect of cerebral infarction (H)  I69.354    3. Type 2 diabetes mellitus with stage 3b chronic kidney disease, with long-term current use of insulin (H)  E11.22     N18.32     Z79.4    4. Essential hypertension  I10    5. Seizure disorder (H)  G40.909    6. Anemia due to vitamin B12 deficiency, unspecified B12 deficiency type  D51.9        CHANGES:    Increase losartan from 25 mg at bedtime to 50 mg at bedtime.    CARE PLAN:    The care plan, medications, vital signs, orders, and nursing notes have been reviewed, and all orders signed. Changes to care plan, if any, as noted. Otherwise, continue current plan of care.    The above has been created using voice recognition software. Please be aware that this may unintentionally  produce inaccuracies and/or  nonsensical sentences.      Electronically signed by: KEILA Mcmahon CNP

## 2022-01-27 ENCOUNTER — LAB REQUISITION (OUTPATIENT)
Dept: LAB | Facility: CLINIC | Age: 72
End: 2022-01-27
Payer: MEDICARE

## 2022-01-27 DIAGNOSIS — I10 ESSENTIAL (PRIMARY) HYPERTENSION: ICD-10-CM

## 2022-01-28 ENCOUNTER — TRANSITIONAL CARE UNIT VISIT (OUTPATIENT)
Dept: GERIATRICS | Facility: CLINIC | Age: 72
End: 2022-01-28
Payer: MEDICARE

## 2022-01-28 VITALS
HEIGHT: 66 IN | BODY MASS INDEX: 21.57 KG/M2 | WEIGHT: 134.2 LBS | SYSTOLIC BLOOD PRESSURE: 106 MMHG | RESPIRATION RATE: 17 BRPM | HEART RATE: 76 BPM | OXYGEN SATURATION: 93 % | TEMPERATURE: 98.8 F | DIASTOLIC BLOOD PRESSURE: 68 MMHG

## 2022-01-28 DIAGNOSIS — N18.32 TYPE 2 DIABETES MELLITUS WITH STAGE 3B CHRONIC KIDNEY DISEASE, WITH LONG-TERM CURRENT USE OF INSULIN (H): ICD-10-CM

## 2022-01-28 DIAGNOSIS — I10 ESSENTIAL HYPERTENSION: ICD-10-CM

## 2022-01-28 DIAGNOSIS — I69.354 SPASTIC HEMIPLEGIA OF LEFT NONDOMINANT SIDE AS LATE EFFECT OF CEREBRAL INFARCTION (H): ICD-10-CM

## 2022-01-28 DIAGNOSIS — I63.521 CEREBROVASCULAR ACCIDENT (CVA) DUE TO OCCLUSION OF RIGHT ANTERIOR CEREBRAL ARTERY (H): Primary | ICD-10-CM

## 2022-01-28 DIAGNOSIS — D51.9 ANEMIA DUE TO VITAMIN B12 DEFICIENCY, UNSPECIFIED B12 DEFICIENCY TYPE: ICD-10-CM

## 2022-01-28 DIAGNOSIS — Z79.4 TYPE 2 DIABETES MELLITUS WITH STAGE 3B CHRONIC KIDNEY DISEASE, WITH LONG-TERM CURRENT USE OF INSULIN (H): ICD-10-CM

## 2022-01-28 DIAGNOSIS — G40.909 SEIZURE DISORDER (H): ICD-10-CM

## 2022-01-28 DIAGNOSIS — E11.22 TYPE 2 DIABETES MELLITUS WITH STAGE 3B CHRONIC KIDNEY DISEASE, WITH LONG-TERM CURRENT USE OF INSULIN (H): ICD-10-CM

## 2022-01-28 PROCEDURE — 99309 SBSQ NF CARE MODERATE MDM 30: CPT | Performed by: NURSE PRACTITIONER

## 2022-01-28 ASSESSMENT — MIFFLIN-ST. JEOR: SCORE: 1145.48

## 2022-01-28 NOTE — LETTER
2022        RE: Addis Peña  [address]  [facility] [address]        [facility]    Name:   Addis Peña  :   1950  MRN:    [id number]     Facility:   [facility]) [[id number]]   Room: [place] [address]  Code Status: DNR and POLST AVAILABLE -     DOS: 2022  Previous visit:  2022    PCP:  Joel Daniel Wegener, MD     CHIEF COMPLAINT / REASON FOR VISIT:  Chief Complaint   Patient presents with     Clinic Care Coordination - Follow-up     Cerebrovascular accident      [facility] from 2021 until 2021 (recurrent CVA)      HPI: Addis is a 71 year old female with a past medical history significant for stroke with residual left spastic hemiplegia who stated she went to bed at approximately 8 PM the night before and woke up at 4:30 AM with increased left-sided weakness.  She stated that she felt well when she had gone to bed.  Typically, she uses a wheelchair for ambulation but can get up on her own and has enough strength in the left arm to get dressed and perform ADLs.  She stated that she could do neither that morning.  She lives with a roommate who helped her and called 911.  She otherwise had no change in her speech or swallowing ability.  She denied any other new focal neurological deficits.  She takes a daily baby aspirin and has been compliant.    Ischemic stroke: Seen as an extended stroke in the ED has a last known normal 8 PM the night prior.  Discussed with neurology.  Admitted and monitored on telemetry.  To continue  mg daily for 3 weeks and add Plavix (continuing Plavix alone after 3 weeks).  Vascular consulted given her left ICA stenosis.  They would like to follow-up with her in clinic in 6 to 8 weeks.  PT saw patient and recommended TCU upon discharge.  She would need an outpatient event monitor.    Seizure disorder: Phenytoin level subtherapeutic and levetiracetam level supratherapeutic.   Phenytoin discontinued, and levetiracetam decreased to 1000 mg twice daily.  Level was to be repeated on 12/15 before Keppra dose.    Hypertension: Initially allowed for permissive hypertension.  Amlodipine and metoprolol were resumed while inpatient.  Also resumed losartan on discharge.    Diabetes mellitus type 2: Metformin was held while inpatient and resumed on discharge.  Glargine 5 units at bedtime was added.  She stated she was unaware of this.    PROCEDURES/SIGNIFICANT IMAGING AND TESTING  CTA head and neck with IV contrast; CT brain perfusion (12/11/2021):  Head CT: No acute intracranial hemorrhage.  No CT evidence of acute infarct (aspect score 10).  Right SOCO territory large chronic infarct.  Small chronic infarcts.  Age-related changes.  Head CTA: No arterial large vessel occlusion.  Multiple stenoses and occlusions.  Neck CTA: Right carotid bifurcation moderate stenosis.  Right proximal carotid bulb 50% stenosis.  Right proximal cervical ICA 50% stenosis.  Left distal common carotid artery moderate stenosis.  Left carotid bifurcation moderate to severe stenosis.  Left proximal external carotid artery severe stenosis.  Left carotid bulb and proximal cervical ICA demonstrates 70 to 90% stenosis.  Left distal cervical ICA 50 to 70% stenosis.  No dissection.  Right vertebral artery origin severe stenosis.  Left distal V1 segment moderate stenosis.  CT perfusion: No acute perfusion abnormality.    MR brain without IV contrast (12/11/2021):  Small area of acute or subacute ischemia involving the posterior limb of the left internal capsule.  Foci of diffusion and FLAIR hyperintense signal in the right and left mid cerebral peduncles without definite diffusion restriction are nonspecific and may reflect sequela of chronic infarcts, alternatively demyelinating disease.  Multiple chronic infarcts as described previously.      CURRENT/RECENT TCU ISSUES    Disposition: Doing okay today, she is in a fairly good  mood. She has an upcoming vascular surgeon appointment on 02/02 to talk about a possible carotid endarterectomy.    She has expressed worry about the possibility of another stroke.  Her neurologist cannot promise she won't have another, but she described a discussion about possible endarterectomy procedure to bilateral carotids, because that is where he suspects they are coming from.  In physical therapy, she is working on small steps, balance, and flexibility.    Diabetes mellitus type 2: She was not on insulin at home.  She has been on sliding scale here, receiving 2 to 4 units.  I suggested we could try increasing her metformin from 1000 mg twice daily to 1250 mg twice daily in an effort to discharge her without the need for insulin.  According to the patient, her last A1c was 6.4.  Dr. Dc brought the dosing back down to 1000 mg twice daily.  Blood glucose levels still look good, and I suspect she will discharge without the need for insulin.  Currently, almost all blood glucose levels are >100 and <150.    Hypertension: Blood pressures are fluctuating, typically in the 130s systolic, but the occasionally much higher (160s).  Diastolics are typically in the 70s.  She is receiving metoprolol succinate 50 mg daily, amlodipine 10 mg daily, and losartan 25 mg daily.  Blood pressures are still somewhat high.  We increased metoprolol succinate to 100 mg daily.  I also suggested moving her losartan from the morning to bedtime.  Still elevated, we increased her losartan from 25 mg to 50 mg at bedtime.  I suspect we will eventually work our way up to 100 mg.  Recent blood pressures look good. We ordered a follow-up BMP on 01/31.    Nocturia and urinary incontinence:  At home, she has been using a PureWick during the night for about 6 months. He has the device here.    Spastic hemiplegia: Generally well controlled.  She has occasional spasms in her left arm.  Baclofen is working well at current dose.    ROS:   Positives:  "Sleep can be \"off and on.\"    Negatives: No headaches or chest pains, coughing or congestion, nausea or vomiting, dizziness or dyspnea, dysuria, constipation or diarrhea, difficulty chewing or swallowing, integumentary issues, or problems with appetite.      Past Medical History:   Diagnosis Date     Allergic rhinitis      Allergic rhinitis      CKD (chronic kidney disease)      Depression      Depression      Displaced dome fracture of left acetabulum (H)      DM2 (diabetes mellitus, type 2) (H)      GERD (gastroesophageal reflux disease)      GERD (gastroesophageal reflux disease)      Gout      HTN (hypertension)      Hyperlipidaemia LDL goal <100      Hypertension goal BP (blood pressure) < 140/90      Left hemiplegia (H) 1/2010    sees PMR physician     Left hemiplegia (H)      Migraine      Migraine      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)      Nephrolithiasis      Nephrolithiasis      Seizure disorder (H)      Spastic hemiplegia of left nondominant side as late effect of cerebral infarction (H) 12/20/2021     Stroke (H) 1/2010    due to uncontrolled hypertension     Stroke (H)      Type 2 diabetes, HbA1C goal < 8% (H)      Vitamin B12 deficiency anemia               Family History   Problem Relation Age of Onset     Hypertension Mother      Heart Disease Mother      C.A.D. Father      Coronary Artery Disease Father      Diabetes Sister      Hypertension Brother      Cancer Sister      Diabetes Sister      Hypertension Brother      Cancer Sister      Social History     Socioeconomic History     Marital status:      Spouse name: None     Number of children: None     Years of education: None     Highest education level: None   Occupational History     None   Tobacco Use     Smoking status: Never Smoker     Smokeless tobacco: Never Used   Vaping Use     Vaping Use: Never used   Substance and Sexual Activity     " Alcohol use: No     Drug use: No     Sexual activity: Not Currently     Partners: Male   Other Topics Concern     Parent/sibling w/ CABG, MI or angioplasty before 65F 55M? No   Social History Narrative     None     Social Determinants of Health     Financial Resource Strain: Not on file   Food Insecurity: Not on file   Transportation Needs: Not on file   Physical Activity: Not on file   Stress: Not on file   Social Connections: Not on file   Intimate Partner Violence: Not on file   Housing Stability: Not on file       MEDICATIONS: Reviewed from the MAR, physician orders, and/or earlier progress notes.  Post Discharge Medication Reconciliation Status: medication reconcilation previously completed during another office visit.    Current Outpatient Medications   Medication Sig     acetaminophen (TYLENOL) 500 MG tablet Take 500-1,000 mg by mouth every 6 hours as needed      amLODIPine (NORVASC) 10 MG tablet Take 1 tablet (10 mg) by mouth daily     atorvastatin (LIPITOR) 40 MG tablet Take 1 tablet (40 mg) by mouth daily     baclofen (LIORESAL) 10 MG tablet TAKE 1 TABLET(10 MG) BY MOUTH THREE TIMES DAILY AS NEEDED FOR MUSCLE SPASMS (Patient taking differently: Take 10 mg by mouth 3 times daily )     bisacodyl (DULCOLAX) 5 MG EC tablet Take 2 tablets (10 mg) by mouth daily as needed for constipation     blood glucose (IRENE CONTOUR) test strip 1 strip by In Vitro route daily Use to test blood sugars as needed     clopidogrel (PLAVIX) 75 MG tablet Take 75 mg by mouth daily     Lactase 4500 units TABS Take 13,500 Units by mouth daily as needed     levETIRAcetam (KEPPRA) 500 MG tablet Take 1,000 mg by mouth 2 times daily Take 1250 mg twice daily     losartan (COZAAR) 25 MG tablet Take 1 tablet (25 mg) by mouth daily (Patient taking differently: Take 50 mg by mouth At Bedtime )     metFORMIN (GLUCOPHAGE) 1000 MG tablet TAKE 1 TABLET(1000 MG) BY MOUTH TWICE DAILY WITH MEALS     metoprolol succinate ER (TOPROL-XL) 100 MG 24 hr  "tablet Take 100 mg by mouth daily     montelukast (SINGULAIR) 10 MG tablet Take 1 tablet (10 mg) by mouth daily     senna (SENOKOT) 8.6 MG tablet Take 1 tablet by mouth daily     Vitamin D3 (VITAMIN D3) 25 mcg (1000 units) tablet Take 1 tablet (25 mcg) by mouth daily     Current Facility-Administered Medications   Medication     vitamin B-12 (CYANOCOBALAMIN) injection 1,000 mcg     ALLERGIES:   Allergies   Allergen Reactions     Lac Bovis      Lisinopril Cough     Milk Products GI Disturbance     Latex Rash     DIET: Diabetic, regular texture, thin liquids.    Vitals:    01/28/22 1113   BP: 106/68   Pulse: 76   Resp: 17   Temp: 98.8  F (37.1  C)   SpO2: 93%   Weight: 60.9 kg (134 lb 3.2 oz)   Height: 1.676 m (5' 6\")     Body mass index is 21.66 kg/m .    EXAMINATION:   General: Pleasant, alert, and conversant middle-aged female, lying in bed, in NAD.  Head: Normocephalic and atraumatic.   Eyes: PERRLA, sclerae clear.   ENT: Moist oral mucosa.  She has her own teeth in excellent repair.  No gingival hyperplasia from phenytoin.  No rhinorrhea or nasal discharge.  Hearing unimpaired.  Cardiovascular: Regular rate and rhythm with a 2/6 SHARON at the LSB.   Respiratory: Lungs clear to auscultation bilaterally.   Abdomen: Nondistended.   Musculoskeletal/Extremities: Age-related degenerative joint disease.  Mild contractures of the left hand and elbow.  Wearing AFO.  Neurologic: Mild contractures of the left upper extremity.  Contracture of the left thumb reveals enlarged CMC joint.  Cannot make a fist.  Has not lost sensation, and no numbness or tingling.  Integument: No rashes, clinically significant lesions, or skin breakdown.   Cognitive/Psychiatric: Alert and oriented with euthymic affect.    DIAGNOSTICS:   No results found for this or any previous visit (from the past 240 hour(s)).  Last Comprehensive Metabolic Panel:  Sodium   Date Value Ref Range Status   11/12/2021 138 133 - 144 mmol/L Final   04/15/2021 140 133 - " 144 mmol/L Final     Potassium   Date Value Ref Range Status   11/12/2021 4.5 3.4 - 5.3 mmol/L Final   04/15/2021 4.6 3.4 - 5.3 mmol/L Final     Chloride   Date Value Ref Range Status   11/12/2021 106 94 - 109 mmol/L Final   04/15/2021 106 94 - 109 mmol/L Final     Carbon Dioxide   Date Value Ref Range Status   04/15/2021 25 20 - 32 mmol/L Final     Carbon Dioxide (CO2)   Date Value Ref Range Status   11/12/2021 28 20 - 32 mmol/L Final     Anion Gap   Date Value Ref Range Status   11/12/2021 4 3 - 14 mmol/L Final   04/15/2021 9 3 - 14 mmol/L Final     Glucose   Date Value Ref Range Status   11/12/2021 128 (H) 70 - 99 mg/dL Final   04/15/2021 150 (H) 70 - 99 mg/dL Final     Comment:     Fasting specimen     Urea Nitrogen   Date Value Ref Range Status   11/12/2021 24 7 - 30 mg/dL Final   04/15/2021 24 7 - 30 mg/dL Final     Creatinine   Date Value Ref Range Status   11/12/2021 0.68 0.52 - 1.04 mg/dL Final   04/15/2021 0.72 0.52 - 1.04 mg/dL Final     GFR Estimate   Date Value Ref Range Status   11/12/2021 89 >60 mL/min/1.73m2 Final     Comment:     As of July 11, 2021, eGFR is calculated by the CKD-EPI creatinine equation, without race adjustment. eGFR can be influenced by muscle mass, exercise, and diet. The reported eGFR is an estimation only and is only applicable if the renal function is stable.   04/15/2021 86 >60 mL/min/[1.73_m2] Final     Comment:     Non  GFR Calc  Starting 12/18/2018, serum creatinine based estimated GFR (eGFR) will be   calculated using the Chronic Kidney Disease Epidemiology Collaboration   (CKD-EPI) equation.       Calcium   Date Value Ref Range Status   11/12/2021 9.3 8.5 - 10.1 mg/dL Final   04/15/2021 9.2 8.5 - 10.1 mg/dL Final     Bilirubin Total   Date Value Ref Range Status   11/12/2021 0.2 0.2 - 1.3 mg/dL Final   04/15/2021 0.2 0.2 - 1.3 mg/dL Final     Alkaline Phosphatase   Date Value Ref Range Status   11/12/2021 131 40 - 150 U/L Final   04/15/2021 159 (H) 40 -  150 U/L Final     ALT   Date Value Ref Range Status   11/12/2021 24 0 - 50 U/L Final   04/15/2021 23 0 - 50 U/L Final     AST   Date Value Ref Range Status   11/12/2021 18 0 - 45 U/L Final   04/15/2021 18 0 - 45 U/L Final     Lab Results   Component Value Date    WBC 9.1 11/12/2021    WBC 7.7 11/20/2020     Lab Results   Component Value Date    RBC 4.38 11/12/2021    RBC 4.05 11/20/2020     Lab Results   Component Value Date    HGB 14.3 11/12/2021    HGB 13.2 11/20/2020     Lab Results   Component Value Date    HCT 42.6 11/12/2021    HCT 40.9 11/20/2020     Lab Results   Component Value Date    MCV 97 11/12/2021     11/20/2020     Lab Results   Component Value Date    MCH 32.6 11/12/2021    MCH 32.6 11/20/2020     Lab Results   Component Value Date    MCHC 33.6 11/12/2021    MCHC 32.3 11/20/2020     Lab Results   Component Value Date    RDW 11.6 11/12/2021    RDW 11.8 11/20/2020     Lab Results   Component Value Date     11/12/2021     11/20/2020       ASSESSMENT/Plan:      ICD-10-CM    1. Cerebrovascular accident (CVA) due to occlusion of right anterior cerebral artery (H)  I63.521    2. Spastic hemiplegia of left nondominant side as late effect of cerebral infarction (H)  I69.354    3. Type 2 diabetes mellitus with stage 3b chronic kidney disease, with long-term current use of insulin (H)  E11.22     N18.32     Z79.4    4. Essential hypertension  I10    5. Seizure disorder (H)  G40.909    6. Anemia due to vitamin B12 deficiency, unspecified B12 deficiency type  D51.9        CHANGES:    None.    CARE PLAN:    The care plan, medications, vital signs, orders, and nursing notes have been reviewed, and all orders signed. Changes to care plan, if any, as noted. Otherwise, continue current plan of care.    The above has been created using voice recognition software. Please be aware that this may unintentionally  produce inaccuracies and/or nonsensical sentences.      Electronically signed by: Kleber  KEILA Cerda CNP        Sincerely,        KEILA Mcmahon CNP

## 2022-01-30 NOTE — PROGRESS NOTES
[facility]    Name:   Addis Peña  :   1950  MRN:    [id number]     Facility:   [facility] ([facility]) [[id number]]   Room: [place] [address]  Code Status: DNR and POLST AVAILABLE -     DOS: 2022  Previous visit:  2022    PCP:  Joel Daniel Wegener, MD     CHIEF COMPLAINT / REASON FOR VISIT:  Chief Complaint   Patient presents with     Clinic Care Coordination - Follow-up     Cerebrovascular accident      [facility] from 2021 until 2021 (recurrent CVA)      HPI: Addis is a 71 year old female with a past medical history significant for stroke with residual left spastic hemiplegia who stated she went to bed at approximately 8 PM the night before and woke up at 4:30 AM with increased left-sided weakness.  She stated that she felt well when she had gone to bed.  Typically, she uses a wheelchair for ambulation but can get up on her own and has enough strength in the left arm to get dressed and perform ADLs.  She stated that she could do neither that morning.  She lives with a roommate who helped her and called 911.  She otherwise had no change in her speech or swallowing ability.  She denied any other new focal neurological deficits.  She takes a daily baby aspirin and has been compliant.    Ischemic stroke: Seen as an extended stroke in the ED has a last known normal 8 PM the night prior.  Discussed with neurology.  Admitted and monitored on telemetry.  To continue  mg daily for 3 weeks and add Plavix (continuing Plavix alone after 3 weeks).  Vascular consulted given her left ICA stenosis.  They would like to follow-up with her in clinic in 6 to 8 weeks.  PT saw patient and recommended TCU upon discharge.  She would need an outpatient event monitor.    Seizure disorder: Phenytoin level subtherapeutic and levetiracetam level supratherapeutic.  Phenytoin discontinued, and levetiracetam decreased to 1000 mg twice daily.  Level was to be repeated on  12/15 before Keppra dose.    Hypertension: Initially allowed for permissive hypertension.  Amlodipine and metoprolol were resumed while inpatient.  Also resumed losartan on discharge.    Diabetes mellitus type 2: Metformin was held while inpatient and resumed on discharge.  Glargine 5 units at bedtime was added.  She stated she was unaware of this.    PROCEDURES/SIGNIFICANT IMAGING AND TESTING  CTA head and neck with IV contrast; CT brain perfusion (12/11/2021):  Head CT: No acute intracranial hemorrhage.  No CT evidence of acute infarct (aspect score 10).  Right SOCO territory large chronic infarct.  Small chronic infarcts.  Age-related changes.  Head CTA: No arterial large vessel occlusion.  Multiple stenoses and occlusions.  Neck CTA: Right carotid bifurcation moderate stenosis.  Right proximal carotid bulb 50% stenosis.  Right proximal cervical ICA 50% stenosis.  Left distal common carotid artery moderate stenosis.  Left carotid bifurcation moderate to severe stenosis.  Left proximal external carotid artery severe stenosis.  Left carotid bulb and proximal cervical ICA demonstrates 70 to 90% stenosis.  Left distal cervical ICA 50 to 70% stenosis.  No dissection.  Right vertebral artery origin severe stenosis.  Left distal V1 segment moderate stenosis.  CT perfusion: No acute perfusion abnormality.    MR brain without IV contrast (12/11/2021):  Small area of acute or subacute ischemia involving the posterior limb of the left internal capsule.  Foci of diffusion and FLAIR hyperintense signal in the right and left mid cerebral peduncles without definite diffusion restriction are nonspecific and may reflect sequela of chronic infarcts, alternatively demyelinating disease.  Multiple chronic infarcts as described previously.      CURRENT/RECENT TCU ISSUES    Disposition: Doing okay today, she is in a fairly good mood. She has an upcoming vascular surgeon appointment on 02/02 to talk about a possible carotid  "endarterectomy.    She has expressed worry about the possibility of another stroke.  Her neurologist cannot promise she won't have another, but she described a discussion about possible endarterectomy procedure to bilateral carotids, because that is where he suspects they are coming from.  In physical therapy, she is working on small steps, balance, and flexibility.    Diabetes mellitus type 2: She was not on insulin at home.  She has been on sliding scale here, receiving 2 to 4 units.  I suggested we could try increasing her metformin from 1000 mg twice daily to 1250 mg twice daily in an effort to discharge her without the need for insulin.  According to the patient, her last A1c was 6.4.  Dr. Dc brought the dosing back down to 1000 mg twice daily.  Blood glucose levels still look good, and I suspect she will discharge without the need for insulin.  Currently, almost all blood glucose levels are >100 and <150.    Hypertension: Blood pressures are fluctuating, typically in the 130s systolic, but the occasionally much higher (160s).  Diastolics are typically in the 70s.  She is receiving metoprolol succinate 50 mg daily, amlodipine 10 mg daily, and losartan 25 mg daily.  Blood pressures are still somewhat high.  We increased metoprolol succinate to 100 mg daily.  I also suggested moving her losartan from the morning to bedtime.  Still elevated, we increased her losartan from 25 mg to 50 mg at bedtime.  I suspect we will eventually work our way up to 100 mg.  Recent blood pressures look good. We ordered a follow-up BMP on 01/31.    Nocturia and urinary incontinence:  At home, she has been using a PureWick during the night for about 6 months. He has the device here.    Spastic hemiplegia: Generally well controlled.  She has occasional spasms in her left arm.  Baclofen is working well at current dose.    ROS:   Positives: Sleep can be \"off and on.\"    Negatives: No headaches or chest pains, coughing or congestion, " nausea or vomiting, dizziness or dyspnea, dysuria, constipation or diarrhea, difficulty chewing or swallowing, integumentary issues, or problems with appetite.      Past Medical History:   Diagnosis Date     Allergic rhinitis      Allergic rhinitis      CKD (chronic kidney disease)      Depression      Depression      Displaced dome fracture of left acetabulum (H)      DM2 (diabetes mellitus, type 2) (H)      GERD (gastroesophageal reflux disease)      GERD (gastroesophageal reflux disease)      Gout      HTN (hypertension)      Hyperlipidaemia LDL goal <100      Hypertension goal BP (blood pressure) < 140/90      Left hemiplegia (H) 1/2010    sees PMR physician     Left hemiplegia (H)      Migraine      Migraine      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)      Nephrolithiasis      Nephrolithiasis      Seizure disorder (H)      Spastic hemiplegia of left nondominant side as late effect of cerebral infarction (H) 12/20/2021     Stroke (H) 1/2010    due to uncontrolled hypertension     Stroke (H)      Type 2 diabetes, HbA1C goal < 8% (H)      Vitamin B12 deficiency anemia               Family History   Problem Relation Age of Onset     Hypertension Mother      Heart Disease Mother      C.A.D. Father      Coronary Artery Disease Father      Diabetes Sister      Hypertension Brother      Cancer Sister      Diabetes Sister      Hypertension Brother      Cancer Sister      Social History     Socioeconomic History     Marital status:      Spouse name: None     Number of children: None     Years of education: None     Highest education level: None   Occupational History     None   Tobacco Use     Smoking status: Never Smoker     Smokeless tobacco: Never Used   Vaping Use     Vaping Use: Never used   Substance and Sexual Activity     Alcohol use: No     Drug use: No     Sexual activity: Not Currently     Partners: Male   Other  Topics Concern     Parent/sibling w/ CABG, MI or angioplasty before 65F 55M? No   Social History Narrative     None     Social Determinants of Health     Financial Resource Strain: Not on file   Food Insecurity: Not on file   Transportation Needs: Not on file   Physical Activity: Not on file   Stress: Not on file   Social Connections: Not on file   Intimate Partner Violence: Not on file   Housing Stability: Not on file       MEDICATIONS: Reviewed from the MAR, physician orders, and/or earlier progress notes.  Post Discharge Medication Reconciliation Status: medication reconcilation previously completed during another office visit.    Current Outpatient Medications   Medication Sig     acetaminophen (TYLENOL) 500 MG tablet Take 500-1,000 mg by mouth every 6 hours as needed      amLODIPine (NORVASC) 10 MG tablet Take 1 tablet (10 mg) by mouth daily     atorvastatin (LIPITOR) 40 MG tablet Take 1 tablet (40 mg) by mouth daily     baclofen (LIORESAL) 10 MG tablet TAKE 1 TABLET(10 MG) BY MOUTH THREE TIMES DAILY AS NEEDED FOR MUSCLE SPASMS (Patient taking differently: Take 10 mg by mouth 3 times daily )     bisacodyl (DULCOLAX) 5 MG EC tablet Take 2 tablets (10 mg) by mouth daily as needed for constipation     blood glucose (IRENE CONTOUR) test strip 1 strip by In Vitro route daily Use to test blood sugars as needed     clopidogrel (PLAVIX) 75 MG tablet Take 75 mg by mouth daily     Lactase 4500 units TABS Take 13,500 Units by mouth daily as needed     levETIRAcetam (KEPPRA) 500 MG tablet Take 1,000 mg by mouth 2 times daily Take 1250 mg twice daily     losartan (COZAAR) 25 MG tablet Take 1 tablet (25 mg) by mouth daily (Patient taking differently: Take 50 mg by mouth At Bedtime )     metFORMIN (GLUCOPHAGE) 1000 MG tablet TAKE 1 TABLET(1000 MG) BY MOUTH TWICE DAILY WITH MEALS     metoprolol succinate ER (TOPROL-XL) 100 MG 24 hr tablet Take 100 mg by mouth daily     montelukast (SINGULAIR) 10 MG tablet Take 1 tablet (10 mg)  "by mouth daily     senna (SENOKOT) 8.6 MG tablet Take 1 tablet by mouth daily     Vitamin D3 (VITAMIN D3) 25 mcg (1000 units) tablet Take 1 tablet (25 mcg) by mouth daily     Current Facility-Administered Medications   Medication     vitamin B-12 (CYANOCOBALAMIN) injection 1,000 mcg     ALLERGIES:   Allergies   Allergen Reactions     Lac Bovis      Lisinopril Cough     Milk Products GI Disturbance     Latex Rash     DIET: Diabetic, regular texture, thin liquids.    Vitals:    01/28/22 1113   BP: 106/68   Pulse: 76   Resp: 17   Temp: 98.8  F (37.1  C)   SpO2: 93%   Weight: 60.9 kg (134 lb 3.2 oz)   Height: 1.676 m (5' 6\")     Body mass index is 21.66 kg/m .    EXAMINATION:   General: Pleasant, alert, and conversant middle-aged female, lying in bed, in NAD.  Head: Normocephalic and atraumatic.   Eyes: PERRLA, sclerae clear.   ENT: Moist oral mucosa.  She has her own teeth in excellent repair.  No gingival hyperplasia from phenytoin.  No rhinorrhea or nasal discharge.  Hearing unimpaired.  Cardiovascular: Regular rate and rhythm with a 2/6 SHARON at the LSB.   Respiratory: Lungs clear to auscultation bilaterally.   Abdomen: Nondistended.   Musculoskeletal/Extremities: Age-related degenerative joint disease.  Mild contractures of the left hand and elbow.  Wearing AFO.  Neurologic: Mild contractures of the left upper extremity.  Contracture of the left thumb reveals enlarged CMC joint.  Cannot make a fist.  Has not lost sensation, and no numbness or tingling.  Integument: No rashes, clinically significant lesions, or skin breakdown.   Cognitive/Psychiatric: Alert and oriented with euthymic affect.    DIAGNOSTICS:   No results found for this or any previous visit (from the past 240 hour(s)).  Last Comprehensive Metabolic Panel:  Sodium   Date Value Ref Range Status   11/12/2021 138 133 - 144 mmol/L Final   04/15/2021 140 133 - 144 mmol/L Final     Potassium   Date Value Ref Range Status   11/12/2021 4.5 3.4 - 5.3 mmol/L " Final   04/15/2021 4.6 3.4 - 5.3 mmol/L Final     Chloride   Date Value Ref Range Status   11/12/2021 106 94 - 109 mmol/L Final   04/15/2021 106 94 - 109 mmol/L Final     Carbon Dioxide   Date Value Ref Range Status   04/15/2021 25 20 - 32 mmol/L Final     Carbon Dioxide (CO2)   Date Value Ref Range Status   11/12/2021 28 20 - 32 mmol/L Final     Anion Gap   Date Value Ref Range Status   11/12/2021 4 3 - 14 mmol/L Final   04/15/2021 9 3 - 14 mmol/L Final     Glucose   Date Value Ref Range Status   11/12/2021 128 (H) 70 - 99 mg/dL Final   04/15/2021 150 (H) 70 - 99 mg/dL Final     Comment:     Fasting specimen     Urea Nitrogen   Date Value Ref Range Status   11/12/2021 24 7 - 30 mg/dL Final   04/15/2021 24 7 - 30 mg/dL Final     Creatinine   Date Value Ref Range Status   11/12/2021 0.68 0.52 - 1.04 mg/dL Final   04/15/2021 0.72 0.52 - 1.04 mg/dL Final     GFR Estimate   Date Value Ref Range Status   11/12/2021 89 >60 mL/min/1.73m2 Final     Comment:     As of July 11, 2021, eGFR is calculated by the CKD-EPI creatinine equation, without race adjustment. eGFR can be influenced by muscle mass, exercise, and diet. The reported eGFR is an estimation only and is only applicable if the renal function is stable.   04/15/2021 86 >60 mL/min/[1.73_m2] Final     Comment:     Non  GFR Calc  Starting 12/18/2018, serum creatinine based estimated GFR (eGFR) will be   calculated using the Chronic Kidney Disease Epidemiology Collaboration   (CKD-EPI) equation.       Calcium   Date Value Ref Range Status   11/12/2021 9.3 8.5 - 10.1 mg/dL Final   04/15/2021 9.2 8.5 - 10.1 mg/dL Final     Bilirubin Total   Date Value Ref Range Status   11/12/2021 0.2 0.2 - 1.3 mg/dL Final   04/15/2021 0.2 0.2 - 1.3 mg/dL Final     Alkaline Phosphatase   Date Value Ref Range Status   11/12/2021 131 40 - 150 U/L Final   04/15/2021 159 (H) 40 - 150 U/L Final     ALT   Date Value Ref Range Status   11/12/2021 24 0 - 50 U/L Final   04/15/2021  23 0 - 50 U/L Final     AST   Date Value Ref Range Status   11/12/2021 18 0 - 45 U/L Final   04/15/2021 18 0 - 45 U/L Final     Lab Results   Component Value Date    WBC 9.1 11/12/2021    WBC 7.7 11/20/2020     Lab Results   Component Value Date    RBC 4.38 11/12/2021    RBC 4.05 11/20/2020     Lab Results   Component Value Date    HGB 14.3 11/12/2021    HGB 13.2 11/20/2020     Lab Results   Component Value Date    HCT 42.6 11/12/2021    HCT 40.9 11/20/2020     Lab Results   Component Value Date    MCV 97 11/12/2021     11/20/2020     Lab Results   Component Value Date    MCH 32.6 11/12/2021    MCH 32.6 11/20/2020     Lab Results   Component Value Date    MCHC 33.6 11/12/2021    MCHC 32.3 11/20/2020     Lab Results   Component Value Date    RDW 11.6 11/12/2021    RDW 11.8 11/20/2020     Lab Results   Component Value Date     11/12/2021     11/20/2020       ASSESSMENT/Plan:      ICD-10-CM    1. Cerebrovascular accident (CVA) due to occlusion of right anterior cerebral artery (H)  I63.521    2. Spastic hemiplegia of left nondominant side as late effect of cerebral infarction (H)  I69.354    3. Type 2 diabetes mellitus with stage 3b chronic kidney disease, with long-term current use of insulin (H)  E11.22     N18.32     Z79.4    4. Essential hypertension  I10    5. Seizure disorder (H)  G40.909    6. Anemia due to vitamin B12 deficiency, unspecified B12 deficiency type  D51.9        CHANGES:    None.    CARE PLAN:    The care plan, medications, vital signs, orders, and nursing notes have been reviewed, and all orders signed. Changes to care plan, if any, as noted. Otherwise, continue current plan of care.    The above has been created using voice recognition software. Please be aware that this may unintentionally  produce inaccuracies and/or nonsensical sentences.      Electronically signed by: KEILA Mcmahon CNP

## 2022-01-31 ENCOUNTER — LAB REQUISITION (OUTPATIENT)
Dept: LAB | Facility: CLINIC | Age: 72
End: 2022-01-31
Payer: MEDICARE

## 2022-01-31 DIAGNOSIS — I69.398 OTHER SEQUELAE OF CEREBRAL INFARCTION: ICD-10-CM

## 2022-01-31 DIAGNOSIS — E11.9 TYPE 2 DIABETES MELLITUS WITHOUT COMPLICATIONS (H): ICD-10-CM

## 2022-01-31 DIAGNOSIS — I10 ESSENTIAL (PRIMARY) HYPERTENSION: ICD-10-CM

## 2022-02-01 ENCOUNTER — TRANSITIONAL CARE UNIT VISIT (OUTPATIENT)
Dept: GERIATRICS | Facility: CLINIC | Age: 72
End: 2022-02-01
Payer: MEDICARE

## 2022-02-01 VITALS
SYSTOLIC BLOOD PRESSURE: 120 MMHG | WEIGHT: 134 LBS | TEMPERATURE: 95.5 F | RESPIRATION RATE: 16 BRPM | DIASTOLIC BLOOD PRESSURE: 75 MMHG | HEIGHT: 66 IN | BODY MASS INDEX: 21.53 KG/M2 | HEART RATE: 65 BPM | OXYGEN SATURATION: 96 %

## 2022-02-01 DIAGNOSIS — I10 ESSENTIAL HYPERTENSION: ICD-10-CM

## 2022-02-01 DIAGNOSIS — I63.521 CEREBROVASCULAR ACCIDENT (CVA) DUE TO OCCLUSION OF RIGHT ANTERIOR CEREBRAL ARTERY (H): Primary | ICD-10-CM

## 2022-02-01 DIAGNOSIS — N18.32 TYPE 2 DIABETES MELLITUS WITH STAGE 3B CHRONIC KIDNEY DISEASE, WITH LONG-TERM CURRENT USE OF INSULIN (H): ICD-10-CM

## 2022-02-01 DIAGNOSIS — G40.909 SEIZURE DISORDER (H): ICD-10-CM

## 2022-02-01 DIAGNOSIS — I69.354 SPASTIC HEMIPLEGIA OF LEFT NONDOMINANT SIDE AS LATE EFFECT OF CEREBRAL INFARCTION (H): ICD-10-CM

## 2022-02-01 DIAGNOSIS — Z79.4 TYPE 2 DIABETES MELLITUS WITH STAGE 3B CHRONIC KIDNEY DISEASE, WITH LONG-TERM CURRENT USE OF INSULIN (H): ICD-10-CM

## 2022-02-01 DIAGNOSIS — D51.9 ANEMIA DUE TO VITAMIN B12 DEFICIENCY, UNSPECIFIED B12 DEFICIENCY TYPE: ICD-10-CM

## 2022-02-01 DIAGNOSIS — E11.22 TYPE 2 DIABETES MELLITUS WITH STAGE 3B CHRONIC KIDNEY DISEASE, WITH LONG-TERM CURRENT USE OF INSULIN (H): ICD-10-CM

## 2022-02-01 LAB
ANION GAP SERPL CALCULATED.3IONS-SCNC: 10 MMOL/L (ref 5–18)
BUN SERPL-MCNC: 27 MG/DL (ref 8–28)
CALCIUM SERPL-MCNC: 9.8 MG/DL (ref 8.5–10.5)
CHLORIDE BLD-SCNC: 109 MMOL/L (ref 98–107)
CO2 SERPL-SCNC: 24 MMOL/L (ref 22–31)
CREAT SERPL-MCNC: 0.83 MG/DL (ref 0.6–1.1)
GFR SERPL CREATININE-BSD FRML MDRD: 75 ML/MIN/1.73M2
GLUCOSE BLD-MCNC: 207 MG/DL (ref 70–125)
POTASSIUM BLD-SCNC: 4 MMOL/L (ref 3.5–5)
SODIUM SERPL-SCNC: 143 MMOL/L (ref 136–145)

## 2022-02-01 PROCEDURE — 99309 SBSQ NF CARE MODERATE MDM 30: CPT | Performed by: NURSE PRACTITIONER

## 2022-02-01 PROCEDURE — 36415 COLL VENOUS BLD VENIPUNCTURE: CPT | Performed by: INTERNAL MEDICINE

## 2022-02-01 PROCEDURE — 82310 ASSAY OF CALCIUM: CPT | Performed by: INTERNAL MEDICINE

## 2022-02-01 PROCEDURE — P9604 ONE-WAY ALLOW PRORATED TRIP: HCPCS | Performed by: INTERNAL MEDICINE

## 2022-02-01 ASSESSMENT — MIFFLIN-ST. JEOR: SCORE: 1144.57

## 2022-02-01 NOTE — LETTER
2022        RE: Addis Peña  [address]  [facility] [address]        [facility]    Name:   Addis Peña  :   1950  MRN:    [id number]     Facility:   [facility]) [[id number]]   Room: [place] [address]  Code Status: DNR and POLST AVAILABLE -     DOS: 2022  Previous visit:  2022    PCP:  Joel Daniel Wegener, MD     CHIEF COMPLAINT / REASON FOR VISIT:  Chief Complaint   Patient presents with     Clinic Care Coordination - Follow-up     Cerebrovascular accident      [facility] from 2021 until 2021 (recurrent CVA)      HPI: Addis is a 71 year old female with a past medical history significant for stroke with residual left spastic hemiplegia who stated she went to bed at approximately 8 PM the night before and woke up at 4:30 AM with increased left-sided weakness.  She stated that she felt well when she had gone to bed.  Typically, she uses a wheelchair for ambulation but can get up on her own and has enough strength in the left arm to get dressed and perform ADLs.  She stated that she could do neither that morning.  She lives with a roommate who helped her and called 911.  She otherwise had no change in her speech or swallowing ability.  She denied any other new focal neurological deficits.  She takes a daily baby aspirin and has been compliant.    Ischemic stroke: Seen as an extended stroke in the ED has a last known normal 8 PM the night prior.  Discussed with neurology.  Admitted and monitored on telemetry.  To continue  mg daily for 3 weeks and add Plavix (continuing Plavix alone after 3 weeks).  Vascular consulted given her left ICA stenosis.  They would like to follow-up with her in clinic in 6 to 8 weeks.  PT saw patient and recommended TCU upon discharge.  She would need an outpatient event monitor.    Seizure disorder: Phenytoin level subtherapeutic and levetiracetam level supratherapeutic.   Phenytoin discontinued, and levetiracetam decreased to 1000 mg twice daily.  Level was to be repeated on 12/15 before Keppra dose.    Hypertension: Initially allowed for permissive hypertension.  Amlodipine and metoprolol were resumed while inpatient.  Also resumed losartan on discharge.    Diabetes mellitus type 2: Metformin was held while inpatient and resumed on discharge.  Glargine 5 units at bedtime was added.  She stated she was unaware of this.    PROCEDURES/SIGNIFICANT IMAGING AND TESTING  CTA head and neck with IV contrast; CT brain perfusion (12/11/2021):  Head CT: No acute intracranial hemorrhage.  No CT evidence of acute infarct (aspect score 10).  Right SOCO territory large chronic infarct.  Small chronic infarcts.  Age-related changes.  Head CTA: No arterial large vessel occlusion.  Multiple stenoses and occlusions.  Neck CTA: Right carotid bifurcation moderate stenosis.  Right proximal carotid bulb 50% stenosis.  Right proximal cervical ICA 50% stenosis.  Left distal common carotid artery moderate stenosis.  Left carotid bifurcation moderate to severe stenosis.  Left proximal external carotid artery severe stenosis.  Left carotid bulb and proximal cervical ICA demonstrates 70 to 90% stenosis.  Left distal cervical ICA 50 to 70% stenosis.  No dissection.  Right vertebral artery origin severe stenosis.  Left distal V1 segment moderate stenosis.  CT perfusion: No acute perfusion abnormality.    MR brain without IV contrast (12/11/2021):  Small area of acute or subacute ischemia involving the posterior limb of the left internal capsule.  Foci of diffusion and FLAIR hyperintense signal in the right and left mid cerebral peduncles without definite diffusion restriction are nonspecific and may reflect sequela of chronic infarcts, alternatively demyelinating disease.  Multiple chronic infarcts as described previously.      CURRENT/RECENT TCU ISSUES    Disposition: Doing okay today, she is in a fair mood. She  has an upcoming vascular surgeon appointment on 02/02 to talk about a possible carotid endarterectomy.  Today, she asks me why labs were drawn 2 days in a row, and it may be that today's labs were ordered by an outside source, possibly the vascular surgeon.    She has expressed worry about the possibility of another stroke.  Her neurologist cannot promise she won't have another, but she described a discussion about possible endarterectomy procedure to bilateral carotids, because that is where he suspects they are coming from.  In physical therapy, she is working on small steps, balance, and flexibility.    Diabetes mellitus type 2: She was not on insulin at home.  She has been on sliding scale here, receiving 2 to 4 units.  I suggested we could try increasing her metformin from 1000 mg twice daily to 1250 mg twice daily in an effort to discharge her without the need for insulin.  According to the patient, her last A1c was 6.4.  Dr. Dc brought the dosing back down to 1000 mg twice daily.  Blood glucose levels still look good, and I suspect she will discharge without the need for insulin.  Currently, almost all blood glucose levels are >100 and <150.    Hypertension: Blood pressures are fluctuating, typically in the 130s systolic, but the occasionally much higher (160s).  Diastolics are typically in the 70s.  She is receiving metoprolol succinate 50 mg daily, amlodipine 10 mg daily, and losartan 25 mg daily.  Blood pressures are still somewhat high.  We increased metoprolol succinate to 100 mg daily.  I also suggested moving her losartan from the morning to bedtime.  Still elevated, we increased her losartan from 25 mg to 50 mg at bedtime.  I suspect we may eventually work our way up to 100 mg.  Recent blood pressures look good.  A follow-up BMP on 02/01 looked excellent (BUN 27, creatinine 0.83, eGFR 75).  Blood pressures still very quite a bit, with systolics ranging from 100-146, and diastolics ranging from 51-87.  " It could, perhaps, stand another little tweak.  We will continue to monitor before any further adjustments.    Nocturia and urinary incontinence:  At home, she has been using a PureWick during the night for about 6 months. He has the device here.    Spastic hemiplegia: Generally well controlled.  She has occasional spasms in her left arm.  Baclofen is working well at current dose.    ROS:   Positives: Sleep can be \"off and on.\"  More often on than not.    Negatives: No headaches or chest pains, coughing or congestion, nausea or vomiting, dizziness or dyspnea, dysuria, constipation or diarrhea, difficulty chewing or swallowing, integumentary issues, or problems with appetite.      Past Medical History:   Diagnosis Date     Allergic rhinitis      Allergic rhinitis      CKD (chronic kidney disease)      Depression      Depression      Displaced dome fracture of left acetabulum (H)      DM2 (diabetes mellitus, type 2) (H)      GERD (gastroesophageal reflux disease)      GERD (gastroesophageal reflux disease)      Gout      HTN (hypertension)      Hyperlipidaemia LDL goal <100      Hypertension goal BP (blood pressure) < 140/90      Left hemiplegia (H) 1/2010    sees PMR physician     Left hemiplegia (H)      Migraine      Migraine      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)      Nephrolithiasis      Nephrolithiasis      Seizure disorder (H)      Spastic hemiplegia of left nondominant side as late effect of cerebral infarction (H) 12/20/2021     Stroke (H) 1/2010    due to uncontrolled hypertension     Stroke (H)      Type 2 diabetes, HbA1C goal < 8% (H)      Vitamin B12 deficiency anemia               Family History   Problem Relation Age of Onset     Hypertension Mother      Heart Disease Mother      C.A.D. Father      Coronary Artery Disease Father      Diabetes Sister      Hypertension Brother      Cancer Sister      Diabetes " Sister      Hypertension Brother      Cancer Sister      Social History     Socioeconomic History     Marital status:      Spouse name: None     Number of children: None     Years of education: None     Highest education level: None   Occupational History     None   Tobacco Use     Smoking status: Never Smoker     Smokeless tobacco: Never Used   Vaping Use     Vaping Use: Never used   Substance and Sexual Activity     Alcohol use: No     Drug use: No     Sexual activity: Not Currently     Partners: Male   Other Topics Concern     Parent/sibling w/ CABG, MI or angioplasty before 65F 55M? No   Social History Narrative     None     Social Determinants of Health     Financial Resource Strain: Not on file   Food Insecurity: Not on file   Transportation Needs: Not on file   Physical Activity: Not on file   Stress: Not on file   Social Connections: Not on file   Intimate Partner Violence: Not on file   Housing Stability: Not on file       MEDICATIONS: Reviewed from the MAR, physician orders, and/or earlier progress notes.  Post Discharge Medication Reconciliation Status: medication reconcilation previously completed during another office visit.    Current Outpatient Medications   Medication Sig     acetaminophen (TYLENOL) 500 MG tablet Take 500-1,000 mg by mouth every 6 hours as needed      amLODIPine (NORVASC) 10 MG tablet Take 1 tablet (10 mg) by mouth daily     atorvastatin (LIPITOR) 40 MG tablet Take 1 tablet (40 mg) by mouth daily     baclofen (LIORESAL) 10 MG tablet TAKE 1 TABLET(10 MG) BY MOUTH THREE TIMES DAILY AS NEEDED FOR MUSCLE SPASMS (Patient taking differently: Take 10 mg by mouth 3 times daily )     bisacodyl (DULCOLAX) 5 MG EC tablet Take 2 tablets (10 mg) by mouth daily as needed for constipation     blood glucose (IRENE CONTOUR) test strip 1 strip by In Vitro route daily Use to test blood sugars as needed     clopidogrel (PLAVIX) 75 MG tablet Take 75 mg by mouth daily     Lactase 4500 units TABS  "Take 13,500 Units by mouth daily as needed     levETIRAcetam (KEPPRA) 500 MG tablet Take 1,000 mg by mouth 2 times daily Take 1250 mg twice daily     losartan (COZAAR) 25 MG tablet Take 1 tablet (25 mg) by mouth daily (Patient taking differently: Take 50 mg by mouth At Bedtime )     metFORMIN (GLUCOPHAGE) 1000 MG tablet TAKE 1 TABLET(1000 MG) BY MOUTH TWICE DAILY WITH MEALS     metoprolol succinate ER (TOPROL-XL) 100 MG 24 hr tablet Take 100 mg by mouth daily     montelukast (SINGULAIR) 10 MG tablet Take 1 tablet (10 mg) by mouth daily     senna (SENOKOT) 8.6 MG tablet Take 1 tablet by mouth daily     Vitamin D3 (VITAMIN D3) 25 mcg (1000 units) tablet Take 1 tablet (25 mcg) by mouth daily     Current Facility-Administered Medications   Medication     vitamin B-12 (CYANOCOBALAMIN) injection 1,000 mcg     ALLERGIES:   Allergies   Allergen Reactions     Lac Bovis      Lisinopril Cough     Milk Products GI Disturbance     Latex Rash     DIET: Diabetic, regular texture, thin liquids.    Vitals:    02/01/22 1203   BP: 120/75   Pulse: 65   Resp: 16   Temp: (!) 95.5  F (35.3  C)   SpO2: 96%   Weight: 60.8 kg (134 lb)   Height: 1.676 m (5' 6\")     Body mass index is 21.63 kg/m .    EXAMINATION:   General: Pleasant, alert, and conversant middle-aged female, lying in bed, in NAD.  Head: Normocephalic and atraumatic.   Eyes: PERRLA, sclerae clear.   ENT: Moist oral mucosa.  She has her own teeth in excellent repair.  No gingival hyperplasia from phenytoin.  No rhinorrhea or nasal discharge.  Hearing unimpaired.  Cardiovascular: Regular rate and rhythm with a 2/6 SHARON at the LSB.   Respiratory: Lungs clear to auscultation bilaterally.   Abdomen: Nondistended.   Musculoskeletal/Extremities: Age-related degenerative joint disease.  Mild contractures of the left hand and elbow.  Wearing AFO.  Neurologic: Mild contractures of the left upper extremity.  Contracture of the left thumb reveals enlarged CMC joint.  Cannot make a fist.  " Has not lost sensation, and no numbness or tingling.  Integument: No rashes, clinically significant lesions, or skin breakdown.   Cognitive/Psychiatric: Alert and oriented with euthymic affect, even though she presents with a hobbs look on her face.    DIAGNOSTICS:   Recent Results (from the past 240 hour(s))   Basic metabolic panel    Collection Time: 02/01/22  9:25 AM   Result Value Ref Range    Sodium 143 136 - 145 mmol/L    Potassium 4.0 3.5 - 5.0 mmol/L    Chloride 109 (H) 98 - 107 mmol/L    Carbon Dioxide (CO2) 24 22 - 31 mmol/L    Anion Gap 10 5 - 18 mmol/L    Urea Nitrogen 27 8 - 28 mg/dL    Creatinine 0.83 0.60 - 1.10 mg/dL    Calcium 9.8 8.5 - 10.5 mg/dL    Glucose 207 (H) 70 - 125 mg/dL    GFR Estimate 75 >60 mL/min/1.73m2     Last Comprehensive Metabolic Panel:  Sodium   Date Value Ref Range Status   02/01/2022 143 136 - 145 mmol/L Final   04/15/2021 140 133 - 144 mmol/L Final     Potassium   Date Value Ref Range Status   02/01/2022 4.0 3.5 - 5.0 mmol/L Final   04/15/2021 4.6 3.4 - 5.3 mmol/L Final     Chloride   Date Value Ref Range Status   02/01/2022 109 (H) 98 - 107 mmol/L Final   04/15/2021 106 94 - 109 mmol/L Final     Carbon Dioxide   Date Value Ref Range Status   04/15/2021 25 20 - 32 mmol/L Final     Carbon Dioxide (CO2)   Date Value Ref Range Status   02/01/2022 24 22 - 31 mmol/L Final     Anion Gap   Date Value Ref Range Status   02/01/2022 10 5 - 18 mmol/L Final   04/15/2021 9 3 - 14 mmol/L Final     Glucose   Date Value Ref Range Status   02/01/2022 207 (H) 70 - 125 mg/dL Final   04/15/2021 150 (H) 70 - 99 mg/dL Final     Comment:     Fasting specimen     Urea Nitrogen   Date Value Ref Range Status   02/01/2022 27 8 - 28 mg/dL Final   04/15/2021 24 7 - 30 mg/dL Final     Creatinine   Date Value Ref Range Status   02/01/2022 0.83 0.60 - 1.10 mg/dL Final   04/15/2021 0.72 0.52 - 1.04 mg/dL Final     GFR Estimate   Date Value Ref Range Status   02/01/2022 75 >60 mL/min/1.73m2 Final     Comment:      Effective December 21, 2021 eGFRcr in adults is calculated using the 2021 CKD-EPI creatinine equation which includes age and gender (Johnna et al., NEJM, DOI: 10.1056/AUBUmw6004995)   04/15/2021 86 >60 mL/min/[1.73_m2] Final     Comment:     Non  GFR Calc  Starting 12/18/2018, serum creatinine based estimated GFR (eGFR) will be   calculated using the Chronic Kidney Disease Epidemiology Collaboration   (CKD-EPI) equation.       Calcium   Date Value Ref Range Status   02/01/2022 9.8 8.5 - 10.5 mg/dL Final   04/15/2021 9.2 8.5 - 10.1 mg/dL Final     Bilirubin Total   Date Value Ref Range Status   11/12/2021 0.2 0.2 - 1.3 mg/dL Final   04/15/2021 0.2 0.2 - 1.3 mg/dL Final     Alkaline Phosphatase   Date Value Ref Range Status   11/12/2021 131 40 - 150 U/L Final   04/15/2021 159 (H) 40 - 150 U/L Final     ALT   Date Value Ref Range Status   11/12/2021 24 0 - 50 U/L Final   04/15/2021 23 0 - 50 U/L Final     AST   Date Value Ref Range Status   11/12/2021 18 0 - 45 U/L Final   04/15/2021 18 0 - 45 U/L Final     Lab Results   Component Value Date    WBC 9.1 11/12/2021    WBC 7.7 11/20/2020     Lab Results   Component Value Date    RBC 4.38 11/12/2021    RBC 4.05 11/20/2020     Lab Results   Component Value Date    HGB 14.3 11/12/2021    HGB 13.2 11/20/2020     Lab Results   Component Value Date    HCT 42.6 11/12/2021    HCT 40.9 11/20/2020     Lab Results   Component Value Date    MCV 97 11/12/2021     11/20/2020     Lab Results   Component Value Date    MCH 32.6 11/12/2021    MCH 32.6 11/20/2020     Lab Results   Component Value Date    MCHC 33.6 11/12/2021    MCHC 32.3 11/20/2020     Lab Results   Component Value Date    RDW 11.6 11/12/2021    RDW 11.8 11/20/2020     Lab Results   Component Value Date     11/12/2021     11/20/2020       ASSESSMENT/Plan:      ICD-10-CM    1. Cerebrovascular accident (CVA) due to occlusion of right anterior cerebral artery (H)  I63.521    2. Spastic  hemiplegia of left nondominant side as late effect of cerebral infarction (H)  I69.354    3. Type 2 diabetes mellitus with stage 3b chronic kidney disease, with long-term current use of insulin (H)  E11.22     N18.32     Z79.4    4. Essential hypertension  I10    5. Seizure disorder (H)  G40.909    6. Anemia due to vitamin B12 deficiency, unspecified B12 deficiency type  D51.9        CHANGES:    None.    CARE PLAN:    The care plan, medications, vital signs, orders, and nursing notes have been reviewed, and all orders signed. Changes to care plan, if any, as noted. Otherwise, continue current plan of care.    The above has been created using voice recognition software. Please be aware that this may unintentionally  produce inaccuracies and/or nonsensical sentences.      Electronically signed by: EKILA Mcmahon CNP        Sincerely,        KEILA Mcmahon CNP

## 2022-02-04 ENCOUNTER — TRANSITIONAL CARE UNIT VISIT (OUTPATIENT)
Dept: GERIATRICS | Facility: CLINIC | Age: 72
End: 2022-02-04
Payer: MEDICARE

## 2022-02-04 VITALS
BODY MASS INDEX: 21.73 KG/M2 | OXYGEN SATURATION: 97 % | WEIGHT: 135.2 LBS | TEMPERATURE: 97.3 F | HEIGHT: 66 IN | DIASTOLIC BLOOD PRESSURE: 64 MMHG | RESPIRATION RATE: 16 BRPM | SYSTOLIC BLOOD PRESSURE: 146 MMHG | HEART RATE: 64 BPM

## 2022-02-04 DIAGNOSIS — N18.32 TYPE 2 DIABETES MELLITUS WITH STAGE 3B CHRONIC KIDNEY DISEASE, WITH LONG-TERM CURRENT USE OF INSULIN (H): ICD-10-CM

## 2022-02-04 DIAGNOSIS — I69.354 SPASTIC HEMIPLEGIA OF LEFT NONDOMINANT SIDE AS LATE EFFECT OF CEREBRAL INFARCTION (H): ICD-10-CM

## 2022-02-04 DIAGNOSIS — I63.521 CEREBROVASCULAR ACCIDENT (CVA) DUE TO OCCLUSION OF RIGHT ANTERIOR CEREBRAL ARTERY (H): Primary | ICD-10-CM

## 2022-02-04 DIAGNOSIS — G40.909 SEIZURE DISORDER (H): ICD-10-CM

## 2022-02-04 DIAGNOSIS — I10 ESSENTIAL HYPERTENSION: ICD-10-CM

## 2022-02-04 DIAGNOSIS — Z79.4 TYPE 2 DIABETES MELLITUS WITH STAGE 3B CHRONIC KIDNEY DISEASE, WITH LONG-TERM CURRENT USE OF INSULIN (H): ICD-10-CM

## 2022-02-04 DIAGNOSIS — D51.9 ANEMIA DUE TO VITAMIN B12 DEFICIENCY, UNSPECIFIED B12 DEFICIENCY TYPE: ICD-10-CM

## 2022-02-04 DIAGNOSIS — E11.22 TYPE 2 DIABETES MELLITUS WITH STAGE 3B CHRONIC KIDNEY DISEASE, WITH LONG-TERM CURRENT USE OF INSULIN (H): ICD-10-CM

## 2022-02-04 PROCEDURE — 99309 SBSQ NF CARE MODERATE MDM 30: CPT | Performed by: NURSE PRACTITIONER

## 2022-02-04 ASSESSMENT — MIFFLIN-ST. JEOR: SCORE: 1150.01

## 2022-02-04 NOTE — LETTER
2022        RE: Addis Peña  [address]  [facility] [address]        [facility]    Name:   Addis Pñea  :   1950  MRN:    [id number]     Facility:   [facility]) [[id number]]   Room: [place] [address]  Code Status: DNR and POLST AVAILABLE -     DOS: 2022  Previous visit:  2022    PCP:  Joel Daniel Wegener, MD     CHIEF COMPLAINT / REASON FOR VISIT:  Chief Complaint   Patient presents with     Clinic Care Coordination - Follow-up     Cerebrovascular accident      [facility] from 2021 until 2021 (recurrent CVA)      HPI: Addis is a 71 year old female with a past medical history significant for stroke with residual left spastic hemiplegia who stated she went to bed at approximately 8 PM the night before and woke up at 4:30 AM with increased left-sided weakness.  She stated that she felt well when she had gone to bed.  Typically, she uses a wheelchair for ambulation but can get up on her own and has enough strength in the left arm to get dressed and perform ADLs.  She stated that she could do neither that morning.  She lives with a roommate who helped her and called 911.  She otherwise had no change in her speech or swallowing ability.  She denied any other new focal neurological deficits.  She takes a daily baby aspirin and has been compliant.    Ischemic stroke: Seen as an extended stroke in the ED has a last known normal 8 PM the night prior.  Discussed with neurology.  Admitted and monitored on telemetry.  To continue  mg daily for 3 weeks and add Plavix (continuing Plavix alone after 3 weeks).  Vascular consulted given her left ICA stenosis.  They would like to follow-up with her in clinic in 6 to 8 weeks.  PT saw patient and recommended TCU upon discharge.  She would need an outpatient event monitor.    Seizure disorder: Phenytoin level subtherapeutic and levetiracetam level supratherapeutic.   Phenytoin discontinued, and levetiracetam decreased to 1000 mg twice daily.  Level was to be repeated on 12/15 before Keppra dose.    Hypertension: Initially allowed for permissive hypertension.  Amlodipine and metoprolol were resumed while inpatient.  Also resumed losartan on discharge.    Diabetes mellitus type 2: Metformin was held while inpatient and resumed on discharge.  Glargine 5 units at bedtime was added.  She stated she was unaware of this.    PROCEDURES/SIGNIFICANT IMAGING AND TESTING  CTA head and neck with IV contrast; CT brain perfusion (12/11/2021):  Head CT: No acute intracranial hemorrhage.  No CT evidence of acute infarct (aspect score 10).  Right SOCO territory large chronic infarct.  Small chronic infarcts.  Age-related changes.  Head CTA: No arterial large vessel occlusion.  Multiple stenoses and occlusions.  Neck CTA: Right carotid bifurcation moderate stenosis.  Right proximal carotid bulb 50% stenosis.  Right proximal cervical ICA 50% stenosis.  Left distal common carotid artery moderate stenosis.  Left carotid bifurcation moderate to severe stenosis.  Left proximal external carotid artery severe stenosis.  Left carotid bulb and proximal cervical ICA demonstrates 70 to 90% stenosis.  Left distal cervical ICA 50 to 70% stenosis.  No dissection.  Right vertebral artery origin severe stenosis.  Left distal V1 segment moderate stenosis.  CT perfusion: No acute perfusion abnormality.    MR brain without IV contrast (12/11/2021):  Small area of acute or subacute ischemia involving the posterior limb of the left internal capsule.  Foci of diffusion and FLAIR hyperintense signal in the right and left mid cerebral peduncles without definite diffusion restriction are nonspecific and may reflect sequela of chronic infarcts, alternatively demyelinating disease.  Multiple chronic infarcts as described previously.      CURRENT/RECENT TCU ISSUES    Disposition: Doing okay today, she is in a fair mood. She  has an upcoming vascular surgeon appointment on 02/02 to talk about a possible carotid endarterectomy.  Today, she asks me why labs were drawn 2 days in a row, and it may be that today's labs were ordered by an outside source, possibly the vascular surgeon.    She has expressed worry about the possibility of another stroke.  Her neurologist cannot promise she won't have another, but she described a discussion about possible endarterectomy procedure to bilateral carotids, because that is where he suspects they are coming from.  In physical therapy, she is working on small steps, balance, and flexibility.  She ultimately decided to not undergo the endarterectomy.  There are risks both in having it and not having it.  The plan is to wait 6 months and undergo repeat imaging.  At this juncture, she feels she is ready to go home.    Diabetes mellitus type 2: She was not on insulin at home.  She has been on sliding scale here, receiving 2 to 4 units.  I suggested we could try increasing her metformin from 1000 mg twice daily to 1250 mg twice daily in an effort to discharge her without the need for insulin.  According to the patient, her last A1c was 6.4.  Dr. Dc brought the dosing back down to 1000 mg twice daily.  Blood glucose levels still look good, and I suspect she will discharge without the need for insulin.  Currently, almost all blood glucose levels are >100 and <150.    Hypertension: Blood pressures are fluctuating, typically in the 130s systolic, but the occasionally much higher (160s).  Diastolics are typically in the 70s.  She is receiving metoprolol succinate 50 mg daily, amlodipine 10 mg daily, and losartan 25 mg daily.  Blood pressures were still somewhat high.  We increased metoprolol succinate to 100 mg daily.  I also suggested moving her losartan from the morning to bedtime.  Still elevated, we increased losartan from 25 mg to 50 mg at bedtime.  I suspect we may eventually work our way up to 100 mg.  A  "follow-up BMP on 02/01 looked excellent (BUN 27, creatinine 0.83, eGFR 75).  Blood pressures still vary quite a bit, with systolics ranging from , and diastolics ranging from 53 -77.  It could, perhaps, stand another little tweak.  We will continue to monitor before any further adjustments.    Nocturia and urinary incontinence:  At home, she has been using a PureWick during the night for about 6 months. He has the device here.    Spastic hemiplegia: Generally well controlled.  She has occasional spasms in her left arm.  Baclofen is working well at current dose.    Discharge planning: As noted above, she feels she is ready to go home.  There will be a discussion today regarding discharge.    ROS:   Positives: Sleep can be \"off and on.\"  More often on than not.    Negatives: No headaches or chest pains, coughing or congestion, nausea or vomiting, dizziness or dyspnea, dysuria, constipation or diarrhea, difficulty chewing or swallowing, integumentary issues, or problems with appetite.      Past Medical History:   Diagnosis Date     Allergic rhinitis      Allergic rhinitis      CKD (chronic kidney disease)      Depression      Depression      Displaced dome fracture of left acetabulum (H)      DM2 (diabetes mellitus, type 2) (H)      GERD (gastroesophageal reflux disease)      GERD (gastroesophageal reflux disease)      Gout      HTN (hypertension)      Hyperlipidaemia LDL goal <100      Hypertension goal BP (blood pressure) < 140/90      Left hemiplegia (H) 1/2010    sees PMR physician     Left hemiplegia (H)      Migraine      Migraine      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)      Nephrolithiasis      Nephrolithiasis      Seizure disorder (H)      Spastic hemiplegia of left nondominant side as late effect of cerebral infarction (H) 12/20/2021     Stroke (H) 1/2010    due to uncontrolled hypertension     Stroke (H)      " Type 2 diabetes, HbA1C goal < 8% (H)      Vitamin B12 deficiency anemia               Family History   Problem Relation Age of Onset     Hypertension Mother      Heart Disease Mother      C.A.D. Father      Coronary Artery Disease Father      Diabetes Sister      Hypertension Brother      Cancer Sister      Diabetes Sister      Hypertension Brother      Cancer Sister      Social History     Socioeconomic History     Marital status:      Spouse name: None     Number of children: None     Years of education: None     Highest education level: None   Occupational History     None   Tobacco Use     Smoking status: Never Smoker     Smokeless tobacco: Never Used   Vaping Use     Vaping Use: Never used   Substance and Sexual Activity     Alcohol use: No     Drug use: No     Sexual activity: Not Currently     Partners: Male   Other Topics Concern     Parent/sibling w/ CABG, MI or angioplasty before 65F 55M? No   Social History Narrative     None     Social Determinants of Health     Financial Resource Strain: Not on file   Food Insecurity: Not on file   Transportation Needs: Not on file   Physical Activity: Not on file   Stress: Not on file   Social Connections: Not on file   Intimate Partner Violence: Not on file   Housing Stability: Not on file       MEDICATIONS: Reviewed from the MAR, physician orders, and/or earlier progress notes.  Post Discharge Medication Reconciliation Status: medication reconcilation previously completed during another office visit.    Current Outpatient Medications   Medication Sig     acetaminophen (TYLENOL) 500 MG tablet Take 500-1,000 mg by mouth every 6 hours as needed      amLODIPine (NORVASC) 10 MG tablet Take 1 tablet (10 mg) by mouth daily     atorvastatin (LIPITOR) 40 MG tablet Take 1 tablet (40 mg) by mouth daily     baclofen (LIORESAL) 10 MG tablet TAKE 1 TABLET(10 MG) BY MOUTH THREE TIMES DAILY AS NEEDED FOR MUSCLE SPASMS (Patient taking differently: Take 10 mg by mouth 3 times  "daily )     bisacodyl (DULCOLAX) 5 MG EC tablet Take 2 tablets (10 mg) by mouth daily as needed for constipation     blood glucose (IRENE CONTOUR) test strip 1 strip by In Vitro route daily Use to test blood sugars as needed     clopidogrel (PLAVIX) 75 MG tablet Take 75 mg by mouth daily     Lactase 4500 units TABS Take 13,500 Units by mouth daily as needed     levETIRAcetam (KEPPRA) 500 MG tablet Take 1,000 mg by mouth 2 times daily Take 1250 mg twice daily     losartan (COZAAR) 25 MG tablet Take 1 tablet (25 mg) by mouth daily (Patient taking differently: Take 50 mg by mouth At Bedtime )     metFORMIN (GLUCOPHAGE) 1000 MG tablet TAKE 1 TABLET(1000 MG) BY MOUTH TWICE DAILY WITH MEALS     metoprolol succinate ER (TOPROL-XL) 100 MG 24 hr tablet Take 100 mg by mouth daily     montelukast (SINGULAIR) 10 MG tablet Take 1 tablet (10 mg) by mouth daily     senna (SENOKOT) 8.6 MG tablet Take 1 tablet by mouth daily     Vitamin D3 (VITAMIN D3) 25 mcg (1000 units) tablet Take 1 tablet (25 mcg) by mouth daily     Current Facility-Administered Medications   Medication     vitamin B-12 (CYANOCOBALAMIN) injection 1,000 mcg     ALLERGIES:   Allergies   Allergen Reactions     Lac Bovis      Lisinopril Cough     Milk Products GI Disturbance     Latex Rash     DIET: Diabetic, regular texture, thin liquids.    Vitals:    02/04/22 1241   BP: (!) 146/64   Pulse: 64   Resp: 16   Temp: 97.3  F (36.3  C)   SpO2: 97%   Weight: 61.3 kg (135 lb 3.2 oz)   Height: 1.676 m (5' 6\")     Body mass index is 21.82 kg/m .    EXAMINATION:   General: Pleasant, alert, and conversant middle-aged female, lying in bed, in NAD.  Head: Normocephalic and atraumatic.   Eyes: PERRLA, sclerae clear.   ENT: Moist oral mucosa.  She has her own teeth in excellent repair.  No gingival hyperplasia from phenytoin.  No rhinorrhea or nasal discharge.  Hearing unimpaired.  Cardiovascular: Regular rate and rhythm with a 2/6 SHARON at the LSB.   Respiratory: Lungs clear to " auscultation bilaterally.   Abdomen: Nondistended.   Musculoskeletal/Extremities: Age-related degenerative joint disease.  Mild contractures of the left hand and elbow.  Wearing AFO.  Neurologic: Mild contractures of the left upper extremity.  Contracture of the left thumb reveals enlarged CMC joint.  Cannot make a fist.  Has not lost sensation, and no numbness or tingling.  Integument: No rashes, clinically significant lesions, or skin breakdown.   Cognitive/Psychiatric: Alert and oriented with euthymic affect, even though she presents with a hobbs look on her face.    DIAGNOSTICS:   Recent Results (from the past 240 hour(s))   Basic metabolic panel    Collection Time: 02/01/22  9:25 AM   Result Value Ref Range    Sodium 143 136 - 145 mmol/L    Potassium 4.0 3.5 - 5.0 mmol/L    Chloride 109 (H) 98 - 107 mmol/L    Carbon Dioxide (CO2) 24 22 - 31 mmol/L    Anion Gap 10 5 - 18 mmol/L    Urea Nitrogen 27 8 - 28 mg/dL    Creatinine 0.83 0.60 - 1.10 mg/dL    Calcium 9.8 8.5 - 10.5 mg/dL    Glucose 207 (H) 70 - 125 mg/dL    GFR Estimate 75 >60 mL/min/1.73m2     Last Comprehensive Metabolic Panel:  Sodium   Date Value Ref Range Status   02/01/2022 143 136 - 145 mmol/L Final   04/15/2021 140 133 - 144 mmol/L Final     Potassium   Date Value Ref Range Status   02/01/2022 4.0 3.5 - 5.0 mmol/L Final   04/15/2021 4.6 3.4 - 5.3 mmol/L Final     Chloride   Date Value Ref Range Status   02/01/2022 109 (H) 98 - 107 mmol/L Final   04/15/2021 106 94 - 109 mmol/L Final     Carbon Dioxide   Date Value Ref Range Status   04/15/2021 25 20 - 32 mmol/L Final     Carbon Dioxide (CO2)   Date Value Ref Range Status   02/01/2022 24 22 - 31 mmol/L Final     Anion Gap   Date Value Ref Range Status   02/01/2022 10 5 - 18 mmol/L Final   04/15/2021 9 3 - 14 mmol/L Final     Glucose   Date Value Ref Range Status   02/01/2022 207 (H) 70 - 125 mg/dL Final   04/15/2021 150 (H) 70 - 99 mg/dL Final     Comment:     Fasting specimen     Urea Nitrogen    Date Value Ref Range Status   02/01/2022 27 8 - 28 mg/dL Final   04/15/2021 24 7 - 30 mg/dL Final     Creatinine   Date Value Ref Range Status   02/01/2022 0.83 0.60 - 1.10 mg/dL Final   04/15/2021 0.72 0.52 - 1.04 mg/dL Final     GFR Estimate   Date Value Ref Range Status   02/01/2022 75 >60 mL/min/1.73m2 Final     Comment:     Effective December 21, 2021 eGFRcr in adults is calculated using the 2021 CKD-EPI creatinine equation which includes age and gender (Johnna et al., NEJ, DOI: 10.1056/HGLAqc2539000)   04/15/2021 86 >60 mL/min/[1.73_m2] Final     Comment:     Non  GFR Calc  Starting 12/18/2018, serum creatinine based estimated GFR (eGFR) will be   calculated using the Chronic Kidney Disease Epidemiology Collaboration   (CKD-EPI) equation.       Calcium   Date Value Ref Range Status   02/01/2022 9.8 8.5 - 10.5 mg/dL Final   04/15/2021 9.2 8.5 - 10.1 mg/dL Final     Bilirubin Total   Date Value Ref Range Status   11/12/2021 0.2 0.2 - 1.3 mg/dL Final   04/15/2021 0.2 0.2 - 1.3 mg/dL Final     Alkaline Phosphatase   Date Value Ref Range Status   11/12/2021 131 40 - 150 U/L Final   04/15/2021 159 (H) 40 - 150 U/L Final     ALT   Date Value Ref Range Status   11/12/2021 24 0 - 50 U/L Final   04/15/2021 23 0 - 50 U/L Final     AST   Date Value Ref Range Status   11/12/2021 18 0 - 45 U/L Final   04/15/2021 18 0 - 45 U/L Final     Lab Results   Component Value Date    WBC 9.1 11/12/2021    WBC 7.7 11/20/2020     Lab Results   Component Value Date    RBC 4.38 11/12/2021    RBC 4.05 11/20/2020     Lab Results   Component Value Date    HGB 14.3 11/12/2021    HGB 13.2 11/20/2020     Lab Results   Component Value Date    HCT 42.6 11/12/2021    HCT 40.9 11/20/2020     Lab Results   Component Value Date    MCV 97 11/12/2021     11/20/2020     Lab Results   Component Value Date    MCH 32.6 11/12/2021    MCH 32.6 11/20/2020     Lab Results   Component Value Date    MCHC 33.6 11/12/2021    MCHC 32.3  11/20/2020     Lab Results   Component Value Date    RDW 11.6 11/12/2021    RDW 11.8 11/20/2020     Lab Results   Component Value Date     11/12/2021     11/20/2020       ASSESSMENT/Plan:      ICD-10-CM    1. Cerebrovascular accident (CVA) due to occlusion of right anterior cerebral artery (H)  I63.521    2. Spastic hemiplegia of left nondominant side as late effect of cerebral infarction (H)  I69.354    3. Type 2 diabetes mellitus with stage 3b chronic kidney disease, with long-term current use of insulin (H)  E11.22     N18.32     Z79.4    4. Essential hypertension  I10    5. Seizure disorder (H)  G40.909    6. Anemia due to vitamin B12 deficiency, unspecified B12 deficiency type  D51.9        CHANGES:    None.    CARE PLAN:    The care plan, medications, vital signs, orders, and nursing notes have been reviewed, and all orders signed. Changes to care plan, if any, as noted. Otherwise, continue current plan of care.    The above has been created using voice recognition software. Please be aware that this may unintentionally  produce inaccuracies and/or nonsensical sentences.      Electronically signed by: KEILA Mcmahon CNP        Sincerely,        KEILA Mcmahon CNP

## 2022-02-04 NOTE — PROGRESS NOTES
[facility]    Name:   Addis Peña  :   1950  MRN:    [id number]     Facility:   [facility] [[id number]]   Room: [place] [address]  Code Status: DNR and POLST AVAILABLE -     DOS: 2022  Previous visit:  2022    PCP:  Joel Daniel Wegener, MD     CHIEF COMPLAINT / REASON FOR VISIT:  Chief Complaint   Patient presents with     Clinic Care Coordination - Follow-up     Cerebrovascular accident      [facility] from 2021 until 2021 (recurrent CVA)      HPI: Addis is a 71 year old female with a past medical history significant for stroke with residual left spastic hemiplegia who stated she went to bed at approximately 8 PM the night before and woke up at 4:30 AM with increased left-sided weakness.  She stated that she felt well when she had gone to bed.  Typically, she uses a wheelchair for ambulation but can get up on her own and has enough strength in the left arm to get dressed and perform ADLs.  She stated that she could do neither that morning.  She lives with a roommate who helped her and called 911.  She otherwise had no change in her speech or swallowing ability.  She denied any other new focal neurological deficits.  She takes a daily baby aspirin and has been compliant.    Ischemic stroke: Seen as an extended stroke in the ED has a last known normal 8 PM the night prior.  Discussed with neurology.  Admitted and monitored on telemetry.  To continue  mg daily for 3 weeks and add Plavix (continuing Plavix alone after 3 weeks).  Vascular consulted given her left ICA stenosis.  They would like to follow-up with her in clinic in 6 to 8 weeks.  PT saw patient and recommended TCU upon discharge.  She would need an outpatient event monitor.    Seizure disorder: Phenytoin level subtherapeutic and levetiracetam level supratherapeutic.  Phenytoin discontinued, and levetiracetam decreased to 1000 mg twice daily.  Level was to be repeated on  12/15 before Keppra dose.    Hypertension: Initially allowed for permissive hypertension.  Amlodipine and metoprolol were resumed while inpatient.  Also resumed losartan on discharge.    Diabetes mellitus type 2: Metformin was held while inpatient and resumed on discharge.  Glargine 5 units at bedtime was added.  She stated she was unaware of this.    PROCEDURES/SIGNIFICANT IMAGING AND TESTING  CTA head and neck with IV contrast; CT brain perfusion (12/11/2021):  Head CT: No acute intracranial hemorrhage.  No CT evidence of acute infarct (aspect score 10).  Right SOCO territory large chronic infarct.  Small chronic infarcts.  Age-related changes.  Head CTA: No arterial large vessel occlusion.  Multiple stenoses and occlusions.  Neck CTA: Right carotid bifurcation moderate stenosis.  Right proximal carotid bulb 50% stenosis.  Right proximal cervical ICA 50% stenosis.  Left distal common carotid artery moderate stenosis.  Left carotid bifurcation moderate to severe stenosis.  Left proximal external carotid artery severe stenosis.  Left carotid bulb and proximal cervical ICA demonstrates 70 to 90% stenosis.  Left distal cervical ICA 50 to 70% stenosis.  No dissection.  Right vertebral artery origin severe stenosis.  Left distal V1 segment moderate stenosis.  CT perfusion: No acute perfusion abnormality.    MR brain without IV contrast (12/11/2021):  Small area of acute or subacute ischemia involving the posterior limb of the left internal capsule.  Foci of diffusion and FLAIR hyperintense signal in the right and left mid cerebral peduncles without definite diffusion restriction are nonspecific and may reflect sequela of chronic infarcts, alternatively demyelinating disease.  Multiple chronic infarcts as described previously.      CURRENT/RECENT TCU ISSUES    Disposition: Doing okay today, she is in a fair mood. She has an upcoming vascular surgeon appointment on 02/02 to talk about a possible carotid endarterectomy.   Today, she asks me why labs were drawn 2 days in a row, and it may be that today's labs were ordered by an outside source, possibly the vascular surgeon.    She has expressed worry about the possibility of another stroke.  Her neurologist cannot promise she won't have another, but she described a discussion about possible endarterectomy procedure to bilateral carotids, because that is where he suspects they are coming from.  In physical therapy, she is working on small steps, balance, and flexibility.    Diabetes mellitus type 2: She was not on insulin at home.  She has been on sliding scale here, receiving 2 to 4 units.  I suggested we could try increasing her metformin from 1000 mg twice daily to 1250 mg twice daily in an effort to discharge her without the need for insulin.  According to the patient, her last A1c was 6.4.  Dr. Dc brought the dosing back down to 1000 mg twice daily.  Blood glucose levels still look good, and I suspect she will discharge without the need for insulin.  Currently, almost all blood glucose levels are >100 and <150.    Hypertension: Blood pressures are fluctuating, typically in the 130s systolic, but the occasionally much higher (160s).  Diastolics are typically in the 70s.  She is receiving metoprolol succinate 50 mg daily, amlodipine 10 mg daily, and losartan 25 mg daily.  Blood pressures are still somewhat high.  We increased metoprolol succinate to 100 mg daily.  I also suggested moving her losartan from the morning to bedtime.  Still elevated, we increased her losartan from 25 mg to 50 mg at bedtime.  I suspect we may eventually work our way up to 100 mg.  Recent blood pressures look good.  A follow-up BMP on 02/01 looked excellent (BUN 27, creatinine 0.83, eGFR 75).  Blood pressures still very quite a bit, with systolics ranging from 100-146, and diastolics ranging from 51-87.  It could, perhaps, stand another little tweak.  We will continue to monitor before any further  "adjustments.    Nocturia and urinary incontinence:  At home, she has been using a PureWick during the night for about 6 months. He has the device here.    Spastic hemiplegia: Generally well controlled.  She has occasional spasms in her left arm.  Baclofen is working well at current dose.    ROS:   Positives: Sleep can be \"off and on.\"  More often on than not.    Negatives: No headaches or chest pains, coughing or congestion, nausea or vomiting, dizziness or dyspnea, dysuria, constipation or diarrhea, difficulty chewing or swallowing, integumentary issues, or problems with appetite.      Past Medical History:   Diagnosis Date     Allergic rhinitis      Allergic rhinitis      CKD (chronic kidney disease)      Depression      Depression      Displaced dome fracture of left acetabulum (H)      DM2 (diabetes mellitus, type 2) (H)      GERD (gastroesophageal reflux disease)      GERD (gastroesophageal reflux disease)      Gout      HTN (hypertension)      Hyperlipidaemia LDL goal <100      Hypertension goal BP (blood pressure) < 140/90      Left hemiplegia (H) 1/2010    sees PMR physician     Left hemiplegia (H)      Migraine      Migraine      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)      Nephrolithiasis      Nephrolithiasis      Seizure disorder (H)      Spastic hemiplegia of left nondominant side as late effect of cerebral infarction (H) 12/20/2021     Stroke (H) 1/2010    due to uncontrolled hypertension     Stroke (H)      Type 2 diabetes, HbA1C goal < 8% (H)      Vitamin B12 deficiency anemia               Family History   Problem Relation Age of Onset     Hypertension Mother      Heart Disease Mother      C.A.D. Father      Coronary Artery Disease Father      Diabetes Sister      Hypertension Brother      Cancer Sister      Diabetes Sister      Hypertension Brother      Cancer Sister      Social History     Socioeconomic " History     Marital status:      Spouse name: None     Number of children: None     Years of education: None     Highest education level: None   Occupational History     None   Tobacco Use     Smoking status: Never Smoker     Smokeless tobacco: Never Used   Vaping Use     Vaping Use: Never used   Substance and Sexual Activity     Alcohol use: No     Drug use: No     Sexual activity: Not Currently     Partners: Male   Other Topics Concern     Parent/sibling w/ CABG, MI or angioplasty before 65F 55M? No   Social History Narrative     None     Social Determinants of Health     Financial Resource Strain: Not on file   Food Insecurity: Not on file   Transportation Needs: Not on file   Physical Activity: Not on file   Stress: Not on file   Social Connections: Not on file   Intimate Partner Violence: Not on file   Housing Stability: Not on file       MEDICATIONS: Reviewed from the MAR, physician orders, and/or earlier progress notes.  Post Discharge Medication Reconciliation Status: medication reconcilation previously completed during another office visit.    Current Outpatient Medications   Medication Sig     acetaminophen (TYLENOL) 500 MG tablet Take 500-1,000 mg by mouth every 6 hours as needed      amLODIPine (NORVASC) 10 MG tablet Take 1 tablet (10 mg) by mouth daily     atorvastatin (LIPITOR) 40 MG tablet Take 1 tablet (40 mg) by mouth daily     baclofen (LIORESAL) 10 MG tablet TAKE 1 TABLET(10 MG) BY MOUTH THREE TIMES DAILY AS NEEDED FOR MUSCLE SPASMS (Patient taking differently: Take 10 mg by mouth 3 times daily )     bisacodyl (DULCOLAX) 5 MG EC tablet Take 2 tablets (10 mg) by mouth daily as needed for constipation     blood glucose (IRENE CONTOUR) test strip 1 strip by In Vitro route daily Use to test blood sugars as needed     clopidogrel (PLAVIX) 75 MG tablet Take 75 mg by mouth daily     Lactase 4500 units TABS Take 13,500 Units by mouth daily as needed     levETIRAcetam (KEPPRA) 500 MG tablet Take  "1,000 mg by mouth 2 times daily Take 1250 mg twice daily     losartan (COZAAR) 25 MG tablet Take 1 tablet (25 mg) by mouth daily (Patient taking differently: Take 50 mg by mouth At Bedtime )     metFORMIN (GLUCOPHAGE) 1000 MG tablet TAKE 1 TABLET(1000 MG) BY MOUTH TWICE DAILY WITH MEALS     metoprolol succinate ER (TOPROL-XL) 100 MG 24 hr tablet Take 100 mg by mouth daily     montelukast (SINGULAIR) 10 MG tablet Take 1 tablet (10 mg) by mouth daily     senna (SENOKOT) 8.6 MG tablet Take 1 tablet by mouth daily     Vitamin D3 (VITAMIN D3) 25 mcg (1000 units) tablet Take 1 tablet (25 mcg) by mouth daily     Current Facility-Administered Medications   Medication     vitamin B-12 (CYANOCOBALAMIN) injection 1,000 mcg     ALLERGIES:   Allergies   Allergen Reactions     Lac Bovis      Lisinopril Cough     Milk Products GI Disturbance     Latex Rash     DIET: Diabetic, regular texture, thin liquids.    Vitals:    02/01/22 1203   BP: 120/75   Pulse: 65   Resp: 16   Temp: (!) 95.5  F (35.3  C)   SpO2: 96%   Weight: 60.8 kg (134 lb)   Height: 1.676 m (5' 6\")     Body mass index is 21.63 kg/m .    EXAMINATION:   General: Pleasant, alert, and conversant middle-aged female, lying in bed, in NAD.  Head: Normocephalic and atraumatic.   Eyes: PERRLA, sclerae clear.   ENT: Moist oral mucosa.  She has her own teeth in excellent repair.  No gingival hyperplasia from phenytoin.  No rhinorrhea or nasal discharge.  Hearing unimpaired.  Cardiovascular: Regular rate and rhythm with a 2/6 SHARON at the LSB.   Respiratory: Lungs clear to auscultation bilaterally.   Abdomen: Nondistended.   Musculoskeletal/Extremities: Age-related degenerative joint disease.  Mild contractures of the left hand and elbow.  Wearing AFO.  Neurologic: Mild contractures of the left upper extremity.  Contracture of the left thumb reveals enlarged CMC joint.  Cannot make a fist.  Has not lost sensation, and no numbness or tingling.  Integument: No rashes, clinically " significant lesions, or skin breakdown.   Cognitive/Psychiatric: Alert and oriented with euthymic affect, even though she presents with a hobbs look on her face.    DIAGNOSTICS:   Recent Results (from the past 240 hour(s))   Basic metabolic panel    Collection Time: 02/01/22  9:25 AM   Result Value Ref Range    Sodium 143 136 - 145 mmol/L    Potassium 4.0 3.5 - 5.0 mmol/L    Chloride 109 (H) 98 - 107 mmol/L    Carbon Dioxide (CO2) 24 22 - 31 mmol/L    Anion Gap 10 5 - 18 mmol/L    Urea Nitrogen 27 8 - 28 mg/dL    Creatinine 0.83 0.60 - 1.10 mg/dL    Calcium 9.8 8.5 - 10.5 mg/dL    Glucose 207 (H) 70 - 125 mg/dL    GFR Estimate 75 >60 mL/min/1.73m2     Last Comprehensive Metabolic Panel:  Sodium   Date Value Ref Range Status   02/01/2022 143 136 - 145 mmol/L Final   04/15/2021 140 133 - 144 mmol/L Final     Potassium   Date Value Ref Range Status   02/01/2022 4.0 3.5 - 5.0 mmol/L Final   04/15/2021 4.6 3.4 - 5.3 mmol/L Final     Chloride   Date Value Ref Range Status   02/01/2022 109 (H) 98 - 107 mmol/L Final   04/15/2021 106 94 - 109 mmol/L Final     Carbon Dioxide   Date Value Ref Range Status   04/15/2021 25 20 - 32 mmol/L Final     Carbon Dioxide (CO2)   Date Value Ref Range Status   02/01/2022 24 22 - 31 mmol/L Final     Anion Gap   Date Value Ref Range Status   02/01/2022 10 5 - 18 mmol/L Final   04/15/2021 9 3 - 14 mmol/L Final     Glucose   Date Value Ref Range Status   02/01/2022 207 (H) 70 - 125 mg/dL Final   04/15/2021 150 (H) 70 - 99 mg/dL Final     Comment:     Fasting specimen     Urea Nitrogen   Date Value Ref Range Status   02/01/2022 27 8 - 28 mg/dL Final   04/15/2021 24 7 - 30 mg/dL Final     Creatinine   Date Value Ref Range Status   02/01/2022 0.83 0.60 - 1.10 mg/dL Final   04/15/2021 0.72 0.52 - 1.04 mg/dL Final     GFR Estimate   Date Value Ref Range Status   02/01/2022 75 >60 mL/min/1.73m2 Final     Comment:     Effective December 21, 2021 eGFRcr in adults is calculated using the 2021 CKD-EPI  creatinine equation which includes age and gender (Johnna pandya al., NEJ, DOI: 10.1056/VXJJdo7901639)   04/15/2021 86 >60 mL/min/[1.73_m2] Final     Comment:     Non  GFR Calc  Starting 12/18/2018, serum creatinine based estimated GFR (eGFR) will be   calculated using the Chronic Kidney Disease Epidemiology Collaboration   (CKD-EPI) equation.       Calcium   Date Value Ref Range Status   02/01/2022 9.8 8.5 - 10.5 mg/dL Final   04/15/2021 9.2 8.5 - 10.1 mg/dL Final     Bilirubin Total   Date Value Ref Range Status   11/12/2021 0.2 0.2 - 1.3 mg/dL Final   04/15/2021 0.2 0.2 - 1.3 mg/dL Final     Alkaline Phosphatase   Date Value Ref Range Status   11/12/2021 131 40 - 150 U/L Final   04/15/2021 159 (H) 40 - 150 U/L Final     ALT   Date Value Ref Range Status   11/12/2021 24 0 - 50 U/L Final   04/15/2021 23 0 - 50 U/L Final     AST   Date Value Ref Range Status   11/12/2021 18 0 - 45 U/L Final   04/15/2021 18 0 - 45 U/L Final     Lab Results   Component Value Date    WBC 9.1 11/12/2021    WBC 7.7 11/20/2020     Lab Results   Component Value Date    RBC 4.38 11/12/2021    RBC 4.05 11/20/2020     Lab Results   Component Value Date    HGB 14.3 11/12/2021    HGB 13.2 11/20/2020     Lab Results   Component Value Date    HCT 42.6 11/12/2021    HCT 40.9 11/20/2020     Lab Results   Component Value Date    MCV 97 11/12/2021     11/20/2020     Lab Results   Component Value Date    MCH 32.6 11/12/2021    MCH 32.6 11/20/2020     Lab Results   Component Value Date    MCHC 33.6 11/12/2021    MCHC 32.3 11/20/2020     Lab Results   Component Value Date    RDW 11.6 11/12/2021    RDW 11.8 11/20/2020     Lab Results   Component Value Date     11/12/2021     11/20/2020       ASSESSMENT/Plan:      ICD-10-CM    1. Cerebrovascular accident (CVA) due to occlusion of right anterior cerebral artery (H)  I63.521    2. Spastic hemiplegia of left nondominant side as late effect of cerebral infarction (H)  I69.354     3. Type 2 diabetes mellitus with stage 3b chronic kidney disease, with long-term current use of insulin (H)  E11.22     N18.32     Z79.4    4. Essential hypertension  I10    5. Seizure disorder (H)  G40.909    6. Anemia due to vitamin B12 deficiency, unspecified B12 deficiency type  D51.9        CHANGES:    None.    CARE PLAN:    The care plan, medications, vital signs, orders, and nursing notes have been reviewed, and all orders signed. Changes to care plan, if any, as noted. Otherwise, continue current plan of care.    The above has been created using voice recognition software. Please be aware that this may unintentionally  produce inaccuracies and/or nonsensical sentences.      Electronically signed by: KEILA Mcmahon CNP

## 2022-02-07 NOTE — PROGRESS NOTES
[facility]    Name:   Addis Peña  :   1950  MRN:    [id number]     Facility:   [facility] ([facility]) [[id number]]   Room: [place] [address]  Code Status: DNR and POLST AVAILABLE -     DOS: 2022  Previous visit:  2022    PCP:  Joel Daniel Wegener, MD     CHIEF COMPLAINT / REASON FOR VISIT:  Chief Complaint   Patient presents with     Clinic Care Coordination - Follow-up     Cerebrovascular accident      [facility] from 2021 until 2021 (recurrent CVA)      HPI: Addis is a 71 year old female with a past medical history significant for stroke with residual left spastic hemiplegia who stated she went to bed at approximately 8 PM the night before and woke up at 4:30 AM with increased left-sided weakness.  She stated that she felt well when she had gone to bed.  Typically, she uses a wheelchair for ambulation but can get up on her own and has enough strength in the left arm to get dressed and perform ADLs.  She stated that she could do neither that morning.  She lives with a roommate who helped her and called 911.  She otherwise had no change in her speech or swallowing ability.  She denied any other new focal neurological deficits.  She takes a daily baby aspirin and has been compliant.    Ischemic stroke: Seen as an extended stroke in the ED has a last known normal 8 PM the night prior.  Discussed with neurology.  Admitted and monitored on telemetry.  To continue  mg daily for 3 weeks and add Plavix (continuing Plavix alone after 3 weeks).  Vascular consulted given her left ICA stenosis.  They would like to follow-up with her in clinic in 6 to 8 weeks.  PT saw patient and recommended TCU upon discharge.  She would need an outpatient event monitor.    Seizure disorder: Phenytoin level subtherapeutic and levetiracetam level supratherapeutic.  Phenytoin discontinued, and levetiracetam decreased to 1000 mg twice daily.  Level was to be repeated on  12/15 before Keppra dose.    Hypertension: Initially allowed for permissive hypertension.  Amlodipine and metoprolol were resumed while inpatient.  Also resumed losartan on discharge.    Diabetes mellitus type 2: Metformin was held while inpatient and resumed on discharge.  Glargine 5 units at bedtime was added.  She stated she was unaware of this.    PROCEDURES/SIGNIFICANT IMAGING AND TESTING  CTA head and neck with IV contrast; CT brain perfusion (12/11/2021):  Head CT: No acute intracranial hemorrhage.  No CT evidence of acute infarct (aspect score 10).  Right SOCO territory large chronic infarct.  Small chronic infarcts.  Age-related changes.  Head CTA: No arterial large vessel occlusion.  Multiple stenoses and occlusions.  Neck CTA: Right carotid bifurcation moderate stenosis.  Right proximal carotid bulb 50% stenosis.  Right proximal cervical ICA 50% stenosis.  Left distal common carotid artery moderate stenosis.  Left carotid bifurcation moderate to severe stenosis.  Left proximal external carotid artery severe stenosis.  Left carotid bulb and proximal cervical ICA demonstrates 70 to 90% stenosis.  Left distal cervical ICA 50 to 70% stenosis.  No dissection.  Right vertebral artery origin severe stenosis.  Left distal V1 segment moderate stenosis.  CT perfusion: No acute perfusion abnormality.    MR brain without IV contrast (12/11/2021):  Small area of acute or subacute ischemia involving the posterior limb of the left internal capsule.  Foci of diffusion and FLAIR hyperintense signal in the right and left mid cerebral peduncles without definite diffusion restriction are nonspecific and may reflect sequela of chronic infarcts, alternatively demyelinating disease.  Multiple chronic infarcts as described previously.      CURRENT/RECENT TCU ISSUES    Disposition: Doing okay today, she is in a fair mood. She has an upcoming vascular surgeon appointment on 02/02 to talk about a possible carotid endarterectomy.   Today, she asks me why labs were drawn 2 days in a row, and it may be that today's labs were ordered by an outside source, possibly the vascular surgeon.    She has expressed worry about the possibility of another stroke.  Her neurologist cannot promise she won't have another, but she described a discussion about possible endarterectomy procedure to bilateral carotids, because that is where he suspects they are coming from.  In physical therapy, she is working on small steps, balance, and flexibility.  She ultimately decided to not undergo the endarterectomy.  There are risks both in having it and not having it.  The plan is to wait 6 months and undergo repeat imaging.  At this juncture, she feels she is ready to go home.    Diabetes mellitus type 2: She was not on insulin at home.  She has been on sliding scale here, receiving 2 to 4 units.  I suggested we could try increasing her metformin from 1000 mg twice daily to 1250 mg twice daily in an effort to discharge her without the need for insulin.  According to the patient, her last A1c was 6.4.  Dr. Dc brought the dosing back down to 1000 mg twice daily.  Blood glucose levels still look good, and I suspect she will discharge without the need for insulin.  Currently, almost all blood glucose levels are >100 and <150.    Hypertension: Blood pressures are fluctuating, typically in the 130s systolic, but the occasionally much higher (160s).  Diastolics are typically in the 70s.  She is receiving metoprolol succinate 50 mg daily, amlodipine 10 mg daily, and losartan 25 mg daily.  Blood pressures were still somewhat high.  We increased metoprolol succinate to 100 mg daily.  I also suggested moving her losartan from the morning to bedtime.  Still elevated, we increased losartan from 25 mg to 50 mg at bedtime.  I suspect we may eventually work our way up to 100 mg.  A follow-up BMP on 02/01 looked excellent (BUN 27, creatinine 0.83, eGFR 75).  Blood pressures still vary  "quite a bit, with systolics ranging from , and diastolics ranging from 53 -77.  It could, perhaps, stand another little tweak.  We will continue to monitor before any further adjustments.    Nocturia and urinary incontinence:  At home, she has been using a PureWick during the night for about 6 months. He has the device here.    Spastic hemiplegia: Generally well controlled.  She has occasional spasms in her left arm.  Baclofen is working well at current dose.    Discharge planning: As noted above, she feels she is ready to go home.  There will be a discussion today regarding discharge.    ROS:   Positives: Sleep can be \"off and on.\"  More often on than not.    Negatives: No headaches or chest pains, coughing or congestion, nausea or vomiting, dizziness or dyspnea, dysuria, constipation or diarrhea, difficulty chewing or swallowing, integumentary issues, or problems with appetite.      Past Medical History:   Diagnosis Date     Allergic rhinitis      Allergic rhinitis      CKD (chronic kidney disease)      Depression      Depression      Displaced dome fracture of left acetabulum (H)      DM2 (diabetes mellitus, type 2) (H)      GERD (gastroesophageal reflux disease)      GERD (gastroesophageal reflux disease)      Gout      HTN (hypertension)      Hyperlipidaemia LDL goal <100      Hypertension goal BP (blood pressure) < 140/90      Left hemiplegia (H) 1/2010    sees PMR physician     Left hemiplegia (H)      Migraine      Migraine      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)      Nephrolithiasis      Nephrolithiasis      Seizure disorder (H)      Spastic hemiplegia of left nondominant side as late effect of cerebral infarction (H) 12/20/2021     Stroke (H) 1/2010    due to uncontrolled hypertension     Stroke (H)      Type 2 diabetes, HbA1C goal < 8% (H)      Vitamin B12 deficiency anemia               Family History "   Problem Relation Age of Onset     Hypertension Mother      Heart Disease Mother      C.A.D. Father      Coronary Artery Disease Father      Diabetes Sister      Hypertension Brother      Cancer Sister      Diabetes Sister      Hypertension Brother      Cancer Sister      Social History     Socioeconomic History     Marital status:      Spouse name: None     Number of children: None     Years of education: None     Highest education level: None   Occupational History     None   Tobacco Use     Smoking status: Never Smoker     Smokeless tobacco: Never Used   Vaping Use     Vaping Use: Never used   Substance and Sexual Activity     Alcohol use: No     Drug use: No     Sexual activity: Not Currently     Partners: Male   Other Topics Concern     Parent/sibling w/ CABG, MI or angioplasty before 65F 55M? No   Social History Narrative     None     Social Determinants of Health     Financial Resource Strain: Not on file   Food Insecurity: Not on file   Transportation Needs: Not on file   Physical Activity: Not on file   Stress: Not on file   Social Connections: Not on file   Intimate Partner Violence: Not on file   Housing Stability: Not on file       MEDICATIONS: Reviewed from the MAR, physician orders, and/or earlier progress notes.  Post Discharge Medication Reconciliation Status: medication reconcilation previously completed during another office visit.    Current Outpatient Medications   Medication Sig     acetaminophen (TYLENOL) 500 MG tablet Take 500-1,000 mg by mouth every 6 hours as needed      amLODIPine (NORVASC) 10 MG tablet Take 1 tablet (10 mg) by mouth daily     atorvastatin (LIPITOR) 40 MG tablet Take 1 tablet (40 mg) by mouth daily     baclofen (LIORESAL) 10 MG tablet TAKE 1 TABLET(10 MG) BY MOUTH THREE TIMES DAILY AS NEEDED FOR MUSCLE SPASMS (Patient taking differently: Take 10 mg by mouth 3 times daily )     bisacodyl (DULCOLAX) 5 MG EC tablet Take 2 tablets (10 mg) by mouth daily as needed for  "constipation     blood glucose (IRENE CONTOUR) test strip 1 strip by In Vitro route daily Use to test blood sugars as needed     clopidogrel (PLAVIX) 75 MG tablet Take 75 mg by mouth daily     Lactase 4500 units TABS Take 13,500 Units by mouth daily as needed     levETIRAcetam (KEPPRA) 500 MG tablet Take 1,000 mg by mouth 2 times daily Take 1250 mg twice daily     losartan (COZAAR) 25 MG tablet Take 1 tablet (25 mg) by mouth daily (Patient taking differently: Take 50 mg by mouth At Bedtime )     metFORMIN (GLUCOPHAGE) 1000 MG tablet TAKE 1 TABLET(1000 MG) BY MOUTH TWICE DAILY WITH MEALS     metoprolol succinate ER (TOPROL-XL) 100 MG 24 hr tablet Take 100 mg by mouth daily     montelukast (SINGULAIR) 10 MG tablet Take 1 tablet (10 mg) by mouth daily     senna (SENOKOT) 8.6 MG tablet Take 1 tablet by mouth daily     Vitamin D3 (VITAMIN D3) 25 mcg (1000 units) tablet Take 1 tablet (25 mcg) by mouth daily     Current Facility-Administered Medications   Medication     vitamin B-12 (CYANOCOBALAMIN) injection 1,000 mcg     ALLERGIES:   Allergies   Allergen Reactions     Lac Bovis      Lisinopril Cough     Milk Products GI Disturbance     Latex Rash     DIET: Diabetic, regular texture, thin liquids.    Vitals:    02/04/22 1241   BP: (!) 146/64   Pulse: 64   Resp: 16   Temp: 97.3  F (36.3  C)   SpO2: 97%   Weight: 61.3 kg (135 lb 3.2 oz)   Height: 1.676 m (5' 6\")     Body mass index is 21.82 kg/m .    EXAMINATION:   General: Pleasant, alert, and conversant middle-aged female, lying in bed, in NAD.  Head: Normocephalic and atraumatic.   Eyes: PERRLA, sclerae clear.   ENT: Moist oral mucosa.  She has her own teeth in excellent repair.  No gingival hyperplasia from phenytoin.  No rhinorrhea or nasal discharge.  Hearing unimpaired.  Cardiovascular: Regular rate and rhythm with a 2/6 SHARON at the LSB.   Respiratory: Lungs clear to auscultation bilaterally.   Abdomen: Nondistended.   Musculoskeletal/Extremities: Age-related " degenerative joint disease.  Mild contractures of the left hand and elbow.  Wearing AFO.  Neurologic: Mild contractures of the left upper extremity.  Contracture of the left thumb reveals enlarged CMC joint.  Cannot make a fist.  Has not lost sensation, and no numbness or tingling.  Integument: No rashes, clinically significant lesions, or skin breakdown.   Cognitive/Psychiatric: Alert and oriented with euthymic affect, even though she presents with a hobbs look on her face.    DIAGNOSTICS:   Recent Results (from the past 240 hour(s))   Basic metabolic panel    Collection Time: 02/01/22  9:25 AM   Result Value Ref Range    Sodium 143 136 - 145 mmol/L    Potassium 4.0 3.5 - 5.0 mmol/L    Chloride 109 (H) 98 - 107 mmol/L    Carbon Dioxide (CO2) 24 22 - 31 mmol/L    Anion Gap 10 5 - 18 mmol/L    Urea Nitrogen 27 8 - 28 mg/dL    Creatinine 0.83 0.60 - 1.10 mg/dL    Calcium 9.8 8.5 - 10.5 mg/dL    Glucose 207 (H) 70 - 125 mg/dL    GFR Estimate 75 >60 mL/min/1.73m2     Last Comprehensive Metabolic Panel:  Sodium   Date Value Ref Range Status   02/01/2022 143 136 - 145 mmol/L Final   04/15/2021 140 133 - 144 mmol/L Final     Potassium   Date Value Ref Range Status   02/01/2022 4.0 3.5 - 5.0 mmol/L Final   04/15/2021 4.6 3.4 - 5.3 mmol/L Final     Chloride   Date Value Ref Range Status   02/01/2022 109 (H) 98 - 107 mmol/L Final   04/15/2021 106 94 - 109 mmol/L Final     Carbon Dioxide   Date Value Ref Range Status   04/15/2021 25 20 - 32 mmol/L Final     Carbon Dioxide (CO2)   Date Value Ref Range Status   02/01/2022 24 22 - 31 mmol/L Final     Anion Gap   Date Value Ref Range Status   02/01/2022 10 5 - 18 mmol/L Final   04/15/2021 9 3 - 14 mmol/L Final     Glucose   Date Value Ref Range Status   02/01/2022 207 (H) 70 - 125 mg/dL Final   04/15/2021 150 (H) 70 - 99 mg/dL Final     Comment:     Fasting specimen     Urea Nitrogen   Date Value Ref Range Status   02/01/2022 27 8 - 28 mg/dL Final   04/15/2021 24 7 - 30 mg/dL  Final     Creatinine   Date Value Ref Range Status   02/01/2022 0.83 0.60 - 1.10 mg/dL Final   04/15/2021 0.72 0.52 - 1.04 mg/dL Final     GFR Estimate   Date Value Ref Range Status   02/01/2022 75 >60 mL/min/1.73m2 Final     Comment:     Effective December 21, 2021 eGFRcr in adults is calculated using the 2021 CKD-EPI creatinine equation which includes age and gender (Johnna et al., [place], DOI: 10.1056/JHHOtz6782200)   04/15/2021 86 >60 mL/min/[1.73_m2] Final     Comment:     Non  GFR Calc  Starting 12/18/2018, serum creatinine based estimated GFR (eGFR) will be   calculated using the Chronic Kidney Disease Epidemiology Collaboration   (CKD-EPI) equation.       Calcium   Date Value Ref Range Status   02/01/2022 9.8 8.5 - 10.5 mg/dL Final   04/15/2021 9.2 8.5 - 10.1 mg/dL Final     Bilirubin Total   Date Value Ref Range Status   11/12/2021 0.2 0.2 - 1.3 mg/dL Final   04/15/2021 0.2 0.2 - 1.3 mg/dL Final     Alkaline Phosphatase   Date Value Ref Range Status   11/12/2021 131 40 - 150 U/L Final   04/15/2021 159 (H) 40 - 150 U/L Final     ALT   Date Value Ref Range Status   11/12/2021 24 0 - 50 U/L Final   04/15/2021 23 0 - 50 U/L Final     AST   Date Value Ref Range Status   11/12/2021 18 0 - 45 U/L Final   04/15/2021 18 0 - 45 U/L Final     Lab Results   Component Value Date    WBC 9.1 11/12/2021    WBC 7.7 11/20/2020     Lab Results   Component Value Date    RBC 4.38 11/12/2021    RBC 4.05 11/20/2020     Lab Results   Component Value Date    HGB 14.3 11/12/2021    HGB 13.2 11/20/2020     Lab Results   Component Value Date    HCT 42.6 11/12/2021    HCT 40.9 11/20/2020     Lab Results   Component Value Date    MCV 97 11/12/2021     11/20/2020     Lab Results   Component Value Date    MCH 32.6 11/12/2021    MCH 32.6 11/20/2020     Lab Results   Component Value Date    MCHC 33.6 11/12/2021    MCHC 32.3 11/20/2020     Lab Results   Component Value Date    RDW 11.6 11/12/2021    RDW 11.8 11/20/2020      Lab Results   Component Value Date     11/12/2021     11/20/2020       ASSESSMENT/Plan:      ICD-10-CM    1. Cerebrovascular accident (CVA) due to occlusion of right anterior cerebral artery (H)  I63.521    2. Spastic hemiplegia of left nondominant side as late effect of cerebral infarction (H)  I69.354    3. Type 2 diabetes mellitus with stage 3b chronic kidney disease, with long-term current use of insulin (H)  E11.22     N18.32     Z79.4    4. Essential hypertension  I10    5. Seizure disorder (H)  G40.909    6. Anemia due to vitamin B12 deficiency, unspecified B12 deficiency type  D51.9        CHANGES:    None.    CARE PLAN:    The care plan, medications, vital signs, orders, and nursing notes have been reviewed, and all orders signed. Changes to care plan, if any, as noted. Otherwise, continue current plan of care.    The above has been created using voice recognition software. Please be aware that this may unintentionally  produce inaccuracies and/or nonsensical sentences.      Electronically signed by: KEILA Mcmahon CNP

## 2022-02-08 ENCOUNTER — TRANSITIONAL CARE UNIT VISIT (OUTPATIENT)
Dept: GERIATRICS | Facility: CLINIC | Age: 72
End: 2022-02-08
Payer: MEDICARE

## 2022-02-08 VITALS
RESPIRATION RATE: 18 BRPM | DIASTOLIC BLOOD PRESSURE: 62 MMHG | SYSTOLIC BLOOD PRESSURE: 115 MMHG | HEIGHT: 66 IN | HEART RATE: 69 BPM | OXYGEN SATURATION: 93 % | TEMPERATURE: 98.4 F | BODY MASS INDEX: 21.24 KG/M2 | WEIGHT: 132.2 LBS

## 2022-02-08 DIAGNOSIS — E11.22 TYPE 2 DIABETES MELLITUS WITH STAGE 3B CHRONIC KIDNEY DISEASE, WITH LONG-TERM CURRENT USE OF INSULIN (H): Primary | ICD-10-CM

## 2022-02-08 DIAGNOSIS — N18.32 TYPE 2 DIABETES MELLITUS WITH STAGE 3B CHRONIC KIDNEY DISEASE, WITH LONG-TERM CURRENT USE OF INSULIN (H): Primary | ICD-10-CM

## 2022-02-08 DIAGNOSIS — Z79.4 TYPE 2 DIABETES MELLITUS WITH STAGE 3B CHRONIC KIDNEY DISEASE, WITH LONG-TERM CURRENT USE OF INSULIN (H): Primary | ICD-10-CM

## 2022-02-08 DIAGNOSIS — I10 HYPERTENSION GOAL BP (BLOOD PRESSURE) < 140/90: ICD-10-CM

## 2022-02-08 DIAGNOSIS — E53.8 VITAMIN B12 DEFICIENCY: ICD-10-CM

## 2022-02-08 PROCEDURE — 99309 SBSQ NF CARE MODERATE MDM 30: CPT | Performed by: NURSE PRACTITIONER

## 2022-02-08 ASSESSMENT — MIFFLIN-ST. JEOR: SCORE: 1136.41

## 2022-02-08 NOTE — PROGRESS NOTES
"[facility]    Chief Complaint   Patient presents with     RECHECK     HPI:  Addis Peña is a 71 year old  (1950), who is being seen today for an episodic care visit at: [facility] ([facility]) [[id number]].     Background:    This is a 71-year-old female, with a past medical history significant for stroke with residual left spastic hemiplegia, seizure disorder, hypertension and type 2 diabetes mellitus, who was hospitalized at [facility] 12/11/21 through 12/14/21 with increased left-sided weakness. An MR brain on 12/11/21 revealed \"small area of acute or subacute ischemia involving the posterior limb of the left internal capsule\". Neurology was consulted. Recommended Aspirin for 3 weeks and addition of Clopidogrel alone for 3 weeks beyond that. Vascular recommended outpatient follow-up. Labs revealed Dilantin level of 0.8 and Levetiracetam 64.3 on 12/11/21. Dilantin was discontinued. Levetiracetam was decreased. Glargine was added for blood sugar control. A [facility] stay was recommended for ongoing physical rehabilitation.     Today's concern is:     Type 2 Diabetes Mellitus. Upon review of blood sugars over the past 5 days, range is as follows:    Breakfast:   Dinner: 105-143    Hypertension. Upon review of blood pressures over the past 5 days, systolic range from , most 120-140. Diastolic range from 53-80.    Vitamin B12 Deficiency. Today, patient reports she needs her Vitamin B12 injection on 2/18. Wants to make sure the order is written. Per review of documentation, last Vitamin B12 625 on 11/12/21. Last received injection on 1/14/22.    Allergies, and PMH/PSH reviewed in EPIC today.  REVIEW OF SYSTEMS:  4 point ROS including Respiratory, CV, GI and , other than that noted in the HPI,  is negative    Objective:   /62   Pulse 69   Temp 98.4  F (36.9  C)   Resp 18   Ht 1.676 m (5' 6\")   Wt 60 kg (132 lb 3.2 oz)   SpO2 93%   BMI 21.34 kg/m    GENERAL APPEARANCE:  " Alert, in no distress  ENT:  Mouth and posterior oropharynx normal, moist mucous membranes  EYES:  EOM, conjunctivae, lids, pupils and irises normal  RESP:  respiratory effort and palpation of chest normal, lungs clear to auscultation , no respiratory distress  CV:  Palpation and auscultation of heart done , regular rate and rhythm, no murmur, rub, or gallop  ABDOMEN:  normal bowel sounds, soft, nontender, no hepatosplenomegaly or other masses  M/S:   Left-sided weakness  SKIN:  Inspection of skin and subcutaneous tissue baseline, Palpation of skin and subcutaneous tissue baseline  NEURO:   Cranial nerves 2-12 are normal tested and grossly at patient's baseline  PSYCH:  affect and mood normal    Labs done in SNF are in [place] EPIC. Please refer to them using [employer] Everywhere.    Assessment/Plan:  Recent Internal Capsule CVA in December 2021 with History of CVA with Left Spastic Hemiplegia. Weakness improving from most recent CVA. Completed treatment with Aspirin alone. Continue Atorvastatin and Clopidogrel as ordered. Baclofen also ordered.    Left ICA Stenosis. Follow-up with Vascular as per them. Last seen on 2/2/22. Continue Atorvastatin and Clopidogrel as ordered.    Seizure Disorder. PTA Dilantin discontinued during hospitalization. Levetiracetam dose decreased during hospitalization. Last Levetiracetam level 41.2 on 1/11/22.     Type 2 Diabetes Mellitus. Last A1C 6.4 on 11/12/21. Blood sugars < 150. Glargine initiated during hospitalization, but discontinued on 1/5/22. Metformin decreased on 1/5/22. Given good control, question if blood sugars need to be checked BID. Will discuss with patient at next visit.    Hypertension. Monitor blood pressure daily. Metoprolol last increased on 1/18/22 and Losartan on 1/25/22. Continue Amlodipine as ordered.    Vitamin B12 Deficiency. Will write order for Vitamin B12 injection per patient request.    Physical Deconditioning. Secondary to recent hospitalization and  co-morbidities. Physical and Occupational Therapy ordered.  is working with EW  to arrange discharge.     Orders:  Vitamin B12 1000 mcg IM x 1 on 2/18/22    Electronically signed by: KEILA Nelson CNP

## 2022-02-08 NOTE — LETTER
"    2/8/2022        RE: Addis Peña  [address]  [place] [address]        [facility] GERIATRICS    Chief Complaint   Patient presents with     RECHECK     HPI:  Addis Peña is a 71 year old  (1950), who is being seen today for an episodic care visit at: [facility] ([facility]) [[id number]].     Background:    This is a 71-year-old female, with a past medical history significant for stroke with residual left spastic hemiplegia, seizure disorder, hypertension and type 2 diabetes mellitus, who was hospitalized at [facility] 12/11/21 through 12/14/21 with increased left-sided weakness. An MR brain on 12/11/21 revealed \"small area of acute or subacute ischemia involving the posterior limb of the left internal capsule\". Neurology was consulted. Recommended Aspirin for 3 weeks and addition of Clopidogrel alone for 3 weeks beyond that. Vascular recommended outpatient follow-up. Labs revealed Dilantin level of 0.8 and Levetiracetam 64.3 on 12/11/21. Dilantin was discontinued. Levetiracetam was decreased. Glargine was added for blood sugar control. A [facility] stay was recommended for ongoing physical rehabilitation.     Today's concern is:     Type 2 Diabetes Mellitus. Upon review of blood sugars over the past 5 days, range is as follows:    Breakfast:   Dinner: 105-143    Hypertension. Upon review of blood pressures over the past 5 days, systolic range from , most 120-140. Diastolic range from 53-80.    Vitamin B12 Deficiency. Today, patient reports she needs her Vitamin B12 injection on 2/18. Wants to make sure the order is written. Per review of documentation, last Vitamin B12 625 on 11/12/21. Last received injection on 1/14/22.    Allergies, and PMH/PSH reviewed in EPIC today.  REVIEW OF SYSTEMS:  4 point ROS including Respiratory, CV, GI and , other than that noted in the HPI,  is negative    Objective:   /62   Pulse 69   Temp 98.4  F (36.9  C)   Resp 18   " "Ht 1.676 m (5' 6\")   Wt 60 kg (132 lb 3.2 oz)   SpO2 93%   BMI 21.34 kg/m    GENERAL APPEARANCE:  Alert, in no distress  ENT:  Mouth and posterior oropharynx normal, moist mucous membranes  EYES:  EOM, conjunctivae, lids, pupils and irises normal  RESP:  respiratory effort and palpation of chest normal, lungs clear to auscultation , no respiratory distress  CV:  Palpation and auscultation of heart done , regular rate and rhythm, no murmur, rub, or gallop  ABDOMEN:  normal bowel sounds, soft, nontender, no hepatosplenomegaly or other masses  M/S:   Left-sided weakness  SKIN:  Inspection of skin and subcutaneous tissue baseline, Palpation of skin and subcutaneous tissue baseline  NEURO:   Cranial nerves 2-12 are normal tested and grossly at patient's baseline  PSYCH:  affect and mood normal    Labs done in SNF are in [facility] EPIC. Please refer to them using [employer] Everywhere.    Assessment/Plan:  Recent Internal Capsule CVA in December 2021 with History of CVA with Left Spastic Hemiplegia. Weakness improving from most recent CVA. Completed treatment with Aspirin alone. Continue Atorvastatin and Clopidogrel as ordered. Baclofen also ordered.    Left ICA Stenosis. Follow-up with Vascular as per them. Last seen on 2/2/22. Continue Atorvastatin and Clopidogrel as ordered.    Seizure Disorder. PTA Dilantin discontinued during hospitalization. Levetiracetam dose decreased during hospitalization. Last Levetiracetam level 41.2 on 1/11/22.     Type 2 Diabetes Mellitus. Last A1C 6.4 on 11/12/21. Blood sugars < 150. Glargine initiated during hospitalization, but discontinued on 1/5/22. Metformin decreased on 1/5/22. Given good control, question if blood sugars need to be checked BID. Will discuss with patient at next visit.    Hypertension. Monitor blood pressure daily. Metoprolol last increased on 1/18/22 and Losartan on 1/25/22. Continue Amlodipine as ordered.    Vitamin B12 Deficiency. Will write order for Vitamin B12 " injection per patient request.    Physical Deconditioning. Secondary to recent hospitalization and co-morbidities. Physical and Occupational Therapy ordered.  is working with [facility]  to arrange discharge.     Orders:  Vitamin B12 1000 mcg IM x 1 on 2/18/22    Electronically signed by: KEILA Nelson CNP            Sincerely,        KEILA Nelson CNP

## 2022-02-11 VITALS
TEMPERATURE: 97.9 F | BODY MASS INDEX: 21.53 KG/M2 | RESPIRATION RATE: 16 BRPM | HEART RATE: 69 BPM | OXYGEN SATURATION: 96 % | SYSTOLIC BLOOD PRESSURE: 144 MMHG | HEIGHT: 66 IN | DIASTOLIC BLOOD PRESSURE: 85 MMHG | WEIGHT: 134 LBS

## 2022-02-11 RX ORDER — CYANOCOBALAMIN 1000 UG/ML
1 INJECTION, SOLUTION INTRAMUSCULAR; SUBCUTANEOUS
COMMUNITY
Start: 2022-02-18

## 2022-02-11 RX ORDER — LOSARTAN POTASSIUM 25 MG/1
50 TABLET ORAL AT BEDTIME
Start: 2022-02-11 | End: 2022-03-21

## 2022-02-11 RX ORDER — BACLOFEN 10 MG/1
10 TABLET ORAL 3 TIMES DAILY
Start: 2022-02-11 | End: 2022-03-24

## 2022-02-11 ASSESSMENT — MIFFLIN-ST. JEOR: SCORE: 1144.57

## 2022-02-14 ENCOUNTER — TRANSITIONAL CARE UNIT VISIT (OUTPATIENT)
Dept: GERIATRICS | Facility: CLINIC | Age: 72
End: 2022-02-14
Payer: MEDICARE

## 2022-02-14 DIAGNOSIS — Z53.9 ERRONEOUS ENCOUNTER--DISREGARD: Primary | ICD-10-CM

## 2022-02-14 NOTE — LETTER
2/11/2022        RE: Addis Peña  [address]  [place] [address]        [facility] GERIATRICS    Chief Complaint   Patient presents with     RECHECK     HPI:  Addis Peña is a 71 year old  (1950), who is being seen today for an episodic care visit at: [facility] ([facility]) [[id number]]. Today's concern is: ***    Allergies, and PMH/PSH reviewed in [facility] today.  REVIEW OF SYSTEMS:  {vlblcq94:806766}    Objective:   There were no vitals taken for this visit.  {[facility] physical exam :389488}    {fgslab:743490}    Assessment/Plan:  {FGS DX2:716979}    Orders:  {fgsorders:363796}  ***    Electronically signed by: Grace Pereyra ***            Sincerely,        Clau Gama, CNP

## 2022-02-14 NOTE — TELEPHONE ENCOUNTER
Reason for Call:  Other call back    Requesting: Skilled nursing 1 x week for 3 weeks for general education  Home health aid 1 x week for 1 week, 2 x week for 2 weeks, 1 x week for 2 weeks    Phone Number Patient can be reached at:  [phone]    Best Time: Any    Can we leave a detailed message on this number? YES    Call taken on 7/12/2021 at 4:57 PM by Yue Griffin   Plan: Candida injections today Detail Level: Zone

## 2022-02-15 ENCOUNTER — TRANSITIONAL CARE UNIT VISIT (OUTPATIENT)
Dept: GERIATRICS | Facility: CLINIC | Age: 72
End: 2022-02-15
Payer: MEDICARE

## 2022-02-15 VITALS
RESPIRATION RATE: 16 BRPM | HEART RATE: 71 BPM | BODY MASS INDEX: 21.53 KG/M2 | OXYGEN SATURATION: 96 % | TEMPERATURE: 97.6 F | WEIGHT: 134 LBS | HEIGHT: 66 IN

## 2022-02-15 DIAGNOSIS — N18.32 TYPE 2 DIABETES MELLITUS WITH STAGE 3B CHRONIC KIDNEY DISEASE, WITH LONG-TERM CURRENT USE OF INSULIN (H): ICD-10-CM

## 2022-02-15 DIAGNOSIS — R53.81 PHYSICAL DECONDITIONING: ICD-10-CM

## 2022-02-15 DIAGNOSIS — I65.22 STENOSIS OF LEFT CAROTID ARTERY: ICD-10-CM

## 2022-02-15 DIAGNOSIS — E11.22 TYPE 2 DIABETES MELLITUS WITH STAGE 3B CHRONIC KIDNEY DISEASE, WITH LONG-TERM CURRENT USE OF INSULIN (H): ICD-10-CM

## 2022-02-15 DIAGNOSIS — G40.909 SEIZURE DISORDER (H): ICD-10-CM

## 2022-02-15 DIAGNOSIS — Z86.73 RECENT CEREBROVASCULAR ACCIDENT (CVA): Primary | ICD-10-CM

## 2022-02-15 DIAGNOSIS — E53.8 VITAMIN B12 DEFICIENCY: ICD-10-CM

## 2022-02-15 DIAGNOSIS — Z79.4 TYPE 2 DIABETES MELLITUS WITH STAGE 3B CHRONIC KIDNEY DISEASE, WITH LONG-TERM CURRENT USE OF INSULIN (H): ICD-10-CM

## 2022-02-15 DIAGNOSIS — I10 HYPERTENSION GOAL BP (BLOOD PRESSURE) < 140/90: ICD-10-CM

## 2022-02-15 PROCEDURE — 99316 NF DSCHRG MGMT 30 MIN+: CPT | Performed by: NURSE PRACTITIONER

## 2022-02-15 ASSESSMENT — MIFFLIN-ST. JEOR: SCORE: 1144.57

## 2022-02-15 NOTE — LETTER
"    2/15/2022        RE: Addis Peña  [address]  [place] [address]        [facility] GERIATRICS DISCHARGE SUMMARY  PATIENT'S NAME: Addis Peña  YOB: 1950  MEDICAL RECORD NUMBER:  [id number]  Place of Service where encounter took place:  [facility] ([facility]) [[id number]]    PRIMARY CARE PROVIDER AND CLINIC RESPONSIBLE AFTER TRANSFER:   Joel Daniel Wegener, MD, [address] [address] / [facility] [address]    [facility]     Transferring providers: KEILA Nelson CNP, Ioana Dc MD  Recent Hospitalization/ED:  [facility]  stay 12/11/21 to 12/14/21.  Date of SNF Admission: 12/14/21  Date of SNF (anticipated) Discharge: 2/21/22  Discharged to: previous independent home  Cognitive Scores: SLUMS 17/30  Physical Function: Requires assistance with pivot transfers.Transfers from bed to wheelchair with modified independence with left AFO. Able to dress herself by putting on pants at edge of bed and adjusting/positioning pants lying in bed in the bridge position. Able to stand for 2 minutes. Able to do some walking down a hallway.  DME: No new DME needed    CODE STATUS/ADVANCE DIRECTIVES DISCUSSION:  DNR/DNI  ALLERGIES: Lac bovis, Lisinopril, Milk products, and Latex    NURSING FACILITY COURSE   This is a 71-year-old female, with a past medical history significant for stroke with residual left spastic hemiplegia, seizure disorder, hypertension and type 2 diabetes mellitus, who was hospitalized at [facility] 12/11/21 through 12/14/21 with increased left-sided weakness. An MR brain on 12/11/21 revealed \"small area of acute or subacute ischemia involving the posterior limb of the left internal capsule\". Neurology was consulted. Recommended Aspirin for 3 weeks and addition of Clopidogrel alone for 3 weeks beyond that. Vascular recommended outpatient follow-up. Labs revealed Dilantin level of 0.8 and Levetiracetam 64.3 on 12/11/21. " Dilantin was discontinued. Levetiracetam was decreased. Glargine was added for blood sugar control. A TCU stay was recommended for ongoing physical rehabilitation.     Recent Internal Capsule CVA in December 2021 with History of CVA with Left Spastic Hemiplegia. Weakness improving from most recent CVA. Completed treatment with Aspirin alone. Continue Atorvastatin and Clopidogrel as ordered. Baclofen also ordered.     Left ICA Stenosis. Follow-up with Vascular as per them. Last seen on 2/2/22. Continue Atorvastatin and Clopidogrel as ordered.     Seizure Disorder. PTA Dilantin discontinued during hospitalization. Levetiracetam dose decreased during hospitalization. Last Levetiracetam level 41.2 on 1/11/22.      Type 2 Diabetes Mellitus. Last A1C 6.4 on 11/12/21. Most blood sugars < 150. Glargine initiated during hospitalization, but discontinued on 1/5/22. Metformin decreased on 1/5/22.      Hypertension. Upon review of blood pressures over the past 5 days, systolic range from 105-158. Diastolic from 61-89. Monitor blood pressures closely upon discharge as some readings elevated. Metoprolol last increased on 1/18/22 and Losartan on 1/25/22. Continue Amlodipine as ordered.     Vitamin B12 Deficiency. Vitamin B12 injection to be received on 2/18/22.     Physical Deconditioning. Secondary to recent hospitalization and co-morbidities. Home Physical and Occupational Therapy ordered. Will also discharge home with PCA services.    Discharge Medications:  Current Outpatient Medications   Medication Sig Dispense Refill     acetaminophen (TYLENOL) 500 MG tablet Take 500-1,000 mg by mouth every 6 hours as needed        amLODIPine (NORVASC) 10 MG tablet Take 1 tablet (10 mg) by mouth daily 90 tablet 3     atorvastatin (LIPITOR) 40 MG tablet Take 1 tablet (40 mg) by mouth daily 90 tablet 3     baclofen (LIORESAL) 10 MG tablet Take 1 tablet (10 mg) by mouth 3 times daily       bisacodyl (DULCOLAX) 5 MG EC tablet Take 2 tablets (10  mg) by mouth daily as needed for constipation 180 tablet 3     blood glucose (IRENE CONTOUR) test strip 1 strip by In Vitro route daily Use to test blood sugars as needed 200 strip 3     clopidogrel (PLAVIX) 75 MG tablet Take 75 mg by mouth daily       cyanocobalamin (CYANOCOBALAMIN) 1000 MCG/ML injection Inject 1 mL into the muscle every 30 days       Lactase 4500 units TABS Take 13,500 Units by mouth daily as needed       levETIRAcetam (KEPPRA) 500 MG tablet Take 1,000 mg by mouth 2 times daily        losartan (COZAAR) 25 MG tablet Take 2 tablets (50 mg) by mouth At Bedtime       metFORMIN (GLUCOPHAGE) 1000 MG tablet TAKE 1 TABLET(1000 MG) BY MOUTH TWICE DAILY WITH MEALS 180 tablet 3     metoprolol succinate ER (TOPROL-XL) 100 MG 24 hr tablet Take 100 mg by mouth daily       montelukast (SINGULAIR) 10 MG tablet Take 1 tablet (10 mg) by mouth daily 90 tablet 3     senna (SENOKOT) 8.6 MG tablet Take 1 tablet by mouth daily       Vitamin D3 (VITAMIN D3) 25 mcg (1000 units) tablet Take 1 tablet (25 mcg) by mouth daily 90 tablet 3      Controlled medications:   not applicable/none     Past Medical History:   Past Medical History:   Diagnosis Date     Allergic rhinitis      Allergic rhinitis      CKD (chronic kidney disease)      Depression      Depression      Displaced dome fracture of left acetabulum (H)      DM2 (diabetes mellitus, type 2) (H)      GERD (gastroesophageal reflux disease)      GERD (gastroesophageal reflux disease)      Gout      HTN (hypertension)      Hyperlipidaemia LDL goal <100      Hypertension goal BP (blood pressure) < 140/90      Left hemiplegia (H) 1/2010    sees PMR physician     Left hemiplegia (H)      Migraine      Migraine      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)      Nephrolithiasis      Nephrolithiasis      Seizure disorder (H)      Spastic hemiplegia of left nondominant side as late effect  "of cerebral infarction (H) 12/20/2021     Stroke (H) 1/2010    due to uncontrolled hypertension     Stroke (H)      Type 2 diabetes, HbA1C goal < 8% (H)      Vitamin B12 deficiency anemia      Physical Exam:   Vitals: Pulse 71   Temp 97.6  F (36.4  C)   Resp 16   Ht 1.676 m (5' 6\")   Wt 60.8 kg (134 lb)   SpO2 96%   BMI 21.63 kg/m    BMI: Body mass index is 21.63 kg/m .  GENERAL APPEARANCE:  Alert, in no distress  ENT:  Mouth and posterior oropharynx normal, moist mucous membranes  EYES:  EOM, conjunctivae, lids, pupils and irises normal  RESP:  respiratory effort and palpation of chest normal, lungs clear to auscultation , no respiratory distress  CV:  Palpation and auscultation of heart done , regular rate and rhythm, no murmur, rub, or gallop  ABDOMEN:  normal bowel sounds, soft, nontender, no hepatosplenomegaly or other masses  M/S:   Left-sided weakness  SKIN:  Inspection of skin and subcutaneous tissue baseline, Palpation of skin and subcutaneous tissue baseline  NEURO:   Cranial nerves 2-12 are normal tested and grossly at patient's baseline  PSYCH:  affect and mood normal     SNF labs: Labs done in SNF are in [facility] EPIC. Please refer to them using [employer] Everywhere.    DISCHARGE PLAN:    Follow up labs: No labs orders/due    Medical Follow Up:      Follow up with primary care provider in 1 week    Current [facility] scheduled appointments:  Next 5 appointments (look out 90 days)    Mar 25, 2022  9:00 AM  MA Visit with HP MA/LPN  [facility] ([facility] ) [address] [place] [id number] [phone]   Apr 19, 2022  9:20 AM  (Arrive by 9:00 AM)  Provider Visit with Joel Daniel Irwin Wegener, MD  [facility] ([facility] ) [address], [address]  [facility] [address] [phone]           Discharge Services: Home Care:  Occupational Therapy, Physical Therapy and From:  " [facility] PCA Services through [facility]    TOTAL DISCHARGE TIME:   Greater than 30 minutes  Electronically signed by:  KEILA Nelson CNP         Documentation of Face-to-Face and Certification for Home Health Services     Patient: Addis Peña   YOB: 1950  MR Number: [id number]  Today's Date: 2/15/2022    I certify that patient: Addis Peña is under my care and that I, or a nurse practitioner or physician's assistant working with me, had a face-to-face encounter that meets the physician face-to-face encounter requirements with this patient on: 2/15/22.    This encounter with the patient was in whole, or in part, for the following medical condition, which is the primary reason for home health care: Recent Internal Capsule CVA in December 2021 with History of CVA with Left Spastic Hemiplegia    I certify that, based on my findings, the following services are medically necessary home health services: Occupational Therapy and Physical Therapy.    My clinical findings support the need for the above services because: Occupational Therapy Services are needed to assess and treat cognitive ability and address ADL safety due to impairment in ADLS given Recent Internal Capsule CVA in December 2021 with History of CVA with Left Spastic Hemiplegia. and Physical Therapy Services are needed to assess and treat the following functional impairments: Gait instability due to Recent Internal Capsule CVA in December 2021 with History of CVA with Left Spastic Hemiplegia.    Further, I certify that my clinical findings support that this patient is homebound (i.e. absences from home require considerable and taxing effort and are for medical reasons or [employer] services or infrequently or of short duration when for other reasons) because: Requires assistance of another person or specialized equipment to access medical services because patient: Is unable to walk  greater than 200 feet without rest...    Based on the above findings. I certify that this patient is confined to the home and needs intermittent skilled nursing care, physical therapy and/or speech therapy.  The patient is under my care, and I have initiated the establishment of the plan of care.  This patient will be followed by a physician who will periodically review the plan of care.  Physician/Provider to provide follow up care: Wegener, Joel Daniel Irwin    Responsible Medicare certified PECOS Physician: Dr. Ioana Dc  Physician Signature: See electronic signature associated with these discharge orders.  Date: 2/15/2022                Sincerely,        KEILA Nelson CNP

## 2022-02-15 NOTE — PROGRESS NOTES
"[facility] GERIATRICS DISCHARGE SUMMARY  PATIENT'S NAME: Addis Peña  YOB: 1950  MEDICAL RECORD NUMBER:  [id number]  Place of Service where encounter took place:  [facility] ([facility]) [[id number]]    PRIMARY CARE PROVIDER AND CLINIC RESPONSIBLE AFTER TRANSFER:   Joel Daniel Wegener, MD, [address] [facility] [address] [facility] [address]    [facility] [place]     Transferring providers: KEILA Nelson CNP, Ioana Dc MD  Recent Hospitalization/ED:  [facility]  stay 12/11/21 to 12/14/21.  Date of SNF Admission: 12/14/21  Date of SNF (anticipated) Discharge: 2/21/22  Discharged to: previous independent home  Cognitive Scores: SLUMS 17/30  Physical Function: Requires assistance with pivot transfers.Transfers from bed to wheelchair with modified independence with left AFO. Able to dress herself by putting on pants at edge of bed and adjusting/positioning pants lying in bed in the bridge position. Able to stand for 2 minutes. Able to do some walking down a hallway.  DME: No new DME needed    CODE STATUS/ADVANCE DIRECTIVES DISCUSSION:  DNR/DNI  ALLERGIES: Lac bovis, Lisinopril, Milk products, and Latex    NURSING FACILITY COURSE   This is a 71-year-old female, with a past medical history significant for stroke with residual left spastic hemiplegia, seizure disorder, hypertension and type 2 diabetes mellitus, who was hospitalized at [facility] 12/11/21 through 12/14/21 with increased left-sided weakness. An MR brain on 12/11/21 revealed \"small area of acute or subacute ischemia involving the posterior limb of the left internal capsule\". Neurology was consulted. Recommended Aspirin for 3 weeks and addition of Clopidogrel alone for 3 weeks beyond that. Vascular recommended outpatient follow-up. Labs revealed Dilantin level of 0.8 and Levetiracetam 64.3 on 12/11/21. Dilantin was discontinued. Levetiracetam was decreased. Glargine was added for blood sugar control. A TCU " stay was recommended for ongoing physical rehabilitation.     Recent Internal Capsule CVA in December 2021 with History of CVA with Left Spastic Hemiplegia. Weakness improving from most recent CVA. Completed treatment with Aspirin alone. Continue Atorvastatin and Clopidogrel as ordered. Baclofen also ordered.     Left ICA Stenosis. Follow-up with Vascular as per them. Last seen on 2/2/22. Continue Atorvastatin and Clopidogrel as ordered.     Seizure Disorder. PTA Dilantin discontinued during hospitalization. Levetiracetam dose decreased during hospitalization. Last Levetiracetam level 41.2 on 1/11/22.      Type 2 Diabetes Mellitus. Last A1C 6.4 on 11/12/21. Most blood sugars < 150. Glargine initiated during hospitalization, but discontinued on 1/5/22. Metformin decreased on 1/5/22.      Hypertension. Upon review of blood pressures over the past 5 days, systolic range from 105-158. Diastolic from 61-89. Monitor blood pressures closely upon discharge as some readings elevated. Metoprolol last increased on 1/18/22 and Losartan on 1/25/22. Continue Amlodipine as ordered.     Vitamin B12 Deficiency. Vitamin B12 injection to be received on 2/18/22.     Physical Deconditioning. Secondary to recent hospitalization and co-morbidities. Home Physical and Occupational Therapy ordered. Will also discharge home with PCA services.    Discharge Medications:  Current Outpatient Medications   Medication Sig Dispense Refill     acetaminophen (TYLENOL) 500 MG tablet Take 500-1,000 mg by mouth every 6 hours as needed        amLODIPine (NORVASC) 10 MG tablet Take 1 tablet (10 mg) by mouth daily 90 tablet 3     atorvastatin (LIPITOR) 40 MG tablet Take 1 tablet (40 mg) by mouth daily 90 tablet 3     baclofen (LIORESAL) 10 MG tablet Take 1 tablet (10 mg) by mouth 3 times daily       bisacodyl (DULCOLAX) 5 MG EC tablet Take 2 tablets (10 mg) by mouth daily as needed for constipation 180 tablet 3     blood glucose (IRENE CONTOUR) test strip 1  strip by In Vitro route daily Use to test blood sugars as needed 200 strip 3     clopidogrel (PLAVIX) 75 MG tablet Take 75 mg by mouth daily       cyanocobalamin (CYANOCOBALAMIN) 1000 MCG/ML injection Inject 1 mL into the muscle every 30 days       Lactase 4500 units TABS Take 13,500 Units by mouth daily as needed       levETIRAcetam (KEPPRA) 500 MG tablet Take 1,000 mg by mouth 2 times daily        losartan (COZAAR) 25 MG tablet Take 2 tablets (50 mg) by mouth At Bedtime       metFORMIN (GLUCOPHAGE) 1000 MG tablet TAKE 1 TABLET(1000 MG) BY MOUTH TWICE DAILY WITH MEALS 180 tablet 3     metoprolol succinate ER (TOPROL-XL) 100 MG 24 hr tablet Take 100 mg by mouth daily       montelukast (SINGULAIR) 10 MG tablet Take 1 tablet (10 mg) by mouth daily 90 tablet 3     senna (SENOKOT) 8.6 MG tablet Take 1 tablet by mouth daily       Vitamin D3 (VITAMIN D3) 25 mcg (1000 units) tablet Take 1 tablet (25 mcg) by mouth daily 90 tablet 3      Controlled medications:   not applicable/none     Past Medical History:   Past Medical History:   Diagnosis Date     Allergic rhinitis      Allergic rhinitis      CKD (chronic kidney disease)      Depression      Depression      Displaced dome fracture of left acetabulum (H)      DM2 (diabetes mellitus, type 2) (H)      GERD (gastroesophageal reflux disease)      GERD (gastroesophageal reflux disease)      Gout      HTN (hypertension)      Hyperlipidaemia LDL goal <100      Hypertension goal BP (blood pressure) < 140/90      Left hemiplegia (H) 1/2010    sees PMR physician     Left hemiplegia (H)      Migraine      Migraine      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)      Nephrolithiasis      Nephrolithiasis      Seizure disorder (H)      Spastic hemiplegia of left nondominant side as late effect of cerebral infarction (H) 12/20/2021     Stroke (H) 1/2010    due to uncontrolled hypertension     Stroke  "(H)      Type 2 diabetes, HbA1C goal < 8% (H)      Vitamin B12 deficiency anemia      Physical Exam:   Vitals: Pulse 71   Temp 97.6  F (36.4  C)   Resp 16   Ht 1.676 m (5' 6\")   Wt 60.8 kg (134 lb)   SpO2 96%   BMI 21.63 kg/m    BMI: Body mass index is 21.63 kg/m .  GENERAL APPEARANCE:  Alert, in no distress  ENT:  Mouth and posterior oropharynx normal, moist mucous membranes  EYES:  EOM, conjunctivae, lids, pupils and irises normal  RESP:  respiratory effort and palpation of chest normal, lungs clear to auscultation , no respiratory distress  CV:  Palpation and auscultation of heart done , regular rate and rhythm, no murmur, rub, or gallop  ABDOMEN:  normal bowel sounds, soft, nontender, no hepatosplenomegaly or other masses  M/S:   Left-sided weakness  SKIN:  Inspection of skin and subcutaneous tissue baseline, Palpation of skin and subcutaneous tissue baseline  NEURO:   Cranial nerves 2-12 are normal tested and grossly at patient's baseline  PSYCH:  affect and mood normal     SNF labs: Labs done in SNF are in [place] EPIC. Please refer to them using [employer] Everywhere.    DISCHARGE PLAN:    Follow up labs: No labs orders/due    Medical Follow Up:      Follow up with primary care provider in 1 week    Current [place] scheduled appointments:  Next 5 appointments (look out 90 days)    Mar 25, 2022  9:00 AM  MA Visit with HP MA/LPN  [facility] [address] [place] [id number] [phone]   Apr 19, 2022  9:20 AM  (Arrive by 9:00 AM)  Provider Visit with Joel Daniel Irwin Wegener, MD  [facility] ([facility] ) [address], [address]  [facility] [address] [phone]           Discharge Services: Home Care:  Occupational Therapy, Physical Therapy and From:  [facility] through [facility]    TOTAL DISCHARGE TIME:   Greater than " 30 minutes  Electronically signed by:  KEILA Nelson CNP         Documentation of Face-to-Face and Certification for Home Health Services     Patient: Addis Peña   YOB: 1950  MR Number: [id number]  Today's Date: 2/15/2022    I certify that patient: Addis Peña is under my care and that I, or a nurse practitioner or physician's assistant working with me, had a face-to-face encounter that meets the physician face-to-face encounter requirements with this patient on: 2/15/22.    This encounter with the patient was in whole, or in part, for the following medical condition, which is the primary reason for home health care: Recent Internal Capsule CVA in December 2021 with History of CVA with Left Spastic Hemiplegia    I certify that, based on my findings, the following services are medically necessary home health services: Occupational Therapy and Physical Therapy.    My clinical findings support the need for the above services because: Occupational Therapy Services are needed to assess and treat cognitive ability and address ADL safety due to impairment in ADLS given Recent Internal Capsule CVA in December 2021 with History of CVA with Left Spastic Hemiplegia. and Physical Therapy Services are needed to assess and treat the following functional impairments: Gait instability due to Recent Internal Capsule CVA in December 2021 with History of CVA with Left Spastic Hemiplegia.    Further, I certify that my clinical findings support that this patient is homebound (i.e. absences from home require considerable and taxing effort and are for medical reasons or [employer] services or infrequently or of short duration when for other reasons) because: Requires assistance of another person or specialized equipment to access medical services because patient: Is unable to walk greater than 200 feet without rest...    Based on the above findings. I certify that this patient is confined to  the home and needs intermittent skilled nursing care, physical therapy and/or speech therapy.  The patient is under my care, and I have initiated the establishment of the plan of care.  This patient will be followed by a physician who will periodically review the plan of care.  Physician/Provider to provide follow up care: Wegener, Joel Daniel Irwin    Responsible Medicare certified PECOS Physician: Dr. Ioana Dc  Physician Signature: See electronic signature associated with these discharge orders.  Date: 2/15/2022

## 2022-02-19 RX ORDER — AMOXICILLIN 250 MG
1 CAPSULE ORAL DAILY
COMMUNITY

## 2022-02-25 ENCOUNTER — TELEPHONE (OUTPATIENT)
Dept: FAMILY MEDICINE | Facility: CLINIC | Age: 72
End: 2022-02-25

## 2022-02-25 NOTE — TELEPHONE ENCOUNTER
Triage,   Sherron from [facility] called and requested the following verbal orders from JW:    Ok for PT/OT for eval and treatment    Thanks!  Brissa RAMOS

## 2022-02-28 ENCOUNTER — TELEPHONE (OUTPATIENT)
Dept: FAMILY MEDICINE | Facility: CLINIC | Age: 72
End: 2022-02-28
Payer: MEDICARE

## 2022-02-28 NOTE — TELEPHONE ENCOUNTER
Reason for Call:  Form, our goal is to have forms completed with 72 hours, however, some forms may require a visit or additional information.    Type of letter, form or note:  plan of care 2/23/2022-4/23/2023    Who is the form from?: [facility]  (if other please explain)    Where did the form come from: form was faxed in    What clinic location was the form placed at?: [facility]    Where the form was placed: wegener Box/Folder    What number is listed as a contact on the form?: fax [phone]       Additional comments:     Call taken on 2/28/2022 at 9:36 AM by Garcia Jang

## 2022-03-01 ENCOUNTER — MEDICAL CORRESPONDENCE (OUTPATIENT)
Dept: HEALTH INFORMATION MANAGEMENT | Facility: CLINIC | Age: 72
End: 2022-03-01
Payer: MEDICARE

## 2022-03-02 ENCOUNTER — TELEPHONE (OUTPATIENT)
Dept: FAMILY MEDICINE | Facility: CLINIC | Age: 72
End: 2022-03-02
Payer: MEDICARE

## 2022-03-02 NOTE — TELEPHONE ENCOUNTER
Reason for Call: Request for an order or referral:    Order or referral being requested: verbal Ot 1 x a week for 1 week/ 2 xa week for 3 weeks / 1 x a week for 2 weeks/ adl/indurance/ range of motion strength / exercise program HHA 1 x a week for 8 weeks    Date needed: at your convenience    Has the patient been seen by the PCP for this problem? YES    Additional comments:     Phone number Patient can be reached at:  Other phone number: [phone] Rosa    Best Time:      Can we leave a detailed message on this number?  YES    Call taken on 3/2/2022 at 3:02 PM by Melodie Ayala

## 2022-03-03 ENCOUNTER — TELEPHONE (OUTPATIENT)
Dept: FAMILY MEDICINE | Facility: CLINIC | Age: 72
End: 2022-03-03

## 2022-03-04 ENCOUNTER — MEDICAL CORRESPONDENCE (OUTPATIENT)
Dept: HEALTH INFORMATION MANAGEMENT | Facility: CLINIC | Age: 72
End: 2022-03-04
Payer: MEDICARE

## 2022-03-04 ENCOUNTER — TELEPHONE (OUTPATIENT)
Dept: FAMILY MEDICINE | Facility: CLINIC | Age: 72
End: 2022-03-04
Payer: MEDICARE

## 2022-03-04 NOTE — TELEPHONE ENCOUNTER
Reason for Call:  Form, our goal is to have forms completed with 72 hours, however, some forms may require a visit or additional information.    Type of letter, form or note:    Physician order to obtain signature for the 3/3/2022 verbal order.    Physician Communication:  3/3/22 @ 815 am: PT called clinic and spoke to Brissa at clinic.  PT verbal order request 2wk5, 1wk3 to address deficits in LE strength, balance, transfers, ROM, Improved  L AFO fit and aerobic endurance to regain mobility to PLOF  3/3/22 @ 840am - TYLER Hernandez called with approval of PT verbal order request from Dr. Wegener    Who is the form from?:   [facility]     Where did the form come from: form was faxed in    What clinic location was the form placed at?:   [facility]    Where the form was placed:   Dr. Wegener's desk    What number is listed as a contact on the form?:   Fax: [phone]       Additional comments: none    Call taken on 3/4/2022 at 9:35 AM by Ness Vaughn

## 2022-03-06 ENCOUNTER — MYC MEDICAL ADVICE (OUTPATIENT)
Dept: FAMILY MEDICINE | Facility: CLINIC | Age: 72
End: 2022-03-06
Payer: MEDICARE

## 2022-03-06 ENCOUNTER — HEALTH MAINTENANCE LETTER (OUTPATIENT)
Age: 72
End: 2022-03-06

## 2022-03-06 DIAGNOSIS — I63.321 CEREBROVASCULAR ACCIDENT (CVA) DUE TO THROMBOSIS OF RIGHT ANTERIOR CEREBRAL ARTERY (H): Primary | ICD-10-CM

## 2022-03-08 ENCOUNTER — TELEPHONE (OUTPATIENT)
Dept: FAMILY MEDICINE | Facility: CLINIC | Age: 72
End: 2022-03-08
Payer: MEDICARE

## 2022-03-08 ENCOUNTER — MEDICAL CORRESPONDENCE (OUTPATIENT)
Dept: HEALTH INFORMATION MANAGEMENT | Facility: CLINIC | Age: 72
End: 2022-03-08
Payer: MEDICARE

## 2022-03-08 NOTE — TELEPHONE ENCOUNTER
Reason for Call:  Form, our goal is to have forms completed with 72 hours, however, some forms may require a visit or additional information.    Type of letter, form or note:  HHA missed visit 3/3/2022    Who is the form from?: [facility]  (if other please explain)    Where did the form come from: form was faxed in    What clinic location was the form placed at?: [facility]    Where the form was placed: wegener Box/Folder    What number is listed as a contact on the form?: fax [phone]       Additional comments:     Call taken on 3/8/2022 at 10:44 AM by Garcia Jang

## 2022-03-09 ENCOUNTER — MYC MEDICAL ADVICE (OUTPATIENT)
Dept: FAMILY MEDICINE | Facility: CLINIC | Age: 72
End: 2022-03-09
Payer: MEDICARE

## 2022-03-09 ENCOUNTER — MEDICAL CORRESPONDENCE (OUTPATIENT)
Dept: HEALTH INFORMATION MANAGEMENT | Facility: CLINIC | Age: 72
End: 2022-03-09
Payer: MEDICARE

## 2022-03-09 ENCOUNTER — TELEPHONE (OUTPATIENT)
Dept: FAMILY MEDICINE | Facility: CLINIC | Age: 72
End: 2022-03-09
Payer: MEDICARE

## 2022-03-09 RX ORDER — CLOPIDOGREL BISULFATE 75 MG/1
75 TABLET ORAL DAILY
Qty: 90 TABLET | Refills: 3 | Status: SHIPPED | OUTPATIENT
Start: 2022-03-09 | End: 2022-11-15

## 2022-03-09 NOTE — TELEPHONE ENCOUNTER
JW,    Please see Printit.  Sending as FYI.    Scheduled patient to see you Tuesday 3/15.  Thanks,  Nicole Drake RN

## 2022-03-09 NOTE — TELEPHONE ENCOUNTER
Reason for Call:  Form, our goal is to have forms completed with 72 hours, however, some forms may require a visit or additional information.    Type of letter, form or note:    Physician order for verbal order given on 3/2/2022    VO for OT   1w1, 2w3, 1w2   to address: ADL training, IADL training, endurance, strengthening, ROM, and HEP.   VO for HHA 1w8  for safety with bathing/shower transfers.  Message left with Nicole on 3/2 at 3:05pm.    Who is the form from?:   [facility]    Where did the form come from: form was faxed in    What clinic location was the form placed at?:   [facility]    Where the form was placed:   Dr. Wegener's desk    What number is listed as a contact on the form?:   Fax: [phone]       Additional comments: none    Call taken on 3/9/2022 at 12:24 PM by Ness Vaughn

## 2022-03-10 NOTE — PROGRESS NOTES
Assessment & Plan     Spastic hemiplegia of left nondominant side as late effect of cerebral infarction (H)  And associated. Did have recurrent cva and changed from aspirin to plavix.  Disability back to baseline now thankfully.     Left foot drop  I will order a new left lower leg custom orthotic.  She has a permanent disability of left lower extremity hemiplegia and foot drop due to previous stroke.  She needs control of flexion/extension and also lateral bending in order to be able to transfer safely.  She reports using non-custom braces in the past which have not been sufficient and caused sores.  Her current brace no longer fits due to weight loss and cannot be modified to meet her needs based on my exam and her report. In the past the custom orthosis has helped her to be able to transfer with assistance.     Type 2 diabetes mellitus with stage 3b chronic kidney disease, with long-term current use of insulin (H)  Lab Results   Component Value Date    A1C 6.5 03/15/2022    A1C 6.4 11/12/2021    A1C 6.2 04/15/2021    A1C 6.3 11/20/2020    A1C 6.6 05/04/2020    A1C 8.4 02/02/2020    A1C 7.6 10/04/2019     Controlled still.  No changes.     Hypertension goal BP (blood pressure) < 140/90  Controlled, continue.   - losartan (COZAAR) 25 MG tablet; Take 1 tablet (25 mg) by mouth At Bedtime           Blood sugar testing frequency justification:      Return in about 6 months (around 9/15/2022) for diabetes.    Joel Daniel Wegener, MD  [facility]    Dave Mancini is a 71 year old who presents for the following health issues  accompanied by her partner.    History of Present Illness       Diabetes:   She presents for follow up of diabetes.  She is checking home blood glucose two times daily. She checks blood glucose before meals.  Blood glucose is never over 200 and never under 70. She is aware of hypoglycemia symptoms including none and weakness. She has no concerns regarding her diabetes at  this time.  She is not experiencing numbness or burning in feet, excessive thirst, blurry vision, weight changes or redness, sores or blisters on feet.         Reason for visit:  Diabetes check AFO facve toface  Symptoms include:  NoneShe consumes 1 sweetened beverage(s) daily.She exercises with enough effort to increase her heart rate 20 to 29 minutes per day.  She exercises with enough effort to increase her heart rate 5 days per week.   She is taking medications regularly.                 Hyperlipidemia Follow-Up      Are you regularly taking any medication or supplement to lower your cholesterol?   Yes- Lipitor    Are you having muscle aches or other side effects that you think could be caused by your cholesterol lowering medication?  No    Hypertension Follow-up      Do you check your blood pressure regularly outside of the clinic? No     Are you following a low salt diet? Yes    Are your blood pressures ever more than 140 on the top number (systolic) OR more   than 90 on the bottom number (diastolic), for example 140/90? Yes    BP Readings from Last 2 Encounters:   03/15/22 139/84   02/11/22 (!) 144/85     Hemoglobin A1C POCT (%)   Date Value   04/15/2021 6.2 (H)   11/20/2020 6.3 (H)     Hemoglobin A1C (%)   Date Value   03/15/2022 6.5 (H)   11/12/2021 6.4 (H)     LDL Cholesterol Calculated (mg/dL)   Date Value   11/12/2021 65   11/20/2020 87   05/04/2020 46     See plan for additional hpi.       Review of Systems         Objective    /84   Pulse 66   Temp 97  F (36.1  C)   SpO2 97%   There is no height or weight on file to calculate BMI.  Physical Exam   Slightly dysarthric speech at baseline.   In wheelchair.   Unable to lift left arm.   Unable to lift left leg.  Left ankle dorsi/plantarflextion 2/5.     Has orthotic on lower leg/ankle which cannot be tightly secured and is clearly ineffective. She states used to fit well but had lost weight.         35 minutes spent on the date of the encounter  doing chart review, history and exam, negotiating and explaining the plan with the patient, documentation and further activities as noted above.

## 2022-03-15 ENCOUNTER — OFFICE VISIT (OUTPATIENT)
Dept: FAMILY MEDICINE | Facility: CLINIC | Age: 72
End: 2022-03-15
Payer: MEDICARE

## 2022-03-15 ENCOUNTER — MYC MEDICAL ADVICE (OUTPATIENT)
Dept: FAMILY MEDICINE | Facility: CLINIC | Age: 72
End: 2022-03-15

## 2022-03-15 VITALS
OXYGEN SATURATION: 97 % | SYSTOLIC BLOOD PRESSURE: 139 MMHG | HEART RATE: 66 BPM | DIASTOLIC BLOOD PRESSURE: 84 MMHG | TEMPERATURE: 97 F

## 2022-03-15 DIAGNOSIS — I69.354 SPASTIC HEMIPLEGIA OF LEFT NONDOMINANT SIDE AS LATE EFFECT OF CEREBRAL INFARCTION (H): Primary | ICD-10-CM

## 2022-03-15 DIAGNOSIS — Z79.4 TYPE 2 DIABETES MELLITUS WITH STAGE 3B CHRONIC KIDNEY DISEASE, WITH LONG-TERM CURRENT USE OF INSULIN (H): ICD-10-CM

## 2022-03-15 DIAGNOSIS — M21.372 LEFT FOOT DROP: ICD-10-CM

## 2022-03-15 DIAGNOSIS — E11.22 TYPE 2 DIABETES MELLITUS WITH STAGE 3B CHRONIC KIDNEY DISEASE, WITH LONG-TERM CURRENT USE OF INSULIN (H): ICD-10-CM

## 2022-03-15 DIAGNOSIS — N18.32 TYPE 2 DIABETES MELLITUS WITH STAGE 3B CHRONIC KIDNEY DISEASE, WITH LONG-TERM CURRENT USE OF INSULIN (H): ICD-10-CM

## 2022-03-15 DIAGNOSIS — I10 HYPERTENSION GOAL BP (BLOOD PRESSURE) < 140/90: ICD-10-CM

## 2022-03-15 LAB — HBA1C MFR BLD: 6.5 % (ref 0–5.6)

## 2022-03-15 PROCEDURE — 99214 OFFICE O/P EST MOD 30 MIN: CPT | Performed by: FAMILY MEDICINE

## 2022-03-15 PROCEDURE — 83036 HEMOGLOBIN GLYCOSYLATED A1C: CPT | Performed by: FAMILY MEDICINE

## 2022-03-15 PROCEDURE — 36415 COLL VENOUS BLD VENIPUNCTURE: CPT | Performed by: FAMILY MEDICINE

## 2022-03-15 NOTE — TELEPHONE ENCOUNTER
Forms faxed to [facility] fax # [phone] and copy sent to stat scan.     Brissa RAMOS

## 2022-03-16 ENCOUNTER — TELEPHONE (OUTPATIENT)
Dept: FAMILY MEDICINE | Facility: CLINIC | Age: 72
End: 2022-03-16
Payer: MEDICARE

## 2022-03-16 NOTE — TELEPHONE ENCOUNTER
Reason for Call:  Form, our goal is to have forms completed with 72 hours, however, some forms may require a visit or additional information.    Type of letter, form or note: physician orders    Who is the form from?: [facility] health care (if other please explain)    Where did the form come from: form was faxed in    What clinic location was the form placed at?: [facility]    Where the form was placed: JW's office    What number is listed as a contact on the form?: return fax [phone]       Additional comments: NA    Call taken on 3/16/2022 at 7:42 AM by Brissa Wharton

## 2022-03-16 NOTE — TELEPHONE ENCOUNTER
Reason for Call:  Form, our goal is to have forms completed with 72 hours, however, some forms may require a visit or additional information.    Type of letter, form or note:  HHA/Homemaking Missed Visit    Who is the form from?: [facility] (if other please explain)    Where did the form come from: form was faxed in    What clinic location was the form placed at?: [facility]    Where the form was placed: 's office    What number is listed as a contact on the form?: return fax [phone]       Additional comments: NA    Call taken on 3/16/2022 at 7:28 AM by Brissa Wharton

## 2022-03-18 ENCOUNTER — MYC MEDICAL ADVICE (OUTPATIENT)
Dept: FAMILY MEDICINE | Facility: CLINIC | Age: 72
End: 2022-03-18
Payer: MEDICARE

## 2022-03-18 ENCOUNTER — MEDICAL CORRESPONDENCE (OUTPATIENT)
Dept: HEALTH INFORMATION MANAGEMENT | Facility: CLINIC | Age: 72
End: 2022-03-18
Payer: MEDICARE

## 2022-03-18 DIAGNOSIS — I69.354 SPASTIC HEMIPLEGIA OF LEFT NONDOMINANT SIDE AS LATE EFFECT OF CEREBRAL INFARCTION (H): Primary | ICD-10-CM

## 2022-03-18 NOTE — TELEPHONE ENCOUNTER
Forms faxed to [facility] fax # [phone] and copy sent to stat scan.     Brissa RAMOS

## 2022-03-18 NOTE — TELEPHONE ENCOUNTER
Forms faxed to [facility] fax # [phone] and copy sent to stat scan.     Brissa RAMOS

## 2022-03-21 ENCOUNTER — MEDICAL CORRESPONDENCE (OUTPATIENT)
Dept: HEALTH INFORMATION MANAGEMENT | Facility: CLINIC | Age: 72
End: 2022-03-21
Payer: MEDICARE

## 2022-03-21 RX ORDER — LOSARTAN POTASSIUM 25 MG/1
25 TABLET ORAL AT BEDTIME
COMMUNITY
Start: 2022-03-21 | End: 2022-05-16

## 2022-03-22 ENCOUNTER — MYC MEDICAL ADVICE (OUTPATIENT)
Dept: FAMILY MEDICINE | Facility: CLINIC | Age: 72
End: 2022-03-22
Payer: MEDICARE

## 2022-03-22 NOTE — TELEPHONE ENCOUNTER
Forms faxed to [facility] Attn: Alexei Craven fax # [phone] and copy sent to stat scan.     Brissa RAMOS

## 2022-03-23 ENCOUNTER — TELEPHONE (OUTPATIENT)
Dept: FAMILY MEDICINE | Facility: CLINIC | Age: 72
End: 2022-03-23
Payer: MEDICARE

## 2022-03-23 NOTE — TELEPHONE ENCOUNTER
Physical therapy orders     Effective 3/27/22 PT 2 x week for 3 weeks, then one time a week for one week.  Gave verbal ok for above.    Judd, PT - [phone]     Thank you,  Mary Rice RN  [place]

## 2022-03-24 ENCOUNTER — TELEPHONE (OUTPATIENT)
Dept: FAMILY MEDICINE | Facility: CLINIC | Age: 72
End: 2022-03-24
Payer: MEDICARE

## 2022-03-24 RX ORDER — BACLOFEN 10 MG/1
10 TABLET ORAL 3 TIMES DAILY
Qty: 270 TABLET | Refills: 3 | Status: SHIPPED | OUTPATIENT
Start: 2022-03-24 | End: 2023-06-02

## 2022-03-24 NOTE — TELEPHONE ENCOUNTER
Reason for Call:  Form, our goal is to have forms completed with 72 hours, however, some forms may require a visit or additional information.    Type of letter, form or note:    Signature for physician order received on 2/25/2022.    Ok for homecare services. Verbal orders are as follows:  PT eval and treat  OT eval and treat    Who is the form from?:   [facility]    Where did the form come from: form was faxed in    What clinic location was the form placed at?:   [facility]    Where the form was placed:   Dr. Wegener's desk    What number is listed as a contact on the form?:   Fax: [phone]       Additional comments:   none    Call taken on 3/24/2022 at 12:52 PM by Ness Vaughn

## 2022-03-25 ENCOUNTER — ALLIED HEALTH/NURSE VISIT (OUTPATIENT)
Dept: FAMILY MEDICINE | Facility: CLINIC | Age: 72
End: 2022-03-25
Payer: MEDICARE

## 2022-03-25 ENCOUNTER — MEDICAL CORRESPONDENCE (OUTPATIENT)
Dept: HEALTH INFORMATION MANAGEMENT | Facility: CLINIC | Age: 72
End: 2022-03-25

## 2022-03-25 DIAGNOSIS — D51.9 ANEMIA DUE TO VITAMIN B12 DEFICIENCY, UNSPECIFIED B12 DEFICIENCY TYPE: ICD-10-CM

## 2022-03-25 PROCEDURE — 99207 PR NO CHARGE NURSE ONLY: CPT

## 2022-03-25 PROCEDURE — 96372 THER/PROPH/DIAG INJ SC/IM: CPT | Performed by: FAMILY MEDICINE

## 2022-03-25 RX ORDER — CYANOCOBALAMIN 1000 UG/ML
1000 INJECTION, SOLUTION INTRAMUSCULAR; SUBCUTANEOUS
Status: COMPLETED | OUTPATIENT
Start: 2022-03-25 | End: 2023-03-10

## 2022-03-25 RX ADMIN — CYANOCOBALAMIN 1000 MCG: 1000 INJECTION, SOLUTION INTRAMUSCULAR; SUBCUTANEOUS at 09:03

## 2022-03-25 NOTE — TELEPHONE ENCOUNTER
TC,    Please see [employer] message.  Per 3/24/22 TE form was placed in [facility] basket per [facility].    Thank you,  Nicole Drake RN

## 2022-03-25 NOTE — PROGRESS NOTES
{PROVIDER CHARTING PREFERENCE:273669}    Dave Mancini is a 71 year old who presents for the following health issues {ACCOMPANIED BY STATEMENT (Optional):949138}    HPI     {SUPERLIST (Optional):172107}  {additonal problems for provider to add (Optional):254589}    Review of Systems   {ROS COMP (Optional):953822}      Objective    There were no vitals taken for this visit.  There is no height or weight on file to calculate BMI.  Physical Exam   {Exam List (Optional):679366}    {Diagnostic Test Results (Optional):538512}    {AMBULATORY ATTESTATION (Optional):074374}

## 2022-03-25 NOTE — NURSING NOTE
Clinic Administered Medication Documentation    Administrations This Visit     cyanocobalamin injection 1,000 mcg     Admin Date  03/25/2022 Action  Given Dose  1,000 mcg Route  Intramuscular Site  Right Deltoid Administered By  Rupal Duque MA    Ordering Provider: Wegener, Joel Daniel Irwin, MD    NDC: 6477-8727-26    Lot#: [id number]    : HIKMA    Patient Supplied?: No                  Injectable Medication Documentation    Patient was given Cyanocobalamin (B-12). Prior to medication administration, verified patients identity using patient s name and date of birth. Please see MAR and medication order for additional information. Patient instructed to remain in clinic for 15 minutes.      Was entire vial of medication used? Yes  Vial/Syringe: Single dose vial  Expiration Date:  06/2023  Was this medication supplied by the patient? No

## 2022-03-28 NOTE — TELEPHONE ENCOUNTER
Forms faxed to [facility] fax # [phone] and copy sent to stat scan.     Brissa RAMOS

## 2022-03-29 ENCOUNTER — MEDICAL CORRESPONDENCE (OUTPATIENT)
Dept: HEALTH INFORMATION MANAGEMENT | Facility: CLINIC | Age: 72
End: 2022-03-29
Payer: MEDICARE

## 2022-03-29 ENCOUNTER — TELEPHONE (OUTPATIENT)
Dept: FAMILY MEDICINE | Facility: CLINIC | Age: 72
End: 2022-03-29
Payer: MEDICARE

## 2022-03-29 NOTE — TELEPHONE ENCOUNTER
Reason for Call:  Form, our goal is to have forms completed with 72 hours, however, some forms may require a visit or additional information.    Type of letter, form or note:  Physician orders  3/23/22 @ 430pm -- PT called clinic and spoke with Mary. PT verbal orders request effective 3/27/22 for 2wk3, 1wk1 frequency to continue PT intervention addressing LE strength, ROM, transfers, gait, and functional mobility training. TYLER Hernandez was able to give approval for requested orders on behalf of Dr. Joel Wegener    Who is the form from?: [facility] (if other please explain)    Where did the form come from: form was faxed in    What clinic location was the form placed at?: [facility]    Where the form was placed: ERASTO's office    What number is listed as a contact on the form?: return fax [phone]       Additional comments: NA    Call taken on 3/29/2022 at 8:57 AM by Brissa Wharton

## 2022-03-29 NOTE — TELEPHONE ENCOUNTER
Faxed to [facility] [phone] & copy sent to scan.    Nicole RAMOS

## 2022-04-01 ENCOUNTER — MEDICAL CORRESPONDENCE (OUTPATIENT)
Dept: HEALTH INFORMATION MANAGEMENT | Facility: CLINIC | Age: 72
End: 2022-04-01
Payer: MEDICARE

## 2022-04-01 ENCOUNTER — TELEPHONE (OUTPATIENT)
Dept: FAMILY MEDICINE | Facility: CLINIC | Age: 72
End: 2022-04-01
Payer: MEDICARE

## 2022-04-01 NOTE — TELEPHONE ENCOUNTER
Reason for Call:  Form, our goal is to have forms completed with 72 hours, however, some forms may require a visit or additional information.    Type of letter, form or note:  to delay start of care 02/23/2022    Who is the form from?: [facility]  (if other please explain)    Where did the form come from: form was faxed in    What clinic location was the form placed at?: [facility]    Where the form was placed: wegener Box/Folder    What number is listed as a contact on the form?:  Fax [phone]       Additional comments:     Call taken on 4/1/2022 at 3:25 PM by Garcia Jang

## 2022-04-04 NOTE — TELEPHONE ENCOUNTER
Forms faxed to [facility] fax # [phone] and copy sent to stat scan.     Brissa RAMOS

## 2022-04-05 ENCOUNTER — TELEPHONE (OUTPATIENT)
Dept: FAMILY MEDICINE | Facility: CLINIC | Age: 72
End: 2022-04-05
Payer: MEDICARE

## 2022-04-05 NOTE — TELEPHONE ENCOUNTER
Reason for Call:  Form, our goal is to have forms completed with 72 hours, however, some forms may require a visit or additional information.    Type of letter, form or note:  medical  -orthotic supplies for left leg/foot    Who is the form from?: [employer] (if other please explain)    Where did the form come from: form was faxed in    What clinic location was the form placed at?: [facility]    Where the form was placed: Dr Wegener's Box/Folder    What number is listed as a contact on the form?: [phone]       Additional comments:     Call taken on 4/5/2022 at 2:54 PM by Elisa Alcala    .

## 2022-04-07 ENCOUNTER — MEDICAL CORRESPONDENCE (OUTPATIENT)
Dept: HEALTH INFORMATION MANAGEMENT | Facility: CLINIC | Age: 72
End: 2022-04-07
Payer: MEDICARE

## 2022-04-22 ENCOUNTER — ANCILLARY PROCEDURE (OUTPATIENT)
Dept: MAMMOGRAPHY | Facility: CLINIC | Age: 72
End: 2022-04-22
Payer: MEDICARE

## 2022-04-22 DIAGNOSIS — Z12.31 ENCOUNTER FOR SCREENING MAMMOGRAM FOR BREAST CANCER: ICD-10-CM

## 2022-04-22 PROCEDURE — 77067 SCR MAMMO BI INCL CAD: CPT | Mod: GC | Performed by: RADIOLOGY

## 2022-04-25 ENCOUNTER — TELEPHONE (OUTPATIENT)
Dept: FAMILY MEDICINE | Facility: CLINIC | Age: 72
End: 2022-04-25
Payer: MEDICARE

## 2022-04-25 NOTE — TELEPHONE ENCOUNTER
Reason for Call:  Form, our goal is to have forms completed with 72 hours, however, some forms may require a visit or additional information.    Type of letter, form or note:  [facility] discharge [facility] 4/19/2022    Who is the form from?: [facility] (if other please explain)    Where did the form come from: form was faxed in    What clinic location was the form placed at?: [facility]    Where the form was placed: wegener  Box/Folder    What number is listed as a contact on the form?: fax [phone]       Additional comments:     Call taken on 4/25/2022 at 8:14 AM by Garcia Jang

## 2022-04-26 ENCOUNTER — MEDICAL CORRESPONDENCE (OUTPATIENT)
Dept: HEALTH INFORMATION MANAGEMENT | Facility: CLINIC | Age: 72
End: 2022-04-26
Payer: MEDICARE

## 2022-04-29 ENCOUNTER — ALLIED HEALTH/NURSE VISIT (OUTPATIENT)
Dept: FAMILY MEDICINE | Facility: CLINIC | Age: 72
End: 2022-04-29
Payer: MEDICARE

## 2022-04-29 DIAGNOSIS — D51.9 ANEMIA DUE TO VITAMIN B12 DEFICIENCY, UNSPECIFIED B12 DEFICIENCY TYPE: Primary | ICD-10-CM

## 2022-04-29 PROCEDURE — 96372 THER/PROPH/DIAG INJ SC/IM: CPT | Performed by: FAMILY MEDICINE

## 2022-04-29 PROCEDURE — 99207 PR NO CHARGE NURSE ONLY: CPT

## 2022-04-29 RX ADMIN — CYANOCOBALAMIN 1000 MCG: 1000 INJECTION, SOLUTION INTRAMUSCULAR; SUBCUTANEOUS at 09:06

## 2022-04-29 NOTE — NURSING NOTE
Clinic Administered Medication Documentation    Administrations This Visit     cyanocobalamin injection 1,000 mcg     Admin Date  04/29/2022 Action  Given Dose  1,000 mcg Route  Intramuscular Site   Administered By  Rupal Duque MA    Ordering Provider: Wegener, Joel Daniel Irwin, MD    NDC: 0727-3528-13    Lot#: [id number]    : [facility]IK[facility]    Patient Supplied?: No                  Injectable Medication Documentation    Patient was given Cyanocobalamin (B-12). Prior to medication administration, verified patients identity using patient s name and date of birth. Please see MAR and medication order for additional information. Patient instructed to remain in clinic for 15 minutes.      Was entire vial of medication used? Yes  Vial/Syringe: Single dose vial  Expiration Date:  07/2023  Was this medication supplied by the patient? No     Rupal Duque MA

## 2022-05-02 ENCOUNTER — MYC MEDICAL ADVICE (OUTPATIENT)
Dept: FAMILY MEDICINE | Facility: CLINIC | Age: 72
End: 2022-05-02
Payer: MEDICARE

## 2022-05-02 DIAGNOSIS — E11.9 TYPE 2 DIABETES, HBA1C GOAL < 8% (H): Primary | ICD-10-CM

## 2022-05-12 ENCOUNTER — MYC MEDICAL ADVICE (OUTPATIENT)
Dept: FAMILY MEDICINE | Facility: CLINIC | Age: 72
End: 2022-05-12
Payer: MEDICARE

## 2022-05-12 DIAGNOSIS — I10 HYPERTENSION GOAL BP (BLOOD PRESSURE) < 140/90: ICD-10-CM

## 2022-05-13 NOTE — TELEPHONE ENCOUNTER
JW,  Please see below.  Losartan listed as historic.  Metoprolol is historic on med list but for 100 mg not 50.  Please advise.  Thanks,  Amanda Parker RN

## 2022-05-16 RX ORDER — LOSARTAN POTASSIUM 25 MG/1
25 TABLET ORAL AT BEDTIME
Qty: 90 TABLET | Refills: 3 | Status: SHIPPED | OUTPATIENT
Start: 2022-05-16 | End: 2022-05-20

## 2022-05-16 RX ORDER — METOPROLOL SUCCINATE 50 MG/1
50 TABLET, EXTENDED RELEASE ORAL DAILY
Qty: 90 TABLET | Refills: 3 | Status: SHIPPED | OUTPATIENT
Start: 2022-05-16 | End: 2022-05-20

## 2022-05-19 ENCOUNTER — TELEPHONE (OUTPATIENT)
Dept: FAMILY MEDICINE | Facility: CLINIC | Age: 72
End: 2022-05-19
Payer: MEDICARE

## 2022-05-19 NOTE — TELEPHONE ENCOUNTER
Triage,   Buck (patient's spouse) called.   Following up on refills below.   Hoping for our office to resend the refill.   Transmission for both meds to pharmacy failed.   Thanks!  Brissa RAMOS

## 2022-05-20 RX ORDER — METOPROLOL SUCCINATE 50 MG/1
50 TABLET, EXTENDED RELEASE ORAL DAILY
Qty: 90 TABLET | Refills: 3 | Status: SHIPPED | OUTPATIENT
Start: 2022-05-20 | End: 2023-05-09

## 2022-05-20 RX ORDER — LOSARTAN POTASSIUM 25 MG/1
25 TABLET ORAL AT BEDTIME
Qty: 90 TABLET | Refills: 3 | Status: SHIPPED | OUTPATIENT
Start: 2022-05-20 | End: 2023-05-08

## 2022-05-20 NOTE — TELEPHONE ENCOUNTER
calling to followup on message  Wasn't routed to triage  Prescription approved per [facility] Refill Protocol.  Evelyn ACEVEOD RN

## 2022-05-27 ENCOUNTER — ALLIED HEALTH/NURSE VISIT (OUTPATIENT)
Dept: FAMILY MEDICINE | Facility: CLINIC | Age: 72
End: 2022-05-27
Payer: MEDICARE

## 2022-05-27 DIAGNOSIS — D51.9 VITAMIN B12 DEFICIENCY ANEMIA: Primary | ICD-10-CM

## 2022-05-27 PROCEDURE — 96372 THER/PROPH/DIAG INJ SC/IM: CPT | Performed by: FAMILY MEDICINE

## 2022-05-27 PROCEDURE — 99207 PR NO CHARGE NURSE ONLY: CPT

## 2022-05-27 RX ADMIN — CYANOCOBALAMIN 1000 MCG: 1000 INJECTION, SOLUTION INTRAMUSCULAR; SUBCUTANEOUS at 10:00

## 2022-05-27 NOTE — PROGRESS NOTES
{PROVIDER CHARTING PREFERENCE:029065}    Dave Mancini is a 71 year old who presents for the following health issues {ACCOMPANIED BY STATEMENT (Optional):192010}    HPI     {SUPERLIST (Optional):422462}  {additonal problems for provider to add (Optional):333289}    Review of Systems   {ROS COMP (Optional):462531}      Objective    There were no vitals taken for this visit.  There is no height or weight on file to calculate BMI.  Physical Exam   {Exam List (Optional):374370}    {Diagnostic Test Results (Optional):238312}    {AMBULATORY ATTESTATION (Optional):642956}

## 2022-05-27 NOTE — NURSING NOTE
Clinic Administered Medication Documentation          Injectable Medication Documentation    Patient was given Cyanocobalamin (B-12). Prior to medication administration, verified patients identity using patient s name and date of birth. Please see MAR and medication order for additional information. Patient instructed to remain in clinic for 15 minutes, report any adverse reaction to staff immediately  and stay in clinic after the injection but patient declined.      Was entire vial of medication used? Yes  Vial/Syringe: Single dose vial  Expiration Date:  12/2022  Was this medication supplied by the patient? No   Mala Olmstead MA on 5/27/2022 at 10:04 AM

## 2022-06-03 ENCOUNTER — MYC MEDICAL ADVICE (OUTPATIENT)
Dept: FAMILY MEDICINE | Facility: CLINIC | Age: 72
End: 2022-06-03
Payer: MEDICARE

## 2022-06-03 DIAGNOSIS — E11.22 TYPE 2 DIABETES MELLITUS WITH STAGE 3B CHRONIC KIDNEY DISEASE, WITHOUT LONG-TERM CURRENT USE OF INSULIN (H): ICD-10-CM

## 2022-06-03 DIAGNOSIS — N18.32 TYPE 2 DIABETES MELLITUS WITH STAGE 3B CHRONIC KIDNEY DISEASE, WITHOUT LONG-TERM CURRENT USE OF INSULIN (H): ICD-10-CM

## 2022-06-24 ENCOUNTER — ALLIED HEALTH/NURSE VISIT (OUTPATIENT)
Dept: FAMILY MEDICINE | Facility: CLINIC | Age: 72
End: 2022-06-24
Payer: MEDICARE

## 2022-06-24 DIAGNOSIS — E53.8 VITAMIN B12 DEFICIENCY (NON ANEMIC): Primary | ICD-10-CM

## 2022-06-24 PROCEDURE — 96372 THER/PROPH/DIAG INJ SC/IM: CPT | Performed by: FAMILY MEDICINE

## 2022-06-24 PROCEDURE — 99207 PR NO CHARGE NURSE ONLY: CPT

## 2022-06-24 RX ADMIN — CYANOCOBALAMIN 1000 MCG: 1000 INJECTION, SOLUTION INTRAMUSCULAR; SUBCUTANEOUS at 09:50

## 2022-06-24 NOTE — NURSING NOTE
Clinic Administered Medication Documentation    Administrations This Visit     cyanocobalamin injection 1,000 mcg     Admin Date  06/24/2022 Action  Given Dose  1,000 mcg Route  Intramuscular Site  Left Deltoid Administered By  Alea Rowan    Ordering Provider: Wegener, Joel Daniel Irwin, MD    Patient Supplied?: No                  Injectable Medication Documentation    Patient was given Cyanocobalamin (B-12). Prior to medication administration, verified patients identity using patient s name and date of birth. Please see MAR and medication order for additional information. Patient instructed to stay in clinic after the injection but patient declined.      Was entire vial of medication used? Yes  Vial/Syringe: Single dose vial  Expiration Date:  12/22  Was this medication supplied by the patient? No     Alea Rowan on 6/24/2022 at 9:52 AM

## 2022-06-26 ENCOUNTER — HEALTH MAINTENANCE LETTER (OUTPATIENT)
Age: 72
End: 2022-06-26

## 2022-07-22 ENCOUNTER — ALLIED HEALTH/NURSE VISIT (OUTPATIENT)
Dept: FAMILY MEDICINE | Facility: CLINIC | Age: 72
End: 2022-07-22
Payer: MEDICARE

## 2022-07-22 DIAGNOSIS — E53.8 VITAMIN B 12 DEFICIENCY: Primary | ICD-10-CM

## 2022-07-22 PROCEDURE — 96372 THER/PROPH/DIAG INJ SC/IM: CPT | Performed by: FAMILY MEDICINE

## 2022-07-22 PROCEDURE — 99207 PR NO CHARGE NURSE ONLY: CPT

## 2022-07-22 RX ADMIN — CYANOCOBALAMIN 1000 MCG: 1000 INJECTION, SOLUTION INTRAMUSCULAR; SUBCUTANEOUS at 10:00

## 2022-07-22 NOTE — NURSING NOTE
Clinic Administered Medication Documentation    Administrations This Visit     cyanocobalamin injection 1,000 mcg     Admin Date  07/22/2022 Action  Given Dose  1,000 mcg Route  Intramuscular Site  Left Deltoid Administered By  Zainab Hernández MA    Ordering Provider: Wegener, Joel Daniel Irwin, MD    NDC: 7615-7605-25    Lot#: [id number]    : HIKMA    Patient Supplied?: No                  Injectable Medication Documentation    Patient was given Cyanocobalamin (B-12). Prior to medication administration, verified patients identity using patient s name and date of birth. Please see MAR and medication order for additional information. Patient instructed to remain in clinic for 15 minutes.      Was entire vial of medication used? Yes  Vial/Syringe: Single dose vial  Expiration Date:  07/01/2023  Was this medication supplied by the patient? No     Zainab Hernández MA

## 2022-08-02 ENCOUNTER — DOCUMENTATION ONLY (OUTPATIENT)
Dept: LAB | Facility: CLINIC | Age: 72
End: 2022-08-02

## 2022-08-02 DIAGNOSIS — Z79.4 TYPE 2 DIABETES MELLITUS WITH STAGE 3B CHRONIC KIDNEY DISEASE, WITH LONG-TERM CURRENT USE OF INSULIN (H): Primary | ICD-10-CM

## 2022-08-02 DIAGNOSIS — N18.32 TYPE 2 DIABETES MELLITUS WITH STAGE 3B CHRONIC KIDNEY DISEASE, WITH LONG-TERM CURRENT USE OF INSULIN (H): Primary | ICD-10-CM

## 2022-08-02 DIAGNOSIS — E11.22 TYPE 2 DIABETES MELLITUS WITH STAGE 3B CHRONIC KIDNEY DISEASE, WITH LONG-TERM CURRENT USE OF INSULIN (H): Primary | ICD-10-CM

## 2022-08-02 NOTE — PROGRESS NOTES
Addis Peña has an upcoming lab appointment:    Future Appointments   Date Time Provider Department [place]   8/17/2022  8:00 AM [facility] LAB [facility]    8/26/2022  9:00 AM HP MA/LPN [facility]    11/15/2022  9:20 AM Wegener, Joel Daniel Irwin, MD UP[facility] UP     Patient is scheduled for the following lab(s): a1c per patient appt. Notes.    There is no order available. Please review and place either future orders or HMPO (Review of Health Maintenance Protocol Orders), as appropriate.    Health Maintenance Due   Topic     MICROALBUMIN      ANNUAL REVIEW OF HM ORDERS      A1C      MICHEAL Lopez

## 2022-08-17 ENCOUNTER — LAB (OUTPATIENT)
Dept: LAB | Facility: CLINIC | Age: 72
End: 2022-08-17
Payer: MEDICARE

## 2022-08-17 DIAGNOSIS — N18.32 TYPE 2 DIABETES MELLITUS WITH STAGE 3B CHRONIC KIDNEY DISEASE, WITH LONG-TERM CURRENT USE OF INSULIN (H): ICD-10-CM

## 2022-08-17 DIAGNOSIS — Z79.4 TYPE 2 DIABETES MELLITUS WITH STAGE 3B CHRONIC KIDNEY DISEASE, WITH LONG-TERM CURRENT USE OF INSULIN (H): ICD-10-CM

## 2022-08-17 DIAGNOSIS — E11.22 TYPE 2 DIABETES MELLITUS WITH STAGE 3B CHRONIC KIDNEY DISEASE, WITH LONG-TERM CURRENT USE OF INSULIN (H): ICD-10-CM

## 2022-08-17 LAB — HBA1C MFR BLD: 6.4 % (ref 0–5.6)

## 2022-08-17 PROCEDURE — 36415 COLL VENOUS BLD VENIPUNCTURE: CPT

## 2022-08-17 PROCEDURE — 83036 HEMOGLOBIN GLYCOSYLATED A1C: CPT

## 2022-08-26 ENCOUNTER — ALLIED HEALTH/NURSE VISIT (OUTPATIENT)
Dept: FAMILY MEDICINE | Facility: CLINIC | Age: 72
End: 2022-08-26
Payer: MEDICARE

## 2022-08-26 DIAGNOSIS — E53.8 VITAMIN B 12 DEFICIENCY: Primary | ICD-10-CM

## 2022-08-26 PROCEDURE — 96372 THER/PROPH/DIAG INJ SC/IM: CPT | Performed by: FAMILY MEDICINE

## 2022-08-26 PROCEDURE — 99207 PR NO CHARGE NURSE ONLY: CPT

## 2022-08-26 RX ADMIN — CYANOCOBALAMIN 1000 MCG: 1000 INJECTION, SOLUTION INTRAMUSCULAR; SUBCUTANEOUS at 09:07

## 2022-08-26 NOTE — NURSING NOTE
Clinic Administered Medication Documentation    Administrations This Visit     cyanocobalamin injection 1,000 mcg     Admin Date  08/26/2022 Action  Given Dose  1,000 mcg Route  Intramuscular Site   Administered By  Kristin Saul    Ordering Provider: Wegener, Joel Daniel Irwin, MD    NDC: 41162-803-09    Lot#: [id number]    : [facility]    Patient Supplied?: No                  Injectable Medication Documentation    Patient was given Cyanocobalamin (B-12). Prior to medication administration, verified patients identity using patient s name and date of birth. Please see MAR and medication order for additional information. Patient instructed to remain in clinic for 15 minutes.      Was entire vial of medication used? Yes  Vial/Syringe: Single dose vial  Expiration Date:  01/01/2024  Was this medication supplied by the patient? No     Kristin Saul CMA

## 2022-09-14 ENCOUNTER — MYC MEDICAL ADVICE (OUTPATIENT)
Dept: FAMILY MEDICINE | Facility: CLINIC | Age: 72
End: 2022-09-14

## 2022-09-14 DIAGNOSIS — G40.909 SEIZURE DISORDER (H): ICD-10-CM

## 2022-09-14 DIAGNOSIS — Z79.82 ENCOUNTER FOR MONITORING OF CHRONIC ASPIRIN THERAPY: ICD-10-CM

## 2022-09-14 DIAGNOSIS — I10 HYPERTENSION GOAL BP (BLOOD PRESSURE) < 140/90: Primary | ICD-10-CM

## 2022-09-14 DIAGNOSIS — N18.32 TYPE 2 DIABETES MELLITUS WITH STAGE 3B CHRONIC KIDNEY DISEASE, WITH LONG-TERM CURRENT USE OF INSULIN (H): ICD-10-CM

## 2022-09-14 DIAGNOSIS — E78.5 HYPERLIPIDEMIA LDL GOAL <70: ICD-10-CM

## 2022-09-14 DIAGNOSIS — Z79.899 MEDICATION MANAGEMENT: ICD-10-CM

## 2022-09-14 DIAGNOSIS — M10.9 GOUT, UNSPECIFIED CAUSE, UNSPECIFIED CHRONICITY, UNSPECIFIED SITE: ICD-10-CM

## 2022-09-14 DIAGNOSIS — E11.22 TYPE 2 DIABETES MELLITUS WITH STAGE 3B CHRONIC KIDNEY DISEASE, WITH LONG-TERM CURRENT USE OF INSULIN (H): ICD-10-CM

## 2022-09-14 DIAGNOSIS — D51.9 ANEMIA DUE TO VITAMIN B12 DEFICIENCY, UNSPECIFIED B12 DEFICIENCY TYPE: ICD-10-CM

## 2022-09-14 DIAGNOSIS — Z51.81 ENCOUNTER FOR MONITORING OF CHRONIC ASPIRIN THERAPY: ICD-10-CM

## 2022-09-14 DIAGNOSIS — Z79.4 TYPE 2 DIABETES MELLITUS WITH STAGE 3B CHRONIC KIDNEY DISEASE, WITH LONG-TERM CURRENT USE OF INSULIN (H): ICD-10-CM

## 2022-09-14 NOTE — TELEPHONE ENCOUNTER
JW,  Please see below [employer] message and advise.  Pended lab orders if approved?  Thanks,  Danelle LUIS RN

## 2022-09-23 ENCOUNTER — ALLIED HEALTH/NURSE VISIT (OUTPATIENT)
Dept: FAMILY MEDICINE | Facility: CLINIC | Age: 72
End: 2022-09-23
Payer: MEDICARE

## 2022-09-23 DIAGNOSIS — L71.9 ROSACEA: Primary | ICD-10-CM

## 2022-09-23 PROCEDURE — 96372 THER/PROPH/DIAG INJ SC/IM: CPT | Performed by: FAMILY MEDICINE

## 2022-09-23 PROCEDURE — 99207 PR NO CHARGE NURSE ONLY: CPT

## 2022-09-23 RX ADMIN — CYANOCOBALAMIN 1000 MCG: 1000 INJECTION, SOLUTION INTRAMUSCULAR; SUBCUTANEOUS at 12:02

## 2022-11-04 ENCOUNTER — ALLIED HEALTH/NURSE VISIT (OUTPATIENT)
Dept: FAMILY MEDICINE | Facility: CLINIC | Age: 72
End: 2022-11-04
Payer: MEDICARE

## 2022-11-04 DIAGNOSIS — L71.9 ROSACEA: Primary | ICD-10-CM

## 2022-11-04 PROCEDURE — 99207 PR NO CHARGE NURSE ONLY: CPT

## 2022-11-04 PROCEDURE — 96372 THER/PROPH/DIAG INJ SC/IM: CPT | Performed by: FAMILY MEDICINE

## 2022-11-04 RX ADMIN — CYANOCOBALAMIN 1000 MCG: 1000 INJECTION, SOLUTION INTRAMUSCULAR; SUBCUTANEOUS at 10:59

## 2022-11-04 NOTE — PROGRESS NOTES
Administrations This Visit     cyanocobalamin injection 1,000 mcg     Admin Date  11/04/2022 Action  Given Dose  1,000 mcg Route  Intramuscular Administered By  Cornelia Johnson, TYLER              Per orders of Dr. Wegener, injection of cyanocobalamin 1000 mcg given by Cornelia Johnson RN. Patient instructed to remain in clinic for 15 minutes afterwards, and to report any adverse reaction to me immediately.       Screening performed by Cornelia Johnson RN on 11/4/2022 at 10:57 AM.

## 2022-11-11 ENCOUNTER — LAB (OUTPATIENT)
Dept: LAB | Facility: CLINIC | Age: 72
End: 2022-11-11
Payer: MEDICARE

## 2022-11-11 DIAGNOSIS — E78.5 HYPERLIPIDEMIA LDL GOAL <70: ICD-10-CM

## 2022-11-11 DIAGNOSIS — G40.909 SEIZURE DISORDER (H): ICD-10-CM

## 2022-11-11 DIAGNOSIS — Z79.899 MEDICATION MANAGEMENT: ICD-10-CM

## 2022-11-11 DIAGNOSIS — Z51.81 ENCOUNTER FOR MONITORING OF CHRONIC ASPIRIN THERAPY: ICD-10-CM

## 2022-11-11 DIAGNOSIS — D51.9 ANEMIA DUE TO VITAMIN B12 DEFICIENCY, UNSPECIFIED B12 DEFICIENCY TYPE: ICD-10-CM

## 2022-11-11 DIAGNOSIS — E11.22 TYPE 2 DIABETES MELLITUS WITH STAGE 3B CHRONIC KIDNEY DISEASE, WITH LONG-TERM CURRENT USE OF INSULIN (H): ICD-10-CM

## 2022-11-11 DIAGNOSIS — Z79.82 ENCOUNTER FOR MONITORING OF CHRONIC ASPIRIN THERAPY: ICD-10-CM

## 2022-11-11 DIAGNOSIS — N18.32 TYPE 2 DIABETES MELLITUS WITH STAGE 3B CHRONIC KIDNEY DISEASE, WITH LONG-TERM CURRENT USE OF INSULIN (H): ICD-10-CM

## 2022-11-11 DIAGNOSIS — M10.9 GOUT, UNSPECIFIED CAUSE, UNSPECIFIED CHRONICITY, UNSPECIFIED SITE: ICD-10-CM

## 2022-11-11 DIAGNOSIS — I10 HYPERTENSION GOAL BP (BLOOD PRESSURE) < 140/90: ICD-10-CM

## 2022-11-11 DIAGNOSIS — Z79.4 TYPE 2 DIABETES MELLITUS WITH STAGE 3B CHRONIC KIDNEY DISEASE, WITH LONG-TERM CURRENT USE OF INSULIN (H): ICD-10-CM

## 2022-11-11 LAB
ALBUMIN SERPL-MCNC: 3.7 G/DL (ref 3.4–5)
ALP SERPL-CCNC: 78 U/L (ref 40–150)
ALT SERPL W P-5'-P-CCNC: 27 U/L (ref 0–50)
ANION GAP SERPL CALCULATED.3IONS-SCNC: 6 MMOL/L (ref 3–14)
AST SERPL W P-5'-P-CCNC: 22 U/L (ref 0–45)
BILIRUB SERPL-MCNC: 0.3 MG/DL (ref 0.2–1.3)
BUN SERPL-MCNC: 25 MG/DL (ref 7–30)
CALCIUM SERPL-MCNC: 8.9 MG/DL (ref 8.5–10.1)
CHLORIDE BLD-SCNC: 110 MMOL/L (ref 94–109)
CHOLEST SERPL-MCNC: 125 MG/DL
CO2 SERPL-SCNC: 25 MMOL/L (ref 20–32)
CREAT SERPL-MCNC: 0.65 MG/DL (ref 0.52–1.04)
ERYTHROCYTE [DISTWIDTH] IN BLOOD BY AUTOMATED COUNT: 12.1 % (ref 10–15)
FASTING STATUS PATIENT QL REPORTED: YES
GFR SERPL CREATININE-BSD FRML MDRD: >90 ML/MIN/1.73M2
GLUCOSE BLD-MCNC: 120 MG/DL (ref 70–99)
HBA1C MFR BLD: 6.2 % (ref 0–5.6)
HCT VFR BLD AUTO: 41 % (ref 35–47)
HDLC SERPL-MCNC: 57 MG/DL
HGB BLD-MCNC: 13.4 G/DL (ref 11.7–15.7)
LDLC SERPL CALC-MCNC: 44 MG/DL
LEVETIRACETAM SERPL-MCNC: 80.6 ΜG/ML (ref 10–40)
MCH RBC QN AUTO: 31.1 PG (ref 26.5–33)
MCHC RBC AUTO-ENTMCNC: 32.7 G/DL (ref 31.5–36.5)
MCV RBC AUTO: 95 FL (ref 78–100)
NONHDLC SERPL-MCNC: 68 MG/DL
PLATELET # BLD AUTO: 243 10E3/UL (ref 150–450)
POTASSIUM BLD-SCNC: 4.6 MMOL/L (ref 3.4–5.3)
PROT SERPL-MCNC: 7 G/DL (ref 6.8–8.8)
RBC # BLD AUTO: 4.31 10E6/UL (ref 3.8–5.2)
SODIUM SERPL-SCNC: 141 MMOL/L (ref 133–144)
TRIGL SERPL-MCNC: 118 MG/DL
URATE SERPL-MCNC: 4.6 MG/DL (ref 2.6–6)
VIT B12 SERPL-MCNC: 597 PG/ML (ref 232–1245)
WBC # BLD AUTO: 6.3 10E3/UL (ref 4–11)

## 2022-11-11 PROCEDURE — 82607 VITAMIN B-12: CPT

## 2022-11-11 PROCEDURE — 84550 ASSAY OF BLOOD/URIC ACID: CPT

## 2022-11-11 PROCEDURE — 36415 COLL VENOUS BLD VENIPUNCTURE: CPT

## 2022-11-11 PROCEDURE — 80177 DRUG SCRN QUAN LEVETIRACETAM: CPT

## 2022-11-11 PROCEDURE — 83036 HEMOGLOBIN GLYCOSYLATED A1C: CPT

## 2022-11-11 PROCEDURE — 80061 LIPID PANEL: CPT

## 2022-11-11 PROCEDURE — 80053 COMPREHEN METABOLIC PANEL: CPT

## 2022-11-11 PROCEDURE — 85027 COMPLETE CBC AUTOMATED: CPT

## 2022-11-12 ASSESSMENT — ENCOUNTER SYMPTOMS
BREAST MASS: 0
CONSTIPATION: 0
PARESTHESIAS: 0
ABDOMINAL PAIN: 0
EYE PAIN: 0
WEAKNESS: 0
HEMATURIA: 0
DIZZINESS: 0
DYSURIA: 0
PALPITATIONS: 0
FEVER: 0
COUGH: 0
NERVOUS/ANXIOUS: 0
JOINT SWELLING: 0
ARTHRALGIAS: 0
CHILLS: 0
DIARRHEA: 0
MYALGIAS: 0
SHORTNESS OF BREATH: 0
SORE THROAT: 0
FREQUENCY: 1
HEARTBURN: 0
HEMATOCHEZIA: 0

## 2022-11-12 ASSESSMENT — ACTIVITIES OF DAILY LIVING (ADL)
CURRENT_FUNCTION: HOUSEWORK REQUIRES ASSISTANCE
CURRENT_FUNCTION: PREPARING MEALS REQUIRES ASSISTANCE
CURRENT_FUNCTION: LAUNDRY REQUIRES ASSISTANCE
CURRENT_FUNCTION: SHOPPING REQUIRES ASSISTANCE
CURRENT_FUNCTION: TRANSPORTATION REQUIRES ASSISTANCE
CURRENT_FUNCTION: BATHING REQUIRES ASSISTANCE

## 2022-11-15 ENCOUNTER — MYC MEDICAL ADVICE (OUTPATIENT)
Dept: FAMILY MEDICINE | Facility: CLINIC | Age: 72
End: 2022-11-15

## 2022-11-15 ENCOUNTER — OFFICE VISIT (OUTPATIENT)
Dept: FAMILY MEDICINE | Facility: CLINIC | Age: 72
End: 2022-11-15
Payer: MEDICARE

## 2022-11-15 VITALS
SYSTOLIC BLOOD PRESSURE: 122 MMHG | TEMPERATURE: 97.5 F | HEIGHT: 66 IN | RESPIRATION RATE: 18 BRPM | DIASTOLIC BLOOD PRESSURE: 77 MMHG | OXYGEN SATURATION: 96 % | BODY MASS INDEX: 21.63 KG/M2 | HEART RATE: 62 BPM

## 2022-11-15 DIAGNOSIS — G40.909 SEIZURE DISORDER (H): ICD-10-CM

## 2022-11-15 DIAGNOSIS — I10 HYPERTENSION GOAL BP (BLOOD PRESSURE) < 130/80: ICD-10-CM

## 2022-11-15 DIAGNOSIS — Z00.00 ENCOUNTER FOR ANNUAL WELLNESS EXAM IN MEDICARE PATIENT: Primary | ICD-10-CM

## 2022-11-15 DIAGNOSIS — I63.321 CEREBROVASCULAR ACCIDENT (CVA) DUE TO THROMBOSIS OF RIGHT ANTERIOR CEREBRAL ARTERY (H): ICD-10-CM

## 2022-11-15 DIAGNOSIS — Z79.4 TYPE 2 DIABETES MELLITUS WITH OTHER NEUROLOGIC COMPLICATION, WITH LONG-TERM CURRENT USE OF INSULIN (H): ICD-10-CM

## 2022-11-15 DIAGNOSIS — Z79.899 MEDICATION MANAGEMENT: Primary | ICD-10-CM

## 2022-11-15 DIAGNOSIS — E11.49 TYPE 2 DIABETES MELLITUS WITH OTHER NEUROLOGIC COMPLICATION, WITH LONG-TERM CURRENT USE OF INSULIN (H): ICD-10-CM

## 2022-11-15 DIAGNOSIS — F33.0 MAJOR DEPRESSIVE DISORDER, RECURRENT EPISODE, MILD (H): ICD-10-CM

## 2022-11-15 DIAGNOSIS — E78.5 HYPERLIPIDEMIA LDL GOAL <70: ICD-10-CM

## 2022-11-15 PROCEDURE — G0439 PPPS, SUBSEQ VISIT: HCPCS | Performed by: FAMILY MEDICINE

## 2022-11-15 PROCEDURE — 99214 OFFICE O/P EST MOD 30 MIN: CPT | Mod: 25 | Performed by: FAMILY MEDICINE

## 2022-11-15 RX ORDER — ATORVASTATIN CALCIUM 40 MG/1
40 TABLET, FILM COATED ORAL DAILY
Qty: 90 TABLET | Refills: 3 | Status: SHIPPED | OUTPATIENT
Start: 2022-11-15 | End: 2023-11-17

## 2022-11-15 RX ORDER — CLOPIDOGREL BISULFATE 75 MG/1
75 TABLET ORAL DAILY
Qty: 90 TABLET | Refills: 3 | Status: SHIPPED | OUTPATIENT
Start: 2022-11-15 | End: 2023-10-30

## 2022-11-15 RX ORDER — AMLODIPINE BESYLATE 10 MG/1
10 TABLET ORAL DAILY
Qty: 90 TABLET | Refills: 3 | Status: SHIPPED | OUTPATIENT
Start: 2022-11-15 | End: 2023-11-17

## 2022-11-15 ASSESSMENT — ACTIVITIES OF DAILY LIVING (ADL)
CURRENT_FUNCTION: LAUNDRY REQUIRES ASSISTANCE
CURRENT_FUNCTION: SHOPPING REQUIRES ASSISTANCE
CURRENT_FUNCTION: BATHING REQUIRES ASSISTANCE
CURRENT_FUNCTION: TRANSPORTATION REQUIRES ASSISTANCE
CURRENT_FUNCTION: HOUSEWORK REQUIRES ASSISTANCE
CURRENT_FUNCTION: PREPARING MEALS REQUIRES ASSISTANCE

## 2022-11-15 ASSESSMENT — ENCOUNTER SYMPTOMS
EYE PAIN: 0
MYALGIAS: 0
ABDOMINAL PAIN: 0
DYSURIA: 0
BREAST MASS: 0
DIZZINESS: 0
NERVOUS/ANXIOUS: 0
PARESTHESIAS: 0
HEARTBURN: 0
CONSTIPATION: 0
HEMATURIA: 0
FREQUENCY: 1
ARTHRALGIAS: 0
COUGH: 0
SHORTNESS OF BREATH: 0
WEAKNESS: 0
DIARRHEA: 0
HEMATOCHEZIA: 0
SORE THROAT: 0
JOINT SWELLING: 0
CHILLS: 0
FEVER: 0
PALPITATIONS: 0

## 2022-11-15 ASSESSMENT — PAIN SCALES - GENERAL: PAINLEVEL: NO PAIN (0)

## 2022-11-15 NOTE — PATIENT INSTRUCTIONS
Please let me know what the exact dose of keppra is right now since level was high.     Due for eye exam in December.     Follow up six months for lab only a1c test.

## 2022-11-15 NOTE — PROGRESS NOTES
"SUBJECTIVE:   Addis is a 72 year old who presents for Preventive Visit.      Patient has been advised of split billing requirements and indicates understanding: Yes  Are you in the first 12 months of your Medicare coverage?  No    Healthy Habits:     In general, how would you rate your overall health?  Good    Frequency of exercise:  4-5 days/week    Duration of exercise:  15-30 minutes    Do you usually eat at least 4 servings of fruit and vegetables a day, include whole grains    & fiber and avoid regularly eating high fat or \"junk\" foods?  Yes    Medication side effects:  None    Ability to successfully perform activities of daily living:  Transportation requires assistance, shopping requires assistance, preparing meals requires assistance, housework requires assistance, bathing requires assistance and laundry requires assistance    Home Safety:  No safety concerns identified    Hearing Impairment:  Difficulty following a conversation in a noisy restaurant or crowded room and need to ask people to speak up or repeat themselves    In the past 6 months, have you been bothered by leaking of urine?  No    In general, how would you rate your overall mental or emotional health?  Good      PHQ-2 Total Score: 0    Additional concerns today:  No    Do you feel safe in your environment? Yes    Have you ever done Advance Care Planning? (For example, a Health Directive, POLST, or a discussion with a medical provider or your loved ones about your wishes): Yes, advance care planning is on file.       Fall risk  Fallen 2 or more times in the past year?: No  Any fall with injury in the past year?: No    Cognitive Screening   1) Repeat 3 items (Leader, Season, Table)    2) Clock draw: NORMAL  3) 3 item recall: Recalls 1 object   Results: NORMAL clock, 1-2 items recalled: COGNITIVE IMPAIRMENT LESS LIKELY    Mini-CogTM Copyright TOBY Rodriguez. Licensed by the author for use in [facility]; reprinted with permission " (jorgekiersten@[facility]). All rights reserved.      Do you have sleep apnea, excessive snoring or daytime drowsiness?: no    Reviewed and updated as needed this visit by clinical staff                  Reviewed and updated as needed this visit by Provider                 Social History     Tobacco Use     Smoking status: Never     Smokeless tobacco: Never   Substance Use Topics     Alcohol use: No     If you drink alcohol do you typically have >3 drinks per day or >7 drinks per week? No    Alcohol Use 11/12/2022   Prescreen: >3 drinks/day or >7 drinks/week? Not Applicable   Prescreen: >3 drinks/day or >7 drinks/week? -   No flowsheet data found.        PROBLEMS TO ADD ON...  -------------------------------------  Overall doing very well physically and emotionally.   Still uses wheelchair for mobility due to previous stroke/weakness.     New pca though that is excellent/very attentive.  (does skin checks, bed sore checks, foot checks several times a week and assists with bathing, etc.)     Wanting to review recent labs/a1c numbers.     Social History     Tobacco Use     Smoking status: Never     Smokeless tobacco: Never   Substance Use Topics     Alcohol use: No        Current providers sharing in care for this patient include:   Patient Care Team:  Wegener, Joel Daniel Irwin, MD as PCP - General (Family Practice)  Madelaine Roland MD as MD (Urology)  Wegener, Joel Daniel Irwin, MD as Referring Physician (Family Practice)  Annette Perez, RN as Registered Nurse (Urology)  Steve Foster MD as Referring Physician (Family Practice)  Rupal as   Desirae Escamilla as Other (see comments)  Wegener, Joel Daniel Irwin, MD as Assigned PCP  Cornelia Pickering MD as MD (Ophthalmology)  Cornelia Pickering MD as Assigned Surgical Provider    The following health maintenance items are reviewed in Epic and correct as of today:  Health Maintenance   Topic Date Due     MICROALBUMIN  05/20/2021     DIABETIC FOOT EXAM   04/20/2022     ANNUAL REVIEW OF HM ORDERS  04/21/2022     PHQ-9  05/16/2022     MEDICARE ANNUAL WELLNESS VISIT  11/16/2022     EYE EXAM  12/08/2022     A1C  02/11/2023     BMP  05/11/2023     CMP  11/11/2023     LIPID  11/11/2023     CBC  11/11/2023     HEMOGLOBIN  11/11/2023     FALL RISK ASSESSMENT  11/15/2023     MAMMO SCREENING  04/22/2024     COLORECTAL CANCER SCREENING  05/09/2024     ADVANCE CARE PLANNING  11/17/2026     DTAP/TDAP/TD IMMUNIZATION (3 - Td or Tdap) 11/20/2030     DEXA  01/19/2032     PARATHYROID  Completed     PHOSPHORUS  Completed     HEPATITIS C SCREENING  Completed     DEPRESSION ACTION PLAN  Completed     INFLUENZA VACCINE  Completed     Pneumococcal Vaccine: 65+ Years  Completed     URINALYSIS  Completed     ALK PHOS  Completed     ZOSTER IMMUNIZATION  Completed     COVID-19 Vaccine  Completed     IPV IMMUNIZATION  Aged Out     MENINGITIS IMMUNIZATION  Aged Out               Review of Systems   Constitutional: Negative for chills and fever.   HENT: Positive for hearing loss. Negative for congestion, ear pain and sore throat.    Eyes: Negative for pain and visual disturbance.   Respiratory: Negative for cough and shortness of breath.    Cardiovascular: Negative for chest pain, palpitations and peripheral edema.   Gastrointestinal: Negative for abdominal pain, constipation, diarrhea, heartburn and hematochezia.   Breasts:  Negative for tenderness, breast mass and discharge.   Genitourinary: Positive for frequency and urgency. Negative for dysuria, genital sores, hematuria, pelvic pain, vaginal bleeding and vaginal discharge.   Musculoskeletal: Negative for arthralgias, joint swelling and myalgias.   Skin: Negative for rash.   Neurological: Negative for dizziness, weakness and paresthesias.   Psychiatric/Behavioral: Negative for mood changes. The patient is not nervous/anxious.          OBJECTIVE:   There were no vitals taken for this visit. Estimated body mass index is 21.63 kg/m  as  "calculated from the following:    Height as of 2/15/22: 1.676 m (5' 6\").    Weight as of 2/15/22: 60.8 kg (134 lb).  Physical Exam  GENERAL: healthy, alert and no distress  EYES: Eyes grossly normal to inspection, PERRL and conjunctivae and sclerae normal  NECK: no adenopathy, no asymmetry, masses, or scars and thyroid normal to palpation  RESP: lungs clear to auscultation - no rales, rhonchi or wheezes  CV: regular rate and rhythm, normal S1 S2, no S3 or S4, no murmur, click or rub, no peripheral edema and peripheral pulses strong  ABDOMEN: soft, nontender, no hepatosplenomegaly, no masses and bowel sounds normal  SKIN: no suspicious lesions or rashes  PSYCH: mentation appears normal, affect normal/bright  Diabetic foot exam: normal DP and PT pulses, no trophic changes or ulcerative lesions and normal sensory exam    ASSESSMENT / PLAN:   (Z00.00) Encounter for annual wellness exam in Medicare patient  (primary encounter diagnosis)  Reviewed health maintence items, breast /colon screening, immunizations among other items.     Reviewed recent labs.  Kidney function normal gfr > 90 , b12, uric acid, cbc all normal, lipids at goal with hdl 44, a1c at goal 6.2     keppra level supratherapeutic (see below).     (I10) Hypertension goal BP (blood pressure) < 130/80  Comment: controlled.  Refills needed  Plan: amLODIPine (NORVASC) 10 MG tablet            (E78.5) Hyperlipidemia LDL goal <70  Comment:   Plan: atorvastatin (LIPITOR) 40 MG tablet            (I63.321) Cerebrovascular accident (CVA) due to thrombosis of right anterior cerebral artery (H)  Comment:   Plan: clopidogrel (PLAVIX) 75 MG tablet            (E11.49,  Z79.4) Type 2 diabetes mellitus with other neurologic complication, with long-term current use of insulin (H)  Comment: controlled, no changes.   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     PHQ 12/13/2019 " "4/20/2021 11/16/2021   PHQ-9 Total Score 0 0 0   Q9: Thoughts of better off dead/self-harm past 2 weeks Not at all Not at all Not at all         (G40.909) Seizure disorder (H)  Comment: they are not fully sure of current dose so will verify and let me know so we can troubleshoot if dose needs changing even though managed by her neurologist last seen in may.     Patient has been advised of split billing requirements and indicates understanding: Yes    In addition to preventive services and counseling spent 25 minutes spent on the date of the encounter doing chart review, history and exam, negotiating and explaining the plan with the patient, documentation and further activities as noted above.             COUNSELING:  Reviewed preventive health counseling, as reflected in patient instructions       Regular exercise       Healthy diet/nutrition       Colon cancer screening    Estimated body mass index is 21.63 kg/m  as calculated from the following:    Height as of 2/15/22: 1.676 m (5' 6\").    Weight as of 2/15/22: 60.8 kg (134 lb).        She reports that she has never smoked. She has never used smokeless tobacco.      Appropriate preventive services were discussed with this patient, including applicable screening as appropriate for cardiovascular disease, diabetes, osteopenia/osteoporosis, and glaucoma.  As appropriate for age/gender, discussed screening for colorectal cancer, prostate cancer, breast cancer, and cervical cancer. Checklist reviewing preventive services available has been given to the patient.    Reviewed patients plan of care and provided an AVS. The Intermediate Care Plan ( asthma action plan, low back pain action plan, and migraine action plan) for Addis meets the Care Plan requirement. This Care Plan has been established and reviewed with the Patient and spouse and other:spouse present. .    Counseling Resources:  ATP IV Guidelines  Pooled Cohorts Equation Calculator  Breast Cancer Risk " Calculator  Breast Cancer: Medication to Reduce Risk  FRAX Risk Assessment  ICSI Preventive Guidelines  Dietary Guidelines for Americans, 2010  [employer]'s MyPlate  ASA Prophylaxis  Lung CA Screening    Joel Daniel Wegener, MD  [facility]    Identified Health Risks:

## 2022-11-27 ENCOUNTER — MYC MEDICAL ADVICE (OUTPATIENT)
Dept: FAMILY MEDICINE | Facility: CLINIC | Age: 72
End: 2022-11-27

## 2022-11-27 DIAGNOSIS — F33.42 RECURRENT MAJOR DEPRESSIVE DISORDER, IN FULL REMISSION (H): Primary | ICD-10-CM

## 2022-11-27 DIAGNOSIS — F34.1 DYSTHYMIA: ICD-10-CM

## 2022-11-28 RX ORDER — CITALOPRAM HYDROBROMIDE 10 MG/1
10 TABLET ORAL DAILY
Qty: 90 TABLET | Refills: 3 | Status: SHIPPED | OUTPATIENT
Start: 2022-11-28 | End: 2023-11-17

## 2022-11-28 NOTE — TELEPHONE ENCOUNTER
JW,      Please see Jack [employer] Jakeâ€™s message.  Per medication history list looks like it was discontinued 12/20/21 by Kleber Stratton APRN CNP (TCU provider)    Nicole Drake RN

## 2022-12-01 ENCOUNTER — ALLIED HEALTH/NURSE VISIT (OUTPATIENT)
Dept: FAMILY MEDICINE | Facility: CLINIC | Age: 72
End: 2022-12-01
Payer: MEDICARE

## 2022-12-01 DIAGNOSIS — D51.9 ANEMIA DUE TO VITAMIN B12 DEFICIENCY, UNSPECIFIED B12 DEFICIENCY TYPE: Primary | ICD-10-CM

## 2022-12-01 PROCEDURE — 99207 PR NO CHARGE NURSE ONLY: CPT

## 2022-12-01 PROCEDURE — 96372 THER/PROPH/DIAG INJ SC/IM: CPT | Performed by: FAMILY MEDICINE

## 2022-12-01 RX ADMIN — CYANOCOBALAMIN 1000 MCG: 1000 INJECTION, SOLUTION INTRAMUSCULAR; SUBCUTANEOUS at 15:05

## 2022-12-01 NOTE — NURSING NOTE
Staff notify pt that she was early, understand that the due date was on weekends.     Rupal Duque MA      Clinic Administered Medication Documentation    Administrations This Visit     cyanocobalamin injection 1,000 mcg     Admin Date  12/01/2022 Action  Given Dose  1,000 mcg Route  Intramuscular Site   Administered By  Rupal Duque MA    Ordering Provider: Wegener, Joel Daniel Irwin, MD    NDC: 68604-171-30    Lot#: [id number]    : [facility]    Patient Supplied?: No                  Injectable Medication Documentation    Patient was given Cyanocobalamin (B-12). Prior to medication administration, verified patients identity using patient s name and date of birth. Please see MAR and medication order for additional information. Patient instructed to remain in clinic for 15 minutes.      Was entire vial of medication used? Yes  Vial/Syringe: Single dose vial  Expiration Date:  4/23  Was this medication supplied by the patient? No

## 2022-12-09 ENCOUNTER — MYC MEDICAL ADVICE (OUTPATIENT)
Dept: FAMILY MEDICINE | Facility: CLINIC | Age: 72
End: 2022-12-09

## 2022-12-09 ENCOUNTER — VIRTUAL VISIT (OUTPATIENT)
Dept: FAMILY MEDICINE | Facility: CLINIC | Age: 72
End: 2022-12-09
Payer: MEDICARE

## 2022-12-09 ENCOUNTER — OFFICE VISIT (OUTPATIENT)
Dept: OPHTHALMOLOGY | Facility: CLINIC | Age: 72
End: 2022-12-09
Attending: OPHTHALMOLOGY
Payer: MEDICARE

## 2022-12-09 DIAGNOSIS — H33.321 RETINAL HOLE, RIGHT: ICD-10-CM

## 2022-12-09 DIAGNOSIS — H40.003 GLAUCOMA SUSPECT OF BOTH EYES: ICD-10-CM

## 2022-12-09 DIAGNOSIS — E11.22 TYPE 2 DIABETES MELLITUS WITH STAGE 3B CHRONIC KIDNEY DISEASE, WITH LONG-TERM CURRENT USE OF INSULIN (H): ICD-10-CM

## 2022-12-09 DIAGNOSIS — H25.13 NUCLEAR SCLEROSIS OF BOTH EYES: ICD-10-CM

## 2022-12-09 DIAGNOSIS — H53.40 UNSPECIFIED VISUAL FIELD DEFECTS: ICD-10-CM

## 2022-12-09 DIAGNOSIS — H40.003 GLAUCOMA SUSPECT, BOTH EYES: ICD-10-CM

## 2022-12-09 DIAGNOSIS — H35.372 EPIRETINAL MEMBRANE (ERM) OF LEFT EYE: Primary | ICD-10-CM

## 2022-12-09 DIAGNOSIS — N18.32 TYPE 2 DIABETES MELLITUS WITH STAGE 3B CHRONIC KIDNEY DISEASE, WITH LONG-TERM CURRENT USE OF INSULIN (H): ICD-10-CM

## 2022-12-09 DIAGNOSIS — E11.3293 TYPE 2 DIABETES MELLITUS WITH BOTH EYES AFFECTED BY MILD NONPROLIFERATIVE RETINOPATHY WITHOUT MACULAR EDEMA, WITHOUT LONG-TERM CURRENT USE OF INSULIN (H): Primary | ICD-10-CM

## 2022-12-09 DIAGNOSIS — U07.1 INFECTION DUE TO 2019 NOVEL CORONAVIRUS: Primary | ICD-10-CM

## 2022-12-09 DIAGNOSIS — Z79.4 TYPE 2 DIABETES MELLITUS WITH STAGE 3B CHRONIC KIDNEY DISEASE, WITH LONG-TERM CURRENT USE OF INSULIN (H): ICD-10-CM

## 2022-12-09 DIAGNOSIS — E11.9 TYPE 2 DIABETES MELLITUS WITHOUT RETINOPATHY (H): ICD-10-CM

## 2022-12-09 PROCEDURE — 99207 OCT OPTIC NERVE RNFL SPECTRALIS OU (BOTH EYES): CPT | Mod: 26 | Performed by: OPHTHALMOLOGY

## 2022-12-09 PROCEDURE — 92134 CPTRZ OPH DX IMG PST SGM RTA: CPT | Performed by: OPHTHALMOLOGY

## 2022-12-09 PROCEDURE — 92014 COMPRE OPH EXAM EST PT 1/>: CPT | Mod: GC | Performed by: OPHTHALMOLOGY

## 2022-12-09 PROCEDURE — 92133 CPTRZD OPH DX IMG PST SGM ON: CPT | Performed by: OPHTHALMOLOGY

## 2022-12-09 PROCEDURE — G0463 HOSPITAL OUTPT CLINIC VISIT: HCPCS

## 2022-12-09 PROCEDURE — 99441 PR PHYSICIAN TELEPHONE EVALUATION 5-10 MIN: CPT | Mod: CS | Performed by: FAMILY MEDICINE

## 2022-12-09 ASSESSMENT — EXTERNAL EXAM - LEFT EYE: OS_EXAM: NORMAL

## 2022-12-09 ASSESSMENT — VISUAL ACUITY
METHOD: SNELLEN - LINEAR
OD_PH_SC+: +2
OD_PH_SC: 20/30
OD_SC: 20/40
OS_SC+: -1
OS_SC: 20/50

## 2022-12-09 ASSESSMENT — TONOMETRY
OD_IOP_MMHG: 13
OS_IOP_MMHG: 12
IOP_METHOD: TONOPEN

## 2022-12-09 ASSESSMENT — CONF VISUAL FIELD
OS_INFERIOR_NASAL_RESTRICTION: 0
OS_SUPERIOR_TEMPORAL_RESTRICTION: 0
OD_SUPERIOR_TEMPORAL_RESTRICTION: 0
OS_INFERIOR_TEMPORAL_RESTRICTION: 0
METHOD: COUNTING FINGERS
OS_NORMAL: 1
OD_NORMAL: 1
OS_SUPERIOR_NASAL_RESTRICTION: 0
OD_SUPERIOR_NASAL_RESTRICTION: 0
OD_INFERIOR_TEMPORAL_RESTRICTION: 0
OD_INFERIOR_NASAL_RESTRICTION: 0

## 2022-12-09 ASSESSMENT — SLIT LAMP EXAM - LIDS
COMMENTS: NORMAL
COMMENTS: NORMAL

## 2022-12-09 ASSESSMENT — CUP TO DISC RATIO
OD_RATIO: 0.6
OS_RATIO: 0.4

## 2022-12-09 ASSESSMENT — EXTERNAL EXAM - RIGHT EYE: OD_EXAM: NORMAL

## 2022-12-09 NOTE — TELEPHONE ENCOUNTER
Patient calling to follow-up on concern for symptoms.  Symptoms are very minimal at the moment, but Buck is concerned about possibility of symptom development and would like to discuss with JW.  JW had opening this afternoon, so patient scheduled for this.  Danelle RODRIGUEZ RN

## 2022-12-09 NOTE — PROGRESS NOTES
Addis is a 72 year old who is being evaluated via a billable video visit.      How would you like to obtain your AVS? MyChart  If the video visit is dropped, the invitation should be resent by: Text to cell phone: [phone]  Will anyone else be joining your video visit? No          Assessment & Plan     Infection due to 2019 novel coronavirus - high risk due to h/o stroke/age  Reviewed risks/benefits of paxlovid therapy and addis would like to start.      Do not take atorvastatin while taking paxlovid (only med interaction).     Only mild symptoms now but reviewed severe symptoms to watch for and seek care if develops (persistent hypoxia O2 sats below 94%, dizziness, fainting, severe nausea, dyspnea, etc.)     Plan reviewed with  as well.   - nirmatrelvir and ritonavir (PAXLOVID) therapy pack; Take 3 tablets by mouth 2 times daily for 5 days (Take 2 Nirmatrelvir tablets and 1 Ritonavir tablet twice daily for 5 days)                 No follow-ups on file.    Joel Daniel Wegener, MD  [facility]   Addis is a 72 year old, presenting for the following health issues:  Covid Concern (Tested positive at home today )      History of Present Illness       Reason for visit:  Covid  Symptom onset:  1-3 days ago  Symptoms include:  Sore throat  Symptom intensity:  Mild  Symptom progression:  Staying the same  Had these symptoms before:  No  What makes it worse:  None  What makes it better:  Decongestant    She eats 2-3 servings of fruits and vegetables daily.She consumes 1 sweetened beverage(s) daily.She exercises with enough effort to increase her heart rate 20 to 29 minutes per day.  She exercises with enough effort to increase her heart rate 7 days per week.   She is taking medications regularly.    Today's PHQ-9         PHQ-9 Total Score: 0    PHQ-9 Q9 Thoughts of better off dead/self-harm past 2 weeks :   Not at all    How difficult have these problems made it for you to  do your work, take care of things at home, or get along with other people: Not difficult at all             Review of Systems         Objective    Vitals - Patient Reported  Pain Score: No Pain (0)      Vitals:  No vitals were obtained today due to virtual visit.    Physical Exam   Clear speech, not coughing on phone call.     Total phone call time 9 minutes.

## 2022-12-09 NOTE — NURSING NOTE
Chief Complaints and History of Present Illnesses   Patient presents with     Follow Up     Type 2 diabetes mellitus with both eyes affected by mild nonproliferative retinopathy without macular edema, without long-term current use of insulin     Chief Complaint(s) and History of Present Illness(es)     Follow Up            Laterality: both eyes    Course: stable    Associated symptoms: dryness and floaters.  Negative for eye pain and headache    Treatments tried: no treatments    Pain scale: 0/10    Comments: Type 2 diabetes mellitus with both eyes affected by mild nonproliferative retinopathy without macular edema, without long-term current use of insulin          Comments    She states that her vision has seemed stable in both eyes since her last eye exam.  She tells me that her eyes feel dry at times.       Lab Results       Component                Value               Date                       A1C                      6.2                 11/11/2022                 A1C                      6.4                 08/17/2022                 A1C                      6.5                 03/15/2022                 A1C                      6.4                 11/12/2021                 A1C                      6.2                 04/15/2021                 A1C                      6.3                 11/20/2020                 A1C                      6.6                 05/04/2020                 A1C                      8.4                 02/02/2020                 A1C                      7.6                 10/04/2019              Magaly Chowdary, COT 7:59 AM  December 9, 2022

## 2022-12-09 NOTE — PROGRESS NOTES
HPI     Follow Up    In both eyes.  Since onset it is stable.  Associated symptoms include dryness and floaters.  Negative for eye pain and headache.  Treatments tried include no treatments.  Pain was noted as 0/10. Additional comments: Type 2 diabetes mellitus with both eyes affected by mild nonproliferative retinopathy without macular edema, without long-term current use of insulin           Comments    She states that her vision has seemed stable in both eyes since her last eye exam.  She tells me that her eyes feel dry at times.       Lab Results       Component                Value               Date                       A1C                      6.2                 11/11/2022                 A1C                      6.4                 08/17/2022                 A1C                      6.5                 03/15/2022                 A1C                      6.4                 11/12/2021                 A1C                      6.2                 04/15/2021                 A1C                      6.3                 11/20/2020                 A1C                      6.6                 05/04/2020                 A1C                      8.4                 02/02/2020                 A1C                      7.6                 10/04/2019              RAMONITA Luong 7:59 AM  December 9, 2022                 Last edited by Magaly Chowdary COT on 12/9/2022  7:59 AM.          History of Present Illness:   Ms. Peña is a 71 year old female with T2DM and stage 3 CKD here for annual diabetic eye exam. She feels her vision remains good and stable in both eyes. No pain, redness, discharge. No new flashes/floaters.  Previous patient with Dr. Pickering.    Interval History 12/09/22: LCV 12/2022. Denies flashes/scotoma. Does have floaters intermittent, few, longstanding, OU. A1c well controlled. No vision concerns feels vision is stable only uses readers.     Additional Ocular History:   Hyperopia and  presbyopia  Stick to the left eye as a child    Relevant Past Medical/Family/Social History:  CVA 1/25/2020   DM2  HTN  HLD    Lives with a roommate at home.  She used to work at the [employer] in [employer] department until her stroke.  No ETOH.  No tobacco.  No drugs.      Assessment:    #type 2 diabetes without retinopathy  without long-term current use of insulin (H)  (primary encounter diagnosis)  Comment: On metformin. Last A1c 6.2. Mild NPDR, appears stable from description last year.  Plan: Discussed the importance of tight blood glucose control in the prevention of diabetic retinopathy. Recommend yearly dilated eye exam.      # Epiretinal membrane (ERM) of left eye  Comment: Mild  Plan: Monitor    #Combined form of age-related cataract, both eyes  Comment: Likely visually significant with BCVA of 20/30 at last visit, but declined refraction today, and her visual function meets her needs.  Plan: Ok to monitor for now.    (H40.003) Glaucoma suspect, both eyes  Comment: Cup to disc asymmetry with slightly larger cup right eye. Normal IOPs at 11/11.  12/09/22 OCT RNFL with borderline inferior thinning OU   - Baseline VF 24-2 OU next year.    (H52.03) Hyperopia of both eyes  Comment: Functions to meet her needs without Rx  Plan: Monitor      # Retinal hole, right  Comment: Identified on prior exam, asymptomatic. Chronic-appearing with pigmented rim. No SRF or RD.  Plan: Discussed signs/sx of RT/RD and the patient knows to call immediately if they develop these symptoms.       TRAN, OU  - Start AT QID OU PRN    No follow-ups on file.      Valdo Mazariegos MD  Resident Physician - PGY3  Department of Ophthalmology   [facility]    Attending Physician Attestation:  Complete documentation of historical and exam elements from today's encounter can be found in the full encounter summary report (not reduplicated in this progress note).  I personally obtained the chief complaint(s) and history of present illness.   I confirmed and edited as necessary the review of systems, past medical/surgical history, family history, social history, and examination findings as documented by others; and I examined the patient myself.  I personally reviewed the relevant tests, images, and reports as documented above.  I formulated and edited as necessary the assessment and plan and discussed the findings and management plan with the patient and family. - Mariusz Gallo MD

## 2022-12-16 ENCOUNTER — LAB (OUTPATIENT)
Dept: LAB | Facility: CLINIC | Age: 72
End: 2022-12-16
Payer: MEDICARE

## 2022-12-16 ENCOUNTER — MYC MEDICAL ADVICE (OUTPATIENT)
Dept: FAMILY MEDICINE | Facility: CLINIC | Age: 72
End: 2022-12-16

## 2022-12-16 DIAGNOSIS — Z79.899 MEDICATION MANAGEMENT: ICD-10-CM

## 2022-12-16 LAB — LEVETIRACETAM SERPL-MCNC: 66.9 ΜG/ML (ref 10–40)

## 2022-12-16 PROCEDURE — 80177 DRUG SCRN QUAN LEVETIRACETAM: CPT

## 2022-12-16 PROCEDURE — 36415 COLL VENOUS BLD VENIPUNCTURE: CPT

## 2023-01-06 ENCOUNTER — ALLIED HEALTH/NURSE VISIT (OUTPATIENT)
Dept: FAMILY MEDICINE | Facility: CLINIC | Age: 73
End: 2023-01-06
Payer: MEDICARE

## 2023-01-06 DIAGNOSIS — E53.8 VITAMIN B12 DEFICIENCY (NON ANEMIC): Primary | ICD-10-CM

## 2023-01-06 PROCEDURE — 99207 PR NO CHARGE NURSE ONLY: CPT

## 2023-01-06 PROCEDURE — 96372 THER/PROPH/DIAG INJ SC/IM: CPT | Performed by: FAMILY MEDICINE

## 2023-01-06 RX ADMIN — CYANOCOBALAMIN 1000 MCG: 1000 INJECTION, SOLUTION INTRAMUSCULAR; SUBCUTANEOUS at 10:20

## 2023-01-06 NOTE — PROGRESS NOTES
Clinic Administered Medication Documentation    Administrations This Visit     cyanocobalamin injection 1,000 mcg     Admin Date  01/06/2023 Action  Given Dose  1,000 mcg Route  Intramuscular Site  Left Deltoid Administered By  Zainab Hernández MA    Ordering Provider: Wegener, Joel Daniel Irwin, MD    NDC: 75226-730-70    Lot#: [id number]    : [facility]    Patient Supplied?: No                  Injectable Medication Documentation    Patient was given Cyanocobalamin (B-12). Prior to medication administration, verified patients identity using patient s name and date of birth. Please see MAR and medication order for additional information. Patient instructed to remain in clinic for 15 minutes.      Was entire vial of medication used? Yes  Vial/Syringe: Single dose vial  Expiration Date:  03/01/2024  Was this medication supplied by the patient? No     Zainab Hernández MA

## 2023-02-10 ENCOUNTER — ALLIED HEALTH/NURSE VISIT (OUTPATIENT)
Dept: FAMILY MEDICINE | Facility: CLINIC | Age: 73
End: 2023-02-10
Payer: MEDICARE

## 2023-02-10 ENCOUNTER — MYC MEDICAL ADVICE (OUTPATIENT)
Dept: FAMILY MEDICINE | Facility: CLINIC | Age: 73
End: 2023-02-10

## 2023-02-10 DIAGNOSIS — Z79.4 TYPE 2 DIABETES MELLITUS WITH STAGE 3B CHRONIC KIDNEY DISEASE, WITH LONG-TERM CURRENT USE OF INSULIN (H): ICD-10-CM

## 2023-02-10 DIAGNOSIS — N18.32 TYPE 2 DIABETES MELLITUS WITH STAGE 3B CHRONIC KIDNEY DISEASE, WITH LONG-TERM CURRENT USE OF INSULIN (H): ICD-10-CM

## 2023-02-10 DIAGNOSIS — E53.8 VITAMIN B12 DEFICIENCY (NON ANEMIC): Primary | ICD-10-CM

## 2023-02-10 DIAGNOSIS — N18.32 TYPE 2 DIABETES MELLITUS WITH STAGE 3B CHRONIC KIDNEY DISEASE, WITHOUT LONG-TERM CURRENT USE OF INSULIN (H): ICD-10-CM

## 2023-02-10 DIAGNOSIS — E11.22 TYPE 2 DIABETES MELLITUS WITH STAGE 3B CHRONIC KIDNEY DISEASE, WITHOUT LONG-TERM CURRENT USE OF INSULIN (H): ICD-10-CM

## 2023-02-10 DIAGNOSIS — J30.2 SEASONAL ALLERGIC RHINITIS, UNSPECIFIED TRIGGER: ICD-10-CM

## 2023-02-10 DIAGNOSIS — E11.22 TYPE 2 DIABETES MELLITUS WITH STAGE 3B CHRONIC KIDNEY DISEASE, WITH LONG-TERM CURRENT USE OF INSULIN (H): ICD-10-CM

## 2023-02-10 PROCEDURE — 99207 PR NO CHARGE NURSE ONLY: CPT

## 2023-02-10 PROCEDURE — 96372 THER/PROPH/DIAG INJ SC/IM: CPT | Performed by: FAMILY MEDICINE

## 2023-02-10 RX ADMIN — CYANOCOBALAMIN 1000 MCG: 1000 INJECTION, SOLUTION INTRAMUSCULAR; SUBCUTANEOUS at 10:01

## 2023-02-13 RX ORDER — MONTELUKAST SODIUM 10 MG/1
10 TABLET ORAL DAILY
Qty: 90 TABLET | Refills: 0 | Status: SHIPPED | OUTPATIENT
Start: 2023-02-13 | End: 2023-05-08

## 2023-02-13 NOTE — TELEPHONE ENCOUNTER
Test strips sent in 2/10/23    Montelukast and Metformin:    Prescription approved per [facility] Refill Protocol.  Due for diabetes follow up in May.    Nicole Drake RN

## 2023-02-15 ENCOUNTER — TELEPHONE (OUTPATIENT)
Dept: FAMILY MEDICINE | Facility: CLINIC | Age: 73
End: 2023-02-15
Payer: MEDICARE

## 2023-02-15 NOTE — TELEPHONE ENCOUNTER
Prior Authorization Retail Medication Request    Medication/Dose: CONTOUR TEST test strip  ICD code (if different than what is on RX):  Type 2 diabetes mellitus with stage 3b chronic kidney disease, without long-term current use of insulin (H) [E11.22, N18.32]   Previously Tried and Failed:  unknown  Rationale:  unknown    Insurance Name:  [employer] [place]  Insurance ID:  [id number]

## 2023-02-17 NOTE — TELEPHONE ENCOUNTER
Central Prior Authorization Team - Phone: [phone]     Hi, this medication did not require a prior authoriziation, the pharmacy is needing the Medicare CMN form to be signed by the provider so they can continue to  bill Medicare part B for the test strips/supplies.  **pharmacy is faxing CMN form to provider office, please ask provider office to follow up with pharmacy they are required by Medicare part B to have provider sign Certificate of Medical Necessity (CMN) form.    No PA needed    Prior Authorization Not Needed per Insurance- DME billing to Medicare part B.  Pharmacy waiting on signed form from provider.    Medication: CONTOUR TEST test strip- PA not needed- pharmacy is required to have provider fill out/sign CMN form (Certificate of Medical Necessity) for Medicare part B (DME billing)  Insurance Company:    Expected CoPay:      Pharmacy Filling the Rx: [id number] - [place], [place] - [address] [facility]  Pharmacy Notified: YesComment:  Yes called and spoke with pharmacist, Rx is to be billied to Medicare part B (DME) billing. they are waiting for provider office to sign CMN form that was faxed to them. I requested to have them fax the provider again and I will route message to provider.  Patient Notified: No

## 2023-02-17 NOTE — TELEPHONE ENCOUNTER
TC,      Please see message.  FYI:   Pharmacy will be faxing CMN form.    Thank you,  Nicole Drake RN

## 2023-02-17 NOTE — TELEPHONE ENCOUNTER
JW,  Please see below [employer] message.  Based on PA encounter, looks like you will need to sign form?  Thanks,  Danelle LUIS RN

## 2023-02-21 NOTE — TELEPHONE ENCOUNTER
"TC team - I have not yet seen/received CMN form.      Can you call her pharmacy and make sure they are faxing to correct fax (directly to us) and put cover sheet on it stating \"do not send to scanning?\" to make sure I get it?     I'm going to copy Pharm D team as well to see if they can help.     Joel Wegener,MD   "

## 2023-02-22 ENCOUNTER — MEDICAL CORRESPONDENCE (OUTPATIENT)
Dept: HEALTH INFORMATION MANAGEMENT | Facility: CLINIC | Age: 73
End: 2023-02-22
Payer: MEDICARE

## 2023-02-22 NOTE — TELEPHONE ENCOUNTER
Can someone call her pharmacy and make sure they are faxing it to the correct number?     Joel Wegener,MD

## 2023-02-22 NOTE — TELEPHONE ENCOUNTER
TC team - see below.  Once we get the form can you fax it to my email so I can complete today?    - Joel Wegener,MD

## 2023-02-24 NOTE — TELEPHONE ENCOUNTER
See last two mychart.  Client reports [facility] did not get form.     Do we have form still?  Can we leave form for Addis or Buck to  or email to them to give to her pharmacy directly?     Joel Wegener,MD

## 2023-02-27 ENCOUNTER — MEDICAL CORRESPONDENCE (OUTPATIENT)
Dept: HEALTH INFORMATION MANAGEMENT | Facility: CLINIC | Age: 73
End: 2023-02-27
Payer: MEDICARE

## 2023-02-27 RX ORDER — CARVEDILOL 25 MG/1
TABLET, FILM COATED ORAL
Qty: 100 STRIP | Refills: 3 | Status: SHIPPED | OUTPATIENT
Start: 2023-02-27 | End: 2024-05-06

## 2023-02-27 NOTE — TELEPHONE ENCOUNTER
Can I have a copy of the form that we sent please? See recent [employer] messages.     Joel Wegener,MD w

## 2023-02-27 NOTE — TELEPHONE ENCOUNTER
Please re-fax form to [facility] on [address] and [address] and give copy back to me.     Placed in [facility] TC basket. Joel Wegener, MD

## 2023-03-09 RX ORDER — CYANOCOBALAMIN 1000 UG/ML
1 INJECTION, SOLUTION INTRAMUSCULAR; SUBCUTANEOUS
Status: CANCELLED | OUTPATIENT
Start: 2023-03-09

## 2023-03-10 ENCOUNTER — ALLIED HEALTH/NURSE VISIT (OUTPATIENT)
Dept: FAMILY MEDICINE | Facility: CLINIC | Age: 73
End: 2023-03-10
Payer: MEDICARE

## 2023-03-10 DIAGNOSIS — E53.8 VITAMIN B12 DEFICIENCY (NON ANEMIC): Primary | ICD-10-CM

## 2023-03-10 PROCEDURE — 96372 THER/PROPH/DIAG INJ SC/IM: CPT | Performed by: FAMILY MEDICINE

## 2023-03-10 PROCEDURE — 99207 PR NO CHARGE NURSE ONLY: CPT

## 2023-03-10 RX ADMIN — CYANOCOBALAMIN 1000 MCG: 1000 INJECTION, SOLUTION INTRAMUSCULAR; SUBCUTANEOUS at 10:14

## 2023-03-10 NOTE — NURSING NOTE
Clinic Administered Medication Documentation    Administrations This Visit     cyanocobalamin injection 1,000 mcg     Admin Date  03/10/2023 Action  $Given Dose  1,000 mcg Route  Intramuscular Site  Left Deltoid Administered By  Zainab Hernández MA    Ordering Provider: Wegener, Joel Daniel Irwin, MD    NDC: 41015-369-62    Lot#: [id number]    : [employer]    Patient Supplied?: No                  Injectable Medication Documentation    Patient was given Cyanocobalamin (B-12). Prior to medication administration, verified patients identity using patient s name and date of birth. Please see MAR and medication order for additional information. Patient instructed to remain in clinic for 15 minutes.      Was entire vial of medication used? Yes  Vial/Syringe: Single dose vial  Expiration Date:  04/01/2024  Was this medication supplied by the patient? No     Zainab Hernández MA

## 2023-03-31 ENCOUNTER — TELEPHONE (OUTPATIENT)
Dept: FAMILY MEDICINE | Facility: CLINIC | Age: 73
End: 2023-03-31
Payer: MEDICARE

## 2023-03-31 DIAGNOSIS — E53.8 B12 DEFICIENCY: Primary | ICD-10-CM

## 2023-03-31 RX ORDER — CYANOCOBALAMIN 1000 UG/ML
1000 INJECTION, SOLUTION INTRAMUSCULAR; SUBCUTANEOUS
Status: ACTIVE | OUTPATIENT
Start: 2023-03-31 | End: 2024-03-25

## 2023-03-31 NOTE — TELEPHONE ENCOUNTER
Dr. Wegener -- Patient is on [facility] MA schedule for a B12 injection. Orders are technical  since they are over 1 year old. Can we please get new orders if you are still desiring this injection?    Thanks,  Theresa Gold RN  [facility]

## 2023-04-07 ENCOUNTER — ALLIED HEALTH/NURSE VISIT (OUTPATIENT)
Dept: FAMILY MEDICINE | Facility: CLINIC | Age: 73
End: 2023-04-07
Payer: MEDICARE

## 2023-04-07 DIAGNOSIS — E53.8 VITAMIN B12 DEFICIENCY (NON ANEMIC): Primary | ICD-10-CM

## 2023-04-07 PROCEDURE — 96372 THER/PROPH/DIAG INJ SC/IM: CPT | Performed by: FAMILY MEDICINE

## 2023-04-07 RX ADMIN — CYANOCOBALAMIN 1000 MCG: 1000 INJECTION, SOLUTION INTRAMUSCULAR; SUBCUTANEOUS at 09:40

## 2023-04-16 ENCOUNTER — HEALTH MAINTENANCE LETTER (OUTPATIENT)
Age: 73
End: 2023-04-16

## 2023-04-17 ENCOUNTER — ANCILLARY ORDERS (OUTPATIENT)
Dept: FAMILY MEDICINE | Facility: CLINIC | Age: 73
End: 2023-04-17

## 2023-04-17 DIAGNOSIS — Z12.31 VISIT FOR SCREENING MAMMOGRAM: ICD-10-CM

## 2023-05-05 ENCOUNTER — ALLIED HEALTH/NURSE VISIT (OUTPATIENT)
Dept: FAMILY MEDICINE | Facility: CLINIC | Age: 73
End: 2023-05-05
Payer: MEDICARE

## 2023-05-05 DIAGNOSIS — E53.8 VITAMIN B12 DEFICIENCY (NON ANEMIC): Primary | ICD-10-CM

## 2023-05-05 PROCEDURE — 96372 THER/PROPH/DIAG INJ SC/IM: CPT | Performed by: FAMILY MEDICINE

## 2023-05-05 PROCEDURE — 99207 PR NO CHARGE NURSE ONLY: CPT

## 2023-05-05 RX ADMIN — CYANOCOBALAMIN 1000 MCG: 1000 INJECTION, SOLUTION INTRAMUSCULAR; SUBCUTANEOUS at 09:57

## 2023-05-05 NOTE — PROGRESS NOTES
Clinic Administered Medication Documentation      Injectable Medication Documentation    Is there an active order (written within the past 365 days, with administrations remaining, not ) in the chart? Yes.     Patient was given B12. Prior to medication administration, verified patient's identity using patient s name and date of birth. Please see MAR and medication order for additional information. Patient instructed to remain in clinic for 15 minutes and report any adverse reaction to staff immediately.    Vial/Syringe: Single dose vial. Was entire vial of medication used? Yes  Was this medication supplied by the patient? No  Is this a medication the patient will need to receive again? Yes. Verified that the patient has refills remaining in their prescription.      Sasha García MA

## 2023-05-06 DIAGNOSIS — I10 HYPERTENSION GOAL BP (BLOOD PRESSURE) < 140/90: ICD-10-CM

## 2023-05-08 RX ORDER — LOSARTAN POTASSIUM 25 MG/1
TABLET ORAL
Qty: 1 TABLET | Refills: 0 | OUTPATIENT
Start: 2023-05-08

## 2023-05-08 RX ORDER — LOSARTAN POTASSIUM 25 MG/1
TABLET ORAL
Qty: 90 TABLET | Refills: 0 | Status: SHIPPED | OUTPATIENT
Start: 2023-05-08 | End: 2023-08-03

## 2023-05-08 NOTE — TELEPHONE ENCOUNTER
Prescription approved per [facility] Refill Protocol.  Nicole Drake RN

## 2023-05-12 ENCOUNTER — LAB (OUTPATIENT)
Dept: LAB | Facility: CLINIC | Age: 73
End: 2023-05-12
Payer: MEDICARE

## 2023-05-12 DIAGNOSIS — E11.49 TYPE 2 DIABETES MELLITUS WITH OTHER NEUROLOGIC COMPLICATION, WITH LONG-TERM CURRENT USE OF INSULIN (H): ICD-10-CM

## 2023-05-12 DIAGNOSIS — Z79.4 TYPE 2 DIABETES MELLITUS WITH OTHER NEUROLOGIC COMPLICATION, WITH LONG-TERM CURRENT USE OF INSULIN (H): ICD-10-CM

## 2023-05-12 LAB — HBA1C MFR BLD: 6.6 % (ref 0–5.6)

## 2023-05-12 PROCEDURE — 36415 COLL VENOUS BLD VENIPUNCTURE: CPT

## 2023-05-12 PROCEDURE — 83036 HEMOGLOBIN GLYCOSYLATED A1C: CPT

## 2023-05-19 ENCOUNTER — ANCILLARY PROCEDURE (OUTPATIENT)
Dept: MAMMOGRAPHY | Facility: CLINIC | Age: 73
End: 2023-05-19
Attending: FAMILY MEDICINE
Payer: MEDICARE

## 2023-05-19 ENCOUNTER — ANCILLARY ORDERS (OUTPATIENT)
Dept: FAMILY MEDICINE | Facility: CLINIC | Age: 73
End: 2023-05-19

## 2023-05-19 DIAGNOSIS — Z12.31 VISIT FOR SCREENING MAMMOGRAM: ICD-10-CM

## 2023-05-19 PROCEDURE — 77067 SCR MAMMO BI INCL CAD: CPT | Mod: GC | Performed by: RADIOLOGY

## 2023-05-19 PROCEDURE — 77063 BREAST TOMOSYNTHESIS BI: CPT | Mod: GC | Performed by: RADIOLOGY

## 2023-06-01 DIAGNOSIS — I69.354 SPASTIC HEMIPLEGIA OF LEFT NONDOMINANT SIDE AS LATE EFFECT OF CEREBRAL INFARCTION (H): ICD-10-CM

## 2023-06-02 RX ORDER — BACLOFEN 10 MG/1
10 TABLET ORAL 3 TIMES DAILY
Qty: 270 TABLET | Refills: 3 | Status: SHIPPED | OUTPATIENT
Start: 2023-06-02 | End: 2024-05-15

## 2023-06-02 NOTE — TELEPHONE ENCOUNTER
ERASTO,    Routing refill request to provider for review/approval because:  Drug not on the [facility] refill protocol   Last OV 11/15/22: annual wellness exam      Thanks,  Nicole Drake RN

## 2023-06-09 ENCOUNTER — ALLIED HEALTH/NURSE VISIT (OUTPATIENT)
Dept: FAMILY MEDICINE | Facility: CLINIC | Age: 73
End: 2023-06-09
Payer: MEDICARE

## 2023-06-09 DIAGNOSIS — E53.8 VITAMIN B12 DEFICIENCY (NON ANEMIC): Primary | ICD-10-CM

## 2023-06-09 PROCEDURE — 99207 PR NO CHARGE NURSE ONLY: CPT

## 2023-06-09 NOTE — PROGRESS NOTES
Clinic Administered Medication Documentation      Injectable Medication Documentation    Is there an active order (written within the past 365 days, with administrations remaining, not ) in the chart? Yes.     Patient was given Cyanocobalamin (B-12). Prior to medication administration, verified patient's identity using patient s name and date of birth. Please see MAR and medication order for additional information. Patient instructed to remain in clinic for 15 minutes and report any adverse reaction to staff immediately.    Vial/Syringe: Single dose vial. Was entire vial of medication used? Yes  Was this medication supplied by the patient? No  Is this a medication the patient will need to receive again? No - not necessary to check for refills remaining.

## 2023-07-14 ENCOUNTER — ALLIED HEALTH/NURSE VISIT (OUTPATIENT)
Dept: FAMILY MEDICINE | Facility: CLINIC | Age: 73
End: 2023-07-14
Payer: MEDICARE

## 2023-07-14 DIAGNOSIS — E53.8 VITAMIN B12 DEFICIENCY (NON ANEMIC): Primary | ICD-10-CM

## 2023-07-14 PROCEDURE — 99207 PR NO CHARGE NURSE ONLY: CPT

## 2023-07-14 PROCEDURE — 96372 THER/PROPH/DIAG INJ SC/IM: CPT | Performed by: FAMILY MEDICINE

## 2023-07-14 RX ADMIN — CYANOCOBALAMIN 1000 MCG: 1000 INJECTION, SOLUTION INTRAMUSCULAR; SUBCUTANEOUS at 11:04

## 2023-07-14 NOTE — PROGRESS NOTES
Clinic Administered Medication Documentation      Injectable Medication Documentation    Is there an active order (written within the past 365 days, with administrations remaining, not ) in the chart? Yes.     Patient was given Cyanocobalamin (B-12). Prior to medication administration, verified patient's identity using patient s name and date of birth. Please see MAR and medication order for additional information. Patient instructed to remain in clinic for 15 minutes and report any adverse reaction to staff immediately.    Vial/Syringe: Single dose vial. Was entire vial of medication used? Yes  Was this medication supplied by the patient? No  Is this a medication the patient will need to receive again? Yes. Verified that the patient has refills remaining in their prescription.    Theresa Gold RN  [facility]

## 2023-08-02 DIAGNOSIS — N18.32 TYPE 2 DIABETES MELLITUS WITH STAGE 3B CHRONIC KIDNEY DISEASE, WITH LONG-TERM CURRENT USE OF INSULIN (H): ICD-10-CM

## 2023-08-02 DIAGNOSIS — E11.22 TYPE 2 DIABETES MELLITUS WITH STAGE 3B CHRONIC KIDNEY DISEASE, WITH LONG-TERM CURRENT USE OF INSULIN (H): ICD-10-CM

## 2023-08-02 DIAGNOSIS — Z79.4 TYPE 2 DIABETES MELLITUS WITH STAGE 3B CHRONIC KIDNEY DISEASE, WITH LONG-TERM CURRENT USE OF INSULIN (H): ICD-10-CM

## 2023-08-02 DIAGNOSIS — I10 HYPERTENSION GOAL BP (BLOOD PRESSURE) < 140/90: ICD-10-CM

## 2023-08-02 DIAGNOSIS — J30.2 SEASONAL ALLERGIC RHINITIS, UNSPECIFIED TRIGGER: ICD-10-CM

## 2023-08-02 NOTE — TELEPHONE ENCOUNTER
"Requested Prescriptions   Pending Prescriptions Disp Refills    losartan (COZAAR) 25 MG tablet [Pharmacy Med Name: LOSARTAN 25MG TABLETS] 90 tablet 0     Sig: TAKE 1 TABLET(25 MG) BY MOUTH AT BEDTIME       Angiotensin-II Receptors Passed - 8/2/2023  7:27 AM        Passed - Last blood pressure under 140/90 in past 12 months     BP Readings from Last 3 Encounters:   11/15/22 122/77   03/15/22 139/84   02/11/22 (!) 144/85                 Passed - Recent (12 mo) or future (30 days) visit within the authorizing provider's specialty     Patient has had an office visit with the authorizing provider or a provider within the authorizing providers department within the previous 12 mos or has a future within next 30 days. See \"Patient Info\" tab in inbasket, or \"Choose Columns\" in Meds & Orders section of the refill encounter.              Passed - Medication is active on med list        Passed - Patient is age 18 or older        Passed - No active pregnancy on record        Passed - Normal serum creatinine on file in past 12 months     Recent Labs   Lab Test 11/11/22  0720   CR 0.65       Ok to refill medication if creatinine is low          Passed - Normal serum potassium on file in past 12 months     Recent Labs   Lab Test 11/11/22  0720   POTASSIUM 4.6                    Passed - No positive pregnancy test in past 12 months          metoprolol succinate ER (TOPROL XL) 50 MG 24 hr tablet [Pharmacy Med Name: METOPROLOL ER SUCCINATE 50MG TABS] 90 tablet 0     Sig: TAKE 1 TABLET(50 MG) BY MOUTH DAILY       Beta-Blockers Protocol Passed - 8/2/2023  7:27 AM        Passed - Blood pressure under 140/90 in past 12 months     BP Readings from Last 3 Encounters:   11/15/22 122/77   03/15/22 139/84   02/11/22 (!) 144/85                 Passed - Patient is age 6 or older        Passed - Recent (12 mo) or future (30 days) visit within the authorizing provider's specialty     Patient has had an office visit with the authorizing provider " "or a provider within the authorizing providers department within the previous 12 mos or has a future within next 30 days. See \"Patient Info\" tab in inbasket, or \"Choose Columns\" in Meds & Orders section of the refill encounter.              Passed - Medication is active on med list          montelukast (SINGULAIR) 10 MG tablet [Pharmacy Med Name: MONTELUKAST 10MG TABLETS] 90 tablet 0     Sig: TAKE 1 TABLET(10 MG) BY MOUTH DAILY       Leukotriene Inhibitors Protocol Failed - 8/2/2023  7:27 AM        Failed - Asthma control assessment score within normal limits in last 6 months     Please review ACT score.           Failed - Recent (6 mo) or future (30 days) visit within the authorizing provider's specialty     Patient had office visit in the last 6 months or has a visit in the next 30 days with authorizing provider or within the authorizing provider's specialty.  See \"Patient Info\" tab in inbasket, or \"Choose Columns\" in Meds & Orders section of the refill encounter.            Passed - Patient is age 12 or older     If patient is under 16, ok to refill using age based dosing.           Passed - Medication is active on med list          metFORMIN (GLUCOPHAGE) 1000 MG tablet [Pharmacy Med Name: METFORMIN 1000MG TABLETS] 180 tablet 0     Sig: TAKE 1 TABLET(1000 MG) BY MOUTH TWICE DAILY WITH MEALS       Biguanide Agents Failed - 8/2/2023  7:27 AM        Failed - Recent (6 mo) or future (30 days) visit within the authorizing provider's specialty     Patient had office visit in the last 6 months or has a visit in the next 30 days with authorizing provider or within the authorizing provider's specialty.  See \"Patient Info\" tab in inbasket, or \"Choose Columns\" in Meds & Orders section of the refill encounter.            Passed - Patient is age 10 or older        Passed - Patient has documented A1c within the specified period of time.     If HgbA1C is 8 or greater, it needs to be on file within the past 3 months.  If less " than 8, must be on file within the past 6 months.     Recent Labs   Lab Test 05/12/23  0656   A1C 6.6*             Passed - Patient's CR is NOT>1.4 OR Patient's EGFR is NOT<45 within past 12 mos.     Recent Labs   Lab Test 11/11/22  0720 11/12/21  0701 04/15/21  0740   GFRESTIMATED >90   < > 86   GFRESTBLACK  --   --  >90    < > = values in this interval not displayed.       Recent Labs   Lab Test 11/11/22  0720   CR 0.65             Passed - Patient does NOT have a diagnosis of CHF.        Passed - Medication is active on med list        Passed - Patient is not pregnant        Passed - Patient has not had a positive pregnancy test within the past 12 mos.            Routing refill request to provider for review/approval because:  OV on 8/18/23     Valdemar Syed RN  [facility]

## 2023-08-03 RX ORDER — METOPROLOL SUCCINATE 50 MG/1
TABLET, EXTENDED RELEASE ORAL
Qty: 90 TABLET | Refills: 3 | Status: SHIPPED | OUTPATIENT
Start: 2023-08-03 | End: 2024-07-15

## 2023-08-03 RX ORDER — MONTELUKAST SODIUM 10 MG/1
TABLET ORAL
Qty: 90 TABLET | Refills: 3 | Status: SHIPPED | OUTPATIENT
Start: 2023-08-03 | End: 2024-07-15

## 2023-08-03 RX ORDER — LOSARTAN POTASSIUM 25 MG/1
TABLET ORAL
Qty: 90 TABLET | Refills: 3 | Status: SHIPPED | OUTPATIENT
Start: 2023-08-03 | End: 2024-07-15

## 2023-08-04 ENCOUNTER — MYC MEDICAL ADVICE (OUTPATIENT)
Dept: FAMILY MEDICINE | Facility: CLINIC | Age: 73
End: 2023-08-04
Payer: MEDICARE

## 2023-08-04 NOTE — TELEPHONE ENCOUNTER
Sent mychart - request sent to PCP (See TE from 8/2)  Last annual OV 11/15/22    Tracey SANTANA RN  [facility]

## 2023-08-18 ENCOUNTER — ALLIED HEALTH/NURSE VISIT (OUTPATIENT)
Dept: FAMILY MEDICINE | Facility: CLINIC | Age: 73
End: 2023-08-18
Payer: MEDICARE

## 2023-08-18 DIAGNOSIS — E53.8 VITAMIN B12 DEFICIENCY (NON ANEMIC): Primary | ICD-10-CM

## 2023-08-18 PROCEDURE — 99207 PR NO CHARGE NURSE ONLY: CPT

## 2023-08-18 PROCEDURE — 96372 THER/PROPH/DIAG INJ SC/IM: CPT | Performed by: FAMILY MEDICINE

## 2023-08-18 RX ADMIN — CYANOCOBALAMIN 1000 MCG: 1000 INJECTION, SOLUTION INTRAMUSCULAR; SUBCUTANEOUS at 09:55

## 2023-08-18 NOTE — PROGRESS NOTES
Clinic Administered Medication Documentation      Injectable Medication Documentation    Is there an active order (written within the past 365 days, with administrations remaining, not ) in the chart? Yes.     Patient was given Cyanocobalamin (B-12). Prior to medication administration, verified patient's identity using patient s name and date of birth. Please see MAR and medication order for additional information. Patient instructed to remain in clinic for 15 minutes and report any adverse reaction to staff immediately.    Vial/Syringe: Single dose vial. Was entire vial of medication used? Yes  Was this medication supplied by the patient? No  Is this a medication the patient will need to receive again? No - not necessary to check for refills remaining.    Theresa Gold RN  [facility]

## 2023-09-17 ENCOUNTER — HEALTH MAINTENANCE LETTER (OUTPATIENT)
Age: 73
End: 2023-09-17

## 2023-09-22 ENCOUNTER — ALLIED HEALTH/NURSE VISIT (OUTPATIENT)
Dept: FAMILY MEDICINE | Facility: CLINIC | Age: 73
End: 2023-09-22
Payer: MEDICARE

## 2023-09-22 DIAGNOSIS — E53.8 VITAMIN B12 DEFICIENCY (NON ANEMIC): Primary | ICD-10-CM

## 2023-09-22 PROCEDURE — 96372 THER/PROPH/DIAG INJ SC/IM: CPT | Performed by: FAMILY MEDICINE

## 2023-09-22 PROCEDURE — 99207 PR NO CHARGE NURSE ONLY: CPT

## 2023-09-22 RX ADMIN — CYANOCOBALAMIN 1000 MCG: 1000 INJECTION, SOLUTION INTRAMUSCULAR; SUBCUTANEOUS at 10:55

## 2023-09-22 NOTE — PROGRESS NOTES
Clinic Administered Medication Documentation      Injectable Medication Documentation    Is there an active order (written within the past 365 days, with administrations remaining, not ) in the chart? Yes.     Patient was given Cyanocobalamin (B-12). Prior to medication administration, verified patient's identity using patient s name and date of birth. Please see MAR and medication order for additional information. Patient instructed to remain in clinic for 15 minutes and report any adverse reaction to staff immediately.    Vial/Syringe: Single dose vial. Was entire vial of medication used? Yes  Was this medication supplied by the patient? No  Is this a medication the patient will need to receive again? Yes. Verified that the patient has refills remaining in their prescription.    Theresa Gold RN  [facility]

## 2023-10-23 ENCOUNTER — ALLIED HEALTH/NURSE VISIT (OUTPATIENT)
Dept: FAMILY MEDICINE | Facility: CLINIC | Age: 73
End: 2023-10-23
Payer: MEDICARE

## 2023-10-23 DIAGNOSIS — D51.9 ANEMIA DUE TO VITAMIN B12 DEFICIENCY, UNSPECIFIED B12 DEFICIENCY TYPE: Primary | ICD-10-CM

## 2023-10-23 PROCEDURE — 99207 PR NO CHARGE NURSE ONLY: CPT

## 2023-10-30 DIAGNOSIS — I63.321 CEREBROVASCULAR ACCIDENT (CVA) DUE TO THROMBOSIS OF RIGHT ANTERIOR CEREBRAL ARTERY (H): ICD-10-CM

## 2023-10-30 RX ORDER — CLOPIDOGREL BISULFATE 75 MG/1
75 TABLET ORAL DAILY
Qty: 90 TABLET | Refills: 0 | Status: SHIPPED | OUTPATIENT
Start: 2023-10-30 | End: 2023-11-17

## 2023-11-06 ENCOUNTER — DOCUMENTATION ONLY (OUTPATIENT)
Dept: FAMILY MEDICINE | Facility: CLINIC | Age: 73
End: 2023-11-06
Payer: MEDICARE

## 2023-11-06 DIAGNOSIS — Z79.899 MEDICATION MANAGEMENT: ICD-10-CM

## 2023-11-06 DIAGNOSIS — E11.22 TYPE 2 DIABETES MELLITUS WITH STAGE 3B CHRONIC KIDNEY DISEASE, WITH LONG-TERM CURRENT USE OF INSULIN (H): Primary | ICD-10-CM

## 2023-11-06 DIAGNOSIS — E11.49 TYPE 2 DIABETES MELLITUS WITH OTHER NEUROLOGIC COMPLICATION, WITH LONG-TERM CURRENT USE OF INSULIN (H): ICD-10-CM

## 2023-11-06 DIAGNOSIS — M10.9 GOUT, UNSPECIFIED CAUSE, UNSPECIFIED CHRONICITY, UNSPECIFIED SITE: ICD-10-CM

## 2023-11-06 DIAGNOSIS — N18.32 TYPE 2 DIABETES MELLITUS WITH STAGE 3B CHRONIC KIDNEY DISEASE, WITH LONG-TERM CURRENT USE OF INSULIN (H): Primary | ICD-10-CM

## 2023-11-06 DIAGNOSIS — Z79.4 TYPE 2 DIABETES MELLITUS WITH OTHER NEUROLOGIC COMPLICATION, WITH LONG-TERM CURRENT USE OF INSULIN (H): ICD-10-CM

## 2023-11-06 DIAGNOSIS — Z79.4 TYPE 2 DIABETES MELLITUS WITH STAGE 3B CHRONIC KIDNEY DISEASE, WITH LONG-TERM CURRENT USE OF INSULIN (H): Primary | ICD-10-CM

## 2023-11-06 DIAGNOSIS — D51.9 ANEMIA DUE TO VITAMIN B12 DEFICIENCY, UNSPECIFIED B12 DEFICIENCY TYPE: ICD-10-CM

## 2023-11-06 DIAGNOSIS — Z29.11 NEED FOR VACCINATION AGAINST RESPIRATORY SYNCYTIAL VIRUS: ICD-10-CM

## 2023-11-06 NOTE — PROGRESS NOTES
Addis Peña has an upcoming lab appointment.        Future Appointments   Date Time Provider Department [place]   11/14/2023  7:00 AM [facility]    11/17/2023  9:00 AM [facility]   11/24/2023 11:00 AM [facility] MA/LPN [facility]   12/26/2023  3:00 PM [facility] MA/LPN [facility]       The appointment note says: labs, Dr. Wegener     There is no Lab order available.      Please review and place future orders as appropriate.  If no Lab order will be placed, please advise patient.      Also for consideration: HMPO    Health Maintenance Due   Topic    MICROALBUMIN     ANNUAL REVIEW OF HM ORDERS     BMP     A1C     CMP     LIPID     CBC     HEMOGLOBIN        Thanks,    Caty Schaeffer

## 2023-11-11 ASSESSMENT — ENCOUNTER SYMPTOMS
PARESTHESIAS: 0
MYALGIAS: 0
CONSTIPATION: 0
CHILLS: 0
DYSURIA: 0
HEMATURIA: 0
SHORTNESS OF BREATH: 0
COUGH: 0
PALPITATIONS: 0
BREAST MASS: 0
WEAKNESS: 0
EYE PAIN: 0
ABDOMINAL PAIN: 0
DIZZINESS: 0
HEARTBURN: 0
NAUSEA: 0
HEADACHES: 0
NERVOUS/ANXIOUS: 0
ARTHRALGIAS: 0
FEVER: 0
FREQUENCY: 1
SORE THROAT: 0
HEMATOCHEZIA: 0
JOINT SWELLING: 0
DIARRHEA: 0

## 2023-11-11 ASSESSMENT — ACTIVITIES OF DAILY LIVING (ADL)
CURRENT_FUNCTION: TRANSPORTATION REQUIRES ASSISTANCE
CURRENT_FUNCTION: MONEY MANAGEMENT REQUIRES ASSISTANCE
CURRENT_FUNCTION: BATHING REQUIRES ASSISTANCE
CURRENT_FUNCTION: LAUNDRY REQUIRES ASSISTANCE
CURRENT_FUNCTION: SHOPPING REQUIRES ASSISTANCE
CURRENT_FUNCTION: HOUSEWORK REQUIRES ASSISTANCE

## 2023-11-14 ENCOUNTER — LAB (OUTPATIENT)
Dept: LAB | Facility: CLINIC | Age: 73
End: 2023-11-14
Payer: MEDICARE

## 2023-11-14 DIAGNOSIS — E11.49 TYPE 2 DIABETES MELLITUS WITH OTHER NEUROLOGIC COMPLICATION, WITH LONG-TERM CURRENT USE OF INSULIN (H): ICD-10-CM

## 2023-11-14 DIAGNOSIS — M10.9 GOUT, UNSPECIFIED CAUSE, UNSPECIFIED CHRONICITY, UNSPECIFIED SITE: ICD-10-CM

## 2023-11-14 DIAGNOSIS — D51.9 ANEMIA DUE TO VITAMIN B12 DEFICIENCY, UNSPECIFIED B12 DEFICIENCY TYPE: ICD-10-CM

## 2023-11-14 DIAGNOSIS — Z79.899 MEDICATION MANAGEMENT: ICD-10-CM

## 2023-11-14 DIAGNOSIS — E11.22 TYPE 2 DIABETES MELLITUS WITH STAGE 3B CHRONIC KIDNEY DISEASE, WITH LONG-TERM CURRENT USE OF INSULIN (H): Primary | ICD-10-CM

## 2023-11-14 DIAGNOSIS — Z79.4 TYPE 2 DIABETES MELLITUS WITH STAGE 3B CHRONIC KIDNEY DISEASE, WITH LONG-TERM CURRENT USE OF INSULIN (H): Primary | ICD-10-CM

## 2023-11-14 DIAGNOSIS — N18.32 TYPE 2 DIABETES MELLITUS WITH STAGE 3B CHRONIC KIDNEY DISEASE, WITH LONG-TERM CURRENT USE OF INSULIN (H): Primary | ICD-10-CM

## 2023-11-14 DIAGNOSIS — Z79.4 TYPE 2 DIABETES MELLITUS WITH OTHER NEUROLOGIC COMPLICATION, WITH LONG-TERM CURRENT USE OF INSULIN (H): ICD-10-CM

## 2023-11-14 LAB
ALBUMIN SERPL BCG-MCNC: 4.3 G/DL (ref 3.5–5.2)
ALP SERPL-CCNC: 81 U/L (ref 40–150)
ALT SERPL W P-5'-P-CCNC: 17 U/L (ref 0–50)
ANION GAP SERPL CALCULATED.3IONS-SCNC: 12 MMOL/L (ref 7–15)
AST SERPL W P-5'-P-CCNC: 22 U/L (ref 0–45)
BILIRUB SERPL-MCNC: 0.2 MG/DL
BUN SERPL-MCNC: 24.4 MG/DL (ref 8–23)
CALCIUM SERPL-MCNC: 10 MG/DL (ref 8.8–10.2)
CHLORIDE SERPL-SCNC: 106 MMOL/L (ref 98–107)
CHOLEST SERPL-MCNC: 144 MG/DL
CREAT SERPL-MCNC: 0.77 MG/DL (ref 0.51–0.95)
DEPRECATED HCO3 PLAS-SCNC: 23 MMOL/L (ref 22–29)
EGFRCR SERPLBLD CKD-EPI 2021: 82 ML/MIN/1.73M2
ERYTHROCYTE [DISTWIDTH] IN BLOOD BY AUTOMATED COUNT: 12.2 % (ref 10–15)
GLUCOSE SERPL-MCNC: 117 MG/DL (ref 70–99)
HBA1C MFR BLD: 6.6 % (ref 0–5.6)
HCT VFR BLD AUTO: 40.2 % (ref 35–47)
HDLC SERPL-MCNC: 61 MG/DL
HGB BLD-MCNC: 12.9 G/DL (ref 11.7–15.7)
LDLC SERPL CALC-MCNC: 63 MG/DL
LEVETIRACETAM SERPL-MCNC: 75.4 ΜG/ML (ref 10–40)
MCH RBC QN AUTO: 31.3 PG (ref 26.5–33)
MCHC RBC AUTO-ENTMCNC: 32.1 G/DL (ref 31.5–36.5)
MCV RBC AUTO: 98 FL (ref 78–100)
NONHDLC SERPL-MCNC: 83 MG/DL
PLATELET # BLD AUTO: 258 10E3/UL (ref 150–450)
POTASSIUM SERPL-SCNC: 4.8 MMOL/L (ref 3.4–5.3)
PROT SERPL-MCNC: 6.6 G/DL (ref 6.4–8.3)
RBC # BLD AUTO: 4.12 10E6/UL (ref 3.8–5.2)
SODIUM SERPL-SCNC: 141 MMOL/L (ref 135–145)
TRIGL SERPL-MCNC: 100 MG/DL
URATE SERPL-MCNC: 4.1 MG/DL (ref 2.4–5.7)
VIT B12 SERPL-MCNC: 464 PG/ML (ref 232–1245)
WBC # BLD AUTO: 5.4 10E3/UL (ref 4–11)

## 2023-11-14 PROCEDURE — 80061 LIPID PANEL: CPT

## 2023-11-14 PROCEDURE — 80053 COMPREHEN METABOLIC PANEL: CPT

## 2023-11-14 PROCEDURE — 36415 COLL VENOUS BLD VENIPUNCTURE: CPT

## 2023-11-14 PROCEDURE — 80177 DRUG SCRN QUAN LEVETIRACETAM: CPT

## 2023-11-14 PROCEDURE — 82607 VITAMIN B-12: CPT

## 2023-11-14 PROCEDURE — 84550 ASSAY OF BLOOD/URIC ACID: CPT

## 2023-11-14 PROCEDURE — 85027 COMPLETE CBC AUTOMATED: CPT

## 2023-11-14 PROCEDURE — 83036 HEMOGLOBIN GLYCOSYLATED A1C: CPT

## 2023-11-16 ASSESSMENT — ENCOUNTER SYMPTOMS
HEADACHES: 0
PALPITATIONS: 0
MYALGIAS: 0
COUGH: 0
BREAST MASS: 0
HEMATURIA: 0
HEMATOCHEZIA: 0
FREQUENCY: 1
NERVOUS/ANXIOUS: 0
CHILLS: 0
FEVER: 0
ARTHRALGIAS: 0
JOINT SWELLING: 0
HEARTBURN: 0
ABDOMINAL PAIN: 0
CONSTIPATION: 0
SORE THROAT: 0
DIARRHEA: 0
DIZZINESS: 0
WEAKNESS: 0
DYSURIA: 0
SHORTNESS OF BREATH: 0
EYE PAIN: 0
NAUSEA: 0
PARESTHESIAS: 0

## 2023-11-16 ASSESSMENT — ACTIVITIES OF DAILY LIVING (ADL)
CURRENT_FUNCTION: SHOPPING REQUIRES ASSISTANCE
CURRENT_FUNCTION: BATHING REQUIRES ASSISTANCE
CURRENT_FUNCTION: HOUSEWORK REQUIRES ASSISTANCE
CURRENT_FUNCTION: MONEY MANAGEMENT REQUIRES ASSISTANCE
CURRENT_FUNCTION: LAUNDRY REQUIRES ASSISTANCE
CURRENT_FUNCTION: TRANSPORTATION REQUIRES ASSISTANCE

## 2023-11-16 NOTE — PROGRESS NOTES
"SUBJECTIVE:   Addis is a 73 year old, presenting for the following:  Physical (AWV)        11/17/2023     8:56 AM   Additional Questions   Roomed by TYLER Mendoza   Accompanied by Buck - caregiver       Are you in the first 12 months of your Medicare coverage?  No    Healthy Habits:     In general, how would you rate your overall health?  Good    Frequency of exercise:  4-5 days/week    Duration of exercise:  15-30 minutes    Do you usually eat at least 4 servings of fruit and vegetables a day, include whole grains    & fiber and avoid regularly eating high fat or \"junk\" foods?  Yes    Taking medications regularly:  Yes    Ability to successfully perform activities of daily living:  Transportation requires assistance, shopping requires assistance, housework requires assistance, bathing requires assistance, laundry requires assistance and money management requires assistance    Home Safety:  No safety concerns identified    Hearing Impairment:  Difficulty following a conversation in a noisy restaurant or crowded room, feel that people are mumbling or not speaking clearly and need to ask people to speak up or repeat themselves    In the past 6 months, have you been bothered by leaking of urine? Yes    In general, how would you rate your overall mental or emotional health?  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 (For example, a Health Directive, POLST, or a discussion " with a medical provider or your loved ones about your wishes): Yes, advance care planning is on file.       Fall risk  Fallen 2 or more times in the past year?: No  Any fall with injury in the past year?: No  Cognitive Screening   1) Repeat 3 items (Leader, Season, Table)    2) Clock draw: NORMAL  3) 3 item recall: Recalls 2 objects   Results: NORMAL clock, 1-2 items recalled: COGNITIVE IMPAIRMENT LESS LIKELY    Mini-CogTM Copyright TOBY Rodriguez. Licensed by the author for use in [facility]; reprinted with permission (carina@[facility]). All rights reserved.      Do you have sleep apnea, excessive snoring or daytime drowsiness? : no    Reviewed and updated as needed this visit by clinical staff   Tobacco  Allergies  Meds              Reviewed and updated as needed this visit by Provider                 Social History     Tobacco Use    Smoking status: Never    Smokeless tobacco: Never   Substance Use Topics    Alcohol use: No             11/11/2023     5:11 AM   Alcohol Use   Prescreen: >3 drinks/day or >7 drinks/week? No          No data to display                Do you have a current opioid prescription? No  Do you use any other controlled substances or medications that are not prescribed by a provider? None              Current providers sharing in care for this patient include:   Patient Care Team:  Wegener, Joel Daniel Irwin, MD as PCP - General (Family Practice)  Madelaine Roland MD as MD (Urology)  Wegener, Joel Daniel Irwin, MD as Referring Physician (Family Practice)  Annette Perez, RN as Registered Nurse (Urology)  Steve Foster MD as Referring Physician (Family Practice)  Rupal as   Desirae Escamilla as Other (see comments)  Wegener, Joel Daniel Irwin, MD as Assigned PCP  Cornelia Pickering MD as MD (Ophthalmology)  Mariusz Gallo MD as Physician (Ophthalmology)  Mariusz Gallo MD as Assigned Surgical Provider    The following health maintenance items are reviewed in  Epic and correct as of today:  Health Maintenance   Topic Date Due    DEXA  01/19/2020    MICROALBUMIN  05/20/2021    DIABETIC FOOT EXAM  11/15/2023    EYE EXAM  12/09/2023    A1C  02/14/2024    COLORECTAL CANCER SCREENING  05/09/2024    BMP  05/14/2024    PHQ-9  05/17/2024    ANNUAL REVIEW OF HM ORDERS  11/06/2024    CMP  11/14/2024    LIPID  11/14/2024    CBC  11/14/2024    HEMOGLOBIN  11/14/2024    MEDICARE ANNUAL WELLNESS VISIT  11/17/2024    FALL RISK ASSESSMENT  11/17/2024    MAMMO SCREENING  05/19/2025    ADVANCE CARE PLANNING  11/17/2028    DTAP/TDAP/TD IMMUNIZATION (3 - Td or Tdap) 11/20/2030    PARATHYROID  Completed    PHOSPHORUS  Completed    HEPATITIS C SCREENING  Completed    DEPRESSION ACTION PLAN  Completed    INFLUENZA VACCINE  Completed    Pneumococcal Vaccine: 65+ Years  Completed    URINALYSIS  Completed    ALK PHOS  Completed    ZOSTER IMMUNIZATION  Completed    RSV VACCINE (Pregnancy & 60+)  Completed    COVID-19 Vaccine  Completed    IPV IMMUNIZATION  Aged Out    HPV IMMUNIZATION  Aged Out    MENINGITIS IMMUNIZATION  Aged Out    RSV MONOCLONAL ANTIBODY  Aged Out               Review of Systems   Constitutional:  Negative for chills and fever.   HENT:  Positive for hearing loss. Negative for congestion, ear pain and sore throat.    Eyes:  Negative for pain and visual disturbance.   Respiratory:  Negative for cough and shortness of breath.    Cardiovascular:  Negative for chest pain, palpitations and peripheral edema.   Gastrointestinal:  Negative for abdominal pain, constipation, diarrhea, heartburn, hematochezia and nausea.   Breasts:  Negative for tenderness, breast mass and discharge.   Genitourinary:  Positive for frequency. Negative for dysuria, genital sores, hematuria, pelvic pain, urgency, vaginal bleeding and vaginal discharge.   Musculoskeletal:  Negative for arthralgias, joint swelling and myalgias.   Skin:  Negative for rash.   Neurological:  Negative for dizziness, weakness,  "headaches and paresthesias.   Psychiatric/Behavioral:  Negative for mood changes. The patient is not nervous/anxious.          OBJECTIVE:   /74   Pulse 60   Temp 97.3  F (36.3  C) (Temporal)   Resp 16   LMP  (LMP Unknown)   SpO2 96%  Estimated body mass index is 21.63 kg/m  as calculated from the following:    Height as of 11/15/22: 1.676 m (5' 6\").    Weight as of 2/15/22: 60.8 kg (134 lb).  Physical Exam  GENERAL: healthy, alert and no distress  EYES: Eyes grossly normal to inspection, PERRL and conjunctivae and sclerae normal  HENT: ear canals and TM's normal, nose and mouth without ulcers or lesions  NECK: no adenopathy, no asymmetry, masses, or scars and thyroid normal to palpation  RESP: lungs clear to auscultation - no rales, rhonchi or wheezes  CV: regular rate and rhythm, normal S1 S2, no S3 or S4, no murmur, click or rub, no peripheral edema and peripheral pulses strong  ABDOMEN: soft, nontender, no hepatosplenomegaly, no masses and bowel sounds normal  MS: in wheelchair, slightly dysarthric speech  SKIN: no suspicious lesions or rashes  NEURO: Normal strength and tone, mentation intact and speech normal  PSYCH: mentation appears normal, affect normal/brigh    Bilateral diabetic monofilament foot exam normal       ASSESSMENT / PLAN:   (Z00.00) Encounter for Medicare annual wellness exam  (primary encounter diagnosis)      Wellness Visit Notes:    Overal doing well.  Well cared for by .   Reviewed recent reassuring labs at goal.     Keppra level slightly high but not oversedated, no change in dose recommended.     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   Follow up six months diabetes visit with me    (E11.22,  N18.32,  Z79.4) Type 2 diabetes mellitus with stage 3b chronic kidney disease, with long-term current use of insulin (H)  Comment: A1c at goal 6.6  Plan:     (E11.49,  Z79.4) Type 2 diabetes mellitus with other neurologic complication, with long-term current use of insulin (H)  Comment:   Plan: Adult Eye  Referral            (Z13.820) Screening for osteoporosis  Comment:   Plan: DEXA HIP/PELVIS/SPINE - Future            (Z78.0) Post-menopausal  Comment:   Plan: DEXA HIP/PELVIS/SPINE - Future            (I69.354) Spastic hemiplegia of left nondominant side as late effect of cerebral infarction (H)  Comment: no changes.  Again well cared for by spouse, has needs met, uses wheelchair.   Plan:     (F33.0) Major depressive disorder, recurrent episode, mild (H24)  Comment:       11/16/2021     9:53 AM 12/9/2022     2:24 PM 11/17/2023     8:55 AM   PHQ   PHQ-9 Total Score 0 0 0   Q9: Thoughts of better off dead/self-harm past 2 weeks Not at all Not at all Not at all     In remission, no changes.   Plan:     (G40.909) Seizure disorder (H)  Comment: quiescent.   Plan:     Patient has been advised of split billing requirements and indicates understanding: Yes      COUNSELING:  Reviewed preventive health counseling, as reflected in patient instructions       Regular exercise       Healthy diet/nutrition       Advanced Planning         She reports that she has never smoked. She has never used smokeless tobacco.      Appropriate preventive services were discussed with this patient, including applicable screening as appropriate for fall prevention, nutrition, physical activity, Tobacco-use cessation, weight loss and cognition.  Checklist reviewing preventive services available has  been given to the patient.    Reviewed patients plan of care and provided an AVS. The Intermediate Care Plan ( asthma action plan, low back pain action plan, and migraine action plan) for Addis meets the Care Plan requirement. This Care Plan has been established and reviewed with the Patient and spouse.          Joel Daniel Wegener, MD  [facility]    Identified Health Risks:  I have reviewed Opioid Use Disorder and Substance Use Disorder risk factors and made any needed referrals.   The patient reports that she has difficulty with activities of daily living. I have asked that the patient make a follow up appointment in 6 months weeks where this issue will be further evaluated and addressed.  The patient was provided with written information regarding signs of hearing loss.  Information on urinary incontinence and treatment options given to patient.  Answers submitted by the patient for this visit:  Patient Health Questionnaire (Submitted on 11/17/2023)  If you checked off any problems, how difficult have these problems made it for you to do your work, take care of things at home, or get along with other people?: Not difficult at all  PHQ9 TOTAL SCORE: 0  RICCO-7 (Submitted on 11/17/2023)  RICCO 7 TOTAL SCORE: 0

## 2023-11-17 ENCOUNTER — OFFICE VISIT (OUTPATIENT)
Dept: FAMILY MEDICINE | Facility: CLINIC | Age: 73
End: 2023-11-17
Payer: MEDICARE

## 2023-11-17 ENCOUNTER — MYC MEDICAL ADVICE (OUTPATIENT)
Dept: FAMILY MEDICINE | Facility: CLINIC | Age: 73
End: 2023-11-17

## 2023-11-17 VITALS
OXYGEN SATURATION: 96 % | HEART RATE: 60 BPM | SYSTOLIC BLOOD PRESSURE: 137 MMHG | RESPIRATION RATE: 16 BRPM | TEMPERATURE: 97.3 F | DIASTOLIC BLOOD PRESSURE: 74 MMHG

## 2023-11-17 DIAGNOSIS — E78.5 HYPERLIPIDEMIA LDL GOAL <70: ICD-10-CM

## 2023-11-17 DIAGNOSIS — Z78.0 POST-MENOPAUSAL: ICD-10-CM

## 2023-11-17 DIAGNOSIS — I69.354 SPASTIC HEMIPLEGIA OF LEFT NONDOMINANT SIDE AS LATE EFFECT OF CEREBRAL INFARCTION (H): ICD-10-CM

## 2023-11-17 DIAGNOSIS — Z00.00 ENCOUNTER FOR MEDICARE ANNUAL WELLNESS EXAM: Primary | ICD-10-CM

## 2023-11-17 DIAGNOSIS — N18.32 TYPE 2 DIABETES MELLITUS WITH STAGE 3B CHRONIC KIDNEY DISEASE, WITH LONG-TERM CURRENT USE OF INSULIN (H): ICD-10-CM

## 2023-11-17 DIAGNOSIS — E11.22 TYPE 2 DIABETES MELLITUS WITH STAGE 3B CHRONIC KIDNEY DISEASE, WITH LONG-TERM CURRENT USE OF INSULIN (H): Primary | ICD-10-CM

## 2023-11-17 DIAGNOSIS — Z13.820 SCREENING FOR OSTEOPOROSIS: ICD-10-CM

## 2023-11-17 DIAGNOSIS — E11.22 TYPE 2 DIABETES MELLITUS WITH STAGE 3B CHRONIC KIDNEY DISEASE, WITH LONG-TERM CURRENT USE OF INSULIN (H): ICD-10-CM

## 2023-11-17 DIAGNOSIS — E11.49 TYPE 2 DIABETES MELLITUS WITH OTHER NEUROLOGIC COMPLICATION, WITH LONG-TERM CURRENT USE OF INSULIN (H): ICD-10-CM

## 2023-11-17 DIAGNOSIS — F33.42 RECURRENT MAJOR DEPRESSIVE DISORDER, IN FULL REMISSION (H): ICD-10-CM

## 2023-11-17 DIAGNOSIS — F33.0 MAJOR DEPRESSIVE DISORDER, RECURRENT EPISODE, MILD (H): ICD-10-CM

## 2023-11-17 DIAGNOSIS — N18.32 TYPE 2 DIABETES MELLITUS WITH STAGE 3B CHRONIC KIDNEY DISEASE, WITH LONG-TERM CURRENT USE OF INSULIN (H): Primary | ICD-10-CM

## 2023-11-17 DIAGNOSIS — I63.321 CEREBROVASCULAR ACCIDENT (CVA) DUE TO THROMBOSIS OF RIGHT ANTERIOR CEREBRAL ARTERY (H): ICD-10-CM

## 2023-11-17 DIAGNOSIS — Z79.4 TYPE 2 DIABETES MELLITUS WITH OTHER NEUROLOGIC COMPLICATION, WITH LONG-TERM CURRENT USE OF INSULIN (H): ICD-10-CM

## 2023-11-17 DIAGNOSIS — G40.909 SEIZURE DISORDER (H): ICD-10-CM

## 2023-11-17 DIAGNOSIS — Z79.4 TYPE 2 DIABETES MELLITUS WITH STAGE 3B CHRONIC KIDNEY DISEASE, WITH LONG-TERM CURRENT USE OF INSULIN (H): ICD-10-CM

## 2023-11-17 DIAGNOSIS — Z79.4 TYPE 2 DIABETES MELLITUS WITH STAGE 3B CHRONIC KIDNEY DISEASE, WITH LONG-TERM CURRENT USE OF INSULIN (H): Primary | ICD-10-CM

## 2023-11-17 DIAGNOSIS — I10 HYPERTENSION GOAL BP (BLOOD PRESSURE) < 130/80: ICD-10-CM

## 2023-11-17 PROCEDURE — 99207 PR FOOT EXAM NO CHARGE: CPT | Performed by: FAMILY MEDICINE

## 2023-11-17 PROCEDURE — G0439 PPPS, SUBSEQ VISIT: HCPCS | Performed by: FAMILY MEDICINE

## 2023-11-17 RX ORDER — AMLODIPINE BESYLATE 10 MG/1
10 TABLET ORAL DAILY
Qty: 90 TABLET | Refills: 3 | Status: SHIPPED | OUTPATIENT
Start: 2023-11-17

## 2023-11-17 RX ORDER — ATORVASTATIN CALCIUM 40 MG/1
40 TABLET, FILM COATED ORAL DAILY
Qty: 90 TABLET | Refills: 3 | Status: SHIPPED | OUTPATIENT
Start: 2023-11-17

## 2023-11-17 RX ORDER — CLOPIDOGREL BISULFATE 75 MG/1
75 TABLET ORAL DAILY
Qty: 90 TABLET | Refills: 0 | Status: SHIPPED | OUTPATIENT
Start: 2023-11-17 | End: 2024-04-23

## 2023-11-17 RX ORDER — CITALOPRAM HYDROBROMIDE 10 MG/1
10 TABLET ORAL DAILY
Qty: 90 TABLET | Refills: 3 | Status: SHIPPED | OUTPATIENT
Start: 2023-11-17

## 2023-11-17 ASSESSMENT — ANXIETY QUESTIONNAIRES
6. BECOMING EASILY ANNOYED OR IRRITABLE: NOT AT ALL
5. BEING SO RESTLESS THAT IT IS HARD TO SIT STILL: NOT AT ALL
1. FEELING NERVOUS, ANXIOUS, OR ON EDGE: NOT AT ALL
IF YOU CHECKED OFF ANY PROBLEMS ON THIS QUESTIONNAIRE, HOW DIFFICULT HAVE THESE PROBLEMS MADE IT FOR YOU TO DO YOUR WORK, TAKE CARE OF THINGS AT HOME, OR GET ALONG WITH OTHER PEOPLE: NOT DIFFICULT AT ALL
3. WORRYING TOO MUCH ABOUT DIFFERENT THINGS: NOT AT ALL
GAD7 TOTAL SCORE: 0
7. FEELING AFRAID AS IF SOMETHING AWFUL MIGHT HAPPEN: NOT AT ALL
GAD7 TOTAL SCORE: 0
4. TROUBLE RELAXING: NOT AT ALL
2. NOT BEING ABLE TO STOP OR CONTROL WORRYING: NOT AT ALL

## 2023-11-17 ASSESSMENT — PAIN SCALES - GENERAL: PAINLEVEL: NO PAIN (0)

## 2023-11-17 ASSESSMENT — PATIENT HEALTH QUESTIONNAIRE - PHQ9
SUM OF ALL RESPONSES TO PHQ QUESTIONS 1-9: 0
SUM OF ALL RESPONSES TO PHQ QUESTIONS 1-9: 0
10. IF YOU CHECKED OFF ANY PROBLEMS, HOW DIFFICULT HAVE THESE PROBLEMS MADE IT FOR YOU TO DO YOUR WORK, TAKE CARE OF THINGS AT HOME, OR GET ALONG WITH OTHER PEOPLE: NOT DIFFICULT AT ALL

## 2023-11-17 NOTE — PATIENT INSTRUCTIONS
Patient Education   Personalized Prevention Plan  You are due for the preventive services outlined below.  Your care team is available to assist you in scheduling these services.  If you have already completed any of these items, please share that information with your care team to update in your medical record.  Health Maintenance Due   Topic Date Due     Osteoporosis Screening  01/19/2020     Kidney Microalbumin Urine Test  05/20/2021     Diabetic Foot Exam  11/15/2023     Eye Exam  12/09/2023     Activities of Daily Living    Your Health Risk Assessment indicates you have difficulties with activities of daily living such as housework, bathing, preparing meals, taking medication, etc. Please make a follow up appointment for us to address this issue in more detail.  Hearing Loss: Care Instructions  Overview     Hearing loss is a sudden or slow decrease in how well you hear. It can range from slight to profound. Permanent hearing loss can occur with aging. It also can happen when you are exposed long-term to loud noise. Examples include listening to loud music, riding motorcycles, or being around other loud machines.  Hearing loss can affect your work and home life. It can make you feel lonely or depressed. You may feel that you have lost your independence. But hearing aids and other devices can help you hear better and feel connected to others.  Follow-up care is a key part of your treatment and safety. Be sure to make and go to all appointments, and call your doctor if you are having problems. It's also a good idea to know your test results and keep a list of the medicines you take.  How can you care for yourself at home?  Avoid loud noises whenever possible. This helps keep your hearing from getting worse.  Always wear hearing protection around loud noises.  Wear a hearing aid as directed.  A professional can help you pick a hearing aid that will work best for you.  You can also get hearing aids over the counter  "for mild to moderate hearing loss.  Have hearing tests as your doctor suggests. They can show whether your hearing has changed. Your hearing aid may need to be adjusted.  Use other devices as needed. These may include:  Telephone amplifiers and hearing aids that can connect to a television, stereo, radio, or microphone.  Devices that use lights or vibrations. These alert you to the doorbell, a ringing telephone, or a baby monitor.  Television closed-captioning. This shows the words at the bottom of the screen. Most new TVs can do this.  TTY (text telephone). This lets you type messages back and forth on the telephone instead of talking or listening. These devices are also called TDD. When messages are typed on the keyboard, they are sent over the phone line to a receiving TTY. The message is shown on a monitor.  Use text messaging, social media, and email if it is hard for you to communicate by telephone.  Try to learn a listening technique called speechreading. It is not lipreading. You pay attention to people's gestures, expressions, posture, and tone of voice. These clues can help you understand what a person is saying. Face the person you are talking to, and have them face you. Make sure the lighting is good. You need to see the other person's face clearly.  Think about counseling if you need help to adjust to your hearing loss.  When should you call for help?  Watch closely for changes in your health, and be sure to contact your doctor if:    You think your hearing is getting worse.     You have new symptoms, such as dizziness or nausea.   Where can you learn more?  Go to https://www.[employer].net/patiented  Enter R798 in the search box to learn more about \"Hearing Loss: Care Instructions.\"  Current as of: February 28, 2023               Content Version: 13.8    0841-9978 [employer].   Care instructions adapted under license by your healthcare professional. If you have questions about a medical " condition or this instruction, always ask your healthcare professional. [employer]wise, [facility] disclaims any warranty or liability for your use of this information.      Bladder Training: Care Instructions  Your Care Instructions     Bladder training is used to treat urge incontinence and stress incontinence. Urge incontinence means that the need to urinate comes on so fast that you can't get to a toilet in time. Stress incontinence means that you leak urine because of pressure on your bladder. For example, it may happen when you laugh, cough, or lift something heavy.  Bladder training can increase how long you can wait before you have to urinate. It can also help your bladder hold more urine. And it can give you better control over the urge to urinate.  It is important to remember that bladder training takes a few weeks to a few months to make a difference. You may not see results right away, but don't give up.  Follow-up care is a key part of your treatment and safety. Be sure to make and go to all appointments, and call your doctor if you are having problems. It's also a good idea to know your test results and keep a list of the medicines you take.  How can you care for yourself at home?  Work with your doctor to come up with a bladder training program that is right for you. You may use one or more of the following methods.  Delayed urination  In the beginning, try to keep from urinating for 5 minutes after you first feel the need to go.  While you wait, take deep, slow breaths to relax. Kegel exercises can also help you delay the need to go to the bathroom.  After some practice, when you can easily wait 5 minutes to urinate, try to wait 10 minutes before you urinate.  Slowly increase the waiting period until you are able to control when you have to urinate.  Scheduled urination  Empty your bladder when you first wake up in the morning.  Schedule times throughout the day when you will urinate.  Start by going  "to the bathroom every hour, even if you don't need to go.  Slowly increase the time between trips to the bathroom.  When you have found a schedule that works well for you, keep doing it.  If you wake up during the night and have to urinate, do it. Apply your schedule to waking hours only.  Kegel exercises  These tighten and strengthen pelvic muscles, which can help you control the flow of urine. (If doing these exercises causes pain, stop doing them and talk with your doctor.) To do Kegel exercises:  Squeeze your muscles as if you were trying not to pass gas. Or squeeze your muscles as if you were stopping the flow of urine. Your belly, legs, and buttocks shouldn't move.  Hold the squeeze for 3 seconds, then relax for 5 to 10 seconds.  Start with 3 seconds, then add 1 second each week until you are able to squeeze for 10 seconds.  Repeat the exercise 10 times a session. Do 3 to 8 sessions a day.  When should you call for help?  Watch closely for changes in your health, and be sure to contact your doctor if:    Your incontinence is getting worse.     You do not get better as expected.   Where can you learn more?  Go to https://www.[employer].net/patiented  Enter V684 in the search box to learn more about \"Bladder Training: Care Instructions.\"  Current as of: February 28, 2023               Content Version: 13.8    8646-4081 [employer].   Care instructions adapted under license by your healthcare professional. If you have questions about a medical condition or this instruction, always ask your healthcare professional. [employer] disclaims any warranty or liability for your use of this information.         "

## 2023-11-24 ENCOUNTER — ALLIED HEALTH/NURSE VISIT (OUTPATIENT)
Dept: FAMILY MEDICINE | Facility: CLINIC | Age: 73
End: 2023-11-24
Payer: MEDICARE

## 2023-11-24 DIAGNOSIS — E53.8 VITAMIN B 12 DEFICIENCY: Primary | ICD-10-CM

## 2023-11-24 PROCEDURE — 99207 PR NO CHARGE NURSE ONLY: CPT

## 2023-11-24 PROCEDURE — 96372 THER/PROPH/DIAG INJ SC/IM: CPT | Performed by: FAMILY MEDICINE

## 2023-11-24 RX ADMIN — CYANOCOBALAMIN 1000 MCG: 1000 INJECTION, SOLUTION INTRAMUSCULAR; SUBCUTANEOUS at 11:34

## 2023-11-24 NOTE — TELEPHONE ENCOUNTER
JW,  Please see below [employer] message and advise.  Pended order for A1C to start with.  Thanks,  Danelle LUIS RN

## 2023-11-24 NOTE — PROGRESS NOTES
Clinic Administered Medication Documentation      Injectable Medication Documentation    Is there an active order (written within the past 365 days, with administrations remaining, not ) in the chart? Yes.     Patient was given Cyanocobalamin (B-12). Prior to medication administration, verified patient's identity using patient s name and date of birth. Please see MAR and medication order for additional information. Patient instructed to remain in clinic for 15 minutes and report any adverse reaction to staff immediately.    Vial/Syringe: Single dose vial. Was entire vial of medication used? Yes  Was this medication supplied by the patient? No  Is this a medication the patient will need to receive again? Yes. Verified that the patient has refills remaining in their prescription.    Theresa Gold RN  [facility]

## 2023-12-26 ENCOUNTER — ALLIED HEALTH/NURSE VISIT (OUTPATIENT)
Dept: FAMILY MEDICINE | Facility: CLINIC | Age: 73
End: 2023-12-26
Payer: MEDICARE

## 2023-12-26 DIAGNOSIS — E53.8 VITAMIN B 12 DEFICIENCY: Primary | ICD-10-CM

## 2023-12-26 PROCEDURE — 99207 PR NO CHARGE NURSE ONLY: CPT

## 2023-12-26 PROCEDURE — 96372 THER/PROPH/DIAG INJ SC/IM: CPT | Performed by: FAMILY MEDICINE

## 2023-12-26 RX ADMIN — CYANOCOBALAMIN 1000 MCG: 1000 INJECTION, SOLUTION INTRAMUSCULAR; SUBCUTANEOUS at 14:49

## 2023-12-26 NOTE — PROGRESS NOTES
Clinic Administered Medication Documentation      Injectable Medication Documentation    Is there an active order (written within the past 365 days, with administrations remaining, not ) in the chart? Yes.     Patient was given Cyanocobalamin (B-12). Prior to medication administration, verified patient's identity using patient s name and date of birth. Please see MAR and medication order for additional information. Patient instructed to remain in clinic for 15 minutes and report any adverse reaction to staff immediately.    Vial/Syringe: Single dose vial. Was entire vial of medication used? Yes  Was this medication supplied by the patient? No  Is this a medication the patient will need to receive again? Yes. Verified that the patient has refills remaining in their prescription.    Theresa Gold RN  [facility]

## 2023-12-28 NOTE — TELEPHONE ENCOUNTER
ERASTO,  NESTOR:  Gave verbal ok for homecare orders as listed below.  Evelyn ACEVEDO RN    
[phone] & copy sent to scan.    Nicole Ayala  TC  
Reason for Call: Request for an order or referral:    Order or referral being requested: Skilled nursing, OT, PT    Date needed: as soon as possible    Has the patient been seen by the PCP for this problem? YES    Additional comments: Needs to start home care today at 1 please advise, thank you!     Phone number [phone]    Best Time:  any    Can we leave a detailed message on this number?  YES    Call taken on 7/12/2021 at 9:50 AM by Asmita Gentile    
rcvd order for delay  For RN PT [facility] SOC to 7/12 fax [phone]  
Negative

## 2023-12-29 ENCOUNTER — ANCILLARY PROCEDURE (OUTPATIENT)
Dept: BONE DENSITY | Facility: CLINIC | Age: 73
End: 2023-12-29
Attending: FAMILY MEDICINE
Payer: MEDICARE

## 2023-12-29 ENCOUNTER — MYC MEDICAL ADVICE (OUTPATIENT)
Dept: FAMILY MEDICINE | Facility: CLINIC | Age: 73
End: 2023-12-29

## 2023-12-29 DIAGNOSIS — Z78.0 POST-MENOPAUSAL: ICD-10-CM

## 2023-12-29 DIAGNOSIS — E55.9 HYPOVITAMINOSIS D: Primary | ICD-10-CM

## 2023-12-29 DIAGNOSIS — Z13.820 SCREENING FOR OSTEOPOROSIS: ICD-10-CM

## 2023-12-29 DIAGNOSIS — M81.0 OSTEOPOROSIS WITHOUT CURRENT PATHOLOGICAL FRACTURE, UNSPECIFIED OSTEOPOROSIS TYPE: ICD-10-CM

## 2023-12-29 PROCEDURE — 77080 DXA BONE DENSITY AXIAL: CPT

## 2024-01-03 ENCOUNTER — OFFICE VISIT (OUTPATIENT)
Dept: OPHTHALMOLOGY | Facility: CLINIC | Age: 74
End: 2024-01-03
Attending: FAMILY MEDICINE
Payer: MEDICARE

## 2024-01-03 DIAGNOSIS — H25.13 NUCLEAR SCLEROSIS OF BOTH EYES: ICD-10-CM

## 2024-01-03 DIAGNOSIS — H40.003 GLAUCOMA SUSPECT, BOTH EYES: Primary | ICD-10-CM

## 2024-01-03 DIAGNOSIS — H35.372 EPIRETINAL MEMBRANE (ERM) OF LEFT EYE: ICD-10-CM

## 2024-01-03 DIAGNOSIS — E11.9 TYPE 2 DIABETES MELLITUS WITHOUT RETINOPATHY (H): ICD-10-CM

## 2024-01-03 PROBLEM — M81.0 OSTEOPOROSIS WITHOUT CURRENT PATHOLOGICAL FRACTURE, UNSPECIFIED OSTEOPOROSIS TYPE: Status: ACTIVE | Noted: 2024-01-03

## 2024-01-03 PROCEDURE — 92015 DETERMINE REFRACTIVE STATE: CPT | Mod: GY

## 2024-01-03 PROCEDURE — 92134 CPTRZ OPH DX IMG PST SGM RTA: CPT | Performed by: OPHTHALMOLOGY

## 2024-01-03 PROCEDURE — G0463 HOSPITAL OUTPT CLINIC VISIT: HCPCS | Performed by: OPHTHALMOLOGY

## 2024-01-03 PROCEDURE — 99213 OFFICE O/P EST LOW 20 MIN: CPT | Performed by: OPHTHALMOLOGY

## 2024-01-03 ASSESSMENT — CONF VISUAL FIELD
OD_INFERIOR_TEMPORAL_RESTRICTION: 0
OS_INFERIOR_NASAL_RESTRICTION: 0
OS_NORMAL: 1
OD_SUPERIOR_TEMPORAL_RESTRICTION: 0
OD_INFERIOR_NASAL_RESTRICTION: 0
OD_NORMAL: 1
METHOD: COUNTING FINGERS
OS_INFERIOR_TEMPORAL_RESTRICTION: 0
OS_SUPERIOR_NASAL_RESTRICTION: 0
OD_SUPERIOR_NASAL_RESTRICTION: 0
OS_SUPERIOR_TEMPORAL_RESTRICTION: 0

## 2024-01-03 ASSESSMENT — VISUAL ACUITY
OS_PH_SC: 20/40
OD_SC: 20/50
OS_SC: 20/50
METHOD: SNELLEN - LINEAR

## 2024-01-03 ASSESSMENT — CUP TO DISC RATIO
OD_RATIO: 0.6
OS_RATIO: 0.4

## 2024-01-03 ASSESSMENT — REFRACTION_WEARINGRX
OD_CYLINDER: SPHERE
OS_CYLINDER: SPHERE
OS_ADD: +2.25
OS_SPHERE: +1.75
OD_SPHERE: +1.50
OD_ADD: +2.25

## 2024-01-03 ASSESSMENT — TONOMETRY
OD_IOP_MMHG: 14
IOP_METHOD: TONOPEN
OS_IOP_MMHG: 15

## 2024-01-03 ASSESSMENT — REFRACTION_MANIFEST
OS_AXIS: 180
OS_CYLINDER: +0.75
OS_ADD: +2.50
OD_ADD: +2.50
OD_SPHERE: +0.50
OS_SPHERE: +0.50
OD_CYLINDER: +0.75
OD_AXIS: 180

## 2024-01-03 ASSESSMENT — SLIT LAMP EXAM - LIDS
COMMENTS: NORMAL
COMMENTS: NORMAL

## 2024-01-03 ASSESSMENT — EXTERNAL EXAM - LEFT EYE: OS_EXAM: NORMAL

## 2024-01-03 ASSESSMENT — EXTERNAL EXAM - RIGHT EYE: OD_EXAM: NORMAL

## 2024-01-03 NOTE — TELEPHONE ENCOUNTER
Patient is scheduled 1/11/24 for a Telephone visit  Dorota Serrano  [facility] Unit Coordinator

## 2024-01-03 NOTE — PROGRESS NOTES
HPI       Diabetic Eye Exam     Additional comments: 1 year follow up             Comments    Pt here for yearly DM eye exam.No changes in vision. No eye pain today.  Still seeing floaters in each eye, no changes. No flashes.  Dryness in each eye, relief with drops.  DM2 BS: 124 morning per pt.  Lab Results       Component                Value               Date                       A1C                      6.6                 11/14/2023                 A1C                      6.6                 05/12/2023                 A1C                      6.2                 11/11/2022                 A1C                      6.4                 08/17/2022                 A1C                      6.5                 03/15/2022                 A1C                      6.2                 04/15/2021                 A1C                      6.3                 11/20/2020                 A1C                      6.6                 05/04/2020                 A1C                      8.4                 02/02/2020                 A1C                      7.6                 10/04/2019              ROBERT Holden January 3, 2024 8:28 AM                Last edited by Helder Jones COMT on 1/3/2024  8:29 AM.          History of Present Illness:   Ms. Peña is a 71 year old female with T2DM and stage 3 CKD here for annual diabetic eye exam. She feels her vision remains good and stable in both eyes. No pain, redness, discharge. No new flashes/floaters.  Previous patient with Dr. Pickering.    Interval History 1/3/24: no new symptoms, patient comfortable with her vision    Additional Ocular History:   Hyperopia and presbyopia  Stick to the left eye as a child    Relevant Past Medical/Family/Social History:  CVA 1/25/2020   DM2  HTN  HLD    Lives with a roommate at home.  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  No ETOH.  No tobacco.  No drugs.      Assessment:    #type 2 diabetes  without retinopathy OU  without long-term current use of insulin (H)  (primary encounter diagnosis)  Comment: On metformin. Last A1c 6.2. Mild NPDR, appears stable from description last year.  Plan: Discussed the importance of tight blood glucose control in the prevention of diabetic retinopathy. Recommend yearly dilated eye exam.      # Epiretinal membrane (ERM) of left eye  Comment: Mild  Plan: Monitor, stable OCT MAC    #Combined form of age-related cataract, both eyes  Comment: Likely visually significant with BCVA of 20/30 at last visit, but declined refraction today, and her visual function meets her needs.  Plan: Ok to monitor for now.    (H40.003) Glaucoma suspect, both eyes  Comment: Cup to disc asymmetry with slightly larger cup right eye. Normal IOPs at 11/11.  12/09/22 OCT RNFL with borderline inferior thinning OU   - Baseline VF 24-2 OU could not be performed due to wheelchair issue.  IOP stable and vision is stable      (H52.03) Hyperopia of both eyes  Comment: Functions to meet her needs without Rx  Plan: Monitor      # Retinal hole, right  Comment: Identified on prior exam, asymptomatic. Chronic-appearing with pigmented rim. No SRF or RD.  Plan: Discussed signs/sx of RT/RD and the patient knows to call immediately if they develop these symptoms.       TRAN, OU  - Start AT QID OU PRN  Follow-up    Yearly exam  Attending Physician Attestation:  Complete documentation of historical and exam elements from today's encounter can be found in the full encounter summary report (not reduplicated in this progress note).  I personally obtained the chief complaint(s) and history of present illness.  I confirmed and edited as necessary the review of systems, past medical/surgical history, family history, social history, and examination findings as documented by others; and I examined the patient myself.  I personally reviewed the relevant tests, images, and reports as documented above.  I formulated and edited as  necessary the assessment and plan and discussed the findings and management plan with the patient and family. - Mariusz Gallo MD

## 2024-01-03 NOTE — NURSING NOTE
Chief Complaints and History of Present Illnesses   Patient presents with    Diabetic Eye Exam     1 year follow up     Chief Complaint(s) and History of Present Illness(es)       Diabetic Eye Exam              Comments: 1 year follow up              Comments    Pt here for yearly DM eye exam.No changes in vision. No eye pain today.  Still seeing floaters in each eye, no changes. No flashes.  Dryness in each eye, relief with drops.  DM2 BS: 124 morning per pt.  Lab Results       Component                Value               Date                       A1C                      6.6                 11/14/2023                 A1C                      6.6                 05/12/2023                 A1C                      6.2                 11/11/2022                 A1C                      6.4                 08/17/2022                 A1C                      6.5                 03/15/2022                 A1C                      6.2                 04/15/2021                 A1C                      6.3                 11/20/2020                 A1C                      6.6                 05/04/2020                 A1C                      8.4                 02/02/2020                 A1C                      7.6                 10/04/2019              ROBERT Holden January 3, 2024 8:28 AM

## 2024-01-05 ENCOUNTER — LAB (OUTPATIENT)
Dept: LAB | Facility: CLINIC | Age: 74
End: 2024-01-05
Payer: MEDICARE

## 2024-01-05 DIAGNOSIS — E55.9 HYPOVITAMINOSIS D: ICD-10-CM

## 2024-01-05 LAB — VIT D+METAB SERPL-MCNC: 46 NG/ML (ref 20–50)

## 2024-01-05 PROCEDURE — 82306 VITAMIN D 25 HYDROXY: CPT

## 2024-01-05 PROCEDURE — 36415 COLL VENOUS BLD VENIPUNCTURE: CPT

## 2024-01-11 ENCOUNTER — MYC MEDICAL ADVICE (OUTPATIENT)
Dept: FAMILY MEDICINE | Facility: CLINIC | Age: 74
End: 2024-01-11

## 2024-01-11 ENCOUNTER — VIRTUAL VISIT (OUTPATIENT)
Dept: FAMILY MEDICINE | Facility: CLINIC | Age: 74
End: 2024-01-11
Payer: MEDICARE

## 2024-01-11 DIAGNOSIS — Z79.4 TYPE 2 DIABETES MELLITUS WITH STAGE 3B CHRONIC KIDNEY DISEASE, WITH LONG-TERM CURRENT USE OF INSULIN (H): Primary | ICD-10-CM

## 2024-01-11 DIAGNOSIS — E11.22 TYPE 2 DIABETES MELLITUS WITH STAGE 3B CHRONIC KIDNEY DISEASE, WITH LONG-TERM CURRENT USE OF INSULIN (H): Primary | ICD-10-CM

## 2024-01-11 DIAGNOSIS — M81.0 AGE-RELATED OSTEOPOROSIS WITHOUT CURRENT PATHOLOGICAL FRACTURE: Primary | ICD-10-CM

## 2024-01-11 DIAGNOSIS — M81.0 AGE-RELATED OSTEOPOROSIS WITHOUT CURRENT PATHOLOGICAL FRACTURE: ICD-10-CM

## 2024-01-11 DIAGNOSIS — N18.32 TYPE 2 DIABETES MELLITUS WITH STAGE 3B CHRONIC KIDNEY DISEASE, WITH LONG-TERM CURRENT USE OF INSULIN (H): Primary | ICD-10-CM

## 2024-01-11 PROCEDURE — G2012 BRIEF CHECK IN BY MD/QHP: HCPCS | Mod: 93 | Performed by: FAMILY MEDICINE

## 2024-01-11 RX ORDER — ALENDRONATE SODIUM 35 MG/1
35 TABLET ORAL
Qty: 12 TABLET | Refills: 3 | Status: SHIPPED | OUTPATIENT
Start: 2024-01-11 | End: 2024-03-08

## 2024-01-11 NOTE — PROGRESS NOTES
Addis is a 73 year old who is being evaluated via a billable telephone visit.      What phone number would you like to be contacted at? [phone]   How would you like to obtain your AVS? Lisa    Distant Location (provider location):  On-site    Assessment & Plan     Age-related osteoporosis without current pathological fracture  Reviewed with her and  fracture risk/bone density in osteoporotic range and medical therapy recommended.  Recent vitamin D in normal range so can begin therapy. In past thyroid levels have been normal but will have re-check since has been two years since last done.   Has not had significant problems with gerd and no stomach ulcers.     Reviewed risks (osteonecrosis of the jaw/esophageal irritation/ulcer) and benefits (decreased fracture risk) of fosamax.  Reviewed administration instructions (take 30 minutes before food/drink in morning and stay upright for 30 minutes after taking.  Take with water only.      Since will need to adjust  hospital bed to stay upright elected to trial weekly dose.  If develops side effects then we could potentially change to IV administration but would like to limit trips if possible.   - alendronate (FOSAMAX) 35 MG tablet  Dispense: 12 tablet; Refill: 3                   Joel Daniel Wegener, MD  [facility]   Addis is a 73 year old, presenting for the following health issues:  Results      1/11/2024     3:26 PM   Additional Questions   Roomed by Tonya SCOTT   Accompanied by Buck       History of Present Illness       Reason for visit:  Osteoporosis  Symptoms include:  Testing  Had these symptoms before:  No  What makes it worse:  No    She eats 2-3 servings of fruits and vegetables daily.She consumes 0 sweetened beverage(s) daily.She exercises with enough effort to increase her heart rate 20 to 29 minutes per day.  She exercises with enough effort to increase her heart rate 5 days per week.   She is taking  medications regularly.               Review of Systems         Objective           Vitals:  No vitals were obtained today due to virtual visit.    Physical Exam   healthy, alert, and no distress  PSYCH: Alert and oriented times 3; coherent speech, normal   rate and volume, able to articulate logical thoughts, able   to abstract reason, no tangential thoughts, no hallucinations   or delusions  Her affect is normal  RESP: No cough, no audible wheezing, able to talk in full sentences  Remainder of exam unable to be completed due to telephone visits                Phone call duration: 7 minutes

## 2024-01-16 ENCOUNTER — PATIENT OUTREACH (OUTPATIENT)
Dept: GASTROENTEROLOGY | Facility: CLINIC | Age: 74
End: 2024-01-16
Payer: MEDICARE

## 2024-01-26 ENCOUNTER — ALLIED HEALTH/NURSE VISIT (OUTPATIENT)
Dept: FAMILY MEDICINE | Facility: CLINIC | Age: 74
End: 2024-01-26
Payer: MEDICARE

## 2024-01-26 DIAGNOSIS — E53.8 VITAMIN B12 DEFICIENCY: Primary | ICD-10-CM

## 2024-01-26 PROCEDURE — 99207 PR NO CHARGE NURSE ONLY: CPT

## 2024-01-26 PROCEDURE — 96372 THER/PROPH/DIAG INJ SC/IM: CPT | Performed by: FAMILY MEDICINE

## 2024-01-26 RX ADMIN — CYANOCOBALAMIN 1000 MCG: 1000 INJECTION, SOLUTION INTRAMUSCULAR; SUBCUTANEOUS at 08:06

## 2024-01-26 NOTE — PROGRESS NOTES
Clinic Administered Medication Documentation      Injectable Medication Documentation    Is there an active order (written within the past 365 days, with administrations remaining, not ) in the chart? Yes.     Patient was given Cyanocobalamin (B-12). Prior to medication administration, verified patient's identity using patient s name and date of birth. Please see MAR and medication order for additional information. Patient instructed to remain in clinic for 15 minutes and report any adverse reaction to staff immediately.    Vial/Syringe: Single dose vial. Was entire vial of medication used? Yes  Was this medication supplied by the patient? No  Is this a medication the patient will need to receive again? Yes. Verified that the patient has refills remaining in their prescription.     Tyra Escamilla RN  [facility]     PROCEDURES:  Cystourethroscopy with bladder tumor resection, small 30-Jul-2019 10:15:51  Scar Butterfield A

## 2024-02-09 ENCOUNTER — PATIENT OUTREACH (OUTPATIENT)
Dept: GASTROENTEROLOGY | Facility: CLINIC | Age: 74
End: 2024-02-09
Payer: MEDICARE

## 2024-02-09 DIAGNOSIS — Z12.11 SPECIAL SCREENING FOR MALIGNANT NEOPLASMS, COLON: Primary | ICD-10-CM

## 2024-02-09 NOTE — PROGRESS NOTES
"Patients last colonoscopy on file was on 5/9/2019 and was recommended to repeat in 5 years.  CRC Screening Colonoscopy Referral Review    Patient meets the inclusion criteria for screening colonoscopy standing order.    Ordering/Referring Provider:  Joel Wegener, MD    BMI: Estimated body mass index is 21.63 kg/m  as calculated from the following:    Height as of 11/15/22: 1.676 m (5' 6\").    Weight as of 2/15/22: 60.8 kg (134 lb).     Sedation:  Does patient have any of the following conditions affecting sedation?  No medical conditions affecting sedation.    Previous Scopes:  Any previous recommendations or follow up needs based on previous scope?  na / No recommendations.    Medical Concerns to Postpone Order:  Does patient have any of the following medical concerns that should postpone/delay colonoscopy referral?  No medical conditions affecting colonoscopy referral.    Final Referral Details:  Based on patient's medical history patient is appropriate for referral order with moderate sedation. If patient's BMI > 50 do not schedule in ASC.  "

## 2024-02-10 ENCOUNTER — MEDICAL CORRESPONDENCE (OUTPATIENT)
Dept: HEALTH INFORMATION MANAGEMENT | Facility: CLINIC | Age: 74
End: 2024-02-10
Payer: MEDICARE

## 2024-03-01 ENCOUNTER — LAB (OUTPATIENT)
Dept: LAB | Facility: CLINIC | Age: 74
End: 2024-03-01
Payer: MEDICARE

## 2024-03-01 ENCOUNTER — ALLIED HEALTH/NURSE VISIT (OUTPATIENT)
Dept: FAMILY MEDICINE | Facility: CLINIC | Age: 74
End: 2024-03-01
Payer: MEDICARE

## 2024-03-01 DIAGNOSIS — Z79.4 TYPE 2 DIABETES MELLITUS WITH STAGE 3B CHRONIC KIDNEY DISEASE, WITH LONG-TERM CURRENT USE OF INSULIN (H): Primary | ICD-10-CM

## 2024-03-01 DIAGNOSIS — E53.8 VITAMIN B 12 DEFICIENCY: Primary | ICD-10-CM

## 2024-03-01 DIAGNOSIS — M81.0 AGE-RELATED OSTEOPOROSIS WITHOUT CURRENT PATHOLOGICAL FRACTURE: ICD-10-CM

## 2024-03-01 DIAGNOSIS — E11.22 TYPE 2 DIABETES MELLITUS WITH STAGE 3B CHRONIC KIDNEY DISEASE, WITH LONG-TERM CURRENT USE OF INSULIN (H): Primary | ICD-10-CM

## 2024-03-01 DIAGNOSIS — N18.32 TYPE 2 DIABETES MELLITUS WITH STAGE 3B CHRONIC KIDNEY DISEASE, WITH LONG-TERM CURRENT USE OF INSULIN (H): Primary | ICD-10-CM

## 2024-03-01 PROCEDURE — 96372 THER/PROPH/DIAG INJ SC/IM: CPT | Performed by: FAMILY MEDICINE

## 2024-03-01 PROCEDURE — 99207 PR NO CHARGE NURSE ONLY: CPT

## 2024-03-01 PROCEDURE — 84443 ASSAY THYROID STIM HORMONE: CPT

## 2024-03-01 PROCEDURE — 36415 COLL VENOUS BLD VENIPUNCTURE: CPT

## 2024-03-01 RX ADMIN — CYANOCOBALAMIN 1000 MCG: 1000 INJECTION, SOLUTION INTRAMUSCULAR; SUBCUTANEOUS at 07:51

## 2024-03-02 LAB — TSH SERPL DL<=0.005 MIU/L-ACNC: 1.84 UIU/ML (ref 0.3–4.2)

## 2024-03-04 ENCOUNTER — TELEPHONE (OUTPATIENT)
Dept: FAMILY MEDICINE | Facility: CLINIC | Age: 74
End: 2024-03-04
Payer: MEDICARE

## 2024-03-04 DIAGNOSIS — N18.32 TYPE 2 DIABETES MELLITUS WITH STAGE 3B CHRONIC KIDNEY DISEASE, WITH LONG-TERM CURRENT USE OF INSULIN (H): Primary | ICD-10-CM

## 2024-03-04 DIAGNOSIS — E11.22 TYPE 2 DIABETES MELLITUS WITH STAGE 3B CHRONIC KIDNEY DISEASE, WITH LONG-TERM CURRENT USE OF INSULIN (H): Primary | ICD-10-CM

## 2024-03-04 DIAGNOSIS — Z79.4 TYPE 2 DIABETES MELLITUS WITH STAGE 3B CHRONIC KIDNEY DISEASE, WITH LONG-TERM CURRENT USE OF INSULIN (H): Primary | ICD-10-CM

## 2024-03-04 NOTE — TELEPHONE ENCOUNTER
Forms/Letter Request    Type of form/letter: Diabetic Supplies      Do we have the form/letter: Yes:     Who is the form from? [facility] medicare processing.medicare part b documentation (if other please explain)    Where did/will the form come from? form was faxed in    When is form/letter needed by: asap    How would you like the form/letter returned: Fax : [phone]    Patient Notified form requests are processed in 5-7 business days:No    Could we send this information to you in [employer] or would you prefer to receive a phone call?:   No preference     Okay to leave a detailed message?: No at Other phone number:  if questions call [phone]

## 2024-03-07 ENCOUNTER — MYC MEDICAL ADVICE (OUTPATIENT)
Dept: FAMILY MEDICINE | Facility: CLINIC | Age: 74
End: 2024-03-07
Payer: MEDICARE

## 2024-03-07 DIAGNOSIS — M81.0 AGE-RELATED OSTEOPOROSIS WITHOUT CURRENT PATHOLOGICAL FRACTURE: ICD-10-CM

## 2024-03-08 ENCOUNTER — MEDICAL CORRESPONDENCE (OUTPATIENT)
Dept: HEALTH INFORMATION MANAGEMENT | Facility: CLINIC | Age: 74
End: 2024-03-08
Payer: MEDICARE

## 2024-03-08 RX ORDER — ALENDRONATE SODIUM 35 MG/1
35 TABLET ORAL
Qty: 12 TABLET | Refills: 3 | Status: SHIPPED | OUTPATIENT
Start: 2024-03-08

## 2024-03-11 NOTE — TELEPHONE ENCOUNTER
Forms faxed to [facility] medicare fax # [phone] and copy sent to stat scan.     Brissa RAMOS

## 2024-03-21 ENCOUNTER — TELEPHONE (OUTPATIENT)
Dept: FAMILY MEDICINE | Facility: CLINIC | Age: 74
End: 2024-03-21
Payer: MEDICARE

## 2024-03-21 DIAGNOSIS — E53.8 VITAMIN B 12 DEFICIENCY: Primary | ICD-10-CM

## 2024-03-21 RX ORDER — CYANOCOBALAMIN 1000 UG/ML
1000 INJECTION, SOLUTION INTRAMUSCULAR; SUBCUTANEOUS
Status: ACTIVE | OUTPATIENT
Start: 2024-04-05 | End: 2025-03-30

## 2024-03-21 NOTE — TELEPHONE ENCOUNTER
Dr.Wegener -- Vitamin B12 orders  on 3/25/24. Next dose is scheduled for 24. New orders needed if this medication is still desired for patient. Pended.     Theresa Gold RN  [facility]

## 2024-04-05 ENCOUNTER — ALLIED HEALTH/NURSE VISIT (OUTPATIENT)
Dept: FAMILY MEDICINE | Facility: CLINIC | Age: 74
End: 2024-04-05
Payer: MEDICARE

## 2024-04-05 DIAGNOSIS — E53.8 VITAMIN B 12 DEFICIENCY: Primary | ICD-10-CM

## 2024-04-05 PROCEDURE — 99207 PR NO CHARGE NURSE ONLY: CPT

## 2024-04-05 PROCEDURE — 96372 THER/PROPH/DIAG INJ SC/IM: CPT | Performed by: FAMILY MEDICINE

## 2024-04-05 RX ADMIN — CYANOCOBALAMIN 1000 MCG: 1000 INJECTION, SOLUTION INTRAMUSCULAR; SUBCUTANEOUS at 08:00

## 2024-04-05 NOTE — NURSING NOTE
Clinic Administered Medication Documentation        Patient was given B12. Prior to medication administration, verified patient's identity using patient s name and date of birth. Please see MAR and medication order for additional information. Patient instructed to remain in clinic for 15 minutes and report any adverse reaction to staff immediately.    Vial/Syringe: Single dose vial. Was entire vial of medication used? Yes         Sasha García MA

## 2024-04-14 ENCOUNTER — HEALTH MAINTENANCE LETTER (OUTPATIENT)
Age: 74
End: 2024-04-14

## 2024-04-23 DIAGNOSIS — I63.321 CEREBROVASCULAR ACCIDENT (CVA) DUE TO THROMBOSIS OF RIGHT ANTERIOR CEREBRAL ARTERY (H): ICD-10-CM

## 2024-04-24 ENCOUNTER — MYC MEDICAL ADVICE (OUTPATIENT)
Dept: FAMILY MEDICINE | Facility: CLINIC | Age: 74
End: 2024-04-24
Payer: MEDICARE

## 2024-04-24 RX ORDER — CLOPIDOGREL BISULFATE 75 MG/1
75 TABLET ORAL DAILY
Qty: 90 TABLET | Refills: 1 | Status: SHIPPED | OUTPATIENT
Start: 2024-04-24

## 2024-04-24 NOTE — TELEPHONE ENCOUNTER
Prescription approved per [facility] Refill Protocol.  Raysa Granados, RN  [facility] Triage Nurse

## 2024-05-03 ENCOUNTER — ALLIED HEALTH/NURSE VISIT (OUTPATIENT)
Dept: FAMILY MEDICINE | Facility: CLINIC | Age: 74
End: 2024-05-03
Payer: MEDICARE

## 2024-05-03 DIAGNOSIS — E53.8 B12 DEFICIENCY: Primary | ICD-10-CM

## 2024-05-03 PROCEDURE — 99207 PR NO CHARGE NURSE ONLY: CPT

## 2024-05-03 PROCEDURE — 96372 THER/PROPH/DIAG INJ SC/IM: CPT | Performed by: FAMILY MEDICINE

## 2024-05-03 RX ADMIN — CYANOCOBALAMIN 1000 MCG: 1000 INJECTION, SOLUTION INTRAMUSCULAR; SUBCUTANEOUS at 08:27

## 2024-05-05 DIAGNOSIS — E11.22 TYPE 2 DIABETES MELLITUS WITH STAGE 3B CHRONIC KIDNEY DISEASE, WITHOUT LONG-TERM CURRENT USE OF INSULIN (H): ICD-10-CM

## 2024-05-05 DIAGNOSIS — N18.32 TYPE 2 DIABETES MELLITUS WITH STAGE 3B CHRONIC KIDNEY DISEASE, WITHOUT LONG-TERM CURRENT USE OF INSULIN (H): ICD-10-CM

## 2024-05-06 RX ORDER — CARVEDILOL 25 MG/1
TABLET, FILM COATED ORAL
Qty: 100 STRIP | Refills: 3 | Status: SHIPPED | OUTPATIENT
Start: 2024-05-06

## 2024-05-14 ENCOUNTER — LAB (OUTPATIENT)
Dept: LAB | Facility: CLINIC | Age: 74
End: 2024-05-14
Payer: MEDICARE

## 2024-05-14 DIAGNOSIS — E11.22 TYPE 2 DIABETES MELLITUS WITH STAGE 3B CHRONIC KIDNEY DISEASE, WITH LONG-TERM CURRENT USE OF INSULIN (H): ICD-10-CM

## 2024-05-14 DIAGNOSIS — N18.32 TYPE 2 DIABETES MELLITUS WITH STAGE 3B CHRONIC KIDNEY DISEASE, WITH LONG-TERM CURRENT USE OF INSULIN (H): ICD-10-CM

## 2024-05-14 DIAGNOSIS — Z79.4 TYPE 2 DIABETES MELLITUS WITH STAGE 3B CHRONIC KIDNEY DISEASE, WITH LONG-TERM CURRENT USE OF INSULIN (H): ICD-10-CM

## 2024-05-14 LAB
ANION GAP SERPL CALCULATED.3IONS-SCNC: 12 MMOL/L (ref 7–15)
BUN SERPL-MCNC: 27.6 MG/DL (ref 8–23)
CALCIUM SERPL-MCNC: 10 MG/DL (ref 8.8–10.2)
CHLORIDE SERPL-SCNC: 106 MMOL/L (ref 98–107)
CREAT SERPL-MCNC: 0.84 MG/DL (ref 0.51–0.95)
DEPRECATED HCO3 PLAS-SCNC: 23 MMOL/L (ref 22–29)
EGFRCR SERPLBLD CKD-EPI 2021: 73 ML/MIN/1.73M2
GLUCOSE SERPL-MCNC: 136 MG/DL (ref 70–99)
HBA1C MFR BLD: 6.5 % (ref 0–5.6)
POTASSIUM SERPL-SCNC: 4.8 MMOL/L (ref 3.4–5.3)
SODIUM SERPL-SCNC: 141 MMOL/L (ref 135–145)

## 2024-05-14 PROCEDURE — 83036 HEMOGLOBIN GLYCOSYLATED A1C: CPT

## 2024-05-14 PROCEDURE — 36415 COLL VENOUS BLD VENIPUNCTURE: CPT

## 2024-05-14 PROCEDURE — 80048 BASIC METABOLIC PNL TOTAL CA: CPT

## 2024-05-14 ASSESSMENT — ANXIETY QUESTIONNAIRES
6. BECOMING EASILY ANNOYED OR IRRITABLE: NOT AT ALL
4. TROUBLE RELAXING: NOT AT ALL
7. FEELING AFRAID AS IF SOMETHING AWFUL MIGHT HAPPEN: NOT AT ALL
3. WORRYING TOO MUCH ABOUT DIFFERENT THINGS: NOT AT ALL
IF YOU CHECKED OFF ANY PROBLEMS ON THIS QUESTIONNAIRE, HOW DIFFICULT HAVE THESE PROBLEMS MADE IT FOR YOU TO DO YOUR WORK, TAKE CARE OF THINGS AT HOME, OR GET ALONG WITH OTHER PEOPLE: NOT DIFFICULT AT ALL
GAD7 TOTAL SCORE: 0
5. BEING SO RESTLESS THAT IT IS HARD TO SIT STILL: NOT AT ALL
1. FEELING NERVOUS, ANXIOUS, OR ON EDGE: NOT AT ALL
2. NOT BEING ABLE TO STOP OR CONTROL WORRYING: NOT AT ALL
8. IF YOU CHECKED OFF ANY PROBLEMS, HOW DIFFICULT HAVE THESE MADE IT FOR YOU TO DO YOUR WORK, TAKE CARE OF THINGS AT HOME, OR GET ALONG WITH OTHER PEOPLE?: NOT DIFFICULT AT ALL
GAD7 TOTAL SCORE: 0
7. FEELING AFRAID AS IF SOMETHING AWFUL MIGHT HAPPEN: NOT AT ALL

## 2024-05-15 DIAGNOSIS — I69.354 SPASTIC HEMIPLEGIA OF LEFT NONDOMINANT SIDE AS LATE EFFECT OF CEREBRAL INFARCTION (H): ICD-10-CM

## 2024-05-17 RX ORDER — BACLOFEN 10 MG/1
10 TABLET ORAL 3 TIMES DAILY
Qty: 270 TABLET | Refills: 3 | Status: SHIPPED | OUTPATIENT
Start: 2024-05-17

## 2024-05-21 ENCOUNTER — OFFICE VISIT (OUTPATIENT)
Dept: FAMILY MEDICINE | Facility: CLINIC | Age: 74
End: 2024-05-21
Payer: MEDICARE

## 2024-05-21 VITALS
SYSTOLIC BLOOD PRESSURE: 125 MMHG | RESPIRATION RATE: 16 BRPM | DIASTOLIC BLOOD PRESSURE: 71 MMHG | HEART RATE: 63 BPM | TEMPERATURE: 97.5 F | OXYGEN SATURATION: 96 %

## 2024-05-21 DIAGNOSIS — E11.22 TYPE 2 DIABETES MELLITUS WITH STAGE 3B CHRONIC KIDNEY DISEASE, WITHOUT LONG-TERM CURRENT USE OF INSULIN (H): Primary | ICD-10-CM

## 2024-05-21 DIAGNOSIS — F33.0 MAJOR DEPRESSIVE DISORDER, RECURRENT EPISODE, MILD (H): ICD-10-CM

## 2024-05-21 DIAGNOSIS — I69.354 SPASTIC HEMIPLEGIA OF LEFT NONDOMINANT SIDE AS LATE EFFECT OF CEREBRAL INFARCTION (H): ICD-10-CM

## 2024-05-21 DIAGNOSIS — G40.909 SEIZURE DISORDER (H): ICD-10-CM

## 2024-05-21 DIAGNOSIS — N18.32 TYPE 2 DIABETES MELLITUS WITH STAGE 3B CHRONIC KIDNEY DISEASE, WITHOUT LONG-TERM CURRENT USE OF INSULIN (H): Primary | ICD-10-CM

## 2024-05-21 PROCEDURE — 99213 OFFICE O/P EST LOW 20 MIN: CPT | Performed by: FAMILY MEDICINE

## 2024-05-21 PROCEDURE — 90480 ADMN SARSCOV2 VAC 1/ONLY CMP: CPT | Performed by: FAMILY MEDICINE

## 2024-05-21 PROCEDURE — 91320 SARSCV2 VAC 30MCG TRS-SUC IM: CPT | Performed by: FAMILY MEDICINE

## 2024-05-21 ASSESSMENT — PATIENT HEALTH QUESTIONNAIRE - PHQ9
SUM OF ALL RESPONSES TO PHQ QUESTIONS 1-9: 0
10. IF YOU CHECKED OFF ANY PROBLEMS, HOW DIFFICULT HAVE THESE PROBLEMS MADE IT FOR YOU TO DO YOUR WORK, TAKE CARE OF THINGS AT HOME, OR GET ALONG WITH OTHER PEOPLE: NOT DIFFICULT AT ALL
SUM OF ALL RESPONSES TO PHQ QUESTIONS 1-9: 0

## 2024-05-21 NOTE — PROGRESS NOTES
Assessment & Plan     Type 2 diabetes mellitus with stage 3b chronic kidney disease, without long-term current use of insulin (H)  Hemoglobin A1C   Date Value Ref Range Status   05/14/2024 6.5 (H) 0.0 - 5.6 % Final     Comment:     Normal <5.7%   Prediabetes 5.7-6.4%    Diabetes 6.5% or higher     Note: Adopted from ADA consensus guidelines.   11/14/2023 6.6 (H) 0.0 - 5.6 % Final     Comment:     Normal <5.7%   Prediabetes 5.7-6.4%    Diabetes 6.5% or higher     Note: Adopted from ADA consensus guidelines.   05/12/2023 6.6 (H) 0.0 - 5.6 % Final     Comment:     Normal <5.7%   Prediabetes 5.7-6.4%    Diabetes 6.5% or higher     Note: Adopted from ADA consensus guidelines.   04/15/2021 6.2 (H) 0 - 5.6 % Final     Comment:     Normal <5.7% Prediabetes 5.7-6.4%  Diabetes 6.5% or higher - adopted from ADA   consensus guidelines.     11/20/2020 6.3 (H) 0 - 5.6 % Final     Comment:     Normal <5.7% Prediabetes 5.7-6.4%  Diabetes 6.5% or higher - adopted from ADA   consensus guidelines.     05/04/2020 6.6 (H) <=5.6 % Final     A1c at goal.  No changes.  Up to date on eye exam.     Spastic hemiplegia of left nondominant side as late effect of cerebral infarction (H)  Recently followed up with her neurologist.  Wondering if updated physical therapy needed but states has not noticed a decline, continues to do home physical therapy and does not feel needing additional physical therapy at this time.  Doing limited walking with a rail otherwise uses wheelchair.  Spouse agrees present today.     Major depressive disorder, recurrent episode, mild (H24)      12/9/2022     2:24 PM 11/17/2023     8:55 AM 5/21/2024     9:11 AM   PHQ   PHQ-9 Total Score 0 0 0   Q9: Thoughts of better off dead/self-harm past 2 weeks Not at all Not at all Not at all     Reports good mood consistent with phq9 today.     Seizure disorder (H)  Quiescent.  Continues on keppra.           25 minutes spent by me on the date of the encounter doing chart review,  history and exam, documentation and further activities per the note    The longitudinal plan of care for the diagnosis(es)/condition(s) as documented were addressed during this visit. Due to the added complexity in care, I will continue to support Addis in the subsequent management and with ongoing continuity of care.      Follow up fall for annual wellness/diabetes scheduled today.     Dave Mancini is a 73 year old, presenting for the following health issues:  Diabetes, Hyperlipidemia, and Hypertension        5/21/2024     9:21 AM   Additional Questions   Roomed by Aleisha SANTANA   Accompanied by n/a     History of Present Illness       CKD: She uses over the counter pain medication, including Acetaminophen 500mg 1 morning, 1 night, two times daily.    Diabetes:   She presents for follow up of diabetes.  She is checking home blood glucose one time daily.   She checks blood glucose before meals.  Blood glucose is never over 200 and never under 70.  When her blood glucose is low, the patient is asymptomatic for confusion, blurred vision, lethargy and reports not feeling dizzy, shaky, or weak.   She has no concerns regarding her diabetes at this time.   She is not experiencing numbness or burning in feet, excessive thirst, blurry vision, weight changes or redness, sores or blisters on feet.           Hyperlipidemia:  She presents for follow up of hyperlipidemia.   She is taking medication to lower cholesterol. She is not having myalgia or other side effects to statin medications.    Hypertension: She presents for follow up of hypertension.  She does not check blood pressure  regularly outside of the clinic. Outpatient blood pressures have not been over 140/90. She does not follow a low salt diet. She consumes 1 sweetened beverage(s) daily.She exercises with enough effort to increase her heart rate 10 to 19 minutes per day.  She exercises with enough effort to increase her heart rate 6 days per week.   She is  taking medications regularly.                     Objective    /71 (BP Location: Left arm, Patient Position: Sitting, Cuff Size: Adult Regular)   Pulse 63   Temp 97.5  F (36.4  C) (Temporal)   Resp 16   LMP  (LMP Unknown)   SpO2 96%   There is no height or weight on file to calculate BMI.  Physical Exam   Appears well.  Slightly dysarthric speech at baseline.  In wheelchair today with limited movement left side and decreased left hand  strength.             Signed Electronically by: Joel Daniel Wegener, MD

## 2024-06-04 ENCOUNTER — MYC MEDICAL ADVICE (OUTPATIENT)
Dept: FAMILY MEDICINE | Facility: CLINIC | Age: 74
End: 2024-06-04

## 2024-06-04 ENCOUNTER — ANCILLARY PROCEDURE (OUTPATIENT)
Dept: MAMMOGRAPHY | Facility: CLINIC | Age: 74
End: 2024-06-04
Attending: FAMILY MEDICINE
Payer: MEDICARE

## 2024-06-04 DIAGNOSIS — Z12.31 VISIT FOR SCREENING MAMMOGRAM: ICD-10-CM

## 2024-06-04 PROCEDURE — 77063 BREAST TOMOSYNTHESIS BI: CPT | Mod: GC | Performed by: STUDENT IN AN ORGANIZED HEALTH CARE EDUCATION/TRAINING PROGRAM

## 2024-06-04 PROCEDURE — 77067 SCR MAMMO BI INCL CAD: CPT | Mod: GC | Performed by: STUDENT IN AN ORGANIZED HEALTH CARE EDUCATION/TRAINING PROGRAM

## 2024-06-05 NOTE — TELEPHONE ENCOUNTER
Condition discovered through nephrology and work-up with biopsy patient will continue on current treatment plan as scheduled JW,  Please see below [employer] message and advise.  Appears future orders have already been signed and scheduled  Thanks,  Yvette HERNÁNDEZ RN

## 2024-06-07 ENCOUNTER — ALLIED HEALTH/NURSE VISIT (OUTPATIENT)
Dept: FAMILY MEDICINE | Facility: CLINIC | Age: 74
End: 2024-06-07
Payer: MEDICARE

## 2024-06-07 DIAGNOSIS — E53.8 VITAMIN B 12 DEFICIENCY: Primary | ICD-10-CM

## 2024-06-07 PROCEDURE — 99207 PR NO CHARGE NURSE ONLY: CPT

## 2024-06-07 PROCEDURE — 96372 THER/PROPH/DIAG INJ SC/IM: CPT | Performed by: FAMILY MEDICINE

## 2024-06-07 RX ADMIN — CYANOCOBALAMIN 1000 MCG: 1000 INJECTION, SOLUTION INTRAMUSCULAR; SUBCUTANEOUS at 08:58

## 2024-06-07 NOTE — PROGRESS NOTES
Clinic Administered Medication Documentation        Patient was given b12. Prior to medication administration, verified patient's identity using patient s name and date of birth. Please see MAR and medication order for additional information. Patient instructed to remain in clinic for 15 minutes and report any adverse reaction to staff immediately.    Vial/Syringe: Syringe  Patient was given B12. Prior to medication administration, verified patient's identity using patient's name and date of birth.    Josefina Burnham MA

## 2024-06-11 ENCOUNTER — ANCILLARY PROCEDURE (OUTPATIENT)
Dept: MAMMOGRAPHY | Facility: CLINIC | Age: 74
End: 2024-06-11
Attending: FAMILY MEDICINE
Payer: MEDICARE

## 2024-06-11 DIAGNOSIS — R92.8 ABNORMAL MAMMOGRAM OF RIGHT BREAST: ICD-10-CM

## 2024-06-11 PROCEDURE — G0279 TOMOSYNTHESIS, MAMMO: HCPCS | Mod: RT | Performed by: RADIOLOGY

## 2024-06-11 PROCEDURE — 77065 DX MAMMO INCL CAD UNI: CPT | Mod: RT | Performed by: RADIOLOGY

## 2024-07-12 ENCOUNTER — ALLIED HEALTH/NURSE VISIT (OUTPATIENT)
Dept: FAMILY MEDICINE | Facility: CLINIC | Age: 74
End: 2024-07-12
Payer: MEDICARE

## 2024-07-12 DIAGNOSIS — E53.8 VITAMIN B 12 DEFICIENCY: Primary | ICD-10-CM

## 2024-07-12 PROCEDURE — 96372 THER/PROPH/DIAG INJ SC/IM: CPT | Performed by: FAMILY MEDICINE

## 2024-07-12 PROCEDURE — 99207 PR NO CHARGE NURSE ONLY: CPT

## 2024-07-12 RX ADMIN — CYANOCOBALAMIN 1000 MCG: 1000 INJECTION, SOLUTION INTRAMUSCULAR; SUBCUTANEOUS at 08:59

## 2024-07-12 NOTE — PROGRESS NOTES
Clinic Administered Medication Documentation      Injectable Medication Documentation    Is there an active order (written within the past 365 days, with administrations remaining, not ) in the chart? Yes.     Patient was given  B12 . Prior to medication administration, verified patient's identity using patient s name and date of birth. Please see MAR and medication order for additional information. Patient instructed to remain in clinic for 15 minutes and report any adverse reaction to staff immediately.    Vial/Syringe: Multi dose vial  Was this medication supplied by the patient? No  Is this a medication the patient will need to receive again? Yes. Verified that the patient has refills remaining in their prescription.

## 2024-07-14 DIAGNOSIS — I10 HYPERTENSION GOAL BP (BLOOD PRESSURE) < 140/90: ICD-10-CM

## 2024-07-14 DIAGNOSIS — J30.2 SEASONAL ALLERGIC RHINITIS, UNSPECIFIED TRIGGER: ICD-10-CM

## 2024-07-15 DIAGNOSIS — E11.22 TYPE 2 DIABETES MELLITUS WITH STAGE 3B CHRONIC KIDNEY DISEASE, WITH LONG-TERM CURRENT USE OF INSULIN (H): ICD-10-CM

## 2024-07-15 DIAGNOSIS — Z79.4 TYPE 2 DIABETES MELLITUS WITH STAGE 3B CHRONIC KIDNEY DISEASE, WITH LONG-TERM CURRENT USE OF INSULIN (H): ICD-10-CM

## 2024-07-15 DIAGNOSIS — I10 HYPERTENSION GOAL BP (BLOOD PRESSURE) < 140/90: ICD-10-CM

## 2024-07-15 DIAGNOSIS — N18.32 TYPE 2 DIABETES MELLITUS WITH STAGE 3B CHRONIC KIDNEY DISEASE, WITH LONG-TERM CURRENT USE OF INSULIN (H): ICD-10-CM

## 2024-07-15 RX ORDER — MONTELUKAST SODIUM 10 MG/1
TABLET ORAL
Qty: 90 TABLET | Refills: 3 | Status: SHIPPED | OUTPATIENT
Start: 2024-07-15

## 2024-07-15 RX ORDER — METOPROLOL SUCCINATE 50 MG/1
TABLET, EXTENDED RELEASE ORAL
Qty: 90 TABLET | Refills: 3 | Status: SHIPPED | OUTPATIENT
Start: 2024-07-15

## 2024-07-15 RX ORDER — LOSARTAN POTASSIUM 25 MG/1
25 TABLET ORAL AT BEDTIME
Qty: 90 TABLET | Refills: 3 | Status: SHIPPED | OUTPATIENT
Start: 2024-07-15

## 2024-07-16 ENCOUNTER — MYC MEDICAL ADVICE (OUTPATIENT)
Dept: FAMILY MEDICINE | Facility: CLINIC | Age: 74
End: 2024-07-16
Payer: MEDICARE

## 2024-07-16 NOTE — TELEPHONE ENCOUNTER
Spoke to patient regarding message  Patient states she received her medications sent to the pharmacy yesterday  Confirmed she does not need anything further at this time  Thanks,  Yvette HERNÁNDEZ RN

## 2024-08-16 ENCOUNTER — ALLIED HEALTH/NURSE VISIT (OUTPATIENT)
Dept: FAMILY MEDICINE | Facility: CLINIC | Age: 74
End: 2024-08-16
Payer: MEDICARE

## 2024-08-16 DIAGNOSIS — E53.8 VITAMIN B 12 DEFICIENCY: Primary | ICD-10-CM

## 2024-08-16 PROCEDURE — 96372 THER/PROPH/DIAG INJ SC/IM: CPT | Performed by: FAMILY MEDICINE

## 2024-08-16 PROCEDURE — 99207 PR NO CHARGE NURSE ONLY: CPT

## 2024-08-16 RX ADMIN — CYANOCOBALAMIN 1000 MCG: 1000 INJECTION, SOLUTION INTRAMUSCULAR; SUBCUTANEOUS at 08:52

## 2024-08-16 NOTE — PROGRESS NOTES
Clinic Administered Medication Documentation      Injectable Medication Documentation    Is there an active order (written within the past 365 days, with administrations remaining, not ) in the chart? No.     Order was obtained by provider in clinic.  Patient was given Cyanocobalamin (B-12). Prior to medication administration, verified patient's identity using patient s name and date of birth. Please see MAR and medication order for additional information. Patient instructed to remain in clinic for 15 minutes and report any adverse reaction to staff immediately.    Vial/Syringe: Single dose vial. Was entire vial of medication used? Yes  Was this medication supplied by the patient?  No  Is this a medication the patient will need to receive again? Yes. Verified that the patient has refills remaining in their prescription.

## 2024-09-01 ENCOUNTER — HEALTH MAINTENANCE LETTER (OUTPATIENT)
Age: 74
End: 2024-09-01

## 2024-09-20 ENCOUNTER — ALLIED HEALTH/NURSE VISIT (OUTPATIENT)
Dept: FAMILY MEDICINE | Facility: CLINIC | Age: 74
End: 2024-09-20
Payer: MEDICARE

## 2024-09-20 DIAGNOSIS — E53.8 B12 DEFICIENCY: Primary | ICD-10-CM

## 2024-09-20 PROCEDURE — 99207 PR NO CHARGE NURSE ONLY: CPT

## 2024-09-20 PROCEDURE — 96372 THER/PROPH/DIAG INJ SC/IM: CPT | Performed by: FAMILY MEDICINE

## 2024-09-20 RX ADMIN — CYANOCOBALAMIN 1000 MCG: 1000 INJECTION, SOLUTION INTRAMUSCULAR; SUBCUTANEOUS at 09:07

## 2024-10-08 ENCOUNTER — PATIENT OUTREACH (OUTPATIENT)
Dept: CARE COORDINATION | Facility: CLINIC | Age: 74
End: 2024-10-08
Payer: MEDICARE

## 2024-10-10 DIAGNOSIS — I63.321 CEREBROVASCULAR ACCIDENT (CVA) DUE TO THROMBOSIS OF RIGHT ANTERIOR CEREBRAL ARTERY (H): ICD-10-CM

## 2024-10-10 RX ORDER — CLOPIDOGREL BISULFATE 75 MG/1
75 TABLET ORAL DAILY
Qty: 90 TABLET | Refills: 0 | Status: SHIPPED | OUTPATIENT
Start: 2024-10-10

## 2024-10-18 ENCOUNTER — ALLIED HEALTH/NURSE VISIT (OUTPATIENT)
Dept: FAMILY MEDICINE | Facility: CLINIC | Age: 74
End: 2024-10-18
Payer: MEDICARE

## 2024-10-18 DIAGNOSIS — E53.8 VITAMIN B 12 DEFICIENCY: Primary | ICD-10-CM

## 2024-10-18 PROCEDURE — 96372 THER/PROPH/DIAG INJ SC/IM: CPT | Performed by: FAMILY MEDICINE

## 2024-10-18 PROCEDURE — 99207 PR NO CHARGE NURSE ONLY: CPT

## 2024-10-18 RX ADMIN — CYANOCOBALAMIN 1000 MCG: 1000 INJECTION, SOLUTION INTRAMUSCULAR; SUBCUTANEOUS at 08:52

## 2024-10-18 NOTE — PROGRESS NOTES
"Clinic Administered Medication Documentation        Patient was given ***. Prior to medication administration, verified patient's identity using patient s name and date of birth. Please see MAR and medication order for additional information. Patient instructed to {CAM INSTRUCTIONS:393153::\"remain in clinic for 15 minutes\",\"report any adverse reaction to staff immediately\"}.    Vial/Syringe: {CAM Vial/Syringe:849621}   "

## 2024-10-29 ENCOUNTER — MYC MEDICAL ADVICE (OUTPATIENT)
Dept: FAMILY MEDICINE | Facility: CLINIC | Age: 74
End: 2024-10-29
Payer: MEDICARE

## 2024-10-29 DIAGNOSIS — I10 HYPERTENSION GOAL BP (BLOOD PRESSURE) < 140/90: Primary | ICD-10-CM

## 2024-10-29 DIAGNOSIS — N18.32 TYPE 2 DIABETES MELLITUS WITH STAGE 3B CHRONIC KIDNEY DISEASE, WITH LONG-TERM CURRENT USE OF INSULIN (H): ICD-10-CM

## 2024-10-29 DIAGNOSIS — E11.22 TYPE 2 DIABETES MELLITUS WITH STAGE 3B CHRONIC KIDNEY DISEASE, WITH LONG-TERM CURRENT USE OF INSULIN (H): ICD-10-CM

## 2024-10-29 DIAGNOSIS — Z79.4 TYPE 2 DIABETES MELLITUS WITH STAGE 3B CHRONIC KIDNEY DISEASE, WITH LONG-TERM CURRENT USE OF INSULIN (H): ICD-10-CM

## 2024-10-29 DIAGNOSIS — D51.9 ANEMIA DUE TO VITAMIN B12 DEFICIENCY, UNSPECIFIED B12 DEFICIENCY TYPE: ICD-10-CM

## 2024-10-29 DIAGNOSIS — E78.5 HYPERLIPIDEMIA LDL GOAL <70: ICD-10-CM

## 2024-10-29 NOTE — TELEPHONE ENCOUNTER
Dr. Wegener,     Please see below [employer] message and advise.   Pt scheduled for AWV with you on 11/22/24.     Thanks,   Barbara BARNES RN

## 2024-11-15 ENCOUNTER — ALLIED HEALTH/NURSE VISIT (OUTPATIENT)
Dept: FAMILY MEDICINE | Facility: CLINIC | Age: 74
End: 2024-11-15
Payer: MEDICARE

## 2024-11-15 DIAGNOSIS — E53.8 VITAMIN B 12 DEFICIENCY: Primary | ICD-10-CM

## 2024-11-15 PROCEDURE — 99207 PR NO CHARGE NURSE ONLY: CPT

## 2024-11-15 RX ADMIN — CYANOCOBALAMIN 1000 MCG: 1000 INJECTION, SOLUTION INTRAMUSCULAR; SUBCUTANEOUS at 08:50

## 2024-11-15 NOTE — TELEPHONE ENCOUNTER
Unsure if improving or not.  Will recheck level  Continue supplementation.    Orders:    vitamin D (ERGOCALCIFEROL) 1.25 MG (92907 UT) CAPS capsule; Take 1 capsule by mouth Once a week at 5 PM    Vitamin D 25 Hydroxy; Future     Reason for Call: Request for an order or referral:    Order or referral being requested: physical therapy    Date needed: as soon as possible    Has the patient been seen by the PCP for this problem? YES    Additional comments: Patient needs physical therapy to help with mobility     Phone number Patient can be reached at:  Home number on file [phone] (home)    Best Time:  any    Can we leave a detailed message on this number?  YES    Call taken on 7/1/2021 at 8:41 AM by Primitivo Riojas

## 2024-11-15 NOTE — PROGRESS NOTES

## 2024-11-19 ENCOUNTER — LAB (OUTPATIENT)
Dept: LAB | Facility: CLINIC | Age: 74
End: 2024-11-19
Payer: MEDICARE

## 2024-11-19 DIAGNOSIS — E78.5 HYPERLIPIDEMIA LDL GOAL <70: ICD-10-CM

## 2024-11-19 DIAGNOSIS — D51.9 ANEMIA DUE TO VITAMIN B12 DEFICIENCY, UNSPECIFIED B12 DEFICIENCY TYPE: ICD-10-CM

## 2024-11-19 DIAGNOSIS — N18.32 TYPE 2 DIABETES MELLITUS WITH STAGE 3B CHRONIC KIDNEY DISEASE, WITH LONG-TERM CURRENT USE OF INSULIN (H): ICD-10-CM

## 2024-11-19 DIAGNOSIS — E11.22 TYPE 2 DIABETES MELLITUS WITH STAGE 3B CHRONIC KIDNEY DISEASE, WITH LONG-TERM CURRENT USE OF INSULIN (H): ICD-10-CM

## 2024-11-19 DIAGNOSIS — I10 HYPERTENSION GOAL BP (BLOOD PRESSURE) < 140/90: ICD-10-CM

## 2024-11-19 DIAGNOSIS — Z79.4 TYPE 2 DIABETES MELLITUS WITH STAGE 3B CHRONIC KIDNEY DISEASE, WITH LONG-TERM CURRENT USE OF INSULIN (H): ICD-10-CM

## 2024-11-19 LAB
ALBUMIN SERPL BCG-MCNC: 4.2 G/DL (ref 3.5–5.2)
ALP SERPL-CCNC: 87 U/L (ref 40–150)
ALT SERPL W P-5'-P-CCNC: 15 U/L (ref 0–50)
ANION GAP SERPL CALCULATED.3IONS-SCNC: 12 MMOL/L (ref 7–15)
AST SERPL W P-5'-P-CCNC: 23 U/L (ref 0–45)
BILIRUB SERPL-MCNC: 0.3 MG/DL
BUN SERPL-MCNC: 21.1 MG/DL (ref 8–23)
CALCIUM SERPL-MCNC: 9.9 MG/DL (ref 8.8–10.4)
CHLORIDE SERPL-SCNC: 106 MMOL/L (ref 98–107)
CHOLEST SERPL-MCNC: 148 MG/DL
CREAT SERPL-MCNC: 0.82 MG/DL (ref 0.51–0.95)
EGFRCR SERPLBLD CKD-EPI 2021: 75 ML/MIN/1.73M2
ERYTHROCYTE [DISTWIDTH] IN BLOOD BY AUTOMATED COUNT: 12.1 % (ref 10–15)
EST. AVERAGE GLUCOSE BLD GHB EST-MCNC: 148 MG/DL
FASTING STATUS PATIENT QL REPORTED: YES
FASTING STATUS PATIENT QL REPORTED: YES
GLUCOSE SERPL-MCNC: 142 MG/DL (ref 70–99)
HBA1C MFR BLD: 6.8 % (ref 0–5.6)
HCO3 SERPL-SCNC: 23 MMOL/L (ref 22–29)
HCT VFR BLD AUTO: 40.1 % (ref 35–47)
HDLC SERPL-MCNC: 54 MG/DL
HGB BLD-MCNC: 12.9 G/DL (ref 11.7–15.7)
LDLC SERPL CALC-MCNC: 65 MG/DL
MCH RBC QN AUTO: 30.8 PG (ref 26.5–33)
MCHC RBC AUTO-ENTMCNC: 32.2 G/DL (ref 31.5–36.5)
MCV RBC AUTO: 96 FL (ref 78–100)
NONHDLC SERPL-MCNC: 94 MG/DL
PLATELET # BLD AUTO: 261 10E3/UL (ref 150–450)
POTASSIUM SERPL-SCNC: 4.8 MMOL/L (ref 3.4–5.3)
PROT SERPL-MCNC: 6.8 G/DL (ref 6.4–8.3)
RBC # BLD AUTO: 4.19 10E6/UL (ref 3.8–5.2)
SODIUM SERPL-SCNC: 141 MMOL/L (ref 135–145)
TRIGL SERPL-MCNC: 145 MG/DL
VIT B12 SERPL-MCNC: 842 PG/ML (ref 232–1245)
WBC # BLD AUTO: 5.6 10E3/UL (ref 4–11)

## 2024-11-19 PROCEDURE — 80061 LIPID PANEL: CPT

## 2024-11-19 PROCEDURE — 83036 HEMOGLOBIN GLYCOSYLATED A1C: CPT

## 2024-11-19 PROCEDURE — 85027 COMPLETE CBC AUTOMATED: CPT

## 2024-11-19 PROCEDURE — 80053 COMPREHEN METABOLIC PANEL: CPT

## 2024-11-19 PROCEDURE — 36415 COLL VENOUS BLD VENIPUNCTURE: CPT

## 2024-11-19 PROCEDURE — 82607 VITAMIN B-12: CPT

## 2024-12-08 NOTE — NURSING NOTE
"Chief Complaint   Patient presents with     Musculoskeletal Problem       Initial /78 (BP Location: Right arm, Patient Position: Chair, Cuff Size: Adult Regular)  Pulse 69  Temp 98.9  F (37.2  C) (Oral)  SpO2 92% Estimated body mass index is 25.02 kg/(m^2) as calculated from the following:    Height as of 1/6/17: 5' 6\" (1.676 m).    Weight as of 1/6/17: 155 lb (70.3 kg).       Unable to get weight and height.    Medication Reconciliation: complete Rupal Duque MA      " To get better and follow your care plan as instructed.

## 2024-12-09 DIAGNOSIS — Z03.89 ENCOUNTER FOR OBSERVATION FOR OTHER SUSPECTED DISEASES AND CONDITIONS RULED OUT: ICD-10-CM

## 2024-12-09 DIAGNOSIS — E11.9 DIABETIC EYE EXAM (H): Primary | ICD-10-CM

## 2024-12-09 DIAGNOSIS — Z01.00 DIABETIC EYE EXAM (H): Primary | ICD-10-CM

## 2024-12-09 DIAGNOSIS — H40.003 GLAUCOMA SUSPECT, BOTH EYES: Primary | ICD-10-CM

## 2024-12-09 NOTE — PROGRESS NOTES
HPI:  Patient presents for a diabetic eye exam. Vision is stable. Patient is happy with over the counter readers.     Here with Aubie - care taker.       Pertinent Medical History:  CVA due to thrombosis of right anterior cerebral artery. 2010  Cerebral artery occlusion with cerebral infarction. 2011  Hemiplegia affecting right dominant side  Spastic hemiplegia of left nondominant side as late effect of cerebral infarction.   Seizure disorder  Type 2 diabetes mellitus   Seasonal allergic rhinitis  B12 deficiency anemia  Hyperlipidemia  Hypertension  CKD stage 3b  Rosacea  Severe left ICA stenosis.     Ocular History:  ERM, left eye.   Glaucoma suspect, both eyes.   Retinal hole, right eye.   Mild NPDR, both eyes.     Eye Medications:  None    Assessment and Plan:    #   Mild Non-proliferative Diabetic Retinopathy, left eye.   #   No Diabetic Retinopathy, right ey e.   Macular OCT 01/07/2025: Right eye: no cme; Left eye: mild ERM, no cme  Last HA1C was 6.8 on 11/19/2024.   Goal is to keep HA1C under 7.0 to prevent blindness.   Recommend annual diabetic eye exam with macular OCT/optos. Exam done with 20D indirect.     #   Epiretinal Membrane, left eye.   Macular OCT 01/07/2025: Right eye: no cme; Left eye: mild ERM, no cme   ERM only seen on oct - not seen on indirect.   Not visually significant.   Return immediately if there waviness in vision or decrease in vision.   Return immediately if there are changes seen on amsler grid.   Recommend annual dilated eye exam with macular OCT.      #   Glaucoma suspect due to age and asymmetrical cupping, right eye > left eye.   We discussed that glaucoma is a treatable blinding disease. There is no cure for glaucoma. There are treatments to slow down progression. The only way to treat glaucoma is to lower the eye pressure with eye drops, lasers, and/or surgeries. Recommend regular follow ups to rule out glaucoma.   Glaucoma suspected noted since 12/08/2021.   No known family  history of glaucoma.   Visual field - cannot be done due to wheelchair noted on 01/03/2024.   Optic nerve OCT 01/07/2025: Right eye: no rnfl thinning, stable; Left eye: no rnfl thinning, stable  Unable to perform visual fields - recommend follow up in the glaucoma service with Dr. Alexis. Can stay in the glaucoma service.     #   Peripheral Retinal Hole, right eye. Retinas attached.   Educated on signs and symptoms of a retinal detachment (ie. Hundreds of floaters, flashes of light, and shadow/curtain over the vision) to be seen immediately.   Recommend annual dilated eye exam.      #   Hyperopia, both eyes.   Happy with over the counter readers.     #   History of Stroke/CVA  CVA due to thrombosis of right anterior cerebral artery. 2010.   Full to finger counting both eyes.   Unable to perform visual field due to wheelchair.     #   Cataract, both eyes.   Visually significant. Not too bothered - okay to monitor for now.   May need more lighting for near work and reading.   Recommend UV protection.   Recommend annual dilated eye exam.    Cannot drive due to history of stroke.         Patient consented to a dilated eye exam:    Yes. Side effects discussed.  Ease of exam: needs hand held slit lamp, unable to get in slit lamp due to wheelchair.     Medical History:  Past Medical History:   Diagnosis Date    Allergic rhinitis     Allergic rhinitis     CKD (chronic kidney disease)     Depression     Depression     Displaced dome fracture of left acetabulum (H)     DM2 (diabetes mellitus, type 2) (H)     GERD (gastroesophageal reflux disease)     GERD (gastroesophageal reflux disease)     Gout     HTN (hypertension)     Hyperlipidaemia LDL goal <100     Hypertension goal BP (blood pressure) < 140/90     Left hemiplegia (H) 01/2010    sees PMR physician    Left hemiplegia (H)     Major depression in complete remission (H) 07/06/2012    Migraine     Migraine     Mild major depression (H) 10/07/2011    Mild  nonproliferative diabetic retinopathy of both eyes (H) 02/2011    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)     Nephrolithiasis     Nephrolithiasis     Seizure disorder (H)     Spastic hemiplegia of left nondominant side as late effect of cerebral infarction (H) 12/20/2021    Stroke (H) 01/2010    due to uncontrolled hypertension    Stroke (H)     Type 2 diabetes, HbA1C goal < 8% (H)     Vitamin B12 deficiency anemia        Medications:  Current Outpatient Medications   Medication Sig Dispense Refill    acetaminophen (TYLENOL) 500 MG tablet Take 500-1,000 mg by mouth every 6 hours as needed       alendronate (FOSAMAX) 35 MG tablet Take 1 tablet (35 mg) by mouth every 7 days. 12 tablet 3    amLODIPine (NORVASC) 10 MG tablet Take 1 tablet (10 mg) by mouth daily. 90 tablet 3    atorvastatin (LIPITOR) 40 MG tablet Take 1 tablet (40 mg) by mouth daily. 90 tablet 3    baclofen (LIORESAL) 10 MG tablet Take 1 tablet (10 mg) by mouth 3 times daily 270 tablet 3    bisacodyl (DULCOLAX) 5 MG EC tablet Take 2 tablets (10 mg) by mouth daily as needed for constipation 180 tablet 3    blood glucose (CONTOUR TEST) test strip USE TO TEST BLOOD GLUCOSE ONCE DAILY 100 strip 3    citalopram (CELEXA) 10 MG tablet Take 1 tablet (10 mg) by mouth daily. 90 tablet 3    clopidogrel (PLAVIX) 75 MG tablet Take 1 tablet (75 mg) by mouth daily. 90 tablet 0    cyanocobalamin (CYANOCOBALAMIN) 1000 MCG/ML injection Inject 1 mL into the muscle every 30 days      Lactase 4500 units TABS Take 13,500 Units by mouth daily as needed      levETIRAcetam (KEPPRA) 500 MG tablet Take 1,000 mg by mouth 2 times daily       losartan (COZAAR) 25 MG tablet Take 1 tablet (25 mg) by mouth at bedtime. 90 tablet 3    metFORMIN (GLUCOPHAGE) 1000 MG tablet TAKE 1 TABLET(1000 MG) BY MOUTH TWICE DAILY WITH MEALS 180 tablet 3    metoprolol succinate ER (TOPROL XL) 50 MG 24 hr tablet Take 1 tablet (50 mg) by mouth daily. 90 tablet 3    montelukast  (SINGULAIR) 10 MG tablet Take 1 tablet (10 mg) by mouth daily. 90 tablet 3    senna-docusate (SENOKOT-S/PERICOLACE) 8.6-50 MG tablet Take 1 tablet by mouth daily      Vitamin D3 (VITAMIN D3) 25 mcg (1000 units) tablet Take 1 tablet (25 mcg) by mouth daily 90 tablet 3   Complete documentation of historical and exam elements from today's encounter can be found in the full encounter summary report (not reduplicated in this progress note). I personally obtained the chief complaint(s) and history of present illness.  I confirmed and edited as necessary the review of systems, past medical/surgical history, family history, social history, and examination findings as documented by others; and I examined the patient myself. I personally reviewed the relevant tests, images, and reports as documented above. I formulated and edited as necessary the assessment and plan and discussed the findings and management plan with the patient and family. - Alex Herron OD

## 2025-01-07 ENCOUNTER — OFFICE VISIT (OUTPATIENT)
Dept: OPHTHALMOLOGY | Facility: CLINIC | Age: 75
End: 2025-01-07
Attending: FAMILY MEDICINE
Payer: MEDICARE

## 2025-01-07 DIAGNOSIS — H35.372 EPIRETINAL MEMBRANE (ERM) OF LEFT EYE: Primary | ICD-10-CM

## 2025-01-07 DIAGNOSIS — H52.03 HYPEROPIA OF BOTH EYES: ICD-10-CM

## 2025-01-07 DIAGNOSIS — E11.9 DIABETIC EYE EXAM (H): ICD-10-CM

## 2025-01-07 DIAGNOSIS — H33.321 RETINAL HOLE OF RIGHT EYE: ICD-10-CM

## 2025-01-07 DIAGNOSIS — H40.003 GLAUCOMA SUSPECT, BOTH EYES: ICD-10-CM

## 2025-01-07 DIAGNOSIS — Z03.89 ENCOUNTER FOR OBSERVATION FOR OTHER SUSPECTED DISEASES AND CONDITIONS RULED OUT: ICD-10-CM

## 2025-01-07 DIAGNOSIS — E11.3292 MILD NONPROLIFERATIVE DIABETIC RETINOPATHY OF LEFT EYE WITHOUT MACULAR EDEMA ASSOCIATED WITH TYPE 2 DIABETES MELLITUS (H): ICD-10-CM

## 2025-01-07 DIAGNOSIS — Z01.00 DIABETIC EYE EXAM (H): ICD-10-CM

## 2025-01-07 DIAGNOSIS — H25.13 NUCLEAR SCLEROSIS OF BOTH EYES: ICD-10-CM

## 2025-01-07 PROCEDURE — 92015 DETERMINE REFRACTIVE STATE: CPT | Mod: GY

## 2025-01-07 PROCEDURE — G0463 HOSPITAL OUTPT CLINIC VISIT: HCPCS | Performed by: OPTOMETRIST

## 2025-01-07 PROCEDURE — 92134 CPTRZ OPH DX IMG PST SGM RTA: CPT | Performed by: OPTOMETRIST

## 2025-01-07 PROCEDURE — 92133 CPTRZD OPH DX IMG PST SGM ON: CPT | Performed by: OPTOMETRIST

## 2025-01-07 ASSESSMENT — REFRACTION_MANIFEST
OS_ADD: +2.25
OS_SPHERE: +0.75
OD_ADD: +2.25
OS_CYLINDER: +0.75
OS_AXIS: 180
OD_CYLINDER: +0.75
OD_SPHERE: +0.75
OD_AXIS: 180

## 2025-01-07 ASSESSMENT — VISUAL ACUITY
OD_PH_SC: 20/40
METHOD: SNELLEN - LINEAR
OD_SC+: +2
OS_SC: 20/50
OD_SC: 20/50
OS_PH_SC: 20/30
OD_PH_SC+: +1

## 2025-01-07 ASSESSMENT — EXTERNAL EXAM - LEFT EYE: OS_EXAM: NORMAL

## 2025-01-07 ASSESSMENT — TONOMETRY
OS_IOP_MMHG: 15
OD_IOP_MMHG: 15
IOP_METHOD: TONOPEN

## 2025-01-07 ASSESSMENT — PACHYMETRY
OS_CT(UM): 529
EXAM_DATE: 1/7/2025
OD_CT(UM): 532

## 2025-01-07 ASSESSMENT — REFRACTION_WEARINGRX
OD_ADD: +2.50
OS_CYLINDER: +0.75
OD_AXIS: 180
OS_AXIS: 180
SPECS_TYPE: LAST MRX
OS_ADD: +2.50
OS_SPHERE: +0.50
OD_SPHERE: +0.50
OD_CYLINDER: +0.75

## 2025-01-07 ASSESSMENT — CONF VISUAL FIELD
OS_SUPERIOR_NASAL_RESTRICTION: 0
OD_INFERIOR_TEMPORAL_RESTRICTION: 0
OD_SUPERIOR_TEMPORAL_RESTRICTION: 0
OD_SUPERIOR_NASAL_RESTRICTION: 0
OS_SUPERIOR_TEMPORAL_RESTRICTION: 0
OS_INFERIOR_NASAL_RESTRICTION: 0
OD_NORMAL: 1
OS_INFERIOR_TEMPORAL_RESTRICTION: 0
OS_NORMAL: 1
OD_INFERIOR_NASAL_RESTRICTION: 0

## 2025-01-07 ASSESSMENT — SLIT LAMP EXAM - LIDS
COMMENTS: NORMAL
COMMENTS: NORMAL

## 2025-01-07 ASSESSMENT — EXTERNAL EXAM - RIGHT EYE: OD_EXAM: NORMAL

## 2025-01-07 NOTE — NURSING NOTE
Chief Complaints and History of Present Illnesses   Patient presents with    New Patient     Chief Complaint(s) and History of Present Illness(es)       New Patient              Laterality: both eyes    Associated symptoms: Negative for flashes and floaters    Treatments tried: artificial tears    Pain scale: 0/10              Comments    The patient notes she has no vision changes.  She wears OTC readers and she is happy not wearing glasses.  She reports no eye problems.  Lab Results   Component Value Date    A1C 6.8 11/19/2024    A1C 6.5 05/14/2024    A1C 6.6 11/14/2023    A1C 6.6 05/12/2023    A1C 6.2 11/11/2022    A1C 6.2 04/15/2021    A1C 6.3 11/20/2020    A1C 6.6 05/04/2020    A1C 8.4 02/02/2020    A1C 7.6 10/04/2019      Edie Robles, COA, COA 8:21 AM 01/07/2025

## 2025-01-09 ENCOUNTER — MYC MEDICAL ADVICE (OUTPATIENT)
Dept: FAMILY MEDICINE | Facility: CLINIC | Age: 75
End: 2025-01-09
Payer: MEDICARE

## 2025-02-10 ENCOUNTER — PATIENT OUTREACH (OUTPATIENT)
Dept: GASTROENTEROLOGY | Facility: CLINIC | Age: 75
End: 2025-02-10
Payer: MEDICARE

## 2025-03-02 ENCOUNTER — HEALTH MAINTENANCE LETTER (OUTPATIENT)
Age: 75
End: 2025-03-02
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
## 2025-03-26 DIAGNOSIS — H40.003 GLAUCOMA SUSPECT, BOTH EYES: Primary | ICD-10-CM

## 2025-03-27 ENCOUNTER — OFFICE VISIT (OUTPATIENT)
Dept: OPHTHALMOLOGY | Facility: CLINIC | Age: 75
End: 2025-03-27
Attending: OPHTHALMOLOGY
Payer: MEDICARE

## 2025-03-27 DIAGNOSIS — H40.003 GLAUCOMA SUSPECT, BOTH EYES: Primary | ICD-10-CM

## 2025-03-27 DIAGNOSIS — E11.9 DIABETIC EYE EXAM (H): ICD-10-CM

## 2025-03-27 DIAGNOSIS — H25.13 NUCLEAR SCLEROSIS OF BOTH EYES: ICD-10-CM

## 2025-03-27 DIAGNOSIS — H35.372 EPIRETINAL MEMBRANE (ERM) OF LEFT EYE: ICD-10-CM

## 2025-03-27 DIAGNOSIS — Z01.00 DIABETIC EYE EXAM (H): ICD-10-CM

## 2025-03-27 DIAGNOSIS — E11.3292 MILD NONPROLIFERATIVE DIABETIC RETINOPATHY OF LEFT EYE WITHOUT MACULAR EDEMA ASSOCIATED WITH TYPE 2 DIABETES MELLITUS (H): ICD-10-CM

## 2025-03-27 PROCEDURE — 92020 GONIOSCOPY: CPT | Performed by: OPHTHALMOLOGY

## 2025-03-27 PROCEDURE — G0463 HOSPITAL OUTPT CLINIC VISIT: HCPCS | Performed by: OPHTHALMOLOGY

## 2025-03-27 PROCEDURE — 92083 EXTENDED VISUAL FIELD XM: CPT | Performed by: OPHTHALMOLOGY

## 2025-03-27 ASSESSMENT — PACHYMETRY
OD_CT(UM): 532
OS_CT(UM): 529
EXAM_DATE: 1/7/2025

## 2025-03-27 ASSESSMENT — CONF VISUAL FIELD
OS_INFERIOR_TEMPORAL_RESTRICTION: 0
OS_INFERIOR_NASAL_RESTRICTION: 0
OD_INFERIOR_NASAL_RESTRICTION: 0
METHOD: COUNTING FINGERS
OS_SUPERIOR_TEMPORAL_RESTRICTION: 0
OD_SUPERIOR_NASAL_RESTRICTION: 0
OD_NORMAL: 1
OS_NORMAL: 1
OD_INFERIOR_TEMPORAL_RESTRICTION: 0
OD_SUPERIOR_TEMPORAL_RESTRICTION: 0
OS_SUPERIOR_NASAL_RESTRICTION: 0

## 2025-03-27 ASSESSMENT — TONOMETRY
OD_IOP_MMHG: 10
OS_IOP_MMHG: 11
OS_IOP_MMHG: 13
IOP_METHOD: ICARE
OD_IOP_MMHG: 12
OD_IOP_MMHG: 12
IOP_METHOD: APPLANATION

## 2025-03-27 ASSESSMENT — VISUAL ACUITY
OD_SC+: -2
OS_SC: 20/40
METHOD: SNELLEN - LINEAR
OD_SC: 20/40
OS_PH_SC: 20/30
OS_SC+: -2

## 2025-03-27 ASSESSMENT — EXTERNAL EXAM - RIGHT EYE: OD_EXAM: NORMAL

## 2025-03-27 ASSESSMENT — EXTERNAL EXAM - LEFT EYE: OS_EXAM: NORMAL

## 2025-03-27 ASSESSMENT — CUP TO DISC RATIO
OD_RATIO: 0.7
OS_RATIO: 0.6

## 2025-03-27 ASSESSMENT — SLIT LAMP EXAM - LIDS
COMMENTS: NORMAL
COMMENTS: NORMAL

## 2025-03-27 NOTE — NURSING NOTE
Chief Complaints and History of Present Illnesses   Patient presents with    Consult For      Glaucoma suspect referred by Dr Owusu     Chief Complaint(s) and History of Present Illness(es)       Consult For              Laterality: both eyes    Course: stable    Associated symptoms: dryness and headache (infrequent).  Negative for eye pain and photophobia    Treatments tried: artificial tears    Pain scale: 0/10    Comments:  Glaucoma suspect referred by Dr Owusu              Comments    She states that her vision has seemed stable in both eyes since her last eye exam.  Infrequently her eyes feel dry.  She uses artificial tears three times a day.    Magaly Chowdary, COT 7:44 AM  March 27, 2025

## 2025-04-08 DIAGNOSIS — I63.321 CEREBROVASCULAR ACCIDENT (CVA) DUE TO THROMBOSIS OF RIGHT ANTERIOR CEREBRAL ARTERY (H): ICD-10-CM

## 2025-04-08 RX ORDER — CLOPIDOGREL BISULFATE 75 MG/1
75 TABLET ORAL DAILY
Qty: 90 TABLET | Refills: 1 | Status: SHIPPED | OUTPATIENT
Start: 2025-04-08

## 2025-04-21 DIAGNOSIS — N18.32 TYPE 2 DIABETES MELLITUS WITH STAGE 3B CHRONIC KIDNEY DISEASE, WITHOUT LONG-TERM CURRENT USE OF INSULIN (H): ICD-10-CM

## 2025-04-21 DIAGNOSIS — E11.22 TYPE 2 DIABETES MELLITUS WITH STAGE 3B CHRONIC KIDNEY DISEASE, WITHOUT LONG-TERM CURRENT USE OF INSULIN (H): ICD-10-CM

## 2025-04-21 RX ORDER — CARVEDILOL 25 MG/1
TABLET, FILM COATED ORAL
Qty: 100 STRIP | Refills: 2 | Status: SHIPPED | OUTPATIENT
Start: 2025-04-21

## 2025-04-25 ENCOUNTER — TELEPHONE (OUTPATIENT)
Dept: FAMILY MEDICINE | Facility: CLINIC | Age: 75
End: 2025-04-25
Payer: MEDICARE

## 2025-04-25 NOTE — TELEPHONE ENCOUNTER
"JW,     Please see below message copied from [employer] message encounter date 4/25/25 (duplicate encounter):     \"Hi this is Buck Mancini's caregiver. Addis fell on Weds, 4/23 They took her to [facility] where her Neurologist is. Cat Scan negative and no signs of a concussion. Addis can get in and out of bed like before dress herself and get on the toilet but she cannot get off by herself. I know her care coordinator contacted you. She hasn't had in house physical or occupational therapy for a few years. I don't know if physical or occupational would apply. I was wondering if it is possible. In house so they can relate to her problem. My concern is that she would now need someone 24 hours a day at all times. Thank You for any help, Buck \"    DORYV on 11/22/2024.   Home Health Referral pended for your review and approval if appropriate.     Thank you,   Negra PARRISH RN    "

## 2025-04-25 NOTE — TELEPHONE ENCOUNTER
Order/Referral Request    Who is requesting: Patient and caregiver    Orders being requested: physical therapy     Reason service is needed/diagnosis: having a challenge around toilet    When are orders needed by: asap    Has this been discussed with Provider: N/A, not sure    Does patient have a preference on a Group/Provider/Facility? [facility]    Does patient have an appointment scheduled?: No    Where to send orders: Place orders within Epic and let the patient know    Could we send this information to you in [facility] or would you prefer to receive a phone call?:   Patient would prefer a phone call   Okay to leave a detailed message?: N/A at Cell number on file:    Telephone Information:   Mobile [phone]     If you can't get a hold of Addis, contact Buck [phone]

## 2025-04-25 NOTE — TELEPHONE ENCOUNTER
Forms/Letter Request    Type of form/letter:     [facility] Medicare Processing    Glucose test strips    Do we have the form/letter: Yes    Who is the form from?   [facility] Medicare Processing    Where did/will the form come from? form was faxed in    When is form/letter needed by: n/a    How would you like the form/letter returned:   Fax: [phone]    Patient Notified form requests are processed in 5-7 business days:No    Could we send this information to you in [employer] or would you prefer to receive a phone call?:   n/a    Placed on Dr. Wegener's desk.

## 2025-04-27 DIAGNOSIS — I69.354 SPASTIC HEMIPLEGIA OF LEFT NONDOMINANT SIDE AS LATE EFFECT OF CEREBRAL INFARCTION (H): ICD-10-CM

## 2025-04-28 RX ORDER — BACLOFEN 10 MG/1
10 TABLET ORAL 3 TIMES DAILY
Qty: 270 TABLET | Refills: 3 | Status: SHIPPED | OUTPATIENT
Start: 2025-04-28

## 2025-05-01 ENCOUNTER — VIRTUAL VISIT (OUTPATIENT)
Dept: FAMILY MEDICINE | Facility: CLINIC | Age: 75
End: 2025-05-01
Payer: MEDICARE

## 2025-05-01 DIAGNOSIS — N18.32 TYPE 2 DIABETES MELLITUS WITH STAGE 3B CHRONIC KIDNEY DISEASE, WITH LONG-TERM CURRENT USE OF INSULIN (H): ICD-10-CM

## 2025-05-01 DIAGNOSIS — I69.354 SPASTIC HEMIPLEGIA OF LEFT NONDOMINANT SIDE AS LATE EFFECT OF CEREBRAL INFARCTION (H): ICD-10-CM

## 2025-05-01 DIAGNOSIS — S09.90XD INJURY OF HEAD, SUBSEQUENT ENCOUNTER: ICD-10-CM
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
## 2025-05-01 DIAGNOSIS — G40.909 SEIZURE DISORDER (H): ICD-10-CM

## 2025-05-01 DIAGNOSIS — E11.22 TYPE 2 DIABETES MELLITUS WITH STAGE 3B CHRONIC KIDNEY DISEASE, WITH LONG-TERM CURRENT USE OF INSULIN (H): ICD-10-CM

## 2025-05-01 DIAGNOSIS — Z79.4 TYPE 2 DIABETES MELLITUS WITH STAGE 3B CHRONIC KIDNEY DISEASE, WITH LONG-TERM CURRENT USE OF INSULIN (H): ICD-10-CM

## 2025-05-01 ASSESSMENT — PATIENT HEALTH QUESTIONNAIRE - PHQ9
10. IF YOU CHECKED OFF ANY PROBLEMS, HOW DIFFICULT HAVE THESE PROBLEMS MADE IT FOR YOU TO DO YOUR WORK, TAKE CARE OF THINGS AT HOME, OR GET ALONG WITH OTHER PEOPLE: NOT DIFFICULT AT ALL
SUM OF ALL RESPONSES TO PHQ QUESTIONS 1-9: 3
SUM OF ALL RESPONSES TO PHQ QUESTIONS 1-9: 3

## 2025-05-01 NOTE — PROGRESS NOTES
"Addis is a 74 year old who is being evaluated via a billable video visit.    How would you like to obtain your AVS? MyChart  If the video visit is dropped, the invitation should be resent by: Text to cell phone: [phone]  Will anyone else be joining your video visit? No      Assessment & Plan     Fall, subsequent encounter  Accidental mechanical fall reaching for something.  Mostly uses wheelchair but son blood thinners evaluated in ED with negative evaluation for bleeding and concussion.  Fairly weak the next two days although now her and  report strength returning to normal so is able to assist with transfers again for self-cares, bathing, etc.  Has left hemiplegia due to previous stroke.     Desires home pt/ot for fall assessment/strengthening given this event which I feel is reasonable idea.  Will order.      Follow up December for physical as planned, earlier as needed.     Injury of head, subsequent encounter  Thankfully no cerebral bleeding or concussion.     Spastic hemiplegia of left nondominant side as late effect of cerebral infarction (H)  As above.     Seizure disorder:  confirmed with her and  fall not related to seizure.     Diabetes type 2:  confirmed has lab appointment coming up for next A1c.             Subjective   Addis is a 74 year old, presenting for the following health issues:  ED follow up (fall)        5/1/2025     2:10 PM   Additional Questions   Roomed by TYLER Mendoza   Accompanied by N/A       Video Start Time:  3:20    History of Present Illness       Reason for visit:  Need of In Home OT And PT haven't had an eval in awhile   She is taking medications regularly.        ED/UC Followup:  Facility:  [facility] - ED  Date of visit: 4/23/2025  Reason for visit: fall, hit head  Current Status: Pt reports \"I am feeling fine\". Pt declines dizziness, headaches, and changes to vision. Pt reports bruising is present \"but it is healing fine\". Pt denies pain.           "   Objective    Vitals - Patient Reported  Systolic (Patient Reported):  (Pt denies VS to report at this time)  Pain Score: No Pain (0)      Vitals:  No vitals were obtained today due to virtual visit.    Physical Exam   In bed, alert, smiling, interactive.  Dysarthric speech at baseline.           Video-Visit Details    Type of service:  Video Visit   Video End Time:3:25 PM  Originating Location (pt. Location): Home    Distant Location (provider location):  On-site  Platform [facility] Video Visit[facility]  Signed Electronically by: Joel Daniel Wegener, MD

## 2025-05-02 ENCOUNTER — TELEPHONE (OUTPATIENT)
Dept: FAMILY MEDICINE | Facility: CLINIC | Age: 75
End: 2025-05-02

## 2025-05-02 NOTE — TELEPHONE ENCOUNTER
Triage,   Sending an FYI.   [facility] called.   Wanted to update us that the patient will start homecare on 5/6/25 due to the agency being at capacity.   They needed to let our office know because the start date of home care is past the 48 hour breanne from when the home care order was placed.  No call back necessary.  Thanks!  Brissa RAMOS

## 2025-05-06 ENCOUNTER — TELEPHONE (OUTPATIENT)
Dept: FAMILY MEDICINE | Facility: CLINIC | Age: 75
End: 2025-05-06
Payer: MEDICARE

## 2025-05-06 NOTE — TELEPHONE ENCOUNTER
Home Care is calling regarding an established patient with [facility].  Requesting orders from: Wegener, Joel Daniel Irwin  RN APPROVED: RN able to provide verbal orders.  Home Care will send orders for signature.  RN will close encounter.  Is this a request for a temporary pause in the home care episode?  No    Orders Requested    Physical Therapy  Request for initial certification (first set of orders)   Frequency: 1x week for 5 weeks, every other week for 4 weeks   RN gave verbal order: Yes      Occupational Therapy  Request for initial evaluation and treatment (one time)   RN gave verbal order: Yes      Phone number Home Care can be reached at: [place] [phone]  Okay to leave a detailed message?: Yes      Falguni Guillory RN

## 2025-05-14 ENCOUNTER — MYC MEDICAL ADVICE (OUTPATIENT)
Dept: FAMILY MEDICINE | Facility: CLINIC | Age: 75
End: 2025-05-14
Payer: MEDICARE

## 2025-05-19 ENCOUNTER — LAB REQUISITION (OUTPATIENT)
Dept: LAB | Facility: CLINIC | Age: 75
End: 2025-05-19
Payer: MEDICARE

## 2025-05-19 ENCOUNTER — DOCUMENTATION ONLY (OUTPATIENT)
Dept: GERIATRICS | Facility: CLINIC | Age: 75
End: 2025-05-19

## 2025-05-19 ENCOUNTER — TRANSITIONAL CARE UNIT VISIT (OUTPATIENT)
Dept: GERIATRICS | Facility: CLINIC | Age: 75
End: 2025-05-19
Payer: MEDICARE

## 2025-05-19 VITALS
DIASTOLIC BLOOD PRESSURE: 64 MMHG | SYSTOLIC BLOOD PRESSURE: 115 MMHG | HEART RATE: 68 BPM | HEIGHT: 66 IN | RESPIRATION RATE: 18 BRPM | BODY MASS INDEX: 23.46 KG/M2 | OXYGEN SATURATION: 95 % | WEIGHT: 146 LBS | TEMPERATURE: 97.9 F

## 2025-05-19 DIAGNOSIS — N20.0 STAGHORN CALCULUS: ICD-10-CM

## 2025-05-19 DIAGNOSIS — R53.81 PHYSICAL DECONDITIONING: ICD-10-CM

## 2025-05-19 DIAGNOSIS — E11.9 TYPE 2 DIABETES, HBA1C GOAL < 8% (H): ICD-10-CM

## 2025-05-19 DIAGNOSIS — M81.0 OSTEOPOROSIS WITHOUT CURRENT PATHOLOGICAL FRACTURE, UNSPECIFIED OSTEOPOROSIS TYPE: ICD-10-CM

## 2025-05-19 DIAGNOSIS — G40.909 SEIZURE DISORDER (H): ICD-10-CM

## 2025-05-19 DIAGNOSIS — A41.51 SEPSIS DUE TO ESCHERICHIA COLI WITH ACUTE RENAL FAILURE AND SEPTIC SHOCK, UNSPECIFIED ACUTE RENAL FAILURE TYPE (H): Primary | ICD-10-CM

## 2025-05-19 DIAGNOSIS — N17.9 SEPSIS DUE TO ESCHERICHIA COLI WITH ACUTE RENAL FAILURE AND SEPTIC SHOCK, UNSPECIFIED ACUTE RENAL FAILURE TYPE (H): Primary | ICD-10-CM

## 2025-05-19 DIAGNOSIS — Z86.73 HISTORY OF STROKE: ICD-10-CM

## 2025-05-19 DIAGNOSIS — R65.21 SEPSIS DUE TO ESCHERICHIA COLI WITH ACUTE RENAL FAILURE AND SEPTIC SHOCK, UNSPECIFIED ACUTE RENAL FAILURE TYPE (H): Primary | ICD-10-CM

## 2025-05-19 DIAGNOSIS — N17.9 AKI (ACUTE KIDNEY INJURY): ICD-10-CM

## 2025-05-19 DIAGNOSIS — G92.8 TOXIC METABOLIC ENCEPHALOPATHY: ICD-10-CM

## 2025-05-19 DIAGNOSIS — Z87.442 PERSONAL HISTORY OF URINARY CALCULI: ICD-10-CM

## 2025-05-19 DIAGNOSIS — N20.0 NEPHROLITHIASIS: ICD-10-CM

## 2025-05-19 PROCEDURE — 99306 1ST NF CARE HIGH MDM 50: CPT | Performed by: FAMILY MEDICINE

## 2025-05-19 RX ORDER — SODIUM CHLORIDE 9 MG/ML
INJECTION, SOLUTION INTRAVENOUS
COMMUNITY

## 2025-05-19 RX ORDER — POLYVINYL ALCOHOL 14 MG/ML
1 SOLUTION/ DROPS OPHTHALMIC DAILY
COMMUNITY

## 2025-05-19 RX ORDER — BACLOFEN 10 MG/1
5 TABLET ORAL 3 TIMES DAILY
COMMUNITY

## 2025-05-19 RX ORDER — BISACODYL 5 MG/1
5 TABLET, DELAYED RELEASE ORAL DAILY
COMMUNITY

## 2025-05-19 NOTE — PROGRESS NOTES
[facility] GERIATRIC SERVICES  PRIMARY CARE PROVIDER AND CLINIC:  Wegener, Joel Daniel Irwin [address] [address] / [facility] [address]  Chief Complaint   Patient presents with    Hospital F/U     [facility] Medical Record Number:  [id number]  Place of Service where encounter took place:  [facility] (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 .  Hospital stay 5/10/25 through 5/16/25.    Hospital Course    Symptoms progressed from the evening prior to presentation through the morning when she woke up and was unable to get out of bed.  Initial evaluation with significant MARI with creatinine 2.28, baseline appears to be 0.8.  CBC with leukocytosis to 13.2 and anemia with hemoglobin 9.2.  She presented as a code stroke due to left sided weakness and slurred speech. Initial imaging showed no acute infarction, findings of chronic right frontal infarct.  Neurocritical care was consulted and did not recommend acute MRI given findings of CT.  Also concern for infection as she had worsening hypotension and was started on norepinephrine with admission to ICU.  UA concerning as infectious source and started on Zosyn.  Further evaluation with renal ultrasound revealed stones of the right kidney and probable stones the left kidney though poor image quality.  CT abdomen pelvis showed a staghorn calculus of the left kidney with mild hydronephrosis and significant stone burden of the right kidney, no hydronephrosis.    Transferred out of ICU 5/11/2025.  Urology was consulted.  Recommendation for bilateral nephrostomy tubes and then plans for further management outpatient.  Urine culture with E. coli and transitioned to ceftriaxone.  Blood culture with  the same 5/10/2025,  negative on 5/11/2025.  IR was consulted, nephrostomy tube placement was delayed due to needing to hold clopidogrel.  Underwent procedure 5/15/2025.  Stable day after and able to discharge for ongoing cares.    Echocardiogram performed due to hypotension which noted normal function EF of 54%. HbA1c of 7.1%    Antibacterials completed during her hospitalization.  Will need follow-up with urology with plans for staged stone removal with left percutaneous nephrolithotomy and likely similar on the right.  Recommendation for UA with culture 10 days prior to surgery    Today  Recalls the nature of her hospitalization well.  Generally doing better.  Feeling tired but otherwise doing okay.  Still feeling sore around perinephric tube sites but this is improving.  She notes a history of kidney stones many years ago, though not as severe presentation.    Her appetite is doing well.  She does note some dysphagia if she eats too fast.  She generally manages this with chewing well and swallowing carefully.  She does have weakness of her left side and notes spasticity of the left upper extremity.  This can be painful at times.  She has poor control of that arm and notes bruise over the wrist possibly prior to her hospitalization or during a transfer.  She has no other pains.  No nausea or vomiting.  No bowel concerns.    Does have significant history of strokes, she states she has had 13.  Last stroke in 2010.    Functional review  She lives at home with her close friend who is also her caregiver.  They live in an apartment together.  She also has hired assistance during the day from 9 to 1 PM.  She self propels in a wheelchair for mobility.  Transfers independently.  Goes to the restroom on her own.  She feeds herself.  There are no stairs in her apartment.  She denies any history of smoking or alcohol use.    She is a hockey fan, enjoys watching the wild.  She also attends Polish poker with friends on  Saturdays.    CODE STATUS/ADVANCE DIRECTIVES DISCUSSION:   DNR / DNI  Patient's living condition: lives with spouse    ALLERGIES:Lisinopril, Milk products, and Latex  PAST MEDICAL HISTORY:  has a past medical history of Allergic rhinitis, Allergic rhinitis, CKD (chronic kidney disease), Depression, Depression, Displaced dome fracture of left acetabulum (H), DM2 (diabetes mellitus, type 2) (H), GERD (gastroesophageal reflux disease), GERD (gastroesophageal reflux disease), Gout, HTN (hypertension), Hyperlipidaemia LDL goal <100, Hypertension goal BP (blood pressure) < 140/90, Left hemiplegia (H) (01/2010), Left hemiplegia (H), Major depression in complete remission (H) (07/06/2012), Migraine, Migraine, Mild major depression (H) (10/07/2011), 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) (02/2011), 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), Nephrolithiasis, Nephrolithiasis, Seizure disorder (H), Spastic hemiplegia of left nondominant 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    She has no past medical history of Arthritis, Cancer (H), Congestive heart failure (H), COPD (chronic obstructive pulmonary disease) (H), Heart disease, History of blood transfusion, Thyroid disease, or Uncomplicated asthma.  PAST SURGICAL HISTORY:  has a past surgical history that includes colonscopy (3/2012); colonoscopy; Cystoscopy, ureteroscopy, combined (Right, 2/22/2016); Colonoscopy (N/A, 5/9/2019); Cystoscopy W/ Ureteroscopy (Right, 02/22/2016); Colonoscopy W/ Biopsies And Polypectomy (05/09/2019); and Orif Acetabulum Fracture (Left).  FAMILY HISTORY: family history includes C.A.D. in her father; Cancer in her sister and sister; Coronary Artery Disease in her father; Diabetes in her sister and sister; Glaucoma in her sister; Heart Disease in her mother; Hypertension in her brother,  brother, and mother.  SOCIAL HISTORY:  reports that she has never smoked. She has never used smokeless tobacco. She reports that she does not drink alcohol and does not use drugs.    Post Discharge Medication Reconciliation Status: discharge medications reconciled, continue medications without change.  Current Outpatient Medications   Medication Sig Dispense Refill    acetaminophen (TYLENOL) 500 MG tablet Take 500 mg by mouth 2 times daily. AND give 1 tab PO daily PRN for pain rated 2-5/10 and 2 tabs PO daily PRN for pain rated 6-10/10      alendronate (FOSAMAX) 35 MG tablet Take 1 tablet (35 mg) by mouth every 7 days. 12 tablet 3    amLODIPine (NORVASC) 10 MG tablet Take 1 tablet (10 mg) by mouth daily. 90 tablet 3    atorvastatin (LIPITOR) 40 MG tablet Take 1 tablet (40 mg) by mouth daily. 90 tablet 3    baclofen (LIORESAL) 10 MG tablet Take 5 mg by mouth 3 times daily.      bisacodyl (DULCOLAX) 5 MG EC tablet Take 5 mg by mouth daily.      blood glucose (CONTOUR TEST) test strip USE TO TEST BLOOD GLUCOSE ONCE DAILY 100 strip 2    citalopram (CELEXA) 10 MG tablet Take 1 tablet (10 mg) by mouth daily. 90 tablet 3    clopidogrel (PLAVIX) 75 MG tablet Take 1 tablet (75 mg) by mouth daily. 90 tablet 1    cyanocobalamin (CYANOCOBALAMIN) 1000 MCG/ML injection Inject 1 mL into the muscle every 30 days      Lactase 4500 units TABS Take 13,500 Units by mouth daily as needed      levETIRAcetam (KEPPRA) 250 MG tablet Take 1,000 mg by mouth 2 times daily.      metFORMIN (GLUCOPHAGE) 500 MG tablet Take 500 mg by mouth daily (with breakfast).      metoprolol succinate ER (TOPROL XL) 50 MG 24 hr tablet Take 1 tablet (50 mg) by mouth daily. 90 tablet 3    montelukast (SINGULAIR) 10 MG tablet Take 1 tablet (10 mg) by mouth daily. 90 tablet 3    polyvinyl alcohol (LIQUIFILM TEARS) 1.4 % ophthalmic solution Place 1 drop into both eyes daily.      senna-docusate (SENOKOT-S/PERICOLACE) 8.6-50 MG tablet Take 1 tablet by mouth daily    "   sodium chloride 0.9% infusion Use 10 ml intravenously one time a  day for Drain patency - Continue flushing each  tube daily until urine is clear and yellow, then can  discontinue      Vitamin D3 (VITAMIN D3) 25 mcg (1000 units) tablet Take 1 tablet (25 mcg) by mouth daily 90 tablet 3    baclofen (LIORESAL) 10 MG tablet TAKE 1 TABLET(10 MG) BY MOUTH THREE TIMES DAILY 270 tablet 3    bisacodyl (DULCOLAX) 5 MG EC tablet Take 2 tablets (10 mg) by mouth daily as needed for constipation 180 tablet 3    losartan (COZAAR) 25 MG tablet Take 1 tablet (25 mg) by mouth at bedtime. 90 tablet 3    metFORMIN (GLUCOPHAGE) 1000 MG tablet TAKE 1 TABLET(1000 MG) BY MOUTH TWICE DAILY WITH MEALS 180 tablet 3       Exam:  /64   Pulse 68   Temp 97.9  F (36.6  C)   Resp 18   Ht 1.676 m (5' 6\")   Wt 66.2 kg (146 lb)   LMP  (LMP Unknown)   SpO2 95%   BMI 23.57 kg/m      GENERAL APPEARANCE: Laying in bed comfortably, NAD  HENT:  NCAT, ,mildly [place], fair dentition  EYES:  Conjunctiva clear, anicteric, EOMI, PERRL  PULM  Normal WOB on RA, lungs CTAB, no wheezes or crackles  CV:  RRR, S1/S2 normal, no murmurs; no LE edema  ABDOMEN: Abdomen soft, not tender, not distended, BS normal and active throughout   : Perinephrostomy tubes in place, draining blood tinged urine   M/S:   Left UE in mild contracture with  SKIN:  Bruise over the left dorsal wrist; healing superficial wounds of right knee  NEURO: Alert, answering questions appropriately, normal thought processes; CN II-XII grossly intact, spastic left upper extremity with decreased bulk  PSYCH: Mood normal with congruent affect      Lab/Diagnostic data:     Recent labs and imaging in [facility] reviewed by me today.        ASSESSMENT/PLAN:    (A41.51,  R65.21,  N17.9) Sepsis due to Escherichia coli with acute renal failure and septic shock, unspecified acute renal failure type (H) - resolved  (primary encounter diagnosis)  Comment: Due to nephrolithiasis as below.  Required short " admission to ICU.  Overall resolving.  Plan:   - CMP    (N20.0) Nephrolithiasis  (N20.0) Staghorn calculus  Comment: Significant stone burden on the right and staghorn calculus of the left, perinephrostomy tubes in place with still blood-tinged urine but clearing.    Plan:   - Follow-up with urology in approximately 3 weeks for review of staged stone removal    (G92.8) Toxic metabolic encephalopathy - resolved  Comment: Due to sepsis with likely strokelike symptoms related to watershed phenomenon.  Resolved    (N17.9) MARI (acute kidney injury)  Comment: Severe MARI with creatinine greater than 2 from baseline of approximately 0.8.  1.54 at time of discharge.  Plan:   - CMP, CBC    (Z86.73) History of stroke  Comment: Significant history of stroke of the right anterior territory with left-sided sequelae.  Plan:   - Continue daily clopidogrel, atorvastatin    (G40.909) Seizure disorder (H)  Comment: Related to stroke history.  Continues on Keppra 1000 mg twice daily, no recent seizure activity    (E11.9) Type 2 diabetes, HbA1C goal < 8% (H)  Comment: Well-controlled, last A1c 7.1%.  Plan:   - Continue metformin 500 mg daily.  Modify pending lab results    (M81.0) Osteoporosis without current pathological fracture, unspecified osteoporosis type  Comment: History of osteoporosis with T-score -2.8.  Continues on alendronate 35 mg weekly, possibly would benefit from 70 mg weekly  Plan:   - Consider repeat DEXA    (R53.81) Physical deconditioning  Comment: Acute hospital deconditioning related to sepsis  Plan:   - PT/OT    Orders:  [x] CMP  [x] CBC w/ diff    Benjamin Rosenstein, MD, MA  [facility]     This note was completed with the assistance of dictation software. Typos and word substitution-errors are expected and unintended.      69 MINUTES SPENT BY ME on the date of service doing chart review, history, exam, documentation & further activities per the note.

## 2025-05-19 NOTE — LETTER
5/19/2025      Addis Peña  [address]  [place] [address]        [facility] GERIATRIC SERVICES  PRIMARY CARE PROVIDER AND CLINIC:  Wegener, Joel Daniel Irwin [address] [address] / [facility] [address]  Chief Complaint   Patient presents with     Hospital F/U     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  Hospital stay 5/10/25 through 5/16/25.    Hospital Course    Symptoms progressed from the evening prior to presentation through the morning when she woke up and was unable to get out of bed.  Initial evaluation with significant MARI with creatinine 2.28, baseline appears to be 0.8.  CBC with leukocytosis to 13.2 and anemia with hemoglobin 9.2.  She presented as a code stroke due to left sided weakness and slurred speech. Initial imaging showed no acute infarction, findings of chronic right frontal infarct.  Neurocritical care was consulted and did not recommend acute MRI given findings of CT.  Also concern for infection as she had worsening hypotension and was started on norepinephrine with admission to ICU.  UA concerning as infectious source and started on Zosyn.  Further evaluation with renal ultrasound revealed stones of the right kidney and probable stones the left kidney though poor image quality.  CT abdomen pelvis showed a staghorn calculus of the left kidney with mild hydronephrosis and significant stone burden of the right kidney, no hydronephrosis.    Transferred out of ICU 5/11/2025.  Urology was consulted.  Recommendation for bilateral nephrostomy tubes and then plans for further management  outpatient.  Urine culture with E. coli and transitioned to ceftriaxone.  Blood culture with the same 5/10/2025,  negative on 5/11/2025.  IR was consulted, nephrostomy tube placement was delayed due to needing to hold clopidogrel.  Underwent procedure 5/15/2025.  Stable day after and able to discharge for ongoing cares.    Echocardiogram performed due to hypotension which noted normal function EF of 54%. HbA1c of 7.1%    Antibacterials completed during her hospitalization.  Will need follow-up with urology with plans for staged stone removal with left percutaneous nephrolithotomy and likely similar on the right.  Recommendation for UA with culture 10 days prior to surgery    Today  Recalls the nature of her hospitalization well.  Generally doing better.  Feeling tired but otherwise doing okay.  Still feeling sore around perinephric tube sites but this is improving.  She notes a history of kidney stones many years ago, though not as severe presentation.    Her appetite is doing well.  She does note some dysphagia if she eats too fast.  She generally manages this with chewing well and swallowing carefully.  She does have weakness of her left side and notes spasticity of the left upper extremity.  This can be painful at times.  She has poor control of that arm and notes bruise over the wrist possibly prior to her hospitalization or during a transfer.  She has no other pains.  No nausea or vomiting.  No bowel concerns.    Does have significant history of strokes, she states she has had 13.  Last stroke in 2010.    Functional review  She lives at home with her close friend who is also her caregiver.  They live in an apartment together.  She also has hired assistance during the day from 9 to 1 PM.  She self propels in a wheelchair for mobility.  Transfers independently.  Goes to the restroom on her own.  She feeds herself.  There are no stairs in her apartment.  She denies any history of smoking or alcohol use.    She is  a hockey fan, enjoys watching the wild.  She also attends Polish poker with friends on Saturdays.    CODE STATUS/ADVANCE DIRECTIVES DISCUSSION:   DNR / DNI  Patient's living condition: lives with spouse    ALLERGIES:Lisinopril, Milk products, and Latex  PAST MEDICAL HISTORY:  has a past medical history of Allergic rhinitis, Allergic rhinitis, CKD (chronic kidney disease), Depression, Depression, Displaced dome fracture of left acetabulum (H), DM2 (diabetes mellitus, type 2) (H), GERD (gastroesophageal reflux disease), GERD (gastroesophageal reflux disease), Gout, HTN (hypertension), Hyperlipidaemia LDL goal <100, Hypertension goal BP (blood pressure) < 140/90, Left hemiplegia (H) (01/2010), Left hemiplegia (H), Major depression in complete remission (H) (07/06/2012), Migraine, Migraine, Mild major depression (H) (10/07/2011), 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) (02/2011), 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), Nephrolithiasis, Nephrolithiasis, Seizure disorder (H), Spastic hemiplegia of left nondominant 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    She has no past medical history of Arthritis, Cancer (H), Congestive heart failure (H), COPD (chronic obstructive pulmonary disease) (H), Heart disease, History of blood transfusion, Thyroid disease, or Uncomplicated asthma.  PAST SURGICAL HISTORY:  has a past surgical history that includes colonscopy (3/2012); colonoscopy; Cystoscopy, ureteroscopy, combined (Right, 2/22/2016); Colonoscopy (N/A, 5/9/2019); Cystoscopy W/ Ureteroscopy (Right, 02/22/2016); Colonoscopy W/ Biopsies And Polypectomy (05/09/2019); and Orif Acetabulum Fracture (Left).  FAMILY HISTORY: family history includes C.A.D. in her father; Cancer in her sister and sister; Coronary Artery Disease in her father; Diabetes in her sister and  sister; Glaucoma in her sister; Heart Disease in her mother; Hypertension in her brother, brother, and mother.  SOCIAL HISTORY:  reports that she has never smoked. She has never used smokeless tobacco. She reports that she does not drink alcohol and does not use drugs.    Post Discharge Medication Reconciliation Status: discharge medications reconciled, continue medications without change.  Current Outpatient Medications   Medication Sig Dispense Refill     acetaminophen (TYLENOL) 500 MG tablet Take 500 mg by mouth 2 times daily. AND give 1 tab PO daily PRN for pain rated 2-5/10 and 2 tabs PO daily PRN for pain rated 6-10/10       alendronate (FOSAMAX) 35 MG tablet Take 1 tablet (35 mg) by mouth every 7 days. 12 tablet 3     amLODIPine (NORVASC) 10 MG tablet Take 1 tablet (10 mg) by mouth daily. 90 tablet 3     atorvastatin (LIPITOR) 40 MG tablet Take 1 tablet (40 mg) by mouth daily. 90 tablet 3     baclofen (LIORESAL) 10 MG tablet Take 5 mg by mouth 3 times daily.       bisacodyl (DULCOLAX) 5 MG EC tablet Take 5 mg by mouth daily.       blood glucose (CONTOUR TEST) test strip USE TO TEST BLOOD GLUCOSE ONCE DAILY 100 strip 2     citalopram (CELEXA) 10 MG tablet Take 1 tablet (10 mg) by mouth daily. 90 tablet 3     clopidogrel (PLAVIX) 75 MG tablet Take 1 tablet (75 mg) by mouth daily. 90 tablet 1     cyanocobalamin (CYANOCOBALAMIN) 1000 MCG/ML injection Inject 1 mL into the muscle every 30 days       Lactase 4500 units TABS Take 13,500 Units by mouth daily as needed       levETIRAcetam (KEPPRA) 250 MG tablet Take 1,000 mg by mouth 2 times daily.       metFORMIN (GLUCOPHAGE) 500 MG tablet Take 500 mg by mouth daily (with breakfast).       metoprolol succinate ER (TOPROL XL) 50 MG 24 hr tablet Take 1 tablet (50 mg) by mouth daily. 90 tablet 3     montelukast (SINGULAIR) 10 MG tablet Take 1 tablet (10 mg) by mouth daily. 90 tablet 3     polyvinyl alcohol (LIQUIFILM TEARS) 1.4 % ophthalmic solution Place 1 drop into  "both eyes daily.       senna-docusate (SENOKOT-S/PERICOLACE) 8.6-50 MG tablet Take 1 tablet by mouth daily       sodium chloride 0.9% infusion Use 10 ml intravenously one time a  day for Drain patency - Continue flushing each  tube daily until urine is clear and yellow, then can  discontinue       Vitamin D3 (VITAMIN D3) 25 mcg (1000 units) tablet Take 1 tablet (25 mcg) by mouth daily 90 tablet 3     baclofen (LIORESAL) 10 MG tablet TAKE 1 TABLET(10 MG) BY MOUTH THREE TIMES DAILY 270 tablet 3     bisacodyl (DULCOLAX) 5 MG EC tablet Take 2 tablets (10 mg) by mouth daily as needed for constipation 180 tablet 3     losartan (COZAAR) 25 MG tablet Take 1 tablet (25 mg) by mouth at bedtime. 90 tablet 3     metFORMIN (GLUCOPHAGE) 1000 MG tablet TAKE 1 TABLET(1000 MG) BY MOUTH TWICE DAILY WITH MEALS 180 tablet 3       Exam:  /64   Pulse 68   Temp 97.9  F (36.6  C)   Resp 18   Ht 1.676 m (5' 6\")   Wt 66.2 kg (146 lb)   LMP  (LMP Unknown)   SpO2 95%   BMI 23.57 kg/m      GENERAL APPEARANCE: Laying in bed comfortably, NAD  HENT:  NCAT, ,mildly [place], fair dentition  EYES:  Conjunctiva clear, anicteric, EOMI, PERRL  PULM  Normal WOB on RA, lungs CTAB, no wheezes or crackles  CV:  RRR, S1/S2 normal, no murmurs; no LE edema  ABDOMEN: Abdomen soft, not tender, not distended, BS normal and active throughout   : Perinephrostomy tubes in place, draining blood tinged urine   M/S:   Left UE in mild contracture with  SKIN:  Bruise over the left dorsal wrist; healing superficial wounds of right knee  NEURO: Alert, answering questions appropriately, normal thought processes; CN II-XII grossly intact, spastic left upper extremity with decreased bulk  PSYCH: Mood normal with congruent affect      Lab/Diagnostic data:     Recent labs and imaging in [facility] reviewed by me today.        ASSESSMENT/PLAN:    (A41.51,  R65.21,  N17.9) Sepsis due to Escherichia coli with acute renal failure and septic shock, unspecified acute renal " failure type (H) - resolved  (primary encounter diagnosis)  Comment: Due to nephrolithiasis as below.  Required short admission to ICU.  Overall resolving.  Plan:   - CMP    (N20.0) Nephrolithiasis  (N20.0) Staghorn calculus  Comment: Significant stone burden on the right and staghorn calculus of the left, perinephrostomy tubes in place with still blood-tinged urine but clearing.    Plan:   - Follow-up with urology in approximately 3 weeks for review of staged stone removal    (G92.8) Toxic metabolic encephalopathy - resolved  Comment: Due to sepsis with likely strokelike symptoms related to watershed phenomenon.  Resolved    (N17.9) MARI (acute kidney injury)  Comment: Severe MARI with creatinine greater than 2 from baseline of approximately 0.8.  1.54 at time of discharge.  Plan:   - CMP, CBC    (Z86.73) History of stroke  Comment: Significant history of stroke of the right anterior territory with left-sided sequelae.  Plan:   - Continue daily clopidogrel, atorvastatin    (G40.909) Seizure disorder (H)  Comment: Related to stroke history.  Continues on Keppra 1000 mg twice daily, no recent seizure activity    (E11.9) Type 2 diabetes, HbA1C goal < 8% (H)  Comment: Well-controlled, last A1c 7.1%.  Plan:   - Continue metformin 500 mg daily.  Modify pending lab results    (M81.0) Osteoporosis without current pathological fracture, unspecified osteoporosis type  Comment: History of osteoporosis with T-score -2.8.  Continues on alendronate 35 mg weekly, possibly would benefit from 70 mg weekly  Plan:   - Consider repeat DEXA    (R53.81) Physical deconditioning  Comment: Acute hospital deconditioning related to sepsis  Plan:   - PT/OT    Orders:  [x] CMP  [x] CBC w/ diff    Benjamin Rosenstein, MD, MA  [facility]     This note was completed with the assistance of dictation software. Typos and word substitution-errors are expected and unintended.      69 MINUTES SPENT BY ME on the date of service  doing chart review, history, exam, documentation & further activities per the note.      Sincerely,        Benjamin Rosenstein, MD    Electronically signed

## 2025-05-20 ENCOUNTER — RESULTS FOLLOW-UP (OUTPATIENT)
Dept: FAMILY MEDICINE | Facility: CLINIC | Age: 75
End: 2025-05-20

## 2025-05-20 LAB
ALBUMIN SERPL BCG-MCNC: 3.4 G/DL (ref 3.5–5.2)
ALP SERPL-CCNC: 122 U/L (ref 40–150)
ALT SERPL W P-5'-P-CCNC: 15 U/L (ref 0–50)
ANION GAP SERPL CALCULATED.3IONS-SCNC: 15 MMOL/L (ref 7–15)
AST SERPL W P-5'-P-CCNC: 26 U/L (ref 0–45)
BASOPHILS # BLD AUTO: 0 10E3/UL (ref 0–0.2)
BASOPHILS NFR BLD AUTO: 0 %
BILIRUB SERPL-MCNC: 0.2 MG/DL
BUN SERPL-MCNC: 29.5 MG/DL (ref 8–23)
CALCIUM SERPL-MCNC: 9.6 MG/DL (ref 8.8–10.4)
CHLORIDE SERPL-SCNC: 102 MMOL/L (ref 98–107)
CREAT SERPL-MCNC: 1.13 MG/DL (ref 0.51–0.95)
EGFRCR SERPLBLD CKD-EPI 2021: 51 ML/MIN/1.73M2
EOSINOPHIL # BLD AUTO: 0.2 10E3/UL (ref 0–0.7)
EOSINOPHIL NFR BLD AUTO: 2 %
ERYTHROCYTE [DISTWIDTH] IN BLOOD BY AUTOMATED COUNT: 13.3 % (ref 10–15)
GLUCOSE SERPL-MCNC: 391 MG/DL (ref 70–99)
HCO3 SERPL-SCNC: 21 MMOL/L (ref 22–29)
HCT VFR BLD AUTO: 35 % (ref 35–47)
HGB BLD-MCNC: 11.2 G/DL (ref 11.7–15.7)
IMM GRANULOCYTES # BLD: 0.1 10E3/UL
IMM GRANULOCYTES NFR BLD: 1 %
LYMPHOCYTES # BLD AUTO: 0.8 10E3/UL (ref 0.8–5.3)
LYMPHOCYTES NFR BLD AUTO: 10 %
MCH RBC QN AUTO: 30.9 PG (ref 26.5–33)
MCHC RBC AUTO-ENTMCNC: 32 G/DL (ref 31.5–36.5)
MCV RBC AUTO: 96 FL (ref 78–100)
MONOCYTES # BLD AUTO: 0.6 10E3/UL (ref 0–1.3)
MONOCYTES NFR BLD AUTO: 7 %
NEUTROPHILS # BLD AUTO: 6.5 10E3/UL (ref 1.6–8.3)
NEUTROPHILS NFR BLD AUTO: 81 %
NRBC # BLD AUTO: 0 10E3/UL
NRBC BLD AUTO-RTO: 0 /100
PLATELET # BLD AUTO: 357 10E3/UL (ref 150–450)
POTASSIUM SERPL-SCNC: 4.9 MMOL/L (ref 3.4–5.3)
PROT SERPL-MCNC: 6.4 G/DL (ref 6.4–8.3)
RBC # BLD AUTO: 3.63 10E6/UL (ref 3.8–5.2)
SODIUM SERPL-SCNC: 138 MMOL/L (ref 135–145)
WBC # BLD AUTO: 8.1 10E3/UL (ref 4–11)

## 2025-05-20 PROCEDURE — 36415 COLL VENOUS BLD VENIPUNCTURE: CPT | Performed by: FAMILY MEDICINE

## 2025-05-20 PROCEDURE — 82947 ASSAY GLUCOSE BLOOD QUANT: CPT | Performed by: FAMILY MEDICINE

## 2025-05-20 PROCEDURE — 85041 AUTOMATED RBC COUNT: CPT | Performed by: FAMILY MEDICINE

## 2025-05-20 PROCEDURE — 82040 ASSAY OF SERUM ALBUMIN: CPT | Performed by: FAMILY MEDICINE

## 2025-05-20 PROCEDURE — P9603 ONE-WAY ALLOW PRORATED MILES: HCPCS | Mod: ORL | Performed by: FAMILY MEDICINE

## 2025-05-21 ENCOUNTER — LAB REQUISITION (OUTPATIENT)
Dept: LAB | Facility: CLINIC | Age: 75
End: 2025-05-21
Payer: MEDICARE

## 2025-05-21 ENCOUNTER — PATIENT OUTREACH (OUTPATIENT)
Dept: CARE COORDINATION | Facility: CLINIC | Age: 75
End: 2025-05-21
Payer: MEDICARE

## 2025-05-21 DIAGNOSIS — Z87.442 PERSONAL HISTORY OF URINARY CALCULI: ICD-10-CM

## 2025-05-21 NOTE — PROGRESS NOTES
[facility] Medicare ACO Reach Population - Chart Review      Background: Chart reviewed proactively to support health maintenance initiatives within [facility]'s Medicare ACO Reach Population.     Patient was discharged to TCU on 5/16/25.    Plan:  Primary Care Care Coordination referral placed.     Slime Hoffman RN  [facility]

## 2025-05-27 ENCOUNTER — RESULTS FOLLOW-UP (OUTPATIENT)
Dept: FAMILY MEDICINE | Facility: CLINIC | Age: 75
End: 2025-05-27

## 2025-05-27 ENCOUNTER — TRANSITIONAL CARE UNIT VISIT (OUTPATIENT)
Dept: GERIATRICS | Facility: CLINIC | Age: 75
End: 2025-05-27
Payer: MEDICARE

## 2025-05-27 DIAGNOSIS — G40.909 SEIZURE DISORDER (H): ICD-10-CM

## 2025-05-27 DIAGNOSIS — N17.9 AKI (ACUTE KIDNEY INJURY): ICD-10-CM

## 2025-05-27 DIAGNOSIS — R65.21 SEPSIS DUE TO ESCHERICHIA COLI WITH ACUTE RENAL FAILURE AND SEPTIC SHOCK, UNSPECIFIED ACUTE RENAL FAILURE TYPE (H): Primary | ICD-10-CM

## 2025-05-27 DIAGNOSIS — Z86.73 HISTORY OF STROKE: ICD-10-CM

## 2025-05-27 DIAGNOSIS — J45.909 UNCOMPLICATED ASTHMA, UNSPECIFIED ASTHMA SEVERITY, UNSPECIFIED WHETHER PERSISTENT: ICD-10-CM

## 2025-05-27 DIAGNOSIS — A41.51 SEPSIS DUE TO ESCHERICHIA COLI WITH ACUTE RENAL FAILURE AND SEPTIC SHOCK, UNSPECIFIED ACUTE RENAL FAILURE TYPE (H): Primary | ICD-10-CM

## 2025-05-27 DIAGNOSIS — G92.8 TOXIC METABOLIC ENCEPHALOPATHY: ICD-10-CM

## 2025-05-27 DIAGNOSIS — N20.0 STAGHORN CALCULUS: ICD-10-CM

## 2025-05-27 DIAGNOSIS — R53.81 PHYSICAL DECONDITIONING: ICD-10-CM

## 2025-05-27 DIAGNOSIS — N20.0 NEPHROLITHIASIS: ICD-10-CM

## 2025-05-27 DIAGNOSIS — I10 ESSENTIAL HYPERTENSION: ICD-10-CM

## 2025-05-27 DIAGNOSIS — H10.31 ACUTE BACTERIAL CONJUNCTIVITIS OF RIGHT EYE: ICD-10-CM

## 2025-05-27 DIAGNOSIS — E11.9 TYPE 2 DIABETES, HBA1C GOAL < 8% (H): ICD-10-CM

## 2025-05-27 DIAGNOSIS — F33.0 MAJOR DEPRESSIVE DISORDER, RECURRENT EPISODE, MILD: ICD-10-CM

## 2025-05-27 DIAGNOSIS — M81.0 OSTEOPOROSIS WITHOUT CURRENT PATHOLOGICAL FRACTURE, UNSPECIFIED OSTEOPOROSIS TYPE: ICD-10-CM

## 2025-05-27 DIAGNOSIS — N17.9 SEPSIS DUE TO ESCHERICHIA COLI WITH ACUTE RENAL FAILURE AND SEPTIC SHOCK, UNSPECIFIED ACUTE RENAL FAILURE TYPE (H): Primary | ICD-10-CM

## 2025-05-27 LAB
ANION GAP SERPL CALCULATED.3IONS-SCNC: 12 MMOL/L (ref 7–15)
BUN SERPL-MCNC: 26.2 MG/DL (ref 8–23)
CALCIUM SERPL-MCNC: 9.9 MG/DL (ref 8.8–10.4)
CHLORIDE SERPL-SCNC: 106 MMOL/L (ref 98–107)
CREAT SERPL-MCNC: 1.05 MG/DL (ref 0.51–0.95)
EGFRCR SERPLBLD CKD-EPI 2021: 56 ML/MIN/1.73M2
GLUCOSE SERPL-MCNC: 128 MG/DL (ref 70–99)
HCO3 SERPL-SCNC: 24 MMOL/L (ref 22–29)
POTASSIUM SERPL-SCNC: 4.8 MMOL/L (ref 3.4–5.3)
SODIUM SERPL-SCNC: 142 MMOL/L (ref 135–145)

## 2025-05-27 PROCEDURE — 36415 COLL VENOUS BLD VENIPUNCTURE: CPT | Mod: ORL | Performed by: FAMILY MEDICINE

## 2025-05-27 PROCEDURE — 82947 ASSAY GLUCOSE BLOOD QUANT: CPT | Performed by: FAMILY MEDICINE

## 2025-05-27 PROCEDURE — 99310 SBSQ NF CARE HIGH MDM 45: CPT

## 2025-05-27 PROCEDURE — P9604 ONE-WAY ALLOW PRORATED TRIP: HCPCS | Mod: ORL | Performed by: FAMILY MEDICINE

## 2025-05-27 PROCEDURE — 82310 ASSAY OF CALCIUM: CPT | Performed by: FAMILY MEDICINE

## 2025-05-28 RX ORDER — POLYMYXIN B SULFATE AND TRIMETHOPRIM 1; 10000 MG/ML; [USP'U]/ML
1 SOLUTION OPHTHALMIC 3 TIMES DAILY
COMMUNITY
End: 2025-06-03

## 2025-05-29 ENCOUNTER — PATIENT OUTREACH (OUTPATIENT)
Dept: CARE COORDINATION | Facility: CLINIC | Age: 75
End: 2025-05-29

## 2025-05-29 ENCOUNTER — TRANSITIONAL CARE UNIT VISIT (OUTPATIENT)
Dept: GERIATRICS | Facility: CLINIC | Age: 75
End: 2025-05-29
Payer: MEDICARE

## 2025-05-29 ENCOUNTER — MYC MEDICAL ADVICE (OUTPATIENT)
Dept: FAMILY MEDICINE | Facility: CLINIC | Age: 75
End: 2025-05-29

## 2025-05-29 VITALS
RESPIRATION RATE: 16 BRPM | HEART RATE: 88 BPM | SYSTOLIC BLOOD PRESSURE: 110 MMHG | BODY MASS INDEX: 21.21 KG/M2 | OXYGEN SATURATION: 92 % | WEIGHT: 132 LBS | DIASTOLIC BLOOD PRESSURE: 42 MMHG | HEIGHT: 66 IN | TEMPERATURE: 97.4 F

## 2025-05-29 DIAGNOSIS — A41.51 SEPSIS DUE TO ESCHERICHIA COLI WITH ACUTE RENAL FAILURE AND SEPTIC SHOCK, UNSPECIFIED ACUTE RENAL FAILURE TYPE (H): ICD-10-CM

## 2025-05-29 DIAGNOSIS — G92.8 TOXIC METABOLIC ENCEPHALOPATHY: ICD-10-CM

## 2025-05-29 DIAGNOSIS — N20.0 NEPHROLITHIASIS: ICD-10-CM

## 2025-05-29 DIAGNOSIS — N17.9 AKI (ACUTE KIDNEY INJURY): ICD-10-CM

## 2025-05-29 DIAGNOSIS — H10.31 ACUTE BACTERIAL CONJUNCTIVITIS OF RIGHT EYE: Primary | ICD-10-CM

## 2025-05-29 DIAGNOSIS — Z86.73 HISTORY OF STROKE: ICD-10-CM

## 2025-05-29 DIAGNOSIS — M81.0 OSTEOPOROSIS WITHOUT CURRENT PATHOLOGICAL FRACTURE, UNSPECIFIED OSTEOPOROSIS TYPE: ICD-10-CM

## 2025-05-29 DIAGNOSIS — J45.909 UNCOMPLICATED ASTHMA, UNSPECIFIED ASTHMA SEVERITY, UNSPECIFIED WHETHER PERSISTENT: ICD-10-CM

## 2025-05-29 DIAGNOSIS — R65.21 SEPSIS DUE TO ESCHERICHIA COLI WITH ACUTE RENAL FAILURE AND SEPTIC SHOCK, UNSPECIFIED ACUTE RENAL FAILURE TYPE (H): ICD-10-CM

## 2025-05-29 DIAGNOSIS — R53.81 PHYSICAL DECONDITIONING: ICD-10-CM

## 2025-05-29 DIAGNOSIS — E11.9 TYPE 2 DIABETES, HBA1C GOAL < 8% (H): ICD-10-CM

## 2025-05-29 DIAGNOSIS — G40.909 SEIZURE DISORDER (H): ICD-10-CM

## 2025-05-29 DIAGNOSIS — N20.0 STAGHORN CALCULUS: ICD-10-CM

## 2025-05-29 DIAGNOSIS — F33.0 MAJOR DEPRESSIVE DISORDER, RECURRENT EPISODE, MILD: ICD-10-CM

## 2025-05-29 DIAGNOSIS — I10 ESSENTIAL HYPERTENSION: ICD-10-CM

## 2025-05-29 DIAGNOSIS — N17.9 SEPSIS DUE TO ESCHERICHIA COLI WITH ACUTE RENAL FAILURE AND SEPTIC SHOCK, UNSPECIFIED ACUTE RENAL FAILURE TYPE (H): ICD-10-CM

## 2025-05-29 NOTE — PROGRESS NOTES
Clinic Care Coordination Contact  Care Coordination Transition Communication    Clinical Data: Patient was hospitalized at [facility] from 5/10 to 5/16 with diagnosis of E.Coli Bacteremia   Nephrolithiasis .     Assessment: Patient has transitioned to TCU/ARU for short term rehabilitation:    Facility Name: [facility]  Transition Communication:  Notified facility of Primary Care- Care Coordination support via Epic fax.    Plan: Care Coordinator will await notification from facility staff informing of patient's discharge plans/needs. Care Coordinator will review chart and outreach to facility staff every 4 weeks and as needed.     Janie Domínguez, [facility] Ambulatory Care Management  [facility]  Phone: [phone]  E-mail: [email or web address]

## 2025-05-29 NOTE — PROGRESS NOTES
[facility] GERIATRICS    Chief Complaint   Patient presents with    RECHECK        HPI: Addis Peña is a 74 year old (1950), who is being seen today for an episodic care visit at: [facility] ([facility]) [[id number]]. HPI information obtained from: facility chart records, facility staff, patient report and [facility] chart review. 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  Patient presented to the ED via EMS from home for evaluation of left-sided weakness.  Initial evaluation with significant MARI (Cr 2.28), leukocytosis WBC 13.2, and anemia with Hgb 9.2.  Initial imaging with no acute infarction; noted findings of chronic right frontal infarct.  Neurocritical care consulted; did not recommend acute MRI given findings of CT.  Empirically started on norepinephrine and admitted to ICU due to concern of infection as she had worsening hypotension. UA with concerns as source of infection; started on Zosyn. UC grew E. Coli and transitioned to ceftriaxone. Transferred out of ICU on 5/11/25. Large staghorn calculus within the left kidney with mild hydronephrosis as well as small calcified renal stones in the right renal pelvis and diffuse proximal ureteral calcification but no hydronephrosis. Urology consulted; bilateral nephrostomy tube placement on 5/16/2025; recommended outpatient follow-up with plan for left percutaneous nephrolithotomy; recommend UA/UC 10 days prior to surgery. Discharged to TCU for medical management, rehab, and nursing care.     5/27: left nephrostomy tube dislodged after repositioning in TCU and was sent to ED. Seen by IR and noted difficulty replacing the tube in existing tract; plan to keep tube out since prior nephrostogram showed non-obstructing collecting system; monitor for clinical signs of urinary tract infection.     Today:  Nursing staff reports no acute medical concerns.     Patient is seen  "today for a visit in her room. She is laying in bed asleep. Awakens as writer calls her name. Feels \"fine\". \"No\" pain. Notes her right eye still gets crusted shut in the morning but no pain discomfort reported. Continues using Polytrim and warm compress as needed. Therapies have been \"good\"; did the nustep this morning.  Appetite has been \"good\". Last BM was \"this morning\". \"No\" belly pain or constipation. \"No\" concerns with bladder; denies dysuria. Sleeping \"good\". Mood is \"good\"; \"no\" depression. Denies CP, palpitations, lightheadedness, dizziness, fatigue, SOB, fever, chills, nausea/vomiting concerns today.    Allergies, and PMH/PSH reviewed in EPIC today.  REVIEW OF SYSTEMS:  4 point ROS including Respiratory, CV, GI and , other than that noted in the HPI,  is negative    Objective:   /42   Pulse 88   Temp 97.4  F (36.3  C)   Resp 16   Ht 1.676 m (5' 6\")   Wt 59.9 kg (132 lb)   LMP  (LMP Unknown)   SpO2 92%   BMI 21.31 kg/m    GENERAL APPEARANCE:  Alert, in no distress  HEENT:  atraumatic, [place], EOM intact, right eye with erythema; crusted yellow drainage noted upper eyelid; moist mucus membranes  RESP:  non-labored breathing, lungs clear on auscultation, no respiratory distress, no cough  CV:  Rate regular, S1 S2 noted, no edema  ABDOMEN:  soft, non-distended, non-tender, bowel sounds active  M/S:   moves all extremities, strength and tone equal bilaterally, no calf pain, arthritic changes noted in hands  SKIN:  warm, dry, thin, fragile; hematoma on upper lip; surrounding skin non-erythematous  NEURO:   Face is symmetric, examination of sensation by touch normal, follows and tracks, slow speech  PSYCH:  calm, cooperative      Labs reviewed as per [facility] and/or Care Everywhere.      Assessment/Plan:  Acute bacterial conjunctivitis of right eye  Noted 5/27; started on polytrim. Today, erythema improved.  -continue polytrim 1 drop in both eyes TID for 7 days (unitl [place] 3)  -continue warm compress as " needed  -monitor effectiveness     Sepsis due to Escherichia coli with acute renal failure and septic shock, unspecified acute renal failure type (H)  Treated with IV abx during hospital stay. Today, no concerns. VSS.  -follow clinically     Nephrolithiasis  Staghorn calculus  S/p bilateral nephrostomy tubes on 5/15. In TCU, left tube dislodged on 5/27; seen by IR in ED, unable to replace tube in existing tract; plan to keep tube out. Today, right nephrostomy tube patent, draining clear, light green output.  -follow-up with urology as recommended in 3 weeks (scheduled 6/4/25)  -plan for left percutaneous nephrolithotomy (scheduled 6/26/25); recommend UA/UC 10 days prior to surgery     Toxic metabolic encephalopathy  Secondary to acute illness. Resolved at hospital discharge. In TCU, no concerns. Seems to have cleared.  -follow clinically     MARI (acute kidney injury)  Baseline Cr ~ 0.8. Discharged from hospital with Cr 1.54. In TCU, recheck Cr 1.05 (5/27).  -avoid nephrotoxins  -trend labs periodically     History of stroke  Associated left sided weakness.  -continue baclofen 5 mg 3 times daily  -continue clopidogrel  -continue atorvastatin     Essential hypertension   //72 mmHg. HR 82-90 mbpm.  -continue metoprolol ER 50 mg daily  -continue amlodipine 10 mg daily  -follow BP and HR; adjust medications as needed     Seizure disorder (H)  Secondary to stroke.  -continue Keppra 1000 mg twice daily     Type 2 diabetes, HbA1C goal < 8% (H)  Last A1c 7.1%.  -continue metformin 500 mg daily  -trend A1c every 6-12 months     Osteoporosis without current pathological fracture, unspecified osteoporosis type  -alendronate 35 mg weekly; consider increasing it to 70 mg weekly     Major depressive disorder, recurrent episode, mild   -continue citalopram 10 mg daily  -monitor mood and behaviors     Uncomplicated asthma, unspecified asthma severity, unspecified whether persistent   Today, O2 sats 92% RA.  -montelukast  10 mg daily  -monitor respiratory status     Physical deconditioning  Secondary to diagnoses above and recent hospitalization. In TCU for rehabilitation.   -PT/OT following- adv per recommendations   -SW available for safe discharge planning ongoing    MED REC REQUIRED  Post Medication Reconciliation Status: discharge medications reconciled and changed, per note/orders          35 minutes spent on the date of this encounter doing chart review, review labs, discussion with nursing staff, patient visit, and documentation.       Electronically signed by: Dr. Elda Scott, DNP, APRN, AGNP-BC, PHN    This note was completed with the assistance of dictation software. Typos and word substitution-errors are expected and unintended.

## 2025-05-29 NOTE — LETTER
Name: Addis Peña is a patient of Dr. Wegener at [facility] and I am the care coordinator.   CC Contact Information: Email: [email or web address]   Phone: [phone]   Current services in place: N/A  Additional details/specific concerns related to recent admission: N/A    I am available to collaborate with the TCU Care team during this patient's stay.     Please feel free to contact me with questions or further collaboration in discharge planning.     Janie Domínguez [facility]  Phone: [phone]  E-mail: [email or web address]

## 2025-05-29 NOTE — LETTER
5/29/2025      Addis Peña  [address]  [place] [address]        [facility] GERIATRICS    Chief Complaint   Patient presents with     RECHECK        HPI: Addis Peña is a 74 year old (1950), who is being seen today for an episodic care visit at: [facility] ([facility]) [[id number]]. HPI information obtained from: facility chart records, facility staff, patient report and [facility] chart review. 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  Patient presented to the ED via EMS from home for evaluation of left-sided weakness.  Initial evaluation with significant MARI (Cr 2.28), leukocytosis WBC 13.2, and anemia with Hgb 9.2.  Initial imaging with no acute infarction; noted findings of chronic right frontal infarct.  Neurocritical care consulted; did not recommend acute MRI given findings of CT.  Empirically started on norepinephrine and admitted to ICU due to concern of infection as she had worsening hypotension. UA with concerns as source of infection; started on Zosyn. UC grew E. Coli and transitioned to ceftriaxone. Transferred out of ICU on 5/11/25. Large staghorn calculus within the left kidney with mild hydronephrosis as well as small calcified renal stones in the right renal pelvis and diffuse proximal ureteral calcification but no hydronephrosis. Urology consulted; bilateral nephrostomy tube placement on 5/16/2025; recommended outpatient follow-up with plan for left percutaneous nephrolithotomy; recommend UA/UC 10 days prior to surgery. Discharged to TCU for medical management, rehab, and nursing care.     5/27: left nephrostomy tube dislodged after repositioning in TCU and was sent to ED. Seen by IR and noted difficulty replacing the tube in existing tract; plan to keep tube out since prior nephrostogram showed non-obstructing collecting system; monitor for clinical signs of urinary  "tract infection.     Today:  Nursing staff reports no acute medical concerns.     Patient is seen today for a visit in her room. She is laying in bed asleep. Awakens as writer calls her name. Feels \"fine\". \"No\" pain. Notes her right eye still gets crusted shut in the morning but no pain discomfort reported. Continues using Polytrim and warm compress as needed. Therapies have been \"good\"; did the nustep this morning.  Appetite has been \"good\". Last BM was \"this morning\". \"No\" belly pain or constipation. \"No\" concerns with bladder; denies dysuria. Sleeping \"good\". Mood is \"good\"; \"no\" depression. Denies CP, palpitations, lightheadedness, dizziness, fatigue, SOB, fever, chills, nausea/vomiting concerns today.    Allergies, and PMH/PSH reviewed in EPIC today.  REVIEW OF SYSTEMS:  4 point ROS including Respiratory, CV, GI and , other than that noted in the HPI,  is negative    Objective:   /42   Pulse 88   Temp 97.4  F (36.3  C)   Resp 16   Ht 1.676 m (5' 6\")   Wt 59.9 kg (132 lb)   LMP  (LMP Unknown)   SpO2 92%   BMI 21.31 kg/m    GENERAL APPEARANCE:  Alert, in no distress  HEENT:  atraumatic, [place], EOM intact, right eye with erythema; crusted yellow drainage noted upper eyelid; moist mucus membranes  RESP:  non-labored breathing, lungs clear on auscultation, no respiratory distress, no cough  CV:  Rate regular, S1 S2 noted, no edema  ABDOMEN:  soft, non-distended, non-tender, bowel sounds active  M/S:   moves all extremities, strength and tone equal bilaterally, no calf pain, arthritic changes noted in hands  SKIN:  warm, dry, thin, fragile; hematoma on upper lip; surrounding skin non-erythematous  NEURO:   Face is symmetric, examination of sensation by touch normal, follows and tracks, slow speech  PSYCH:  calm, cooperative      Labs reviewed as per [facility] and/or Care Everywhere.      Assessment/Plan:  Acute bacterial conjunctivitis of right eye  Noted 5/27; started on polytrim. Today, erythema " improved.  -continue polytrim 1 drop in both eyes TID for 7 days (unitl [place] 3)  -continue warm compress as needed  -monitor effectiveness     Sepsis due to Escherichia coli with acute renal failure and septic shock, unspecified acute renal failure type (H)  Treated with IV abx during hospital stay. Today, no concerns. VSS.  -follow clinically     Nephrolithiasis  Staghorn calculus  S/p bilateral nephrostomy tubes on 5/15. In TCU, left tube dislodged on 5/27; seen by IR in ED, unable to replace tube in existing tract; plan to keep tube out. Today, right nephrostomy tube patent, draining clear, light green output.  -follow-up with urology as recommended in 3 weeks (scheduled 6/4/25)  -plan for left percutaneous nephrolithotomy (scheduled 6/26/25); recommend UA/UC 10 days prior to surgery     Toxic metabolic encephalopathy  Secondary to acute illness. Resolved at hospital discharge. In TCU, no concerns. Seems to have cleared.  -follow clinically     MARI (acute kidney injury)  Baseline Cr ~ 0.8. Discharged from hospital with Cr 1.54. In TCU, recheck Cr 1.05 (5/27).  -avoid nephrotoxins  -trend labs periodically     History of stroke  Associated left sided weakness.  -continue baclofen 5 mg 3 times daily  -continue clopidogrel  -continue atorvastatin     Essential hypertension   //72 mmHg. HR 82-90 mbpm.  -continue metoprolol ER 50 mg daily  -continue amlodipine 10 mg daily  -follow BP and HR; adjust medications as needed     Seizure disorder (H)  Secondary to stroke.  -continue Keppra 1000 mg twice daily     Type 2 diabetes, HbA1C goal < 8% (H)  Last A1c 7.1%.  -continue metformin 500 mg daily  -trend A1c every 6-12 months     Osteoporosis without current pathological fracture, unspecified osteoporosis type  -alendronate 35 mg weekly; consider increasing it to 70 mg weekly     Major depressive disorder, recurrent episode, mild   -continue citalopram 10 mg daily  -monitor mood and behaviors     Uncomplicated  asthma, unspecified asthma severity, unspecified whether persistent   Today, O2 sats 92% RA.  -montelukast 10 mg daily  -monitor respiratory status     Physical deconditioning  Secondary to diagnoses above and recent hospitalization. In TCU for rehabilitation.   -PT/OT following- adv per recommendations   -SW available for safe discharge planning ongoing    MED REC REQUIRED  Post Medication Reconciliation Status: discharge medications reconciled and changed, per note/orders          35 minutes spent on the date of this encounter doing chart review, review labs, discussion with nursing staff, patient visit, and documentation.       Electronically signed by: Dr. Elda Scott, DNP, APRN, AGNP-BC, PHN    This note was completed with the assistance of dictation software. Typos and word substitution-errors are expected and unintended.        Sincerely,        Elda Scott, KEILA CNP    Electronically signed

## 2025-06-05 ENCOUNTER — TRANSITIONAL CARE UNIT VISIT (OUTPATIENT)
Dept: GERIATRICS | Facility: CLINIC | Age: 75
End: 2025-06-05
Payer: MEDICARE

## 2025-06-05 VITALS
DIASTOLIC BLOOD PRESSURE: 56 MMHG | RESPIRATION RATE: 16 BRPM | WEIGHT: 132 LBS | HEART RATE: 94 BPM | TEMPERATURE: 97.1 F | SYSTOLIC BLOOD PRESSURE: 126 MMHG | OXYGEN SATURATION: 97 % | HEIGHT: 66 IN | BODY MASS INDEX: 21.21 KG/M2

## 2025-06-05 DIAGNOSIS — N20.0 STAGHORN CALCULUS: ICD-10-CM

## 2025-06-05 DIAGNOSIS — G92.8 TOXIC METABOLIC ENCEPHALOPATHY: ICD-10-CM

## 2025-06-05 DIAGNOSIS — Z86.73 HISTORY OF STROKE: ICD-10-CM

## 2025-06-05 DIAGNOSIS — A41.51 SEPSIS DUE TO ESCHERICHIA COLI WITH ACUTE RENAL FAILURE AND SEPTIC SHOCK, UNSPECIFIED ACUTE RENAL FAILURE TYPE (H): Primary | ICD-10-CM

## 2025-06-05 DIAGNOSIS — R53.81 PHYSICAL DECONDITIONING: ICD-10-CM

## 2025-06-05 DIAGNOSIS — R65.21 SEPSIS DUE TO ESCHERICHIA COLI WITH ACUTE RENAL FAILURE AND SEPTIC SHOCK, UNSPECIFIED ACUTE RENAL FAILURE TYPE (H): Primary | ICD-10-CM

## 2025-06-05 DIAGNOSIS — N17.9 SEPSIS DUE TO ESCHERICHIA COLI WITH ACUTE RENAL FAILURE AND SEPTIC SHOCK, UNSPECIFIED ACUTE RENAL FAILURE TYPE (H): Primary | ICD-10-CM

## 2025-06-05 DIAGNOSIS — N17.9 AKI (ACUTE KIDNEY INJURY): ICD-10-CM

## 2025-06-05 DIAGNOSIS — G40.909 SEIZURE DISORDER (H): ICD-10-CM

## 2025-06-05 DIAGNOSIS — N20.0 NEPHROLITHIASIS: ICD-10-CM

## 2025-06-05 DIAGNOSIS — F33.0 MAJOR DEPRESSIVE DISORDER, RECURRENT EPISODE, MILD: ICD-10-CM

## 2025-06-05 DIAGNOSIS — I69.354 SPASTIC HEMIPLEGIA OF LEFT NONDOMINANT SIDE AS LATE EFFECT OF CEREBRAL INFARCTION (H): ICD-10-CM

## 2025-06-05 DIAGNOSIS — E11.9 TYPE 2 DIABETES, HBA1C GOAL < 8% (H): ICD-10-CM

## 2025-06-05 NOTE — PROGRESS NOTES
"[facility] GERIATRICS    Chief Complaint   Patient presents with    RECHECK     HPI:  Addis Peña is a 74 year old  (1950), who is being seen today for an episodic care visit at: [facility] ([facility]) [[id number]]. Today's concern is: ***    12:20 PM -12:33 PM   Saw uro yesterday  Needs left nephrostomy tube replaced --> Planning tomorrow  Will be undergoing lithotomy 6/26      Generally feeling well  Knows plan fro stones  No pain  Is urinating without concern currently  No constipation  Appetite is doing well   Working well with therapies. Requires asssitance but says not more than usual    Says mood si doing good  Was seen by ACP found it helpful    Living at home with     LCD Monday, will stay for LTC    Allergies, and PMH/PSH reviewed in [facility] today.  REVIEW OF SYSTEMS:  {woazbn76:484119}    Objective:   /56   Pulse 94   Temp 97.1  F (36.2  C)   Resp 16   Ht 1.676 m (5' 6\")   Wt 59.9 kg (132 lb)   LMP  (LMP Unknown)   SpO2 97%   BMI 21.31 kg/m    {Nursing home physical exam :386495}    {fgslab:560392}    Assessment/Plan:  {FGS DX2:177943}    MED REC REQUIRED{TIP  Click the link below to document or use med rec list, use list to pull in response :382038}  Post Medication Reconciliation Status: {MED REC LIST:369466}      Orders:  {fgsorders:947787}  ***  [***] [facility] Monday     Electronically signed by: Mary Gama ***      "

## 2025-06-05 NOTE — LETTER
6/5/2025      Addis Peña  [address]  [place] [address]        [facility] GERIATRICS    Chief Complaint   Patient presents with    RECHECK     HPI:  Addis Peña is a 74 year old  (1950), who is being seen today for an episodic care visit at: No question data found.. Today's concern is: ***    Allergies, and PMH/PSH reviewed in [facility] today.  REVIEW OF SYSTEMS:  {bghnst12:692963}    Objective:   LMP  (LMP Unknown)   {Nursing home physical exam :264367}    {fgslab:541224}    Assessment/Plan:  {FGS DX2:971653}    MED REC REQUIRED{TIP  Click the link below to document or use med rec list, use list to pull in response :067655}  Post Medication Reconciliation Status: {MED REC LIST:322170}      Orders:  {fgsorders:459302}  ***    Electronically signed by: Mary Gama ***          Sincerely,        Benjamin Rosenstein, MD    Electronically signed

## 2025-06-07 ENCOUNTER — LAB REQUISITION (OUTPATIENT)
Dept: LAB | Facility: CLINIC | Age: 75
End: 2025-06-07
Payer: MEDICARE

## 2025-06-07 DIAGNOSIS — N17.9 ACUTE KIDNEY FAILURE, UNSPECIFIED: ICD-10-CM

## 2025-06-09 ENCOUNTER — TRANSITIONAL CARE UNIT VISIT (OUTPATIENT)
Dept: GERIATRICS | Facility: CLINIC | Age: 75
End: 2025-06-09
Payer: MEDICARE

## 2025-06-09 ENCOUNTER — LAB REQUISITION (OUTPATIENT)
Dept: LAB | Facility: CLINIC | Age: 75
End: 2025-06-09
Payer: MEDICARE

## 2025-06-09 ENCOUNTER — RESULTS FOLLOW-UP (OUTPATIENT)
Dept: GERIATRICS | Facility: CLINIC | Age: 75
End: 2025-06-09

## 2025-06-09 VITALS
BODY MASS INDEX: 21.21 KG/M2 | HEART RATE: 73 BPM | TEMPERATURE: 97.8 F | SYSTOLIC BLOOD PRESSURE: 92 MMHG | RESPIRATION RATE: 16 BRPM | HEIGHT: 66 IN | DIASTOLIC BLOOD PRESSURE: 43 MMHG | OXYGEN SATURATION: 94 % | WEIGHT: 132 LBS

## 2025-06-09 DIAGNOSIS — E11.9 TYPE 2 DIABETES, HBA1C GOAL < 8% (H): ICD-10-CM

## 2025-06-09 DIAGNOSIS — I10 HYPERTENSION GOAL BP (BLOOD PRESSURE) < 130/80: ICD-10-CM

## 2025-06-09 DIAGNOSIS — Z86.73 HISTORY OF STROKE: ICD-10-CM

## 2025-06-09 DIAGNOSIS — N20.0 STAGHORN CALCULUS: ICD-10-CM

## 2025-06-09 DIAGNOSIS — N17.9 AKI (ACUTE KIDNEY INJURY): ICD-10-CM

## 2025-06-09 DIAGNOSIS — N17.9 SEPSIS DUE TO ESCHERICHIA COLI WITH ACUTE RENAL FAILURE AND SEPTIC SHOCK, UNSPECIFIED ACUTE RENAL FAILURE TYPE (H): Primary | ICD-10-CM

## 2025-06-09 DIAGNOSIS — F33.0 MAJOR DEPRESSIVE DISORDER, RECURRENT EPISODE, MILD: ICD-10-CM

## 2025-06-09 DIAGNOSIS — I69.354 SPASTIC HEMIPLEGIA OF LEFT NONDOMINANT SIDE AS LATE EFFECT OF CEREBRAL INFARCTION (H): ICD-10-CM

## 2025-06-09 DIAGNOSIS — R53.81 PHYSICAL DECONDITIONING: ICD-10-CM

## 2025-06-09 DIAGNOSIS — D64.9 ANEMIA, UNSPECIFIED TYPE: ICD-10-CM

## 2025-06-09 DIAGNOSIS — A41.51 SEPSIS DUE TO ESCHERICHIA COLI WITH ACUTE RENAL FAILURE AND SEPTIC SHOCK, UNSPECIFIED ACUTE RENAL FAILURE TYPE (H): Primary | ICD-10-CM

## 2025-06-09 DIAGNOSIS — G92.8 TOXIC METABOLIC ENCEPHALOPATHY: ICD-10-CM

## 2025-06-09 DIAGNOSIS — G40.909 SEIZURE DISORDER (H): ICD-10-CM

## 2025-06-09 DIAGNOSIS — R65.21 SEPSIS DUE TO ESCHERICHIA COLI WITH ACUTE RENAL FAILURE AND SEPTIC SHOCK, UNSPECIFIED ACUTE RENAL FAILURE TYPE (H): Primary | ICD-10-CM

## 2025-06-09 DIAGNOSIS — D64.9 ANEMIA, UNSPECIFIED: ICD-10-CM

## 2025-06-09 DIAGNOSIS — N20.0 NEPHROLITHIASIS: ICD-10-CM

## 2025-06-09 DIAGNOSIS — J45.909 UNCOMPLICATED ASTHMA, UNSPECIFIED ASTHMA SEVERITY, UNSPECIFIED WHETHER PERSISTENT: ICD-10-CM

## 2025-06-09 DIAGNOSIS — M81.0 OSTEOPOROSIS WITHOUT CURRENT PATHOLOGICAL FRACTURE, UNSPECIFIED OSTEOPOROSIS TYPE: ICD-10-CM

## 2025-06-09 DIAGNOSIS — I10 ESSENTIAL HYPERTENSION: ICD-10-CM

## 2025-06-09 LAB
ANION GAP SERPL CALCULATED.3IONS-SCNC: 18 MMOL/L (ref 7–15)
BUN SERPL-MCNC: 34.7 MG/DL (ref 8–23)
CALCIUM SERPL-MCNC: 9.7 MG/DL (ref 8.8–10.4)
CHLORIDE SERPL-SCNC: 106 MMOL/L (ref 98–107)
CREAT SERPL-MCNC: 1.48 MG/DL (ref 0.51–0.95)
EGFRCR SERPLBLD CKD-EPI 2021: 37 ML/MIN/1.73M2
GLUCOSE SERPL-MCNC: 162 MG/DL (ref 70–99)
HCO3 SERPL-SCNC: 15 MMOL/L (ref 22–29)
POTASSIUM SERPL-SCNC: 4.2 MMOL/L (ref 3.4–5.3)
SODIUM SERPL-SCNC: 139 MMOL/L (ref 135–145)

## 2025-06-09 PROCEDURE — 99309 SBSQ NF CARE MODERATE MDM 30: CPT

## 2025-06-09 PROCEDURE — 82947 ASSAY GLUCOSE BLOOD QUANT: CPT | Performed by: FAMILY MEDICINE

## 2025-06-09 PROCEDURE — 36415 COLL VENOUS BLD VENIPUNCTURE: CPT | Mod: ORL | Performed by: FAMILY MEDICINE

## 2025-06-09 PROCEDURE — 80048 BASIC METABOLIC PNL TOTAL CA: CPT | Mod: ORL | Performed by: FAMILY MEDICINE

## 2025-06-09 PROCEDURE — P9604 ONE-WAY ALLOW PRORATED TRIP: HCPCS | Mod: ORL | Performed by: FAMILY MEDICINE

## 2025-06-09 RX ORDER — AMLODIPINE BESYLATE 10 MG/1
5 TABLET ORAL DAILY
Qty: 90 TABLET | Refills: 3 | Status: SHIPPED | OUTPATIENT
Start: 2025-06-09

## 2025-06-09 NOTE — PROGRESS NOTES
[facility] GERIATRICS    Chief Complaint   Patient presents with    RECHECK    Pre-Op Exam        HPI: Addis Peña is a 74 year old (1950), who is being seen today for an episodic care visit at: [facility]) [[id number]]. HPI information obtained from: facility chart records, facility staff, patient report and [facility] chart review. 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  Patient presented to the ED via EMS from home for evaluation of left-sided weakness.  Initial evaluation with significant MARI (Cr 2.28), leukocytosis WBC 13.2, and anemia with Hgb 9.2.  Initial imaging with no acute infarction; noted findings of chronic right frontal infarct.  Neurocritical care consulted; did not recommend acute MRI given findings of CT.  Empirically started on norepinephrine and admitted to ICU due to concern of infection as she had worsening hypotension. UA with concerns as source of infection; started on Zosyn. UC grew E. Coli and transitioned to ceftriaxone. Transferred out of ICU on 5/11/25. Large staghorn calculus within the left kidney with mild hydronephrosis as well as small calcified renal stones in the right renal pelvis and diffuse proximal ureteral calcification but no hydronephrosis. Urology consulted; bilateral nephrostomy tube placement on 5/16/2025; recommended outpatient follow-up with plan for left percutaneous nephrolithotomy; recommend UA/UC 10 days prior to surgery. Discharged to TCU for medical management, rehab, and nursing care.     5/27: left nephrostomy tube dislodged after repositioning in TCU and was sent to ED. Seen by IR and noted difficulty replacing the tube in existing tract; plan to keep tube out since prior nephrostogram showed non-obstructing collecting system; monitor for clinical signs of urinary tract infection.     6/4: patient had follow-up with urology who recommended  "replacement of dislodged nephrostomy tube; referral placed; scheduled for 6/6 however, IR postponed since Plavix was not held for 5 days. Plan for tube replacement on 6/16.     Today:  Nursing staff reports no acute medical concerns.     Patient is seen today for a visit in her room. She is laying in bed. Feels \"tired\". She does not sleep well here because of the noise outside her room. Reports to sleep better when she is at home. Endorses pain in her right flank but tolerable. Therapies have been \"good\"; she worked out on the [employer] today.  Appetite is \"good\"; she reports eating \"until I am full\"; usually eats half of her meals. Last BM was \"this morning\"; \"no\" belly pain. \"No\" concerns with her donald tube. Mood is \"good\"; \"no\" depression. Denies CP, palpitations, lightheadedness, dizziness, fatigue, SOB, fever, chills, nausea/vomiting, bladder or bowel concerns today.    Writer discussed with patient regarding plan for nephrostomy tube replacement as well as pre-operative instructions. Patient expressed acknowledgement and agreed to plan below.       Allergies, and PMH/PSH reviewed in [employer] today.  REVIEW OF SYSTEMS:  4 point ROS including Respiratory, CV, GI and , other than that noted in the HPI,  is negative    Objective:   BP 92/43   Pulse 73   Temp 97.8  F (36.6  C)   Resp 16   Ht 1.676 m (5' 6\")   Wt 59.9 kg (132 lb)   LMP  (LMP Unknown)   SpO2 94%   BMI 21.31 kg/m    GENERAL APPEARANCE:  Alert, elderly, in no distress, laying in bed  HEENT:  atraumatic, [place], EOM intact, moist mucus membranes  RESP:  non-labored breathing, lungs clear on auscultation, no respiratory distress, no cough  CV:  Rate regular, S1 S2 noted, no edema  ABDOMEN:  soft, non-distended, non-tender, bowel sounds active; donald tube patent and draining clear yellow/light green output  M/S:  moves all extremities, strength and tone equal bilaterally, no calf pain, arthritic changes noted in hands  SKIN:  warm, dry, thin, fragile, no " obvious rash, lesions, ulcerations or petechiae   NEURO:   Face is symmetric, examination of sensation by touch normal, follows and tracks, slow speech  PSYCH:  calm, cooperative        Labs reviewed as per Epic and/or Care Everywhere.      Assessment/Plan:  Acute bacterial conjunctivitis of right eye  Noted 5/27; started on polytrim (end 6/3). Now resolved.  -monitor     Sepsis due to Escherichia coli with acute renal failure and septic shock, unspecified acute renal failure type (H)  Treated with IV abx during hospital stay. Today, no concerns. VSS.  -follow clinically     Nephrolithiasis  Staghorn calculus  S/p bilateral nephrostomy tubes on 5/15. In TCU, left tube dislodged on 5/27; seen by IR in ED, unable to replace tube in existing tract; plan to keep tube out. Patient had follow-up with urology on 6/4 who recommended replacement of dislodged nephrostomy tube; referral placed; scheduled for 6/6. However, IR postponed since Plavix was not held for 5 days. Plan for tube replacement on 6/16. Today, right nephrostomy tube patent, draining clear, light green/yellow output.  -change APAP 500 mg BID to BID PRN  -plan to replace left nephrostomy tube as scheduled on 6/16 (pre-op done on separate note)  -plan for left percutaneous nephrolithotomy (scheduled 6/26/25); recommend UA/UC 10 days prior to surgery     Toxic metabolic encephalopathy  Secondary to acute illness. Resolved at hospital discharge. In TCU, no concerns. Seems to have cleared.  -follow clinically     MARI (acute kidney injury)  Baseline Cr ~ 0.8. Discharged from hospital with Cr 1.54. In TCU, recheck Cr 1.05 (5/27).  -avoid nephrotoxins  -BMP pending today    Anemia, unspecified type   Baseline ~11-12. In TCU, last hgb 7.3; platelet 468 (6/6/25). Today, no signs of active bleeding.  -monitor bleeding risks  -recheck CBC on 6/11     History of stroke  Spastic hemiplegia of left nondominant side as late effect of cerebral infarction (H)   Associated  left sided weakness/spasm.  -continue baclofen 5 mg 3 times daily  -continue clopidogrel  -continue atorvastatin     Essential hypertension   BP 92//59 mmHg; averaging on low normal lately. HR 73-86 bpm.  -continue metoprolol ER 50 mg daily  -decrease amlodipine 10 mg daily to 5 mg daily  -follow BP and HR; adjust medications as needed     Seizure disorder (H)  Secondary to stroke.  -continue Keppra 1000 mg twice daily     Type 2 diabetes, HbA1C goal < 8% (H)  Last A1c 7.1%. In TCU, metformin increased to home dose 1000 mg BID on 5/20.   -continue metformin 1000 mg twice daily  -trend A1c every 6-12 months     Osteoporosis without current pathological fracture, unspecified osteoporosis type  -alendronate 35 mg weekly; consider increasing it to 70 mg weekly     Major depressive disorder, recurrent episode, mild   -continue citalopram 10 mg daily  -monitor mood and behaviors     Uncomplicated asthma, unspecified asthma severity, unspecified whether persistent   Today, O2 sats 97% RA.  -montelukast 10 mg daily  -monitor respiratory status     Physical deconditioning  Secondary to diagnoses above and recent hospitalization. In TCU for rehabilitation.   -PT/OT following- adv per recommendations   -SW available for safe discharge planning ongoing      MED REC REQUIRED  Post Medication Reconciliation Status: discharge medications reconciled and changed, per note/orders        45 minutes spent on the date of this encounter doing chart review, review labs, discussion with nursing staff, patient visit, and documentation.       Electronically signed by: Dr. Elda Scott, DNP, APRN, AGNP-BC, PHN    This note was completed with the assistance of dictation software. Typos and word substitution-errors are expected and unintended.

## 2025-06-09 NOTE — LETTER
6/9/2025      Addis Peña  [address]  [place] [address]        [facility] GERIATRICS    Chief Complaint   Patient presents with     RECHECK     Pre-Op Exam        HPI: Addis Peña is a 74 year old (1950), who is being seen today for an episodic care visit at: [facility] ([facility]) [[id number]]. HPI information obtained from: facility chart records, facility staff, patient report and [facility] chart review. 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  Patient presented to the ED via EMS from home for evaluation of left-sided weakness.  Initial evaluation with significant MARI (Cr 2.28), leukocytosis WBC 13.2, and anemia with Hgb 9.2.  Initial imaging with no acute infarction; noted findings of chronic right frontal infarct.  Neurocritical care consulted; did not recommend acute MRI given findings of CT.  Empirically started on norepinephrine and admitted to ICU due to concern of infection as she had worsening hypotension. UA with concerns as source of infection; started on Zosyn. UC grew E. Coli and transitioned to ceftriaxone. Transferred out of ICU on 5/11/25. Large staghorn calculus within the left kidney with mild hydronephrosis as well as small calcified renal stones in the right renal pelvis and diffuse proximal ureteral calcification but no hydronephrosis. Urology consulted; bilateral nephrostomy tube placement on 5/16/2025; recommended outpatient follow-up with plan for left percutaneous nephrolithotomy; recommend UA/UC 10 days prior to surgery. Discharged to TCU for medical management, rehab, and nursing care.     5/27: left nephrostomy tube dislodged after repositioning in TCU and was sent to ED. Seen by IR and noted difficulty replacing the tube in existing tract; plan to keep tube out since prior nephrostogram showed non-obstructing collecting system; monitor for clinical signs  "of urinary tract infection.     6/4: patient had follow-up with urology who recommended replacement of dislodged nephrostomy tube; referral placed; scheduled for 6/6 however, IR postponed since Plavix was not held for 5 days. Plan for tube replacement on 6/16.     Today:  Nursing staff reports no acute medical concerns.     Patient is seen today for a visit in her room. She is laying in bed. Feels \"tired\". She does not sleep well here because of the noise outside her room. Reports to sleep better when she is at home. Endorses pain in her right flank but tolerable. Therapies have been \"good\"; she worked out on the [employer] today.  Appetite is \"good\"; she reports eating \"until I am full\"; usually eats half of her meals. Last BM was \"this morning\"; \"no\" belly pain. \"No\" concerns with her donald tube. Mood is \"good\"; \"no\" depression. Denies CP, palpitations, lightheadedness, dizziness, fatigue, SOB, fever, chills, nausea/vomiting, bladder or bowel concerns today.    Writer discussed with patient regarding plan for nephrostomy tube replacement as well as pre-operative instructions. Patient expressed acknowledgement and agreed to plan below.       Allergies, and PMH/PSH reviewed in [facility] today.  REVIEW OF SYSTEMS:  4 point ROS including Respiratory, CV, GI and , other than that noted in the HPI,  is negative    Objective:   BP 92/43   Pulse 73   Temp 97.8  F (36.6  C)   Resp 16   Ht 1.676 m (5' 6\")   Wt 59.9 kg (132 lb)   LMP  (LMP Unknown)   SpO2 94%   BMI 21.31 kg/m    GENERAL APPEARANCE:  Alert, elderly, in no distress, laying in bed  HEENT:  atraumatic, [place], EOM intact, moist mucus membranes  RESP:  non-labored breathing, lungs clear on auscultation, no respiratory distress, no cough  CV:  Rate regular, S1 S2 noted, no edema  ABDOMEN:  soft, non-distended, non-tender, bowel sounds active; donald tube patent and draining clear yellow/light green output  M/S:  moves all extremities, strength and tone equal " bilaterally, no calf pain, arthritic changes noted in hands  SKIN:  warm, dry, thin, fragile, no obvious rash, lesions, ulcerations or petechiae   NEURO:   Face is symmetric, examination of sensation by touch normal, follows and tracks, slow speech  PSYCH:  calm, cooperative        Labs reviewed as per Epic and/or Care Everywhere.      Assessment/Plan:  Acute bacterial conjunctivitis of right eye  Noted 5/27; started on polytrim (end 6/3). Now resolved.  -monitor     Sepsis due to Escherichia coli with acute renal failure and septic shock, unspecified acute renal failure type (H)  Treated with IV abx during hospital stay. Today, no concerns. VSS.  -follow clinically     Nephrolithiasis  Staghorn calculus  S/p bilateral nephrostomy tubes on 5/15. In TCU, left tube dislodged on 5/27; seen by IR in ED, unable to replace tube in existing tract; plan to keep tube out. Patient had follow-up with urology on 6/4 who recommended replacement of dislodged nephrostomy tube; referral placed; scheduled for 6/6. However, IR postponed since Plavix was not held for 5 days. Plan for tube replacement on 6/16. Today, right nephrostomy tube patent, draining clear, light green/yellow output.  -change APAP 500 mg BID to BID PRN  -plan to replace left nephrostomy tube as scheduled on 6/16 (pre-op done on separate note)  -plan for left percutaneous nephrolithotomy (scheduled 6/26/25); recommend UA/UC 10 days prior to surgery     Toxic metabolic encephalopathy  Secondary to acute illness. Resolved at hospital discharge. In TCU, no concerns. Seems to have cleared.  -follow clinically     MARI (acute kidney injury)  Baseline Cr ~ 0.8. Discharged from hospital with Cr 1.54. In TCU, recheck Cr 1.05 (5/27).  -avoid nephrotoxins  -BMP pending today    Anemia, unspecified type   Baseline ~11-12. In TCU, last hgb 7.3; platelet 468 (6/6/25). Today, no signs of active bleeding.  -monitor bleeding risks  -recheck CBC on 6/11     History of  stroke  Spastic hemiplegia of left nondominant side as late effect of cerebral infarction (H)   Associated left sided weakness/spasm.  -continue baclofen 5 mg 3 times daily  -continue clopidogrel  -continue atorvastatin     Essential hypertension   BP 92//59 mmHg; averaging on low normal lately. HR 73-86 bpm.  -continue metoprolol ER 50 mg daily  -decrease amlodipine 10 mg daily to 5 mg daily  -follow BP and HR; adjust medications as needed     Seizure disorder (H)  Secondary to stroke.  -continue Keppra 1000 mg twice daily     Type 2 diabetes, HbA1C goal < 8% (H)  Last A1c 7.1%. In TCU, metformin increased to home dose 1000 mg BID on 5/20.   -continue metformin 1000 mg twice daily  -trend A1c every 6-12 months     Osteoporosis without current pathological fracture, unspecified osteoporosis type  -alendronate 35 mg weekly; consider increasing it to 70 mg weekly     Major depressive disorder, recurrent episode, mild   -continue citalopram 10 mg daily  -monitor mood and behaviors     Uncomplicated asthma, unspecified asthma severity, unspecified whether persistent   Today, O2 sats 97% RA.  -montelukast 10 mg daily  -monitor respiratory status     Physical deconditioning  Secondary to diagnoses above and recent hospitalization. In TCU for rehabilitation.   -PT/OT following- adv per recommendations   -SW available for safe discharge planning ongoing      MED REC REQUIRED  Post Medication Reconciliation Status: discharge medications reconciled and changed, per note/orders        45 minutes spent on the date of this encounter doing chart review, review labs, discussion with nursing staff, patient visit, and documentation.       Electronically signed by: Dr. Elda Scott, DNP, APRN, AGNP-BC, PHN    This note was completed with the assistance of dictation software. Typos and word substitution-errors are expected and unintended.            Preoperative Evaluation  [facility] GERIATRICS  [address] [place] [address]  W  [place] [address]  Phone: [phone]  Fax: [phone]  Primary Provider: Joel Daniel Wegener, MD  Pre-op Performing Provider: KEILA Louis [facility]  Jun 9, 2025     Surgical information  Surgery/procedure: Nephrostomy tube placement  Surgery location: [facility]  Surgeon: Dr. RACHEL Ulrich  Surgery date: 6/16/25  Time of surgery: 0830  Where patient plans to recover: [facility]CU      Fax number for surgical facility: Note does not need to be faxed, will be available electronically in Epic.    Nephrolithiasis  Staghorn calculus  S/p bilateral nephrostomy tubes on 5/15. In TCU, left tube dislodged on 5/27; seen by IR in ED, unable to replace tube in existing tract; plan to keep tube out. Patient had follow-up with urology on 6/4 who recommended replacement of dislodged nephrostomy tube; referral placed; scheduled for 6/6. However, IR postponed since Plavix was not held for 5 days. Plan for tube replacement on 6/16. Today, right nephrostomy tube patent, draining clear, light green/yellow output.  -plan to replace left nephrostomy tube as scheduled on 6/16  -plan for left percutaneous nephrolithotomy (scheduled 6/26/25); recommend UA/UC 10 days prior to surgery     Toxic metabolic encephalopathy  Secondary to acute illness. Resolved at hospital discharge. In TCU, no concerns. Seems to have cleared.  -follow clinically     History of stroke  Spastic hemiplegia of left nondominant side as late effect of cerebral infarction (H)   Associated left sided weakness/spasm.  -continue baclofen 5 mg 3 times daily  -HOLD clopidogrel on 6/11 until after surgery  -continue atorvastatin     Essential hypertension   BP 92//59 mmHg; averaging on low normal lately. HR 73-86 bpm.  -continue metoprolol and amlodipine     Seizure disorder (H)  Secondary to stroke.  -continue Keppra 1000 mg twice daily     Type 2 diabetes, HbA1C goal < 8% (H)  Last A1c 7.1%.  -HOLD metformin on day of surgery    Anemia, unspecified type    Baseline ~11-12. In TCU, last hgb 7.3; platelet 468 (6/6/25). Today, no signs of active bleeding.  -monitor bleeding risks  -recheck CBC on 6/11     Osteoporosis without current pathological fracture, unspecified osteoporosis type  -HOLD vitamins (Vitamin D and Vit B12 injection) now until after surgery     Major depressive disorder, recurrent episode, mild   -continue citalopram 10 mg daily     Uncomplicated asthma, unspecified asthma severity, unspecified whether persistent   Today, O2 sats 97% RA.  -montelukast 10 mg daily  -monitor respiratory status       Subjective  Addis is a 74 year old, presenting for the following:  RECHECK and Pre-Op Exam        HPI: Addis Peña is a 74 year old (1950) with Large staghorn calculus within the left kidney with mild hydronephrosis as well as small calcified renal stones in the right renal pelvis and diffuse proximal ureteral calcification but no hydronephrosis. Urology consulted; bilateral nephrostomy tube placement on 5/16/2025; recommended outpatient follow-up with plan for left percutaneous nephrolithotomy. In TCU, left tube dislodged on 5/27; seen by IR in ED, unable to replace tube in existing tract; plan to keep tube out. Patient had follow-up with urology on 6/4 who recommended replacement of dislodged nephrostomy tube; referral placed; scheduled for 6/6. However, IR postponed since Plavix was not held for 5 days. Plan for tube replacement on 6/16. Today, right nephrostomy tube patent, draining clear, light green/yellow output.        Advance Care Planning    Document on file is a Health Care Directive or POLST.    Preoperative Review of    reviewed - no record of controlled substances prescribed.      Patient Active Problem List    Diagnosis Date Noted     Osteoporosis without current pathological fracture, unspecified osteoporosis type 01/03/2024     Priority: Medium     Major depressive disorder, recurrent episode, mild 01/08/2022      Priority: Medium     Type 2 diabetes mellitus with other neurologic complication, with long-term current use of insulin (H) 12/30/2021     Priority: Medium     Essential hypertension 12/30/2021     Priority: Medium     Cerebrovascular accident (CVA) due to occlusion of right anterior cerebral artery (H) 12/30/2021     Priority: Medium     Spastic hemiplegia of left nondominant side as late effect of cerebral infarction (H) 12/20/2021     Priority: Medium     Seizure disorder (H) 04/20/2021     Priority: Medium     Type 2 diabetes mellitus with stage 3b chronic kidney disease, with long-term current use of insulin (H) 04/20/2021     Priority: Medium     Closed left hip fracture (H) 02/02/2020     Priority: Medium     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  No history of GITA 2 or greater found in epic.   No further cervical cancer screening recommended.    updated.              Type 2 diabetes mellitus with stage 3 chronic kidney disease, without long-term current use of insulin (H) 01/06/2017     Priority: Medium     Altered mental status 12/23/2015     Priority: Medium     MARI (acute kidney injury) 12/23/2015     Priority: Medium     Type 2 diabetes with stage 3 chronic kidney disease GFR 30-59 (H) 10/23/2015     Priority: Medium     Gout 01/28/2014     Priority: Medium     Problem list name updated by automated process. Provider to review       Hyperlipidemia LDL goal <70 01/20/2014     Priority: Medium     B12 deficiency anemia 06/25/2013     Priority: Medium     Major depression in complete remission (H) 07/06/2012     Priority:  Medium     History of colonic polyps 04/13/2012     Priority: Medium     Problem list name updated by automated process. Provider to review       Mild major depression (H) 10/07/2011     Priority: Medium     GERD (gastroesophageal reflux disease) 10/07/2011     Priority: Medium     Seasonal allergic rhinitis 10/07/2011     Priority: Medium     Cerebral artery occlusion with cerebral infarction (H) 09/13/2011     Priority: Medium     Problem list name updated by automated process. Provider to review       Rosacea 02/09/2011     Priority: Medium     Hypertension goal BP (blood pressure) < 140/90      Priority: Medium     Type 2 diabetes, HbA1C goal < 8% (H)      Priority: Medium     Cerebrovascular accident (CVA) due to thrombosis of right anterior cerebral artery (H)      Priority: Medium     Untreated HTN ([id number]) and DM (hgba1c 11.4)  Right Anterior cerebral artery occlusion/distribution.  Angiogram possible arteritis.  Echo NL.  Hypercoag w.u normal  Vasculitis w.u. NL        Past Medical History:   Diagnosis Date     Allergic rhinitis      Allergic rhinitis      CKD (chronic kidney disease)      Depression      Depression      Displaced dome fracture of left acetabulum (H)      DM2 (diabetes mellitus, type 2) (H)      GERD (gastroesophageal reflux disease)      GERD (gastroesophageal reflux disease)      Gout      HTN (hypertension)      Hyperlipidaemia LDL goal <100      Hypertension goal BP (blood pressure) < 140/90      Left hemiplegia (H) 01/2010    sees PMR physician     Left hemiplegia (H)      Major depression in complete remission (H) 07/06/2012     Migraine      Migraine      Mild major depression (H) 10/07/2011     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) 02/2011     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)      Nephrolithiasis      Nephrolithiasis      Seizure disorder (H)      Spastic hemiplegia of left nondominant side as late effect of cerebral infarction (H)  12/20/2021     Stroke (H) 01/2010    due to uncontrolled hypertension     Stroke (H)      Type 2 diabetes, HbA1C goal < 8% (H)      Vitamin B12 deficiency anemia      Past Surgical History:   Procedure Laterality Date     COLONOSCOPY       COLONOSCOPY N/A 5/9/2019    Procedure: COLONOSCOPY, WITH POLYPECTOMY AND BIOPSY;  Surgeon: Na Diehl MD;  Location: [facility] OR     COLONOSCOPY W/ BIOPSIES AND POLYPECTOMY  05/09/2019    Procedure: COLONOSCOPY, WITH POLYPECTOMY AND BIOPSY; Surgeon: Na Diehl MD; Location: [facility] OR       colonscopy  3/2012    repeat 1 to 3 y     CYSTOSCOPY W/ URETEROSCOPY Right 02/22/2016    Procedure: COMBINED CYSTOSCOPY, URETEROSCOPY; Surgeon: Madelaine Roland MD; Location: [facility] OR       CYSTOSCOPY, URETEROSCOPY, COMBINED Right 2/22/2016    Procedure: COMBINED CYSTOSCOPY, URETEROSCOPY;  Surgeon: Madelaine Roland MD;  Location: [facility] OR     ORIF ACETABULUM FRACTURE Left      Current Outpatient Medications   Medication Sig Dispense Refill     acetaminophen (TYLENOL) 500 MG tablet Take 500 mg by mouth 2 times daily. AND give 1 tab PO daily PRN for pain rated 2-5/10 and 2 tabs PO daily PRN for pain rated 6-10/10       alendronate (FOSAMAX) 35 MG tablet Take 1 tablet (35 mg) by mouth every 7 days. 12 tablet 3     amLODIPine (NORVASC) 10 MG tablet Take 1 tablet (10 mg) by mouth daily. 90 tablet 3     atorvastatin (LIPITOR) 40 MG tablet Take 1 tablet (40 mg) by mouth daily. 90 tablet 3     baclofen (LIORESAL) 10 MG tablet Take 5 mg by mouth 3 times daily.       baclofen (LIORESAL) 10 MG tablet TAKE 1 TABLET(10 MG) BY MOUTH THREE TIMES DAILY 270 tablet 3     bisacodyl (DULCOLAX) 5 MG EC tablet Take 5 mg by mouth daily.       bisacodyl (DULCOLAX) 5 MG EC tablet Take 2 tablets (10 mg) by mouth daily as needed for constipation 180 tablet 3     blood glucose (CONTOUR TEST) test strip USE TO TEST BLOOD GLUCOSE ONCE DAILY 100 strip 2     citalopram (CELEXA) 10 MG  "tablet Take 1 tablet (10 mg) by mouth daily. 90 tablet 3     clopidogrel (PLAVIX) 75 MG tablet Take 1 tablet (75 mg) by mouth daily. 90 tablet 1     cyanocobalamin (CYANOCOBALAMIN) 1000 MCG/ML injection Inject 1 mL into the muscle every 30 days       Lactase 4500 units TABS Take 13,500 Units by mouth daily as needed       levETIRAcetam (KEPPRA) 250 MG tablet Take 1,000 mg by mouth 2 times daily.       losartan (COZAAR) 25 MG tablet Take 1 tablet (25 mg) by mouth at bedtime. 90 tablet 3     metFORMIN (GLUCOPHAGE) 1000 MG tablet TAKE 1 TABLET(1000 MG) BY MOUTH TWICE DAILY WITH MEALS 180 tablet 3     metoprolol succinate ER (TOPROL XL) 50 MG 24 hr tablet Take 1 tablet (50 mg) by mouth daily. 90 tablet 3     montelukast (SINGULAIR) 10 MG tablet Take 1 tablet (10 mg) by mouth daily. 90 tablet 3     polyvinyl alcohol (LIQUIFILM TEARS) 1.4 % ophthalmic solution Place 1 drop into both eyes daily.       senna-docusate (SENOKOT-S/PERICOLACE) 8.6-50 MG tablet Take 1 tablet by mouth daily       sodium chloride 0.9% infusion Use 10 ml intravenously one time a  day for Drain patency - Continue flushing each  tube daily until urine is clear and yellow, then can  discontinue       Vitamin D3 (VITAMIN D3) 25 mcg (1000 units) tablet Take 1 tablet (25 mcg) by mouth daily 90 tablet 3       Allergies   Allergen Reactions     Lisinopril Cough     Milk Products GI Disturbance     Latex Rash        Social History     Tobacco Use     Smoking status: Never     Smokeless tobacco: Never     Tobacco comments:     None   Substance Use Topics     Alcohol use: No       History   Drug Use No          Objective   BP 92/43   Pulse 73   Temp 97.8  F (36.6  C)   Resp 16   Ht 1.676 m (5' 6\")   Wt 59.9 kg (132 lb)   LMP  (LMP Unknown)   SpO2 94%   BMI 21.31 kg/m     Estimated body mass index is 21.31 kg/m  as calculated from the following:    Height as of this encounter: 1.676 m (5' 6\").    Weight as of this encounter: 59.9 kg (132 lb).  Physical " Exam  GENERAL APPEARANCE:  Alert, elderly, in no distress, laying in bed  HEENT:  atraumatic, [place], EOM intact, moist mucus membranes  RESP:  non-labored breathing, lungs clear on auscultation, no respiratory distress, no cough  CV:  Rate regular, S1 S2 noted, no edema  ABDOMEN:  soft, non-distended, non-tender, bowel sounds active; donald tube patent and draining clear yellow/light green output  M/S:  moves all extremities, strength and tone equal bilaterally, no calf pain, arthritic changes noted in hands  SKIN:  warm, dry, thin, fragile, no obvious rash, lesions, ulcerations or petechiae   NEURO:   Face is symmetric, examination of sensation by touch normal, follows and tracks, slow speech  PSYCH:  calm, cooperative      Recent Labs   Lab Test 06/09/25  0947 05/27/25  0655 05/20/25  0948 11/19/24  0803   HGB  --   --  11.2* 12.9   PLT  --   --  357 261    142 138 141   POTASSIUM 4.2 4.8 4.9 4.8   CR 1.48* 1.05* 1.13* 0.82   A1C  --   --   --  6.8*        Diagnostics  Labs pending at this time.  Results will be reviewed when available.   No EKG this visit, completed in the last 90 days.    Revised Cardiac Risk Index (RCRI)  The patient has the following serious cardiovascular risks for perioperative complications:   - Cerebrovascular Disease (TIA or CVA) = 1 point     RCRI Interpretation: 1 point: Class II (low risk - 0.9% complication rate)         Signed Electronically by: KEILA Louis DNP  A copy of this evaluation report is provided to the requesting physician.      Sincerely,        KEILA Louis CNP    Electronically signed

## 2025-06-09 NOTE — PROGRESS NOTES
Preoperative Evaluation  [facility] GERIATRICS  [address] [place] [address]  Phone: [phone]  Fax: [phone]  Primary Provider: Joel Daniel Wegener, MD  Pre-op Performing Provider: KEILA Louis DNP  Jun 9, 2025     Surgical information  Surgery/procedure: Nephrostomy tube placement  Surgery location: [facility]  Surgeon: Dr. RACHEL Ulrich  Surgery date: 6/16/25  Time of surgery: 0830  Where patient plans to recover: [facility] TCU      Fax number for surgical facility: Note does not need to be faxed, will be available electronically in Epic.    Nephrolithiasis  Staghorn calculus  S/p bilateral nephrostomy tubes on 5/15. In TCU, left tube dislodged on 5/27; seen by IR in ED, unable to replace tube in existing tract; plan to keep tube out. Patient had follow-up with urology on 6/4 who recommended replacement of dislodged nephrostomy tube; referral placed; scheduled for 6/6. However, IR postponed since Plavix was not held for 5 days. Plan for tube replacement on 6/16. Today, right nephrostomy tube patent, draining clear, light green/yellow output.  -plan to replace left nephrostomy tube as scheduled on 6/16  -plan for left percutaneous nephrolithotomy (scheduled 6/26/25); recommend UA/UC 10 days prior to surgery     Toxic metabolic encephalopathy  Secondary to acute illness. Resolved at hospital discharge. In TCU, no concerns. Seems to have cleared.  -follow clinically     History of stroke  Spastic hemiplegia of left nondominant side as late effect of cerebral infarction (H)   Associated left sided weakness/spasm.  -continue baclofen 5 mg 3 times daily  -HOLD clopidogrel on 6/11 until after surgery  -continue atorvastatin     Essential hypertension   BP 92//59 mmHg; averaging on low normal lately. HR 73-86 bpm.  -continue metoprolol and amlodipine     Seizure disorder (H)  Secondary to stroke.  -continue Keppra 1000 mg twice daily     Type 2 diabetes, HbA1C goal < 8%  (H)  Last A1c 7.1%.  -HOLD metformin on day of surgery    Anemia, unspecified type   Baseline ~11-12. In TCU, last hgb 7.3; platelet 468 (6/6/25). Today, no signs of active bleeding.  -monitor bleeding risks  -recheck CBC on 6/11     Osteoporosis without current pathological fracture, unspecified osteoporosis type  -HOLD vitamins (Vitamin D and Vit B12 injection) now until after surgery     Major depressive disorder, recurrent episode, mild   -continue citalopram 10 mg daily     Uncomplicated asthma, unspecified asthma severity, unspecified whether persistent   Today, O2 sats 97% RA.  -montelukast 10 mg daily  -monitor respiratory status       Subjective   Addis is a 74 year old, presenting for the following:  RECHECK and Pre-Op Exam        HPI: Addis Peña is a 74 year old (1950) with Large staghorn calculus within the left kidney with mild hydronephrosis as well as small calcified renal stones in the right renal pelvis and diffuse proximal ureteral calcification but no hydronephrosis. Urology consulted; bilateral nephrostomy tube placement on 5/16/2025; recommended outpatient follow-up with plan for left percutaneous nephrolithotomy. In TCU, left tube dislodged on 5/27; seen by IR in ED, unable to replace tube in existing tract; plan to keep tube out. Patient had follow-up with urology on 6/4 who recommended replacement of dislodged nephrostomy tube; referral placed; scheduled for 6/6. However, IR postponed since Plavix was not held for 5 days. Plan for tube replacement on 6/16. Today, right nephrostomy tube patent, draining clear, light green/yellow output.        Advance Care Planning    Document on file is a Health Care Directive or POLST.    Preoperative Review of    reviewed - no record of controlled substances prescribed.      Patient Active Problem List    Diagnosis Date Noted    Osteoporosis without current pathological fracture, unspecified osteoporosis type 01/03/2024     Priority:  Medium    Major depressive disorder, recurrent episode, mild 01/08/2022     Priority: Medium    Type 2 diabetes mellitus with other neurologic complication, with long-term current use of insulin (H) 12/30/2021     Priority: Medium    Essential hypertension 12/30/2021     Priority: Medium    Cerebrovascular accident (CVA) due to occlusion of right anterior cerebral artery (H) 12/30/2021     Priority: Medium    Spastic hemiplegia of left nondominant side as late effect of cerebral infarction (H) 12/20/2021     Priority: Medium    Seizure disorder (H) 04/20/2021     Priority: Medium    Type 2 diabetes mellitus with stage 3b chronic kidney disease, with long-term current use of insulin (H) 04/20/2021     Priority: Medium    Closed left hip fracture (H) 02/02/2020     Priority: Medium    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  No history of GITA 2 or greater found in epic.   No further cervical cancer screening recommended.   Hm updated.             Type 2 diabetes mellitus with stage 3 chronic kidney disease, without long-term current use of insulin (H) 01/06/2017     Priority: Medium    Altered mental status 12/23/2015     Priority: Medium    MARI (acute kidney injury) 12/23/2015     Priority: Medium    Type 2 diabetes with stage 3 chronic kidney disease GFR 30-59 (H) 10/23/2015     Priority: Medium    Gout 01/28/2014     Priority: Medium     Problem list name updated by automated process. Provider to review      Hyperlipidemia LDL goal <70 01/20/2014     Priority: Medium    B12 deficiency anemia 06/25/2013     Priority: Medium    Major  depression in complete remission (H) 07/06/2012     Priority: Medium    History of colonic polyps 04/13/2012     Priority: Medium     Problem list name updated by automated process. Provider to review      Mild major depression (H) 10/07/2011     Priority: Medium    GERD (gastroesophageal reflux disease) 10/07/2011     Priority: Medium    Seasonal allergic rhinitis 10/07/2011     Priority: Medium    Cerebral artery occlusion with cerebral infarction (H) 09/13/2011     Priority: Medium     Problem list name updated by automated process. Provider to review      Rosacea 02/09/2011     Priority: Medium    Hypertension goal BP (blood pressure) < 140/90      Priority: Medium    Type 2 diabetes, HbA1C goal < 8% (H)      Priority: Medium    Cerebrovascular accident (CVA) due to thrombosis of right anterior cerebral artery (H)      Priority: Medium     Untreated HTN (JBQm132h) and DM (hgba1c 11.4)  Right Anterior cerebral artery occlusion/distribution.  Angiogram possible arteritis.  Echo NL.  Hypercoag w.u normal  Vasculitis w.u. NL        Past Medical History:   Diagnosis Date    Allergic rhinitis     Allergic rhinitis     CKD (chronic kidney disease)     Depression     Depression     Displaced dome fracture of left acetabulum (H)     DM2 (diabetes mellitus, type 2) (H)     GERD (gastroesophageal reflux disease)     GERD (gastroesophageal reflux disease)     Gout     HTN (hypertension)     Hyperlipidaemia LDL goal <100     Hypertension goal BP (blood pressure) < 140/90     Left hemiplegia (H) 01/2010    sees PMR physician    Left hemiplegia (H)     Major depression in complete remission (H) 07/06/2012    Migraine     Migraine     Mild major depression (H) 10/07/2011    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) 02/2011    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)     Nephrolithiasis     Nephrolithiasis     Seizure disorder (H)     Spastic hemiplegia of left nondominant side as late effect  of cerebral infarction (H) 12/20/2021    Stroke (H) 01/2010    due to uncontrolled hypertension    Stroke (H)     Type 2 diabetes, HbA1C goal < 8% (H)     Vitamin B12 deficiency anemia      Past Surgical History:   Procedure Laterality Date    COLONOSCOPY      COLONOSCOPY N/A 5/9/2019    Procedure: COLONOSCOPY, WITH POLYPECTOMY AND BIOPSY;  Surgeon: Na Diehl MD;  Location: [facility] OR    COLONOSCOPY W/ BIOPSIES AND POLYPECTOMY  05/09/2019    Procedure: COLONOSCOPY, WITH POLYPECTOMY AND BIOPSY; Surgeon: Na Diehl MD; Location: [facility] OR      colonscopy  3/2012    repeat 1 to 3 y    CYSTOSCOPY W/ URETEROSCOPY Right 02/22/2016    Procedure: COMBINED CYSTOSCOPY, URETEROSCOPY; Surgeon: Madelaine Roland MD; Location: [facility] OR      CYSTOSCOPY, URETEROSCOPY, COMBINED Right 2/22/2016    Procedure: COMBINED CYSTOSCOPY, URETEROSCOPY;  Surgeon: Madelaine Roland MD;  Location: [facility] OR    ORIF ACETABULUM FRACTURE Left      Current Outpatient Medications   Medication Sig Dispense Refill    acetaminophen (TYLENOL) 500 MG tablet Take 500 mg by mouth 2 times daily. AND give 1 tab PO daily PRN for pain rated 2-5/10 and 2 tabs PO daily PRN for pain rated 6-10/10      alendronate (FOSAMAX) 35 MG tablet Take 1 tablet (35 mg) by mouth every 7 days. 12 tablet 3    amLODIPine (NORVASC) 10 MG tablet Take 1 tablet (10 mg) by mouth daily. 90 tablet 3    atorvastatin (LIPITOR) 40 MG tablet Take 1 tablet (40 mg) by mouth daily. 90 tablet 3    baclofen (LIORESAL) 10 MG tablet Take 5 mg by mouth 3 times daily.      baclofen (LIORESAL) 10 MG tablet TAKE 1 TABLET(10 MG) BY MOUTH THREE TIMES DAILY 270 tablet 3    bisacodyl (DULCOLAX) 5 MG EC tablet Take 5 mg by mouth daily.      bisacodyl (DULCOLAX) 5 MG EC tablet Take 2 tablets (10 mg) by mouth daily as needed for constipation 180 tablet 3    blood glucose (CONTOUR TEST) test strip USE TO TEST BLOOD GLUCOSE ONCE DAILY 100 strip 2    citalopram (CELEXA) 10  "MG tablet Take 1 tablet (10 mg) by mouth daily. 90 tablet 3    clopidogrel (PLAVIX) 75 MG tablet Take 1 tablet (75 mg) by mouth daily. 90 tablet 1    cyanocobalamin (CYANOCOBALAMIN) 1000 MCG/ML injection Inject 1 mL into the muscle every 30 days      Lactase 4500 units TABS Take 13,500 Units by mouth daily as needed      levETIRAcetam (KEPPRA) 250 MG tablet Take 1,000 mg by mouth 2 times daily.      losartan (COZAAR) 25 MG tablet Take 1 tablet (25 mg) by mouth at bedtime. 90 tablet 3    metFORMIN (GLUCOPHAGE) 1000 MG tablet TAKE 1 TABLET(1000 MG) BY MOUTH TWICE DAILY WITH MEALS 180 tablet 3    metoprolol succinate ER (TOPROL XL) 50 MG 24 hr tablet Take 1 tablet (50 mg) by mouth daily. 90 tablet 3    montelukast (SINGULAIR) 10 MG tablet Take 1 tablet (10 mg) by mouth daily. 90 tablet 3    polyvinyl alcohol (LIQUIFILM TEARS) 1.4 % ophthalmic solution Place 1 drop into both eyes daily.      senna-docusate (SENOKOT-S/PERICOLACE) 8.6-50 MG tablet Take 1 tablet by mouth daily      sodium chloride 0.9% infusion Use 10 ml intravenously one time a  day for Drain patency - Continue flushing each  tube daily until urine is clear and yellow, then can  discontinue      Vitamin D3 (VITAMIN D3) 25 mcg (1000 units) tablet Take 1 tablet (25 mcg) by mouth daily 90 tablet 3       Allergies   Allergen Reactions    Lisinopril Cough    Milk Products GI Disturbance    Latex Rash        Social History     Tobacco Use    Smoking status: Never    Smokeless tobacco: Never    Tobacco comments:     None   Substance Use Topics    Alcohol use: No       History   Drug Use No          Objective    BP 92/43   Pulse 73   Temp 97.8  F (36.6  C)   Resp 16   Ht 1.676 m (5' 6\")   Wt 59.9 kg (132 lb)   LMP  (LMP Unknown)   SpO2 94%   BMI 21.31 kg/m     Estimated body mass index is 21.31 kg/m  as calculated from the following:    Height as of this encounter: 1.676 m (5' 6\").    Weight as of this encounter: 59.9 kg (132 lb).  Physical Exam  GENERAL " APPEARANCE:  Alert, elderly, in no distress, laying in bed  HEENT:  atraumatic, [place], EOM intact, moist mucus membranes  RESP:  non-labored breathing, lungs clear on auscultation, no respiratory distress, no cough  CV:  Rate regular, S1 S2 noted, no edema  ABDOMEN:  soft, non-distended, non-tender, bowel sounds active; donald tube patent and draining clear yellow/light green output  M/S:  moves all extremities, strength and tone equal bilaterally, no calf pain, arthritic changes noted in hands  SKIN:  warm, dry, thin, fragile, no obvious rash, lesions, ulcerations or petechiae   NEURO:   Face is symmetric, examination of sensation by touch normal, follows and tracks, slow speech  PSYCH:  calm, cooperative      Recent Labs   Lab Test 06/09/25  0947 05/27/25  0655 05/20/25  0948 11/19/24  0803   HGB  --   --  11.2* 12.9   PLT  --   --  357 261    142 138 141   POTASSIUM 4.2 4.8 4.9 4.8   CR 1.48* 1.05* 1.13* 0.82   A1C  --   --   --  6.8*        Diagnostics  Labs pending at this time.  Results will be reviewed when available.   No EKG this visit, completed in the last 90 days.    Revised Cardiac Risk Index (RCRI)  The patient has the following serious cardiovascular risks for perioperative complications:   - Cerebrovascular Disease (TIA or CVA) = 1 point     RCRI Interpretation: 1 point: Class II (low risk - 0.9% complication rate)         Signed Electronically by: KEILA Louis DNP  A copy of this evaluation report is provided to the requesting physician.

## 2025-06-11 ENCOUNTER — TELEPHONE (OUTPATIENT)
Dept: GERIATRICS | Facility: CLINIC | Age: 75
End: 2025-06-11

## 2025-06-11 ENCOUNTER — RESULTS FOLLOW-UP (OUTPATIENT)
Dept: FAMILY MEDICINE | Facility: CLINIC | Age: 75
End: 2025-06-11

## 2025-06-11 VITALS
BODY MASS INDEX: 21.21 KG/M2 | SYSTOLIC BLOOD PRESSURE: 140 MMHG | TEMPERATURE: 97.1 F | HEART RATE: 77 BPM | WEIGHT: 132 LBS | HEIGHT: 66 IN | DIASTOLIC BLOOD PRESSURE: 53 MMHG | RESPIRATION RATE: 18 BRPM | OXYGEN SATURATION: 96 %

## 2025-06-11 DIAGNOSIS — D64.9 ANEMIA, UNSPECIFIED TYPE: Primary | ICD-10-CM

## 2025-06-11 LAB
ERYTHROCYTE [DISTWIDTH] IN BLOOD BY AUTOMATED COUNT: 14.6 % (ref 10–15)
HCT VFR BLD AUTO: 23.1 % (ref 35–47)
HGB BLD-MCNC: 7 G/DL (ref 11.7–15.7)
MCH RBC QN AUTO: 29.3 PG (ref 26.5–33)
MCHC RBC AUTO-ENTMCNC: 30.3 G/DL (ref 31.5–36.5)
MCV RBC AUTO: 97 FL (ref 78–100)
PLATELET # BLD AUTO: 406 10E3/UL (ref 150–450)
RBC # BLD AUTO: 2.39 10E6/UL (ref 3.8–5.2)
WBC # BLD AUTO: 9 10E3/UL (ref 4–11)

## 2025-06-11 PROCEDURE — 36415 COLL VENOUS BLD VENIPUNCTURE: CPT | Mod: ORL | Performed by: FAMILY MEDICINE

## 2025-06-11 PROCEDURE — P9604 ONE-WAY ALLOW PRORATED TRIP: HCPCS | Mod: ORL | Performed by: FAMILY MEDICINE

## 2025-06-11 PROCEDURE — 85027 COMPLETE CBC AUTOMATED: CPT | Mod: ORL | Performed by: FAMILY MEDICINE

## 2025-06-11 NOTE — TELEPHONE ENCOUNTER
[facility] Geriatrics   2025     Name: Addis Peña   : 1950     Background:  Labs returned today with hemoglobin significantly decreased to 7.0 from prior of 11.2. Creatinine has also been uptrending as well, suspicious for true volume loss.     Orders:  Please send to the ED for further evaluation      Electronically signed by Benjamin Rosenstein, MD, MA

## 2025-06-11 NOTE — PROGRESS NOTES
[facility] GERIATRICS    Chief Complaint   Patient presents with    RECHECK        HPI: Addis Peña is a 74 year old (1950), who is being seen today for an episodic care visit at: [facility] ([facility]) [[id number]]. HPI information obtained from: facility chart records, facility staff, patient report and [facility] chart review. 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  Patient presented to the ED via EMS from home for evaluation of left-sided weakness.  Initial evaluation with significant MARI (Cr 2.28), leukocytosis WBC 13.2, and anemia with Hgb 9.2.  Initial imaging with no acute infarction; noted findings of chronic right frontal infarct.  Neurocritical care consulted; did not recommend acute MRI given findings of CT.  Empirically started on norepinephrine and admitted to ICU due to concern of infection as she had worsening hypotension. UA with concerns as source of infection; started on Zosyn. UC grew E. Coli and transitioned to ceftriaxone. Transferred out of ICU on 5/11/25. Large staghorn calculus within the left kidney with mild hydronephrosis as well as small calcified renal stones in the right renal pelvis and diffuse proximal ureteral calcification but no hydronephrosis. Urology consulted; bilateral nephrostomy tube placement on 5/16/2025; recommended outpatient follow-up with plan for left percutaneous nephrolithotomy; recommend UA/UC 10 days prior to surgery. Discharged to TCU for medical management, rehab, and nursing care.     5/27: left nephrostomy tube dislodged after repositioning in TCU and was sent to ED. Seen by IR and noted difficulty replacing the tube in existing tract; plan to keep tube out since prior nephrostogram showed non-obstructing collecting system; monitor for clinical signs of urinary tract infection.     6/4: patient had follow-up with urology who recommended replacement of  "dislodged nephrostomy tube; referral placed; scheduled for 6/6 however, IR postponed since Plavix was not held for 5 days. Plan for tube replacement on 6/16.     6/11: Patient sent to ED for low hgb. Downtrending during TCU stay 11.2-> 7.3 -> 7.0. Received one unit PRBC. Discharged back to TCU.    Today:  Nursing staff reports no acute medical concerns. Therapies signed off on 6/9/25. Awaiting plan for LTC placement.    Patient is seen today for a visit in her room. She is laying in bed with her eyes closed. Awakens as writer enters her room. Feels \"good\". \"No\" pain anywhere. She endorses not working with therapy. Appetite is good but notes portion sizes are too large; reports eating half of her meals. Last BM was \"this morning\". \"No\" belly pain. \"No\" concerns with her nephrostomy tube; endorses plan to undergo left nephrostomy tube replacement on Monday. Sleeping \"off and on.. depends on what's going on out there\". Notes talking and foot traffic in the hallways causes her to wake up sometimes. Mood is \"good\"; \"no\" depression. Denies CP, palpitations, lightheadedness, dizziness, fatigue, SOB, fever, chills, nausea/vomiting concerns today.        Allergies, and PMH/PSH reviewed in EPIC today.  REVIEW OF SYSTEMS:  4 point ROS including Respiratory, CV, GI and , other than that noted in the HPI,  is negative    Objective:   BP (!) 140/53   Pulse 77   Temp 97.1  F (36.2  C)   Resp 18   Ht 1.676 m (5' 6\")   Wt 59.9 kg (132 lb)   LMP  (LMP Unknown)   SpO2 96%   BMI 21.31 kg/m    GENERAL APPEARANCE:  Alert,  in no distress  HEENT:  atraumatic, [place], EOM intact, moist mucus membranes  RESP:  non-labored breathing, lungs clear on auscultation, no respiratory distress, no cough  CV:  Rate regular, S1 S2 noted, no edema  ABDOMEN:  soft, non-distended, non-tender, bowel sounds active; nephrostomy tube patent draining yellow output  M/S:  moves all extremities, strength and tone equal bilaterally, no calf pain, " arthritic changes noted in hands  SKIN:  warm, dry, thin, fragile, no obvious rash, lesions, ulcerations or petechiae   NEURO:   Face is symmetric, examination of sensation by touch normal, follows and tracks, slow speech  PSYCH:  calm, cooperative      Labs reviewed as per Epic and/or Care Everywhere.      Assessment/Plan:    Sepsis due to Escherichia coli with acute renal failure and septic shock, unspecified acute renal failure type (H)  Treated with IV abx during hospital stay. Today, no concerns. VSS.  -follow clinically     Nephrolithiasis  Staghorn calculus  S/p bilateral nephrostomy tubes on 5/15. In TCU, left tube dislodged on 5/27; seen by IR in ED, unable to replace tube in existing tract; plan to keep tube out. Patient had follow-up with urology on 6/4 who recommended replacement of dislodged nephrostomy tube; referral placed; scheduled for 6/6. However, IR postponed since Plavix was not held for 5 days. Plan for tube replacement on 6/16. Today, right nephrostomy tube patent, draining yellow output. Denies any pain.  -continue APAP 500 mg BID PRN  -replace left nephrostomy tube as scheduled on 6/16   -left percutaneous nephrolithotomy as scheduled on 6/26/25; recommend UA/UC 10 days prior to surgery     Toxic metabolic encephalopathy  Secondary to acute illness. Resolved at hospital discharge. In TCU, no concerns. Seems to have cleared.  -follow clinically     MARI (acute kidney injury)  Baseline Cr ~ 0.8. Discharged from hospital with Cr 1.54. In TCU, last Cr 1.2 (6/11).  -avoid nephrotoxins  -trend labs periodically    Anemia, unspecified type   Baseline ~11-12. Received 1 unit PRBC in ED on 6/11. Today, no signs of active bleeding.  -monitor bleeding risks  -recheck Hgb on Monday     History of stroke  Spastic hemiplegia of left nondominant side as late effect of cerebral infarction (H)   Associated left sided weakness/spasm.  -continue baclofen 5 mg 3 times daily  -continue clopidogrel  -continue  atorvastatin     Essential hypertension   Amlodipine decreased to 5 mg on 6/9/25 due to low normal SBPs. Today, /66 -140/53 mmHg. HR 75-78 bpm.  -continue metoprolol ER 50 mg daily  -continue amlodipine 5 mg daily  -follow BP and HR; adjust medications as needed     Seizure disorder (H)  Secondary to stroke.  -continue Keppra 1000 mg twice daily     Type 2 diabetes, HbA1C goal < 8% (H)  Last A1c 7.1%. In TCU, metformin increased to home dose 1000 mg BID on 5/20.   -continue metformin 1000 mg twice daily  -trend A1c every 6-12 months     Osteoporosis without current pathological fracture, unspecified osteoporosis type  -alendronate 35 mg weekly; consider increasing it to 70 mg weekly when renal function improves     Major depressive disorder, recurrent episode, mild   -continue citalopram 10 mg daily  -monitor mood and behaviors     Uncomplicated asthma, unspecified asthma severity, unspecified whether persistent   Today, O2 sats 95% RA.  -montelukast 10 mg daily  -monitor respiratory status    Acute bacterial conjunctivitis of right eye  Noted in TCU on 5/27; treated with polytrim (end 6/3). Resolved.  -monitor     Physical deconditioning  Impaired mobility and activities of daily living   Secondary to diagnoses above and recent hospitalization. In TCU for rehabilitation. Therapies signed off on 6/9/25. Patient is currently max assist; recommending LTC.  -PT/OT following- adv per recommendations   -SW available for safe discharge planning ongoing      MED REC REQUIRED  Post Medication Reconciliation Status: discharge medications reconciled and changed, per note/orders        35  minutes spent on the date of this encounter doing chart review, review labs, discussion with nursing staff, patient visit, and documentation.       Electronically signed by: Dr. Elda Scott, DNP, APRN, AGNP-BC, PHN    This note was completed with the assistance of dictation software. Typos and word substitution-errors are expected and  unintended.

## 2025-06-12 ENCOUNTER — TRANSITIONAL CARE UNIT VISIT (OUTPATIENT)
Dept: GERIATRICS | Facility: CLINIC | Age: 75
End: 2025-06-12
Payer: MEDICARE

## 2025-06-12 DIAGNOSIS — G92.8 TOXIC METABOLIC ENCEPHALOPATHY: ICD-10-CM

## 2025-06-12 DIAGNOSIS — N20.0 STAGHORN CALCULUS: ICD-10-CM

## 2025-06-12 DIAGNOSIS — R65.21 SEPSIS DUE TO ESCHERICHIA COLI WITH ACUTE RENAL FAILURE AND SEPTIC SHOCK, UNSPECIFIED ACUTE RENAL FAILURE TYPE (H): Primary | ICD-10-CM

## 2025-06-12 DIAGNOSIS — Z74.09 IMPAIRED MOBILITY AND ACTIVITIES OF DAILY LIVING: ICD-10-CM

## 2025-06-12 DIAGNOSIS — N20.0 NEPHROLITHIASIS: ICD-10-CM

## 2025-06-12 DIAGNOSIS — I10 ESSENTIAL HYPERTENSION: ICD-10-CM

## 2025-06-12 DIAGNOSIS — G40.909 SEIZURE DISORDER (H): ICD-10-CM

## 2025-06-12 DIAGNOSIS — Z86.73 HISTORY OF STROKE: ICD-10-CM

## 2025-06-12 DIAGNOSIS — J45.909 UNCOMPLICATED ASTHMA, UNSPECIFIED ASTHMA SEVERITY, UNSPECIFIED WHETHER PERSISTENT: ICD-10-CM

## 2025-06-12 DIAGNOSIS — A41.51 SEPSIS DUE TO ESCHERICHIA COLI WITH ACUTE RENAL FAILURE AND SEPTIC SHOCK, UNSPECIFIED ACUTE RENAL FAILURE TYPE (H): Primary | ICD-10-CM

## 2025-06-12 DIAGNOSIS — N17.9 SEPSIS DUE TO ESCHERICHIA COLI WITH ACUTE RENAL FAILURE AND SEPTIC SHOCK, UNSPECIFIED ACUTE RENAL FAILURE TYPE (H): Primary | ICD-10-CM

## 2025-06-12 DIAGNOSIS — M81.0 OSTEOPOROSIS WITHOUT CURRENT PATHOLOGICAL FRACTURE, UNSPECIFIED OSTEOPOROSIS TYPE: ICD-10-CM

## 2025-06-12 DIAGNOSIS — I69.354 SPASTIC HEMIPLEGIA OF LEFT NONDOMINANT SIDE AS LATE EFFECT OF CEREBRAL INFARCTION (H): ICD-10-CM

## 2025-06-12 DIAGNOSIS — D64.9 ANEMIA, UNSPECIFIED TYPE: ICD-10-CM

## 2025-06-12 DIAGNOSIS — E11.9 TYPE 2 DIABETES, HBA1C GOAL < 8% (H): ICD-10-CM

## 2025-06-12 DIAGNOSIS — F33.0 MAJOR DEPRESSIVE DISORDER, RECURRENT EPISODE, MILD: ICD-10-CM

## 2025-06-12 DIAGNOSIS — R53.81 PHYSICAL DECONDITIONING: ICD-10-CM

## 2025-06-12 DIAGNOSIS — N17.9 AKI (ACUTE KIDNEY INJURY): ICD-10-CM

## 2025-06-12 DIAGNOSIS — Z78.9 IMPAIRED MOBILITY AND ACTIVITIES OF DAILY LIVING: ICD-10-CM

## 2025-06-12 NOTE — LETTER
6/12/2025      Addis Peña  [address]  [place] [address]        [facility] GERIATRICS    Chief Complaint   Patient presents with     RECHECK        HPI: Addis Peña is a 74 year old (1950), who is being seen today for an episodic care visit at: [facility] ([facility]) [[id number]]. HPI information obtained from: facility chart records, facility staff, patient report and [facility] chart review. 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  Patient presented to the ED via EMS from home for evaluation of left-sided weakness.  Initial evaluation with significant MARI (Cr 2.28), leukocytosis WBC 13.2, and anemia with Hgb 9.2.  Initial imaging with no acute infarction; noted findings of chronic right frontal infarct.  Neurocritical care consulted; did not recommend acute MRI given findings of CT.  Empirically started on norepinephrine and admitted to ICU due to concern of infection as she had worsening hypotension. UA with concerns as source of infection; started on Zosyn. UC grew E. Coli and transitioned to ceftriaxone. Transferred out of ICU on 5/11/25. Large staghorn calculus within the left kidney with mild hydronephrosis as well as small calcified renal stones in the right renal pelvis and diffuse proximal ureteral calcification but no hydronephrosis. Urology consulted; bilateral nephrostomy tube placement on 5/16/2025; recommended outpatient follow-up with plan for left percutaneous nephrolithotomy; recommend UA/UC 10 days prior to surgery. Discharged to TCU for medical management, rehab, and nursing care.     5/27: left nephrostomy tube dislodged after repositioning in TCU and was sent to ED. Seen by IR and noted difficulty replacing the tube in existing tract; plan to keep tube out since prior nephrostogram showed non-obstructing collecting system; monitor for clinical signs of urinary  "tract infection.     6/4: patient had follow-up with urology who recommended replacement of dislodged nephrostomy tube; referral placed; scheduled for 6/6 however, IR postponed since Plavix was not held for 5 days. Plan for tube replacement on 6/16.     6/11: Patient sent to ED for low hgb. Downtrending during TCU stay 11.2-> 7.3 -> 7.0. Received one unit PRBC. Discharged back to TCU.    Today:  Nursing staff reports no acute medical concerns. Therapies signed off on 6/9/25. Awaiting plan for LTC placement.    Patient is seen today for a visit in her room. She is laying in bed with her eyes closed. Awakens as writer enters her room. Feels \"good\". \"No\" pain anywhere. She endorses not working with therapy. Appetite is good but notes portion sizes are too large; reports eating half of her meals. Last BM was \"this morning\". \"No\" belly pain. \"No\" concerns with her nephrostomy tube; endorses plan to undergo left nephrostomy tube replacement on Monday. Sleeping \"off and on.. depends on what's going on out there\". Notes talking and foot traffic in the hallways causes her to wake up sometimes. Mood is \"good\"; \"no\" depression. Denies CP, palpitations, lightheadedness, dizziness, fatigue, SOB, fever, chills, nausea/vomiting concerns today.        Allergies, and PMH/PSH reviewed in [facility] today.  REVIEW OF SYSTEMS:  4 point ROS including Respiratory, CV, GI and , other than that noted in the HPI,  is negative    Objective:   BP (!) 140/53   Pulse 77   Temp 97.1  F (36.2  C)   Resp 18   Ht 1.676 m (5' 6\")   Wt 59.9 kg (132 lb)   LMP  (LMP Unknown)   SpO2 96%   BMI 21.31 kg/m    GENERAL APPEARANCE:  Alert,  in no distress  HEENT:  atraumatic, [place], EOM intact, moist mucus membranes  RESP:  non-labored breathing, lungs clear on auscultation, no respiratory distress, no cough  CV:  Rate regular, S1 S2 noted, no edema  ABDOMEN:  soft, non-distended, non-tender, bowel sounds active; nephrostomy tube patent draining yellow " output  M/S:  moves all extremities, strength and tone equal bilaterally, no calf pain, arthritic changes noted in hands  SKIN:  warm, dry, thin, fragile, no obvious rash, lesions, ulcerations or petechiae   NEURO:   Face is symmetric, examination of sensation by touch normal, follows and tracks, slow speech  PSYCH:  calm, cooperative      Labs reviewed as per Epic and/or Care Everywhere.      Assessment/Plan:    Sepsis due to Escherichia coli with acute renal failure and septic shock, unspecified acute renal failure type (H)  Treated with IV abx during hospital stay. Today, no concerns. VSS.  -follow clinically     Nephrolithiasis  Staghorn calculus  S/p bilateral nephrostomy tubes on 5/15. In TCU, left tube dislodged on 5/27; seen by IR in ED, unable to replace tube in existing tract; plan to keep tube out. Patient had follow-up with urology on 6/4 who recommended replacement of dislodged nephrostomy tube; referral placed; scheduled for 6/6. However, IR postponed since Plavix was not held for 5 days. Plan for tube replacement on 6/16. Today, right nephrostomy tube patent, draining yellow output. Denies any pain.  -continue APAP 500 mg BID PRN  -replace left nephrostomy tube as scheduled on 6/16   -left percutaneous nephrolithotomy as scheduled on 6/26/25; recommend UA/UC 10 days prior to surgery     Toxic metabolic encephalopathy  Secondary to acute illness. Resolved at hospital discharge. In TCU, no concerns. Seems to have cleared.  -follow clinically     MARI (acute kidney injury)  Baseline Cr ~ 0.8. Discharged from hospital with Cr 1.54. In TCU, last Cr 1.2 (6/11).  -avoid nephrotoxins  -trend labs periodically    Anemia, unspecified type   Baseline ~11-12. Received 1 unit PRBC in ED on 6/11. Today, no signs of active bleeding.  -monitor bleeding risks  -recheck Hgb on Monday     History of stroke  Spastic hemiplegia of left nondominant side as late effect of cerebral infarction (H)   Associated left sided  weakness/spasm.  -continue baclofen 5 mg 3 times daily  -continue clopidogrel  -continue atorvastatin     Essential hypertension   Amlodipine decreased to 5 mg on 6/9/25 due to low normal SBPs. Today, /66 -140/53 mmHg. HR 75-78 bpm.  -continue metoprolol ER 50 mg daily  -continue amlodipine 5 mg daily  -follow BP and HR; adjust medications as needed     Seizure disorder (H)  Secondary to stroke.  -continue Keppra 1000 mg twice daily     Type 2 diabetes, HbA1C goal < 8% (H)  Last A1c 7.1%. In TCU, metformin increased to home dose 1000 mg BID on 5/20.   -continue metformin 1000 mg twice daily  -trend A1c every 6-12 months     Osteoporosis without current pathological fracture, unspecified osteoporosis type  -alendronate 35 mg weekly; consider increasing it to 70 mg weekly when renal function improves     Major depressive disorder, recurrent episode, mild   -continue citalopram 10 mg daily  -monitor mood and behaviors     Uncomplicated asthma, unspecified asthma severity, unspecified whether persistent   Today, O2 sats 95% RA.  -montelukast 10 mg daily  -monitor respiratory status    Acute bacterial conjunctivitis of right eye  Noted in TCU on 5/27; treated with polytrim (end 6/3). Resolved.  -monitor     Physical deconditioning  Impaired mobility and activities of daily living   Secondary to diagnoses above and recent hospitalization. In TCU for rehabilitation. Therapies signed off on 6/9/25. Patient is currently max assist; recommending LTC.  -PT/OT following- adv per recommendations   -SW available for safe discharge planning ongoing      MED REC REQUIRED  Post Medication Reconciliation Status: discharge medications reconciled and changed, per note/orders        35  minutes spent on the date of this encounter doing chart review, review labs, discussion with nursing staff, patient visit, and documentation.       Electronically signed by: Dr. Elda Scott, DNP, APRN, AGNP-BC, PHN    This note was completed with  the assistance of dictation software. Typos and word substitution-errors are expected and unintended.                Sincerely,        KEILA Louis CNP    Electronically signed

## 2025-06-16 ENCOUNTER — TELEPHONE (OUTPATIENT)
Dept: LAB | Facility: CLINIC | Age: 75
End: 2025-06-16
Payer: MEDICARE

## 2025-06-16 DIAGNOSIS — M81.0 AGE-RELATED OSTEOPOROSIS WITHOUT CURRENT PATHOLOGICAL FRACTURE: ICD-10-CM

## 2025-06-16 RX ORDER — ALENDRONATE SODIUM 35 MG/1
35 TABLET ORAL
Qty: 4 TABLET | Refills: 5 | Status: SHIPPED | OUTPATIENT
Start: 2025-06-16

## 2025-06-16 RX ORDER — ALENDRONATE SODIUM 35 MG/1
TABLET ORAL
Qty: 4 TABLET | Status: SHIPPED | OUTPATIENT
Start: 2025-06-16 | End: 2025-06-16

## 2025-06-18 ENCOUNTER — TELEPHONE (OUTPATIENT)
Dept: GERIATRICS | Facility: CLINIC | Age: 75
End: 2025-06-18
Payer: MEDICARE

## 2025-06-18 NOTE — TELEPHONE ENCOUNTER
"[facility] Triage Call    Provider: KEILA Hernandez DNP  Facility: [facility]  Facility Type:  TCU    Caller: [facility]   Call Back Number: [phone]    Allergies:    Allergies   Allergen Reactions    Lisinopril Cough    Milk Products GI Disturbance    Latex Rash          SBAR:     S-(situation): Patient has a PERCUTANEOUS NEPHROLITHOTOMY on 6/26, nursing is calling to clarify if and for how long should the Plavix be held.  Per the directions this is discretionary to the provider.           B-(background): Currently on Plavix 75 mg daily    A-(assessment): n/a    R-(recommendations): Please advise for direction of Plavix      Telephone encounter sent to:  KEILA Hernandez DNP    Please send response/orders to \"Geriatrics Nurse Pool\"    Sharon Hurst RN      "

## 2025-06-18 NOTE — TELEPHONE ENCOUNTER
Hold Plavix for 5 days prior to surgery. Surgery is on 6/26 so would recommend holding Plavix on 6/21.

## 2025-06-19 ENCOUNTER — LAB REQUISITION (OUTPATIENT)
Dept: LAB | Facility: CLINIC | Age: 75
End: 2025-06-19
Payer: MEDICARE

## 2025-06-19 ENCOUNTER — TRANSITIONAL CARE UNIT VISIT (OUTPATIENT)
Dept: GERIATRICS | Facility: CLINIC | Age: 75
End: 2025-06-19
Payer: MEDICARE

## 2025-06-19 VITALS
DIASTOLIC BLOOD PRESSURE: 63 MMHG | BODY MASS INDEX: 21.21 KG/M2 | HEIGHT: 66 IN | TEMPERATURE: 97.7 F | HEART RATE: 76 BPM | SYSTOLIC BLOOD PRESSURE: 137 MMHG | WEIGHT: 132 LBS | RESPIRATION RATE: 18 BRPM | OXYGEN SATURATION: 96 %

## 2025-06-19 DIAGNOSIS — A41.51 SEPSIS DUE TO ESCHERICHIA COLI WITH ACUTE RENAL FAILURE AND SEPTIC SHOCK, UNSPECIFIED ACUTE RENAL FAILURE TYPE (H): Primary | ICD-10-CM

## 2025-06-19 DIAGNOSIS — D64.9 ANEMIA, UNSPECIFIED TYPE: ICD-10-CM

## 2025-06-19 DIAGNOSIS — I10 ESSENTIAL HYPERTENSION: ICD-10-CM

## 2025-06-19 DIAGNOSIS — N17.9 ACUTE KIDNEY FAILURE, UNSPECIFIED: ICD-10-CM

## 2025-06-19 DIAGNOSIS — Z01.818 ENCOUNTER FOR OTHER PREPROCEDURAL EXAMINATION: ICD-10-CM

## 2025-06-19 DIAGNOSIS — M81.0 OSTEOPOROSIS WITHOUT CURRENT PATHOLOGICAL FRACTURE, UNSPECIFIED OSTEOPOROSIS TYPE: ICD-10-CM

## 2025-06-19 DIAGNOSIS — G40.909 SEIZURE DISORDER (H): ICD-10-CM

## 2025-06-19 DIAGNOSIS — N20.0 STAGHORN CALCULUS: ICD-10-CM

## 2025-06-19 DIAGNOSIS — N20.0 NEPHROLITHIASIS: ICD-10-CM

## 2025-06-19 DIAGNOSIS — R53.81 PHYSICAL DECONDITIONING: ICD-10-CM

## 2025-06-19 DIAGNOSIS — D64.9 ANEMIA, UNSPECIFIED: ICD-10-CM

## 2025-06-19 DIAGNOSIS — Z78.9 IMPAIRED MOBILITY AND ACTIVITIES OF DAILY LIVING: ICD-10-CM

## 2025-06-19 DIAGNOSIS — F33.0 MAJOR DEPRESSIVE DISORDER, RECURRENT EPISODE, MILD: ICD-10-CM

## 2025-06-19 DIAGNOSIS — N17.9 SEPSIS DUE TO ESCHERICHIA COLI WITH ACUTE RENAL FAILURE AND SEPTIC SHOCK, UNSPECIFIED ACUTE RENAL FAILURE TYPE (H): Primary | ICD-10-CM

## 2025-06-19 DIAGNOSIS — Z74.09 IMPAIRED MOBILITY AND ACTIVITIES OF DAILY LIVING: ICD-10-CM

## 2025-06-19 DIAGNOSIS — N17.9 AKI (ACUTE KIDNEY INJURY): ICD-10-CM

## 2025-06-19 DIAGNOSIS — Z86.73 HISTORY OF STROKE: ICD-10-CM

## 2025-06-19 DIAGNOSIS — J45.909 UNCOMPLICATED ASTHMA, UNSPECIFIED ASTHMA SEVERITY, UNSPECIFIED WHETHER PERSISTENT: ICD-10-CM

## 2025-06-19 DIAGNOSIS — I69.354 SPASTIC HEMIPLEGIA OF LEFT NONDOMINANT SIDE AS LATE EFFECT OF CEREBRAL INFARCTION (H): ICD-10-CM

## 2025-06-19 DIAGNOSIS — G92.8 TOXIC METABOLIC ENCEPHALOPATHY: ICD-10-CM

## 2025-06-19 DIAGNOSIS — R65.21 SEPSIS DUE TO ESCHERICHIA COLI WITH ACUTE RENAL FAILURE AND SEPTIC SHOCK, UNSPECIFIED ACUTE RENAL FAILURE TYPE (H): Primary | ICD-10-CM

## 2025-06-19 DIAGNOSIS — E11.9 TYPE 2 DIABETES, HBA1C GOAL < 8% (H): ICD-10-CM

## 2025-06-19 LAB
ALBUMIN UR-MCNC: 200 MG/DL
APPEARANCE UR: ABNORMAL
BACTERIA #/AREA URNS HPF: ABNORMAL /HPF
BILIRUB UR QL STRIP: NEGATIVE
COLOR UR AUTO: ABNORMAL
GLUCOSE UR STRIP-MCNC: 30 MG/DL
HGB UR QL STRIP: ABNORMAL
KETONES UR STRIP-MCNC: NEGATIVE MG/DL
LEUKOCYTE ESTERASE UR QL STRIP: ABNORMAL
NITRATE UR QL: NEGATIVE
PH UR STRIP: 6 [PH] (ref 5–7)
RBC URINE: >182 /HPF
SP GR UR STRIP: 1.01 (ref 1–1.03)
UROBILINOGEN UR STRIP-MCNC: NORMAL MG/DL
WBC URINE: 47 /HPF

## 2025-06-19 PROCEDURE — 81001 URINALYSIS AUTO W/SCOPE: CPT | Mod: ORL | Performed by: FAMILY MEDICINE

## 2025-06-19 PROCEDURE — 87086 URINE CULTURE/COLONY COUNT: CPT | Mod: ORL | Performed by: FAMILY MEDICINE

## 2025-06-19 NOTE — PROGRESS NOTES
Preoperative Evaluation  [facility] GERIATRICS  [address] [place] [address]  Phone: [phone]  Fax: [phone]  Primary Provider: Joel Daniel Wegener, MD  Pre-op Performing Provider: KEILA Louis DNP  Jun 19, 2025     Surgical information  Surgery/procedure: PCNL  Surgery location: [facility]  Surgeon: Dr. Aguirre  Surgery date: 6/26/25  Time of surgery: 0700  Where patient plans to recover: [facility]CU    Fax number for surgical facility: Note does not need to be faxed, will be available electronically in Epic.    Nephrolithiasis  Staghorn calculus  S/p bilateral nephrostomy tubes on 5/15. In TCU, left tube dislodged on 5/27; seen by IR in ED, unable to replace tube in existing tract; plan to keep tube out. Patient had follow-up with urology on 6/4 who recommended replacement of dislodged nephrostomy tube; referral placed; scheduled for 6/6. However, IR postponed since Plavix was not held for 5 days. Underwent tube replacement on 6/16.  -plan for left percutaneous nephrolithotomy (scheduled 6/26/25)  -order UA/UC as recommended     Toxic metabolic encephalopathy  Secondary to acute illness. Resolved at hospital discharge. In TCU, no concerns. Seems to have cleared.  -follow clinically     History of stroke  Spastic hemiplegia of left nondominant side as late effect of cerebral infarction (H)   Associated left sided weakness/spasm.  -continue baclofen 5 mg 3 times daily  -HOLD clopidogrel on 6/21 until after surgery  -continue atorvastatin     Essential hypertension   Controlled.  -continue metoprolol and amlodipine     Seizure disorder (H)  Secondary to stroke.  -continue Keppra 1000 mg twice daily    MARI (acute kidney injury)  Baseline Cr ~ 0.8. Discharged from hospital with Cr 1.54. In TCU, last Cr 1.2 (6/11).  -avoid nephrotoxins  -recheck BMP on 6/23     Type 2 diabetes, HbA1C goal < 8% (H)  Last A1c 7.1%.  -HOLD metformin on day of surgery     Anemia, unspecified type    Baseline ~11-12. Received 1 unit PRBC 6/11. Last Hgb 7.6 (6.11).  -recheck CBC on 6/23     Osteoporosis without current pathological fracture, unspecified osteoporosis type  -HOLD vitamins (Vitamin D and Vit B12 injection) now until after surgery     Major depressive disorder, recurrent episode, mild   -continue citalopram 10 mg daily     Uncomplicated asthma, unspecified asthma severity, unspecified whether persistent   Today, O2 sats 96 % RA.  -montelukast 10 mg daily  -monitor respiratory status    Subjective   Addis is a 74 year old, presenting for the following:  RECHECK and Pre-Op Exam        HPI: Addis Peña is a 74 year old (1950) with Large staghorn calculus within the left kidney with mild hydronephrosis as well as small calcified renal stones in the right renal pelvis and diffuse proximal ureteral calcification but no hydronephrosis. Urology consulted; bilateral nephrostomy tube placement on 5/16/2025; recommended outpatient follow-up with plan for left percutaneous nephrolithotomy.       Advance Care Planning    Document on file is a Health Care Directive or POLST.    Preoperative Review of    reviewed - no record of controlled substances prescribed.      Patient Active Problem List    Diagnosis Date Noted     Osteoporosis without current pathological fracture, unspecified osteoporosis type 01/03/2024     Priority: Medium     Major depressive disorder, recurrent episode, mild 01/08/2022     Priority: Medium     Type 2 diabetes mellitus with other neurologic complication, with long-term current use of insulin (H) 12/30/2021     Priority: Medium     Essential hypertension 12/30/2021     Priority: Medium     Cerebrovascular accident (CVA) due to occlusion of right anterior cerebral artery (H) 12/30/2021     Priority: Medium     Spastic hemiplegia of left nondominant side as late effect of cerebral infarction (H) 12/20/2021     Priority: Medium     Seizure disorder (H) 04/20/2021      Priority: Medium     Type 2 diabetes mellitus with stage 3b chronic kidney disease, with long-term current use of insulin (H) 04/20/2021     Priority: Medium     Closed left hip fracture (H) 02/02/2020     Priority: Medium     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  No history of GITA 2 or greater found in epic.   No further cervical cancer screening recommended.    updated.              Type 2 diabetes mellitus with stage 3 chronic kidney disease, without long-term current use of insulin (H) 01/06/2017     Priority: Medium     Altered mental status 12/23/2015     Priority: Medium     MARI (acute kidney injury) 12/23/2015     Priority: Medium     Type 2 diabetes with stage 3 chronic kidney disease GFR 30-59 (H) 10/23/2015     Priority: Medium     Gout 01/28/2014     Priority: Medium     Problem list name updated by automated process. Provider to review       Hyperlipidemia LDL goal <70 01/20/2014     Priority: Medium     B12 deficiency anemia 06/25/2013     Priority: Medium     Major depression in complete remission (H) 07/06/2012     Priority: Medium     History of colonic polyps 04/13/2012     Priority: Medium     Problem list name updated by automated process. Provider to review       Mild major depression (H) 10/07/2011     Priority: Medium     GERD (gastroesophageal reflux disease) 10/07/2011     Priority: Medium     Seasonal allergic rhinitis 10/07/2011     Priority: Medium     Cerebral artery occlusion with cerebral infarction (H) 09/13/2011     Priority: Medium     Problem list name updated by automated process. Provider to  review       Rosacea 02/09/2011     Priority: Medium     Hypertension goal BP (blood pressure) < 140/90      Priority: Medium     Type 2 diabetes, HbA1C goal < 8% (H)      Priority: Medium     Cerebrovascular accident (CVA) due to thrombosis of right anterior cerebral artery (H)      Priority: Medium     Untreated HTN ([id number]) and DM (hgba1c 11.4)  Right Anterior cerebral artery occlusion/distribution.  Angiogram possible arteritis.  Echo NL.  Hypercoag w.u normal  Vasculitis w.u. NL        Past Medical History:   Diagnosis Date     Allergic rhinitis      Allergic rhinitis      CKD (chronic kidney disease)      Depression      Depression      Displaced dome fracture of left acetabulum (H)      DM2 (diabetes mellitus, type 2) (H)      GERD (gastroesophageal reflux disease)      GERD (gastroesophageal reflux disease)      Gout      HTN (hypertension)      Hyperlipidaemia LDL goal <100      Hypertension goal BP (blood pressure) < 140/90      Left hemiplegia (H) 01/2010    sees PMR physician     Left hemiplegia (H)      Major depression in complete remission (H) 07/06/2012     Migraine      Migraine      Mild major depression (H) 10/07/2011     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) 02/2011     Mild nonproliferative diabetic retinopathy of both eyes (H)      Nephrolithiasis      Nephrolithiasis      Seizure disorder (H)      Spastic hemiplegia of left nondominant side as late effect of cerebral infarction (H) 12/20/2021     Stroke (H) 01/2010    due to uncontrolled hypertension     Stroke (H)      Type 2 diabetes, HbA1C goal < 8% (H)      Vitamin B12 deficiency anemia      Past Surgical History:   Procedure Laterality Date     COLONOSCOPY       COLONOSCOPY N/A 5/9/2019    Procedure: COLONOSCOPY, WITH POLYPECTOMY AND BIOPSY;  Surgeon: Na Diehl MD;  Location: [facility] OR     COLONOSCOPY W/ BIOPSIES AND POLYPECTOMY  05/09/2019    Procedure: COLONOSCOPY, WITH POLYPECTOMY AND BIOPSY;  Surgeon: Na Diehl MD; Location: [facility] OR       colonscopy  3/2012    repeat 1 to 3 y     CYSTOSCOPY W/ URETEROSCOPY Right 02/22/2016    Procedure: COMBINED CYSTOSCOPY, URETEROSCOPY; Surgeon: Madelaine Roland MD; Location: [facility] OR       CYSTOSCOPY, URETEROSCOPY, COMBINED Right 2/22/2016    Procedure: COMBINED CYSTOSCOPY, URETEROSCOPY;  Surgeon: Madelaine Roland MD;  Location: [facility] OR     ORIF ACETABULUM FRACTURE Left      Current Outpatient Medications   Medication Sig Dispense Refill     acetaminophen (TYLENOL) 500 MG tablet Take 500 mg by mouth 2 times daily as needed. AND give 1 tab PO daily PRN for pain rated 2-5/10 and 2 tabs PO daily PRN for pain rated 6-10/10       alendronate (FOSAMAX) 35 MG tablet Take 1 tablet (35 mg) by mouth every 7 days. 4 tablet 5     amLODIPine (NORVASC) 10 MG tablet Take 0.5 tablets (5 mg) by mouth daily. 90 tablet 3     atorvastatin (LIPITOR) 40 MG tablet Take 1 tablet (40 mg) by mouth daily. 90 tablet 3     baclofen (LIORESAL) 10 MG tablet Take 5 mg by mouth 3 times daily.       baclofen (LIORESAL) 10 MG tablet TAKE 1 TABLET(10 MG) BY MOUTH THREE TIMES DAILY 270 tablet 3     bisacodyl (DULCOLAX) 5 MG EC tablet Take 5 mg by mouth daily.       bisacodyl (DULCOLAX) 5 MG EC tablet Take 2 tablets (10 mg) by mouth daily as needed for constipation 180 tablet 3     blood glucose (CONTOUR TEST) test strip USE TO TEST BLOOD GLUCOSE ONCE DAILY 100 strip 2     citalopram (CELEXA) 10 MG tablet Take 1 tablet (10 mg) by mouth daily. 90 tablet 3     clopidogrel (PLAVIX) 75 MG tablet Take 1 tablet (75 mg) by mouth daily. 90 tablet 1     cyanocobalamin (CYANOCOBALAMIN) 1000 MCG/ML injection Inject 1 mL into the muscle every 30 days       Lactase 4500 units TABS Take 13,500 Units by mouth daily as needed       levETIRAcetam (KEPPRA) 250 MG tablet Take 1,000 mg by mouth 2 times daily.       metFORMIN (GLUCOPHAGE) 1000 MG tablet TAKE 1 TABLET(1000 MG) BY MOUTH TWICE DAILY  "WITH MEALS 180 tablet 3     metoprolol succinate ER (TOPROL XL) 50 MG 24 hr tablet Take 1 tablet (50 mg) by mouth daily. 90 tablet 3     montelukast (SINGULAIR) 10 MG tablet Take 1 tablet (10 mg) by mouth daily. 90 tablet 3     polyvinyl alcohol (LIQUIFILM TEARS) 1.4 % ophthalmic solution Place 1 drop into both eyes daily.       senna-docusate (SENOKOT-S/PERICOLACE) 8.6-50 MG tablet Take 1 tablet by mouth daily       sodium chloride 0.9% infusion Use 10 ml intravenously one time a  day for Drain patency - Continue flushing each  tube daily until urine is clear and yellow, then can  discontinue       Vitamin D3 (VITAMIN D3) 25 mcg (1000 units) tablet Take 1 tablet (25 mcg) by mouth daily 90 tablet 3       Allergies   Allergen Reactions     Lisinopril Cough     Milk Products GI Disturbance     Latex Rash        Social History     Tobacco Use     Smoking status: Never     Smokeless tobacco: Never     Tobacco comments:     None   Substance Use Topics     Alcohol use: No       History   Drug Use No           Objective    /63   Pulse 76   Temp 97.7  F (36.5  C)   Resp 18   Ht 1.676 m (5' 6\")   Wt 59.9 kg (132 lb)   LMP  (LMP Unknown)   SpO2 96%   BMI 21.31 kg/m     Estimated body mass index is 21.31 kg/m  as calculated from the following:    Height as of this encounter: 1.676 m (5' 6\").    Weight as of this encounter: 59.9 kg (132 lb).  Physical Exam  GENERAL APPEARANCE:  Alert, elderly, in no distress  HEENT:  atraumatic, [place], EOM intact, moist mucus membranes  RESP:  non-labored breathing, lungs clear on auscultation, no respiratory distress, no cough  CV:  Rate regular, S1 S2 noted, no edema  ABDOMEN:  soft, non-distended, non-tender, bowel sounds active; right nephrostomy tube draining yellow/green output; left nephrostomy tube draining yellow/green output with trace blood noted.  M/S:   wheelchair bound, moves all extremities, strength and tone equal bilaterally, no calf pain, arthritic changes noted in " hands  SKIN:  warm, dry, thin, fragile, no obvious rash, lesions, ulcerations or petechiae   NEURO:   Face is symmetric, examination of sensation by touch normal, follows and tracks, slow speech  PSYCH:  calm, cooperative    Recent Labs   Lab Test 06/11/25  0752 06/09/25  0947 05/27/25  0655 05/20/25  0948 11/19/24  0803   HGB 7.0*  --   --  11.2* 12.9     --   --  357 261   NA  --  139 142 138 141   POTASSIUM  --  4.2 4.8 4.9 4.8   CR  --  1.48* 1.05* 1.13* 0.82   A1C  --   --   --   --  6.8*        Diagnostics  BMP and CBC pending on 6/23  UA/UC ordered  No EKG this visit, completed in the last 90 days.    Revised Cardiac Risk Index (RCRI)  The patient has the following serious cardiovascular risks for perioperative complications:   - Cerebrovascular Disease (TIA or CVA) = 1 point     RCRI Interpretation: 1 point: Class II (low risk - 0.9% complication rate)         Signed Electronically by: KEILA Louis DNP  A copy of this evaluation report is provided to the requesting physician.

## 2025-06-19 NOTE — PROGRESS NOTES
Nasal swab results reviewed:  
Culture result:      Final  
SA target not detected.                                 A MRSA NEGATIVE, SA NEGATIVE test result does not preclude MRSA or SA nasal colonization [facility] GERIATRICS    Chief Complaint   Patient presents with    RECHECK    Pre-Op Exam        HPI: Addis Peña is a 74 year old (1950), who is being seen today for an episodic care visit at: [facility] ([facility]) [[id number]]. HPI information obtained from: facility chart records, facility staff, patient report and [facility] chart review. 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  Patient presented to the ED via EMS from home for evaluation of left-sided weakness.  Initial evaluation with significant MARI (Cr 2.28), leukocytosis WBC 13.2, and anemia with Hgb 9.2.  Initial imaging with no acute infarction; noted findings of chronic right frontal infarct.  Neurocritical care consulted; did not recommend acute MRI given findings of CT.  Empirically started on norepinephrine and admitted to ICU due to concern of infection as she had worsening hypotension. UA with concerns as source of infection; started on Zosyn. UC grew E. Coli and transitioned to ceftriaxone. Transferred out of ICU on 5/11/25. Large staghorn calculus within the left kidney with mild hydronephrosis as well as small calcified renal stones in the right renal pelvis and diffuse proximal ureteral calcification but no hydronephrosis. Urology consulted; bilateral nephrostomy tube placement on 5/16/2025; recommended outpatient follow-up with plan for left percutaneous nephrolithotomy; recommend UA/UC 10 days prior to surgery. Discharged to TCU for medical management, rehab, and nursing care.     5/27: left nephrostomy tube dislodged after repositioning in TCU and was sent to ED. Seen by IR and noted difficulty replacing the tube in existing tract; plan to keep tube out since prior nephrostogram showed non-obstructing collecting system; monitor for clinical signs of urinary tract infection.     6/4: patient had follow-up with urology who recommended  "replacement of dislodged nephrostomy tube; referral placed; scheduled for 6/6 however, IR postponed since Plavix was not held for 5 days. Plan for tube replacement on 6/16.     6/11: Patient sent to ED for low hgb. Downtrending during TCU stay 11.2-> 7.3 -> 7.0. Received one unit PRBC. Discharged back to TCU.    Therapies signed off on 6/9/25. Awaiting plan for LTC placement.    6/16/25: underwent replacement of Left nephrostomy tube.    Today:  Nursing staff reports no acute medical concerns.     Patient is seen today for a visit in her room.  Feels \"fine\".  \"No\" pain.  Appetite is not great; notes, \"I don't eat a lot\"; reports eating half of her meals.  Last BM was \"couple days ago\"; notes this as her normal.  Nephrostomy tubes are draining.  She notes a little blood in the left nephrostomy collection bag.  \"No\" belly pain. Sleeping \"up and down\"; depending on how loud it gets outside her room. Mood is \"good\"; \"no\" depression. Denies CP, palpitations, lightheadedness, dizziness, fatigue, SOB, fever, chills, nausea/vomiting concerns today.      Allergies, and PMH/PSH reviewed in [facility] today.  REVIEW OF SYSTEMS:  4 point ROS including Respiratory, CV, GI and , other than that noted in the HPI,  is negative    Objective:   /63   Pulse 76   Temp 97.7  F (36.5  C)   Resp 18   Ht 1.676 m (5' 6\")   Wt 59.9 kg (132 lb)   LMP  (LMP Unknown)   SpO2 96%   BMI 21.31 kg/m    GENERAL APPEARANCE:  Alert, elderly, in no distress  HEENT:  atraumatic, [place], EOM intact, moist mucus membranes  RESP:  non-labored breathing, lungs clear on auscultation, no respiratory distress, no cough  CV:  Rate regular, S1 S2 noted, no edema  ABDOMEN:  soft, non-distended, non-tender, bowel sounds active; right nephrostomy tube draining yellow/green output; left nephrostomy tube draining yellow/green output with trace blood noted.  M/S:   wheelchair bound, moves all extremities, strength and tone equal bilaterally, no calf pain, " arthritic changes noted in hands  SKIN:  warm, dry, thin, fragile, no obvious rash, lesions, ulcerations or petechiae   NEURO:   Face is symmetric, examination of sensation by touch normal, follows and tracks, slow speech  PSYCH:  calm, cooperative        Labs reviewed as per Epic and/or Care Everywhere.      Assessment/Plan:    Sepsis due to Escherichia coli with acute renal failure and septic shock, unspecified acute renal failure type (H)  Treated with IV abx during hospital stay. Today, no concerns. VSS.  -follow clinically     Nephrolithiasis  Staghorn calculus  S/p bilateral nephrostomy tubes on 5/15. In TCU, left tube dislodged on 5/27; seen by IR in ED, unable to replace tube in existing tract; plan to keep tube out. Patient had follow-up with urology on 6/4 who recommended replacement of dislodged nephrostomy tube; referral placed; scheduled for 6/6. However, IR postponed since Plavix was not held for 5 days. Had left nephrostomy tube replacement on 6/16. Today, no concerns  -continue APAP 500 mg BID PRN  -left percutaneous nephrolithotomy as scheduled on 6/26/25; recommend UA/UC 10 days prior to surgery     Toxic metabolic encephalopathy  Secondary to acute illness. Resolved at hospital discharge. In TCU, no concerns. Seems to have cleared.  -follow clinically     MARI (acute kidney injury)  Baseline Cr ~ 0.8. Discharged from hospital with Cr 1.54. In TCU, last Cr 1.2 (6/11).  -avoid nephrotoxins  -recheck BMP on 6/23    Anemia, unspecified type   Baseline ~11-12. Received 1 unit PRBC in ED on 6/11. Last hgb 7.6 (6/11). Today, no signs of active bleeding.  -monitor bleeding risks  -recheck Hgb on 6/23     History of stroke  Spastic hemiplegia of left nondominant side as late effect of cerebral infarction (H)   Associated left sided weakness/spasm.  -continue baclofen 5 mg 3 times daily  -continue clopidogrel  -continue atorvastatin     Essential hypertension   Amlodipine decreased to 5 mg on 6/9/25 due to  low normal SBPs. Today, //63 mmHg. HR 76-85 bpm.  -continue metoprolol ER 50 mg daily  -continue amlodipine 5 mg daily  -follow BP and HR; adjust medications as needed     Seizure disorder (H)  Secondary to stroke.  -continue Keppra 1000 mg twice daily     Type 2 diabetes, HbA1C goal < 8% (H)  Last A1c 7.1%. In TCU, metformin increased to home dose 1000 mg BID on 5/20.   -continue metformin 1000 mg twice daily  -trend A1c every 6-12 months     Osteoporosis without current pathological fracture, unspecified osteoporosis type  -alendronate 35 mg weekly; consider increasing it to 70 mg weekly when renal function improves     Major depressive disorder, recurrent episode, mild   -continue citalopram 10 mg daily  -monitor mood and behaviors     Uncomplicated asthma, unspecified asthma severity, unspecified whether persistent   Today, O2 sats 96% RA.  -montelukast 10 mg daily  -monitor respiratory status    Acute bacterial conjunctivitis of right eye  Noted in TCU on 5/27; treated with polytrim (end 6/3). Resolved.  -monitor     Physical deconditioning  Impaired mobility and activities of daily living   Secondary to diagnoses above and recent hospitalization. In TCU for rehabilitation. Therapies signed off on 6/9/25. Patient is currently max assist; recommending LTC.  -PT/OT following- adv per recommendations   -SW available for safe discharge planning ongoing      MED REC REQUIRED  Post Medication Reconciliation Status: discharge medications reconciled and changed, per note/orders        45 minutes spent on the date of this encounter doing chart review, review labs, discussion with nursing staff, patient visit, and documentation.       Electronically signed by: Dr. Elda Scott, DNP, APRN, AGNP-BC, PHN    This note was completed with the assistance of dictation software. Typos and word substitution-errors are expected and unintended.

## 2025-06-19 NOTE — LETTER
6/19/2025      Addis Peña  [address]  [place] [address]        [facility] GERIATRICS    Chief Complaint   Patient presents with     RECHECK        HPI: Addis Peña is a 74 year old (1950), who is being seen today for an episodic care visit at: [facility] ([facility]) [[id number]]. HPI information obtained from: facility chart records, facility staff, patient report and [facility] chart review. 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  Patient presented to the ED via EMS from home for evaluation of left-sided weakness.  Initial evaluation with significant MARI (Cr 2.28), leukocytosis WBC 13.2, and anemia with Hgb 9.2.  Initial imaging with no acute infarction; noted findings of chronic right frontal infarct.  Neurocritical care consulted; did not recommend acute MRI given findings of CT.  Empirically started on norepinephrine and admitted to ICU due to concern of infection as she had worsening hypotension. UA with concerns as source of infection; started on Zosyn. UC grew E. Coli and transitioned to ceftriaxone. Transferred out of ICU on 5/11/25. Large staghorn calculus within the left kidney with mild hydronephrosis as well as small calcified renal stones in the right renal pelvis and diffuse proximal ureteral calcification but no hydronephrosis. Urology consulted; bilateral nephrostomy tube placement on 5/16/2025; recommended outpatient follow-up with plan for left percutaneous nephrolithotomy; recommend UA/UC 10 days prior to surgery. Discharged to TCU for medical management, rehab, and nursing care.     5/27: left nephrostomy tube dislodged after repositioning in TCU and was sent to ED. Seen by IR and noted difficulty replacing the tube in existing tract; plan to keep tube out since prior nephrostogram showed non-obstructing collecting system; monitor for clinical signs of urinary  "tract infection.     6/4: patient had follow-up with urology who recommended replacement of dislodged nephrostomy tube; referral placed; scheduled for 6/6 however, IR postponed since Plavix was not held for 5 days. Plan for tube replacement on 6/16.     6/11: Patient sent to ED for low hgb. Downtrending during TCU stay 11.2-> 7.3 -> 7.0. Received one unit PRBC. Discharged back to TCU.    Therapies signed off on 6/9/25. Awaiting plan for LTC placement.    6/16/25: underwent replacement of Left nephrostomy tube.    Today:  Nursing staff reports no acute medical concerns.     Patient is seen today for a visit in her room.  Feels \"fine\".  \"No\" pain.  Appetite is not great; notes, \"I don't eat a lot\"; reports eating half of her meals.  Last BM was \"couple days ago\"; notes this as her normal.  Nephrostomy tubes are draining.  She notes a little blood in the left nephrostomy collection bag.  \"No\" belly pain. Sleeping \"up and down\"; depending on how loud it gets outside her room. Mood is \"good\"; \"no\" depression. Denies CP, palpitations, lightheadedness, dizziness, fatigue, SOB, fever, chills, nausea/vomiting concerns today.      Allergies, and PMH/PSH reviewed in [facility] today.  REVIEW OF SYSTEMS:  4 point ROS including Respiratory, CV, GI and , other than that noted in the HPI,  is negative    Objective:   /63   Pulse 76   Temp 97.7  F (36.5  C)   Resp 18   Ht 1.676 m (5' 6\")   Wt 59.9 kg (132 lb)   LMP  (LMP Unknown)   SpO2 96%   BMI 21.31 kg/m    GENERAL APPEARANCE:  Alert, elderly, in no distress  HEENT:  atraumatic, [place], EOM intact, moist mucus membranes  RESP:  non-labored breathing, lungs clear on auscultation, no respiratory distress, no cough  CV:  Rate regular, S1 S2 noted, no edema  ABDOMEN:  soft, non-distended, non-tender, bowel sounds active; right nephrostomy tube draining yellow/green output; left nephrostomy tube draining yellow/green output with trace blood noted.  M/S:   wheelchair bound, " moves all extremities, strength and tone equal bilaterally, no calf pain, arthritic changes noted in hands  SKIN:  warm, dry, thin, fragile, no obvious rash, lesions, ulcerations or petechiae   NEURO:   Face is symmetric, examination of sensation by touch normal, follows and tracks, slow speech  PSYCH:  calm, cooperative        Labs reviewed as per Epic and/or Care Everywhere.      Assessment/Plan:    Sepsis due to Escherichia coli with acute renal failure and septic shock, unspecified acute renal failure type (H)  Treated with IV abx during hospital stay. Today, no concerns. VSS.  -follow clinically     Nephrolithiasis  Staghorn calculus  S/p bilateral nephrostomy tubes on 5/15. In TCU, left tube dislodged on 5/27; seen by IR in ED, unable to replace tube in existing tract; plan to keep tube out. Patient had follow-up with urology on 6/4 who recommended replacement of dislodged nephrostomy tube; referral placed; scheduled for 6/6. However, IR postponed since Plavix was not held for 5 days. Had left nephrostomy tube replacement on 6/16. Today, no concerns  -continue APAP 500 mg BID PRN  -left percutaneous nephrolithotomy as scheduled on 6/26/25; recommend UA/UC 10 days prior to surgery     Toxic metabolic encephalopathy  Secondary to acute illness. Resolved at hospital discharge. In TCU, no concerns. Seems to have cleared.  -follow clinically     MARI (acute kidney injury)  Baseline Cr ~ 0.8. Discharged from hospital with Cr 1.54. In TCU, last Cr 1.2 (6/11).  -avoid nephrotoxins  -recheck BMP on 6/23    Anemia, unspecified type   Baseline ~11-12. Received 1 unit PRBC in ED on 6/11. Last hgb 7.6 (6/11). Today, no signs of active bleeding.  -monitor bleeding risks  -recheck Hgb on 6/23     History of stroke  Spastic hemiplegia of left nondominant side as late effect of cerebral infarction (H)   Associated left sided weakness/spasm.  -continue baclofen 5 mg 3 times daily  -continue clopidogrel  -continue atorvastatin      Essential hypertension   Amlodipine decreased to 5 mg on 6/9/25 due to low normal SBPs. Today, //63 mmHg. HR 76-85 bpm.  -continue metoprolol ER 50 mg daily  -continue amlodipine 5 mg daily  -follow BP and HR; adjust medications as needed     Seizure disorder (H)  Secondary to stroke.  -continue Keppra 1000 mg twice daily     Type 2 diabetes, HbA1C goal < 8% (H)  Last A1c 7.1%. In TCU, metformin increased to home dose 1000 mg BID on 5/20.   -continue metformin 1000 mg twice daily  -trend A1c every 6-12 months     Osteoporosis without current pathological fracture, unspecified osteoporosis type  -alendronate 35 mg weekly; consider increasing it to 70 mg weekly when renal function improves     Major depressive disorder, recurrent episode, mild   -continue citalopram 10 mg daily  -monitor mood and behaviors     Uncomplicated asthma, unspecified asthma severity, unspecified whether persistent   Today, O2 sats 96% RA.  -montelukast 10 mg daily  -monitor respiratory status    Acute bacterial conjunctivitis of right eye  Noted in TCU on 5/27; treated with polytrim (end 6/3). Resolved.  -monitor     Physical deconditioning  Impaired mobility and activities of daily living   Secondary to diagnoses above and recent hospitalization. In TCU for rehabilitation. Therapies signed off on 6/9/25. Patient is currently max assist; recommending LTC.  -PT/OT following- adv per recommendations   -SW available for safe discharge planning ongoing      MED REC REQUIRED  Post Medication Reconciliation Status: discharge medications reconciled and changed, per note/orders        45 minutes spent on the date of this encounter doing chart review, review labs, discussion with nursing staff, patient visit, and documentation.       Electronically signed by: Dr. Elda Scott, DNP, APRN, AGNP-BC, PHN    This note was completed with the assistance of dictation software. Typos and word substitution-errors are expected and  unintended.          Preoperative Evaluation  [facility] GERIATRICS  [address] [place] [address]  Phone: [phone]  Fax: [phone]  Primary Provider: Joel Daniel Wegener, MD  Pre-op Performing Provider: KEILA Louis [facility]  Jun 19, 2025     Surgical information  Surgery/procedure: PCNL  Surgery location: [facility]  Surgeon: Dr. Aguirre  Surgery date: 6/26/25  Time of surgery: 0700  Where patient plans to recover: [facility]    Fax number for surgical facility: Note does not need to be faxed, will be available electronically in Epic.    Nephrolithiasis  Staghorn calculus  S/p bilateral nephrostomy tubes on 5/15. In TCU, left tube dislodged on 5/27; seen by IR in ED, unable to replace tube in existing tract; plan to keep tube out. Patient had follow-up with urology on 6/4 who recommended replacement of dislodged nephrostomy tube; referral placed; scheduled for 6/6. However, IR postponed since Plavix was not held for 5 days. Underwent tube replacement on 6/16.  -plan for left percutaneous nephrolithotomy (scheduled 6/26/25)  -order UA/UC as recommended     Toxic metabolic encephalopathy  Secondary to acute illness. Resolved at hospital discharge. In TCU, no concerns. Seems to have cleared.  -follow clinically     History of stroke  Spastic hemiplegia of left nondominant side as late effect of cerebral infarction (H)   Associated left sided weakness/spasm.  -continue baclofen 5 mg 3 times daily  -HOLD clopidogrel on 6/21 until after surgery  -continue atorvastatin     Essential hypertension   Controlled.  -continue metoprolol and amlodipine     Seizure disorder (H)  Secondary to stroke.  -continue Keppra 1000 mg twice daily    MARI (acute kidney injury)  Baseline Cr ~ 0.8. Discharged from hospital with Cr 1.54. In TCU, last Cr 1.2 (6/11).  -avoid nephrotoxins  -recheck BMP on 6/23     Type 2 diabetes, HbA1C goal < 8% (H)  Last A1c 7.1%.  -HOLD metformin on day of surgery      Anemia, unspecified type   Baseline ~11-12. Received 1 unit PRBC 6/11. Last Hgb 7.6 (6.11).  -recheck CBC on 6/23     Osteoporosis without current pathological fracture, unspecified osteoporosis type  -HOLD vitamins (Vitamin D and Vit B12 injection) now until after surgery     Major depressive disorder, recurrent episode, mild   -continue citalopram 10 mg daily     Uncomplicated asthma, unspecified asthma severity, unspecified whether persistent   Today, O2 sats 96 % RA.  -montelukast 10 mg daily  -monitor respiratory status    Subjective  Addis is a 74 year old, presenting for the following:  RECHECK and Pre-Op Exam        HPI: Addis Peña is a 74 year old (1950) with Large staghorn calculus within the left kidney with mild hydronephrosis as well as small calcified renal stones in the right renal pelvis and diffuse proximal ureteral calcification but no hydronephrosis. Urology consulted; bilateral nephrostomy tube placement on 5/16/2025; recommended outpatient follow-up with plan for left percutaneous nephrolithotomy.       Advance Care Planning    Document on file is a Health Care Directive or POLST.    Preoperative Review of    reviewed - no record of controlled substances prescribed.      Patient Active Problem List    Diagnosis Date Noted     Osteoporosis without current pathological fracture, unspecified osteoporosis type 01/03/2024     Priority: Medium     Major depressive disorder, recurrent episode, mild 01/08/2022     Priority: Medium     Type 2 diabetes mellitus with other neurologic complication, with long-term current use of insulin (H) 12/30/2021     Priority: Medium     Essential hypertension 12/30/2021     Priority: Medium     Cerebrovascular accident (CVA) due to occlusion of right anterior cerebral artery (H) 12/30/2021     Priority: Medium     Spastic hemiplegia of left nondominant side as late effect of cerebral infarction (H) 12/20/2021     Priority: Medium     Seizure  disorder (H) 04/20/2021     Priority: Medium     Type 2 diabetes mellitus with stage 3b chronic kidney disease, with long-term current use of insulin (H) 04/20/2021     Priority: Medium     Closed left hip fracture (H) 02/02/2020     Priority: Medium     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  No history of GITA 2 or greater found in epic.   No further cervical cancer screening recommended.    updated.              Type 2 diabetes mellitus with stage 3 chronic kidney disease, without long-term current use of insulin (H) 01/06/2017     Priority: Medium     Altered mental status 12/23/2015     Priority: Medium     MARI (acute kidney injury) 12/23/2015     Priority: Medium     Type 2 diabetes with stage 3 chronic kidney disease GFR 30-59 (H) 10/23/2015     Priority: Medium     Gout 01/28/2014     Priority: Medium     Problem list name updated by automated process. Provider to review       Hyperlipidemia LDL goal <70 01/20/2014     Priority: Medium     B12 deficiency anemia 06/25/2013     Priority: Medium     Major depression in complete remission (H) 07/06/2012     Priority: Medium     History of colonic polyps 04/13/2012     Priority: Medium     Problem list name updated by automated process. Provider to review       Mild major depression (H) 10/07/2011     Priority: Medium     GERD (gastroesophageal reflux disease) 10/07/2011     Priority: Medium     Seasonal allergic rhinitis 10/07/2011     Priority: Medium     Cerebral artery occlusion with cerebral infarction (H) 09/13/2011     Priority: Medium     Problem list name updated by  automated process. Provider to review       Rosacea 02/09/2011     Priority: Medium     Hypertension goal BP (blood pressure) < 140/90      Priority: Medium     Type 2 diabetes, HbA1C goal < 8% (H)      Priority: Medium     Cerebrovascular accident (CVA) due to thrombosis of right anterior cerebral artery (H)      Priority: Medium     Untreated HTN (NUJs152i) and DM (hgba1c 11.4)  Right Anterior cerebral artery occlusion/distribution.  Angiogram possible arteritis.  Echo NL.  Hypercoag w.u normal  Vasculitis w.u. NL        Past Medical History:   Diagnosis Date     Allergic rhinitis      Allergic rhinitis      CKD (chronic kidney disease)      Depression      Depression      Displaced dome fracture of left acetabulum (H)      DM2 (diabetes mellitus, type 2) (H)      GERD (gastroesophageal reflux disease)      GERD (gastroesophageal reflux disease)      Gout      HTN (hypertension)      Hyperlipidaemia LDL goal <100      Hypertension goal BP (blood pressure) < 140/90      Left hemiplegia (H) 01/2010    sees PMR physician     Left hemiplegia (H)      Major depression in complete remission (H) 07/06/2012     Migraine      Migraine      Mild major depression (H) 10/07/2011     Mild nonproliferative diabetic retinopathy of both eyes (H) 02/2011     Mild nonproliferative diabetic retinopathy of both eyes (H)      Nephrolithiasis      Nephrolithiasis      Seizure disorder (H)      Spastic hemiplegia of left nondominant side as late effect of cerebral infarction (H) 12/20/2021     Stroke (H) 01/2010    due to uncontrolled hypertension     Stroke (H)      Type 2 diabetes, HbA1C goal < 8% (H)      Vitamin B12 deficiency anemia      Past Surgical History:   Procedure Laterality Date     COLONOSCOPY       COLONOSCOPY N/A 5/9/2019    Procedure: COLONOSCOPY, WITH POLYPECTOMY AND BIOPSY;  Surgeon: Na Diehl MD;  Location: [facility] OR     COLONOSCOPY W/ BIOPSIES AND POLYPECTOMY  05/09/2019    Procedure: COLONOSCOPY,  WITH POLYPECTOMY AND BIOPSY; Surgeon: Na Diehl MD; Location: [facility] OR       colonscopy  3/2012    repeat 1 to 3 y     CYSTOSCOPY W/ URETEROSCOPY Right 02/22/2016    Procedure: COMBINED CYSTOSCOPY, URETEROSCOPY; Surgeon: Madelaine Roland MD; Location: [facility] OR       CYSTOSCOPY, URETEROSCOPY, COMBINED Right 2/22/2016    Procedure: COMBINED CYSTOSCOPY, URETEROSCOPY;  Surgeon: Madelaine Roland MD;  Location: [facility] OR     ORIF ACETABULUM FRACTURE Left      Current Outpatient Medications   Medication Sig Dispense Refill     acetaminophen (TYLENOL) 500 MG tablet Take 500 mg by mouth 2 times daily as needed. AND give 1 tab PO daily PRN for pain rated 2-5/10 and 2 tabs PO daily PRN for pain rated 6-10/10       alendronate (FOSAMAX) 35 MG tablet Take 1 tablet (35 mg) by mouth every 7 days. 4 tablet 5     amLODIPine (NORVASC) 10 MG tablet Take 0.5 tablets (5 mg) by mouth daily. 90 tablet 3     atorvastatin (LIPITOR) 40 MG tablet Take 1 tablet (40 mg) by mouth daily. 90 tablet 3     baclofen (LIORESAL) 10 MG tablet Take 5 mg by mouth 3 times daily.       baclofen (LIORESAL) 10 MG tablet TAKE 1 TABLET(10 MG) BY MOUTH THREE TIMES DAILY 270 tablet 3     bisacodyl (DULCOLAX) 5 MG EC tablet Take 5 mg by mouth daily.       bisacodyl (DULCOLAX) 5 MG EC tablet Take 2 tablets (10 mg) by mouth daily as needed for constipation 180 tablet 3     blood glucose (CONTOUR TEST) test strip USE TO TEST BLOOD GLUCOSE ONCE DAILY 100 strip 2     citalopram (CELEXA) 10 MG tablet Take 1 tablet (10 mg) by mouth daily. 90 tablet 3     clopidogrel (PLAVIX) 75 MG tablet Take 1 tablet (75 mg) by mouth daily. 90 tablet 1     cyanocobalamin (CYANOCOBALAMIN) 1000 MCG/ML injection Inject 1 mL into the muscle every 30 days       Lactase 4500 units TABS Take 13,500 Units by mouth daily as needed       levETIRAcetam (KEPPRA) 250 MG tablet Take 1,000 mg by mouth 2 times daily.       metFORMIN (GLUCOPHAGE) 1000 MG tablet TAKE 1  "TABLET(1000 MG) BY MOUTH TWICE DAILY WITH MEALS 180 tablet 3     metoprolol succinate ER (TOPROL XL) 50 MG 24 hr tablet Take 1 tablet (50 mg) by mouth daily. 90 tablet 3     montelukast (SINGULAIR) 10 MG tablet Take 1 tablet (10 mg) by mouth daily. 90 tablet 3     polyvinyl alcohol (LIQUIFILM TEARS) 1.4 % ophthalmic solution Place 1 drop into both eyes daily.       senna-docusate (SENOKOT-S/PERICOLACE) 8.6-50 MG tablet Take 1 tablet by mouth daily       sodium chloride 0.9% infusion Use 10 ml intravenously one time a  day for Drain patency - Continue flushing each  tube daily until urine is clear and yellow, then can  discontinue       Vitamin D3 (VITAMIN D3) 25 mcg (1000 units) tablet Take 1 tablet (25 mcg) by mouth daily 90 tablet 3       Allergies   Allergen Reactions     Lisinopril Cough     Milk Products GI Disturbance     Latex Rash        Social History     Tobacco Use     Smoking status: Never     Smokeless tobacco: Never     Tobacco comments:     None   Substance Use Topics     Alcohol use: No       History   Drug Use No           Objective   /63   Pulse 76   Temp 97.7  F (36.5  C)   Resp 18   Ht 1.676 m (5' 6\")   Wt 59.9 kg (132 lb)   LMP  (LMP Unknown)   SpO2 96%   BMI 21.31 kg/m     Estimated body mass index is 21.31 kg/m  as calculated from the following:    Height as of this encounter: 1.676 m (5' 6\").    Weight as of this encounter: 59.9 kg (132 lb).  Physical Exam  GENERAL APPEARANCE:  Alert, elderly, in no distress  HEENT:  atraumatic, [place], EOM intact, moist mucus membranes  RESP:  non-labored breathing, lungs clear on auscultation, no respiratory distress, no cough  CV:  Rate regular, S1 S2 noted, no edema  ABDOMEN:  soft, non-distended, non-tender, bowel sounds active; right nephrostomy tube draining yellow/green output; left nephrostomy tube draining yellow/green output with trace blood noted.  M/S:   wheelchair bound, moves all extremities, strength and tone equal bilaterally, no " calf pain, arthritic changes noted in hands  SKIN:  warm, dry, thin, fragile, no obvious rash, lesions, ulcerations or petechiae   NEURO:   Face is symmetric, examination of sensation by touch normal, follows and tracks, slow speech  PSYCH:  calm, cooperative    Recent Labs   Lab Test 06/11/25  0752 06/09/25  0947 05/27/25  0655 05/20/25  0948 11/19/24  0803   HGB 7.0*  --   --  11.2* 12.9     --   --  357 261   NA  --  139 142 138 141   POTASSIUM  --  4.2 4.8 4.9 4.8   CR  --  1.48* 1.05* 1.13* 0.82   A1C  --   --   --   --  6.8*        Diagnostics  BMP and CBC pending on 6/23  UA/UC ordered  No EKG this visit, completed in the last 90 days.    Revised Cardiac Risk Index (RCRI)  The patient has the following serious cardiovascular risks for perioperative complications:   - Cerebrovascular Disease (TIA or CVA) = 1 point     RCRI Interpretation: 1 point: Class II (low risk - 0.9% complication rate)         Signed Electronically by: KEILA Louis DNP  A copy of this evaluation report is provided to the requesting physician.        Sincerely,        KEILA Louis CNP    Electronically signed

## 2025-06-20 ENCOUNTER — MYC MEDICAL ADVICE (OUTPATIENT)
Dept: FAMILY MEDICINE | Facility: CLINIC | Age: 75
End: 2025-06-20
Payer: MEDICARE

## 2025-06-20 NOTE — TELEPHONE ENCOUNTER
JW,    Per chart review appears Dr. Francheska Looney was her TCU provider at [facility]  05/16/25 discharge note from TCU MD states renal dosing of Baclofen 5mg TID- changed from 10mg TID  Could advise visit with patient to discuss change in dosing?  Please advise    Thanks,  Falguni HOFF RN

## 2025-06-21 ENCOUNTER — HEALTH MAINTENANCE LETTER (OUTPATIENT)
Age: 75
End: 2025-06-21

## 2025-06-21 LAB — BACTERIA UR CULT: NO GROWTH

## 2025-06-23 ENCOUNTER — TRANSITIONAL CARE UNIT VISIT (OUTPATIENT)
Dept: GERIATRICS | Facility: CLINIC | Age: 75
End: 2025-06-23
Payer: MEDICARE

## 2025-06-23 ENCOUNTER — RESULTS FOLLOW-UP (OUTPATIENT)
Dept: GERIATRICS | Facility: CLINIC | Age: 75
End: 2025-06-23

## 2025-06-23 VITALS
HEART RATE: 76 BPM | OXYGEN SATURATION: 96 % | TEMPERATURE: 96.9 F | WEIGHT: 132 LBS | SYSTOLIC BLOOD PRESSURE: 113 MMHG | BODY MASS INDEX: 21.21 KG/M2 | HEIGHT: 66 IN | DIASTOLIC BLOOD PRESSURE: 85 MMHG | RESPIRATION RATE: 16 BRPM

## 2025-06-23 DIAGNOSIS — N20.0 NEPHROLITHIASIS: ICD-10-CM

## 2025-06-23 DIAGNOSIS — D64.9 ANEMIA, UNSPECIFIED TYPE: ICD-10-CM

## 2025-06-23 DIAGNOSIS — N17.9 SEPSIS DUE TO ESCHERICHIA COLI WITH ACUTE RENAL FAILURE AND SEPTIC SHOCK, UNSPECIFIED ACUTE RENAL FAILURE TYPE (H): Primary | ICD-10-CM

## 2025-06-23 DIAGNOSIS — G40.909 SEIZURE DISORDER (H): ICD-10-CM

## 2025-06-23 DIAGNOSIS — I10 ESSENTIAL HYPERTENSION: ICD-10-CM

## 2025-06-23 DIAGNOSIS — Z74.09 IMPAIRED MOBILITY AND ACTIVITIES OF DAILY LIVING: ICD-10-CM

## 2025-06-23 DIAGNOSIS — R53.81 PHYSICAL DECONDITIONING: ICD-10-CM

## 2025-06-23 DIAGNOSIS — M81.0 OSTEOPOROSIS WITHOUT CURRENT PATHOLOGICAL FRACTURE, UNSPECIFIED OSTEOPOROSIS TYPE: ICD-10-CM

## 2025-06-23 DIAGNOSIS — Z86.73 HISTORY OF STROKE: ICD-10-CM

## 2025-06-23 DIAGNOSIS — A41.51 SEPSIS DUE TO ESCHERICHIA COLI WITH ACUTE RENAL FAILURE AND SEPTIC SHOCK, UNSPECIFIED ACUTE RENAL FAILURE TYPE (H): Primary | ICD-10-CM

## 2025-06-23 DIAGNOSIS — Z78.9 IMPAIRED MOBILITY AND ACTIVITIES OF DAILY LIVING: ICD-10-CM

## 2025-06-23 DIAGNOSIS — R65.21 SEPSIS DUE TO ESCHERICHIA COLI WITH ACUTE RENAL FAILURE AND SEPTIC SHOCK, UNSPECIFIED ACUTE RENAL FAILURE TYPE (H): Primary | ICD-10-CM

## 2025-06-23 DIAGNOSIS — J45.909 UNCOMPLICATED ASTHMA, UNSPECIFIED ASTHMA SEVERITY, UNSPECIFIED WHETHER PERSISTENT: ICD-10-CM

## 2025-06-23 DIAGNOSIS — I69.354 SPASTIC HEMIPLEGIA OF LEFT NONDOMINANT SIDE AS LATE EFFECT OF CEREBRAL INFARCTION (H): ICD-10-CM

## 2025-06-23 DIAGNOSIS — E11.9 TYPE 2 DIABETES, HBA1C GOAL < 8% (H): ICD-10-CM

## 2025-06-23 DIAGNOSIS — F33.0 MAJOR DEPRESSIVE DISORDER, RECURRENT EPISODE, MILD: ICD-10-CM

## 2025-06-23 DIAGNOSIS — N17.9 AKI (ACUTE KIDNEY INJURY): ICD-10-CM

## 2025-06-23 LAB
ANION GAP SERPL CALCULATED.3IONS-SCNC: 12 MMOL/L (ref 7–15)
BUN SERPL-MCNC: 29.7 MG/DL (ref 8–23)
CALCIUM SERPL-MCNC: 9.6 MG/DL (ref 8.8–10.4)
CHLORIDE SERPL-SCNC: 106 MMOL/L (ref 98–107)
CREAT SERPL-MCNC: 1.21 MG/DL (ref 0.51–0.95)
EGFRCR SERPLBLD CKD-EPI 2021: 47 ML/MIN/1.73M2
ERYTHROCYTE [DISTWIDTH] IN BLOOD BY AUTOMATED COUNT: 14.6 % (ref 10–15)
GLUCOSE SERPL-MCNC: 95 MG/DL (ref 70–99)
HCO3 SERPL-SCNC: 23 MMOL/L (ref 22–29)
HCT VFR BLD AUTO: 30.9 % (ref 35–47)
HGB BLD-MCNC: 9.4 G/DL (ref 11.7–15.7)
MCH RBC QN AUTO: 29.2 PG (ref 26.5–33)
MCHC RBC AUTO-ENTMCNC: 30.4 G/DL (ref 31.5–36.5)
MCV RBC AUTO: 96 FL (ref 78–100)
PLATELET # BLD AUTO: 365 10E3/UL (ref 150–450)
POTASSIUM SERPL-SCNC: 4.4 MMOL/L (ref 3.4–5.3)
RBC # BLD AUTO: 3.22 10E6/UL (ref 3.8–5.2)
SODIUM SERPL-SCNC: 141 MMOL/L (ref 135–145)
WBC # BLD AUTO: 6.5 10E3/UL (ref 4–11)

## 2025-06-23 PROCEDURE — 80048 BASIC METABOLIC PNL TOTAL CA: CPT | Mod: ORL | Performed by: FAMILY MEDICINE

## 2025-06-23 PROCEDURE — P9604 ONE-WAY ALLOW PRORATED TRIP: HCPCS | Mod: ORL | Performed by: FAMILY MEDICINE

## 2025-06-23 PROCEDURE — 85027 COMPLETE CBC AUTOMATED: CPT | Mod: ORL | Performed by: FAMILY MEDICINE

## 2025-06-23 PROCEDURE — 36415 COLL VENOUS BLD VENIPUNCTURE: CPT | Mod: ORL | Performed by: FAMILY MEDICINE

## 2025-06-23 NOTE — PROGRESS NOTES
[facility] GERIATRICS    Chief Complaint   Patient presents with    RECHECK        HPI: Addis Peña is a 74 year old (1950), who is being seen today for an episodic care visit at: [facility] ([facility]) [[id number]]. HPI information obtained from: facility chart records, facility staff, patient report and [facility] chart review. 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  Patient presented to the ED via EMS from home for evaluation of left-sided weakness.  Initial evaluation with significant MARI (Cr 2.28), leukocytosis WBC 13.2, and anemia with Hgb 9.2.  Initial imaging with no acute infarction; noted findings of chronic right frontal infarct.  Neurocritical care consulted; did not recommend acute MRI given findings of CT.  Empirically started on norepinephrine and admitted to ICU due to concern of infection as she had worsening hypotension. UA with concerns as source of infection; started on Zosyn. UC grew E. Coli and transitioned to ceftriaxone. Transferred out of ICU on 5/11/25. Large staghorn calculus within the left kidney with mild hydronephrosis as well as small calcified renal stones in the right renal pelvis and diffuse proximal ureteral calcification but no hydronephrosis. Urology consulted; bilateral nephrostomy tube placement on 5/16/2025; recommended outpatient follow-up with plan for left percutaneous nephrolithotomy; recommend UA/UC 10 days prior to surgery. Discharged to TCU for medical management, rehab, and nursing care.     5/27: left nephrostomy tube dislodged after repositioning in TCU and was sent to ED. Seen by IR and noted difficulty replacing the tube in existing tract; plan to keep tube out since prior nephrostogram showed non-obstructing collecting system; monitor for clinical signs of urinary tract infection.     6/4: patient had follow-up with urology who recommended replacement of  "dislodged nephrostomy tube; referral placed; scheduled for 6/6 however, IR postponed since Plavix was not held for 5 days. Plan for tube replacement on 6/16.     6/11: Patient sent to ED for low hgb. Downtrending during TCU stay 11.2-> 7.3 -> 7.0. Received one unit PRBC. Discharged back to TCU.    Therapies signed off on 6/9/25. Awaiting plan for LTC placement.    6/16/25: underwent replacement of Left nephrostomy tube.    Today:  Nursing staff reports no acute medical concerns.     Patient is seen today for a visit in her room. She is sleeping in bed. Feels \"fine\". Her back feels \"fine'\". \"No\" pain anywhere. When asked if she needs to increase her baclofen; she states, \"I don't think so\". Denies any concerns with spasms.  Appetite is \"good\". Sleeping \"good\". Mood is \"okay\"; \"no\" depression or SI. Denies CP, palpitations, lightheadedness, dizziness, fatigue, SOB, fever, chills, nausea/vomiting, bladder or bowel concerns today.        Allergies, and PMH/PSH reviewed in EPIC today.  REVIEW OF SYSTEMS:  4 point ROS including Respiratory, CV, GI and , other than that noted in the HPI,  is negative    Objective:   /85   Pulse 76   Temp 96.9  F (36.1  C)   Resp 16   Ht 1.676 m (5' 6\")   Wt 59.9 kg (132 lb)   LMP  (LMP Unknown)   SpO2 96%   BMI 21.31 kg/m    GENERAL APPEARANCE:  Alert, elderly, in no distress, laying in bed  HEENT:  atraumatic, [place], EOM intact, moist mucus membranes  RESP:  non-labored breathing, lungs clear on auscultation, no respiratory distress, no cough  CV:  Rate regular, S1 S2 noted, no edema  ABDOMEN:  soft, non-distended, non-tender, bowel sounds active  M/S:   wheelchair bound, moves all extremities, strength and tone equal bilaterally, no calf pain, arthritic changes noted in hands  SKIN:  warm, dry, thin, fragile, no obvious rash, lesions, ulcerations or petechiae   NEURO:   Face is symmetric, examination of sensation by touch normal, follows and tracks, slow speech  PSYCH:  " calm, cooperative          Labs reviewed as per [facility] and/or Care Everywhere.      Assessment/Plan:    Sepsis due to Escherichia coli with acute renal failure and septic shock, unspecified acute renal failure type (H)  Treated with IV abx during hospital stay. Today, no concerns. VSS.  -follow clinically     Nephrolithiasis  Staghorn calculus  S/p bilateral nephrostomy tubes on 5/15. In TCU, left tube dislodged on 5/27; seen by IR in ED, unable to replace tube in existing tract; plan to keep tube out. Patient had follow-up with urology on 6/4 who recommended replacement of dislodged nephrostomy tube; referral placed; scheduled for 6/6. However, IR postponed since Plavix was not held for 5 days. Had left nephrostomy tube replacement on 6/16. Today, no concerns. UA/UC obtained on 6/19 with no concerns.   -continue APAP 500 mg BID PRN  -left percutaneous nephrolithotomy as scheduled on 6/26/25     Toxic metabolic encephalopathy  Secondary to acute illness. Resolved at hospital discharge. In TCU, no concerns. Seems to have cleared.  -follow clinically     MARI (acute kidney injury)  Baseline Cr ~ 0.8. Discharged from hospital with Cr 1.54. In TCU, Cr 1.21 (6/23.)  -avoid nephrotoxins  -trend labs periodically    Anemia, unspecified type   Baseline ~11-12. Received 1 unit PRBC in ED on 6/11. Last hgb 9.4 (6/23. Today, no signs of active bleeding.  -monitor bleeding risks     History of stroke  Spastic hemiplegia of left nondominant side as late effect of cerebral infarction (H)   Associated left sided weakness/spasm. Today, patient declined need to increase baclofen.  -continue baclofen 5 mg 3 times daily  -continue clopidogrel  -continue atorvastatin     Essential hypertension   Amlodipine decreased to 5 mg on 6/9/25 due to low normal SBPs. Today, //70 mmHg. HR 70-85 bpm.  -continue metoprolol ER 50 mg daily  -continue amlodipine 5 mg daily  -follow BP and HR; adjust medications as needed     Seizure disorder  (H)  Secondary to stroke.  -continue Keppra 1000 mg twice daily     Type 2 diabetes, HbA1C goal < 8% (H)  Last A1c 7.1%. In TCU, metformin increased to home dose 1000 mg BID on 5/20.   -continue metformin 1000 mg twice daily  -trend A1c every 6-12 months     Osteoporosis without current pathological fracture, unspecified osteoporosis type  -alendronate 35 mg weekly; consider increasing it to 70 mg weekly when renal function improves     Major depressive disorder, recurrent episode, mild   -continue citalopram 10 mg daily  -monitor mood and behaviors     Uncomplicated asthma, unspecified asthma severity, unspecified whether persistent   Today, O2 sats 96 % RA.  -montelukast 10 mg daily  -monitor respiratory status    Acute bacterial conjunctivitis of right eye  Noted in TCU on 5/27; treated with polytrim (end 6/3). Resolved.  -monitor     Physical deconditioning  Impaired mobility and activities of daily living   Secondary to diagnoses above and recent hospitalization. In TCU for rehabilitation. Therapies signed off on 6/9/25. Patient is currently max assist; recommending LTC.  -PT/OT following- adv per recommendations   -SW available for safe discharge planning ongoing      MED REC REQUIRED  Post Medication Reconciliation Status: discharge medications reconciled and changed, per note/orders        35 minutes spent on the date of this encounter doing chart review, review labs, discussion with nursing staff, patient visit, and documentation.       Electronically signed by: Dr. Elda Scott, DNP, APRN, AGNP-BC, PHN    This note was completed with the assistance of dictation software. Typos and word substitution-errors are expected and unintended.

## 2025-06-23 NOTE — LETTER
6/23/2025      Addis Peña  [address]  [place] [address]        [facility] GERIATRICS    Chief Complaint   Patient presents with     RECHECK        HPI: Addis Peña is a 74 year old (1950), who is being seen today for an episodic care visit at: [facility] ([facility]) [[id number]]. HPI information obtained from: facility chart records, facility staff, patient report and [facility] chart review. 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  Patient presented to the ED via EMS from home for evaluation of left-sided weakness.  Initial evaluation with significant AMRI (Cr 2.28), leukocytosis WBC 13.2, and anemia with Hgb 9.2.  Initial imaging with no acute infarction; noted findings of chronic right frontal infarct.  Neurocritical care consulted; did not recommend acute MRI given findings of CT.  Empirically started on norepinephrine and admitted to ICU due to concern of infection as she had worsening hypotension. UA with concerns as source of infection; started on Zosyn. UC grew E. Coli and transitioned to ceftriaxone. Transferred out of ICU on 5/11/25. Large staghorn calculus within the left kidney with mild hydronephrosis as well as small calcified renal stones in the right renal pelvis and diffuse proximal ureteral calcification but no hydronephrosis. Urology consulted; bilateral nephrostomy tube placement on 5/16/2025; recommended outpatient follow-up with plan for left percutaneous nephrolithotomy; recommend UA/UC 10 days prior to surgery. Discharged to TCU for medical management, rehab, and nursing care.     5/27: left nephrostomy tube dislodged after repositioning in TCU and was sent to ED. Seen by IR and noted difficulty replacing the tube in existing tract; plan to keep tube out since prior nephrostogram showed non-obstructing collecting system; monitor for clinical signs of urinary  "tract infection.     6/4: patient had follow-up with urology who recommended replacement of dislodged nephrostomy tube; referral placed; scheduled for 6/6 however, IR postponed since Plavix was not held for 5 days. Plan for tube replacement on 6/16.     6/11: Patient sent to ED for low hgb. Downtrending during TCU stay 11.2-> 7.3 -> 7.0. Received one unit PRBC. Discharged back to TCU.    Therapies signed off on 6/9/25. Awaiting plan for LTC placement.    6/16/25: underwent replacement of Left nephrostomy tube.    Today:  Nursing staff reports no acute medical concerns.     Patient is seen today for a visit in her room. She is sleeping in bed. Feels \"fine\". Her back feels \"fine'\". \"No\" pain anywhere. When asked if she needs to increase her baclofen; she states, \"I don't think so\". Denies any concerns with spasms.  Appetite is \"good\". Sleeping \"good\". Mood is \"okay\"; \"no\" depression or SI. Denies CP, palpitations, lightheadedness, dizziness, fatigue, SOB, fever, chills, nausea/vomiting, bladder or bowel concerns today.        Allergies, and PMH/PSH reviewed in [facility] today.  REVIEW OF SYSTEMS:  4 point ROS including Respiratory, CV, GI and , other than that noted in the HPI,  is negative    Objective:   /85   Pulse 76   Temp 96.9  F (36.1  C)   Resp 16   Ht 1.676 m (5' 6\")   Wt 59.9 kg (132 lb)   LMP  (LMP Unknown)   SpO2 96%   BMI 21.31 kg/m    GENERAL APPEARANCE:  Alert, elderly, in no distress, laying in bed  HEENT:  atraumatic, [place], EOM intact, moist mucus membranes  RESP:  non-labored breathing, lungs clear on auscultation, no respiratory distress, no cough  CV:  Rate regular, S1 S2 noted, no edema  ABDOMEN:  soft, non-distended, non-tender, bowel sounds active  M/S:   wheelchair bound, moves all extremities, strength and tone equal bilaterally, no calf pain, arthritic changes noted in hands  SKIN:  warm, dry, thin, fragile, no obvious rash, lesions, ulcerations or petechiae   NEURO:   Face is " symmetric, examination of sensation by touch normal, follows and tracks, slow speech  PSYCH:  calm, cooperative          Labs reviewed as per Epic and/or Care Everywhere.      Assessment/Plan:    Sepsis due to Escherichia coli with acute renal failure and septic shock, unspecified acute renal failure type (H)  Treated with IV abx during hospital stay. Today, no concerns. VSS.  -follow clinically     Nephrolithiasis  Staghorn calculus  S/p bilateral nephrostomy tubes on 5/15. In TCU, left tube dislodged on 5/27; seen by IR in ED, unable to replace tube in existing tract; plan to keep tube out. Patient had follow-up with urology on 6/4 who recommended replacement of dislodged nephrostomy tube; referral placed; scheduled for 6/6. However, IR postponed since Plavix was not held for 5 days. Had left nephrostomy tube replacement on 6/16. Today, no concerns. UA/UC obtained on 6/19 with no concerns.   -continue APAP 500 mg BID PRN  -left percutaneous nephrolithotomy as scheduled on 6/26/25     Toxic metabolic encephalopathy  Secondary to acute illness. Resolved at hospital discharge. In TCU, no concerns. Seems to have cleared.  -follow clinically     MARI (acute kidney injury)  Baseline Cr ~ 0.8. Discharged from hospital with Cr 1.54. In TCU, Cr 1.21 (6/23.)  -avoid nephrotoxins  -trend labs periodically    Anemia, unspecified type   Baseline ~11-12. Received 1 unit PRBC in ED on 6/11. Last hgb 9.4 (6/23. Today, no signs of active bleeding.  -monitor bleeding risks     History of stroke  Spastic hemiplegia of left nondominant side as late effect of cerebral infarction (H)   Associated left sided weakness/spasm. Today, patient declined need to increase baclofen.  -continue baclofen 5 mg 3 times daily  -continue clopidogrel  -continue atorvastatin     Essential hypertension   Amlodipine decreased to 5 mg on 6/9/25 due to low normal SBPs. Today, //70 mmHg. HR 70-85 bpm.  -continue metoprolol ER 50 mg  daily  -continue amlodipine 5 mg daily  -follow BP and HR; adjust medications as needed     Seizure disorder (H)  Secondary to stroke.  -continue Keppra 1000 mg twice daily     Type 2 diabetes, HbA1C goal < 8% (H)  Last A1c 7.1%. In TCU, metformin increased to home dose 1000 mg BID on 5/20.   -continue metformin 1000 mg twice daily  -trend A1c every 6-12 months     Osteoporosis without current pathological fracture, unspecified osteoporosis type  -alendronate 35 mg weekly; consider increasing it to 70 mg weekly when renal function improves     Major depressive disorder, recurrent episode, mild   -continue citalopram 10 mg daily  -monitor mood and behaviors     Uncomplicated asthma, unspecified asthma severity, unspecified whether persistent   Today, O2 sats 96 % RA.  -montelukast 10 mg daily  -monitor respiratory status    Acute bacterial conjunctivitis of right eye  Noted in TCU on 5/27; treated with polytrim (end 6/3). Resolved.  -monitor     Physical deconditioning  Impaired mobility and activities of daily living   Secondary to diagnoses above and recent hospitalization. In TCU for rehabilitation. Therapies signed off on 6/9/25. Patient is currently max assist; recommending LTC.  -PT/OT following- adv per recommendations   -SW available for safe discharge planning ongoing      MED REC REQUIRED  Post Medication Reconciliation Status: discharge medications reconciled and changed, per note/orders        35 minutes spent on the date of this encounter doing chart review, review labs, discussion with nursing staff, patient visit, and documentation.       Electronically signed by: Dr. Elda Scott, DNP, APRN, AGNP-BC, PHN    This note was completed with the assistance of dictation software. Typos and word substitution-errors are expected and unintended.          Sincerely,        Elda Scott, KEILA CNP    Electronically signed

## 2025-06-23 NOTE — TELEPHONE ENCOUNTER
JW,  Please see below MyChart message FYI  Appears no advisement needed  Thanks,  Falguni HOFF RN

## 2025-06-24 ENCOUNTER — PATIENT OUTREACH (OUTPATIENT)
Dept: CARE COORDINATION | Facility: CLINIC | Age: 75
End: 2025-06-24
Payer: MEDICARE

## 2025-06-24 NOTE — PROGRESS NOTES
Clinic Care Coordination Contact    Situation: Patient chart reviewed by care coordinator.    Background: Pt referred for clinic care coordination. Discharged to TCU post hospital.     Assessment: Pt still in TCU    Plan/Recommendations: [facility] will review chart in 4 weeks.     Janie Domínguez [facility] Ambulatory Care Management  [facility]  Phone: [phone]  E-mail: [email or web address]

## 2025-06-26 NOTE — TELEPHONE ENCOUNTER
"RN team, maybe try to get more information regarding the patient/ot request  Home care? Out patient? Unsure if can wait until PCP back in office    Thanks  Ana \"Aryan\" MASTER Martin   "

## 2025-06-26 NOTE — TELEPHONE ENCOUNTER
Called both pt and significant other Aubie.   Left message for patient to call [facility] back regarding PT and OT orders.   [employer] message also sent with the above information.   When patient calls back please transfer to an RN  Thank you,  Negra PARRISH RN

## 2025-06-26 NOTE — TELEPHONE ENCOUNTER
JW,    *OKAY TO WAIT FOR PCP RETURN*  Buck called back to provide additional information  [facility]  recommended that patient do OT and PT after completing surgery  Per provider who sees patient at [facility], this needs to be ordered by PCP  [facility] ones to [facility]  States it has to be ordered primary care provider for insurance purposes  [facility] ref[facility],  Falguni HOFF RN

## 2025-07-02 ENCOUNTER — DOCUMENTATION ONLY (OUTPATIENT)
Dept: GERIATRICS | Facility: CLINIC | Age: 75
End: 2025-07-02
Payer: MEDICARE

## 2025-07-03 ENCOUNTER — LAB REQUISITION (OUTPATIENT)
Dept: LAB | Facility: CLINIC | Age: 75
End: 2025-07-03
Payer: MEDICARE

## 2025-07-03 ENCOUNTER — TRANSITIONAL CARE UNIT VISIT (OUTPATIENT)
Dept: GERIATRICS | Facility: CLINIC | Age: 75
End: 2025-07-03
Payer: MEDICARE

## 2025-07-03 VITALS
HEART RATE: 65 BPM | RESPIRATION RATE: 18 BRPM | SYSTOLIC BLOOD PRESSURE: 137 MMHG | OXYGEN SATURATION: 97 % | DIASTOLIC BLOOD PRESSURE: 63 MMHG | TEMPERATURE: 97.2 F

## 2025-07-03 DIAGNOSIS — R53.81 PHYSICAL DECONDITIONING: ICD-10-CM

## 2025-07-03 DIAGNOSIS — N20.0 NEPHROLITHIASIS: Primary | ICD-10-CM

## 2025-07-03 DIAGNOSIS — N17.9 AKI (ACUTE KIDNEY INJURY): ICD-10-CM

## 2025-07-03 DIAGNOSIS — N17.9 ACUTE KIDNEY FAILURE, UNSPECIFIED: ICD-10-CM

## 2025-07-03 DIAGNOSIS — Z09 HOSPITAL DISCHARGE FOLLOW-UP: ICD-10-CM

## 2025-07-03 DIAGNOSIS — F33.0 MAJOR DEPRESSIVE DISORDER, RECURRENT EPISODE, MILD: ICD-10-CM

## 2025-07-03 DIAGNOSIS — I10 ESSENTIAL HYPERTENSION: ICD-10-CM

## 2025-07-03 DIAGNOSIS — J45.909 UNCOMPLICATED ASTHMA, UNSPECIFIED ASTHMA SEVERITY, UNSPECIFIED WHETHER PERSISTENT: ICD-10-CM

## 2025-07-03 DIAGNOSIS — Z74.09 IMPAIRED MOBILITY AND ACTIVITIES OF DAILY LIVING: ICD-10-CM

## 2025-07-03 DIAGNOSIS — G40.909 SEIZURE DISORDER (H): ICD-10-CM

## 2025-07-03 DIAGNOSIS — Z86.73 HISTORY OF STROKE: ICD-10-CM

## 2025-07-03 DIAGNOSIS — N20.0 STAGHORN CALCULUS: ICD-10-CM

## 2025-07-03 DIAGNOSIS — E11.9 TYPE 2 DIABETES, HBA1C GOAL < 8% (H): ICD-10-CM

## 2025-07-03 DIAGNOSIS — Z78.9 IMPAIRED MOBILITY AND ACTIVITIES OF DAILY LIVING: ICD-10-CM

## 2025-07-03 DIAGNOSIS — D64.9 ANEMIA, UNSPECIFIED TYPE: ICD-10-CM

## 2025-07-03 DIAGNOSIS — D64.9 ANEMIA, UNSPECIFIED: ICD-10-CM

## 2025-07-03 DIAGNOSIS — I69.354 SPASTIC HEMIPLEGIA OF LEFT NONDOMINANT SIDE AS LATE EFFECT OF CEREBRAL INFARCTION (H): ICD-10-CM

## 2025-07-03 DIAGNOSIS — K59.01 SLOW TRANSIT CONSTIPATION: ICD-10-CM

## 2025-07-03 DIAGNOSIS — Z96.0 S/P URETERAL STENT PLACEMENT: ICD-10-CM

## 2025-07-03 DIAGNOSIS — Z86.19 HX OF ESCHERICHIA COLI SEPTICEMIA: ICD-10-CM

## 2025-07-03 NOTE — LETTER
7/3/2025      Addis Peña  [address]  [place] [address]        [facility] GERIATRICS    PRIMARY CARE PROVIDER AND CLINIC:  Joel Daniel Wegener, MD, [address] [address] / [facility] [address]  Chief Complaint   Patient presents with     Hospital F/U      [facility] Record Number:  [id number]  Place of Service where encounter took place:  [facility] ([facility]) [[id number]]    HPI: Addis Peña  is a 74 year old  (1950), admitted to the above facility from  [facility] . Hospital stay 6/26/25 through 6/30/25. HPI information obtained from: facility chart records, facility staff, patient report and [facility] chart review. 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  Patient presented to the ED via EMS from home for evaluation of left-sided weakness.  Initial evaluation with significant MARI (Cr 2.28), leukocytosis WBC 13.2, and anemia with Hgb 9.2.  Initial imaging with no acute infarction; noted findings of chronic right frontal infarct.  Neurocritical care consulted; did not recommend acute MRI given findings of CT.  Empirically started on norepinephrine and admitted to ICU due to concern of infection as she had worsening hypotension. UA with concerns as source of infection; started on Zosyn. UC grew E. Coli and transitioned to ceftriaxone. Transferred out of ICU on 5/11/25. Large staghorn calculus within the left kidney with mild hydronephrosis as well as small calcified renal stones in the right renal pelvis and diffuse proximal ureteral calcification but no hydronephrosis. Urology consulted; bilateral nephrostomy tube placement on 5/16/2025; recommended outpatient follow-up with plan for left percutaneous nephrolithotomy; recommend UA/UC 10 days prior to surgery. Discharged to TCU for medical management, rehab, and nursing care.      5/27: left nephrostomy tube  "dislodged after repositioning in TCU and was sent to ED. Seen by IR and noted difficulty replacing the tube in existing tract; plan to keep tube out since prior nephrostogram showed non-obstructing collecting system; monitor for clinical signs of urinary tract infection.      6/4: patient had follow-up with urology who recommended replacement of dislodged nephrostomy tube; referral placed; scheduled for 6/6 however, IR postponed since Plavix was not held for 5 days. Plan for tube replacement on 6/16.      6/11: Patient sent to ED for low hgb. Downtrending during TCU stay 11.2-> 7.3 -> 7.0. Received one unit PRBC. Discharged back to TCU.     6/16/25: underwent replacement of Left nephrostomy tube.    6/26/25: Underwent planned left PCNL with utereral stent placed. Hgb 9.5-> 8.9 post op. Urology noted residual stone burden on the left; recommend ureteroscopy in 3-4 weeks. Underwent right nephrostomy tube removal and right ureteral stent placement on 6/29 to help facilitate future ureteroscopy. Discharged from hospital with Hgb 9.7 (6/30). Transferred back to TCU.      Today:  Nursing staff reports no acute medical concerns. She has a follow-up appointment with urology on 7/8.     Patient is seen today for a visit in her room. She feels \"good\".  \"No\" pain anywhere.  Therapies went \"good\"; recalls doing sit and stand exercises.  Appetite has been \"good\"; endorses eating all her food.  Last BM was \"couple days ago\"; \"no\" belly pain.  But she might feel a little constipated.  Endorses chronic urinary incontinence and uses incontinence briefs for support.  Denies dysuria.  Sleeping has been \"good\"; notes waking up due to cares from staff.  Mood is \"good\"; \"no\" depression. Denies CP, palpitations, lightheadedness, dizziness, fatigue, SOB, fever, chills, nausea/vomiting concerns today.      CODE STATUS/ADVANCE DIRECTIVES DISCUSSION:  Prior  DNR / DNI  ALLERGIES:   Allergies   Allergen Reactions     Lisinopril Cough     Milk " Products GI Disturbance     Latex Rash      PAST MEDICAL HISTORY:   Past Medical History:   Diagnosis Date     Allergic rhinitis      Allergic rhinitis      CKD (chronic kidney disease)      Depression      Depression      Displaced dome fracture of left acetabulum (H)      DM2 (diabetes mellitus, type 2) (H)      GERD (gastroesophageal reflux disease)      GERD (gastroesophageal reflux disease)      Gout      HTN (hypertension)      Hyperlipidaemia LDL goal <100      Hypertension goal BP (blood pressure) < 140/90      Left hemiplegia (H) 01/2010    sees PMR physician     Left hemiplegia (H)      Major depression in complete remission (H) 07/06/2012     Migraine      Migraine      Mild major depression (H) 10/07/2011     Mild nonproliferative diabetic retinopathy of both eyes (H) 02/2011     Mild nonproliferative diabetic retinopathy of both eyes (H)      Nephrolithiasis      Nephrolithiasis      Seizure disorder (H)      Spastic hemiplegia of left nondominant side as late effect of cerebral infarction (H) 12/20/2021     Stroke (H) 01/2010    due to uncontrolled hypertension     Stroke (H)      Type 2 diabetes, HbA1C goal < 8% (H)      Vitamin B12 deficiency anemia       PAST SURGICAL HISTORY:   has a past surgical history that includes colonscopy (3/2012); colonoscopy; Cystoscopy, ureteroscopy, combined (Right, 2/22/2016); Colonoscopy (N/A, 5/9/2019); Cystoscopy W/ Ureteroscopy (Right, 02/22/2016); Colonoscopy W/ Biopsies And Polypectomy (05/09/2019); and Orif Acetabulum Fracture (Left).  FAMILY HISTORY: family history includes C.A.D. in her father; Cancer in her sister and sister; Coronary Artery Disease in her father; Diabetes in her sister and sister; Glaucoma in her sister; Heart Disease in her mother; Hypertension in her brother, brother, and mother.  SOCIAL HISTORY:   reports that she has never smoked. She has never used smokeless tobacco. She reports that she does not drink alcohol and does not use  drugs.  Patient's living condition: lives with partner    Post Discharge Medication Reconciliation Status:   MED REC REQUIRED  Post Medication Reconciliation Status: discharge medications reconciled and changed, per note/orders       Current Outpatient Medications   Medication Sig Dispense Refill     acetaminophen (TYLENOL) 500 MG tablet Take 500 mg by mouth 2 times daily as needed. AND give 1 tab PO daily PRN for pain rated 2-5/10 and 2 tabs PO daily PRN for pain rated 6-10/10       alendronate (FOSAMAX) 35 MG tablet Take 1 tablet (35 mg) by mouth every 7 days. 4 tablet 5     amLODIPine (NORVASC) 10 MG tablet Take 0.5 tablets (5 mg) by mouth daily. 90 tablet 3     atorvastatin (LIPITOR) 40 MG tablet Take 1 tablet (40 mg) by mouth daily. 90 tablet 3     baclofen (LIORESAL) 10 MG tablet Take 5 mg by mouth 3 times daily.       baclofen (LIORESAL) 10 MG tablet TAKE 1 TABLET(10 MG) BY MOUTH THREE TIMES DAILY 270 tablet 3     bisacodyl (DULCOLAX) 5 MG EC tablet Take 5 mg by mouth daily.       bisacodyl (DULCOLAX) 5 MG EC tablet Take 2 tablets (10 mg) by mouth daily as needed for constipation 180 tablet 3     blood glucose (CONTOUR TEST) test strip USE TO TEST BLOOD GLUCOSE ONCE DAILY 100 strip 2     citalopram (CELEXA) 10 MG tablet Take 1 tablet (10 mg) by mouth daily. 90 tablet 3     clopidogrel (PLAVIX) 75 MG tablet Take 1 tablet (75 mg) by mouth daily. 90 tablet 1     cyanocobalamin (CYANOCOBALAMIN) 1000 MCG/ML injection Inject 1 mL into the muscle every 30 days       Lactase 4500 units TABS Take 13,500 Units by mouth daily as needed       levETIRAcetam (KEPPRA) 250 MG tablet Take 1,000 mg by mouth 2 times daily.       metFORMIN (GLUCOPHAGE) 1000 MG tablet TAKE 1 TABLET(1000 MG) BY MOUTH TWICE DAILY WITH MEALS 180 tablet 3     metoprolol succinate ER (TOPROL XL) 50 MG 24 hr tablet Take 1 tablet (50 mg) by mouth daily. 90 tablet 3     montelukast (SINGULAIR) 10 MG tablet Take 1 tablet (10 mg) by mouth daily. 90 tablet  3     polyvinyl alcohol (LIQUIFILM TEARS) 1.4 % ophthalmic solution Place 1 drop into both eyes daily.       senna-docusate (SENOKOT-S/PERICOLACE) 8.6-50 MG tablet Take 1 tablet by mouth daily       sodium chloride 0.9% infusion Use 10 ml intravenously one time a  day for Drain patency - Continue flushing each  tube daily until urine is clear and yellow, then can  discontinue       Vitamin D3 (VITAMIN D3) 25 mcg (1000 units) tablet Take 1 tablet (25 mcg) by mouth daily 90 tablet 3     Current Facility-Administered Medications   Medication Dose Route Frequency Provider Last Rate Last Admin     cyanocobalamin injection 1,000 mcg  1,000 mcg Intramuscular Q30 Days    1,000 mcg at 05/02/25 0855       ROS:  4 point ROS including Respiratory, CV, GI and , other than that noted in the HPI,  is negative    Vitals:  /63   Pulse 65   Temp 97.2  F (36.2  C)   Resp 18   LMP  (LMP Unknown)   SpO2 97%   Exam:  GENERAL APPEARANCE:  Alert, elderly, in no distress  HEENT:  atraumatic, [place], EOM intact, moist mucus membranes  RESP:  non-labored breathing, lungs clear on auscultation, no respiratory distress, no cough  CV:  Rate regular, S1 S2 noted, no edema  ABDOMEN:  soft, non-distended, non-tender, bowel sounds active  M/S:   wheelchair bound, moves all extremities, strength and tone equal bilaterally, no calf pain, arthritic changes noted in hands  SKIN:  warm, dry, thin, fragile, no obvious rash, lesions, ulcerations or petechiae   NEURO:   Face is symmetric, examination of sensation by touch normal, follows and tracks, slow speech  PSYCH:  calm, cooperative      Lab/Diagnostic data:  Labs reviewed as per [facility] and/or Care Everywhere.      ASSESSMENT/PLAN:    Nephrolithiasis  Staghorn calculus  Urology following. S/p bilateral ureteral stent 6/26. Plan for ureteroscopy in 3-4 weeks.  Today, patient denies concerns.   -continue APAP 500 mg BID PRN  -follow-up with urology as scheduled on 7/8/25     MARI (acute kidney  injury)  Hx of Escherichia coli septicemia   Sepsis due to Escherichia coli with acute renal failure and septic shock and reason for previous hospitalization in May. Baseline Cr ~ 0.8. Last Cr 1.21 (6/27/25).  -avoid nephrotoxins  -check BMP on 7/7/25     Anemia, unspecified type   Baseline ~11-12. Received 1 unit PRBC in ED on 6/11. Last hgb 9.7 (6/30). Today, no signs of active bleeding.  -monitor bleeding risks  -continue Vit B12 injection monthly (last given 7/1/25)  -check CBC on 7/7/25     History of stroke  Spastic hemiplegia of left nondominant side as late effect of cerebral infarction (H)   Associated left sided weakness/spasm. Today, patient endorses no need to increase baclofen.  -continue baclofen 5 mg 3 times daily  -continue clopidogrel  -continue atorvastatin     Essential hypertension   Amlodipine decreased to 5 mg on 6/9/25 due to low normal SBPs. Today, //62  mmHg. HR 65-83 bpm.  -continue metoprolol ER 50 mg daily  -continue amlodipine 5 mg daily  -follow BP and HR; adjust medications as needed     Seizure disorder (H)  Secondary to stroke.  -continue Keppra 1000 mg twice daily     Type 2 diabetes, HbA1C goal < 8% (H)  Last A1c 7.1% (5/16/25). In TCU, metformin increased to home dose 1000 mg BID on 5/20.   -continue metformin 1000 mg twice daily  -trend A1c every 6-12 months     Osteoporosis without current pathological fracture, unspecified osteoporosis type  -alendronate 35 mg weekly; consider increasing it to 70 mg weekly when renal function improves     Major depressive disorder, recurrent episode, mild   -continue citalopram 10 mg daily  -monitor mood and behaviors     Uncomplicated asthma, unspecified asthma severity, unspecified whether persistent   Today, O2 sats 97 % RA.  -montelukast 10 mg daily  -monitor respiratory status    Slow transit constipation  Patient reported today.   -start senna-S 1 tab BID and as needed  -monitor effectiveness     Physical  deconditioning  Impaired mobility and activities of daily living   Secondary to diagnoses above and recent hospitalization. In TCU for rehabilitation.Patient is currently max assist; recommending LTC.  -PT/OT to eval and treat- adv per recommendations   -SW available for safe discharge planning ongoing        45 minutes spent on the date of this encounter doing chart review, review labs, discussion with nursing staff, patient visit, and documentation.         Electronically signed by:  Dr. Elda Scott, DNP, APRN, AGNP-BC, PHN    This note was completed with the assistance of dictation software. Typos and word substitution-errors are expected and unintended.                         Sincerely,        KEILA Louis CNP    Electronically signed

## 2025-07-03 NOTE — PROGRESS NOTES
[facility] GERIATRICS    PRIMARY CARE PROVIDER AND CLINIC:  Joel Daniel Wegener, MD, [address] [address] / [facility] [id number]  Chief Complaint   Patient presents with    Hospital F/U      [facility] Record Number:  [id number]  Place of Service where encounter took place:  [facility] ([facility]) [[id number]]    HPI: Addis Peña  is a 74 year old  (1950), admitted to the above facility from  [facility] . Hospital stay 6/26/25 through 6/30/25. HPI information obtained from: facility chart records, facility staff, patient report and [facility] chart review. 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  Patient presented to the ED via EMS from home for evaluation of left-sided weakness.  Initial evaluation with significant MARI (Cr 2.28), leukocytosis WBC 13.2, and anemia with Hgb 9.2.  Initial imaging with no acute infarction; noted findings of chronic right frontal infarct.  Neurocritical care consulted; did not recommend acute MRI given findings of CT.  Empirically started on norepinephrine and admitted to ICU due to concern of infection as she had worsening hypotension. UA with concerns as source of infection; started on Zosyn. UC grew E. Coli and transitioned to ceftriaxone. Transferred out of ICU on 5/11/25. Large staghorn calculus within the left kidney with mild hydronephrosis as well as small calcified renal stones in the right renal pelvis and diffuse proximal ureteral calcification but no hydronephrosis. Urology consulted; bilateral nephrostomy tube placement on 5/16/2025; recommended outpatient follow-up with plan for left percutaneous nephrolithotomy; recommend UA/UC 10 days prior to surgery. Discharged to TCU for medical management, rehab, and nursing care.      5/27: left nephrostomy tube dislodged after repositioning in TCU and was sent to ED. Seen by IR and noted difficulty replacing  "the tube in existing tract; plan to keep tube out since prior nephrostogram showed non-obstructing collecting system; monitor for clinical signs of urinary tract infection.      6/4: patient had follow-up with urology who recommended replacement of dislodged nephrostomy tube; referral placed; scheduled for 6/6 however, IR postponed since Plavix was not held for 5 days. Plan for tube replacement on 6/16.      6/11: Patient sent to ED for low hgb. Downtrending during TCU stay 11.2-> 7.3 -> 7.0. Received one unit PRBC. Discharged back to TCU.     6/16/25: underwent replacement of Left nephrostomy tube.    6/26/25: Underwent planned left PCNL with utereral stent placed. Hgb 9.5-> 8.9 post op. Urology noted residual stone burden on the left; recommend ureteroscopy in 3-4 weeks. Underwent right nephrostomy tube removal and right ureteral stent placement on 6/29 to help facilitate future ureteroscopy. Discharged from hospital with Hgb 9.7 (6/30). Transferred back to TCU.      Today:  Nursing staff reports no acute medical concerns. She has a follow-up appointment with urology on 7/8.     Patient is seen today for a visit in her room. She feels \"good\".  \"No\" pain anywhere.  Therapies went \"good\"; recalls doing sit and stand exercises.  Appetite has been \"good\"; endorses eating all her food.  Last BM was \"couple days ago\"; \"no\" belly pain.  But she might feel a little constipated.  Endorses chronic urinary incontinence and uses incontinence briefs for support.  Denies dysuria.  Sleeping has been \"good\"; notes waking up due to cares from staff.  Mood is \"good\"; \"no\" depression. Denies CP, palpitations, lightheadedness, dizziness, fatigue, SOB, fever, chills, nausea/vomiting concerns today.      CODE STATUS/ADVANCE DIRECTIVES DISCUSSION:  Prior  DNR / DNI  ALLERGIES:   Allergies   Allergen Reactions    Lisinopril Cough    Milk Products GI Disturbance    Latex Rash      PAST MEDICAL HISTORY:   Past Medical History:   Diagnosis " Date    Allergic rhinitis     Allergic rhinitis     CKD (chronic kidney disease)     Depression     Depression     Displaced dome fracture of left acetabulum (H)     DM2 (diabetes mellitus, type 2) (H)     GERD (gastroesophageal reflux disease)     GERD (gastroesophageal reflux disease)     Gout     HTN (hypertension)     Hyperlipidaemia LDL goal <100     Hypertension goal BP (blood pressure) < 140/90     Left hemiplegia (H) 01/2010    sees PMR physician    Left hemiplegia (H)     Major depression in complete remission (H) 07/06/2012    Migraine     Migraine     Mild major depression (H) 10/07/2011    Mild nonproliferative diabetic retinopathy of both eyes (H) 02/2011    Mild nonproliferative diabetic retinopathy of both eyes (H)     Nephrolithiasis     Nephrolithiasis     Seizure disorder (H)     Spastic hemiplegia of left nondominant side as late effect of cerebral infarction (H) 12/20/2021    Stroke (H) 01/2010    due to uncontrolled hypertension    Stroke (H)     Type 2 diabetes, HbA1C goal < 8% (H)     Vitamin B12 deficiency anemia       PAST SURGICAL HISTORY:   has a past surgical history that includes colonscopy (3/2012); colonoscopy; Cystoscopy, ureteroscopy, combined (Right, 2/22/2016); Colonoscopy (N/A, 5/9/2019); Cystoscopy W/ Ureteroscopy (Right, 02/22/2016); Colonoscopy W/ Biopsies And Polypectomy (05/09/2019); and Orif Acetabulum Fracture (Left).  FAMILY HISTORY: family history includes C.A.D. in her father; Cancer in her sister and sister; Coronary Artery Disease in her father; Diabetes in her sister and sister; Glaucoma in her sister; Heart Disease in her mother; Hypertension in her brother, brother, and mother.  SOCIAL HISTORY:   reports that she has never smoked. She has never used smokeless tobacco. She reports that she does not drink alcohol and does not use drugs.  Patient's living condition: lives with partner    Post Discharge Medication Reconciliation Status:   MED REC REQUIRED  Post  Medication Reconciliation Status: discharge medications reconciled and changed, per note/orders       Current Outpatient Medications   Medication Sig Dispense Refill    acetaminophen (TYLENOL) 500 MG tablet Take 500 mg by mouth 2 times daily as needed. AND give 1 tab PO daily PRN for pain rated 2-5/10 and 2 tabs PO daily PRN for pain rated 6-10/10      alendronate (FOSAMAX) 35 MG tablet Take 1 tablet (35 mg) by mouth every 7 days. 4 tablet 5    amLODIPine (NORVASC) 10 MG tablet Take 0.5 tablets (5 mg) by mouth daily. 90 tablet 3    atorvastatin (LIPITOR) 40 MG tablet Take 1 tablet (40 mg) by mouth daily. 90 tablet 3    baclofen (LIORESAL) 10 MG tablet Take 5 mg by mouth 3 times daily.      baclofen (LIORESAL) 10 MG tablet TAKE 1 TABLET(10 MG) BY MOUTH THREE TIMES DAILY 270 tablet 3    bisacodyl (DULCOLAX) 5 MG EC tablet Take 5 mg by mouth daily.      bisacodyl (DULCOLAX) 5 MG EC tablet Take 2 tablets (10 mg) by mouth daily as needed for constipation 180 tablet 3    blood glucose (CONTOUR TEST) test strip USE TO TEST BLOOD GLUCOSE ONCE DAILY 100 strip 2    citalopram (CELEXA) 10 MG tablet Take 1 tablet (10 mg) by mouth daily. 90 tablet 3    clopidogrel (PLAVIX) 75 MG tablet Take 1 tablet (75 mg) by mouth daily. 90 tablet 1    cyanocobalamin (CYANOCOBALAMIN) 1000 MCG/ML injection Inject 1 mL into the muscle every 30 days      Lactase 4500 units TABS Take 13,500 Units by mouth daily as needed      levETIRAcetam (KEPPRA) 250 MG tablet Take 1,000 mg by mouth 2 times daily.      metFORMIN (GLUCOPHAGE) 1000 MG tablet TAKE 1 TABLET(1000 MG) BY MOUTH TWICE DAILY WITH MEALS 180 tablet 3    metoprolol succinate ER (TOPROL XL) 50 MG 24 hr tablet Take 1 tablet (50 mg) by mouth daily. 90 tablet 3    montelukast (SINGULAIR) 10 MG tablet Take 1 tablet (10 mg) by mouth daily. 90 tablet 3    polyvinyl alcohol (LIQUIFILM TEARS) 1.4 % ophthalmic solution Place 1 drop into both eyes daily.      senna-docusate (SENOKOT-S/PERICOLACE)  8.6-50 MG tablet Take 1 tablet by mouth daily      sodium chloride 0.9% infusion Use 10 ml intravenously one time a  day for Drain patency - Continue flushing each  tube daily until urine is clear and yellow, then can  discontinue      Vitamin D3 (VITAMIN D3) 25 mcg (1000 units) tablet Take 1 tablet (25 mcg) by mouth daily 90 tablet 3     Current Facility-Administered Medications   Medication Dose Route Frequency Provider Last Rate Last Admin    cyanocobalamin injection 1,000 mcg  1,000 mcg Intramuscular Q30 Days    1,000 mcg at 05/02/25 0855       ROS:  4 point ROS including Respiratory, CV, GI and , other than that noted in the HPI,  is negative    Vitals:  /63   Pulse 65   Temp 97.2  F (36.2  C)   Resp 18   LMP  (LMP Unknown)   SpO2 97%   Exam:  GENERAL APPEARANCE:  Alert, elderly, in no distress  HEENT:  atraumatic, [place], EOM intact, moist mucus membranes  RESP:  non-labored breathing, lungs clear on auscultation, no respiratory distress, no cough  CV:  Rate regular, S1 S2 noted, no edema  ABDOMEN:  soft, non-distended, non-tender, bowel sounds active  M/S:   wheelchair bound, moves all extremities, strength and tone equal bilaterally, no calf pain, arthritic changes noted in hands  SKIN:  warm, dry, thin, fragile, no obvious rash, lesions, ulcerations or petechiae   NEURO:   Face is symmetric, examination of sensation by touch normal, follows and tracks, slow speech  PSYCH:  calm, cooperative      Lab/Diagnostic data:  Labs reviewed as per [facility] and/or Care Everywhere.      ASSESSMENT/PLAN:    Nephrolithiasis  Staghorn calculus  Urology following. S/p bilateral ureteral stent 6/26. Plan for ureteroscopy in 3-4 weeks.  Today, patient denies concerns.   -continue APAP 500 mg BID PRN  -follow-up with urology as scheduled on 7/8/25     MARI (acute kidney injury)  Hx of Escherichia coli septicemia   Sepsis due to Escherichia coli with acute renal failure and septic shock and reason for previous  hospitalization in May. Baseline Cr ~ 0.8. Last Cr 1.21 (6/27/25).  -avoid nephrotoxins  -check BMP on 7/7/25     Anemia, unspecified type   Baseline ~11-12. Received 1 unit PRBC in ED on 6/11. Last hgb 9.7 (6/30). Today, no signs of active bleeding.  -monitor bleeding risks  -continue Vit B12 injection monthly (last given 7/1/25)  -check CBC on 7/7/25     History of stroke  Spastic hemiplegia of left nondominant side as late effect of cerebral infarction (H)   Associated left sided weakness/spasm. Today, patient endorses no need to increase baclofen.  -continue baclofen 5 mg 3 times daily  -continue clopidogrel  -continue atorvastatin     Essential hypertension   Amlodipine decreased to 5 mg on 6/9/25 due to low normal SBPs. Today, //62  mmHg. HR 65-83 bpm.  -continue metoprolol ER 50 mg daily  -continue amlodipine 5 mg daily  -follow BP and HR; adjust medications as needed     Seizure disorder (H)  Secondary to stroke.  -continue Keppra 1000 mg twice daily     Type 2 diabetes, HbA1C goal < 8% (H)  Last A1c 7.1% (5/16/25). In TCU, metformin increased to home dose 1000 mg BID on 5/20.   -continue metformin 1000 mg twice daily  -trend A1c every 6-12 months     Osteoporosis without current pathological fracture, unspecified osteoporosis type  -alendronate 35 mg weekly; consider increasing it to 70 mg weekly when renal function improves     Major depressive disorder, recurrent episode, mild   -continue citalopram 10 mg daily  -monitor mood and behaviors     Uncomplicated asthma, unspecified asthma severity, unspecified whether persistent   Today, O2 sats 97 % RA.  -montelukast 10 mg daily  -monitor respiratory status    Slow transit constipation  Patient reported today.   -start senna-S 1 tab BID and as needed  -monitor effectiveness     Physical deconditioning  Impaired mobility and activities of daily living   Secondary to diagnoses above and recent hospitalization. In TCU for rehabilitation.Patient is  currently max assist; recommending LTC.  -PT/OT to eval and treat- adv per recommendations   -SW available for safe discharge planning ongoing        45 minutes spent on the date of this encounter doing chart review, review labs, discussion with nursing staff, patient visit, and documentation.         Electronically signed by:  Dr. Elda Scott, DNP, APRN, AGNP-BC, PHN    This note was completed with the assistance of dictation software. Typos and word substitution-errors are expected and unintended.

## 2025-07-07 ENCOUNTER — TRANSITIONAL CARE UNIT VISIT (OUTPATIENT)
Dept: GERIATRICS | Facility: CLINIC | Age: 75
End: 2025-07-07
Payer: MEDICARE

## 2025-07-07 VITALS
BODY MASS INDEX: 21.69 KG/M2 | OXYGEN SATURATION: 97 % | WEIGHT: 135 LBS | RESPIRATION RATE: 16 BRPM | SYSTOLIC BLOOD PRESSURE: 148 MMHG | HEART RATE: 70 BPM | HEIGHT: 66 IN | TEMPERATURE: 97.9 F | DIASTOLIC BLOOD PRESSURE: 60 MMHG

## 2025-07-07 DIAGNOSIS — Z96.0 S/P URETERAL STENT PLACEMENT: ICD-10-CM

## 2025-07-07 DIAGNOSIS — R53.81 PHYSICAL DECONDITIONING: ICD-10-CM

## 2025-07-07 DIAGNOSIS — I69.354 SPASTIC HEMIPLEGIA OF LEFT NONDOMINANT SIDE AS LATE EFFECT OF CEREBRAL INFARCTION (H): ICD-10-CM

## 2025-07-07 DIAGNOSIS — G40.909 SEIZURE DISORDER (H): ICD-10-CM

## 2025-07-07 DIAGNOSIS — Z74.09 IMPAIRED MOBILITY AND ACTIVITIES OF DAILY LIVING: ICD-10-CM

## 2025-07-07 DIAGNOSIS — K59.01 SLOW TRANSIT CONSTIPATION: ICD-10-CM

## 2025-07-07 DIAGNOSIS — D64.9 ANEMIA, UNSPECIFIED TYPE: ICD-10-CM

## 2025-07-07 DIAGNOSIS — N20.0 STAGHORN CALCULUS: ICD-10-CM

## 2025-07-07 DIAGNOSIS — Z78.9 IMPAIRED MOBILITY AND ACTIVITIES OF DAILY LIVING: ICD-10-CM

## 2025-07-07 DIAGNOSIS — N17.9 AKI (ACUTE KIDNEY INJURY): ICD-10-CM

## 2025-07-07 DIAGNOSIS — M81.0 OSTEOPOROSIS WITHOUT CURRENT PATHOLOGICAL FRACTURE, UNSPECIFIED OSTEOPOROSIS TYPE: ICD-10-CM

## 2025-07-07 DIAGNOSIS — N20.0 NEPHROLITHIASIS: Primary | ICD-10-CM

## 2025-07-07 DIAGNOSIS — E11.9 TYPE 2 DIABETES, HBA1C GOAL < 8% (H): ICD-10-CM

## 2025-07-07 DIAGNOSIS — J45.909 UNCOMPLICATED ASTHMA, UNSPECIFIED ASTHMA SEVERITY, UNSPECIFIED WHETHER PERSISTENT: ICD-10-CM

## 2025-07-07 DIAGNOSIS — F33.0 MAJOR DEPRESSIVE DISORDER, RECURRENT EPISODE, MILD: ICD-10-CM

## 2025-07-07 DIAGNOSIS — Z86.19 HX OF ESCHERICHIA COLI SEPTICEMIA: ICD-10-CM

## 2025-07-07 DIAGNOSIS — Z86.73 HISTORY OF STROKE: ICD-10-CM

## 2025-07-07 DIAGNOSIS — I10 ESSENTIAL HYPERTENSION: ICD-10-CM

## 2025-07-07 LAB
ANION GAP SERPL CALCULATED.3IONS-SCNC: 13 MMOL/L (ref 7–15)
BUN SERPL-MCNC: 33 MG/DL (ref 8–23)
CALCIUM SERPL-MCNC: 9.6 MG/DL (ref 8.8–10.4)
CHLORIDE SERPL-SCNC: 105 MMOL/L (ref 98–107)
CREAT SERPL-MCNC: 1.21 MG/DL (ref 0.51–0.95)
EGFRCR SERPLBLD CKD-EPI 2021: 47 ML/MIN/1.73M2
ERYTHROCYTE [DISTWIDTH] IN BLOOD BY AUTOMATED COUNT: 14.1 % (ref 10–15)
GLUCOSE SERPL-MCNC: 149 MG/DL (ref 70–99)
HCO3 SERPL-SCNC: 22 MMOL/L (ref 22–29)
HCT VFR BLD AUTO: 31 % (ref 35–47)
HGB BLD-MCNC: 9.5 G/DL (ref 11.7–15.7)
MCH RBC QN AUTO: 29.3 PG (ref 26.5–33)
MCHC RBC AUTO-ENTMCNC: 30.6 G/DL (ref 31.5–36.5)
MCV RBC AUTO: 96 FL (ref 78–100)
PLATELET # BLD AUTO: 334 10E3/UL (ref 150–450)
POTASSIUM SERPL-SCNC: 4 MMOL/L (ref 3.4–5.3)
RBC # BLD AUTO: 3.24 10E6/UL (ref 3.8–5.2)
SODIUM SERPL-SCNC: 140 MMOL/L (ref 135–145)
WBC # BLD AUTO: 10 10E3/UL (ref 4–11)

## 2025-07-07 PROCEDURE — 85027 COMPLETE CBC AUTOMATED: CPT | Mod: ORL

## 2025-07-07 PROCEDURE — 80048 BASIC METABOLIC PNL TOTAL CA: CPT | Mod: ORL

## 2025-07-07 PROCEDURE — 99309 SBSQ NF CARE MODERATE MDM 30: CPT

## 2025-07-07 PROCEDURE — P9604 ONE-WAY ALLOW PRORATED TRIP: HCPCS | Mod: ORL

## 2025-07-07 PROCEDURE — 36415 COLL VENOUS BLD VENIPUNCTURE: CPT | Mod: ORL

## 2025-07-07 NOTE — LETTER
7/7/2025      Addis Peña  [address]  [place] [address]        [facility] GERIATRICS    Chief Complaint   Patient presents with     RECHECK        HPI: Addis Peña is a 74 year old (1950), who is being seen today for an episodic care visit at: [facility] ([facility]) [[id number]]. HPI information obtained from: facility chart records, facility staff, patient report and [facility] chart review. 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  Patient presented to the ED via EMS from home for evaluation of left-sided weakness.  Initial evaluation with significant MARI (Cr 2.28), leukocytosis WBC 13.2, and anemia with Hgb 9.2.  Initial imaging with no acute infarction; noted findings of chronic right frontal infarct.  Neurocritical care consulted; did not recommend acute MRI given findings of CT.  Empirically started on norepinephrine and admitted to ICU due to concern of infection as she had worsening hypotension. UA with concerns as source of infection; started on Zosyn. UC grew E. Coli and transitioned to ceftriaxone. Transferred out of ICU on 5/11/25. Large staghorn calculus within the left kidney with mild hydronephrosis as well as small calcified renal stones in the right renal pelvis and diffuse proximal ureteral calcification but no hydronephrosis. Urology consulted; bilateral nephrostomy tube placement on 5/16/2025; recommended outpatient follow-up with plan for left percutaneous nephrolithotomy; recommend UA/UC 10 days prior to surgery. Discharged to TCU for medical management, rehab, and nursing care.      5/27: left nephrostomy tube dislodged after repositioning in TCU and was sent to ED. Seen by IR and noted difficulty replacing the tube in existing tract; plan to keep tube out since prior nephrostogram showed non-obstructing collecting system; monitor for clinical signs of urinary  "tract infection.      6/4: patient had follow-up with urology who recommended replacement of dislodged nephrostomy tube; referral placed; scheduled for 6/6 however, IR postponed since Plavix was not held for 5 days. Plan for tube replacement on 6/16.      6/11: Patient sent to ED for low hgb. Downtrending during TCU stay 11.2-> 7.3 -> 7.0. Received one unit PRBC. Discharged back to TCU.     6/16/25: underwent replacement of Left nephrostomy tube.     6/26/25: Underwent planned left PCNL with utereral stent placed. Hgb 9.5-> 8.9 post op. Urology noted residual stone burden on the left; recommend ureteroscopy in 3-4 weeks. Underwent right nephrostomy tube removal and right ureteral stent placement on 6/29 to help facilitate future ureteroscopy. Discharged from hospital with Hgb 9.7 (6/30). Transferred back to TCU.    Today:  Nursing staff reports no acute medical concerns.     Patient is seen today for a visit in her room. She is sleeping comfortably but awakens as writer calls her name. Feels \"good\". \"No\" pain. Therapies have been \"good\"; recalls doing sit/stand exercises.  Appetite is \"good\". Last BM was \"couple days\" ago. No belly pain. \"No\" concerns with bladder; uses incontinent briefs for support. Sleeping \"good\". Mood is \"good\". Denies CP, palpitations, lightheadedness, dizziness, fatigue, SOB, fever, chills, nausea/vomiting concerns today.    Allergies, and PMH/PSH reviewed in dooub today.  REVIEW OF SYSTEMS:  4 point ROS including Respiratory, CV, GI and , other than that noted in the HPI,  is negative    Objective:   BP (!) 148/60   Pulse 70   Temp 97.9  F (36.6  C)   Resp 16   Ht 1.676 m (5' 6\")   Wt 61.2 kg (135 lb)   LMP  (LMP Unknown)   SpO2 97%   BMI 21.79 kg/m    GENERAL APPEARANCE:  Alert, elderly, in no distress, laying in bed  HEENT:  atraumatic, [place], EOM intact, moist mucus membranes  RESP:  non-labored breathing, lungs clear on auscultation, no respiratory distress, no cough  CV:  Rate " regular, S1 S2 noted, no edema  ABDOMEN:  soft, non-distended, non-tender, bowel sounds active  M/S:   wheelchair bound, moves all extremities, strength and tone equal bilaterally, no calf pain, arthritic changes noted in hands  SKIN:  warm, dry, thin, fragile, no obvious rash, lesions, ulcerations or petechiae   NEURO:   Face is symmetric, examination of sensation by touch normal, follows and tracks, slow speech  PSYCH:  calm, cooperative      Labs reviewed as per [facility] and/or Care Everywhere.      Assessment/Plan:    Nephrolithiasis  Staghorn calculus  S/P ureteral stent placement   Urology following. S/p bilateral ureteral stent 6/26. Plan for ureteroscopy in 3-4 weeks.  Today, patient denies concerns. [facility] reports patient's follow-up appt on 7/8 had been cancelled by urology team; plan for procedure on 7/31.  -continue APAP 500 mg BID PRN  -follow-up with urology as scheduled      MARI (acute kidney injury)  Hx of Escherichia coli septicemia   Sepsis due to Escherichia coli with acute renal failure and septic shock and reason for previous hospitalization in May. Baseline Cr ~ 0.8. In TCU, recheck Cr 1.21 today.  -avoid nephrotoxins  -trend labs periodically     Anemia, unspecified type   Baseline ~11-12. Received 1 unit PRBC in ED on 6/11. Today, recheck Hgb 9.5; no signs of active bleeding.  -monitor bleeding risks  -continue Vit B12 injection monthly (last given 7/1/25)  -trend labs periodically     History of stroke  Spastic hemiplegia of left nondominant side as late effect of cerebral infarction (H)   Associated left sided weakness/spasm. Today, patient endorses no need to increase baclofen.  -continue baclofen 5 mg 3 times daily  -continue clopidogrel  -continue atorvastatin     Essential hypertension   Amlodipine decreased to 5 mg on 6/9/25 due to low normal SBPs. Today, //60  mmHg. HR 60-88 bpm.  -continue metoprolol ER 50 mg daily  -continue amlodipine 5 mg daily  -follow BP and HR; adjust  medications as needed     Seizure disorder (H)  Secondary to stroke.  -continue Keppra 1000 mg twice daily     Type 2 diabetes, HbA1C goal < 8% (H)  Last A1c 7.1% (5/16/25). In TCU, metformin increased to home dose 1000 mg BID on 5/20.   -continue metformin 1000 mg twice daily  -trend A1c every 6-12 months     Osteoporosis without current pathological fracture, unspecified osteoporosis type  -alendronate 35 mg weekly; consider increasing it to 70 mg weekly when renal function improves     Major depressive disorder, recurrent episode, mild   -continue citalopram 10 mg daily  -monitor mood and behaviors     Uncomplicated asthma, unspecified asthma severity, unspecified whether persistent   Today, O2 sats 97 % RA.  -montelukast 10 mg daily  -monitor respiratory status     Slow transit constipation  Scheduled senna started on 7/3. Today, no concerns.  -continue senna-S 1 tab BID and as needed  -monitor effectiveness     Physical deconditioning  Impaired mobility and activities of daily living   Secondary to diagnoses above and recent hospitalization. In TCU for rehabilitation.Patient is currently max assist; recommending LTC.  -PT/OT following - adv per recommendations   -SW available for safe discharge planning ongoing    MED REC REQUIRED  Post Medication Reconciliation Status: discharge medications reconciled and changed, per note/orders        35 minutes spent on the date of this encounter doing chart review, review labs, discussion with nursing staff, patient visit, and documentation.       Electronically signed by: Dr. Elda Scott, DNP, APRN, AGNP-BC, PHN    This note was completed with the assistance of dictation software. Typos and word substitution-errors are expected and unintended.            Sincerely,        Elda Scott, KEILA CNP    Electronically signed

## 2025-07-07 NOTE — PROGRESS NOTES
[facility] GERIATRICS    Chief Complaint   Patient presents with    RECHECK        HPI: Addis Peña is a 74 year old (1950), who is being seen today for an episodic care visit at: [facility] ([facility]) [[id number]]. HPI information obtained from: facility chart records, facility staff, patient report and [facility] chart review. 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  Patient presented to the ED via EMS from home for evaluation of left-sided weakness.  Initial evaluation with significant MARI (Cr 2.28), leukocytosis WBC 13.2, and anemia with Hgb 9.2.  Initial imaging with no acute infarction; noted findings of chronic right frontal infarct.  Neurocritical care consulted; did not recommend acute MRI given findings of CT.  Empirically started on norepinephrine and admitted to ICU due to concern of infection as she had worsening hypotension. UA with concerns as source of infection; started on Zosyn. UC grew E. Coli and transitioned to ceftriaxone. Transferred out of ICU on 5/11/25. Large staghorn calculus within the left kidney with mild hydronephrosis as well as small calcified renal stones in the right renal pelvis and diffuse proximal ureteral calcification but no hydronephrosis. Urology consulted; bilateral nephrostomy tube placement on 5/16/2025; recommended outpatient follow-up with plan for left percutaneous nephrolithotomy; recommend UA/UC 10 days prior to surgery. Discharged to TCU for medical management, rehab, and nursing care.      5/27: left nephrostomy tube dislodged after repositioning in TCU and was sent to ED. Seen by IR and noted difficulty replacing the tube in existing tract; plan to keep tube out since prior nephrostogram showed non-obstructing collecting system; monitor for clinical signs of urinary tract infection.      6/4: patient had follow-up with urology who recommended replacement of  "dislodged nephrostomy tube; referral placed; scheduled for 6/6 however, IR postponed since Plavix was not held for 5 days. Plan for tube replacement on 6/16.      6/11: Patient sent to ED for low hgb. Downtrending during TCU stay 11.2-> 7.3 -> 7.0. Received one unit PRBC. Discharged back to TCU.     6/16/25: underwent replacement of Left nephrostomy tube.     6/26/25: Underwent planned left PCNL with utereral stent placed. Hgb 9.5-> 8.9 post op. Urology noted residual stone burden on the left; recommend ureteroscopy in 3-4 weeks. Underwent right nephrostomy tube removal and right ureteral stent placement on 6/29 to help facilitate future ureteroscopy. Discharged from hospital with Hgb 9.7 (6/30). Transferred back to TCU.    Today:  Nursing staff reports no acute medical concerns.     Patient is seen today for a visit in her room. She is sleeping comfortably but awakens as writer calls her name. Feels \"good\". \"No\" pain. Therapies have been \"good\"; recalls doing sit/stand exercises.  Appetite is \"good\". Last BM was \"couple days\" ago. No belly pain. \"No\" concerns with bladder; uses incontinent briefs for support. Sleeping \"good\". Mood is \"good\". Denies CP, palpitations, lightheadedness, dizziness, fatigue, SOB, fever, chills, nausea/vomiting concerns today.    Allergies, and PMH/PSH reviewed in [facility] today.  REVIEW OF SYSTEMS:  4 point ROS including Respiratory, CV, GI and , other than that noted in the HPI,  is negative    Objective:   BP (!) 148/60   Pulse 70   Temp 97.9  F (36.6  C)   Resp 16   Ht 1.676 m (5' 6\")   Wt 61.2 kg (135 lb)   LMP  (LMP Unknown)   SpO2 97%   BMI 21.79 kg/m    GENERAL APPEARANCE:  Alert, elderly, in no distress, laying in bed  HEENT:  atraumatic, [place], EOM intact, moist mucus membranes  RESP:  non-labored breathing, lungs clear on auscultation, no respiratory distress, no cough  CV:  Rate regular, S1 S2 noted, no edema  ABDOMEN:  soft, non-distended, non-tender, bowel sounds " active  M/S:   wheelchair bound, moves all extremities, strength and tone equal bilaterally, no calf pain, arthritic changes noted in hands  SKIN:  warm, dry, thin, fragile, no obvious rash, lesions, ulcerations or petechiae   NEURO:   Face is symmetric, examination of sensation by touch normal, follows and tracks, slow speech  PSYCH:  calm, cooperative      Labs reviewed as per [facility] and/or Care Everywhere.      Assessment/Plan:    Nephrolithiasis  Staghorn calculus  S/P ureteral stent placement   Urology following. S/p bilateral ureteral stent 6/26. Plan for ureteroscopy in 3-4 weeks.  Today, patient denies concerns. [facility] reports patient's follow-up appt on 7/8 had been cancelled by urology team; plan for procedure on 7/31.  -continue APAP 500 mg BID PRN  -follow-up with urology as scheduled      MARI (acute kidney injury)  Hx of Escherichia coli septicemia   Sepsis due to Escherichia coli with acute renal failure and septic shock and reason for previous hospitalization in May. Baseline Cr ~ 0.8. In TCU, recheck Cr 1.21 today.  -avoid nephrotoxins  -trend labs periodically     Anemia, unspecified type   Baseline ~11-12. Received 1 unit PRBC in ED on 6/11. Today, recheck Hgb 9.5; no signs of active bleeding.  -monitor bleeding risks  -continue Vit B12 injection monthly (last given 7/1/25)  -trend labs periodically     History of stroke  Spastic hemiplegia of left nondominant side as late effect of cerebral infarction (H)   Associated left sided weakness/spasm. Today, patient endorses no need to increase baclofen.  -continue baclofen 5 mg 3 times daily  -continue clopidogrel  -continue atorvastatin     Essential hypertension   Amlodipine decreased to 5 mg on 6/9/25 due to low normal SBPs. Today, //60  mmHg. HR 60-88 bpm.  -continue metoprolol ER 50 mg daily  -continue amlodipine 5 mg daily  -follow BP and HR; adjust medications as needed     Seizure disorder (H)  Secondary to stroke.  -continue Keppra 1000  mg twice daily     Type 2 diabetes, HbA1C goal < 8% (H)  Last A1c 7.1% (5/16/25). In TCU, metformin increased to home dose 1000 mg BID on 5/20.   -continue metformin 1000 mg twice daily  -trend A1c every 6-12 months     Osteoporosis without current pathological fracture, unspecified osteoporosis type  -alendronate 35 mg weekly; consider increasing it to 70 mg weekly when renal function improves     Major depressive disorder, recurrent episode, mild   -continue citalopram 10 mg daily  -monitor mood and behaviors     Uncomplicated asthma, unspecified asthma severity, unspecified whether persistent   Today, O2 sats 97 % RA.  -montelukast 10 mg daily  -monitor respiratory status     Slow transit constipation  Scheduled senna started on 7/3. Today, no concerns.  -continue senna-S 1 tab BID and as needed  -monitor effectiveness     Physical deconditioning  Impaired mobility and activities of daily living   Secondary to diagnoses above and recent hospitalization. In TCU for rehabilitation.Patient is currently max assist; recommending LTC.  -PT/OT following - adv per recommendations   -SW available for safe discharge planning ongoing    MED REC REQUIRED  Post Medication Reconciliation Status: discharge medications reconciled and changed, per note/orders        35 minutes spent on the date of this encounter doing chart review, review labs, discussion with nursing staff, patient visit, and documentation.       Electronically signed by: Dr. Elda Scott, DNP, APRN, AGNP-BC, PHN    This note was completed with the assistance of dictation software. Typos and word substitution-errors are expected and unintended.

## 2025-07-10 ENCOUNTER — OFFICE VISIT (OUTPATIENT)
Dept: GERIATRICS | Facility: CLINIC | Age: 75
End: 2025-07-10
Payer: MEDICARE

## 2025-07-10 VITALS
RESPIRATION RATE: 17 BRPM | DIASTOLIC BLOOD PRESSURE: 83 MMHG | HEIGHT: 66 IN | OXYGEN SATURATION: 95 % | BODY MASS INDEX: 21.69 KG/M2 | WEIGHT: 135 LBS | HEART RATE: 80 BPM | TEMPERATURE: 98.2 F | SYSTOLIC BLOOD PRESSURE: 145 MMHG

## 2025-07-10 DIAGNOSIS — N20.0 STAGHORN CALCULUS: ICD-10-CM

## 2025-07-10 DIAGNOSIS — J45.909 UNCOMPLICATED ASTHMA, UNSPECIFIED ASTHMA SEVERITY, UNSPECIFIED WHETHER PERSISTENT: ICD-10-CM

## 2025-07-10 DIAGNOSIS — Z01.818 PREOPERATIVE EXAMINATION: Primary | ICD-10-CM

## 2025-07-10 DIAGNOSIS — E11.9 TYPE 2 DIABETES, HBA1C GOAL < 8% (H): ICD-10-CM

## 2025-07-10 DIAGNOSIS — M81.0 OSTEOPOROSIS WITHOUT CURRENT PATHOLOGICAL FRACTURE, UNSPECIFIED OSTEOPOROSIS TYPE: ICD-10-CM

## 2025-07-10 DIAGNOSIS — G40.909 SEIZURE DISORDER (H): ICD-10-CM

## 2025-07-10 DIAGNOSIS — N20.0 NEPHROLITHIASIS: ICD-10-CM

## 2025-07-10 DIAGNOSIS — F33.0 MAJOR DEPRESSIVE DISORDER, RECURRENT EPISODE, MILD: ICD-10-CM

## 2025-07-10 DIAGNOSIS — I69.354 SPASTIC HEMIPLEGIA OF LEFT NONDOMINANT SIDE AS LATE EFFECT OF CEREBRAL INFARCTION (H): ICD-10-CM

## 2025-07-10 DIAGNOSIS — Z86.73 HISTORY OF STROKE: ICD-10-CM

## 2025-07-10 DIAGNOSIS — I10 ESSENTIAL HYPERTENSION: ICD-10-CM

## 2025-07-10 DIAGNOSIS — N17.9 AKI (ACUTE KIDNEY INJURY): ICD-10-CM

## 2025-07-10 NOTE — LETTER
7/10/2025      Addis Peña  [address]  [place] [address]        Preoperative Evaluation  [facility]  [address] [place] [address]  Phone: [phone]  Fax: [phone]  Primary Provider: Joel Daniel Wegener, MD  Pre-op Performing Provider: KEILA Louis DNP  Jul 10, 2025     Surgical information  Surgery/procedure: laser cystoscopic removal of ureteral stone  Surgery location: [facility]  Surgeon: Dr. Aguirre  Surgery date: 7/31/25  Time of surgery: 1240  Where patient plans to recover: [facility] TCU    Fax number for surgical facility: Note does not need to be faxed, will be available electronically in Epic.    Nephrolithiasis  Staghorn calculus  S/p bilateral nephrostomy tubes on 5/15. S/p bilateral ureteral stent 6/26. Plan for ureteroscopy on 7/31 for further stone removal.  -continue APAP 500 mg BID PRN    History of stroke  Spastic hemiplegia of left nondominant side as late effect of cerebral infarction (H)   Associated left sided weakness/spasm.  -continue baclofen 5 mg 3 times daily  -HOLD clopidogrel on 7/26 until after surgery  -continue atorvastatin     Essential hypertension   Controlled.  -continue metoprolol and amlodipine     Seizure disorder (H)  Secondary to stroke.  -continue Keppra 1000 mg twice daily    MARI (acute kidney injury)  Baseline Cr ~ 0.8. Discharged from hospital with Cr 1.54. In TCU, last Cr 1.21 (7/7/25).  -avoid nephrotoxins     Type 2 diabetes, HbA1C goal < 8% (H)  Last A1c 7.1% (5/6/25)  -HOLD metformin on day of surgery     Anemia, unspecified type   Baseline ~11-12. Received 1 unit PRBC 6/11. Hgb improved, now 9.5 (7/7/25).     Osteoporosis without current pathological fracture, unspecified osteoporosis type  -HOLD vitamins 14 days before surgery     Major depressive disorder, recurrent episode, mild   -continue citalopram 10 mg daily     Uncomplicated asthma, unspecified asthma severity, unspecified  whether persistent   Today, O2 sats 96% RA.  -montelukast 10 mg daily  -monitor respiratory status    Additional recommendations:  Patient is medically optimized to undergo planned procedure.    Subjective  Addis is a 74 year old, presenting for the following:  Pre-Op Exam      HPI: Addis Peña is a 74 year old (1950) with Large staghorn calculus within the left kidney with mild hydronephrosis as well as small calcified renal stones in the right renal pelvis and diffuse proximal ureteral calcification but no hydronephrosis. S/p bilateral nephrostomy tubes on 5/15. S/p bilateral ureteral stent 6/26. Plan for ureteroscopy on 7/31 for further stone removal.       Advance Care Planning    Document on file is a Health Care Directive or POLST.    Preoperative Review of    reviewed - no record of controlled substances prescribed.      Patient Active Problem List    Diagnosis Date Noted     Osteoporosis without current pathological fracture, unspecified osteoporosis type 01/03/2024     Priority: Medium     Major depressive disorder, recurrent episode, mild 01/08/2022     Priority: Medium     Type 2 diabetes mellitus with other neurologic complication, with long-term current use of insulin (H) 12/30/2021     Priority: Medium     Essential hypertension 12/30/2021     Priority: Medium     Cerebrovascular accident (CVA) due to occlusion of right anterior cerebral artery (H) 12/30/2021     Priority: Medium     Spastic hemiplegia of left nondominant side as late effect of cerebral infarction (H) 12/20/2021     Priority: Medium     Seizure disorder (H) 04/20/2021     Priority: Medium     Type 2 diabetes mellitus with stage 3b chronic kidney disease, with long-term current use of insulin (H) 04/20/2021     Priority: Medium     Closed left hip fracture (H) 02/02/2020     Priority: Medium     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  No history of GITA 2 or greater found in epic.   No further cervical cancer screening recommended.    updated.              Type 2 diabetes mellitus with stage 3 chronic kidney disease, without long-term current use of insulin (H) 01/06/2017     Priority: Medium     Altered mental status 12/23/2015     Priority: Medium     MARI (acute kidney injury) 12/23/2015     Priority: Medium     Type 2 diabetes with stage 3 chronic kidney disease GFR 30-59 (H) 10/23/2015     Priority: Medium     Gout 01/28/2014     Priority: Medium     Problem list name updated by automated process. Provider to review       Hyperlipidemia LDL goal <70 01/20/2014     Priority: Medium     B12 deficiency anemia 06/25/2013     Priority: Medium     Major depression in complete remission (H) 07/06/2012     Priority: Medium     History of colonic polyps 04/13/2012     Priority: Medium     Problem list name updated by automated process. Provider to review       Mild major depression (H) 10/07/2011     Priority: Medium     GERD (gastroesophageal reflux disease) 10/07/2011     Priority: Medium     Seasonal allergic rhinitis 10/07/2011     Priority: Medium     Cerebral artery occlusion with cerebral infarction (H) 09/13/2011     Priority: Medium     Problem list name updated by automated process. Provider to review       Rosacea 02/09/2011     Priority: Medium     Hypertension goal BP (blood pressure) < 140/90      Priority: Medium     Type 2 diabetes, HbA1C goal < 8% (H)      Priority: Medium     Cerebrovascular accident (CVA) due to thrombosis of right anterior cerebral artery (H)      Priority: Medium     Untreated HTN ([id number]) and DM (hgba1c  11.4)  Right Anterior cerebral artery occlusion/distribution.  Angiogram possible arteritis.  Echo NL.  Hypercoag w.u normal  Vasculitis w.u. NL        Past Medical History:   Diagnosis Date     Allergic rhinitis      Allergic rhinitis      CKD (chronic kidney disease)      Depression      Depression      Displaced dome fracture of left acetabulum (H)      DM2 (diabetes mellitus, type 2) (H)      GERD (gastroesophageal reflux disease)      GERD (gastroesophageal reflux disease)      Gout      HTN (hypertension)      Hyperlipidaemia LDL goal <100      Hypertension goal BP (blood pressure) < 140/90      Left hemiplegia (H) 01/2010    sees PMR physician     Left hemiplegia (H)      Major depression in complete remission (H) 07/06/2012     Migraine      Migraine      Mild major depression (H) 10/07/2011     Mild nonproliferative diabetic retinopathy of both eyes (H) 02/2011     Mild nonproliferative diabetic retinopathy of both eyes (H)      Nephrolithiasis      Nephrolithiasis      Seizure disorder (H)      Spastic hemiplegia of left nondominant side as late effect of cerebral infarction (H) 12/20/2021     Stroke (H) 01/2010    due to uncontrolled hypertension     Stroke (H)      Type 2 diabetes, HbA1C goal < 8% (H)      Vitamin B12 deficiency anemia      Past Surgical History:   Procedure Laterality Date     COLONOSCOPY       COLONOSCOPY N/A 5/9/2019    Procedure: COLONOSCOPY, WITH POLYPECTOMY AND BIOPSY;  Surgeon: Na Diehl MD;  Location: [facility] OR     COLONOSCOPY W/ BIOPSIES AND POLYPECTOMY  05/09/2019    Procedure: COLONOSCOPY, WITH POLYPECTOMY AND BIOPSY; Surgeon: Na Diehl MD; Location: [facility] OR       colonscopy  3/2012    repeat 1 to 3 y     CYSTOSCOPY W/ URETEROSCOPY Right 02/22/2016    Procedure: COMBINED CYSTOSCOPY, URETEROSCOPY; Surgeon: Madelaine Roland MD; Location: [facility] OR       CYSTOSCOPY, URETEROSCOPY, COMBINED Right 2/22/2016    Procedure: COMBINED CYSTOSCOPY,  URETEROSCOPY;  Surgeon: Madelaine Roland MD;  Location: [facility] OR     ORIF ACETABULUM FRACTURE Left      Current Outpatient Medications   Medication Sig Dispense Refill     acetaminophen (TYLENOL) 500 MG tablet Take 500 mg by mouth 2 times daily as needed. AND give 1 tab PO daily PRN for pain rated 2-5/10 and 2 tabs PO daily PRN for pain rated 6-10/10       alendronate (FOSAMAX) 35 MG tablet Take 1 tablet (35 mg) by mouth every 7 days. 4 tablet 5     amLODIPine (NORVASC) 10 MG tablet Take 0.5 tablets (5 mg) by mouth daily. 90 tablet 3     atorvastatin (LIPITOR) 40 MG tablet Take 1 tablet (40 mg) by mouth daily. 90 tablet 3     baclofen (LIORESAL) 10 MG tablet Take 5 mg by mouth 3 times daily.       baclofen (LIORESAL) 10 MG tablet TAKE 1 TABLET(10 MG) BY MOUTH THREE TIMES DAILY 270 tablet 3     bisacodyl (DULCOLAX) 5 MG EC tablet Take 5 mg by mouth daily.       bisacodyl (DULCOLAX) 5 MG EC tablet Take 2 tablets (10 mg) by mouth daily as needed for constipation 180 tablet 3     blood glucose (CONTOUR TEST) test strip USE TO TEST BLOOD GLUCOSE ONCE DAILY 100 strip 2     citalopram (CELEXA) 10 MG tablet Take 1 tablet (10 mg) by mouth daily. 90 tablet 3     clopidogrel (PLAVIX) 75 MG tablet Take 1 tablet (75 mg) by mouth daily. 90 tablet 1     cyanocobalamin (CYANOCOBALAMIN) 1000 MCG/ML injection Inject 1 mL into the muscle every 30 days       Lactase 4500 units TABS Take 13,500 Units by mouth daily as needed       levETIRAcetam (KEPPRA) 250 MG tablet Take 1,000 mg by mouth 2 times daily.       metFORMIN (GLUCOPHAGE) 1000 MG tablet TAKE 1 TABLET(1000 MG) BY MOUTH TWICE DAILY WITH MEALS 180 tablet 3     metoprolol succinate ER (TOPROL XL) 50 MG 24 hr tablet Take 1 tablet (50 mg) by mouth daily. 90 tablet 3     montelukast (SINGULAIR) 10 MG tablet Take 1 tablet (10 mg) by mouth daily. 90 tablet 3     polyvinyl alcohol (LIQUIFILM TEARS) 1.4 % ophthalmic solution Place 1 drop into both eyes daily.       senna-docusate  "(SENOKOT-S/PERICOLACE) 8.6-50 MG tablet Take 1 tablet by mouth 2 times daily.       sodium chloride 0.9% infusion Use 10 ml intravenously one time a  day for Drain patency - Continue flushing each  tube daily until urine is clear and yellow, then can  discontinue       Vitamin D3 (VITAMIN D3) 25 mcg (1000 units) tablet Take 1 tablet (25 mcg) by mouth daily 90 tablet 3       Allergies   Allergen Reactions     Lisinopril Cough     Milk Products GI Disturbance     Latex Rash        Social History     Tobacco Use     Smoking status: Never     Smokeless tobacco: Never     Tobacco comments:     None   Substance Use Topics     Alcohol use: No       History   Drug Use No           Objective   BP (!) 145/83   Pulse 80   Temp 98.2  F (36.8  C)   Resp 17   Ht 1.676 m (5' 6\")   Wt 61.2 kg (135 lb)   LMP  (LMP Unknown)   SpO2 95%   BMI 21.79 kg/m     Estimated body mass index is 21.79 kg/m  as calculated from the following:    Height as of this encounter: 1.676 m (5' 6\").    Weight as of this encounter: 61.2 kg (135 lb).  Physical Exam  GENERAL APPEARANCE:  Alert, in no distress  HEENT:  atraumatic, [place], EOM intact, moist mucus membranes  RESP:  non-labored breathing, lungs clear on auscultation, no respiratory distress, no cough  CV:  Rate regular, S1 S2 noted, no edema  ABDOMEN:  soft, non-distended, non-tender, bowel sounds active  M/S:   wheelchair bound, moves all extremities, strength and tone equal bilaterally, no calf pain, arthritic changes noted in hands  SKIN:  warm, dry, thin, fragile, no obvious rash, lesions, ulcerations or petechiae   NEURO:   Face is symmetric, examination of sensation by touch normal, follows and tracks, slow speech  PSYCH:  calm, cooperative    Last Comprehensive Metabolic Panel:  Lab Results   Component Value Date     07/07/2025    POTASSIUM 4.0 07/07/2025    CHLORIDE 105 07/07/2025    CO2 22 07/07/2025    ANIONGAP 13 07/07/2025     (H) 07/07/2025    BUN 33.0 (H) " 07/07/2025    CR 1.21 (H) 07/07/2025    GFRESTIMATED 47 (L) 07/07/2025    ALBINO 9.6 07/07/2025       Lab Results   Component Value Date    WBC 10.0 07/07/2025    WBC 7.7 11/20/2020     Lab Results   Component Value Date    RBC 3.24 07/07/2025    RBC 4.05 11/20/2020     Lab Results   Component Value Date    HGB 9.5 07/07/2025    HGB 13.2 11/20/2020     Lab Results   Component Value Date    HCT 31.0 07/07/2025    HCT 40.9 11/20/2020     Lab Results   Component Value Date    MCV 96 07/07/2025     11/20/2020     Lab Results   Component Value Date    MCH 29.3 07/07/2025    MCH 32.6 11/20/2020     Lab Results   Component Value Date    MCHC 30.6 07/07/2025    MCHC 32.3 11/20/2020     Lab Results   Component Value Date    RDW 14.1 07/07/2025    RDW 11.8 11/20/2020     Lab Results   Component Value Date     07/07/2025     11/20/2020       Diagnostics  No EKG required for low risk surgery    Revised Cardiac Risk Index (RCRI)  The patient has the following serious cardiovascular risks for perioperative complications:   - Cerebrovascular Disease (TIA or CVA) = 1 point     RCRI Interpretation: 1 point: Class II (low risk - 0.9% complication rate)         Signed Electronically by: KEILA Louis DNP  A copy of this evaluation report is provided to the requesting physician.        Sincerely,        KEILA Louis CNP    Electronically signed

## 2025-07-10 NOTE — PROGRESS NOTES
Preoperative Evaluation  [facility] GERIATRICS  [address] [place] [address]  Phone: [phone]  Fax: [phone]  Primary Provider: Joel Daniel Wegener, MD  Pre-op Performing Provider: KEILA Louis [facility]  Jul 10, 2025     Surgical information  Surgery/procedure: laser cystoscopic removal of ureteral stone  Surgery location: [facility]  Surgeon: Dr. Aguirre  Surgery date: 7/31/25  Time of surgery: 1240  Where patient plans to recover: [facility] TCU    Fax number for surgical facility: Note does not need to be faxed, will be available electronically in Epic.    Nephrolithiasis  Staghorn calculus  S/p bilateral nephrostomy tubes on 5/15. S/p bilateral ureteral stent 6/26. Plan for ureteroscopy on 7/31 for further stone removal.  -continue APAP 500 mg BID PRN    History of stroke  Spastic hemiplegia of left nondominant side as late effect of cerebral infarction (H)   Associated left sided weakness/spasm.  -continue baclofen 5 mg 3 times daily  -HOLD clopidogrel on 7/26 until after surgery  -continue atorvastatin     Essential hypertension   Controlled.  -continue metoprolol and amlodipine     Seizure disorder (H)  Secondary to stroke.  -continue Keppra 1000 mg twice daily    MARI (acute kidney injury)  Baseline Cr ~ 0.8. Discharged from hospital with Cr 1.54. In TCU, last Cr 1.21 (7/7/25).  -avoid nephrotoxins     Type 2 diabetes, HbA1C goal < 8% (H)  Last A1c 7.1% (5/6/25)  -HOLD metformin on day of surgery     Anemia, unspecified type   Baseline ~11-12. Received 1 unit PRBC 6/11. Hgb improved, now 9.5 (7/7/25).     Osteoporosis without current pathological fracture, unspecified osteoporosis type  -HOLD vitamins 14 days before surgery     Major depressive disorder, recurrent episode, mild   -continue citalopram 10 mg daily     Uncomplicated asthma, unspecified asthma severity, unspecified whether persistent   Today, O2 sats 96% RA.  -montelukast 10 mg daily  -monitor respiratory  status    Additional recommendations:  Patient is medically optimized to undergo planned procedure.    Subjective   Addis is a 74 year old, presenting for the following:  Pre-Op Exam      HPI: Addis Peña is a 74 year old (1950) with Large staghorn calculus within the left kidney with mild hydronephrosis as well as small calcified renal stones in the right renal pelvis and diffuse proximal ureteral calcification but no hydronephrosis. S/p bilateral nephrostomy tubes on 5/15. S/p bilateral ureteral stent 6/26. Plan for ureteroscopy on 7/31 for further stone removal.       Advance Care Planning    Document on file is a Health Care Directive or POLST.    Preoperative Review of    reviewed - no record of controlled substances prescribed.      Patient Active Problem List    Diagnosis Date Noted    Osteoporosis without current pathological fracture, unspecified osteoporosis type 01/03/2024     Priority: Medium    Major depressive disorder, recurrent episode, mild 01/08/2022     Priority: Medium    Type 2 diabetes mellitus with other neurologic complication, with long-term current use of insulin (H) 12/30/2021     Priority: Medium    Essential hypertension 12/30/2021     Priority: Medium    Cerebrovascular accident (CVA) due to occlusion of right anterior cerebral artery (H) 12/30/2021     Priority: Medium    Spastic hemiplegia of left nondominant side as late effect of cerebral infarction (H) 12/20/2021     Priority: Medium    Seizure disorder (H) 04/20/2021     Priority: Medium    Type 2 diabetes mellitus with stage 3b chronic kidney disease, with long-term current use of insulin (H) 04/20/2021     Priority: Medium    Closed left hip fracture (H) 02/02/2020     Priority: Medium    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  No history of GITA 2 or greater found in epic.   No further cervical cancer screening recommended.    updated.             Type 2 diabetes mellitus with stage 3 chronic kidney disease, without long-term current use of insulin (H) 01/06/2017     Priority: Medium    Altered mental status 12/23/2015     Priority: Medium    MARI (acute kidney injury) 12/23/2015     Priority: Medium    Type 2 diabetes with stage 3 chronic kidney disease GFR 30-59 (H) 10/23/2015     Priority: Medium    Gout 01/28/2014     Priority: Medium     Problem list name updated by automated process. Provider to review      Hyperlipidemia LDL goal <70 01/20/2014     Priority: Medium    B12 deficiency anemia 06/25/2013     Priority: Medium    Major depression in complete remission (H) 07/06/2012     Priority: Medium    History of colonic polyps 04/13/2012     Priority: Medium     Problem list name updated by automated process. Provider to review      Mild major depression (H) 10/07/2011     Priority: Medium    GERD (gastroesophageal reflux disease) 10/07/2011     Priority: Medium    Seasonal allergic rhinitis 10/07/2011     Priority: Medium    Cerebral artery occlusion with cerebral infarction (H) 09/13/2011     Priority: Medium     Problem list name updated by automated process. Provider to review      Rosacea 02/09/2011     Priority: Medium    Hypertension goal BP (blood pressure) < 140/90      Priority: Medium    Type 2 diabetes, HbA1C goal < 8% (H)      Priority: Medium    Cerebrovascular accident (CVA) due to thrombosis of right anterior cerebral artery (H)      Priority: Medium     Untreated HTN ([id number]) and DM (hgba1c 11.4)  Right Anterior cerebral artery occlusion/distribution.  Angiogram possible arteritis.  Echo NL.  Hypercoag w.u  normal  Vasculitis w.u. NL        Past Medical History:   Diagnosis Date    Allergic rhinitis     Allergic rhinitis     CKD (chronic kidney disease)     Depression     Depression     Displaced dome fracture of left acetabulum (H)     DM2 (diabetes mellitus, type 2) (H)     GERD (gastroesophageal reflux disease)     GERD (gastroesophageal reflux disease)     Gout     HTN (hypertension)     Hyperlipidaemia LDL goal <100     Hypertension goal BP (blood pressure) < 140/90     Left hemiplegia (H) 01/2010    sees PMR physician    Left hemiplegia (H)     Major depression in complete remission (H) 07/06/2012    Migraine     Migraine     Mild major depression (H) 10/07/2011    Mild nonproliferative diabetic retinopathy of both eyes (H) 02/2011    Mild nonproliferative diabetic retinopathy of both eyes (H)     Nephrolithiasis     Nephrolithiasis     Seizure disorder (H)     Spastic hemiplegia of left nondominant side as late effect of cerebral infarction (H) 12/20/2021    Stroke (H) 01/2010    due to uncontrolled hypertension    Stroke (H)     Type 2 diabetes, HbA1C goal < 8% (H)     Vitamin B12 deficiency anemia      Past Surgical History:   Procedure Laterality Date    COLONOSCOPY      COLONOSCOPY N/A 5/9/2019    Procedure: COLONOSCOPY, WITH POLYPECTOMY AND BIOPSY;  Surgeon: Na Diehl MD;  Location: [facility] OR    COLONOSCOPY W/ BIOPSIES AND POLYPECTOMY  05/09/2019    Procedure: COLONOSCOPY, WITH POLYPECTOMY AND BIOPSY; Surgeon: Na Diehl MD; Location: [facility] OR      colonscopy  3/2012    repeat 1 to 3 y    CYSTOSCOPY W/ URETEROSCOPY Right 02/22/2016    Procedure: COMBINED CYSTOSCOPY, URETEROSCOPY; Surgeon: Madelaine Roland MD; Location: [facility] OR      CYSTOSCOPY, URETEROSCOPY, COMBINED Right 2/22/2016    Procedure: COMBINED CYSTOSCOPY, URETEROSCOPY;  Surgeon: Madelaine Roland MD;  Location: [facility] OR    ORIF ACETABULUM FRACTURE Left      Current Outpatient Medications   Medication  Sig Dispense Refill    acetaminophen (TYLENOL) 500 MG tablet Take 500 mg by mouth 2 times daily as needed. AND give 1 tab PO daily PRN for pain rated 2-5/10 and 2 tabs PO daily PRN for pain rated 6-10/10      alendronate (FOSAMAX) 35 MG tablet Take 1 tablet (35 mg) by mouth every 7 days. 4 tablet 5    amLODIPine (NORVASC) 10 MG tablet Take 0.5 tablets (5 mg) by mouth daily. 90 tablet 3    atorvastatin (LIPITOR) 40 MG tablet Take 1 tablet (40 mg) by mouth daily. 90 tablet 3    baclofen (LIORESAL) 10 MG tablet Take 5 mg by mouth 3 times daily.      baclofen (LIORESAL) 10 MG tablet TAKE 1 TABLET(10 MG) BY MOUTH THREE TIMES DAILY 270 tablet 3    bisacodyl (DULCOLAX) 5 MG EC tablet Take 5 mg by mouth daily.      bisacodyl (DULCOLAX) 5 MG EC tablet Take 2 tablets (10 mg) by mouth daily as needed for constipation 180 tablet 3    blood glucose (CONTOUR TEST) test strip USE TO TEST BLOOD GLUCOSE ONCE DAILY 100 strip 2    citalopram (CELEXA) 10 MG tablet Take 1 tablet (10 mg) by mouth daily. 90 tablet 3    clopidogrel (PLAVIX) 75 MG tablet Take 1 tablet (75 mg) by mouth daily. 90 tablet 1    cyanocobalamin (CYANOCOBALAMIN) 1000 MCG/ML injection Inject 1 mL into the muscle every 30 days      Lactase 4500 units TABS Take 13,500 Units by mouth daily as needed      levETIRAcetam (KEPPRA) 250 MG tablet Take 1,000 mg by mouth 2 times daily.      metFORMIN (GLUCOPHAGE) 1000 MG tablet TAKE 1 TABLET(1000 MG) BY MOUTH TWICE DAILY WITH MEALS 180 tablet 3    metoprolol succinate ER (TOPROL XL) 50 MG 24 hr tablet Take 1 tablet (50 mg) by mouth daily. 90 tablet 3    montelukast (SINGULAIR) 10 MG tablet Take 1 tablet (10 mg) by mouth daily. 90 tablet 3    polyvinyl alcohol (LIQUIFILM TEARS) 1.4 % ophthalmic solution Place 1 drop into both eyes daily.      senna-docusate (SENOKOT-S/PERICOLACE) 8.6-50 MG tablet Take 1 tablet by mouth 2 times daily.      sodium chloride 0.9% infusion Use 10 ml intravenously one time a  day for Drain patency -  "Continue flushing each  tube daily until urine is clear and yellow, then can  discontinue      Vitamin D3 (VITAMIN D3) 25 mcg (1000 units) tablet Take 1 tablet (25 mcg) by mouth daily 90 tablet 3       Allergies   Allergen Reactions    Lisinopril Cough    Milk Products GI Disturbance    Latex Rash        Social History     Tobacco Use    Smoking status: Never    Smokeless tobacco: Never    Tobacco comments:     None   Substance Use Topics    Alcohol use: No       History   Drug Use No           Objective    BP (!) 145/83   Pulse 80   Temp 98.2  F (36.8  C)   Resp 17   Ht 1.676 m (5' 6\")   Wt 61.2 kg (135 lb)   LMP  (LMP Unknown)   SpO2 95%   BMI 21.79 kg/m     Estimated body mass index is 21.79 kg/m  as calculated from the following:    Height as of this encounter: 1.676 m (5' 6\").    Weight as of this encounter: 61.2 kg (135 lb).  Physical Exam  GENERAL APPEARANCE:  Alert, in no distress  HEENT:  atraumatic, [place], EOM intact, moist mucus membranes  RESP:  non-labored breathing, lungs clear on auscultation, no respiratory distress, no cough  CV:  Rate regular, S1 S2 noted, no edema  ABDOMEN:  soft, non-distended, non-tender, bowel sounds active  M/S:   wheelchair bound, moves all extremities, strength and tone equal bilaterally, no calf pain, arthritic changes noted in hands  SKIN:  warm, dry, thin, fragile, no obvious rash, lesions, ulcerations or petechiae   NEURO:   Face is symmetric, examination of sensation by touch normal, follows and tracks, slow speech  PSYCH:  calm, cooperative    Last Comprehensive Metabolic Panel:  Lab Results   Component Value Date     07/07/2025    POTASSIUM 4.0 07/07/2025    CHLORIDE 105 07/07/2025    CO2 22 07/07/2025    ANIONGAP 13 07/07/2025     (H) 07/07/2025    BUN 33.0 (H) 07/07/2025    CR 1.21 (H) 07/07/2025    GFRESTIMATED 47 (L) 07/07/2025    ALBINO 9.6 07/07/2025       Lab Results   Component Value Date    WBC 10.0 07/07/2025    WBC 7.7 11/20/2020     Lab " Results   Component Value Date    RBC 3.24 07/07/2025    RBC 4.05 11/20/2020     Lab Results   Component Value Date    HGB 9.5 07/07/2025    HGB 13.2 11/20/2020     Lab Results   Component Value Date    HCT 31.0 07/07/2025    HCT 40.9 11/20/2020     Lab Results   Component Value Date    MCV 96 07/07/2025     11/20/2020     Lab Results   Component Value Date    MCH 29.3 07/07/2025    MCH 32.6 11/20/2020     Lab Results   Component Value Date    MCHC 30.6 07/07/2025    MCHC 32.3 11/20/2020     Lab Results   Component Value Date    RDW 14.1 07/07/2025    RDW 11.8 11/20/2020     Lab Results   Component Value Date     07/07/2025     11/20/2020       Diagnostics  No EKG required for low risk surgery    Revised Cardiac Risk Index (RCRI)  The patient has the following serious cardiovascular risks for perioperative complications:   - Cerebrovascular Disease (TIA or CVA) = 1 point     RCRI Interpretation: 1 point: Class II (low risk - 0.9% complication rate)         Signed Electronically by: KEILA Louis DNP  A copy of this evaluation report is provided to the requesting physician.

## 2025-07-14 ENCOUNTER — LAB REQUISITION (OUTPATIENT)
Dept: LAB | Facility: CLINIC | Age: 75
End: 2025-07-14
Payer: MEDICARE

## 2025-07-14 ENCOUNTER — TRANSITIONAL CARE UNIT VISIT (OUTPATIENT)
Dept: GERIATRICS | Facility: CLINIC | Age: 75
End: 2025-07-14
Payer: MEDICARE

## 2025-07-14 VITALS
HEART RATE: 73 BPM | SYSTOLIC BLOOD PRESSURE: 140 MMHG | TEMPERATURE: 98 F | RESPIRATION RATE: 20 BRPM | HEIGHT: 66 IN | BODY MASS INDEX: 21.69 KG/M2 | WEIGHT: 135 LBS | OXYGEN SATURATION: 95 % | DIASTOLIC BLOOD PRESSURE: 60 MMHG

## 2025-07-14 DIAGNOSIS — N20.0 NEPHROLITHIASIS: Primary | ICD-10-CM

## 2025-07-14 DIAGNOSIS — N20.0 STAGHORN CALCULUS: ICD-10-CM

## 2025-07-14 DIAGNOSIS — E11.9 TYPE 2 DIABETES, HBA1C GOAL < 8% (H): ICD-10-CM

## 2025-07-14 DIAGNOSIS — Z86.73 HISTORY OF STROKE: ICD-10-CM

## 2025-07-14 DIAGNOSIS — I69.354 SPASTIC HEMIPLEGIA OF LEFT NONDOMINANT SIDE AS LATE EFFECT OF CEREBRAL INFARCTION (H): ICD-10-CM

## 2025-07-14 DIAGNOSIS — F33.0 MAJOR DEPRESSIVE DISORDER, RECURRENT EPISODE, MILD: ICD-10-CM

## 2025-07-14 DIAGNOSIS — E11.9 TYPE 2 DIABETES MELLITUS WITHOUT COMPLICATIONS (H): ICD-10-CM

## 2025-07-14 DIAGNOSIS — G40.909 SEIZURE DISORDER (H): ICD-10-CM

## 2025-07-14 NOTE — PROGRESS NOTES
"[facility] GERIATRICS    Chief Complaint   Patient presents with    RECHECK     HPI:  Addis Peña is a 74 year old  (1950), who was previously living relatively independently, with pmhx including prior strokes with left sided weakens, post-stroke seizures on keppra, T2DM well controlled, osteoporosis, who was admitted to [facility] 5/16/25 after hospitalization for septic shock due to pyelonephritis r/t significant renal stones. She is being seen today for an episodic care visit at: [facility] ([facility]) [[id number]].     Seen today for routine visit.  She has no concerns.  Feels she is generally doing well.  Planning for further stone removal procedure 7/31/2025.  Seen previously by my colleague for preoperative evaluation without concerns for proceeding.    In review with therapies, noted that she is had decreased tolerance with therapy.  Tends to fatigue quickly and sleep following therapy.  She says she is sleeping well and tends to feel rested.  Her appetite is doing well.  Her weights have been stable. States that her plan now is to stay for long-term care.  Does not feel that she would be able to return to her prior living situation given her current functional needs.    Objective:   BP (!) 140/60   Pulse 73   Temp 98  F (36.7  C)   Resp 20   Ht 1.676 m (5' 6\")   Wt 61.2 kg (135 lb)   LMP  (LMP Unknown)   SpO2 95%   BMI 21.79 kg/m      GENERAL APPEARANCE: Laying in bed comfortably, NAD  PULM  Normal WOB on RA, lungs CTAB, no wheezes or crackles  CV:  RRR, S1/S2 normal, no murmurs; no LE edema  ABDOMEN: Abdomen soft, not tender, not distended, BS normal and active throughout   M/S:  Left UE in contracture  NEURO: Alert, answering questions appropriately, normal thought processes; CN II-XII grossly intact; spastic left UE.   PSYCH: Mood normal with congruent affect    Recent labs in EPIC reviewed by me today.     Assessment/Plan:    (N20.0) Nephrolithiasis  (primary encounter " diagnosis)  (N20.0) Staghorn calculus  Comment: Significant stone burden complicated by # septic shock for which she was previously hospitalized in May.  Following with urology and planning for step procedure stone removal.  Planning for next procedure 7/31/2025.  No concerns with proceeding.    (Z86.73) History of stroke  (I69.354) Spastic hemiplegia of left nondominant side as late effect of cerebral infarction (H)  Comment: Significant history of stroke affecting the right anterior territory with left-sided sequelae.  Impairing overall function and likely need for long-term care.  Plan:   - Continue daily clopidogrel, atorvastatin    (G40.909) Seizure disorder (H)  Comment: Related to her stroke history.  Continues on Keppra 1000 mg twice daily no recent seizure activity.    (E11.9) Type 2 diabetes, HbA1C goal < 8% (H)  Comment: Well-controlled with last A1c of 7.1%.  Continues on 500 mg metformin daily.  Given some concerns for overall function, repeat A1c and consider discontinuing metformin.   Plan:   -A1c    (F33.0) Major depressive disorder, recurrent episode, mild  Comment: Overall seems reasonably controlled on daily citalopram.  However concern from staff and therapy that significant fatigue possibly represents depression and decreased motivation for therapy, though this may represent significant impairment following ICU admission due to septic shock and associated deconditioning.  ACP was offered and she did not wish to pursue this.    MED REC REQUIRED  Post Medication Reconciliation Status: medication reconcilation previously completed during another office visit      Orders:  [x] A1c    Electronically signed by:    Benjamin Rosenstein, MD, MA  [facility]     This note was completed with the assistance of dictation software. Typos and word substitution-errors are expected and unintended.      34 MINUTES SPENT BY ME on the date of service doing chart review, history, exam,  documentation & further activities per the note.

## 2025-07-16 ENCOUNTER — RESULTS FOLLOW-UP (OUTPATIENT)
Dept: FAMILY MEDICINE | Facility: CLINIC | Age: 75
End: 2025-07-16

## 2025-07-16 ENCOUNTER — MYC MEDICAL ADVICE (OUTPATIENT)
Dept: FAMILY MEDICINE | Facility: CLINIC | Age: 75
End: 2025-07-16

## 2025-07-16 LAB
EST. AVERAGE GLUCOSE BLD GHB EST-MCNC: 146 MG/DL
HBA1C MFR BLD: 6.7 %

## 2025-07-16 PROCEDURE — 83036 HEMOGLOBIN GLYCOSYLATED A1C: CPT | Mod: ORL | Performed by: FAMILY MEDICINE

## 2025-07-16 PROCEDURE — 36415 COLL VENOUS BLD VENIPUNCTURE: CPT | Mod: ORL | Performed by: FAMILY MEDICINE

## 2025-07-16 PROCEDURE — P9604 ONE-WAY ALLOW PRORATED TRIP: HCPCS | Mod: ORL | Performed by: FAMILY MEDICINE

## 2025-07-16 NOTE — TELEPHONE ENCOUNTER
Dr. Wegener,    Please see below [employer] message - no labs ordered by PCP recently, sending as FYI    Thanks,   Barbara BARNES RN

## 2025-07-23 ENCOUNTER — PATIENT OUTREACH (OUTPATIENT)
Dept: CARE COORDINATION | Facility: CLINIC | Age: 75
End: 2025-07-23
Payer: MEDICARE

## 2025-07-23 NOTE — PROGRESS NOTES
Clinic Care Coordination Contact    Situation: Patient chart reviewed by care coordinator.    Background: Pt referred for care coordination    Assessment: continues to be in TCU at [facility]. A referral was made for pt Sig Buck Toro. He states that pt is not likely to return to her home due to health needs.     Plan/Recommendations: Referral will be closed at this time. If pt returns to the community, a new CC referral can be made at that time.     Janie Domínguez [facility]  Phone: [phone]  E-mail: [email or web address]

## 2025-07-24 ENCOUNTER — TRANSITIONAL CARE UNIT VISIT (OUTPATIENT)
Dept: GERIATRICS | Facility: CLINIC | Age: 75
End: 2025-07-24
Payer: MEDICARE

## 2025-07-24 VITALS
RESPIRATION RATE: 16 BRPM | BODY MASS INDEX: 20.81 KG/M2 | OXYGEN SATURATION: 95 % | HEART RATE: 76 BPM | HEIGHT: 66 IN | DIASTOLIC BLOOD PRESSURE: 72 MMHG | WEIGHT: 129.5 LBS | TEMPERATURE: 97.7 F | SYSTOLIC BLOOD PRESSURE: 124 MMHG

## 2025-07-24 DIAGNOSIS — G40.909 SEIZURE DISORDER (H): ICD-10-CM

## 2025-07-24 DIAGNOSIS — R53.81 PHYSICAL DECONDITIONING: ICD-10-CM

## 2025-07-24 DIAGNOSIS — Z74.09 IMPAIRED MOBILITY AND ACTIVITIES OF DAILY LIVING: ICD-10-CM

## 2025-07-24 DIAGNOSIS — E11.9 TYPE 2 DIABETES, HBA1C GOAL < 8% (H): ICD-10-CM

## 2025-07-24 DIAGNOSIS — N17.9 AKI (ACUTE KIDNEY INJURY): ICD-10-CM

## 2025-07-24 DIAGNOSIS — K59.01 SLOW TRANSIT CONSTIPATION: ICD-10-CM

## 2025-07-24 DIAGNOSIS — H10.31 ACUTE BACTERIAL CONJUNCTIVITIS OF RIGHT EYE: Primary | ICD-10-CM

## 2025-07-24 DIAGNOSIS — Z86.73 HISTORY OF STROKE: ICD-10-CM

## 2025-07-24 DIAGNOSIS — N20.0 STAGHORN CALCULUS: ICD-10-CM

## 2025-07-24 DIAGNOSIS — F33.0 MAJOR DEPRESSIVE DISORDER, RECURRENT EPISODE, MILD: ICD-10-CM

## 2025-07-24 DIAGNOSIS — D64.9 ANEMIA, UNSPECIFIED TYPE: ICD-10-CM

## 2025-07-24 DIAGNOSIS — I69.354 SPASTIC HEMIPLEGIA OF LEFT NONDOMINANT SIDE AS LATE EFFECT OF CEREBRAL INFARCTION (H): ICD-10-CM

## 2025-07-24 DIAGNOSIS — N20.0 NEPHROLITHIASIS: ICD-10-CM

## 2025-07-24 DIAGNOSIS — M81.0 OSTEOPOROSIS WITHOUT CURRENT PATHOLOGICAL FRACTURE, UNSPECIFIED OSTEOPOROSIS TYPE: ICD-10-CM

## 2025-07-24 DIAGNOSIS — I10 ESSENTIAL HYPERTENSION: ICD-10-CM

## 2025-07-24 DIAGNOSIS — Z78.9 IMPAIRED MOBILITY AND ACTIVITIES OF DAILY LIVING: ICD-10-CM

## 2025-07-24 DIAGNOSIS — J45.909 UNCOMPLICATED ASTHMA, UNSPECIFIED ASTHMA SEVERITY, UNSPECIFIED WHETHER PERSISTENT: ICD-10-CM

## 2025-07-24 RX ORDER — POLYMYXIN B SULFATE AND TRIMETHOPRIM 1; 10000 MG/ML; [USP'U]/ML
1 SOLUTION OPHTHALMIC 3 TIMES DAILY
COMMUNITY
End: 2025-08-01

## 2025-07-24 NOTE — LETTER
7/24/2025      Addis Peña  [address]  [place] [address]        [facility] GERIATRICS    Chief Complaint   Patient presents with     RECHECK        HPI: Addis Peña is a 74 year old (1950), who is being seen today for an episodic care visit at: [facility] ([facility]) [[id number]]. HPI information obtained from: facility chart records, facility staff, patient report and [facility] chart review. 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  Patient presented to the ED via EMS from home for evaluation of left-sided weakness.  Initial evaluation with significant MARI (Cr 2.28), leukocytosis WBC 13.2, and anemia with Hgb 9.2.  Initial imaging with no acute infarction; noted findings of chronic right frontal infarct.  Neurocritical care consulted; did not recommend acute MRI given findings of CT.  Empirically started on norepinephrine and admitted to ICU due to concern of infection as she had worsening hypotension. UA with concerns as source of infection; started on Zosyn. UC grew E. Coli and transitioned to ceftriaxone. Transferred out of ICU on 5/11/25. Large staghorn calculus within the left kidney with mild hydronephrosis as well as small calcified renal stones in the right renal pelvis and diffuse proximal ureteral calcification but no hydronephrosis. Urology consulted; bilateral nephrostomy tube placement on 5/16/2025; recommended outpatient follow-up with plan for left percutaneous nephrolithotomy; recommend UA/UC 10 days prior to surgery. Discharged to TCU for medical management, rehab, and nursing care.      5/27: left nephrostomy tube dislodged after repositioning in TCU and was sent to ED. Seen by IR and noted difficulty replacing the tube in existing tract; plan to keep tube out since prior nephrostogram showed non-obstructing collecting system; monitor for clinical signs of urinary  "tract infection.      6/4: patient had follow-up with urology who recommended replacement of dislodged nephrostomy tube; referral placed; scheduled for 6/6 however, IR postponed since Plavix was not held for 5 days. Plan for tube replacement on 6/16.      6/11: Patient sent to ED for low hgb. Downtrending during TCU stay 11.2-> 7.3 -> 7.0. Received one unit PRBC. Discharged back to TCU.     6/16/25: underwent replacement of Left nephrostomy tube.     6/26/25: Underwent planned left PCNL with utereral stent placed. Hgb 9.5-> 8.9 post op. Urology noted residual stone burden on the left; recommend ureteroscopy in 3-4 weeks. Underwent right nephrostomy tube removal and right ureteral stent placement on 6/29 to help facilitate future ureteroscopy. Discharged from hospital with Hgb 9.7 (6/30). Transferred back to TCU.    Today:  Nursing staff reports no acute medical concerns.     Patient is seen today for a visit in her room. She is sleeping. Takes multiple attempts to wake her. She \"fine\". \"No\" pain anywhere.  Appetite is \"good\"; endorses eating all of her meals. Last BM was \"the other day\". \"No\" belly pain. Bladder feels \"fine\".  Sleeping \"good\". Mood is \"good\". \"No\" depression or SI. Denies CP, palpitations, lightheadedness, dizziness, fatigue, SOB, fever, chills, nausea/vomiting concerns today.      Allergies, and PMH/PSH reviewed in [facility] today.  REVIEW OF SYSTEMS:  4 point ROS including Respiratory, CV, GI and , other than that noted in the HPI,  is negative    Objective:   /72   Pulse 76   Temp 97.7  F (36.5  C)   Resp 16   Ht 1.676 m (5' 6\")   Wt 58.7 kg (129 lb 8 oz)   LMP  (LMP Unknown)   SpO2 95%   BMI 20.90 kg/m    GENERAL APPEARANCE:  Alert, elderly, in no distress, laying in bed  HEENT:  atraumatic, [place], EOM intact,  Right eye with red conjunctiva and purulent drainage noted; moist mucus membranes  RESP:  non-labored breathing, lungs clear on auscultation, no respiratory distress, no " cough  CV:  Rate regular, S1 S2 noted, no edema  ABDOMEN:  soft, non-distended, non-tender, bowel sounds active  M/S:   wheelchair bound, moves all extremities, strength and tone equal bilaterally, no calf pain, arthritic changes noted in hands  SKIN:  warm, dry, thin, fragile, no obvious rash, lesions, ulcerations or petechiae   NEURO:   Face is symmetric, examination of sensation by touch normal, follows and tracks, slow speech  PSYCH:  calm, cooperative      Labs reviewed as per Epic and/or Care Everywhere.      Assessment/Plan:  Acute bacterial conjunctivitis of right eye   Noted today on exam.  -start Polytrim 1 drop in right eye 3 times daily for 7 days  -monitor effectiveness    Nephrolithiasis  Staghorn calculus  S/P ureteral stent placement   Urology following. S/p bilateral ureteral stent 6/26. Plan for ureteroscopy on 7/31. Today, patient has no pain concerns.   -continue APAP 500 mg BID PRN  -follow-up with urology as scheduled      MARI (acute kidney injury)  Hx of Escherichia coli septicemia   Sepsis due to Escherichia coli with acute renal failure and septic shock and reason for previous hospitalization in May. Baseline Cr ~ 0.8. In TCU, recheck Cr 1.21 (7/7/25).  -avoid nephrotoxins  -trend labs periodically     Anemia, unspecified type   Baseline ~11-12. Received 1 unit PRBC in ED on 6/11. In TCU, recheck Hgb 9.5 (7/7/25). Today, no signs of active bleeding.  -monitor bleeding risks  -continue Vit B12 injection monthly (last given 7/1/25)  -trend labs periodically     History of stroke  Spastic hemiplegia of left nondominant side as late effect of cerebral infarction (H)   Associated left sided weakness/spasm. Today, patient denies any pain/spasm concerns.  -continue baclofen 5 mg 3 times daily  -continue clopidogrel  -continue atorvastatin     Essential hypertension   Amlodipine decreased to 5 mg on 6/9/25 due to low normal SBPs. Today, //72 mmHg. HR 73-76 bpm.  -continue metoprolol ER  50 mg daily  -continue amlodipine 5 mg daily  -follow BP and HR; adjust medications as needed     Seizure disorder (H)  Secondary to stroke.  -continue Keppra 1000 mg twice daily     Type 2 diabetes, HbA1C goal < 8% (H)  A1c 7.1% (5/16/25). In TCU, metformin increased to home dose 1000 mg BID on 5/20. Recheck A1c 6.7% (7/16/25).  -continue metformin 1000 mg twice daily  -trend A1c every 6-12 months     Osteoporosis without current pathological fracture, unspecified osteoporosis type  -alendronate 35 mg weekly; consider increasing it to 70 mg weekly when renal function improves     Major depressive disorder, recurrent episode, mild   Patient denies worsening mood.   -continue citalopram 10 mg daily  -monitor mood and behaviors     Uncomplicated asthma, unspecified asthma severity, unspecified whether persistent   Today, O2 sats 95% RA.  -montelukast 10 mg daily  -monitor respiratory status     Slow transit constipation  Scheduled senna started on 7/3. Today, no concerns.  -continue senna-S 1 tab BID and as needed  -monitor effectiveness     Physical deconditioning  Impaired mobility and activities of daily living   Secondary to diagnoses above and recent hospitalization. In TCU for rehabilitation.Patient is currently max assist; plan to move to LTC.  -PT/OT following - adv per recommendations   -SW available for safe discharge planning ongoing    MED REC REQUIRED  Post Medication Reconciliation Status: discharge medications reconciled and changed, per note/orders        35 minutes spent on the date of this encounter doing chart review, review labs, discussion with nursing staff, patient visit, and documentation.       Electronically signed by: Dr. Elda Scott, DNP, APRN, AGNP-BC, PHN    This note was completed with the assistance of dictation software. Typos and word substitution-errors are expected and unintended.           Sincerely,        Elda Scott, KEILA CNP    Electronically signed

## 2025-07-24 NOTE — PROGRESS NOTES
[facility] GERIATRICS    Chief Complaint   Patient presents with    RECHECK        HPI: Addis Peña is a 74 year old (1950), who is being seen today for an episodic care visit at: [facility] ([facility]) [[id number]]. HPI information obtained from: facility chart records, facility staff, patient report and [facility] chart review. 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  Patient presented to the ED via EMS from home for evaluation of left-sided weakness.  Initial evaluation with significant MARI (Cr 2.28), leukocytosis WBC 13.2, and anemia with Hgb 9.2.  Initial imaging with no acute infarction; noted findings of chronic right frontal infarct.  Neurocritical care consulted; did not recommend acute MRI given findings of CT.  Empirically started on norepinephrine and admitted to ICU due to concern of infection as she had worsening hypotension. UA with concerns as source of infection; started on Zosyn. UC grew E. Coli and transitioned to ceftriaxone. Transferred out of ICU on 5/11/25. Large staghorn calculus within the left kidney with mild hydronephrosis as well as small calcified renal stones in the right renal pelvis and diffuse proximal ureteral calcification but no hydronephrosis. Urology consulted; bilateral nephrostomy tube placement on 5/16/2025; recommended outpatient follow-up with plan for left percutaneous nephrolithotomy; recommend UA/UC 10 days prior to surgery. Discharged to TCU for medical management, rehab, and nursing care.      5/27: left nephrostomy tube dislodged after repositioning in TCU and was sent to ED. Seen by IR and noted difficulty replacing the tube in existing tract; plan to keep tube out since prior nephrostogram showed non-obstructing collecting system; monitor for clinical signs of urinary tract infection.      6/4: patient had follow-up with urology who recommended replacement of  "dislodged nephrostomy tube; referral placed; scheduled for 6/6 however, IR postponed since Plavix was not held for 5 days. Plan for tube replacement on 6/16.      6/11: Patient sent to ED for low hgb. Downtrending during TCU stay 11.2-> 7.3 -> 7.0. Received one unit PRBC. Discharged back to TCU.     6/16/25: underwent replacement of Left nephrostomy tube.     6/26/25: Underwent planned left PCNL with utereral stent placed. Hgb 9.5-> 8.9 post op. Urology noted residual stone burden on the left; recommend ureteroscopy in 3-4 weeks. Underwent right nephrostomy tube removal and right ureteral stent placement on 6/29 to help facilitate future ureteroscopy. Discharged from hospital with Hgb 9.7 (6/30). Transferred back to TCU.    Today:  Nursing staff reports no acute medical concerns.     Patient is seen today for a visit in her room. She is sleeping. Takes multiple attempts to wake her. She \"fine\". \"No\" pain anywhere.  Appetite is \"good\"; endorses eating all of her meals. Last BM was \"the other day\". \"No\" belly pain. Bladder feels \"fine\".  Sleeping \"good\". Mood is \"good\". \"No\" depression or SI. Denies CP, palpitations, lightheadedness, dizziness, fatigue, SOB, fever, chills, nausea/vomiting concerns today.      Allergies, and PMH/PSH reviewed in [facility] today.  REVIEW OF SYSTEMS:  4 point ROS including Respiratory, CV, GI and , other than that noted in the HPI,  is negative    Objective:   /72   Pulse 76   Temp 97.7  F (36.5  C)   Resp 16   Ht 1.676 m (5' 6\")   Wt 58.7 kg (129 lb 8 oz)   LMP  (LMP Unknown)   SpO2 95%   BMI 20.90 kg/m    GENERAL APPEARANCE:  Alert, elderly, in no distress, laying in bed  HEENT:  atraumatic, [place], EOM intact,  Right eye with red conjunctiva and purulent drainage noted; moist mucus membranes  RESP:  non-labored breathing, lungs clear on auscultation, no respiratory distress, no cough  CV:  Rate regular, S1 S2 noted, no edema  ABDOMEN:  soft, non-distended, non-tender, bowel " sounds active  M/S:   wheelchair bound, moves all extremities, strength and tone equal bilaterally, no calf pain, arthritic changes noted in hands  SKIN:  warm, dry, thin, fragile, no obvious rash, lesions, ulcerations or petechiae   NEURO:   Face is symmetric, examination of sensation by touch normal, follows and tracks, slow speech  PSYCH:  calm, cooperative      Labs reviewed as per Epic and/or Care Everywhere.      Assessment/Plan:  Acute bacterial conjunctivitis of right eye   Noted today on exam.  -start Polytrim 1 drop in right eye 3 times daily for 7 days  -monitor effectiveness    Nephrolithiasis  Staghorn calculus  S/P ureteral stent placement   Urology following. S/p bilateral ureteral stent 6/26. Plan for ureteroscopy on 7/31. Today, patient has no pain concerns.   -continue APAP 500 mg BID PRN  -follow-up with urology as scheduled      MARI (acute kidney injury)  Hx of Escherichia coli septicemia   Sepsis due to Escherichia coli with acute renal failure and septic shock and reason for previous hospitalization in May. Baseline Cr ~ 0.8. In TCU, recheck Cr 1.21 (7/7/25).  -avoid nephrotoxins  -trend labs periodically     Anemia, unspecified type   Baseline ~11-12. Received 1 unit PRBC in ED on 6/11. In TCU, recheck Hgb 9.5 (7/7/25). Today, no signs of active bleeding.  -monitor bleeding risks  -continue Vit B12 injection monthly (last given 7/1/25)  -trend labs periodically     History of stroke  Spastic hemiplegia of left nondominant side as late effect of cerebral infarction (H)   Associated left sided weakness/spasm. Today, patient denies any pain/spasm concerns.  -continue baclofen 5 mg 3 times daily  -continue clopidogrel  -continue atorvastatin     Essential hypertension   Amlodipine decreased to 5 mg on 6/9/25 due to low normal SBPs. Today, //72 mmHg. HR 73-76 bpm.  -continue metoprolol ER 50 mg daily  -continue amlodipine 5 mg daily  -follow BP and HR; adjust medications as needed      Seizure disorder (H)  Secondary to stroke.  -continue Keppra 1000 mg twice daily     Type 2 diabetes, HbA1C goal < 8% (H)  A1c 7.1% (5/16/25). In TCU, metformin increased to home dose 1000 mg BID on 5/20. Recheck A1c 6.7% (7/16/25).  -continue metformin 1000 mg twice daily  -trend A1c every 6-12 months     Osteoporosis without current pathological fracture, unspecified osteoporosis type  -alendronate 35 mg weekly; consider increasing it to 70 mg weekly when renal function improves     Major depressive disorder, recurrent episode, mild   Patient denies worsening mood.   -continue citalopram 10 mg daily  -monitor mood and behaviors     Uncomplicated asthma, unspecified asthma severity, unspecified whether persistent   Today, O2 sats 95% RA.  -montelukast 10 mg daily  -monitor respiratory status     Slow transit constipation  Scheduled senna started on 7/3. Today, no concerns.  -continue senna-S 1 tab BID and as needed  -monitor effectiveness     Physical deconditioning  Impaired mobility and activities of daily living   Secondary to diagnoses above and recent hospitalization. In TCU for rehabilitation.Patient is currently max assist; plan to move to LTC.  -PT/OT following - adv per recommendations   -SW available for safe discharge planning ongoing    MED REC REQUIRED  Post Medication Reconciliation Status: discharge medications reconciled and changed, per note/orders        35 minutes spent on the date of this encounter doing chart review, review labs, discussion with nursing staff, patient visit, and documentation.       Electronically signed by: Dr. Elda Scott, DNP, APRN, AGNP-BC, PHN    This note was completed with the assistance of dictation software. Typos and word substitution-errors are expected and unintended.

## 2025-08-04 ENCOUNTER — TRANSITIONAL CARE UNIT VISIT (OUTPATIENT)
Dept: GERIATRICS | Facility: CLINIC | Age: 75
End: 2025-08-04
Payer: MEDICARE

## 2025-08-04 VITALS
OXYGEN SATURATION: 95 % | HEIGHT: 66 IN | BODY MASS INDEX: 19.44 KG/M2 | DIASTOLIC BLOOD PRESSURE: 72 MMHG | WEIGHT: 121 LBS | SYSTOLIC BLOOD PRESSURE: 130 MMHG | TEMPERATURE: 97.6 F | RESPIRATION RATE: 18 BRPM | HEART RATE: 62 BPM

## 2025-08-04 DIAGNOSIS — Z86.73 HISTORY OF STROKE: ICD-10-CM

## 2025-08-04 DIAGNOSIS — N20.0 STAGHORN CALCULUS: ICD-10-CM

## 2025-08-04 DIAGNOSIS — N20.0 NEPHROLITHIASIS: Primary | ICD-10-CM

## 2025-08-04 DIAGNOSIS — N17.9 AKI (ACUTE KIDNEY INJURY): ICD-10-CM

## 2025-08-04 DIAGNOSIS — D64.9 ANEMIA, UNSPECIFIED TYPE: ICD-10-CM

## 2025-08-04 DIAGNOSIS — Z74.09 IMPAIRED MOBILITY AND ACTIVITIES OF DAILY LIVING: ICD-10-CM

## 2025-08-04 DIAGNOSIS — J45.909 UNCOMPLICATED ASTHMA, UNSPECIFIED ASTHMA SEVERITY, UNSPECIFIED WHETHER PERSISTENT: ICD-10-CM

## 2025-08-04 DIAGNOSIS — M81.0 OSTEOPOROSIS WITHOUT CURRENT PATHOLOGICAL FRACTURE, UNSPECIFIED OSTEOPOROSIS TYPE: ICD-10-CM

## 2025-08-04 DIAGNOSIS — I69.354 SPASTIC HEMIPLEGIA OF LEFT NONDOMINANT SIDE AS LATE EFFECT OF CEREBRAL INFARCTION (H): ICD-10-CM

## 2025-08-04 DIAGNOSIS — F33.0 MAJOR DEPRESSIVE DISORDER, RECURRENT EPISODE, MILD: ICD-10-CM

## 2025-08-04 DIAGNOSIS — I10 ESSENTIAL HYPERTENSION: ICD-10-CM

## 2025-08-04 DIAGNOSIS — E11.9 TYPE 2 DIABETES, HBA1C GOAL < 8% (H): ICD-10-CM

## 2025-08-04 DIAGNOSIS — Z78.9 IMPAIRED MOBILITY AND ACTIVITIES OF DAILY LIVING: ICD-10-CM

## 2025-08-04 DIAGNOSIS — R53.81 PHYSICAL DECONDITIONING: ICD-10-CM

## 2025-08-04 DIAGNOSIS — G40.909 SEIZURE DISORDER (H): ICD-10-CM

## 2025-08-04 DIAGNOSIS — K59.01 SLOW TRANSIT CONSTIPATION: ICD-10-CM

## 2025-08-04 PROCEDURE — 99310 SBSQ NF CARE HIGH MDM 45: CPT

## 2025-08-06 ENCOUNTER — TELEPHONE (OUTPATIENT)
Dept: GERIATRICS | Facility: CLINIC | Age: 75
End: 2025-08-06
Payer: MEDICARE

## 2025-08-06 ENCOUNTER — DOCUMENTATION ONLY (OUTPATIENT)
Dept: GERIATRICS | Facility: CLINIC | Age: 75
End: 2025-08-06
Payer: MEDICARE

## 2025-08-06 RX ORDER — BISACODYL 5 MG/1
5 TABLET, DELAYED RELEASE ORAL DAILY PRN
COMMUNITY

## 2025-08-12 ENCOUNTER — DOCUMENTATION ONLY (OUTPATIENT)
Dept: OTHER | Facility: CLINIC | Age: 75
End: 2025-08-12
Payer: MEDICARE

## 2025-08-25 ENCOUNTER — TELEPHONE (OUTPATIENT)
Dept: OPHTHALMOLOGY | Facility: CLINIC | Age: 75
End: 2025-08-25
Payer: MEDICARE

## (undated) RX ORDER — FENTANYL CITRATE 50 UG/ML
INJECTION, SOLUTION INTRAMUSCULAR; INTRAVENOUS
Status: DISPENSED
Start: 2019-05-09